# Patient Record
Sex: MALE | Race: WHITE | Employment: OTHER | URBAN - METROPOLITAN AREA
[De-identification: names, ages, dates, MRNs, and addresses within clinical notes are randomized per-mention and may not be internally consistent; named-entity substitution may affect disease eponyms.]

---

## 2018-09-28 ENCOUNTER — APPOINTMENT (OUTPATIENT)
Dept: GENERAL RADIOLOGY | Age: 58
End: 2018-09-28
Payer: MEDICAID

## 2018-09-28 ENCOUNTER — HOSPITAL ENCOUNTER (EMERGENCY)
Age: 58
Discharge: HOME | End: 2018-09-28
Payer: MEDICAID

## 2018-09-28 VITALS
DIASTOLIC BLOOD PRESSURE: 96 MMHG | RESPIRATION RATE: 16 BRPM | HEART RATE: 62 BPM | BODY MASS INDEX: 22.22 KG/M2 | WEIGHT: 150 LBS | TEMPERATURE: 97.4 F | HEIGHT: 69 IN | OXYGEN SATURATION: 98 % | SYSTOLIC BLOOD PRESSURE: 126 MMHG

## 2018-09-28 DIAGNOSIS — R07.9 CHEST PAIN, UNSPECIFIED TYPE: Primary | ICD-10-CM

## 2018-09-28 DIAGNOSIS — M25.512 ACUTE PAIN OF LEFT SHOULDER: ICD-10-CM

## 2018-09-28 LAB
ALBUMIN SERPL-MCNC: 4.2 GM/DL (ref 3.4–5)
ALP BLD-CCNC: 65 IU/L (ref 40–129)
ALT SERPL-CCNC: 12 U/L (ref 10–40)
ANION GAP SERPL CALCULATED.3IONS-SCNC: 14 MMOL/L (ref 4–16)
AST SERPL-CCNC: 21 IU/L (ref 15–37)
BASOPHILS ABSOLUTE: 0.1 K/CU MM
BASOPHILS RELATIVE PERCENT: 0.6 % (ref 0–1)
BILIRUB SERPL-MCNC: 0.3 MG/DL (ref 0–1)
BUN BLDV-MCNC: 14 MG/DL (ref 6–23)
CALCIUM SERPL-MCNC: 9 MG/DL (ref 8.3–10.6)
CHLORIDE BLD-SCNC: 102 MMOL/L (ref 99–110)
CO2: 21 MMOL/L (ref 21–32)
CREAT SERPL-MCNC: 0.8 MG/DL (ref 0.9–1.3)
DIFFERENTIAL TYPE: ABNORMAL
EOSINOPHILS ABSOLUTE: 0.3 K/CU MM
EOSINOPHILS RELATIVE PERCENT: 2.7 % (ref 0–3)
GFR AFRICAN AMERICAN: >60 ML/MIN/1.73M2
GFR NON-AFRICAN AMERICAN: >60 ML/MIN/1.73M2
GLUCOSE BLD-MCNC: 93 MG/DL (ref 70–99)
HCT VFR BLD CALC: 41.1 % (ref 42–52)
HEMOGLOBIN: 13.8 GM/DL (ref 13.5–18)
IMMATURE NEUTROPHIL %: 0.2 % (ref 0–0.43)
LYMPHOCYTES ABSOLUTE: 2.3 K/CU MM
LYMPHOCYTES RELATIVE PERCENT: 24.4 % (ref 24–44)
MCH RBC QN AUTO: 29.4 PG (ref 27–31)
MCHC RBC AUTO-ENTMCNC: 33.6 % (ref 32–36)
MCV RBC AUTO: 87.6 FL (ref 78–100)
MONOCYTES ABSOLUTE: 0.9 K/CU MM
MONOCYTES RELATIVE PERCENT: 9.1 % (ref 0–4)
NUCLEATED RBC %: 0 %
PDW BLD-RTO: 15.4 % (ref 11.7–14.9)
PLATELET # BLD: 234 K/CU MM (ref 140–440)
PMV BLD AUTO: 8.8 FL (ref 7.5–11.1)
POTASSIUM SERPL-SCNC: 4.1 MMOL/L (ref 3.5–5.1)
RBC # BLD: 4.69 M/CU MM (ref 4.6–6.2)
SEGMENTED NEUTROPHILS ABSOLUTE COUNT: 6 K/CU MM
SEGMENTED NEUTROPHILS RELATIVE PERCENT: 63 % (ref 36–66)
SODIUM BLD-SCNC: 137 MMOL/L (ref 135–145)
TOTAL IMMATURE NEUTOROPHIL: 0.02 K/CU MM
TOTAL NUCLEATED RBC: 0 K/CU MM
TOTAL PROTEIN: 6.6 GM/DL (ref 6.4–8.2)
TROPONIN T: <0.01 NG/ML
WBC # BLD: 9.5 K/CU MM (ref 4–10.5)

## 2018-09-28 PROCEDURE — 84484 ASSAY OF TROPONIN QUANT: CPT

## 2018-09-28 PROCEDURE — 93005 ELECTROCARDIOGRAM TRACING: CPT | Performed by: PHYSICIAN ASSISTANT

## 2018-09-28 PROCEDURE — 73030 X-RAY EXAM OF SHOULDER: CPT

## 2018-09-28 PROCEDURE — 71045 X-RAY EXAM CHEST 1 VIEW: CPT

## 2018-09-28 PROCEDURE — 85025 COMPLETE CBC W/AUTO DIFF WBC: CPT

## 2018-09-28 PROCEDURE — 99285 EMERGENCY DEPT VISIT HI MDM: CPT

## 2018-09-28 PROCEDURE — 80053 COMPREHEN METABOLIC PANEL: CPT

## 2018-09-28 PROCEDURE — 6370000000 HC RX 637 (ALT 250 FOR IP): Performed by: PHYSICIAN ASSISTANT

## 2018-09-28 RX ORDER — HYDROCODONE BITARTRATE AND ACETAMINOPHEN 5; 325 MG/1; MG/1
1 TABLET ORAL ONCE
Status: COMPLETED | OUTPATIENT
Start: 2018-09-28 | End: 2018-09-28

## 2018-09-28 RX ORDER — HYDROCODONE BITARTRATE AND ACETAMINOPHEN 5; 325 MG/1; MG/1
1 TABLET ORAL EVERY 6 HOURS PRN
Qty: 8 TABLET | Refills: 0 | Status: SHIPPED | OUTPATIENT
Start: 2018-09-28 | End: 2018-10-06

## 2018-09-28 RX ORDER — METHYLPREDNISOLONE 4 MG/1
TABLET ORAL
Qty: 1 KIT | Refills: 0 | Status: SHIPPED | OUTPATIENT
Start: 2018-09-28 | End: 2018-12-21 | Stop reason: ALTCHOICE

## 2018-09-28 RX ADMIN — HYDROCODONE BITARTRATE AND ACETAMINOPHEN 1 TABLET: 5; 325 TABLET ORAL at 07:14

## 2018-09-28 ASSESSMENT — PAIN SCALES - GENERAL
PAINLEVEL_OUTOF10: 10
PAINLEVEL_OUTOF10: 10

## 2018-09-28 ASSESSMENT — PAIN DESCRIPTION - ORIENTATION: ORIENTATION: LEFT

## 2018-09-28 ASSESSMENT — HEART SCORE: ECG: 0

## 2018-09-28 ASSESSMENT — PAIN DESCRIPTION - PAIN TYPE: TYPE: CHRONIC PAIN

## 2018-09-28 NOTE — ED PROVIDER NOTES
surgical history that includes Arm Surgery (Left, 30+ yrs ); Colonoscopy (2010); and other surgical history (Right, 07/08/2015). Home medications:   Prior to Admission medications    Medication Sig Start Date End Date Taking? Authorizing Provider   HYDROcodone-acetaminophen (NORCO) 5-325 MG per tablet Take 1 tablet by mouth every 6 hours as needed for Pain for up to 8 doses. . 9/28/18 10/6/18 Yes George Franks PA-C   methylPREDNISolone (MEDROL, ELOISA,) 4 MG tablet Take by mouth. 9/28/18  Yes Fuentes Gelineau TERRANCE Franks   naproxen (NAPROSYN) 500 MG tablet Take 1 tablet by mouth 2 times daily 4/5/18   Hair Hernandez MD   ondansetron (ZOFRAN ODT) 4 MG disintegrating tablet Take 1 tablet by mouth every 8 hours as needed for Nausea or Vomiting 4/5/18   Hair Hernandez MD   Dextromethorphan-guaiFENesin (Q-TUSSIN DM)  MG/5ML SYRP Take 5 mLs by mouth every 4 hours as needed for Cough 9/13/17   Debra Suresh PA-C   aspirin 81 MG chewable tablet Take 1 tablet by mouth daily 1/3/17   Jess Reddy MD   theophylline (CORA-24) 100 MG extended release capsule Take 1 capsule by mouth 2 times daily 1/3/17   Jess Reddy MD   verapamil (CALAN) 120 MG tablet Take 1 tablet by mouth every 12 hours 1/3/17   Jess Reddy MD   guaiFENesin [facility]) 600 MG extended release tablet Take 1 tablet by mouth 2 times daily 1/3/17   Jess Reddy MD   tiotropium (Ines Fulton) 18 MCG inhalation capsule Inhale 1 capsule into the lungs daily 1/3/17   Jess Reddy MD   albuterol sulfate HFA (PROVENTIL HFA) 108 (90 BASE) MCG/ACT inhaler Inhale 2 puffs into the lungs every 6 hours as needed for Wheezing 1/3/17   Jess Reddy MD   omeprazole (PRILOSEC) 20 MG capsule Take 1 capsule by mouth daily. 7/18/14   Liborio Brady MD   albuterol (PROVENTIL HFA) 108 (90 BASE) MCG/ACT inhaler Inhale 2 puffs into the lungs every 4 hours as needed for Wheezing or Shortness of Breath for 30 days. With spacer (and mask if indicated). Thanks.  12/30/13 1/29/14  Anika LEIGH Maciel's note for EKG read. Radiographs (if obtained):  [] The following radiograph was interpreted by myself in the absence of a radiologist:   [x] Radiologist's Report Reviewed:  XR CHEST PORTABLE   Final Result   No acute cardiopulmonary disease. XR SHOULDER LEFT (MIN 2 VIEWS)   Final Result   No acute abnormality.                 Labs  Results for orders placed or performed during the hospital encounter of 09/28/18   Troponin   Result Value Ref Range    Troponin T <0.010 <0.01 NG/ML   CBC Auto Differential   Result Value Ref Range    WBC 9.5 4.0 - 10.5 K/CU MM    RBC 4.69 4.6 - 6.2 M/CU MM    Hemoglobin 13.8 13.5 - 18.0 GM/DL    Hematocrit 41.1 (L) 42 - 52 %    MCV 87.6 78 - 100 FL    MCH 29.4 27 - 31 PG    MCHC 33.6 32.0 - 36.0 %    RDW 15.4 (H) 11.7 - 14.9 %    Platelets 496 016 - 002 K/CU MM    MPV 8.8 7.5 - 11.1 FL    Differential Type AUTOMATED DIFFERENTIAL     Segs Relative 63.0 36 - 66 %    Lymphocytes % 24.4 24 - 44 %    Monocytes % 9.1 (H) 0 - 4 %    Eosinophils % 2.7 0 - 3 %    Basophils % 0.6 0 - 1 %    Segs Absolute 6.0 K/CU MM    Lymphocytes # 2.3 K/CU MM    Monocytes # 0.9 K/CU MM    Eosinophils # 0.3 K/CU MM    Basophils # 0.1 K/CU MM    Nucleated RBC % 0.0 %    Total Nucleated RBC 0.0 K/CU MM    Total Immature Neutrophil 0.02 K/CU MM    Immature Neutrophil % 0.2 0 - 0.43 %   CMP   Result Value Ref Range    Sodium 137 135 - 145 MMOL/L    Potassium 4.1 3.5 - 5.1 MMOL/L    Chloride 102 99 - 110 mMol/L    CO2 21 21 - 32 MMOL/L    BUN 14 6 - 23 MG/DL    CREATININE 0.8 (L) 0.9 - 1.3 MG/DL    Glucose 93 70 - 99 MG/DL    Calcium 9.0 8.3 - 10.6 MG/DL    Alb 4.2 3.4 - 5.0 GM/DL    Total Protein 6.6 6.4 - 8.2 GM/DL    Total Bilirubin 0.3 0.0 - 1.0 MG/DL    ALT 12 10 - 40 U/L    AST 21 15 - 37 IU/L    Alkaline Phosphatase 65 40 - 129 IU/L    GFR Non-African American >60 >60 mL/min/1.73m2    GFR African American >60 >60 mL/min/1.73m2    Anion Gap 14 4 - 16   EKG 12 Lead   Result Value Ref Range

## 2018-09-29 PROCEDURE — 93010 ELECTROCARDIOGRAM REPORT: CPT | Performed by: INTERNAL MEDICINE

## 2018-10-01 LAB
EKG ATRIAL RATE: 65 BPM
EKG DIAGNOSIS: NORMAL
EKG P AXIS: 71 DEGREES
EKG P-R INTERVAL: 192 MS
EKG Q-T INTERVAL: 420 MS
EKG QRS DURATION: 100 MS
EKG QTC CALCULATION (BAZETT): 436 MS
EKG R AXIS: 69 DEGREES
EKG T AXIS: 77 DEGREES
EKG VENTRICULAR RATE: 65 BPM

## 2018-11-05 ENCOUNTER — HOSPITAL ENCOUNTER (OUTPATIENT)
Age: 58
Discharge: HOME | End: 2018-11-05
Payer: MEDICAID

## 2018-11-05 ENCOUNTER — HOSPITAL ENCOUNTER (OUTPATIENT)
Dept: GENERAL RADIOLOGY | Age: 58
Discharge: HOME | End: 2018-11-05
Payer: MEDICAID

## 2018-11-05 DIAGNOSIS — M99.05 SEGMENTAL AND SOMATIC DYSFUNCTION OF PELVIC REGION: ICD-10-CM

## 2018-11-05 DIAGNOSIS — M99.03 LUMBAR REGION SOMATIC DYSFUNCTION: ICD-10-CM

## 2018-11-05 PROCEDURE — 72170 X-RAY EXAM OF PELVIS: CPT

## 2018-11-05 PROCEDURE — 72100 X-RAY EXAM L-S SPINE 2/3 VWS: CPT

## 2018-11-30 ENCOUNTER — HOSPITAL ENCOUNTER (OUTPATIENT)
Dept: NEUROLOGY | Age: 58
Discharge: HOME | End: 2018-11-30
Payer: MEDICAID

## 2018-11-30 PROCEDURE — 95860 NEEDLE EMG 1 EXTREMITY: CPT

## 2018-12-19 ENCOUNTER — OFFICE VISIT (OUTPATIENT)
Dept: BARIATRICS/WEIGHT MGMT | Age: 58
End: 2018-12-19
Payer: MEDICAID

## 2018-12-19 VITALS
WEIGHT: 159.2 LBS | DIASTOLIC BLOOD PRESSURE: 80 MMHG | BODY MASS INDEX: 23.58 KG/M2 | HEIGHT: 69 IN | HEART RATE: 59 BPM | RESPIRATION RATE: 15 BRPM | SYSTOLIC BLOOD PRESSURE: 118 MMHG

## 2018-12-19 DIAGNOSIS — K44.9 HIATAL HERNIA: Primary | ICD-10-CM

## 2018-12-19 DIAGNOSIS — K27.9 PUD (PEPTIC ULCER DISEASE): ICD-10-CM

## 2018-12-19 DIAGNOSIS — K21.00 GASTROESOPHAGEAL REFLUX DISEASE WITH ESOPHAGITIS: ICD-10-CM

## 2018-12-19 PROCEDURE — G8420 CALC BMI NORM PARAMETERS: HCPCS | Performed by: SURGERY

## 2018-12-19 PROCEDURE — 4004F PT TOBACCO SCREEN RCVD TLK: CPT | Performed by: SURGERY

## 2018-12-19 PROCEDURE — 3017F COLORECTAL CA SCREEN DOC REV: CPT | Performed by: SURGERY

## 2018-12-19 PROCEDURE — G8427 DOCREV CUR MEDS BY ELIG CLIN: HCPCS | Performed by: SURGERY

## 2018-12-19 PROCEDURE — G8484 FLU IMMUNIZE NO ADMIN: HCPCS | Performed by: SURGERY

## 2018-12-19 PROCEDURE — 99203 OFFICE O/P NEW LOW 30 MIN: CPT | Performed by: SURGERY

## 2018-12-19 RX ORDER — PANTOPRAZOLE SODIUM 40 MG/1
40 TABLET, DELAYED RELEASE ORAL 2 TIMES DAILY
Qty: 60 TABLET | Refills: 3 | Status: ON HOLD | OUTPATIENT
Start: 2018-12-19 | End: 2019-01-28 | Stop reason: HOSPADM

## 2018-12-19 RX ORDER — PANTOPRAZOLE SODIUM 40 MG/1
40 TABLET, DELAYED RELEASE ORAL DAILY
COMMUNITY
End: 2018-12-21 | Stop reason: DRUGHIGH

## 2018-12-19 RX ORDER — AMOXICILLIN 250 MG
2 CAPSULE ORAL DAILY
Qty: 60 TABLET | Refills: 3 | Status: SHIPPED | OUTPATIENT
Start: 2018-12-19 | End: 2018-12-29

## 2018-12-19 ASSESSMENT — ENCOUNTER SYMPTOMS
COLOR CHANGE: 0
SORE THROAT: 0
CONSTIPATION: 1
WHEEZING: 0
BLOOD IN STOOL: 0
VOMITING: 0
ANAL BLEEDING: 0
ABDOMINAL PAIN: 1
SHORTNESS OF BREATH: 0
TROUBLE SWALLOWING: 0
NAUSEA: 1
DIARRHEA: 0
COUGH: 0
VOICE CHANGE: 0
PHOTOPHOBIA: 0

## 2018-12-21 ENCOUNTER — HOSPITAL ENCOUNTER (OUTPATIENT)
Age: 58
Discharge: HOME | End: 2018-12-21
Payer: MEDICAID

## 2018-12-21 ENCOUNTER — ANESTHESIA EVENT (OUTPATIENT)
Dept: OPERATING ROOM | Age: 58
DRG: 220 | End: 2018-12-21
Payer: MEDICAID

## 2018-12-21 ENCOUNTER — HOSPITAL ENCOUNTER (OUTPATIENT)
Dept: PREADMISSION TESTING | Age: 58
Discharge: HOME | End: 2018-12-25
Payer: MEDICAID

## 2018-12-21 VITALS
WEIGHT: 159 LBS | RESPIRATION RATE: 18 BRPM | BODY MASS INDEX: 23.55 KG/M2 | HEART RATE: 70 BPM | HEIGHT: 69 IN | TEMPERATURE: 98.8 F | DIASTOLIC BLOOD PRESSURE: 76 MMHG | SYSTOLIC BLOOD PRESSURE: 111 MMHG | OXYGEN SATURATION: 95 %

## 2018-12-21 LAB
ANION GAP SERPL CALCULATED.3IONS-SCNC: 10 MMOL/L (ref 4–16)
BUN BLDV-MCNC: 14 MG/DL (ref 6–23)
CALCIUM SERPL-MCNC: 9 MG/DL (ref 8.3–10.6)
CHLORIDE BLD-SCNC: 104 MMOL/L (ref 99–110)
CO2: 28 MMOL/L (ref 21–32)
CREAT SERPL-MCNC: 1 MG/DL (ref 0.9–1.3)
GFR AFRICAN AMERICAN: >60 ML/MIN/1.73M2
GFR NON-AFRICAN AMERICAN: >60 ML/MIN/1.73M2
GLUCOSE BLD-MCNC: 94 MG/DL (ref 70–99)
HCT VFR BLD CALC: 42.2 % (ref 42–52)
HEMOGLOBIN: 13.7 GM/DL (ref 13.5–18)
MCH RBC QN AUTO: 29.4 PG (ref 27–31)
MCHC RBC AUTO-ENTMCNC: 32.5 % (ref 32–36)
MCV RBC AUTO: 90.6 FL (ref 78–100)
PDW BLD-RTO: 14.9 % (ref 11.7–14.9)
PLATELET # BLD: 255 K/CU MM (ref 140–440)
PMV BLD AUTO: 8.9 FL (ref 7.5–11.1)
POTASSIUM SERPL-SCNC: 4.7 MMOL/L (ref 3.5–5.1)
RBC # BLD: 4.66 M/CU MM (ref 4.6–6.2)
SODIUM BLD-SCNC: 142 MMOL/L (ref 135–145)
WBC # BLD: 9.5 K/CU MM (ref 4–10.5)

## 2018-12-21 PROCEDURE — 80048 BASIC METABOLIC PNL TOTAL CA: CPT

## 2018-12-21 PROCEDURE — 85027 COMPLETE CBC AUTOMATED: CPT

## 2018-12-21 PROCEDURE — 36415 COLL VENOUS BLD VENIPUNCTURE: CPT

## 2018-12-21 RX ORDER — DOCUSATE SODIUM 100 MG/1
200 CAPSULE, LIQUID FILLED ORAL 2 TIMES DAILY PRN
COMMUNITY
End: 2019-10-11 | Stop reason: CLARIF

## 2018-12-21 RX ORDER — CEFAZOLIN SODIUM 1 G/50ML
1 INJECTION, SOLUTION INTRAVENOUS ONCE
Status: CANCELLED | OUTPATIENT
Start: 2018-12-26

## 2018-12-21 RX ORDER — SUCRALFATE ORAL 1 G/10ML
1 SUSPENSION ORAL 4 TIMES DAILY
Status: ON HOLD | COMMUNITY
End: 2018-12-27 | Stop reason: HOSPADM

## 2018-12-21 ASSESSMENT — ENCOUNTER SYMPTOMS: DYSPNEA ACTIVITY LEVEL: AFTER AMBULATING 2 FLIGHTS OF STAIRS

## 2018-12-21 ASSESSMENT — PAIN SCALES - GENERAL: PAINLEVEL_OUTOF10: 7

## 2018-12-21 ASSESSMENT — LIFESTYLE VARIABLES: SMOKING_STATUS: 1

## 2018-12-21 NOTE — ANESTHESIA PRE PROCEDURE
08/02/2017       HCG (If Applicable): No results found for: PREGTESTUR, PREGSERUM, HCG, HCGQUANT     ABGs: No results found for: PHART, PO2ART, WVZ1OSS, NMP4FZR, BEART, S7WVGSFX     Type & Screen (If Applicable):  No results found for: [facility]    Anesthesia Evaluation  Patient summary reviewed and Nursing notes reviewed no history of anesthetic complications:   Airway: Mallampati: II  TM distance: >3 FB   Neck ROM: limited  Mouth opening: > = 3 FB Dental:    (+) edentulous      Pulmonary: breath sounds clear to auscultation  (+) COPD:  current smoker ( ETOH once a month)                           Cardiovascular:  Exercise tolerance: good (>4 METS),   (+) hypertension:, NAVARRETE: after ambulating 2 flights of stairs,       NYHA Classification: I  ECG reviewed  Rhythm: regular  Rate: normal  Echocardiogram reviewed         Beta Blocker:  Not on Beta Blocker      ROS comment: ECHO   Summary   Left ventricular systolic function is normal.   Ejection fraction is visually estimated at 50%.   Essentially normal chamber sizes.   Sclerotic, but non-stenotic aortic valve.   No evidence of any pericardial effusion.   Dilated LVOT, measuring at 2.5 cm.   Dilated aortic root, measuring at 4.0 cm.   Inferior vena cava is dilated, measuring at 2.6 cm cm, but does collapse   with respiration.   Mild MR and TR noted   RVSP is normal      Signature      ------------------------------------------------------------------   Electronically signed by Shay Moreau MD (Interpreting   physician) on 08/02/2017 at 07:55 PM   ------------------------------------------------------------------    HR 65  Normal sinus rhythm   Incomplete right bundle branch block   Borderline ECG   When compared with ECG of 13-SEP-2017 11:25,   No significant change was found   Confirmed by [facility] MD, Dorcas Galeazzi ([id number]) on 9/29/2018 8:16:33 PM      Neuro/Psych:   (+) neuromuscular disease (Numbness & tingling in hands & feet; left shoulder impingement ):, headaches:

## 2018-12-22 LAB
EKG ATRIAL RATE: 65 BPM
EKG DIAGNOSIS: NORMAL
EKG P AXIS: 68 DEGREES
EKG P-R INTERVAL: 198 MS
EKG Q-T INTERVAL: 398 MS
EKG QRS DURATION: 100 MS
EKG QTC CALCULATION (BAZETT): 413 MS
EKG R AXIS: 35 DEGREES
EKG T AXIS: 57 DEGREES
EKG VENTRICULAR RATE: 65 BPM

## 2018-12-26 ENCOUNTER — HOSPITAL ENCOUNTER (INPATIENT)
Age: 58
LOS: 1 days | Discharge: HOME | DRG: 220 | End: 2018-12-27
Attending: SURGERY | Admitting: SURGERY
Payer: MEDICAID

## 2018-12-26 ENCOUNTER — ANESTHESIA (OUTPATIENT)
Dept: OPERATING ROOM | Age: 58
DRG: 220 | End: 2018-12-26
Payer: MEDICAID

## 2018-12-26 VITALS
OXYGEN SATURATION: 91 % | SYSTOLIC BLOOD PRESSURE: 136 MMHG | RESPIRATION RATE: 10 BRPM | TEMPERATURE: 97.1 F | DIASTOLIC BLOOD PRESSURE: 80 MMHG

## 2018-12-26 DIAGNOSIS — K44.9 PARAESOPHAGEAL HIATAL HERNIA: Primary | ICD-10-CM

## 2018-12-26 PROCEDURE — 6360000002 HC RX W HCPCS: Performed by: SURGERY

## 2018-12-26 PROCEDURE — 1200000000 HC SEMI PRIVATE

## 2018-12-26 PROCEDURE — 6360000002 HC RX W HCPCS: Performed by: ANESTHESIOLOGY

## 2018-12-26 PROCEDURE — 6360000002 HC RX W HCPCS: Performed by: NURSE ANESTHETIST, CERTIFIED REGISTERED

## 2018-12-26 PROCEDURE — 94761 N-INVAS EAR/PLS OXIMETRY MLT: CPT

## 2018-12-26 PROCEDURE — 2580000003 HC RX 258: Performed by: NURSE ANESTHETIST, CERTIFIED REGISTERED

## 2018-12-26 PROCEDURE — 2709999900 HC NON-CHARGEABLE SUPPLY: Performed by: SURGERY

## 2018-12-26 PROCEDURE — 0DV44ZZ RESTRICTION OF ESOPHAGOGASTRIC JUNCTION, PERCUTANEOUS ENDOSCOPIC APPROACH: ICD-10-PCS | Performed by: SURGERY

## 2018-12-26 PROCEDURE — S2900 ROBOTIC SURGICAL SYSTEM: HCPCS | Performed by: SURGERY

## 2018-12-26 PROCEDURE — 3600000009 HC SURGERY ROBOT BASE: Performed by: SURGERY

## 2018-12-26 PROCEDURE — 2700000000 HC OXYGEN THERAPY PER DAY

## 2018-12-26 PROCEDURE — 2580000003 HC RX 258: Performed by: SURGERY

## 2018-12-26 PROCEDURE — 6370000000 HC RX 637 (ALT 250 FOR IP): Performed by: SURGERY

## 2018-12-26 PROCEDURE — 7100000001 HC PACU RECOVERY - ADDTL 15 MIN: Performed by: SURGERY

## 2018-12-26 PROCEDURE — C9113 INJ PANTOPRAZOLE SODIUM, VIA: HCPCS | Performed by: SURGERY

## 2018-12-26 PROCEDURE — C1713 ANCHOR/SCREW BN/BN,TIS/BN: HCPCS | Performed by: SURGERY

## 2018-12-26 PROCEDURE — 0BUT4JZ SUPPLEMENT DIAPHRAGM WITH SYNTHETIC SUBSTITUTE, PERCUTANEOUS ENDOSCOPIC APPROACH: ICD-10-PCS | Performed by: SURGERY

## 2018-12-26 PROCEDURE — 2580000003 HC RX 258: Performed by: ANESTHESIOLOGY

## 2018-12-26 PROCEDURE — 43282 LAP PARAESOPH HER RPR W/MESH: CPT | Performed by: SURGERY

## 2018-12-26 PROCEDURE — 2500000003 HC RX 250 WO HCPCS: Performed by: SURGERY

## 2018-12-26 PROCEDURE — 2500000003 HC RX 250 WO HCPCS: Performed by: NURSE ANESTHETIST, CERTIFIED REGISTERED

## 2018-12-26 PROCEDURE — 7100000000 HC PACU RECOVERY - FIRST 15 MIN: Performed by: SURGERY

## 2018-12-26 PROCEDURE — 3700000001 HC ADD 15 MINUTES (ANESTHESIA): Performed by: SURGERY

## 2018-12-26 PROCEDURE — C9290 INJ, BUPIVACAINE LIPOSOME: HCPCS | Performed by: SURGERY

## 2018-12-26 PROCEDURE — 3600000019 HC SURGERY ROBOT ADDTL 15MIN: Performed by: SURGERY

## 2018-12-26 PROCEDURE — 3700000000 HC ANESTHESIA ATTENDED CARE: Performed by: SURGERY

## 2018-12-26 PROCEDURE — 8E0W4CZ ROBOTIC ASSISTED PROCEDURE OF TRUNK REGION, PERCUTANEOUS ENDOSCOPIC APPROACH: ICD-10-PCS | Performed by: SURGERY

## 2018-12-26 DEVICE — MESH HERN W7XL10CM SYN TISS REINF BIOABSRB DISP BIO-A: Type: IMPLANTABLE DEVICE | Site: ABDOMEN | Status: FUNCTIONAL

## 2018-12-26 RX ORDER — BISACODYL 10 MG
10 SUPPOSITORY, RECTAL RECTAL DAILY PRN
Status: DISCONTINUED | OUTPATIENT
Start: 2018-12-26 | End: 2018-12-27 | Stop reason: HOSPADM

## 2018-12-26 RX ORDER — HYDROMORPHONE HCL 110MG/55ML
0.5 PATIENT CONTROLLED ANALGESIA SYRINGE INTRAVENOUS EVERY 5 MIN PRN
Status: DISCONTINUED | OUTPATIENT
Start: 2018-12-26 | End: 2018-12-26 | Stop reason: HOSPADM

## 2018-12-26 RX ORDER — SODIUM CHLORIDE 0.9 % (FLUSH) 0.9 %
10 SYRINGE (ML) INJECTION EVERY 12 HOURS SCHEDULED
Status: DISCONTINUED | OUTPATIENT
Start: 2018-12-26 | End: 2018-12-27 | Stop reason: HOSPADM

## 2018-12-26 RX ORDER — ONDANSETRON 2 MG/ML
4 INJECTION INTRAMUSCULAR; INTRAVENOUS
Status: DISCONTINUED | OUTPATIENT
Start: 2018-12-26 | End: 2018-12-26 | Stop reason: HOSPADM

## 2018-12-26 RX ORDER — PROPOFOL 10 MG/ML
INJECTION, EMULSION INTRAVENOUS PRN
Status: DISCONTINUED | OUTPATIENT
Start: 2018-12-26 | End: 2018-12-26 | Stop reason: SDUPTHER

## 2018-12-26 RX ORDER — OXYCODONE HYDROCHLORIDE AND ACETAMINOPHEN 5; 325 MG/1; MG/1
1 TABLET ORAL EVERY 4 HOURS PRN
Status: DISCONTINUED | OUTPATIENT
Start: 2018-12-26 | End: 2018-12-27 | Stop reason: HOSPADM

## 2018-12-26 RX ORDER — ACETAMINOPHEN 325 MG/1
650 TABLET ORAL EVERY 4 HOURS PRN
Status: DISCONTINUED | OUTPATIENT
Start: 2018-12-26 | End: 2018-12-27 | Stop reason: HOSPADM

## 2018-12-26 RX ORDER — ROCURONIUM BROMIDE 10 MG/ML
INJECTION, SOLUTION INTRAVENOUS PRN
Status: DISCONTINUED | OUTPATIENT
Start: 2018-12-26 | End: 2018-12-26 | Stop reason: SDUPTHER

## 2018-12-26 RX ORDER — FENTANYL CITRATE 50 UG/ML
25 INJECTION, SOLUTION INTRAMUSCULAR; INTRAVENOUS EVERY 5 MIN PRN
Status: DISCONTINUED | OUTPATIENT
Start: 2018-12-26 | End: 2018-12-26 | Stop reason: HOSPADM

## 2018-12-26 RX ORDER — SENNA AND DOCUSATE SODIUM 50; 8.6 MG/1; MG/1
2 TABLET, FILM COATED ORAL 2 TIMES DAILY
Status: DISCONTINUED | OUTPATIENT
Start: 2018-12-26 | End: 2018-12-27 | Stop reason: HOSPADM

## 2018-12-26 RX ORDER — MAGNESIUM SULFATE 1 G/100ML
1 INJECTION INTRAVENOUS PRN
Status: DISCONTINUED | OUTPATIENT
Start: 2018-12-26 | End: 2018-12-27 | Stop reason: HOSPADM

## 2018-12-26 RX ORDER — PROMETHAZINE HYDROCHLORIDE 25 MG/ML
12.5 INJECTION, SOLUTION INTRAMUSCULAR; INTRAVENOUS EVERY 6 HOURS PRN
Status: DISCONTINUED | OUTPATIENT
Start: 2018-12-26 | End: 2018-12-27 | Stop reason: HOSPADM

## 2018-12-26 RX ORDER — CEFAZOLIN SODIUM 2 G/50ML
2 SOLUTION INTRAVENOUS EVERY 8 HOURS
Status: DISCONTINUED | OUTPATIENT
Start: 2018-12-26 | End: 2018-12-26 | Stop reason: SDUPTHER

## 2018-12-26 RX ORDER — DOCUSATE SODIUM 100 MG/1
100 CAPSULE, LIQUID FILLED ORAL 2 TIMES DAILY
Status: DISCONTINUED | OUTPATIENT
Start: 2018-12-26 | End: 2018-12-27 | Stop reason: HOSPADM

## 2018-12-26 RX ORDER — AMOXICILLIN 250 MG
2 CAPSULE ORAL DAILY
Status: DISCONTINUED | OUTPATIENT
Start: 2018-12-26 | End: 2018-12-26 | Stop reason: SDUPTHER

## 2018-12-26 RX ORDER — LIDOCAINE HYDROCHLORIDE 20 MG/ML
INJECTION, SOLUTION EPIDURAL; INFILTRATION; INTRACAUDAL; PERINEURAL PRN
Status: DISCONTINUED | OUTPATIENT
Start: 2018-12-26 | End: 2018-12-26 | Stop reason: SDUPTHER

## 2018-12-26 RX ORDER — OXYCODONE HYDROCHLORIDE AND ACETAMINOPHEN 5; 325 MG/1; MG/1
2 TABLET ORAL EVERY 4 HOURS PRN
Status: DISCONTINUED | OUTPATIENT
Start: 2018-12-26 | End: 2018-12-27 | Stop reason: HOSPADM

## 2018-12-26 RX ORDER — SODIUM CHLORIDE, SODIUM LACTATE, POTASSIUM CHLORIDE, CALCIUM CHLORIDE 600; 310; 30; 20 MG/100ML; MG/100ML; MG/100ML; MG/100ML
INJECTION, SOLUTION INTRAVENOUS ONCE
Status: COMPLETED | OUTPATIENT
Start: 2018-12-26 | End: 2018-12-26

## 2018-12-26 RX ORDER — POTASSIUM CHLORIDE 7.45 MG/ML
10 INJECTION INTRAVENOUS PRN
Status: DISCONTINUED | OUTPATIENT
Start: 2018-12-26 | End: 2018-12-27 | Stop reason: HOSPADM

## 2018-12-26 RX ORDER — HYDROMORPHONE HCL 110MG/55ML
1 PATIENT CONTROLLED ANALGESIA SYRINGE INTRAVENOUS
Status: DISCONTINUED | OUTPATIENT
Start: 2018-12-26 | End: 2018-12-27 | Stop reason: HOSPADM

## 2018-12-26 RX ORDER — SODIUM CHLORIDE, SODIUM LACTATE, POTASSIUM CHLORIDE, CALCIUM CHLORIDE 600; 310; 30; 20 MG/100ML; MG/100ML; MG/100ML; MG/100ML
INJECTION, SOLUTION INTRAVENOUS CONTINUOUS PRN
Status: DISCONTINUED | OUTPATIENT
Start: 2018-12-26 | End: 2018-12-26 | Stop reason: SDUPTHER

## 2018-12-26 RX ORDER — HYDRALAZINE HYDROCHLORIDE 20 MG/ML
INJECTION INTRAMUSCULAR; INTRAVENOUS PRN
Status: DISCONTINUED | OUTPATIENT
Start: 2018-12-26 | End: 2018-12-26 | Stop reason: SDUPTHER

## 2018-12-26 RX ORDER — ONDANSETRON 4 MG/1
4 TABLET, ORALLY DISINTEGRATING ORAL EVERY 8 HOURS PRN
Status: DISCONTINUED | OUTPATIENT
Start: 2018-12-26 | End: 2018-12-27 | Stop reason: HOSPADM

## 2018-12-26 RX ORDER — SODIUM CHLORIDE 0.9 % (FLUSH) 0.9 %
10 SYRINGE (ML) INJECTION PRN
Status: DISCONTINUED | OUTPATIENT
Start: 2018-12-26 | End: 2018-12-27 | Stop reason: HOSPADM

## 2018-12-26 RX ORDER — LABETALOL HYDROCHLORIDE 5 MG/ML
5 INJECTION, SOLUTION INTRAVENOUS EVERY 10 MIN PRN
Status: DISCONTINUED | OUTPATIENT
Start: 2018-12-26 | End: 2018-12-26 | Stop reason: HOSPADM

## 2018-12-26 RX ORDER — PANTOPRAZOLE SODIUM 40 MG/10ML
40 INJECTION, POWDER, LYOPHILIZED, FOR SOLUTION INTRAVENOUS 2 TIMES DAILY
Status: DISCONTINUED | OUTPATIENT
Start: 2018-12-26 | End: 2018-12-27 | Stop reason: HOSPADM

## 2018-12-26 RX ORDER — ACETAMINOPHEN 10 MG/ML
1000 INJECTION, SOLUTION INTRAVENOUS ONCE
Status: COMPLETED | OUTPATIENT
Start: 2018-12-26 | End: 2018-12-26

## 2018-12-26 RX ORDER — CEFAZOLIN SODIUM 1 G/50ML
1 INJECTION, SOLUTION INTRAVENOUS ONCE
Status: COMPLETED | OUTPATIENT
Start: 2018-12-26 | End: 2018-12-26

## 2018-12-26 RX ORDER — ONDANSETRON 2 MG/ML
INJECTION INTRAMUSCULAR; INTRAVENOUS PRN
Status: DISCONTINUED | OUTPATIENT
Start: 2018-12-26 | End: 2018-12-26 | Stop reason: SDUPTHER

## 2018-12-26 RX ORDER — ALBUTEROL SULFATE 90 UG/1
2 AEROSOL, METERED RESPIRATORY (INHALATION) EVERY 6 HOURS PRN
Status: DISCONTINUED | OUTPATIENT
Start: 2018-12-26 | End: 2018-12-27 | Stop reason: HOSPADM

## 2018-12-26 RX ORDER — DEXTROSE AND SODIUM CHLORIDE 5; .45 G/100ML; G/100ML
INJECTION, SOLUTION INTRAVENOUS CONTINUOUS
Status: DISCONTINUED | OUTPATIENT
Start: 2018-12-26 | End: 2018-12-27 | Stop reason: HOSPADM

## 2018-12-26 RX ORDER — ACETAMINOPHEN 650 MG/1
650 SUPPOSITORY RECTAL EVERY 4 HOURS PRN
Status: DISCONTINUED | OUTPATIENT
Start: 2018-12-26 | End: 2018-12-27 | Stop reason: HOSPADM

## 2018-12-26 RX ORDER — ONDANSETRON 2 MG/ML
4 INJECTION INTRAMUSCULAR; INTRAVENOUS EVERY 4 HOURS PRN
Status: DISCONTINUED | OUTPATIENT
Start: 2018-12-26 | End: 2018-12-27 | Stop reason: HOSPADM

## 2018-12-26 RX ORDER — FENTANYL CITRATE 50 UG/ML
INJECTION, SOLUTION INTRAMUSCULAR; INTRAVENOUS PRN
Status: DISCONTINUED | OUTPATIENT
Start: 2018-12-26 | End: 2018-12-26 | Stop reason: SDUPTHER

## 2018-12-26 RX ORDER — PROMETHAZINE HYDROCHLORIDE 25 MG/ML
6.25 INJECTION, SOLUTION INTRAMUSCULAR; INTRAVENOUS
Status: COMPLETED | OUTPATIENT
Start: 2018-12-26 | End: 2018-12-26

## 2018-12-26 RX ORDER — ACETAMINOPHEN 80 MG
TABLET,CHEWABLE ORAL
Status: DISCONTINUED
Start: 2018-12-26 | End: 2018-12-26 | Stop reason: WASHOUT

## 2018-12-26 RX ORDER — LABETALOL HYDROCHLORIDE 5 MG/ML
INJECTION, SOLUTION INTRAVENOUS PRN
Status: DISCONTINUED | OUTPATIENT
Start: 2018-12-26 | End: 2018-12-26 | Stop reason: SDUPTHER

## 2018-12-26 RX ORDER — CEFAZOLIN SODIUM 2 G/50ML
2 SOLUTION INTRAVENOUS EVERY 8 HOURS
Status: COMPLETED | OUTPATIENT
Start: 2018-12-26 | End: 2018-12-27

## 2018-12-26 RX ORDER — HYDROMORPHONE HCL 110MG/55ML
PATIENT CONTROLLED ANALGESIA SYRINGE INTRAVENOUS PRN
Status: DISCONTINUED | OUTPATIENT
Start: 2018-12-26 | End: 2018-12-26 | Stop reason: SDUPTHER

## 2018-12-26 RX ORDER — ONDANSETRON 2 MG/ML
4 INJECTION INTRAMUSCULAR; INTRAVENOUS EVERY 4 HOURS PRN
Status: DISCONTINUED | OUTPATIENT
Start: 2018-12-26 | End: 2018-12-26 | Stop reason: SDUPTHER

## 2018-12-26 RX ORDER — GLYCOPYRROLATE 1 MG/5 ML
SYRINGE (ML) INTRAVENOUS PRN
Status: DISCONTINUED | OUTPATIENT
Start: 2018-12-26 | End: 2018-12-26 | Stop reason: SDUPTHER

## 2018-12-26 RX ORDER — NEOSTIGMINE METHYLSULFATE 5 MG/5 ML
SYRINGE (ML) INTRAVENOUS PRN
Status: DISCONTINUED | OUTPATIENT
Start: 2018-12-26 | End: 2018-12-26 | Stop reason: SDUPTHER

## 2018-12-26 RX ORDER — DEXAMETHASONE SODIUM PHOSPHATE 4 MG/ML
INJECTION, SOLUTION INTRA-ARTICULAR; INTRALESIONAL; INTRAMUSCULAR; INTRAVENOUS; SOFT TISSUE PRN
Status: DISCONTINUED | OUTPATIENT
Start: 2018-12-26 | End: 2018-12-26 | Stop reason: SDUPTHER

## 2018-12-26 RX ADMIN — THEOPHYLLINE ANHYDROUS 100 MG: 100 CAPSULE, EXTENDED RELEASE ORAL at 21:28

## 2018-12-26 RX ADMIN — DOCUSATE SODIUM AND SENNOSIDES 2 TABLET: 8.6; 5 TABLET, FILM COATED ORAL at 21:28

## 2018-12-26 RX ADMIN — SODIUM CHLORIDE, POTASSIUM CHLORIDE, SODIUM LACTATE AND CALCIUM CHLORIDE: 600; 310; 30; 20 INJECTION, SOLUTION INTRAVENOUS at 11:56

## 2018-12-26 RX ADMIN — METRONIDAZOLE 500 MG: 500 INJECTION, SOLUTION INTRAVENOUS at 12:18

## 2018-12-26 RX ADMIN — SODIUM CHLORIDE, PRESERVATIVE FREE 10 ML: 5 INJECTION INTRAVENOUS at 21:31

## 2018-12-26 RX ADMIN — PANTOPRAZOLE SODIUM 40 MG: 40 INJECTION, POWDER, FOR SOLUTION INTRAVENOUS at 21:28

## 2018-12-26 RX ADMIN — CEFAZOLIN SODIUM 2 G: 1 INJECTION, SOLUTION INTRAVENOUS at 12:06

## 2018-12-26 RX ADMIN — HYDROMORPHONE HYDROCHLORIDE 1 MG: 2 INJECTION INTRAMUSCULAR; INTRAVENOUS; SUBCUTANEOUS at 12:32

## 2018-12-26 RX ADMIN — HYDROMORPHONE HYDROCHLORIDE 0.5 MG: 2 INJECTION INTRAMUSCULAR; INTRAVENOUS; SUBCUTANEOUS at 14:20

## 2018-12-26 RX ADMIN — Medication 3 MG: at 13:46

## 2018-12-26 RX ADMIN — SODIUM CHLORIDE, POTASSIUM CHLORIDE, SODIUM LACTATE AND CALCIUM CHLORIDE: 600; 310; 30; 20 INJECTION, SOLUTION INTRAVENOUS at 10:36

## 2018-12-26 RX ADMIN — LIDOCAINE HYDROCHLORIDE 80 MG: 20 INJECTION, SOLUTION EPIDURAL; INFILTRATION; INTRACAUDAL; PERINEURAL at 12:05

## 2018-12-26 RX ADMIN — LABETALOL HYDROCHLORIDE 5 MG: 5 INJECTION, SOLUTION INTRAVENOUS at 12:42

## 2018-12-26 RX ADMIN — SODIUM CHLORIDE, POTASSIUM CHLORIDE, SODIUM LACTATE AND CALCIUM CHLORIDE: 600; 310; 30; 20 INJECTION, SOLUTION INTRAVENOUS at 12:55

## 2018-12-26 RX ADMIN — OXYCODONE AND ACETAMINOPHEN 2 TABLET: 5; 325 TABLET ORAL at 21:52

## 2018-12-26 RX ADMIN — DEXAMETHASONE SODIUM PHOSPHATE 8 MG: 4 INJECTION, SOLUTION INTRAMUSCULAR; INTRAVENOUS at 12:22

## 2018-12-26 RX ADMIN — FENTANYL CITRATE 50 MCG: 50 INJECTION INTRAMUSCULAR; INTRAVENOUS at 13:16

## 2018-12-26 RX ADMIN — HYDROMORPHONE HYDROCHLORIDE 1 MG: 2 INJECTION INTRAMUSCULAR; INTRAVENOUS; SUBCUTANEOUS at 11:57

## 2018-12-26 RX ADMIN — CEFAZOLIN SODIUM 2 G: 2 SOLUTION INTRAVENOUS at 21:27

## 2018-12-26 RX ADMIN — ACETAMINOPHEN 1000 MG: 10 INJECTION, SOLUTION INTRAVENOUS at 13:48

## 2018-12-26 RX ADMIN — PROPOFOL 150 MG: 10 INJECTION, EMULSION INTRAVENOUS at 12:05

## 2018-12-26 RX ADMIN — DEXTROSE AND SODIUM CHLORIDE: 5; 450 INJECTION, SOLUTION INTRAVENOUS at 14:59

## 2018-12-26 RX ADMIN — VERAPAMIL HYDROCHLORIDE 120 MG: 120 TABLET, FILM COATED, EXTENDED RELEASE ORAL at 21:52

## 2018-12-26 RX ADMIN — DEXTROSE AND SODIUM CHLORIDE: 5; 450 INJECTION, SOLUTION INTRAVENOUS at 23:33

## 2018-12-26 RX ADMIN — HYDRALAZINE HYDROCHLORIDE 5 MG: 20 INJECTION, SOLUTION INTRAMUSCULAR; INTRAVENOUS at 12:47

## 2018-12-26 RX ADMIN — Medication 0.4 MG: at 13:46

## 2018-12-26 RX ADMIN — ROCURONIUM BROMIDE 30 MG: 50 INJECTION, SOLUTION INTRAVENOUS at 13:20

## 2018-12-26 RX ADMIN — DOCUSATE SODIUM 100 MG: 100 CAPSULE, LIQUID FILLED ORAL at 21:28

## 2018-12-26 RX ADMIN — ONDANSETRON HYDROCHLORIDE 4 MG: 2 SOLUTION INTRAMUSCULAR; INTRAVENOUS at 13:49

## 2018-12-26 RX ADMIN — ROCURONIUM BROMIDE 50 MG: 50 INJECTION, SOLUTION INTRAVENOUS at 12:07

## 2018-12-26 RX ADMIN — PROMETHAZINE HYDROCHLORIDE 6.25 MG: 25 INJECTION INTRAMUSCULAR; INTRAVENOUS at 14:30

## 2018-12-26 ASSESSMENT — PULMONARY FUNCTION TESTS
PIF_VALUE: 22
PIF_VALUE: 23
PIF_VALUE: 24
PIF_VALUE: 7
PIF_VALUE: 15
PIF_VALUE: 0
PIF_VALUE: 1
PIF_VALUE: 14
PIF_VALUE: 2
PIF_VALUE: 23
PIF_VALUE: 29
PIF_VALUE: 17
PIF_VALUE: 23
PIF_VALUE: 0
PIF_VALUE: 21
PIF_VALUE: 23
PIF_VALUE: 24
PIF_VALUE: 22
PIF_VALUE: 16
PIF_VALUE: 21
PIF_VALUE: 16
PIF_VALUE: 5
PIF_VALUE: 21
PIF_VALUE: 23
PIF_VALUE: 20
PIF_VALUE: 17
PIF_VALUE: 18
PIF_VALUE: 20
PIF_VALUE: 23
PIF_VALUE: 15
PIF_VALUE: 15
PIF_VALUE: 14
PIF_VALUE: 24
PIF_VALUE: 21
PIF_VALUE: 21
PIF_VALUE: 24
PIF_VALUE: 17
PIF_VALUE: 22
PIF_VALUE: 17
PIF_VALUE: 21
PIF_VALUE: 14
PIF_VALUE: 17
PIF_VALUE: 1
PIF_VALUE: 24
PIF_VALUE: 17
PIF_VALUE: 23
PIF_VALUE: 14
PIF_VALUE: 14
PIF_VALUE: 4
PIF_VALUE: 21
PIF_VALUE: 17
PIF_VALUE: 22
PIF_VALUE: 17
PIF_VALUE: 14
PIF_VALUE: 21
PIF_VALUE: 14
PIF_VALUE: 21
PIF_VALUE: 21
PIF_VALUE: 25
PIF_VALUE: 23
PIF_VALUE: 15
PIF_VALUE: 2
PIF_VALUE: 22
PIF_VALUE: 14
PIF_VALUE: 20
PIF_VALUE: 21
PIF_VALUE: 19
PIF_VALUE: 21
PIF_VALUE: 21
PIF_VALUE: 26
PIF_VALUE: 24
PIF_VALUE: 20
PIF_VALUE: 2
PIF_VALUE: 24
PIF_VALUE: 16
PIF_VALUE: 24
PIF_VALUE: 24
PIF_VALUE: 17
PIF_VALUE: 21
PIF_VALUE: 21
PIF_VALUE: 23
PIF_VALUE: 20
PIF_VALUE: 21
PIF_VALUE: 25
PIF_VALUE: 21
PIF_VALUE: 23
PIF_VALUE: 6
PIF_VALUE: 24
PIF_VALUE: 15
PIF_VALUE: 24
PIF_VALUE: 23
PIF_VALUE: 19
PIF_VALUE: 23
PIF_VALUE: 14
PIF_VALUE: 0
PIF_VALUE: 1
PIF_VALUE: 0
PIF_VALUE: 21
PIF_VALUE: 17
PIF_VALUE: 8
PIF_VALUE: 5
PIF_VALUE: 4
PIF_VALUE: 17
PIF_VALUE: 17
PIF_VALUE: 25
PIF_VALUE: 21
PIF_VALUE: 23
PIF_VALUE: 0
PIF_VALUE: 24
PIF_VALUE: 21
PIF_VALUE: 0
PIF_VALUE: 16
PIF_VALUE: 19
PIF_VALUE: 25
PIF_VALUE: 14
PIF_VALUE: 4
PIF_VALUE: 21
PIF_VALUE: 14
PIF_VALUE: 24
PIF_VALUE: 14
PIF_VALUE: 24

## 2018-12-26 ASSESSMENT — PAIN DESCRIPTION - LOCATION: LOCATION: ABDOMEN

## 2018-12-26 ASSESSMENT — PAIN SCALES - GENERAL
PAINLEVEL_OUTOF10: 8
PAINLEVEL_OUTOF10: 7

## 2018-12-26 ASSESSMENT — PAIN DESCRIPTION - DESCRIPTORS
DESCRIPTORS: PRESSURE
DESCRIPTORS: SHARP

## 2018-12-26 ASSESSMENT — PAIN DESCRIPTION - PAIN TYPE: TYPE: SURGICAL PAIN

## 2018-12-26 ASSESSMENT — PAIN - FUNCTIONAL ASSESSMENT: PAIN_FUNCTIONAL_ASSESSMENT: 0-10

## 2018-12-26 ASSESSMENT — PAIN DESCRIPTION - ORIENTATION: ORIENTATION: MID

## 2018-12-26 NOTE — OP NOTE
OPERATIVE REPORT      Jama Ramirez, 1960, 63 y.o.,  male, CSN: [id number]  December 26, 2018      PREOPERATIVE DIAGNOSES:   1. Refractory severe gastroesophageal reflux disease. 2. Medium size paraesophageal Hiatal hernia. POSTOPERATIVE DIAGNOSES:  1. Refractory severe gastroesophageal reflux disease. 2. Large paraesophageal Hiatal hernia. PROCEDURES DONE:   1. Laparoscopic da Perla-assisted robotic paraesophageal hiatal  Herniorrhaphy with Bio-A mesh 10 cm x 7 cm . 2. Nissen-Basilia floppy fundoplication. SURGEON: Gauri Hood MD, FACS, FICS. ASSISTANT: None. ANESTHESIA: General endotracheal anesthesia, as well as local to the wounds  preoperatively and postoperatively using 1% Xylocaine with epinephrine and  0.5% Marcaine, 50:50 mixture. ESTIMATED BLOOD LOSS: Less than 10 mL. BLOOD GIVEN: None. FLUIDS GIVEN: Crystalloid. DRAINS: None. COMPLICATIONS: None. CONDITION: Stable, extubated to the PACU. SPECIMENS: Hiatal hernial sac and Gastro-Esophageal pad of fat - discarded. OPERATIVE DESCRIPTION: After proper informed consent was signed by the  patient, knowing all the risks, benefits, and potential complications and  possible alternatives of the procedure, after the patient complied adequately  with 2 weeks worth of Slim-Fast diet, he was aware of the fact that  eructation after surgery might be difficult or impossible; well aware of the  fact that recurrent reflux might occur, recurrent hiatal hernia might happen,  need for further procedure or further interventions might be necessary. Given his history of longstanding, 20+ years of refractory GERD, with a CT-proven paraesophageal  hiatal hernia, and an EGD confirming the diagnosis and ruling out esophageal neoplasms and gross dysmotility disorders, decision was made to proceed with laparoscopic da  Perla-assisted hiatal herniorrhaphy and Nissen fundoplication.    The patient was properly identified in the holding area; he received 2 g of  Ancef IV, 500 mg of Flagyl IV. TEDs and SCDs were placed on his legs and  activated bilaterally and he voided his urinary bladder on-call to OR and  Hibiclens skin prep was performed; he was taken to the operating room, was  placed supine on the operating room table, and after institution of general  endotracheal anesthesia, his abdomen  was prepped and draped in usual sterile fashion. An OG tube was placed without difficulty by Anesthesia. The  above-mentioned anesthetic mixture was used to anesthetize all incisions,  starting 2 fingerbreadths above the umbilicus after a timeout was carried out  appropriately. Dissection was carried down all the way to the fascia. The  fascia was opened longitudinally for 1 cm. The peritoneal cavity was entered  under direct visualization. The 10-mm port was introduced. Pneumoperitoneum  was instituted using CO2 inflation of 14 mmHg pressure. Patient was placed  in steep reverse Trendelenburg position. Exploration of the abdomen revealed  the previously noted on CT scan hiatal hernia. The [facility] liver retractor was introduced, and with the help of another  retractor to lift the liver, the [facility] liver retractor was used to retract  the left lobe of the liver and exposed the hiatal hernia, and intrathoracic  fundus of the stomach was noted. The hiatal hernia defect was about 4 cm in  greatest dimension. All ports were placed under direct visualization, that  is two 8-mm ports in the left upper quadrant, mid-clavicular line and  anterior axillary line, and the right upper quadrant port in the  mid-clavicular line. All ports were 10 cm apart, one from the other in a  semilunar line. Attention was paid to the hiatal hernia. The gastrohepatic omentum/pars  flaccida were taken down meticulously using the [employer] ligature.  The hot  scissors were then used to take down the hernial sac, which was done without  any difficulty, starting at

## 2018-12-27 VITALS
WEIGHT: 159 LBS | HEART RATE: 65 BPM | HEIGHT: 69 IN | SYSTOLIC BLOOD PRESSURE: 122 MMHG | DIASTOLIC BLOOD PRESSURE: 95 MMHG | RESPIRATION RATE: 17 BRPM | TEMPERATURE: 97.8 F | BODY MASS INDEX: 23.55 KG/M2 | OXYGEN SATURATION: 96 %

## 2018-12-27 DIAGNOSIS — K44.9 PARAESOPHAGEAL HIATAL HERNIA: ICD-10-CM

## 2018-12-27 LAB
ANION GAP SERPL CALCULATED.3IONS-SCNC: 10 MMOL/L (ref 4–16)
BASOPHILS ABSOLUTE: 0 K/CU MM
BASOPHILS RELATIVE PERCENT: 0.3 % (ref 0–1)
BUN BLDV-MCNC: 7 MG/DL (ref 6–23)
CALCIUM SERPL-MCNC: 8.7 MG/DL (ref 8.3–10.6)
CHLORIDE BLD-SCNC: 105 MMOL/L (ref 99–110)
CO2: 25 MMOL/L (ref 21–32)
CREAT SERPL-MCNC: 0.8 MG/DL (ref 0.9–1.3)
DIFFERENTIAL TYPE: ABNORMAL
EOSINOPHILS ABSOLUTE: 0 K/CU MM
EOSINOPHILS RELATIVE PERCENT: 0.2 % (ref 0–3)
GFR AFRICAN AMERICAN: >60 ML/MIN/1.73M2
GFR NON-AFRICAN AMERICAN: >60 ML/MIN/1.73M2
GLUCOSE BLD-MCNC: 116 MG/DL (ref 70–99)
HCT VFR BLD CALC: 38.9 % (ref 42–52)
HEMOGLOBIN: 12.3 GM/DL (ref 13.5–18)
IMMATURE NEUTROPHIL %: 0.3 % (ref 0–0.43)
LYMPHOCYTES ABSOLUTE: 2 K/CU MM
LYMPHOCYTES RELATIVE PERCENT: 19.5 % (ref 24–44)
MCH RBC QN AUTO: 28.6 PG (ref 27–31)
MCHC RBC AUTO-ENTMCNC: 31.6 % (ref 32–36)
MCV RBC AUTO: 90.5 FL (ref 78–100)
MONOCYTES ABSOLUTE: 0.9 K/CU MM
MONOCYTES RELATIVE PERCENT: 8.2 % (ref 0–4)
NUCLEATED RBC %: 0 %
PDW BLD-RTO: 15.1 % (ref 11.7–14.9)
PLATELET # BLD: 218 K/CU MM (ref 140–440)
PMV BLD AUTO: 8.7 FL (ref 7.5–11.1)
POTASSIUM SERPL-SCNC: 3.9 MMOL/L (ref 3.5–5.1)
RBC # BLD: 4.3 M/CU MM (ref 4.6–6.2)
SEGMENTED NEUTROPHILS ABSOLUTE COUNT: 7.5 K/CU MM
SEGMENTED NEUTROPHILS RELATIVE PERCENT: 71.5 % (ref 36–66)
SODIUM BLD-SCNC: 140 MMOL/L (ref 135–145)
TOTAL IMMATURE NEUTOROPHIL: 0.03 K/CU MM
TOTAL NUCLEATED RBC: 0 K/CU MM
WBC # BLD: 10.4 K/CU MM (ref 4–10.5)

## 2018-12-27 PROCEDURE — 36415 COLL VENOUS BLD VENIPUNCTURE: CPT

## 2018-12-27 PROCEDURE — 6360000002 HC RX W HCPCS: Performed by: SURGERY

## 2018-12-27 PROCEDURE — 99024 POSTOP FOLLOW-UP VISIT: CPT | Performed by: SURGERY

## 2018-12-27 PROCEDURE — 85025 COMPLETE CBC W/AUTO DIFF WBC: CPT

## 2018-12-27 PROCEDURE — C9113 INJ PANTOPRAZOLE SODIUM, VIA: HCPCS | Performed by: SURGERY

## 2018-12-27 PROCEDURE — 94640 AIRWAY INHALATION TREATMENT: CPT

## 2018-12-27 PROCEDURE — 94150 VITAL CAPACITY TEST: CPT

## 2018-12-27 PROCEDURE — 2700000000 HC OXYGEN THERAPY PER DAY

## 2018-12-27 PROCEDURE — 80048 BASIC METABOLIC PNL TOTAL CA: CPT

## 2018-12-27 PROCEDURE — 2580000003 HC RX 258: Performed by: SURGERY

## 2018-12-27 PROCEDURE — 6370000000 HC RX 637 (ALT 250 FOR IP): Performed by: SURGERY

## 2018-12-27 PROCEDURE — 94761 N-INVAS EAR/PLS OXIMETRY MLT: CPT

## 2018-12-27 RX ORDER — OXYCODONE HYDROCHLORIDE AND ACETAMINOPHEN 5; 325 MG/1; MG/1
1-2 TABLET ORAL EVERY 6 HOURS PRN
Qty: 35 TABLET | Refills: 0 | Status: SHIPPED | OUTPATIENT
Start: 2018-12-27 | End: 2018-12-27 | Stop reason: SDUPTHER

## 2018-12-27 RX ORDER — OXYCODONE HYDROCHLORIDE AND ACETAMINOPHEN 5; 325 MG/1; MG/1
1-2 TABLET ORAL EVERY 6 HOURS PRN
Qty: 35 TABLET | Refills: 0 | Status: SHIPPED | OUTPATIENT
Start: 2018-12-27 | End: 2019-01-03

## 2018-12-27 RX ORDER — AMOXICILLIN 250 MG
2 CAPSULE ORAL DAILY
Qty: 60 TABLET | Refills: 3 | Status: SHIPPED | OUTPATIENT
Start: 2018-12-27 | End: 2019-01-06

## 2018-12-27 RX ADMIN — ENOXAPARIN SODIUM 40 MG: 40 INJECTION SUBCUTANEOUS at 09:43

## 2018-12-27 RX ADMIN — TIOTROPIUM BROMIDE 18 MCG: 18 CAPSULE ORAL; RESPIRATORY (INHALATION) at 08:12

## 2018-12-27 RX ADMIN — OXYCODONE AND ACETAMINOPHEN 2 TABLET: 5; 325 TABLET ORAL at 04:13

## 2018-12-27 RX ADMIN — DOCUSATE SODIUM 100 MG: 100 CAPSULE, LIQUID FILLED ORAL at 09:42

## 2018-12-27 RX ADMIN — CEFAZOLIN SODIUM 2 G: 2 SOLUTION INTRAVENOUS at 04:06

## 2018-12-27 RX ADMIN — OXYCODONE AND ACETAMINOPHEN 2 TABLET: 5; 325 TABLET ORAL at 11:20

## 2018-12-27 RX ADMIN — THEOPHYLLINE ANHYDROUS 100 MG: 100 CAPSULE, EXTENDED RELEASE ORAL at 09:42

## 2018-12-27 RX ADMIN — DOCUSATE SODIUM AND SENNOSIDES 2 TABLET: 8.6; 5 TABLET, FILM COATED ORAL at 09:42

## 2018-12-27 RX ADMIN — VERAPAMIL HYDROCHLORIDE 120 MG: 120 TABLET, FILM COATED, EXTENDED RELEASE ORAL at 09:42

## 2018-12-27 RX ADMIN — SODIUM CHLORIDE, PRESERVATIVE FREE 10 ML: 5 INJECTION INTRAVENOUS at 09:43

## 2018-12-27 RX ADMIN — DEXTROSE AND SODIUM CHLORIDE: 5; 450 INJECTION, SOLUTION INTRAVENOUS at 07:54

## 2018-12-27 RX ADMIN — PANTOPRAZOLE SODIUM 40 MG: 40 INJECTION, POWDER, FOR SOLUTION INTRAVENOUS at 09:43

## 2018-12-27 ASSESSMENT — PAIN DESCRIPTION - PAIN TYPE
TYPE: SURGICAL PAIN
TYPE: SURGICAL PAIN

## 2018-12-27 ASSESSMENT — PAIN DESCRIPTION - FREQUENCY
FREQUENCY: INTERMITTENT
FREQUENCY: INTERMITTENT

## 2018-12-27 ASSESSMENT — PAIN DESCRIPTION - ONSET
ONSET: PROGRESSIVE
ONSET: ON-GOING

## 2018-12-27 ASSESSMENT — PAIN DESCRIPTION - PROGRESSION
CLINICAL_PROGRESSION: GRADUALLY IMPROVING
CLINICAL_PROGRESSION: NOT CHANGED
CLINICAL_PROGRESSION: GRADUALLY WORSENING

## 2018-12-27 ASSESSMENT — PAIN DESCRIPTION - DESCRIPTORS
DESCRIPTORS: PRESSURE
DESCRIPTORS: DISCOMFORT

## 2018-12-27 ASSESSMENT — PAIN DESCRIPTION - LOCATION
LOCATION: ABDOMEN
LOCATION: ABDOMEN

## 2018-12-27 ASSESSMENT — PAIN SCALES - GENERAL
PAINLEVEL_OUTOF10: 6
PAINLEVEL_OUTOF10: 5
PAINLEVEL_OUTOF10: 2
PAINLEVEL_OUTOF10: 3

## 2018-12-27 ASSESSMENT — PAIN DESCRIPTION - ORIENTATION
ORIENTATION: MID
ORIENTATION: MID;UPPER

## 2018-12-27 NOTE — PLAN OF CARE
Problem: Pain:  Goal: Pain level will decrease  Pain level will decrease  Outcome: Ongoing    Goal: Control of acute pain  Control of acute pain  Outcome: Ongoing      Problem: Falls - Risk of:  Goal: Will remain free from falls  Will remain free from falls  Outcome: Ongoing      Problem: Skin Integrity:  Goal: Demonstration of wound healing without infection will improve  Demonstration of wound healing without infection will improve  Outcome: Ongoing

## 2018-12-27 NOTE — PROGRESS NOTES
Continue to wait for transport help
I met with patient on 12/27/18 for bedside tobacco consult. Karen Darnell stated that he smokes about 8 cigarettes per day. I explained that [facility] ([facility]) cares about his health and advised him to quit. Karen Darnell stated that he would like to quit and has cut back from a previous 2.5 packs per day. Karen Darnell is not using the nicotine patch and not having intense cravings. We discussed health concerns, reported by the patient-COPD-and the benefits of quitting. We discussed building a quit plan, identifying triggers, ways to fight cravings, modifying behaviors and building a quit kit to assist. I explained the REACH cessation program, provided educational information, and strongly encouraged Karen Darnell to contact me for outpatient services.  He again stated that he is trying to quit.       Alva Eddy, Certified Tobacco Treatment Specialist, [facility]      
Recd from or  All aalrms on  All safety checks done pt restless  Turning side to side  C/o post pain  rr even and regular  Report rtecd from [address]
Turned amadou well  Back to resting after  reassesment done  Leaving on o2 at 2l
K/CU MM    Immature Neutrophil % 0.3 0 - 0.43 %       Scheduled Meds:   theophylline  100 mg Oral BID    tiotropium  18 mcg Inhalation Daily    verapamil  120 mg Oral 2 times per day    sodium chloride flush  10 mL Intravenous 2 times per day    docusate sodium  100 mg Oral BID    enoxaparin  40 mg Subcutaneous Daily    pantoprazole  40 mg Intravenous BID    sennosides-docusate sodium  2 tablet Oral BID       Continuous Infusions:   dextrose 5 % and 0.45 % NaCl 125 mL/hr at 12/27/18 0754       Physical Exam:  HEENT: Anicteric sclerae, Oropharyngeal mucosae moist, pink and intact. Heart:  Normal S1 and S2, RRR  Lungs: Clear to auscultation bilaterally, No audible Wheezes or Rales. Extremities: No edema. Neuro: Alert and Oriented x 3, Non focal.  Abdomen: Soft, Benign, Non tender, Non distended, Positive bowel sounds. Incision: Nicely healing: No erythema, No discharge. Active Problems:    Paraesophageal hiatal hernia  Resolved Problems:    * No resolved hospital problems. *      Assessment and Plan:  Shruthi English is a 63 y.o. male who is POD # 1 status post:  1. Laparoscopic da Perla-assisted robotic paraesophageal hiatal  Herniorrhaphy with Bio-A mesh 10 cm x 7 cm . 2. Nissen-Basilia floppy fundoplication. Advance diet to full liquids as tolerated, and probably dc home today, on 10 days full liquids. Increase Ambulation to at least 4x/day walk in the hallways with assistance. Respiratory everett-operative care: Incentive Spirometry / deep breathing and coughing 10x/hr while awake. Continue DVT prophylaxis with Teds and SCDs and SC Lovenox. Continue GI prophylaxis with Protonix IV till able to tolerate PO. Continue everett-op Abx for 24 hrs after surgery then dc.    ___________________________________________    Tonya An MD, FACS, FICS  12/27/2018  8:52 AM    Patient was seen with total face to face time of 25 minutes.  More than 50% of this visit was counseling and education as

## 2018-12-27 NOTE — DISCHARGE SUMMARY
Refills: 3      tiotropium (SPIRIVA HANDIHALER) 18 MCG inhalation capsule Inhale 1 capsule into the lungs daily  Qty: 30 capsule, Refills: 3      albuterol sulfate HFA (PROVENTIL HFA) 108 (90 BASE) MCG/ACT inhaler Inhale 2 puffs into the lungs every 6 hours as needed for Wheezing  Qty: 1 Inhaler, Refills: 3      theophylline (CORA-24) 100 MG extended release capsule Take 1 capsule by mouth 2 times daily  Qty: 90 capsule, Refills: 3       !! - Potential duplicate medications found. Please discuss with provider. STOP taking these medications       sucralfate (CARAFATE) 1 GM/10ML suspension Comments:   Reason for Stopping:             Activity: no lifting > 20 Lbs, or Strenuous exercise for 3 weeks. Diet: encourage fluids and bariatric stage II diet for 2 wks.   Wound Care: keep wound clean and dry    Call  [phone] to make a follow-up appointment  with Dr Violetta Meyer MD, Sheeba Fonseca in 1 week.    ____________________________________________    Signed:      Violetta Meyer MD, HENRY GALLARDO    12/27/2018  1:02 PM

## 2019-01-04 ENCOUNTER — OFFICE VISIT (OUTPATIENT)
Dept: BARIATRICS/WEIGHT MGMT | Age: 59
End: 2019-01-04

## 2019-01-04 VITALS
HEART RATE: 85 BPM | DIASTOLIC BLOOD PRESSURE: 82 MMHG | BODY MASS INDEX: 23.54 KG/M2 | HEIGHT: 69 IN | SYSTOLIC BLOOD PRESSURE: 130 MMHG | WEIGHT: 158.95 LBS

## 2019-01-04 DIAGNOSIS — Z98.890 STATUS POST LAPAROSCOPIC NISSEN FUNDOPLICATION: Primary | ICD-10-CM

## 2019-01-04 PROCEDURE — 99024 POSTOP FOLLOW-UP VISIT: CPT | Performed by: SURGERY

## 2019-01-04 RX ORDER — PANTOPRAZOLE SODIUM 40 MG/1
40 TABLET, DELAYED RELEASE ORAL 2 TIMES DAILY
Qty: 60 TABLET | Refills: 3 | Status: ON HOLD | OUTPATIENT
Start: 2019-01-04 | End: 2019-01-28 | Stop reason: HOSPADM

## 2019-01-04 RX ORDER — ALUMINA, MAGNESIA, AND SIMETHICONE 2400; 2400; 240 MG/30ML; MG/30ML; MG/30ML
30 SUSPENSION ORAL EVERY 6 HOURS
Qty: 355 ML | Refills: 5 | Status: SHIPPED | OUTPATIENT
Start: 2019-01-04 | End: 2019-10-11 | Stop reason: CLARIF

## 2019-01-14 ENCOUNTER — OFFICE VISIT (OUTPATIENT)
Dept: ORTHOPEDIC SURGERY | Age: 59
End: 2019-01-14
Payer: MEDICAID

## 2019-01-14 DIAGNOSIS — G56.02 LEFT CARPAL TUNNEL SYNDROME: Primary | ICD-10-CM

## 2019-01-14 PROCEDURE — 99203 OFFICE O/P NEW LOW 30 MIN: CPT | Performed by: ORTHOPAEDIC SURGERY

## 2019-01-14 PROCEDURE — 3017F COLORECTAL CA SCREEN DOC REV: CPT | Performed by: ORTHOPAEDIC SURGERY

## 2019-01-14 PROCEDURE — 4004F PT TOBACCO SCREEN RCVD TLK: CPT | Performed by: ORTHOPAEDIC SURGERY

## 2019-01-14 PROCEDURE — G8420 CALC BMI NORM PARAMETERS: HCPCS | Performed by: ORTHOPAEDIC SURGERY

## 2019-01-14 PROCEDURE — G8428 CUR MEDS NOT DOCUMENT: HCPCS | Performed by: ORTHOPAEDIC SURGERY

## 2019-01-14 PROCEDURE — G8484 FLU IMMUNIZE NO ADMIN: HCPCS | Performed by: ORTHOPAEDIC SURGERY

## 2019-01-14 ASSESSMENT — ENCOUNTER SYMPTOMS: COLOR CHANGE: 0

## 2019-01-16 ENCOUNTER — OFFICE VISIT (OUTPATIENT)
Dept: BARIATRICS/WEIGHT MGMT | Age: 59
End: 2019-01-16

## 2019-01-16 VITALS
WEIGHT: 149 LBS | DIASTOLIC BLOOD PRESSURE: 72 MMHG | SYSTOLIC BLOOD PRESSURE: 110 MMHG | BODY MASS INDEX: 22.07 KG/M2 | RESPIRATION RATE: 15 BRPM | HEART RATE: 70 BPM | HEIGHT: 69 IN

## 2019-01-16 DIAGNOSIS — Z98.890 STATUS POST LAPAROSCOPIC NISSEN FUNDOPLICATION: Primary | ICD-10-CM

## 2019-01-16 PROCEDURE — 99024 POSTOP FOLLOW-UP VISIT: CPT | Performed by: SURGERY

## 2019-01-16 RX ORDER — FEEDER CONTAINER WITH PUMP SET
1 EACH MISCELLANEOUS
Qty: 32 CAN | Refills: 3 | Status: SHIPPED | OUTPATIENT
Start: 2019-01-16 | End: 2019-01-22 | Stop reason: SDUPTHER

## 2019-01-21 ENCOUNTER — TELEPHONE (OUTPATIENT)
Dept: BARIATRICS/WEIGHT MGMT | Age: 59
End: 2019-01-21

## 2019-01-22 ENCOUNTER — OFFICE VISIT (OUTPATIENT)
Dept: PHYSICAL MEDICINE AND REHAB | Age: 59
End: 2019-01-22
Payer: MEDICAID

## 2019-01-22 DIAGNOSIS — R20.2 PARESTHESIA AND PAIN OF BOTH UPPER EXTREMITIES: ICD-10-CM

## 2019-01-22 DIAGNOSIS — M54.12 C7 RADICULOPATHY: Primary | ICD-10-CM

## 2019-01-22 DIAGNOSIS — M79.602 PARESTHESIA AND PAIN OF BOTH UPPER EXTREMITIES: ICD-10-CM

## 2019-01-22 DIAGNOSIS — G56.03 BILATERAL CARPAL TUNNEL SYNDROME: ICD-10-CM

## 2019-01-22 DIAGNOSIS — M79.601 PARESTHESIA AND PAIN OF BOTH UPPER EXTREMITIES: ICD-10-CM

## 2019-01-22 PROCEDURE — 95911 NRV CNDJ TEST 9-10 STUDIES: CPT | Performed by: PHYSICAL MEDICINE & REHABILITATION

## 2019-01-22 PROCEDURE — 95886 MUSC TEST DONE W/N TEST COMP: CPT | Performed by: PHYSICAL MEDICINE & REHABILITATION

## 2019-01-22 RX ORDER — FEEDER CONTAINER WITH PUMP SET
1 EACH MISCELLANEOUS
Qty: 32 CAN | Refills: 3 | Status: CANCELLED | OUTPATIENT
Start: 2019-01-22

## 2019-01-22 RX ORDER — FEEDER CONTAINER WITH PUMP SET
1 EACH MISCELLANEOUS
Qty: 32 CAN | Refills: 3 | Status: SHIPPED | OUTPATIENT
Start: 2019-01-22 | End: 2019-02-14 | Stop reason: SDUPTHER

## 2019-01-23 ENCOUNTER — HOSPITAL ENCOUNTER (OUTPATIENT)
Dept: GENERAL RADIOLOGY | Age: 59
Discharge: HOME | End: 2019-01-23
Payer: MEDICAID

## 2019-01-23 DIAGNOSIS — Z98.890 STATUS POST LAPAROSCOPIC NISSEN FUNDOPLICATION: ICD-10-CM

## 2019-01-23 PROCEDURE — 74241 FL UGI W KUB: CPT

## 2019-01-25 ENCOUNTER — ANESTHESIA EVENT (OUTPATIENT)
Dept: ENDOSCOPY | Age: 59
End: 2019-01-25
Payer: MEDICAID

## 2019-01-25 ASSESSMENT — LIFESTYLE VARIABLES: SMOKING_STATUS: 1

## 2019-01-28 ENCOUNTER — HOSPITAL ENCOUNTER (OUTPATIENT)
Age: 59
Setting detail: OUTPATIENT SURGERY
Discharge: HOME | End: 2019-01-28
Attending: SURGERY | Admitting: SURGERY
Payer: MEDICAID

## 2019-01-28 ENCOUNTER — ANESTHESIA (OUTPATIENT)
Dept: ENDOSCOPY | Age: 59
End: 2019-01-28
Payer: MEDICAID

## 2019-01-28 VITALS — OXYGEN SATURATION: 97 % | SYSTOLIC BLOOD PRESSURE: 125 MMHG | DIASTOLIC BLOOD PRESSURE: 96 MMHG

## 2019-01-28 VITALS
WEIGHT: 143 LBS | DIASTOLIC BLOOD PRESSURE: 87 MMHG | TEMPERATURE: 97.3 F | SYSTOLIC BLOOD PRESSURE: 136 MMHG | RESPIRATION RATE: 16 BRPM | HEART RATE: 65 BPM | BODY MASS INDEX: 21.18 KG/M2 | OXYGEN SATURATION: 96 % | HEIGHT: 69 IN

## 2019-01-28 PROBLEM — R13.19 ESOPHAGEAL DYSPHAGIA: Status: ACTIVE | Noted: 2019-01-28

## 2019-01-28 PROCEDURE — 6360000002 HC RX W HCPCS: Performed by: NURSE ANESTHETIST, CERTIFIED REGISTERED

## 2019-01-28 PROCEDURE — 88305 TISSUE EXAM BY PATHOLOGIST: CPT

## 2019-01-28 PROCEDURE — 7100000011 HC PHASE II RECOVERY - ADDTL 15 MIN: Performed by: SURGERY

## 2019-01-28 PROCEDURE — 3700000001 HC ADD 15 MINUTES (ANESTHESIA): Performed by: SURGERY

## 2019-01-28 PROCEDURE — 88112 CYTOPATH CELL ENHANCE TECH: CPT

## 2019-01-28 PROCEDURE — 2709999900 HC NON-CHARGEABLE SUPPLY: Performed by: SURGERY

## 2019-01-28 PROCEDURE — 3609012400 HC EGD TRANSORAL BIOPSY SINGLE/MULTIPLE: Performed by: SURGERY

## 2019-01-28 PROCEDURE — 3700000000 HC ANESTHESIA ATTENDED CARE: Performed by: SURGERY

## 2019-01-28 PROCEDURE — 2580000003 HC RX 258: Performed by: NURSE ANESTHETIST, CERTIFIED REGISTERED

## 2019-01-28 PROCEDURE — 2580000003 HC RX 258: Performed by: ANESTHESIOLOGY

## 2019-01-28 PROCEDURE — 17250 CHEM CAUT OF GRANLTJ TISSUE: CPT | Performed by: SURGERY

## 2019-01-28 PROCEDURE — 43239 EGD BIOPSY SINGLE/MULTIPLE: CPT | Performed by: SURGERY

## 2019-01-28 PROCEDURE — 7100000010 HC PHASE II RECOVERY - FIRST 15 MIN: Performed by: SURGERY

## 2019-01-28 PROCEDURE — 2500000003 HC RX 250 WO HCPCS: Performed by: NURSE ANESTHETIST, CERTIFIED REGISTERED

## 2019-01-28 RX ORDER — LIDOCAINE HYDROCHLORIDE 20 MG/ML
INJECTION, SOLUTION EPIDURAL; INFILTRATION; INTRACAUDAL; PERINEURAL PRN
Status: DISCONTINUED | OUTPATIENT
Start: 2019-01-28 | End: 2019-01-28 | Stop reason: SDUPTHER

## 2019-01-28 RX ORDER — NYSTATIN 100000 [USP'U]/G
POWDER TOPICAL
Qty: 2 BOTTLE | Refills: 3 | Status: SHIPPED | OUTPATIENT
Start: 2019-01-28 | End: 2019-07-05

## 2019-01-28 RX ORDER — ESOMEPRAZOLE MAGNESIUM 40 MG/1
40 CAPSULE, DELAYED RELEASE ORAL 2 TIMES DAILY
Qty: 60 CAPSULE | Refills: 2 | Status: SHIPPED | OUTPATIENT
Start: 2019-01-28 | End: 2019-07-05

## 2019-01-28 RX ORDER — METOCLOPRAMIDE 10 MG/1
5 TABLET ORAL
Qty: 90 TABLET | Refills: 3 | Status: SHIPPED | OUTPATIENT
Start: 2019-01-28 | End: 2019-07-05

## 2019-01-28 RX ORDER — SODIUM CHLORIDE, SODIUM LACTATE, POTASSIUM CHLORIDE, CALCIUM CHLORIDE 600; 310; 30; 20 MG/100ML; MG/100ML; MG/100ML; MG/100ML
INJECTION, SOLUTION INTRAVENOUS ONCE
Status: COMPLETED | OUTPATIENT
Start: 2019-01-28 | End: 2019-01-28

## 2019-01-28 RX ORDER — SODIUM CHLORIDE 9 MG/ML
INJECTION, SOLUTION INTRAVENOUS CONTINUOUS PRN
Status: DISCONTINUED | OUTPATIENT
Start: 2019-01-28 | End: 2019-01-28 | Stop reason: SDUPTHER

## 2019-01-28 RX ORDER — CALCIUM CARBONATE 200(500)MG
1 TABLET,CHEWABLE ORAL DAILY
Qty: 30 TABLET | Refills: 3 | Status: SHIPPED | OUTPATIENT
Start: 2019-01-28 | End: 2019-02-27

## 2019-01-28 RX ORDER — PROPOFOL 10 MG/ML
INJECTION, EMULSION INTRAVENOUS PRN
Status: DISCONTINUED | OUTPATIENT
Start: 2019-01-28 | End: 2019-01-28 | Stop reason: SDUPTHER

## 2019-01-28 RX ADMIN — PROPOFOL 50 MG: 10 INJECTION, EMULSION INTRAVENOUS at 08:26

## 2019-01-28 RX ADMIN — LIDOCAINE HYDROCHLORIDE 100 MG: 20 INJECTION, SOLUTION EPIDURAL; INFILTRATION; INTRACAUDAL; PERINEURAL at 08:20

## 2019-01-28 RX ADMIN — SODIUM CHLORIDE, POTASSIUM CHLORIDE, SODIUM LACTATE AND CALCIUM CHLORIDE: 600; 310; 30; 20 INJECTION, SOLUTION INTRAVENOUS at 08:14

## 2019-01-28 RX ADMIN — SODIUM CHLORIDE: 9 INJECTION, SOLUTION INTRAVENOUS at 08:10

## 2019-01-28 RX ADMIN — PROPOFOL 50 MG: 10 INJECTION, EMULSION INTRAVENOUS at 08:23

## 2019-01-28 RX ADMIN — PROPOFOL 50 MG: 10 INJECTION, EMULSION INTRAVENOUS at 08:29

## 2019-01-28 RX ADMIN — PROPOFOL 150 MG: 10 INJECTION, EMULSION INTRAVENOUS at 08:20

## 2019-01-28 ASSESSMENT — PAIN SCALES - GENERAL
PAINLEVEL_OUTOF10: 0
PAINLEVEL_OUTOF10: 0

## 2019-01-28 ASSESSMENT — PAIN - FUNCTIONAL ASSESSMENT: PAIN_FUNCTIONAL_ASSESSMENT: 0-10

## 2019-01-28 ASSESSMENT — LIFESTYLE VARIABLES: SMOKING_STATUS: 1

## 2019-01-29 ENCOUNTER — TELEPHONE (OUTPATIENT)
Dept: BARIATRICS/WEIGHT MGMT | Age: 59
End: 2019-01-29

## 2019-02-13 RX ORDER — GREEN TEA/HOODIA GORDONII 315-12.5MG
1 CAPSULE ORAL DAILY
Qty: 30 TABLET | Refills: 2 | Status: SHIPPED | OUTPATIENT
Start: 2019-02-13 | End: 2019-03-15

## 2019-02-13 RX ORDER — AMPHOTERICIN B
1 POWDER (GRAM) MISCELLANEOUS 4 TIMES DAILY
Qty: 100 G | Refills: 5 | Status: SHIPPED | OUTPATIENT
Start: 2019-02-13 | End: 2019-07-05

## 2019-02-14 DIAGNOSIS — R13.19 ESOPHAGEAL DYSPHAGIA: ICD-10-CM

## 2019-02-14 DIAGNOSIS — Z98.890 STATUS POST LAPAROSCOPIC NISSEN FUNDOPLICATION: ICD-10-CM

## 2019-02-14 DIAGNOSIS — B37.0 THRUSH: Primary | ICD-10-CM

## 2019-02-14 RX ORDER — FEEDER CONTAINER WITH PUMP SET
1 EACH MISCELLANEOUS
Qty: 32 CAN | Refills: 3 | Status: SHIPPED | OUTPATIENT
Start: 2019-02-14 | End: 2020-05-20 | Stop reason: DRUGHIGH

## 2019-02-14 RX ORDER — CLOTRIMAZOLE 10 MG/1
10 LOZENGE ORAL; TOPICAL
Qty: 50 TABLET | Refills: 0 | Status: SHIPPED | OUTPATIENT
Start: 2019-02-14 | End: 2019-02-24

## 2019-02-15 ENCOUNTER — TELEPHONE (OUTPATIENT)
Dept: BARIATRICS/WEIGHT MGMT | Age: 59
End: 2019-02-15

## 2019-02-22 ENCOUNTER — HOSPITAL ENCOUNTER (OUTPATIENT)
Age: 59
Discharge: HOME | End: 2019-02-22
Payer: MEDICAID

## 2019-02-22 ENCOUNTER — OFFICE VISIT (OUTPATIENT)
Dept: BARIATRICS/WEIGHT MGMT | Age: 59
End: 2019-02-22

## 2019-02-22 VITALS
DIASTOLIC BLOOD PRESSURE: 70 MMHG | SYSTOLIC BLOOD PRESSURE: 128 MMHG | BODY MASS INDEX: 22.07 KG/M2 | HEIGHT: 69 IN | RESPIRATION RATE: 17 BRPM | HEART RATE: 64 BPM | WEIGHT: 149 LBS

## 2019-02-22 DIAGNOSIS — Z98.890 STATUS POST LAPAROSCOPIC NISSEN FUNDOPLICATION: ICD-10-CM

## 2019-02-22 DIAGNOSIS — K44.9 PARAESOPHAGEAL HIATAL HERNIA: ICD-10-CM

## 2019-02-22 DIAGNOSIS — K44.9 PARAESOPHAGEAL HIATAL HERNIA: Primary | ICD-10-CM

## 2019-02-22 LAB
ALBUMIN SERPL-MCNC: 4.3 GM/DL (ref 3.4–5)
ALP BLD-CCNC: 83 IU/L (ref 40–129)
ALT SERPL-CCNC: 7 U/L (ref 10–40)
AST SERPL-CCNC: 15 IU/L (ref 15–37)
BILIRUB SERPL-MCNC: 0.2 MG/DL (ref 0–1)
BILIRUBIN DIRECT: 0.2 MG/DL (ref 0–0.3)
BILIRUBIN, INDIRECT: 0 MG/DL (ref 0–0.7)
TOTAL PROTEIN: 6.2 GM/DL (ref 6.4–8.2)

## 2019-02-22 PROCEDURE — 99024 POSTOP FOLLOW-UP VISIT: CPT | Performed by: SURGERY

## 2019-02-22 PROCEDURE — 80076 HEPATIC FUNCTION PANEL: CPT

## 2019-02-22 PROCEDURE — 36415 COLL VENOUS BLD VENIPUNCTURE: CPT

## 2019-02-22 RX ORDER — FEEDER CONTAINER WITH PUMP SET
1 EACH MISCELLANEOUS
Qty: 32 CAN | Refills: 3 | Status: ON HOLD | OUTPATIENT
Start: 2019-02-22 | End: 2019-04-08 | Stop reason: SDUPTHER

## 2019-02-25 ENCOUNTER — TELEPHONE (OUTPATIENT)
Dept: BARIATRICS/WEIGHT MGMT | Age: 59
End: 2019-02-25

## 2019-02-25 DIAGNOSIS — K20.90 ESOPHAGITIS: Primary | ICD-10-CM

## 2019-02-25 RX ORDER — OXYCODONE HYDROCHLORIDE AND ACETAMINOPHEN 325; 5 MG/5ML; MG/5ML
7.5 SOLUTION ORAL EVERY 6 HOURS PRN
Qty: 220 ML | Refills: 0 | Status: SHIPPED | OUTPATIENT
Start: 2019-02-25 | End: 2019-02-28

## 2019-02-27 DIAGNOSIS — B37.0 ORAL THRUSH: Primary | ICD-10-CM

## 2019-02-28 RX ORDER — CLOTRIMAZOLE 10 MG/1
10 LOZENGE ORAL; TOPICAL
Qty: 50 TABLET | Refills: 0 | Status: SHIPPED | OUTPATIENT
Start: 2019-02-28 | End: 2019-03-08 | Stop reason: SDUPTHER

## 2019-03-08 ENCOUNTER — OFFICE VISIT (OUTPATIENT)
Dept: BARIATRICS/WEIGHT MGMT | Age: 59
End: 2019-03-08

## 2019-03-08 VITALS
SYSTOLIC BLOOD PRESSURE: 128 MMHG | RESPIRATION RATE: 16 BRPM | WEIGHT: 151 LBS | DIASTOLIC BLOOD PRESSURE: 76 MMHG | BODY MASS INDEX: 22.36 KG/M2 | HEART RATE: 75 BPM | HEIGHT: 69 IN

## 2019-03-08 DIAGNOSIS — B37.0 ORAL THRUSH: ICD-10-CM

## 2019-03-08 DIAGNOSIS — B37.81 ESOPHAGEAL THRUSH (HCC): Primary | ICD-10-CM

## 2019-03-08 PROCEDURE — 99024 POSTOP FOLLOW-UP VISIT: CPT | Performed by: SURGERY

## 2019-03-08 RX ORDER — AMOXICILLIN 250 MG
2 CAPSULE ORAL DAILY
Qty: 60 TABLET | Refills: 3 | Status: SHIPPED | OUTPATIENT
Start: 2019-03-08 | End: 2019-03-18

## 2019-03-08 RX ORDER — CLOTRIMAZOLE 10 MG/1
10 LOZENGE ORAL; TOPICAL
Qty: 50 TABLET | Refills: 0 | Status: SHIPPED | OUTPATIENT
Start: 2019-03-08 | End: 2019-05-02 | Stop reason: SDUPTHER

## 2019-03-08 RX ORDER — OXYCODONE HYDROCHLORIDE AND ACETAMINOPHEN 5; 325 MG/1; MG/1
1-2 TABLET ORAL EVERY 6 HOURS PRN
Qty: 35 TABLET | Refills: 0 | Status: SHIPPED | OUTPATIENT
Start: 2019-03-08 | End: 2019-03-15

## 2019-03-11 ENCOUNTER — TELEPHONE (OUTPATIENT)
Dept: BARIATRICS/WEIGHT MGMT | Age: 59
End: 2019-03-11

## 2019-03-27 ENCOUNTER — APPOINTMENT (OUTPATIENT)
Dept: GENERAL RADIOLOGY | Age: 59
End: 2019-03-27
Payer: MEDICAID

## 2019-03-27 ENCOUNTER — HOSPITAL ENCOUNTER (EMERGENCY)
Age: 59
Discharge: HOME | End: 2019-03-27
Payer: MEDICAID

## 2019-03-27 VITALS
SYSTOLIC BLOOD PRESSURE: 133 MMHG | BODY MASS INDEX: 22.22 KG/M2 | HEART RATE: 61 BPM | RESPIRATION RATE: 17 BRPM | DIASTOLIC BLOOD PRESSURE: 97 MMHG | HEIGHT: 69 IN | OXYGEN SATURATION: 99 % | WEIGHT: 150 LBS | TEMPERATURE: 98.4 F

## 2019-03-27 DIAGNOSIS — R11.2 NAUSEA AND VOMITING, INTRACTABILITY OF VOMITING NOT SPECIFIED, UNSPECIFIED VOMITING TYPE: Primary | ICD-10-CM

## 2019-03-27 LAB
ALBUMIN SERPL-MCNC: 3.6 GM/DL (ref 3.4–5)
ALP BLD-CCNC: 87 IU/L (ref 40–129)
ALT SERPL-CCNC: 8 U/L (ref 10–40)
ANION GAP SERPL CALCULATED.3IONS-SCNC: 11 MMOL/L (ref 4–16)
AST SERPL-CCNC: 14 IU/L (ref 15–37)
BASOPHILS ABSOLUTE: 0 K/CU MM
BASOPHILS RELATIVE PERCENT: 0.4 % (ref 0–1)
BILIRUB SERPL-MCNC: 0.1 MG/DL (ref 0–1)
BUN BLDV-MCNC: 13 MG/DL (ref 6–23)
CALCIUM SERPL-MCNC: 8.3 MG/DL (ref 8.3–10.6)
CHLORIDE BLD-SCNC: 108 MMOL/L (ref 99–110)
CO2: 24 MMOL/L (ref 21–32)
CREAT SERPL-MCNC: 0.7 MG/DL (ref 0.9–1.3)
DIFFERENTIAL TYPE: ABNORMAL
EOSINOPHILS ABSOLUTE: 0.1 K/CU MM
EOSINOPHILS RELATIVE PERCENT: 1.6 % (ref 0–3)
GFR AFRICAN AMERICAN: >60 ML/MIN/1.73M2
GFR NON-AFRICAN AMERICAN: >60 ML/MIN/1.73M2
GLUCOSE BLD-MCNC: 99 MG/DL (ref 70–99)
HCT VFR BLD CALC: 37.9 % (ref 42–52)
HEMOGLOBIN: 12.2 GM/DL (ref 13.5–18)
IMMATURE NEUTROPHIL %: 0.2 % (ref 0–0.43)
LIPASE: 14 IU/L (ref 13–60)
LYMPHOCYTES ABSOLUTE: 2.1 K/CU MM
LYMPHOCYTES RELATIVE PERCENT: 37.4 % (ref 24–44)
MCH RBC QN AUTO: 28.2 PG (ref 27–31)
MCHC RBC AUTO-ENTMCNC: 32.2 % (ref 32–36)
MCV RBC AUTO: 87.5 FL (ref 78–100)
MONOCYTES ABSOLUTE: 0.6 K/CU MM
MONOCYTES RELATIVE PERCENT: 10.5 % (ref 0–4)
NUCLEATED RBC %: 0 %
PDW BLD-RTO: 15.9 % (ref 11.7–14.9)
PLATELET # BLD: 221 K/CU MM (ref 140–440)
PMV BLD AUTO: 9.8 FL (ref 7.5–11.1)
POTASSIUM SERPL-SCNC: 3.7 MMOL/L (ref 3.5–5.1)
RBC # BLD: 4.33 M/CU MM (ref 4.6–6.2)
SEGMENTED NEUTROPHILS ABSOLUTE COUNT: 2.8 K/CU MM
SEGMENTED NEUTROPHILS RELATIVE PERCENT: 49.9 % (ref 36–66)
SODIUM BLD-SCNC: 143 MMOL/L (ref 135–145)
TOTAL IMMATURE NEUTOROPHIL: 0.01 K/CU MM
TOTAL NUCLEATED RBC: 0 K/CU MM
TOTAL PROTEIN: 6.4 GM/DL (ref 6.4–8.2)
WBC # BLD: 5.5 K/CU MM (ref 4–10.5)

## 2019-03-27 PROCEDURE — 2580000003 HC RX 258: Performed by: PHYSICIAN ASSISTANT

## 2019-03-27 PROCEDURE — 96374 THER/PROPH/DIAG INJ IV PUSH: CPT

## 2019-03-27 PROCEDURE — 85025 COMPLETE CBC W/AUTO DIFF WBC: CPT

## 2019-03-27 PROCEDURE — 83690 ASSAY OF LIPASE: CPT

## 2019-03-27 PROCEDURE — 96375 TX/PRO/DX INJ NEW DRUG ADDON: CPT

## 2019-03-27 PROCEDURE — 2500000003 HC RX 250 WO HCPCS: Performed by: PHYSICIAN ASSISTANT

## 2019-03-27 PROCEDURE — 80053 COMPREHEN METABOLIC PANEL: CPT

## 2019-03-27 PROCEDURE — 99283 EMERGENCY DEPT VISIT LOW MDM: CPT

## 2019-03-27 PROCEDURE — 71045 X-RAY EXAM CHEST 1 VIEW: CPT

## 2019-03-27 PROCEDURE — 36415 COLL VENOUS BLD VENIPUNCTURE: CPT

## 2019-03-27 PROCEDURE — 6360000002 HC RX W HCPCS: Performed by: PHYSICIAN ASSISTANT

## 2019-03-27 RX ORDER — ONDANSETRON 2 MG/ML
4 INJECTION INTRAMUSCULAR; INTRAVENOUS EVERY 30 MIN PRN
Status: DISCONTINUED | OUTPATIENT
Start: 2019-03-27 | End: 2019-03-27 | Stop reason: HOSPADM

## 2019-03-27 RX ORDER — GUAIFENESIN AND CODEINE PHOSPHATE 100; 10 MG/5ML; MG/5ML
5 SOLUTION ORAL 3 TIMES DAILY PRN
Qty: 118 ML | Refills: 0 | Status: SHIPPED | OUTPATIENT
Start: 2019-03-27 | End: 2019-04-03

## 2019-03-27 RX ORDER — 0.9 % SODIUM CHLORIDE 0.9 %
1000 INTRAVENOUS SOLUTION INTRAVENOUS ONCE
Status: COMPLETED | OUTPATIENT
Start: 2019-03-27 | End: 2019-03-27

## 2019-03-27 RX ORDER — ONDANSETRON 4 MG/1
4 TABLET, FILM COATED ORAL EVERY 8 HOURS PRN
Qty: 10 TABLET | Refills: 0 | Status: ON HOLD | OUTPATIENT
Start: 2019-03-27 | End: 2019-04-08 | Stop reason: SDUPTHER

## 2019-03-27 RX ORDER — IPRATROPIUM BROMIDE AND ALBUTEROL SULFATE 2.5; .5 MG/3ML; MG/3ML
1 SOLUTION RESPIRATORY (INHALATION) ONCE
Status: DISCONTINUED | OUTPATIENT
Start: 2019-03-27 | End: 2019-03-27

## 2019-03-27 RX ORDER — FAMOTIDINE 20 MG/1
20 TABLET, FILM COATED ORAL 2 TIMES DAILY
Qty: 20 TABLET | Refills: 0 | Status: SHIPPED | OUTPATIENT
Start: 2019-03-27 | End: 2019-04-03

## 2019-03-27 RX ADMIN — ONDANSETRON 4 MG: 2 INJECTION INTRAMUSCULAR; INTRAVENOUS at 17:09

## 2019-03-27 RX ADMIN — SODIUM CHLORIDE 1000 ML: 9 INJECTION, SOLUTION INTRAVENOUS at 17:09

## 2019-03-27 RX ADMIN — FAMOTIDINE 20 MG: 10 INJECTION, SOLUTION INTRAVENOUS at 17:12

## 2019-03-27 ASSESSMENT — PAIN DESCRIPTION - LOCATION: LOCATION: ABDOMEN

## 2019-03-27 ASSESSMENT — PAIN SCALES - GENERAL: PAINLEVEL_OUTOF10: 8

## 2019-03-27 ASSESSMENT — PAIN DESCRIPTION - DESCRIPTORS: DESCRIPTORS: ACHING;DISCOMFORT

## 2019-03-28 ENCOUNTER — TELEPHONE (OUTPATIENT)
Dept: BARIATRICS/WEIGHT MGMT | Age: 59
End: 2019-03-28

## 2019-03-29 ENCOUNTER — TELEPHONE (OUTPATIENT)
Dept: BARIATRICS/WEIGHT MGMT | Age: 59
End: 2019-03-29

## 2019-03-29 DIAGNOSIS — R11.0 NAUSEA: Primary | ICD-10-CM

## 2019-03-29 RX ORDER — PROMETHAZINE HYDROCHLORIDE 25 MG/1
25 TABLET ORAL EVERY 8 HOURS PRN
Qty: 21 TABLET | Refills: 0 | Status: SHIPPED | OUTPATIENT
Start: 2019-03-29 | End: 2019-04-05

## 2019-04-03 ENCOUNTER — ANESTHESIA EVENT (OUTPATIENT)
Dept: ENDOSCOPY | Age: 59
End: 2019-04-03
Payer: MEDICAID

## 2019-04-03 ENCOUNTER — OFFICE VISIT (OUTPATIENT)
Dept: BARIATRICS/WEIGHT MGMT | Age: 59
End: 2019-04-03
Payer: MEDICAID

## 2019-04-03 VITALS
SYSTOLIC BLOOD PRESSURE: 104 MMHG | BODY MASS INDEX: 21.59 KG/M2 | HEART RATE: 80 BPM | HEIGHT: 69 IN | WEIGHT: 145.8 LBS | DIASTOLIC BLOOD PRESSURE: 72 MMHG

## 2019-04-03 DIAGNOSIS — Z98.890 STATUS POST LAPAROSCOPIC NISSEN FUNDOPLICATION: Primary | ICD-10-CM

## 2019-04-03 PROCEDURE — 3017F COLORECTAL CA SCREEN DOC REV: CPT | Performed by: SURGERY

## 2019-04-03 PROCEDURE — G8420 CALC BMI NORM PARAMETERS: HCPCS | Performed by: SURGERY

## 2019-04-03 PROCEDURE — 99214 OFFICE O/P EST MOD 30 MIN: CPT | Performed by: SURGERY

## 2019-04-03 PROCEDURE — G8427 DOCREV CUR MEDS BY ELIG CLIN: HCPCS | Performed by: SURGERY

## 2019-04-03 PROCEDURE — 4004F PT TOBACCO SCREEN RCVD TLK: CPT | Performed by: SURGERY

## 2019-04-03 RX ORDER — LORATADINE AND PSEUDOEPHEDRINE 10; 240 MG/1; MG/1
1 TABLET, EXTENDED RELEASE ORAL DAILY
Qty: 60 TABLET | Refills: 3 | Status: SHIPPED | OUTPATIENT
Start: 2019-04-03 | End: 2019-10-11 | Stop reason: CLARIF

## 2019-04-03 RX ORDER — ONDANSETRON 4 MG/1
4 TABLET, ORALLY DISINTEGRATING ORAL EVERY 8 HOURS PRN
Qty: 30 TABLET | Refills: 3 | Status: SHIPPED | OUTPATIENT
Start: 2019-04-03 | End: 2019-07-05

## 2019-04-03 RX ORDER — OMEPRAZOLE 20 MG/1
20 CAPSULE, DELAYED RELEASE ORAL DAILY PRN
Status: ON HOLD | COMMUNITY
End: 2019-12-30 | Stop reason: SDUPTHER

## 2019-04-03 ASSESSMENT — LIFESTYLE VARIABLES: SMOKING_STATUS: 1

## 2019-04-03 ASSESSMENT — ENCOUNTER SYMPTOMS
VOMITING: 1
DIARRHEA: 0
VOICE CHANGE: 0
NAUSEA: 1
COLOR CHANGE: 0
SHORTNESS OF BREATH: 0
ANAL BLEEDING: 0
SORE THROAT: 0
PHOTOPHOBIA: 0
BLOOD IN STOOL: 0
COUGH: 0
WHEEZING: 0
CONSTIPATION: 0
TROUBLE SWALLOWING: 0
ABDOMINAL PAIN: 1

## 2019-04-03 NOTE — ANESTHESIA PRE PROCEDURE
Department of Anesthesiology  Preprocedure Note       Name:  Ana Dozier   Age:  63 y.o.  :  1960                                          MRN:  [id number]         Date:  4/3/2019      Surgeon: Martita Munson):  Tobias Boss MD    Procedure: EGD ESOPHAGOGASTRODUODENOSCOPY WITH BX (N/A )    Medications prior to admission:   Prior to Admission medications    Medication Sig Start Date End Date Taking? Authorizing Provider   omeprazole (PRILOSEC) 20 MG delayed release capsule Take 20 mg by mouth daily    Historical Provider, MD   ondansetron (ZOFRAN ODT) 4 MG disintegrating tablet Take 1 tablet by mouth every 8 hours as needed for Nausea or Vomiting Place under the tongue and let it melt and absorb from under your tongue. Do NOT swallow the pill otherwise it will take longer to work. Get well soon. 4/3/19   Mray Valles MD   loratadine-pseudoephedrine (CLARITIN-D 24 HOUR)  MG per extended release tablet Take 1 tablet by mouth daily 4/3/19   Tobias Boss MD   promethazine (PHENERGAN) 25 MG tablet Take 1 tablet by mouth every 8 hours as needed for Nausea 2nd choice for nausea 3/29/19 4/5/19  TIMOTHY Hines - CNP   ondansetron (ZOFRAN) 4 MG tablet Take 1 tablet by mouth every 8 hours as needed for Nausea 3/27/19   WM Thomas   guaiFENesin-codeine (GUAIFENESIN AC) 100-10 MG/5ML syrup Take 5 mLs by mouth 3 times daily as needed for Cough for up to 7 days.  3/27/19 4/3/19  WM Thomas   benzocaine-menthol (CEPACOL SORE THROAT) 15-3.6 MG lozenge Take 1 lozenge by mouth every 2 hours as needed for Sore Throat 19   Tobias Boss MD   phenol 1.4 % LIQD mouth spray Take 1 drop by mouth every 2 hours as needed for Sore Throat 19   Tobias Boss MD   Nutritional Supplements (ENSURE HIGH PROTEIN) LIQD Take 1 Can by mouth Daily with lunch 19   Tobias Boss MD   lidocaine viscous (XYLOCAINE) 2 % solution Take 15 mLs by mouth as needed for HFA (PROVENTIL HFA) 108 (90 BASE) MCG/ACT inhaler Inhale 2 puffs into the lungs every 6 hours as needed for Wheezing 1/3/17   Mauricio Toure MD       Current medications:    Current Outpatient Medications   Medication Sig Dispense Refill    omeprazole (PRILOSEC) 20 MG delayed release capsule Take 20 mg by mouth daily      ondansetron (ZOFRAN ODT) 4 MG disintegrating tablet Take 1 tablet by mouth every 8 hours as needed for Nausea or Vomiting Place under the tongue and let it melt and absorb from under your tongue. Do NOT swallow the pill otherwise it will take longer to work. Get well soon. 30 tablet 3    loratadine-pseudoephedrine (CLARITIN-D 24 HOUR)  MG per extended release tablet Take 1 tablet by mouth daily 60 tablet 3    promethazine (PHENERGAN) 25 MG tablet Take 1 tablet by mouth every 8 hours as needed for Nausea 2nd choice for nausea 21 tablet 0    ondansetron (ZOFRAN) 4 MG tablet Take 1 tablet by mouth every 8 hours as needed for Nausea 10 tablet 0    guaiFENesin-codeine (GUAIFENESIN AC) 100-10 MG/5ML syrup Take 5 mLs by mouth 3 times daily as needed for Cough for up to 7 days.  118 mL 0    benzocaine-menthol (CEPACOL SORE THROAT) 15-3.6 MG lozenge Take 1 lozenge by mouth every 2 hours as needed for Sore Throat 168 lozenge 5    phenol 1.4 % LIQD mouth spray Take 1 drop by mouth every 2 hours as needed for Sore Throat 177 mL 5    Nutritional Supplements (ENSURE HIGH PROTEIN) LIQD Take 1 Can by mouth Daily with lunch 32 Can 3    lidocaine viscous (XYLOCAINE) 2 % solution Take 15 mLs by mouth as needed for Irritation 675 mL 5    Nutritional Supplements (ENSURE HIGH PROTEIN) LIQD Take 1 Can by mouth Daily with lunch 32 Can 3    Amphotericin B 905 UNIT/MG POWD 1 Dose by Does not apply route 4 times daily 100 g 5    nystatin (MYCOSTATIN) 347435 UNIT/ML suspension Take 5 mLs by mouth 4 times daily 300 mL 5    esomeprazole (NEXIUM) 40 MG delayed release capsule Take 1 capsule by mouth 2 times daily 60 capsule 2    metoclopramide (REGLAN) 10 MG tablet Take 0.5 tablets by mouth 3 times daily (with meals) 90 tablet 3    nystatin (MYCOSTATIN) 655407 UNIT/GM powder Apply 3 times daily. 2 Bottle 3    magnesium hydroxide (MILK OF MAGNESIA) 400 MG/5ML suspension Take 30 mLs by mouth daily as needed for Constipation 473 mL 5    aluminum & magnesium hydroxide-simethicone (MAALOX ADVANCED MAX ST) 400-400-40 MG/5ML SUSP Take 30 mLs by mouth every 6 hours 355 mL 5    docusate sodium (COLACE) 100 MG capsule Take 200 mg by mouth nightly      magnesium hydroxide (MILK OF MAGNESIA) 400 MG/5ML suspension Take 30 mLs by mouth daily as needed for Constipation 473 mL 5    ondansetron (ZOFRAN ODT) 4 MG disintegrating tablet Take 1 tablet by mouth every 8 hours as needed for Nausea or Vomiting 30 tablet 0    theophylline (CORA-24) 100 MG extended release capsule Take 1 capsule by mouth 2 times daily 90 capsule 3    verapamil (CALAN) 120 MG tablet Take 1 tablet by mouth every 12 hours (Patient taking differently: Take 120 mg by mouth every 12 hours Supposed to take for cluster headache treatment but has been out of med for months) 60 tablet 3    tiotropium (SPIRIVA HANDIHALER) 18 MCG inhalation capsule Inhale 1 capsule into the lungs daily 30 capsule 3    albuterol sulfate HFA (PROVENTIL HFA) 108 (90 BASE) MCG/ACT inhaler Inhale 2 puffs into the lungs every 6 hours as needed for Wheezing 1 Inhaler 3     No current facility-administered medications for this visit. Allergies:     Allergies   Allergen Reactions    Bee Venom Anaphylaxis    Nsaids Other (See Comments)     Not to take any longer due to stomach ulcer       Problem List:    Patient Active Problem List   Diagnosis Code    Impingement syndrome of right shoulder M75.41    Acute bronchitis with COPD ([facility] [address] J44.0, J20.9    Chest pain R07.9    Post-nasal drip R09.82    Chronic cluster headache G44.029    Generalized weakness R53.1    Vertigo R42    Positional lightheadedness R42    Hiatal hernia K44.9    Gastroesophageal reflux disease with esophagitis K21.0    PUD (peptic ulcer disease) K27.9    Paraesophageal hiatal hernia K44.9    Status post laparoscopic Nissen fundoplication A59.382    Esophageal dysphagia R13.10    Esophageal thrush (HCC) B37.81       Past Medical History:        Diagnosis Date    Acid reflux     Arthritis     Bronchitis     Chronic cluster headache     COPD (chronic obstructive pulmonary disease) ([address]     \"dx 2 yrs ago\" \\"does not see a lung dr Sheila Calabrese Degenerative disc disease     \"in my back have degenarative disc\"    Depression     Fracture     \"fell over a 5 gallon bucket and landed on cement block and broke my right hand\" (7/6/2015)    GERD with esophagitis     Hiatal hernia     HX OTHER MEDICAL     pt states has trouble with reading and writing\"can read very very little\"    Hyperlipidemia     Hypertension     \"on medication for last two yrs\" follow with Dr Bridger Moreno Impingement syndrome of right shoulder     Left shoulder pain     limited range-of-motion    Lower back pain     Neck pain     more on left side into shoulder--pain limits ROM    Vertigo        Past Surgical History:        Procedure Laterality Date    ARM SURGERY Left 30+ yrs    \"put plastic ligaments in \"  after injury through glass window    COLONOSCOPY  2010    DENTAL SURGERY      all teeth extracted    GASTRIC FUNDOPLICATION N/A 82/37/3813    NISSEN FUNDOPLICATION LAPAROSCOPIC ROBOTIC HIATAL HERNIA performed by Bouchra Back MD at [address]  12/2018    UPPER GASTROINTESTINAL ENDOSCOPY N/A 1/28/2019    EGD BIOPSY / BRUSHING OF ESOPHAGUS performed by Bouchra Back MD at [address] [facility] ENDOSCOPY       Social History:    Social History     Tobacco Use    Smoking status: Current Every Day Smoker     Packs/day: 1.50     Years: 40.00     Pack years: 60.00     Types: Cigarettes    Smokeless tobacco: Never Used Substance Use Topics    Alcohol use: Yes     Alcohol/week: 1.8 oz     Types: 3 Cans of beer per week     Comment: socially                                Ready to quit: Not Answered  Counseling given: Not Answered      Vital Signs (Current): There were no vitals filed for this visit. BP Readings from Last 3 Encounters:   04/03/19 104/72   03/27/19 (!) 133/97   03/08/19 128/76       NPO Status:                                                                                 BMI:   Wt Readings from Last 3 Encounters:   04/03/19 145 lb 12.8 oz (66.1 kg)   03/27/19 150 lb (68 kg)   03/08/19 151 lb (68.5 kg)     There is no height or weight on file to calculate BMI.    CBC:   Lab Results   Component Value Date    WBC 5.5 03/27/2019    RBC 4.33 03/27/2019    HGB 12.2 03/27/2019    HCT 37.9 03/27/2019    MCV 87.5 03/27/2019    RDW 15.9 03/27/2019     03/27/2019       CMP:   Lab Results   Component Value Date     03/27/2019    K 3.7 03/27/2019     03/27/2019    CO2 24 03/27/2019    BUN 13 03/27/2019    CREATININE 0.7 03/27/2019    GFRAA >60 03/27/2019    LABGLOM >60 03/27/2019    GLUCOSE 99 03/27/2019    PROT 6.4 03/27/2019    PROT 7.0 11/19/2012    CALCIUM 8.3 03/27/2019    BILITOT 0.1 03/27/2019    ALKPHOS 87 03/27/2019    AST 14 03/27/2019    ALT 8 03/27/2019       POC Tests: No results for input(s): POCGLU, POCNA, POCK, POCCL, POCBUN, POCHEMO, POCHCT in the last 72 hours.     Coags:   Lab Results   Component Value Date    PROTIME 11.8 08/02/2017    PROTIME 10.8 05/15/2011    INR 1.03 08/02/2017    APTT 31.3 08/02/2017       HCG (If Applicable): No results found for: PREGTESTUR, PREGSERUM, HCG, HCGQUANT     ABGs: No results found for: PHART, PO2ART, TCC6YQQ, DES5PUN, BEART, A8EASQXC     Type & Screen (If Applicable):  No results found for: LABABO, [address]    Anesthesia Evaluation  Patient summary reviewed and Nursing notes reviewed  Airway: Mallampati: I  TM

## 2019-04-03 NOTE — PROGRESS NOTES
presenting here with Abdominal pain, nausea, vomiting. States having fevers at night. C/O foul taste in the back of throat and mouth causing vomiting. Recently seen in ER on 3/27/19. Chest Xray obtained:    FINDINGS:   The cardiomediastinal silhouette is within normal range. Lungs are clear. There is no focal pulmonary consolidation, pleural effusion, pneumothorax, or   evidence of airspace pulmonary edema.           Impression   1.  No radiographic finding to account for patient's cough and congestion.

## 2019-04-03 NOTE — PROGRESS NOTES
states has trouble with reading and writing\"can read very very little\"    Hyperlipidemia     Hypertension     \"on medication for last two yrs\" follow with Dr Analilia Aguillon Impingement syndrome of right shoulder     Left shoulder pain     limited range-of-motion    Lower back pain     Neck pain     more on left side into shoulder--pain limits ROM    Vertigo       Past Surgical History:   Procedure Laterality Date    ARM SURGERY Left 30+ yrs    \"put plastic ligaments in \"  after injury through glass window    COLONOSCOPY  2010    DENTAL SURGERY      all teeth extracted    GASTRIC FUNDOPLICATION N/A 45/07/8623    NISSEN FUNDOPLICATION LAPAROSCOPIC ROBOTIC HIATAL HERNIA performed by Sherwin Pedro MD at [address] [facility]  12/2018    UPPER GASTROINTESTINAL ENDOSCOPY N/A 1/28/2019    EGD BIOPSY / BRUSHING OF ESOPHAGUS performed by Sherwin Pedro MD at [facility] ENDOSCOPY     Current Outpatient Medications   Medication Sig Dispense Refill    omeprazole (PRILOSEC) 20 MG delayed release capsule Take 20 mg by mouth daily      promethazine (PHENERGAN) 25 MG tablet Take 1 tablet by mouth every 8 hours as needed for Nausea 2nd choice for nausea 21 tablet 0    ondansetron (ZOFRAN) 4 MG tablet Take 1 tablet by mouth every 8 hours as needed for Nausea 10 tablet 0    guaiFENesin-codeine (GUAIFENESIN AC) 100-10 MG/5ML syrup Take 5 mLs by mouth 3 times daily as needed for Cough for up to 7 days.  118 mL 0    benzocaine-menthol (CEPACOL SORE THROAT) 15-3.6 MG lozenge Take 1 lozenge by mouth every 2 hours as needed for Sore Throat 168 lozenge 5    phenol 1.4 % LIQD mouth spray Take 1 drop by mouth every 2 hours as needed for Sore Throat 177 mL 5    Nutritional Supplements (ENSURE HIGH PROTEIN) LIQD Take 1 Can by mouth Daily with lunch 32 Can 3    lidocaine viscous (XYLOCAINE) 2 % solution Take 15 mLs by mouth as needed for Irritation 675 mL 5    Nutritional Supplements (ENSURE HIGH PROTEIN) LIQD Take 1 Can by mouth Daily with lunch 32 Can 3    Amphotericin B 905 UNIT/MG POWD 1 Dose by Does not apply route 4 times daily 100 g 5    nystatin (MYCOSTATIN) 893064 UNIT/ML suspension Take 5 mLs by mouth 4 times daily 300 mL 5    esomeprazole (NEXIUM) 40 MG delayed release capsule Take 1 capsule by mouth 2 times daily 60 capsule 2    metoclopramide (REGLAN) 10 MG tablet Take 0.5 tablets by mouth 3 times daily (with meals) 90 tablet 3    nystatin (MYCOSTATIN) 991247 UNIT/GM powder Apply 3 times daily. 2 Bottle 3    magnesium hydroxide (MILK OF MAGNESIA) 400 MG/5ML suspension Take 30 mLs by mouth daily as needed for Constipation 473 mL 5    aluminum & magnesium hydroxide-simethicone (MAALOX ADVANCED MAX ST) 400-400-40 MG/5ML SUSP Take 30 mLs by mouth every 6 hours 355 mL 5    docusate sodium (COLACE) 100 MG capsule Take 200 mg by mouth nightly      magnesium hydroxide (MILK OF MAGNESIA) 400 MG/5ML suspension Take 30 mLs by mouth daily as needed for Constipation 473 mL 5    ondansetron (ZOFRAN ODT) 4 MG disintegrating tablet Take 1 tablet by mouth every 8 hours as needed for Nausea or Vomiting 30 tablet 0    theophylline (CORA-24) 100 MG extended release capsule Take 1 capsule by mouth 2 times daily 90 capsule 3    verapamil (CALAN) 120 MG tablet Take 1 tablet by mouth every 12 hours (Patient taking differently: Take 120 mg by mouth every 12 hours Supposed to take for cluster headache treatment but has been out of med for months) 60 tablet 3    tiotropium (SPIRIVA HANDIHALER) 18 MCG inhalation capsule Inhale 1 capsule into the lungs daily 30 capsule 3    albuterol sulfate HFA (PROVENTIL HFA) 108 (90 BASE) MCG/ACT inhaler Inhale 2 puffs into the lungs every 6 hours as needed for Wheezing 1 Inhaler 3     No current facility-administered medications for this visit.        Allergies   Allergen Reactions    Bee Venom Anaphylaxis    Nsaids Other (See Comments)     Not to take any longer due to stomach ulcer           Review of Systems   Constitutional: Negative for activity change, chills, diaphoresis and fever. HENT: Negative for sore throat, trouble swallowing and voice change. Eyes: Negative for photophobia and visual disturbance. Respiratory: Negative for cough, shortness of breath and wheezing. Cardiovascular: Negative for chest pain, palpitations and leg swelling. Gastrointestinal: Positive for abdominal pain, nausea and vomiting. Negative for anal bleeding, blood in stool, constipation and diarrhea. Endocrine: Negative for cold intolerance, heat intolerance, polydipsia and polyuria. Genitourinary: Negative for dysuria, frequency and hematuria. Musculoskeletal: Negative for joint swelling, myalgias and neck stiffness. Skin: Negative for color change and rash. Neurological: Negative for seizures, speech difficulty, light-headedness and numbness. Hematological: Negative for adenopathy. Does not bruise/bleed easily. OBJECTIVE:    /72 (Site: Right Upper Arm, Position: Sitting, Cuff Size: Medium Adult)   Pulse 80   Ht 5' 9\" (1.753 m)   Wt 145 lb 12.8 oz (66.1 kg)   BMI 21.53 kg/m²    Body mass index is 21.53 kg/m². Physical Exam   Constitutional: He is oriented to person, place, and time. He appears well-developed and well-nourished. No distress. HENT:   Head: Normocephalic and atraumatic. Eyes: Pupils are equal, round, and reactive to light. Conjunctivae and EOM are normal. No scleral icterus. Neck: Normal range of motion. No JVD present. No tracheal deviation present. No thyromegaly present. Cardiovascular: Normal rate, regular rhythm, normal heart sounds and intact distal pulses. Exam reveals no gallop and no friction rub. No murmur heard. Pulmonary/Chest: Effort normal. No stridor. No respiratory distress. He has no wheezes. He has no rales. He exhibits no tenderness. Abdominal: Soft. Bowel sounds are normal. He exhibits no distension and no mass.

## 2019-04-08 ENCOUNTER — HOSPITAL ENCOUNTER (OUTPATIENT)
Age: 59
Setting detail: OUTPATIENT SURGERY
Discharge: HOME | End: 2019-04-08
Attending: SURGERY | Admitting: SURGERY
Payer: MEDICAID

## 2019-04-08 ENCOUNTER — ANESTHESIA (OUTPATIENT)
Dept: ENDOSCOPY | Age: 59
End: 2019-04-08
Payer: MEDICAID

## 2019-04-08 VITALS
WEIGHT: 145 LBS | TEMPERATURE: 97 F | HEIGHT: 69 IN | BODY MASS INDEX: 21.48 KG/M2 | SYSTOLIC BLOOD PRESSURE: 116 MMHG | DIASTOLIC BLOOD PRESSURE: 88 MMHG | HEART RATE: 60 BPM | OXYGEN SATURATION: 95 % | RESPIRATION RATE: 16 BRPM

## 2019-04-08 VITALS
RESPIRATION RATE: 14 BRPM | OXYGEN SATURATION: 95 % | SYSTOLIC BLOOD PRESSURE: 114 MMHG | DIASTOLIC BLOOD PRESSURE: 84 MMHG

## 2019-04-08 PROCEDURE — 87077 CULTURE AEROBIC IDENTIFY: CPT

## 2019-04-08 PROCEDURE — 7100000011 HC PHASE II RECOVERY - ADDTL 15 MIN: Performed by: SURGERY

## 2019-04-08 PROCEDURE — 3700000000 HC ANESTHESIA ATTENDED CARE: Performed by: SURGERY

## 2019-04-08 PROCEDURE — 88342 IMHCHEM/IMCYTCHM 1ST ANTB: CPT

## 2019-04-08 PROCEDURE — 43239 EGD BIOPSY SINGLE/MULTIPLE: CPT | Performed by: SURGERY

## 2019-04-08 PROCEDURE — 2500000003 HC RX 250 WO HCPCS: Performed by: NURSE ANESTHETIST, CERTIFIED REGISTERED

## 2019-04-08 PROCEDURE — 2709999900 HC NON-CHARGEABLE SUPPLY: Performed by: SURGERY

## 2019-04-08 PROCEDURE — 3609012400 HC EGD TRANSORAL BIOPSY SINGLE/MULTIPLE: Performed by: SURGERY

## 2019-04-08 PROCEDURE — 2580000003 HC RX 258: Performed by: ANESTHESIOLOGY

## 2019-04-08 PROCEDURE — 7100000010 HC PHASE II RECOVERY - FIRST 15 MIN: Performed by: SURGERY

## 2019-04-08 PROCEDURE — 6360000002 HC RX W HCPCS: Performed by: NURSE ANESTHETIST, CERTIFIED REGISTERED

## 2019-04-08 PROCEDURE — 88112 CYTOPATH CELL ENHANCE TECH: CPT

## 2019-04-08 PROCEDURE — 88305 TISSUE EXAM BY PATHOLOGIST: CPT

## 2019-04-08 PROCEDURE — 3700000001 HC ADD 15 MINUTES (ANESTHESIA): Performed by: SURGERY

## 2019-04-08 RX ORDER — PROPOFOL 10 MG/ML
INJECTION, EMULSION INTRAVENOUS PRN
Status: DISCONTINUED | OUTPATIENT
Start: 2019-04-08 | End: 2019-04-08 | Stop reason: SDUPTHER

## 2019-04-08 RX ORDER — SODIUM CHLORIDE, SODIUM LACTATE, POTASSIUM CHLORIDE, CALCIUM CHLORIDE 600; 310; 30; 20 MG/100ML; MG/100ML; MG/100ML; MG/100ML
1000 INJECTION, SOLUTION INTRAVENOUS CONTINUOUS
Status: DISCONTINUED | OUTPATIENT
Start: 2019-04-08 | End: 2019-04-08 | Stop reason: HOSPADM

## 2019-04-08 RX ORDER — LIDOCAINE HYDROCHLORIDE 20 MG/ML
INJECTION, SOLUTION INFILTRATION; PERINEURAL PRN
Status: DISCONTINUED | OUTPATIENT
Start: 2019-04-08 | End: 2019-04-08 | Stop reason: SDUPTHER

## 2019-04-08 RX ORDER — METOCLOPRAMIDE 10 MG/1
5 TABLET ORAL
Qty: 90 TABLET | Refills: 3 | Status: SHIPPED | OUTPATIENT
Start: 2019-04-08 | End: 2019-07-05

## 2019-04-08 RX ADMIN — SODIUM CHLORIDE, POTASSIUM CHLORIDE, SODIUM LACTATE AND CALCIUM CHLORIDE 1000 ML: 600; 310; 30; 20 INJECTION, SOLUTION INTRAVENOUS at 11:57

## 2019-04-08 RX ADMIN — PROPOFOL 80 MG: 10 INJECTION, EMULSION INTRAVENOUS at 13:08

## 2019-04-08 RX ADMIN — PROPOFOL 30 MG: 10 INJECTION, EMULSION INTRAVENOUS at 13:16

## 2019-04-08 RX ADMIN — PROPOFOL 30 MG: 10 INJECTION, EMULSION INTRAVENOUS at 13:12

## 2019-04-08 RX ADMIN — PROPOFOL 30 MG: 10 INJECTION, EMULSION INTRAVENOUS at 13:14

## 2019-04-08 RX ADMIN — LIDOCAINE HYDROCHLORIDE 100 MG: 20 INJECTION, SOLUTION INFILTRATION; PERINEURAL at 13:08

## 2019-04-08 RX ADMIN — PROPOFOL 30 MG: 10 INJECTION, EMULSION INTRAVENOUS at 13:18

## 2019-04-08 RX ADMIN — PROPOFOL 50 MG: 10 INJECTION, EMULSION INTRAVENOUS at 13:10

## 2019-04-08 ASSESSMENT — PAIN DESCRIPTION - DESCRIPTORS: DESCRIPTORS: ACHING

## 2019-04-08 ASSESSMENT — PAIN SCALES - GENERAL
PAINLEVEL_OUTOF10: 0
PAINLEVEL_OUTOF10: 0

## 2019-04-08 ASSESSMENT — PAIN - FUNCTIONAL ASSESSMENT: PAIN_FUNCTIONAL_ASSESSMENT: 0-10

## 2019-04-08 NOTE — OP NOTE
ANTRAL gastritis, with healing ulcers  -Significant gastroparesis with solid food particles in the stomach  - No biliary reflux  - Esophageal candidiasis improved but still half of the esophagus was healed, the proximal half - biopsies were brushed and sent to the lab   The pylorus was intubated and  the scope was advanced all the way to the third portion of the duodenum. No  postpyloric abnormalities identified, except some superficial healing ulcers, including the ampulla and periampullary  regions. The scope was retracted back into the stomach, retroflexed; again an intact fundoplication was noted, intra abbominally. Documentary pictures of all the above  and below relevant findings were taken. The stomach was decompressed; the scope was retracted  out uneventfully. The patient tolerated the procedure well without any  complications and was sent to PACU in stable condition. Will have to contiune him on Prevacid PPI and Reglan for pro kinesis, and Nystatin and follow.     ____________________________________________    Leta Saini MD, FACS, FICS    4/8/2019  1:23 PM

## 2019-04-08 NOTE — ANESTHESIA POSTPROCEDURE EVALUATION
Department of Anesthesiology  Postprocedure Note    Patient: Leoma Seip  MRN: [id number]  YOB: 1960  Date of evaluation: 4/8/2019  Time:  1:37 PM     Procedure Summary     Date:  04/08/19 Room / Location:  [facility] ENDO 01 / [facility] ENDOSCOPY    Anesthesia Start:  3767 Anesthesia Stop:  1458    Procedure:  EGD BIOPSY AND BRUSHING (N/A ) Diagnosis:  (Nasuea, Vomiting)    Surgeon:  Aneesh Tran MD Responsible Provider:  Therese Marcos MD    Anesthesia Type:  MAC, TIVA ASA Status:  3          Anesthesia Type: MAC, TIVA    Sreedhar Phase I:      Sreedhar Phase II:      Last vitals: Reviewed and per EMR flowsheets.        Anesthesia Post Evaluation    Patient location during evaluation: bedside  Patient participation: complete - patient participated  Level of consciousness: awake and alert  Pain score: 0  Airway patency: patent  Nausea & Vomiting: no vomiting and no nausea  Complications: no  Cardiovascular status: blood pressure returned to baseline and hemodynamically stable  Respiratory status: acceptable, room air, spontaneous ventilation and nonlabored ventilation  Hydration status: stable

## 2019-04-08 NOTE — PROGRESS NOTES
1340-To Pacu, awake, denies any pain, nausea or SOB, family @ bedside, updated on plan of care, call light in reach.   Dr. Aviva Mendez in to update on procedure, family @ bedside  1350-Repositioned to semi-fowlers, PO fluids given  1407-Tolerating fluids well, instructions reviewed with pt and family, understanding verbalized  1416-To car per w/c with written instructions,  present

## 2019-04-08 NOTE — H&P
HISTORY AND PHYSICAL EXAM    Date of Admission:  4/8/2019    PCP:  Isidra Lal    Chief Complaint / History of Present Illness:  Giovanni Ashraf is a 63 y.o. male presenting with pain in the Epigasrtium and is chronic, worsening, dull and throbbing compared to patient's normal condition. It is moderate in intensity for a duration of 3 months and is intermittent with modifying factors increased by or worse with eating. .. presenting here with Abdominal pain, nausea, vomiting. States having fevers at night. C/O foul taste in the back of throat and mouth causing vomiting. Recently seen in ER on 3/27/19. Chest Xray obtained:     FINDINGS:   The cardiomediastinal silhouette is within normal range. Lungs are clear. There is no focal pulmonary consolidation, pleural effusion, pneumothorax, or   evidence of airspace pulmonary edema.           Impression   1. No radiographic finding to account for patient's cough and congestion.          Fungal infection was Diagnosed with his last EGD, needs another one, will proceed TODAY. Wounds very nicely healing, no erythema, no induration, no purulent discharge. No constipation, BMs back to normal.      PMH:   has a past medical history of Acid reflux, Arthritis, Bronchitis, Chronic cluster headache, COPD (chronic obstructive pulmonary disease) ([address] Degenerative disc disease, Depression, Fracture, GERD with esophagitis, Hiatal hernia, gastric ulcer, OTHER MEDICAL, Hyperlipidemia, Hypertension, Impingement syndrome of right shoulder, Left shoulder pain, Lower back pain, Neck pain, and Vertigo. PSH:   has a past surgical history that includes Arm Surgery (Left, 30+ yrs); Colonoscopy (2010); Upper gastrointestinal endoscopy (12/2018); Dental surgery; Gastric fundoplication (N/A, 80/30/1986); and Upper gastrointestinal endoscopy (N/A, 1/28/2019). Allergies:     Allergies   Allergen Reactions    Bee Venom Anaphylaxis    Nsaids Other (See Comments) Not to take any longer due to stomach ulcer        Home Meds:    Prior to Admission medications    Medication Sig Start Date End Date Taking? Authorizing Provider   ondansetron (ZOFRAN ODT) 4 MG disintegrating tablet Take 1 tablet by mouth every 8 hours as needed for Nausea or Vomiting Place under the tongue and let it melt and absorb from under your tongue. Do NOT swallow the pill otherwise it will take longer to work. Get well soon.  4/3/19  Yes Gita Trevino MD   loratadine-pseudoephedrine (CLARITIN-D 24 HOUR)  MG per extended release tablet Take 1 tablet by mouth daily 4/3/19  Yes Gita Trevino MD   benzocaine-menthol (CEPACOL SORE THROAT) 15-3.6 MG lozenge Take 1 lozenge by mouth every 2 hours as needed for Sore Throat 2/22/19  Yes Gita Trevino MD   phenol 1.4 % LIQD mouth spray Take 1 drop by mouth every 2 hours as needed for Sore Throat 2/22/19  Yes Gita Trevino MD   lidocaine viscous (XYLOCAINE) 2 % solution Take 15 mLs by mouth as needed for Irritation 2/22/19  Yes Gita Trevino MD   Nutritional Supplements (ENSURE HIGH PROTEIN) LIQD Take 1 Can by mouth Daily with lunch 2/14/19  Yes TIMOTHY Laurent - CNP   nystatin (MYCOSTATIN) 759799 UNIT/ML suspension Take 5 mLs by mouth 4 times daily 1/30/19  Yes Gita Trevino MD   esomeprazole (NEXIUM) 40 MG delayed release capsule Take 1 capsule by mouth 2 times daily 1/28/19  Yes Gita Trevino MD   metoclopramide (REGLAN) 10 MG tablet Take 0.5 tablets by mouth 3 times daily (with meals) 1/28/19  Yes Gita Trevino MD   aluminum & magnesium hydroxide-simethicone (MAALOX ADVANCED MAX ST) 400-400-40 MG/5ML SUSP Take 30 mLs by mouth every 6 hours 1/4/19  Yes Gita Trevino MD   docusate sodium (COLACE) 100 MG capsule Take 200 mg by mouth nightly   Yes Historical Provider, MD   magnesium hydroxide (MILK OF MAGNESIA) 400 MG/5ML suspension Take 30 mLs by mouth daily as needed for Constipation 12/19/18  Yes Gita Trevino MD theophylline (CORA-24) 100 MG extended release capsule Take 1 capsule by mouth 2 times daily 1/3/17  Yes Leslie Abbott MD   tiotropium (Adline Wolfgang) 18 MCG inhalation capsule Inhale 1 capsule into the lungs daily 1/3/17  Yes Leslie Abbott MD   albuterol sulfate HFA (PROVENTIL HFA) 108 (90 BASE) MCG/ACT inhaler Inhale 2 puffs into the lungs every 6 hours as needed for Wheezing 1/3/17  Yes Leslie Abbott MD   omeprazole (PRILOSEC) 20 MG delayed release capsule Take 20 mg by mouth daily    Historical Provider, MD   Amphotericin B 905 UNIT/MG POWD 1 Dose by Does not apply route 4 times daily 2/13/19   Tobias Boss MD   nystatin (MYCOSTATIN) 129239 UNIT/GM powder Apply 3 times daily.  1/28/19   Tobias Boss MD   verapamil (CALAN) 120 MG tablet Take 1 tablet by mouth every 12 hours  Patient taking differently: Take 120 mg by mouth every 12 hours Supposed to take for cluster headache treatment but has been out of med for months 1/3/17   Leslie Abbott MD        [facility] Meds:    Current Facility-Administered Medications   Medication Dose Route Frequency Provider Last Rate Last Dose    lactated ringers infusion 1,000 mL  1,000 mL Intravenous Continuous Katie Nelson DO 50 mL/hr at 04/08/19 1157 1,000 mL at 04/08/19 1157      lactated ringers 1,000 mL (04/08/19 1157)       Social History / Family History:        Social History     Socioeconomic History    Marital status:      Spouse name: None    Number of children: None    Years of education: None    Highest education level: None   Occupational History    None   Social Needs    Financial resource strain: None    Food insecurity:     Worry: None     Inability: None    Transportation needs:     Medical: None     Non-medical: None   Tobacco Use    Smoking status: Current Every Day Smoker     Packs/day: 0.25     Years: 40.00     Pack years: 10.00     Types: Cigarettes    Smokeless tobacco: Never Used    Tobacco comment: \"use to smoke 1.5 - to 2.5 packs per day\"   Substance and Sexual Activity    Alcohol use: Yes     Alcohol/week: 1.8 oz     Types: 3 Cans of beer per week     Comment: socially/ average\" one time per week- but none for past 4-5 months\" \"use to drink pretty heavy back in the day- just on weekends\"    Drug use: No    Sexual activity: Yes     Partners: Female   Lifestyle    Physical activity:     Days per week: None     Minutes per session: None    Stress: None   Relationships    Social connections:     Talks on phone: None     Gets together: None     Attends Yarsani service: None     Active member of club or organization: None     Attends meetings of clubs or organizations: None     Relationship status: None    Intimate partner violence:     Fear of current or ex partner: None     Emotionally abused: None     Physically abused: None     Forced sexual activity: None   Other Topics Concern    None   Social History Narrative    None      Family History   Problem Relation Age of Onset    Dementia Mother     Stroke Mother     Stroke Father     High Blood Pressure Father     Heart Disease Father     Breast Cancer Sister     Diabetes Brother     Cancer Brother         mouth and throat    Heart Attack Maternal Uncle     Heart Disease Maternal Uncle     otherwise irrelevant to this surgical issue. Review of Systems:  Constitutional: Negative for fever, chills, diaphoresis, appetite change and fatigue. HENT: Negative for sore throat, trouble swallowing and voice change. Respiratory: Negative for cough, positive for shortness of breath no wheezing. Cardiovascular: Negative for chest pain positive for SOB with one flight of stairs exertion, no pitting LE edema. Gastrointestinal: Positive for abdominal pain, nausea and vomiting. Negative for diarrhea, constipation, blood in stool, abdominal distention, anal bleeding or rectal pain. Musculoskeletal: Negative for joint swelling and arthralgias.    Skin: Warm and dry, well previous visit (from the past 24 hour(s)). Diagnosis:  Patient Active Problem List   Diagnosis    Impingement syndrome of right shoulder    Acute bronchitis with COPD ([address]    Chest pain    Post-nasal drip    Chronic cluster headache    Generalized weakness    Vertigo    Positional lightheadedness    Hiatal hernia    Gastroesophageal reflux disease with esophagitis    PUD (peptic ulcer disease)    Paraesophageal hiatal hernia    Status post laparoscopic Nissen fundoplication    Esophageal dysphagia    Esophageal thrush ([facility] [address]           Assessment & Plan:    1. Status post laparoscopic Nissen fundoplication          C/o \"nasty taste, paint. ? Sour, worse than sour\", that causes n/v.. Needs another EGD.   Will do today.    ____________________________________________    Radha Galeana MD, FACS, FICS    4/8/2019  1:05 PM

## 2019-04-09 LAB — CLOTEST: NEGATIVE

## 2019-04-24 ENCOUNTER — OFFICE VISIT (OUTPATIENT)
Dept: BARIATRICS/WEIGHT MGMT | Age: 59
End: 2019-04-24
Payer: MEDICAID

## 2019-04-24 VITALS
HEIGHT: 69 IN | WEIGHT: 145.4 LBS | SYSTOLIC BLOOD PRESSURE: 114 MMHG | BODY MASS INDEX: 21.53 KG/M2 | DIASTOLIC BLOOD PRESSURE: 78 MMHG | HEART RATE: 85 BPM

## 2019-04-24 DIAGNOSIS — B37.81 CANDIDA INFECTION, ESOPHAGEAL (HCC): Primary | ICD-10-CM

## 2019-04-24 PROCEDURE — 4004F PT TOBACCO SCREEN RCVD TLK: CPT | Performed by: SURGERY

## 2019-04-24 PROCEDURE — 3017F COLORECTAL CA SCREEN DOC REV: CPT | Performed by: SURGERY

## 2019-04-24 PROCEDURE — 99213 OFFICE O/P EST LOW 20 MIN: CPT | Performed by: SURGERY

## 2019-04-24 PROCEDURE — G8420 CALC BMI NORM PARAMETERS: HCPCS | Performed by: SURGERY

## 2019-04-24 PROCEDURE — G8427 DOCREV CUR MEDS BY ELIG CLIN: HCPCS | Performed by: SURGERY

## 2019-04-24 RX ORDER — FEEDER CONTAINER WITH PUMP SET
1 EACH MISCELLANEOUS
Qty: 32 CAN | Refills: 3 | Status: SHIPPED | OUTPATIENT
Start: 2019-04-24 | End: 2019-07-05

## 2019-04-24 RX ORDER — CIPROFLOXACIN 500 MG/1
500 TABLET, FILM COATED ORAL 2 TIMES DAILY
Qty: 20 TABLET | Refills: 0 | Status: SHIPPED | OUTPATIENT
Start: 2019-04-24 | End: 2019-05-04

## 2019-04-24 ASSESSMENT — ENCOUNTER SYMPTOMS
TROUBLE SWALLOWING: 0
COLOR CHANGE: 0
BLOOD IN STOOL: 0
SORE THROAT: 0
ANAL BLEEDING: 0
COUGH: 0
DIARRHEA: 0
VOMITING: 0
CONSTIPATION: 0
ABDOMINAL PAIN: 0
WHEEZING: 0
SHORTNESS OF BREATH: 0
NAUSEA: 0
PHOTOPHOBIA: 0
VOICE CHANGE: 0

## 2019-04-24 NOTE — PROGRESS NOTES
GENERAL SURGERY OFFICE NOTE    SUBJECTIVE:    Patient presenting today referred from [facility] [place] and No ref. provider found, for   Chief Complaint   Patient presents with    Follow-up     F/U EGD 4/8/19        HPI: Giovanni Ashraf is a 63 y.o. male presenting in first, follow up 3 weeks post EGD.     Procedure: EGD     Pathology:   Final Pathologic Diagnosis:  Biopsy gastric antral mucosa: Mild chronic gastritis. Electronically Signed Out By Carol Maldonado MD  Comment:   Small intestinal metaplasia, dysplasia, and organisms with  features of H. pylori are not seen.     Cyto:  Final Cytologic Diagnosis:   Esophageal Brushing with cell block:        Negative for malignancy.      Positive for Candida esophagitis. Needs Abx for what appears to be a UTI, and Ensure. Wounds very nicely healing, no erythema, no induration, no purulent discharge. No constipation, BMs back to normal.    Thoroughly reviewed the patient's medical history, family history, social history and review of systems with the patient today in the office. Please see medical record for pertinent positives.       Past Medical History:   Diagnosis Date    Acid reflux     Arthritis     Bronchitis     Chronic cluster headache     COPD (chronic obstructive pulmonary disease) (HCC)     \"dx 2 yrs ago\" \\"does not see a lung dr Chaparro Navas Degenerative disc disease     \"in my back have degenarative disc\"    Depression     Fracture     \"fell over a 5 gallon bucket and landed on cement block and broke my right hand\" (7/6/2015)    GERD with esophagitis     Hiatal hernia     Hx of gastric ulcer     HX OTHER MEDICAL     pt states has trouble with reading and writing\"can read very very little\"    Hyperlipidemia     Hypertension     \"on medication for last two yrs\" follow with Dr Sandeep Gonzalez Impingement syndrome of right shoulder     Left shoulder pain     limited range-of-motion    Lower back pain     Neck pain     more on left side viscous (XYLOCAINE) 2 % solution Take 15 mLs by mouth as needed for Irritation 675 mL 5    Nutritional Supplements (ENSURE HIGH PROTEIN) LIQD Take 1 Can by mouth Daily with lunch 32 Can 3    Amphotericin B 905 UNIT/MG POWD 1 Dose by Does not apply route 4 times daily 100 g 5    nystatin (MYCOSTATIN) 603970 UNIT/ML suspension Take 5 mLs by mouth 4 times daily 300 mL 5    esomeprazole (NEXIUM) 40 MG delayed release capsule Take 1 capsule by mouth 2 times daily 60 capsule 2    metoclopramide (REGLAN) 10 MG tablet Take 0.5 tablets by mouth 3 times daily (with meals) 90 tablet 3    nystatin (MYCOSTATIN) 987919 UNIT/GM powder Apply 3 times daily. 2 Bottle 3    aluminum & magnesium hydroxide-simethicone (MAALOX ADVANCED MAX ST) 400-400-40 MG/5ML SUSP Take 30 mLs by mouth every 6 hours 355 mL 5    docusate sodium (COLACE) 100 MG capsule Take 200 mg by mouth nightly      magnesium hydroxide (MILK OF MAGNESIA) 400 MG/5ML suspension Take 30 mLs by mouth daily as needed for Constipation 473 mL 5    theophylline (CORA-24) 100 MG extended release capsule Take 1 capsule by mouth 2 times daily 90 capsule 3    verapamil (CALAN) 120 MG tablet Take 1 tablet by mouth every 12 hours (Patient taking differently: Take 120 mg by mouth every 12 hours Supposed to take for cluster headache treatment but has been out of med for months) 60 tablet 3    tiotropium (SPIRIVA HANDIHALER) 18 MCG inhalation capsule Inhale 1 capsule into the lungs daily 30 capsule 3    albuterol sulfate HFA (PROVENTIL HFA) 108 (90 BASE) MCG/ACT inhaler Inhale 2 puffs into the lungs every 6 hours as needed for Wheezing 1 Inhaler 3     No current facility-administered medications for this visit. Allergies   Allergen Reactions    Bee Venom Anaphylaxis    Nsaids Other (See Comments)     Not to take any longer due to stomach ulcer           Review of Systems   Constitutional: Negative for activity change, chills, diaphoresis and fever.    HENT: Negative for sore throat, trouble swallowing and voice change. Eyes: Negative for photophobia and visual disturbance. Respiratory: Negative for cough, shortness of breath and wheezing. Cardiovascular: Negative for chest pain, palpitations and leg swelling. Gastrointestinal: Negative for abdominal pain, anal bleeding, blood in stool, constipation, diarrhea, nausea and vomiting. Endocrine: Negative for cold intolerance, heat intolerance, polydipsia and polyuria. Genitourinary: Negative for dysuria, frequency and hematuria. Musculoskeletal: Negative for joint swelling, myalgias and neck stiffness. Skin: Negative for color change and rash. Neurological: Negative for seizures, speech difficulty, light-headedness and numbness. Hematological: Negative for adenopathy. Does not bruise/bleed easily. OBJECTIVE:    /78 (Site: Right Upper Arm, Position: Sitting, Cuff Size: Large Adult)   Pulse 85   Ht 5' 9\" (1.753 m)   Wt 145 lb 6.4 oz (66 kg)   BMI 21.47 kg/m²    Body mass index is 21.47 kg/m². Physical Exam   Constitutional: He is oriented to person, place, and time. He appears well-developed and well-nourished. No distress. HENT:   Head: Normocephalic and atraumatic. Eyes: Pupils are equal, round, and reactive to light. Conjunctivae and EOM are normal. No scleral icterus. Neck: Normal range of motion. No JVD present. No tracheal deviation present. No thyromegaly present. Cardiovascular: Normal rate, regular rhythm, normal heart sounds and intact distal pulses. Exam reveals no gallop and no friction rub. No murmur heard. Pulmonary/Chest: Effort normal. No stridor. No respiratory distress. He has no wheezes. He has no rales. He exhibits no tenderness. Abdominal: Soft. Bowel sounds are normal. He exhibits no distension and no mass. There is no tenderness. There is no rebound and no guarding. Musculoskeletal: Normal range of motion. He exhibits no edema or tenderness.

## 2019-04-24 NOTE — PROGRESS NOTES
presenting in first, follow up 3 weeks post EGD. Procedure: EGD    Pathology:   Final Pathologic Diagnosis:  Biopsy gastric antral mucosa: Mild chronic gastritis. Electronically Signed Out By Sebas Warren MD  Comment:   Small intestinal metaplasia, dysplasia, and organisms with  features of H. pylori are not seen. Cyto:  Final Cytologic Diagnosis:   Esophageal Brushing with cell block:        Negative for malignancy.      Positive for Candida esophagitis.

## 2019-05-01 DIAGNOSIS — B37.0 ORAL THRUSH: ICD-10-CM

## 2019-05-02 RX ORDER — CLOTRIMAZOLE 10 MG/1
10 LOZENGE ORAL; TOPICAL
Qty: 50 TABLET | Refills: 0 | Status: SHIPPED | OUTPATIENT
Start: 2019-05-02 | End: 2019-05-12

## 2019-05-13 ENCOUNTER — OFFICE VISIT (OUTPATIENT)
Dept: ORTHOPEDIC SURGERY | Age: 59
End: 2019-05-13
Payer: MEDICAID

## 2019-05-13 DIAGNOSIS — G56.01 RIGHT CARPAL TUNNEL SYNDROME: ICD-10-CM

## 2019-05-13 DIAGNOSIS — G56.02 LEFT CARPAL TUNNEL SYNDROME: Primary | ICD-10-CM

## 2019-05-13 PROCEDURE — G8420 CALC BMI NORM PARAMETERS: HCPCS | Performed by: ORTHOPAEDIC SURGERY

## 2019-05-13 PROCEDURE — 4004F PT TOBACCO SCREEN RCVD TLK: CPT | Performed by: ORTHOPAEDIC SURGERY

## 2019-05-13 PROCEDURE — 3017F COLORECTAL CA SCREEN DOC REV: CPT | Performed by: ORTHOPAEDIC SURGERY

## 2019-05-13 PROCEDURE — G8428 CUR MEDS NOT DOCUMENT: HCPCS | Performed by: ORTHOPAEDIC SURGERY

## 2019-05-13 PROCEDURE — 99213 OFFICE O/P EST LOW 20 MIN: CPT | Performed by: ORTHOPAEDIC SURGERY

## 2019-05-13 ASSESSMENT — ENCOUNTER SYMPTOMS: COLOR CHANGE: 0

## 2019-05-13 NOTE — PROGRESS NOTES
present in the radial distribution. Decreased strength noted. Neurological: He is alert and oriented to person, place, and time. He has normal strength. A sensory deficit is present. Skin: Skin is warm and dry. No rash noted. No erythema. No pallor. Left hand-mild thenar atrophy, skin intact with no erythema or ecchymosis present. He does have severe arthritic changes to the DIP joints of all of his fingers without any sign of skin breakdown. EMG report of the bilateral upper extremities from January 22, 2019 reviewed by me today in the office demonstrates mild bilateral carpal tunnel syndrome, left worse than right, indolent C7 radiculopathy. Assessment:      Left carpal tunnel syndrome, mild  Right carpal tunnel syndrome, mild      Plan:      I discussed with him today his EMG findings. I explained does have mild carpal tunnel syndrome bilaterally. At this point given his persistent and worsening symptoms despite conservative treatment the next step is to consider surgical treatment beginning with his more symptomatic right hand first.  I discussed with him today performing right carpal tunnel release. I explained risks, benefits, possible complications of the procedure and answered all questions for the patient. The patient understands and consents to the procedure. We will schedule surgery at soonest convenience. I explained postoperative rehabilitation protocol and expectations with the patient today. Patient will follow up with their primary care physician prior to surgical treatment for preoperative clearance. Continue weight-bearing as tolerated. Continue range of motion exercises as instructed. Ice and elevate as needed. Tylenol or Motrin for pain. He would like to have the surgery at [facility]. Follow up in 1 week postop and once he is doing better we will discuss proceeding with surgical treatment for his left hand.           Lakeisha 97, DO

## 2019-05-21 ENCOUNTER — TELEPHONE (OUTPATIENT)
Dept: ORTHOPEDIC SURGERY | Age: 59
End: 2019-05-21

## 2019-05-21 NOTE — TELEPHONE ENCOUNTER
Called and left message with surgery date/times  Pre testing is June 7,2019 in [facility] arrival time is 2pm  Surgery date is June 14, 2019 in [facility] arrival time is 8am

## 2019-06-04 ENCOUNTER — TELEPHONE (OUTPATIENT)
Dept: ORTHOPEDIC SURGERY | Age: 59
End: 2019-06-04

## 2019-06-04 NOTE — TELEPHONE ENCOUNTER
Left another message of surgery date and time  Pre testing is June 7, 2019 in [facility] arrival time is 2pm  Surgery date is June 14, 2019 in [facility] arrival time is 8 am

## 2019-06-05 DIAGNOSIS — G56.01 RIGHT CARPAL TUNNEL SYNDROME: Primary | ICD-10-CM

## 2019-06-05 RX ORDER — CEPHALEXIN 250 MG/1
250 CAPSULE ORAL 4 TIMES DAILY
Qty: 4 CAPSULE | Refills: 0 | Status: SHIPPED | OUTPATIENT
Start: 2019-06-05 | End: 2019-06-06

## 2019-06-05 RX ORDER — HYDROCODONE BITARTRATE AND ACETAMINOPHEN 5; 325 MG/1; MG/1
1 TABLET ORAL EVERY 6 HOURS PRN
Qty: 5 TABLET | Refills: 0 | Status: SHIPPED | OUTPATIENT
Start: 2019-06-05 | End: 2019-06-08

## 2019-06-07 ENCOUNTER — HOSPITAL ENCOUNTER (OUTPATIENT)
Age: 59
Discharge: HOME | End: 2019-06-07
Payer: MEDICAID

## 2019-06-07 ENCOUNTER — HOSPITAL ENCOUNTER (OUTPATIENT)
Dept: PREADMISSION TESTING | Age: 59
Discharge: HOME | End: 2019-06-11
Payer: MEDICAID

## 2019-06-07 VITALS
WEIGHT: 146.38 LBS | BODY MASS INDEX: 21.68 KG/M2 | SYSTOLIC BLOOD PRESSURE: 137 MMHG | TEMPERATURE: 98.7 F | RESPIRATION RATE: 16 BRPM | OXYGEN SATURATION: 96 % | HEART RATE: 76 BPM | DIASTOLIC BLOOD PRESSURE: 83 MMHG | HEIGHT: 69 IN

## 2019-06-07 LAB
ANION GAP SERPL CALCULATED.3IONS-SCNC: 12 MMOL/L (ref 4–16)
BUN BLDV-MCNC: 18 MG/DL (ref 6–23)
CALCIUM SERPL-MCNC: 8.9 MG/DL (ref 8.3–10.6)
CHLORIDE BLD-SCNC: 103 MMOL/L (ref 99–110)
CO2: 23 MMOL/L (ref 21–32)
CREAT SERPL-MCNC: 0.9 MG/DL (ref 0.9–1.3)
EKG ATRIAL RATE: 73 BPM
EKG DIAGNOSIS: NORMAL
EKG P AXIS: 76 DEGREES
EKG P-R INTERVAL: 196 MS
EKG Q-T INTERVAL: 392 MS
EKG QRS DURATION: 98 MS
EKG QTC CALCULATION (BAZETT): 431 MS
EKG R AXIS: 13 DEGREES
EKG T AXIS: 58 DEGREES
EKG VENTRICULAR RATE: 73 BPM
GFR AFRICAN AMERICAN: >60 ML/MIN/1.73M2
GFR NON-AFRICAN AMERICAN: >60 ML/MIN/1.73M2
GLUCOSE BLD-MCNC: 58 MG/DL (ref 70–99)
HCT VFR BLD CALC: 40.7 % (ref 42–52)
HEMOGLOBIN: 13.1 GM/DL (ref 13.5–18)
MCH RBC QN AUTO: 29.4 PG (ref 27–31)
MCHC RBC AUTO-ENTMCNC: 32.2 % (ref 32–36)
MCV RBC AUTO: 91.3 FL (ref 78–100)
PDW BLD-RTO: 14.4 % (ref 11.7–14.9)
PLATELET # BLD: 257 K/CU MM (ref 140–440)
PMV BLD AUTO: 9.4 FL (ref 7.5–11.1)
POTASSIUM SERPL-SCNC: 4.2 MMOL/L (ref 3.5–5.1)
RBC # BLD: 4.46 M/CU MM (ref 4.6–6.2)
SODIUM BLD-SCNC: 138 MMOL/L (ref 135–145)
WBC # BLD: 8 K/CU MM (ref 4–10.5)

## 2019-06-07 PROCEDURE — 85027 COMPLETE CBC AUTOMATED: CPT

## 2019-06-07 PROCEDURE — 93010 ELECTROCARDIOGRAM REPORT: CPT | Performed by: INTERNAL MEDICINE

## 2019-06-07 PROCEDURE — 36415 COLL VENOUS BLD VENIPUNCTURE: CPT

## 2019-06-07 PROCEDURE — 80048 BASIC METABOLIC PNL TOTAL CA: CPT

## 2019-06-07 PROCEDURE — 93005 ELECTROCARDIOGRAM TRACING: CPT | Performed by: NURSE PRACTITIONER

## 2019-06-07 ASSESSMENT — PAIN DESCRIPTION - ORIENTATION: ORIENTATION: RIGHT

## 2019-06-07 ASSESSMENT — PAIN SCALES - GENERAL: PAINLEVEL_OUTOF10: 6

## 2019-06-07 ASSESSMENT — PAIN DESCRIPTION - DESCRIPTORS: DESCRIPTORS: ACHING

## 2019-06-07 ASSESSMENT — PAIN DESCRIPTION - PAIN TYPE: TYPE: ACUTE PAIN

## 2019-06-07 ASSESSMENT — PAIN DESCRIPTION - LOCATION: LOCATION: HAND;WRIST

## 2019-06-07 NOTE — PROGRESS NOTES
PAT interview complete. Labs/EKG done . Pre-op instructiions given and understanding voiced. Dylan Cason here to see.

## 2019-06-13 ENCOUNTER — TELEPHONE (OUTPATIENT)
Dept: ORTHOPEDIC SURGERY | Age: 59
End: 2019-06-13

## 2019-06-21 ENCOUNTER — OFFICE VISIT (OUTPATIENT)
Dept: BARIATRICS/WEIGHT MGMT | Age: 59
End: 2019-06-21
Payer: MEDICAID

## 2019-06-21 ENCOUNTER — TELEPHONE (OUTPATIENT)
Dept: BARIATRICS/WEIGHT MGMT | Age: 59
End: 2019-06-21

## 2019-06-21 VITALS
RESPIRATION RATE: 16 BRPM | BODY MASS INDEX: 21.27 KG/M2 | WEIGHT: 144 LBS | DIASTOLIC BLOOD PRESSURE: 82 MMHG | HEART RATE: 69 BPM | SYSTOLIC BLOOD PRESSURE: 116 MMHG

## 2019-06-21 DIAGNOSIS — E16.2 HYPOGLYCEMIA: ICD-10-CM

## 2019-06-21 DIAGNOSIS — B37.81 CANDIDA ESOPHAGITIS (HCC): Primary | ICD-10-CM

## 2019-06-21 PROCEDURE — 3017F COLORECTAL CA SCREEN DOC REV: CPT | Performed by: SURGERY

## 2019-06-21 PROCEDURE — 4004F PT TOBACCO SCREEN RCVD TLK: CPT | Performed by: SURGERY

## 2019-06-21 PROCEDURE — G8420 CALC BMI NORM PARAMETERS: HCPCS | Performed by: SURGERY

## 2019-06-21 PROCEDURE — G8427 DOCREV CUR MEDS BY ELIG CLIN: HCPCS | Performed by: SURGERY

## 2019-06-21 PROCEDURE — 99214 OFFICE O/P EST MOD 30 MIN: CPT | Performed by: SURGERY

## 2019-06-21 ASSESSMENT — ENCOUNTER SYMPTOMS
DIARRHEA: 0
COUGH: 0
SORE THROAT: 0
PHOTOPHOBIA: 0
ANAL BLEEDING: 0
COLOR CHANGE: 0
ABDOMINAL PAIN: 1
WHEEZING: 0
SHORTNESS OF BREATH: 0
NAUSEA: 0
TROUBLE SWALLOWING: 0
VOICE CHANGE: 0
CONSTIPATION: 0
BLOOD IN STOOL: 0
VOMITING: 0

## 2019-06-21 NOTE — PROGRESS NOTES
GENERAL SURGERY OFFICE NOTE    SUBJECTIVE:    Patient presenting today referred from Lei Marcos and No ref. provider found, for   Chief Complaint   Patient presents with    Follow-up     1 month f/u patient reprots trouble swallowing and feels like thrush has returned or unresolved. HPI: Clau Mary is a 63 y.o. male presenting with pain in the Epigasrtium and is chronic, worsening and dull compared to patient's normal condition. It is moderate in intensity for a duration of 1 week and is intermittent with modifying factors increased by or worse with eating. .  presenting in first, follow up 1 months OV 4/24/19        Wounds very nicely healing, no erythema, no induration, no purulent discharge. No constipation, BMs back to normal.    Thoroughly reviewed the patient's medical history, family history, social history and review of systems with the patient today in the office. Please see medical record for pertinent positives.       Past Medical History:   Diagnosis Date    Acid reflux     Arthritis     Bronchitis     last ~2016    Chronic cluster headache 6/7/2019 last    COPD (chronic obstructive pulmonary disease) ([facility] [address]     \"dx 2 yrs ago\" \\"does not see a lung dr Mary Jauregui Degenerative disc disease     \"in my back have degenarative disc\"    Depression     Fracture     \"fell over a 5 gallon bucket and landed on cement block and broke my right hand\" (7/6/2015)    GERD with esophagitis     H/O dizziness     States will feel like he is going to pass out, possible low BS    Hiatal hernia     Hx of gastric ulcer     HX OTHER MEDICAL     pt states has trouble with reading and writing\"can read very very little\"    Hyperlipidemia     Hypertension     \"on medication for last two yrs\" follow with Dr Tommy Lewis Impingement syndrome of right shoulder     Left shoulder pain     limited range-of-motion    Lower back pain     Motion sickness     Neck pain     more on left side into shoulder--pain limits ROM    Vertigo       Past Surgical History:   Procedure Laterality Date    ARM SURGERY Left 30+ yrs    \"put plastic ligaments in \"  after injury through glass window    COLONOSCOPY  2010    DENTAL SURGERY      all teeth extracted    ENDOSCOPY, COLON, DIAGNOSTIC  04/08/2019    EGD - thrush noted through out    GASTRIC FUNDOPLICATION N/A 06/43/2845    NISSEN FUNDOPLICATION LAPAROSCOPIC ROBOTIC HIATAL HERNIA performed by Luz Whitfield MD at [address]  12/2018    UPPER GASTROINTESTINAL ENDOSCOPY N/A 1/28/2019    EGD BIOPSY / BRUSHING OF ESOPHAGUS performed by Luz Whitfield MD at [address] [facility] N/A 4/8/2019    EGD BIOPSY AND BRUSHING performed by Luz Whitfield MD at [address] [facility] ENDOSCOPY     Current Outpatient Medications   Medication Sig Dispense Refill    nystatin (MYCOSTATIN) 334976 UNIT/ML suspension Take 5 mLs by mouth 4 times daily 300 mL 5    Nutritional Supplements (ENSURE HIGH PROTEIN) LIQD Take 1 Can by mouth Daily with lunch (Patient taking differently: Take 1 Can by mouth Daily with supper ) 32 Can 3    metoclopramide (REGLAN) 10 MG tablet Take 0.5 tablets by mouth 3 times daily (with meals) 90 tablet 3    omeprazole (PRILOSEC) 20 MG delayed release capsule Take 20 mg by mouth daily      ondansetron (ZOFRAN ODT) 4 MG disintegrating tablet Take 1 tablet by mouth every 8 hours as needed for Nausea or Vomiting Place under the tongue and let it melt and absorb from under your tongue. Do NOT swallow the pill otherwise it will take longer to work. Get well soon.  30 tablet 3    loratadine-pseudoephedrine (CLARITIN-D 24 HOUR)  MG per extended release tablet Take 1 tablet by mouth daily 60 tablet 3    benzocaine-menthol (CEPACOL SORE THROAT) 15-3.6 MG lozenge Take 1 lozenge by mouth every 2 hours as needed for Sore Throat 168 lozenge 5    phenol 1.4 % LIQD mouth spray Take 1 drop by mouth every 2 hours as needed for Sore Throat 177 mL 5    lidocaine viscous (XYLOCAINE) 2 % solution Take 15 mLs by mouth as needed for Irritation 675 mL 5    Nutritional Supplements (ENSURE HIGH PROTEIN) LIQD Take 1 Can by mouth Daily with lunch 32 Can 3    Amphotericin B 905 UNIT/MG POWD 1 Dose by Does not apply route 4 times daily 100 g 5    esomeprazole (NEXIUM) 40 MG delayed release capsule Take 1 capsule by mouth 2 times daily 60 capsule 2    metoclopramide (REGLAN) 10 MG tablet Take 0.5 tablets by mouth 3 times daily (with meals) 90 tablet 3    nystatin (MYCOSTATIN) 209326 UNIT/GM powder Apply 3 times daily. 2 Bottle 3    aluminum & magnesium hydroxide-simethicone (MAALOX ADVANCED MAX ST) 400-400-40 MG/5ML SUSP Take 30 mLs by mouth every 6 hours 355 mL 5    docusate sodium (COLACE) 100 MG capsule Take 200 mg by mouth nightly      magnesium hydroxide (MILK OF MAGNESIA) 400 MG/5ML suspension Take 30 mLs by mouth daily as needed for Constipation 473 mL 5    theophylline (CORA-24) 100 MG extended release capsule Take 1 capsule by mouth 2 times daily 90 capsule 3    verapamil (CALAN) 120 MG tablet Take 1 tablet by mouth every 12 hours (Patient taking differently: Take 120 mg by mouth every 12 hours Supposed to take for cluster headache treatment but has been out of med for months) 60 tablet 3    tiotropium (SPIRIVA HANDIHALER) 18 MCG inhalation capsule Inhale 1 capsule into the lungs daily 30 capsule 3    albuterol sulfate HFA (PROVENTIL HFA) 108 (90 BASE) MCG/ACT inhaler Inhale 2 puffs into the lungs every 6 hours as needed for Wheezing 1 Inhaler 3     No current facility-administered medications for this visit. Allergies   Allergen Reactions    Bee Venom Anaphylaxis    Nsaids Other (See Comments)     Not to take any longer due to stomach ulcer           Review of Systems   Constitutional: Negative for activity change, chills, diaphoresis and fever.    HENT: Negative for sore throat, trouble swallowing and voice change. Eyes: Negative for photophobia and visual disturbance. Respiratory: Negative for cough, shortness of breath and wheezing. Cardiovascular: Negative for chest pain, palpitations and leg swelling. Gastrointestinal: Positive for abdominal pain (Dysphagia. Amanda Leida ). Negative for anal bleeding, blood in stool, constipation, diarrhea, nausea and vomiting. Endocrine: Negative for cold intolerance, heat intolerance, polydipsia and polyuria. Genitourinary: Negative for dysuria, frequency and hematuria. Musculoskeletal: Negative for joint swelling, myalgias and neck stiffness. Skin: Negative for color change and rash. Neurological: Negative for seizures, speech difficulty, light-headedness and numbness. Hematological: Negative for adenopathy. Does not bruise/bleed easily. OBJECTIVE:    /82 (Site: Right Upper Arm, Position: Sitting, Cuff Size: Medium Adult)   Pulse 69   Resp 16   Wt 144 lb (65.3 kg)   BMI 21.27 kg/m²    Body mass index is 21.27 kg/m². Physical Exam   Constitutional: He is oriented to person, place, and time. He appears well-developed and well-nourished. No distress. HENT:   Head: Normocephalic and atraumatic. Eyes: Pupils are equal, round, and reactive to light. Conjunctivae and EOM are normal. No scleral icterus. Neck: Normal range of motion. No JVD present. No tracheal deviation present. No thyromegaly present. Cardiovascular: Normal rate, regular rhythm, normal heart sounds and intact distal pulses. Exam reveals no gallop and no friction rub. No murmur heard. Pulmonary/Chest: Effort normal. No stridor. No respiratory distress. He has no wheezes. He has no rales. He exhibits no tenderness. Abdominal: Soft. Bowel sounds are normal. He exhibits no distension and no mass. There is no tenderness. There is no rebound and no guarding. Musculoskeletal: Normal range of motion. He exhibits no edema or tenderness.    Lymphadenopathy:     He has no cervical adenopathy. Neurological: He is alert and oriented to person, place, and time. No cranial nerve deficit. Coordination normal.   Skin: Skin is warm and dry. No rash noted. He is not diaphoretic. No erythema. No pallor. Psychiatric: His behavior is normal. Judgment and thought content normal.   Vitals reviewed. ASSESSMENT & PLAN:    1. Candida esophagitis ([address]    2. Hypoglycemia         Nystatin is BACK, Rx for 2 wks then re-EGD. Patient counseled on the risks, benefits, and alternatives of treatment plan at length while in the office today. Patient states an understanding and willingness to proceed with the plan. Orders Placed This Encounter   Medications    nystatin (MYCOSTATIN) 849178 UNIT/ML suspension     Sig: Take 5 mLs by mouth 4 times daily     Dispense:  300 mL     Refill:  5     Hypoglycemia? ? Will check MRI to r/o insulinoma. Follow Up:  Return in about 2 weeks (around 7/5/2019) for Surgery: EGD. Jovany Patterson MD, FACS, FICS. 6/21/19       Patient was seen with total face to face time of 25 minutes. More than 50% of this visit was counseling and education as above in my assessment and plan.

## 2019-06-21 NOTE — TELEPHONE ENCOUNTER
Scheduled MRI ABD MRCP at [facility]  On 7/2/19 at 13:00 with arrival time of 12:30. Prep: NPO 8 hours prior. Wear loose comfortable clothing, with no metal.  F/U to be scheduled after EGD. Called and spoke to Giuseppe Chavez, gave dates and times.

## 2019-06-25 ENCOUNTER — TELEPHONE (OUTPATIENT)
Dept: SURGERY | Age: 59
End: 2019-06-25

## 2019-07-02 ENCOUNTER — HOSPITAL ENCOUNTER (OUTPATIENT)
Dept: MRI IMAGING | Age: 59
Discharge: HOME | End: 2019-07-02
Payer: MEDICAID

## 2019-07-02 DIAGNOSIS — E16.2 HYPOGLYCEMIA: ICD-10-CM

## 2019-07-02 PROCEDURE — 6360000004 HC RX CONTRAST MEDICATION: Performed by: SURGERY

## 2019-07-02 PROCEDURE — 74183 MRI ABD W/O CNTR FLWD CNTR: CPT

## 2019-07-02 PROCEDURE — A9577 INJ MULTIHANCE: HCPCS | Performed by: SURGERY

## 2019-07-02 RX ADMIN — GADOBENATE DIMEGLUMINE 13 ML: 529 INJECTION, SOLUTION INTRAVENOUS at 14:23

## 2019-07-03 ENCOUNTER — ANESTHESIA EVENT (OUTPATIENT)
Dept: ENDOSCOPY | Age: 59
End: 2019-07-03
Payer: MEDICAID

## 2019-07-05 NOTE — PROGRESS NOTES
Surgery 7/8/19 @ 0800, arrive 0600     1. Do not eat or drink anything after 12 midnight -unless instructed by your doctor prior to surgery. This includes no water, chewing gum or mints. 2. Follow your directions as prescribed by the doctor for your procedure and medications. 3.Check with your Doctor regarding stopping Plavix, Coumadin, Lovenox,Effient,Pradaxa,Xarelto, Fragmin or other blood thinners and follow their instructions. 4. Do not smoke, and do not drink any alcoholic beverages 24 hours prior to surgery. This includes NA Beer. 5. You may brush your teeth and gargle the morning of surgery. DO NOT SWALLOW WATER   6. You MUST make arrangements for a responsible adult to take you home after your surgery and be able to check on you every couple hours for the day. You will not be allowed to leave alone or drive yourself home. It is strongly suggested someone stay with you the first 24 hrs. Your surgery will be cancelled if you do not have a ride home. 7. Please wear simple, loose fitting clothing to the hospital.  Geoffrey Rodriges not bring valuables (money, credit cards, checkbooks, etc.) Do not wear any makeup (including no eye makeup) or nail polish on your fingers or toes. 8. DO NOT wear any jewelry or piercings on day of surgery. All body piercing jewelry must be removed. 9. If you have dentures, they will be removed before going to the OR; we will provide you a container. If you wear contact lenses or glasses, they will be removed; please bring a case for them. 10. If you  have a Living Will and Durable Power of  for Healthcare, please bring in a copy. 11 Please bring picture ID,  insurance card, paperwork from the doctors office    (H & P, Consent, & card for implantable devices).

## 2019-07-07 ASSESSMENT — COPD QUESTIONNAIRES: CAT_SEVERITY: SEVERE

## 2019-07-07 NOTE — ANESTHESIA PRE PROCEDURE
ligaments in \"  after injury through glass window    COLONOSCOPY  2010    COLONOSCOPY  2018    HX: polyps - Dr. Olive Espinoza      all teeth extracted    ENDOSCOPY, COLON, DIAGNOSTIC  04/08/2019    EGD - thrush noted through out    EYE SURGERY Left 1990's    \"metal removed from eye\"    GASTRIC FUNDOPLICATION N/A 02/50/9611    NISSEN FUNDOPLICATION LAPAROSCOPIC ROBOTIC HIATAL HERNIA performed by Carlos Enrique Koroma MD at [facility]  12/2018    UPPER GASTROINTESTINAL ENDOSCOPY N/A 1/28/2019    EGD BIOPSY / BRUSHING OF ESOPHAGUS performed by Carlos Enrique Koroma MD at [facility] N/A 4/8/2019    EGD BIOPSY AND BRUSHING performed by Carlos Enrique Koroma MD at [address] [place] [address] History:    Social History     Tobacco Use    Smoking status: Current Every Day Smoker     Packs/day: 1.00     Years: 40.00     Pack years: 40.00     Types: Cigarettes    Smokeless tobacco: Never Used    Tobacco comment: \"use to smoke 1.5 - to 2.5 packs per day\"   Substance Use Topics    Alcohol use: Yes     Comment: Occasional 1/month                                Ready to quit: Not Answered  Counseling given: Not Answered  Comment: \"use to smoke 1.5 - to 2.5 packs per day\"      Vital Signs (Current):   Vitals:    07/05/19 0906   Weight: 144 lb (65.3 kg)   Height: 5' 9\" (1.753 m)                                              BP Readings from Last 3 Encounters:   06/21/19 116/82   06/07/19 137/83   04/24/19 114/78       NPO Status:                                                                                 BMI:   Wt Readings from Last 3 Encounters:   06/21/19 144 lb (65.3 kg)   06/07/19 146 lb 6 oz (66.4 kg)   04/24/19 145 lb 6.4 oz (66 kg)     Body mass index is 21.27 kg/m².     CBC:   Lab Results   Component Value Date    WBC 8.0 06/07/2019    RBC 4.46 06/07/2019    HGB 13.1 06/07/2019    HCT 40.7 06/07/2019    MCV 91.3

## 2019-07-08 ENCOUNTER — HOSPITAL ENCOUNTER (OUTPATIENT)
Age: 59
Setting detail: OUTPATIENT SURGERY
Discharge: HOME | End: 2019-07-08
Attending: SURGERY | Admitting: SURGERY
Payer: MEDICAID

## 2019-07-08 ENCOUNTER — ANESTHESIA (OUTPATIENT)
Dept: ENDOSCOPY | Age: 59
End: 2019-07-08
Payer: MEDICAID

## 2019-07-08 VITALS
WEIGHT: 144 LBS | TEMPERATURE: 97.8 F | HEART RATE: 56 BPM | OXYGEN SATURATION: 97 % | BODY MASS INDEX: 21.33 KG/M2 | DIASTOLIC BLOOD PRESSURE: 77 MMHG | SYSTOLIC BLOOD PRESSURE: 107 MMHG | HEIGHT: 69 IN | RESPIRATION RATE: 16 BRPM

## 2019-07-08 VITALS
DIASTOLIC BLOOD PRESSURE: 66 MMHG | TEMPERATURE: 98.6 F | SYSTOLIC BLOOD PRESSURE: 109 MMHG | RESPIRATION RATE: 12 BRPM | OXYGEN SATURATION: 100 %

## 2019-07-08 PROCEDURE — 87077 CULTURE AEROBIC IDENTIFY: CPT

## 2019-07-08 PROCEDURE — 7100000010 HC PHASE II RECOVERY - FIRST 15 MIN: Performed by: SURGERY

## 2019-07-08 PROCEDURE — 6360000002 HC RX W HCPCS: Performed by: NURSE ANESTHETIST, CERTIFIED REGISTERED

## 2019-07-08 PROCEDURE — 88112 CYTOPATH CELL ENHANCE TECH: CPT

## 2019-07-08 PROCEDURE — 7100000011 HC PHASE II RECOVERY - ADDTL 15 MIN: Performed by: SURGERY

## 2019-07-08 PROCEDURE — 2709999900 HC NON-CHARGEABLE SUPPLY: Performed by: SURGERY

## 2019-07-08 PROCEDURE — 3700000001 HC ADD 15 MINUTES (ANESTHESIA): Performed by: SURGERY

## 2019-07-08 PROCEDURE — 2500000003 HC RX 250 WO HCPCS: Performed by: NURSE ANESTHETIST, CERTIFIED REGISTERED

## 2019-07-08 PROCEDURE — 2580000003 HC RX 258: Performed by: SURGERY

## 2019-07-08 PROCEDURE — 87102 FUNGUS ISOLATION CULTURE: CPT

## 2019-07-08 PROCEDURE — 3700000000 HC ANESTHESIA ATTENDED CARE: Performed by: SURGERY

## 2019-07-08 PROCEDURE — 3609012400 HC EGD TRANSORAL BIOPSY SINGLE/MULTIPLE: Performed by: SURGERY

## 2019-07-08 PROCEDURE — 43239 EGD BIOPSY SINGLE/MULTIPLE: CPT | Performed by: SURGERY

## 2019-07-08 PROCEDURE — 88305 TISSUE EXAM BY PATHOLOGIST: CPT

## 2019-07-08 RX ORDER — LIDOCAINE HYDROCHLORIDE 20 MG/ML
INJECTION, SOLUTION EPIDURAL; INFILTRATION; INTRACAUDAL; PERINEURAL PRN
Status: DISCONTINUED | OUTPATIENT
Start: 2019-07-08 | End: 2019-07-08 | Stop reason: SDUPTHER

## 2019-07-08 RX ORDER — PROPOFOL 10 MG/ML
INJECTION, EMULSION INTRAVENOUS PRN
Status: DISCONTINUED | OUTPATIENT
Start: 2019-07-08 | End: 2019-07-08 | Stop reason: SDUPTHER

## 2019-07-08 RX ORDER — SODIUM CHLORIDE, SODIUM LACTATE, POTASSIUM CHLORIDE, CALCIUM CHLORIDE 600; 310; 30; 20 MG/100ML; MG/100ML; MG/100ML; MG/100ML
INJECTION, SOLUTION INTRAVENOUS CONTINUOUS
Status: DISCONTINUED | OUTPATIENT
Start: 2019-07-08 | End: 2019-07-08 | Stop reason: HOSPADM

## 2019-07-08 RX ORDER — METOCLOPRAMIDE 10 MG/1
5 TABLET ORAL
Qty: 90 TABLET | Refills: 3 | Status: SHIPPED | OUTPATIENT
Start: 2019-07-08 | End: 2019-09-04 | Stop reason: SDUPTHER

## 2019-07-08 RX ADMIN — LIDOCAINE HYDROCHLORIDE 100 MG: 20 INJECTION, SOLUTION EPIDURAL; INFILTRATION; INTRACAUDAL; PERINEURAL at 08:12

## 2019-07-08 RX ADMIN — SODIUM CHLORIDE, POTASSIUM CHLORIDE, SODIUM LACTATE AND CALCIUM CHLORIDE: 600; 310; 30; 20 INJECTION, SOLUTION INTRAVENOUS at 06:35

## 2019-07-08 RX ADMIN — PROPOFOL 10 MG: 10 INJECTION, EMULSION INTRAVENOUS at 08:13

## 2019-07-08 RX ADMIN — PROPOFOL 80 MG: 10 INJECTION, EMULSION INTRAVENOUS at 08:12

## 2019-07-08 RX ADMIN — PROPOFOL 150 MG: 10 INJECTION, EMULSION INTRAVENOUS at 08:14

## 2019-07-08 ASSESSMENT — COPD QUESTIONNAIRES: CAT_SEVERITY: SEVERE

## 2019-07-08 NOTE — ANESTHESIA PRE PROCEDURE
HFA) 108 (90 BASE) MCG/ACT inhaler Inhale 2 puffs into the lungs every 6 hours as needed for Wheezing 1/3/17  Yes Kristian Cervantes MD   theophylline (CORA-24) 100 MG extended release capsule Take 1 capsule by mouth 2 times daily 1/3/17   Kristian Cervantes MD       Current medications:    Current Facility-Administered Medications   Medication Dose Route Frequency Provider Last Rate Last Dose    lactated ringers infusion   Intravenous Continuous Griselda Tineo  mL/hr at 07/08/19 7407         Allergies:     Allergies   Allergen Reactions    Bee Venom Anaphylaxis    Nsaids Other (See Comments)     Not to take any longer due to stomach ulcer       Problem List:    Patient Active Problem List   Diagnosis Code    Impingement syndrome of right shoulder M75.41    Acute bronchitis with COPD ([facility] [address] J44.0, J20.9    Chest pain R07.9    Post-nasal drip R09.82    Chronic cluster headache G44.029    Generalized weakness R53.1    Vertigo R42    Positional lightheadedness R42    Hiatal hernia K44.9    Gastroesophageal reflux disease with esophagitis K21.0    PUD (peptic ulcer disease) K27.9    Paraesophageal hiatal hernia K44.9    Status post laparoscopic Nissen fundoplication Q42.273    Esophageal dysphagia R13.10    Esophageal thrush (HCC) B37.81    Candida esophagitis (HCC) B37.81    Gastroparesis K31.84    Candida infection, esophageal ([facility] [address] B37.81       Past Medical History:        Diagnosis Date    Arthritis     Hands    Bronchitis     last ~2016    Chronic cluster headache 6/7/2019 last    COPD (chronic obstructive pulmonary disease) ([facility] [address]     \"dx 2 yrs ago\" \\"does not see a lung dr Aldair Joseph with [facility]    Degenerative disc disease     \"in my back have degenarative disc\"    Depression     Fracture     \"fell over a 5 gallon bucket and landed on cement block and broke my right hand\" (7/6/2015)    GERD with esophagitis     H/O dizziness     States will feel like he is going to pass out, possible low BS    Hiatal hernia     Hx of gastric ulcer     HX OTHER MEDICAL     pt states has trouble with reading and writing\"can read very very little\"    Hyperlipidemia     Hypertension     \"on medication for last two yrs\" follow with Dr Attila Natarajan Impingement syndrome of right shoulder     Left shoulder pain     limited range-of-motion    Lower back pain     Motion sickness     Neck pain     more on left side into shoulder--pain limits ROM    Vertigo        Past Surgical History:        Procedure Laterality Date    ARM SURGERY Left 30+ yrs    \"put plastic ligaments in \"  after injury through glass window    COLONOSCOPY  2010    COLONOSCOPY  2018    HX: polyps - Dr. Zeb Doherty      all teeth extracted    ENDOSCOPY, COLON, DIAGNOSTIC  04/08/2019    EGD - thrush noted through out   [address] Left 1990's    \"metal removed from eye\"    GASTRIC FUNDOPLICATION N/A 73/62/5916    NISSEN FUNDOPLICATION LAPAROSCOPIC ROBOTIC HIATAL HERNIA performed by Senait Dangelo MD at [facility] 18  12/2018    UPPER GASTROINTESTINAL ENDOSCOPY N/A 1/28/2019    EGD BIOPSY / BRUSHING OF ESOPHAGUS performed by Senait Dangelo MD at [place] [address] N/A 4/8/2019    EGD BIOPSY AND BRUSHING performed by Senait Dangelo MD at [address] [place] [address] History:    Social History     Tobacco Use    Smoking status: Current Every Day Smoker     Packs/day: 1.00     Years: 40.00     Pack years: 40.00     Types: Cigarettes    Smokeless tobacco: Never Used    Tobacco comment: \"use to smoke 1.5 - to 2.5 packs per day\"   Substance Use Topics    Alcohol use: Yes     Comment: Occasional 1/month                                Ready to quit: Not Answered  Counseling given: Not Answered  Comment: \"use to smoke 1.5 - to 2.5 packs per day\"      Vital Signs (Current):   Vitals:    07/05/19 0906 07/08/19 0618   BP:  122/78   Pulse:  55   Resp:  17

## 2019-07-08 NOTE — H&P
HISTORY AND PHYSICAL EXAM    Date of Admission:  7/8/2019    PCP:  Amanda Freed    Chief Complaint / History of Present Illness:  Lamont Bell is a 63 y.o. male presenting with pain in the Epigasrtium and is chronic, worsening and dull compared to patient's normal condition. It is moderate in intensity for a duration of many weeks and is intermittent with modifying factors increased by or worse with eating. .  Had candida esophagitis, and treated with Antifungals, will repeat the EGD and culture. PMH:   has a past medical history of Arthritis, Bronchitis, Chronic cluster headache (6/7/2019 last), COPD (chronic obstructive pulmonary disease) ([address] Degenerative disc disease, Depression, Fracture, GERD with esophagitis, H/O dizziness, Hiatal hernia, gastric ulcer, OTHER MEDICAL, Hyperlipidemia, Hypertension, Impingement syndrome of right shoulder, Left shoulder pain, Lower back pain, Motion sickness, Neck pain, and Vertigo. PSH:   has a past surgical history that includes Arm Surgery (Left, 30+ yrs); Upper gastrointestinal endoscopy (12/2018); Dental surgery; Gastric fundoplication (N/A, 32/27/2073); Upper gastrointestinal endoscopy (N/A, 1/28/2019); Endoscopy, colon, diagnostic (04/08/2019); Upper gastrointestinal endoscopy (N/A, 4/8/2019); Colonoscopy (2010); Colonoscopy (2018); eye surgery (Left, 1990's); and hernia repair. Allergies: Allergies   Allergen Reactions    Bee Venom Anaphylaxis    Nsaids Other (See Comments)     Not to take any longer due to stomach ulcer        Home Meds:    Prior to Admission medications    Medication Sig Start Date End Date Taking?  Authorizing Provider   nystatin (MYCOSTATIN) 339307 UNIT/ML suspension Take 5 mLs by mouth 4 times daily 6/21/19  Yes Lucia Bishop MD   omeprazole (PRILOSEC) 20 MG delayed release capsule Take 20 mg by mouth daily   Yes Historical Provider, MD   loratadine-pseudoephedrine (CLARITIN-D 24 HOUR)  MG per extended

## 2019-07-08 NOTE — OP NOTE
intact fundoplication was noted, no changes suspicious Whitney's  - ANTRAL gastritis, with healing ulcers, almost all healed  - Less obvious gastroparesis with no solid food particles in the stomach  - Esophageal candidiasis MUCH improved - biopsies were brushed and sent to the lab    - No biliary reflux  The pylorus was intubated and  the scope was advanced all the way to the third portion of the duodenum. No  postpyloric abnormalities identified, including the ampulla and periampullary  regions. The scope was retracted back into the stomach, retroflexed; again an intact fundoplication was noted, intra abbominally. Documentary pictures of all the above  and below relevant findings were taken. The stomach was decompressed; the scope was retracted  out uneventfully. The patient tolerated the procedure well without any  complications and was sent to PACU in stable condition.      ____________________________________________    George Simon MD, FACS, FICS    7/8/2019  8:25 AM

## 2019-07-09 LAB — CLOTEST: NEGATIVE

## 2019-07-12 DIAGNOSIS — E16.2 HYPOGLYCEMIA: Primary | ICD-10-CM

## 2019-07-17 ENCOUNTER — OFFICE VISIT (OUTPATIENT)
Dept: BARIATRICS/WEIGHT MGMT | Age: 59
End: 2019-07-17
Payer: MEDICAID

## 2019-07-17 VITALS
DIASTOLIC BLOOD PRESSURE: 78 MMHG | SYSTOLIC BLOOD PRESSURE: 106 MMHG | OXYGEN SATURATION: 97 % | BODY MASS INDEX: 21.03 KG/M2 | HEART RATE: 72 BPM | WEIGHT: 142 LBS | HEIGHT: 69 IN

## 2019-07-17 DIAGNOSIS — B37.81 CANDIDA ESOPHAGITIS (HCC): Primary | ICD-10-CM

## 2019-07-17 PROCEDURE — 99213 OFFICE O/P EST LOW 20 MIN: CPT | Performed by: SURGERY

## 2019-07-17 PROCEDURE — G8420 CALC BMI NORM PARAMETERS: HCPCS | Performed by: SURGERY

## 2019-07-17 PROCEDURE — 4004F PT TOBACCO SCREEN RCVD TLK: CPT | Performed by: SURGERY

## 2019-07-17 PROCEDURE — G8427 DOCREV CUR MEDS BY ELIG CLIN: HCPCS | Performed by: SURGERY

## 2019-07-17 PROCEDURE — 3017F COLORECTAL CA SCREEN DOC REV: CPT | Performed by: SURGERY

## 2019-07-17 ASSESSMENT — ENCOUNTER SYMPTOMS
DIARRHEA: 0
BLOOD IN STOOL: 0
CONSTIPATION: 0
NAUSEA: 0
COUGH: 0
SORE THROAT: 0
VOMITING: 0
TROUBLE SWALLOWING: 0
COLOR CHANGE: 0
SHORTNESS OF BREATH: 0
WHEEZING: 0
VOICE CHANGE: 0
ANAL BLEEDING: 0
ABDOMINAL PAIN: 0
PHOTOPHOBIA: 0

## 2019-07-17 NOTE — PROGRESS NOTES
GENERAL SURGERY OFFICE NOTE    SUBJECTIVE:    Patient presenting today referred from [facility] and No ref. provider found, for   Chief Complaint   Patient presents with    Follow-up     Post EGD, MRI/MRCP        HPI: Cory Bishop is a 63 y.o. male Presenting here to follow-up after EGD and MRI/MRCP. Procedure: - EGD to the 3rd portion of the duodenum  - Esophageal biopsies to r/o candida esophagitis  - Antral mucosal biopsies to r/o H.pylori: Crystal and Pathology    Pathology: Pathology is still in process. Final Cytologic Diagnosis:  Esophageal Brushing (Cytology with Cell Block): -     Negative for malignancy. -     Findings suggestive of candida fungal organisms are  not seen.  .      MRCP:   Impression   No acute intra-abdominal abnormality.  Specifically, no arterially enhancing   mass to suggest insulinoma.           Wounds very nicely healing, no erythema, no induration, no purulent discharge. No constipation, BMs back to normal.    Thoroughly reviewed the patient's medical history, family history, social history and review of systems with the patient today in the office. Please see medical record for pertinent positives.       Past Medical History:   Diagnosis Date    Arthritis     Hands    Bronchitis     last ~2016    Chronic cluster headache 6/7/2019 last    COPD (chronic obstructive pulmonary disease) ([facility] [address]     \"dx 2 yrs ago\" \\"does not see a lung dr Joce Ayala with FMD    Degenerative disc disease     \"in my back have degenarative disc\"    Depression     Fracture     \"fell over a 5 gallon bucket and landed on cement block and broke my right hand\" (7/6/2015)    GERD with esophagitis     H/O dizziness     States will feel like he is going to pass out, possible low BS    Hiatal hernia     Hx of gastric ulcer     HX OTHER MEDICAL     pt states has trouble with reading and writing\"can read very very little\"    Hyperlipidemia     Hypertension     \"on medication for last 2 hours as needed for Sore Throat 177 mL 5    lidocaine viscous (XYLOCAINE) 2 % solution Take 15 mLs by mouth as needed for Irritation 675 mL 5    Nutritional Supplements (ENSURE HIGH PROTEIN) LIQD Take 1 Can by mouth Daily with lunch 32 Can 3    aluminum & magnesium hydroxide-simethicone (MAALOX ADVANCED MAX ST) 400-400-40 MG/5ML SUSP Take 30 mLs by mouth every 6 hours 355 mL 5    docusate sodium (COLACE) 100 MG capsule Take 200 mg by mouth 2 times daily as needed       magnesium hydroxide (MILK OF MAGNESIA) 400 MG/5ML suspension Take 30 mLs by mouth daily as needed for Constipation 473 mL 5    theophylline (CORA-24) 100 MG extended release capsule Take 1 capsule by mouth 2 times daily 90 capsule 3    tiotropium (SPIRIVA HANDIHALER) 18 MCG inhalation capsule Inhale 1 capsule into the lungs daily 30 capsule 3    albuterol sulfate HFA (PROVENTIL HFA) 108 (90 BASE) MCG/ACT inhaler Inhale 2 puffs into the lungs every 6 hours as needed for Wheezing 1 Inhaler 3     No current facility-administered medications for this visit. Allergies   Allergen Reactions    Bee Venom Anaphylaxis    Nsaids Other (See Comments)     Not to take any longer due to stomach ulcer           Review of Systems   Constitutional: Negative for activity change, chills, diaphoresis and fever. HENT: Negative for sore throat, trouble swallowing and voice change. Eyes: Negative for photophobia and visual disturbance. Respiratory: Negative for cough, shortness of breath and wheezing. Cardiovascular: Negative for chest pain, palpitations and leg swelling. Gastrointestinal: Negative for abdominal pain, anal bleeding, blood in stool, constipation, diarrhea, nausea and vomiting. Endocrine: Negative for cold intolerance, heat intolerance, polydipsia and polyuria. Genitourinary: Negative for dysuria, frequency and hematuria. Musculoskeletal: Negative for joint swelling, myalgias and neck stiffness.    Skin: Negative for color today. Patient states an understanding and willingness to proceed with the plan. Follow Up:  Return in about 4 weeks (around 8/14/2019) for Follow up Symptoms: And to assure that he gains weight. Sirisha Moser MD, FACS, FICS. 7/17/19       Patient was seen with total face to face time of 25 minutes. More than 50% of this visit was counseling and education as above in my assessment and plan.

## 2019-08-10 LAB
FINAL RESULT: NORMAL
FINAL RESULT: NORMAL
PRELIMINARY: NORMAL
PRELIMINARY: NORMAL

## 2019-08-26 ENCOUNTER — OFFICE VISIT (OUTPATIENT)
Dept: ORTHOPEDIC SURGERY | Age: 59
End: 2019-08-26
Payer: MEDICAID

## 2019-08-26 VITALS — OXYGEN SATURATION: 96 % | RESPIRATION RATE: 14 BRPM | HEART RATE: 71 BPM

## 2019-08-26 DIAGNOSIS — G56.01 RIGHT CARPAL TUNNEL SYNDROME: Primary | ICD-10-CM

## 2019-08-26 PROCEDURE — G8420 CALC BMI NORM PARAMETERS: HCPCS | Performed by: ORTHOPAEDIC SURGERY

## 2019-08-26 PROCEDURE — 4004F PT TOBACCO SCREEN RCVD TLK: CPT | Performed by: ORTHOPAEDIC SURGERY

## 2019-08-26 PROCEDURE — 99212 OFFICE O/P EST SF 10 MIN: CPT | Performed by: ORTHOPAEDIC SURGERY

## 2019-08-26 PROCEDURE — 3017F COLORECTAL CA SCREEN DOC REV: CPT | Performed by: ORTHOPAEDIC SURGERY

## 2019-08-26 PROCEDURE — G8427 DOCREV CUR MEDS BY ELIG CLIN: HCPCS | Performed by: ORTHOPAEDIC SURGERY

## 2019-08-26 ASSESSMENT — ENCOUNTER SYMPTOMS: COLOR CHANGE: 0

## 2019-08-26 NOTE — PROGRESS NOTES
normal range of motion, no tenderness, no bony tenderness, normal two-point discrimination, normal capillary refill, no deformity, no laceration and no swelling. Normal sensation noted. Normal strength noted. Left hand: He exhibits tenderness, disruption of two-point discrimination and swelling. He exhibits normal range of motion, no bony tenderness, normal capillary refill, no deformity and no laceration. Decreased sensation noted. Decreased sensation is present in the ulnar distribution and is present in the medial distribution. Decreased sensation is not present in the radial distribution. Decreased strength noted. Neurological: He is alert and oriented to person, place, and time. He has normal strength. A sensory deficit is present. Skin: Skin is warm and dry. No rash noted. No erythema. No pallor. Left hand-mild thenar atrophy, skin intact with no erythema or ecchymosis present. He does have severe arthritic changes to the DIP joints of all of his fingers without any sign of skin breakdown. EMG report of the bilateral upper extremities from January 22, 2019 reviewed by me today in the office demonstrates mild bilateral carpal tunnel syndrome, left worse than right, indolent C7 radiculopathy. Assessment:      Left carpal tunnel syndrome, mild  Right carpal tunnel syndrome, mild      Plan:      I discussed with him again today his EMG findings. I explained does have mild carpal tunnel syndrome bilaterally. At this point given his persistent and worsening symptoms despite conservative treatment the next step is to consider surgical treatment beginning with his more symptomatic right hand first.  I discussed with him today performing right carpal tunnel release. I explained risks, benefits, possible complications of the procedure and answered all questions for the patient. The patient understands and consents to the procedure. We will schedule surgery at soonest convenience.   I explained postoperative rehabilitation protocol and expectations with the patient today. Patient will follow up with their primary care physician prior to surgical treatment for preoperative clearance. Continue weight-bearing as tolerated. Continue range of motion exercises as instructed. Ice and elevate as needed. Tylenol or Motrin for pain. He would like to have the surgery at [facility]. Follow up in 1 week postop and once he is doing better we will discuss proceeding with surgical treatment for his left hand.           Lakeisha 97, DO

## 2019-08-27 ENCOUNTER — TELEPHONE (OUTPATIENT)
Dept: ORTHOPEDIC SURGERY | Age: 59
End: 2019-08-27

## 2019-08-27 NOTE — TELEPHONE ENCOUNTER
Spoke with Ely Shown, gave pre testing information  Pre testing is Oct. 11, 2019 in [facility] at 9 am  Ely Shown repeated information

## 2019-09-04 ENCOUNTER — OFFICE VISIT (OUTPATIENT)
Dept: BARIATRICS/WEIGHT MGMT | Age: 59
End: 2019-09-04
Payer: MEDICAID

## 2019-09-04 VITALS
SYSTOLIC BLOOD PRESSURE: 112 MMHG | HEART RATE: 80 BPM | DIASTOLIC BLOOD PRESSURE: 86 MMHG | BODY MASS INDEX: 21.11 KG/M2 | WEIGHT: 142.5 LBS | HEIGHT: 69 IN

## 2019-09-04 DIAGNOSIS — K31.84 GASTROPARESIS: ICD-10-CM

## 2019-09-04 DIAGNOSIS — B37.81 CANDIDA INFECTION, ESOPHAGEAL (HCC): Primary | ICD-10-CM

## 2019-09-04 PROCEDURE — 99214 OFFICE O/P EST MOD 30 MIN: CPT | Performed by: SURGERY

## 2019-09-04 PROCEDURE — 3017F COLORECTAL CA SCREEN DOC REV: CPT | Performed by: SURGERY

## 2019-09-04 PROCEDURE — G8420 CALC BMI NORM PARAMETERS: HCPCS | Performed by: SURGERY

## 2019-09-04 PROCEDURE — G8427 DOCREV CUR MEDS BY ELIG CLIN: HCPCS | Performed by: SURGERY

## 2019-09-04 PROCEDURE — 4004F PT TOBACCO SCREEN RCVD TLK: CPT | Performed by: SURGERY

## 2019-09-04 RX ORDER — METOCLOPRAMIDE 10 MG/1
5 TABLET ORAL
Qty: 90 TABLET | Refills: 5 | Status: SHIPPED | OUTPATIENT
Start: 2019-09-04 | End: 2019-10-11 | Stop reason: CLARIF

## 2019-09-04 ASSESSMENT — ENCOUNTER SYMPTOMS
COLOR CHANGE: 0
WHEEZING: 0
BLOOD IN STOOL: 0
COUGH: 0
CONSTIPATION: 0
NAUSEA: 0
VOMITING: 0
VOICE CHANGE: 0
ANAL BLEEDING: 0
SHORTNESS OF BREATH: 0
PHOTOPHOBIA: 0
TROUBLE SWALLOWING: 0
SORE THROAT: 0
ABDOMINAL PAIN: 0
DIARRHEA: 0

## 2019-09-04 NOTE — PROGRESS NOTES
GENERAL SURGERY OFFICE NOTE    SUBJECTIVE:    Patient presenting today [facility] and No ref. provider found, for   Chief Complaint   Patient presents with    Follow-up        HPI: Hernan Orellana is a 63 y.o. male presenting with pain in the Epigasrtium and is acute, improving and dull compared to patient's normal condition. It is moderate in intensity for a duration of 3 months and is intermittent with modifying factors increased by or worse with eating. NOW is liking the Reglan, it is helping;    Procedure: - EGD to the 3rd portion of the duodenum  - Esophageal biopsies to r/o candida esophagitis  - Antral mucosal biopsies to r/o H.pylori: Crystal and Pathology     Pathology: Pathology is still in process. Final Cytologic Diagnosis:  Esophageal Brushing (Cytology with Cell Block): -     Negative for malignancy. -     Findings suggestive of candida fungal organisms are  not seen.  .        MRCP:   Impression   No acute intra-abdominal abnormality.  Specifically, no arterially enhancing   mass to suggest insulinoma.              Wounds very nicely healing, no erythema, no induration, no purulent discharge. No constipation, BMs back to normal.    Thoroughly reviewed the patient's medical history, family history, social history and review of systems with the patient today in the office. Please see medical record for pertinent positives.       Past Medical History:   Diagnosis Date    Arthritis     Hands    Bronchitis     last ~2016    Chronic cluster headache 6/7/2019 last    COPD (chronic obstructive pulmonary disease) ([facility] [address]     \"dx 2 yrs ago\" \\"does not see a lung  [facility] Isabella Bryan with FMD    Degenerative disc disease     \"in my back have degenarative disc\"    Depression     Fracture     \"fell over a 5 gallon bucket and landed on cement block and broke my right hand\" (7/6/2015)    GERD with esophagitis     H/O dizziness     States will feel like he is going to pass out,

## 2019-09-24 ENCOUNTER — ANESTHESIA EVENT (OUTPATIENT)
Dept: OPERATING ROOM | Age: 59
End: 2019-09-24
Payer: MEDICAID

## 2019-09-25 DIAGNOSIS — G56.01 RIGHT CARPAL TUNNEL SYNDROME: Primary | ICD-10-CM

## 2019-09-25 RX ORDER — CEPHALEXIN 250 MG/1
250 CAPSULE ORAL 4 TIMES DAILY
Qty: 4 CAPSULE | Refills: 0 | Status: SHIPPED | OUTPATIENT
Start: 2019-09-25 | End: 2019-09-26

## 2019-09-25 RX ORDER — HYDROCODONE BITARTRATE AND ACETAMINOPHEN 5; 325 MG/1; MG/1
1 TABLET ORAL EVERY 6 HOURS PRN
Qty: 5 TABLET | Refills: 0 | Status: SHIPPED | OUTPATIENT
Start: 2019-09-25 | End: 2019-09-27 | Stop reason: ALTCHOICE

## 2019-09-27 ENCOUNTER — HOSPITAL ENCOUNTER (OUTPATIENT)
Dept: PREADMISSION TESTING | Age: 59
Discharge: HOME | End: 2019-10-01
Payer: MEDICAID

## 2019-09-27 VITALS
HEART RATE: 64 BPM | HEIGHT: 69 IN | SYSTOLIC BLOOD PRESSURE: 138 MMHG | DIASTOLIC BLOOD PRESSURE: 90 MMHG | WEIGHT: 140 LBS | TEMPERATURE: 97.8 F | RESPIRATION RATE: 16 BRPM | BODY MASS INDEX: 20.73 KG/M2 | OXYGEN SATURATION: 98 %

## 2019-09-27 LAB
ANION GAP SERPL CALCULATED.3IONS-SCNC: 10 MMOL/L (ref 4–16)
BUN BLDV-MCNC: 17 MG/DL (ref 6–23)
CALCIUM SERPL-MCNC: 8.7 MG/DL (ref 8.3–10.6)
CHLORIDE BLD-SCNC: 103 MMOL/L (ref 99–110)
CO2: 26 MMOL/L (ref 21–32)
CREAT SERPL-MCNC: 0.8 MG/DL (ref 0.9–1.3)
EKG ATRIAL RATE: 52 BPM
EKG DIAGNOSIS: NORMAL
EKG P AXIS: 75 DEGREES
EKG P-R INTERVAL: 192 MS
EKG Q-T INTERVAL: 434 MS
EKG QRS DURATION: 92 MS
EKG QTC CALCULATION (BAZETT): 403 MS
EKG R AXIS: 52 DEGREES
EKG T AXIS: 71 DEGREES
EKG VENTRICULAR RATE: 52 BPM
GFR AFRICAN AMERICAN: >60 ML/MIN/1.73M2
GFR NON-AFRICAN AMERICAN: >60 ML/MIN/1.73M2
GLUCOSE BLD-MCNC: 64 MG/DL (ref 70–99)
HCT VFR BLD CALC: 41.8 % (ref 42–52)
HEMOGLOBIN: 13.4 GM/DL (ref 13.5–18)
MCH RBC QN AUTO: 29.3 PG (ref 27–31)
MCHC RBC AUTO-ENTMCNC: 32.1 % (ref 32–36)
MCV RBC AUTO: 91.3 FL (ref 78–100)
PDW BLD-RTO: 13.7 % (ref 11.7–14.9)
PLATELET # BLD: 214 K/CU MM (ref 140–440)
PMV BLD AUTO: 9.6 FL (ref 7.5–11.1)
POTASSIUM SERPL-SCNC: 4.2 MMOL/L (ref 3.5–5.1)
RBC # BLD: 4.58 M/CU MM (ref 4.6–6.2)
SODIUM BLD-SCNC: 139 MMOL/L (ref 135–145)
WBC # BLD: 6.1 K/CU MM (ref 4–10.5)

## 2019-09-27 PROCEDURE — 85027 COMPLETE CBC AUTOMATED: CPT

## 2019-09-27 PROCEDURE — 36415 COLL VENOUS BLD VENIPUNCTURE: CPT

## 2019-09-27 PROCEDURE — 93005 ELECTROCARDIOGRAM TRACING: CPT | Performed by: NURSE PRACTITIONER

## 2019-09-27 PROCEDURE — 93010 ELECTROCARDIOGRAM REPORT: CPT | Performed by: INTERNAL MEDICINE

## 2019-09-27 PROCEDURE — 80048 BASIC METABOLIC PNL TOTAL CA: CPT

## 2019-09-27 RX ORDER — ACETAMINOPHEN 500 MG
1000 TABLET ORAL EVERY 6 HOURS PRN
COMMUNITY

## 2019-09-27 ASSESSMENT — PAIN DESCRIPTION - PAIN TYPE: TYPE: CHRONIC PAIN

## 2019-09-27 ASSESSMENT — PAIN DESCRIPTION - ORIENTATION: ORIENTATION: RIGHT

## 2019-09-27 ASSESSMENT — LIFESTYLE VARIABLES: SMOKING_STATUS: 1

## 2019-09-27 ASSESSMENT — PAIN DESCRIPTION - LOCATION: LOCATION: HAND

## 2019-09-27 ASSESSMENT — PAIN SCALES - GENERAL: PAINLEVEL_OUTOF10: 4

## 2019-09-27 ASSESSMENT — PAIN DESCRIPTION - DESCRIPTORS: DESCRIPTORS: ACHING

## 2019-10-04 ENCOUNTER — ANESTHESIA (OUTPATIENT)
Dept: OPERATING ROOM | Age: 59
End: 2019-10-04
Payer: MEDICAID

## 2019-10-04 ENCOUNTER — HOSPITAL ENCOUNTER (OUTPATIENT)
Age: 59
Setting detail: OUTPATIENT SURGERY
Discharge: HOME | End: 2019-10-04
Attending: ORTHOPAEDIC SURGERY | Admitting: ORTHOPAEDIC SURGERY
Payer: MEDICAID

## 2019-10-04 VITALS
RESPIRATION RATE: 18 BRPM | WEIGHT: 140 LBS | BODY MASS INDEX: 20.73 KG/M2 | DIASTOLIC BLOOD PRESSURE: 78 MMHG | SYSTOLIC BLOOD PRESSURE: 115 MMHG | HEIGHT: 69 IN | TEMPERATURE: 97.2 F | HEART RATE: 65 BPM | OXYGEN SATURATION: 96 %

## 2019-10-04 VITALS — DIASTOLIC BLOOD PRESSURE: 78 MMHG | SYSTOLIC BLOOD PRESSURE: 112 MMHG | OXYGEN SATURATION: 100 %

## 2019-10-04 PROCEDURE — 3600000002 HC SURGERY LEVEL 2 BASE: Performed by: ORTHOPAEDIC SURGERY

## 2019-10-04 PROCEDURE — 3600000012 HC SURGERY LEVEL 2 ADDTL 15MIN: Performed by: ORTHOPAEDIC SURGERY

## 2019-10-04 PROCEDURE — 3700000001 HC ADD 15 MINUTES (ANESTHESIA): Performed by: ORTHOPAEDIC SURGERY

## 2019-10-04 PROCEDURE — 3700000000 HC ANESTHESIA ATTENDED CARE: Performed by: ORTHOPAEDIC SURGERY

## 2019-10-04 PROCEDURE — 64721 CARPAL TUNNEL SURGERY: CPT | Performed by: ORTHOPAEDIC SURGERY

## 2019-10-04 PROCEDURE — 2709999900 HC NON-CHARGEABLE SUPPLY: Performed by: ORTHOPAEDIC SURGERY

## 2019-10-04 PROCEDURE — 7100000010 HC PHASE II RECOVERY - FIRST 15 MIN: Performed by: ORTHOPAEDIC SURGERY

## 2019-10-04 PROCEDURE — 2580000003 HC RX 258

## 2019-10-04 PROCEDURE — 2500000003 HC RX 250 WO HCPCS: Performed by: ORTHOPAEDIC SURGERY

## 2019-10-04 PROCEDURE — 6360000002 HC RX W HCPCS: Performed by: NURSE ANESTHETIST, CERTIFIED REGISTERED

## 2019-10-04 PROCEDURE — 6360000002 HC RX W HCPCS: Performed by: ORTHOPAEDIC SURGERY

## 2019-10-04 PROCEDURE — 7100000011 HC PHASE II RECOVERY - ADDTL 15 MIN: Performed by: ORTHOPAEDIC SURGERY

## 2019-10-04 RX ORDER — LIDOCAINE HYDROCHLORIDE 20 MG/ML
INJECTION, SOLUTION INTRAVENOUS PRN
Status: DISCONTINUED | OUTPATIENT
Start: 2019-10-04 | End: 2019-10-04 | Stop reason: SDUPTHER

## 2019-10-04 RX ORDER — SODIUM CHLORIDE 0.9 % (FLUSH) 0.9 %
10 SYRINGE (ML) INJECTION EVERY 12 HOURS SCHEDULED
Status: DISCONTINUED | OUTPATIENT
Start: 2019-10-04 | End: 2019-10-04 | Stop reason: HOSPADM

## 2019-10-04 RX ORDER — OXYCODONE HYDROCHLORIDE 5 MG/1
10 TABLET ORAL EVERY 4 HOURS PRN
Status: DISCONTINUED | OUTPATIENT
Start: 2019-10-04 | End: 2019-10-04 | Stop reason: HOSPADM

## 2019-10-04 RX ORDER — ONDANSETRON 2 MG/ML
4 INJECTION INTRAMUSCULAR; INTRAVENOUS EVERY 6 HOURS PRN
Status: DISCONTINUED | OUTPATIENT
Start: 2019-10-04 | End: 2019-10-04 | Stop reason: HOSPADM

## 2019-10-04 RX ORDER — SODIUM CHLORIDE, SODIUM LACTATE, POTASSIUM CHLORIDE, CALCIUM CHLORIDE 600; 310; 30; 20 MG/100ML; MG/100ML; MG/100ML; MG/100ML
INJECTION, SOLUTION INTRAVENOUS CONTINUOUS
Status: DISCONTINUED | OUTPATIENT
Start: 2019-10-04 | End: 2019-10-04 | Stop reason: HOSPADM

## 2019-10-04 RX ORDER — PROPOFOL 10 MG/ML
INJECTION, EMULSION INTRAVENOUS CONTINUOUS PRN
Status: DISCONTINUED | OUTPATIENT
Start: 2019-10-04 | End: 2019-10-04 | Stop reason: SDUPTHER

## 2019-10-04 RX ORDER — LIDOCAINE HYDROCHLORIDE 10 MG/ML
INJECTION, SOLUTION INFILTRATION; PERINEURAL
Status: COMPLETED | OUTPATIENT
Start: 2019-10-04 | End: 2019-10-04

## 2019-10-04 RX ORDER — CEFAZOLIN SODIUM 2 G/100ML
2 INJECTION, SOLUTION INTRAVENOUS
Status: COMPLETED | OUTPATIENT
Start: 2019-10-04 | End: 2019-10-04

## 2019-10-04 RX ORDER — SODIUM CHLORIDE, SODIUM LACTATE, POTASSIUM CHLORIDE, CALCIUM CHLORIDE 600; 310; 30; 20 MG/100ML; MG/100ML; MG/100ML; MG/100ML
INJECTION, SOLUTION INTRAVENOUS
Status: COMPLETED
Start: 2019-10-04 | End: 2019-10-04

## 2019-10-04 RX ORDER — SODIUM CHLORIDE 0.9 % (FLUSH) 0.9 %
10 SYRINGE (ML) INJECTION PRN
Status: DISCONTINUED | OUTPATIENT
Start: 2019-10-04 | End: 2019-10-04 | Stop reason: HOSPADM

## 2019-10-04 RX ORDER — DOCUSATE SODIUM 100 MG/1
100 CAPSULE, LIQUID FILLED ORAL 2 TIMES DAILY
Status: DISCONTINUED | OUTPATIENT
Start: 2019-10-04 | End: 2019-10-04 | Stop reason: HOSPADM

## 2019-10-04 RX ORDER — OXYCODONE HYDROCHLORIDE 5 MG/1
5 TABLET ORAL EVERY 4 HOURS PRN
Status: DISCONTINUED | OUTPATIENT
Start: 2019-10-04 | End: 2019-10-04 | Stop reason: HOSPADM

## 2019-10-04 RX ADMIN — PROPOFOL 140 MCG/KG/MIN: 10 INJECTION, EMULSION INTRAVENOUS at 11:59

## 2019-10-04 RX ADMIN — SODIUM CHLORIDE, SODIUM LACTATE, POTASSIUM CHLORIDE, CALCIUM CHLORIDE: 600; 310; 30; 20 INJECTION, SOLUTION INTRAVENOUS at 08:31

## 2019-10-04 RX ADMIN — CEFAZOLIN SODIUM 2 G: 2 INJECTION, SOLUTION INTRAVENOUS at 12:01

## 2019-10-04 RX ADMIN — LIDOCAINE HYDROCHLORIDE 80 MG: 20 INJECTION, SOLUTION INTRAVENOUS at 11:59

## 2019-10-04 RX ADMIN — SODIUM CHLORIDE, POTASSIUM CHLORIDE, SODIUM LACTATE AND CALCIUM CHLORIDE: 600; 310; 30; 20 INJECTION, SOLUTION INTRAVENOUS at 08:31

## 2019-10-04 ASSESSMENT — PULMONARY FUNCTION TESTS
PIF_VALUE: 0
PIF_VALUE: 1
PIF_VALUE: 0
PIF_VALUE: 1

## 2019-10-04 ASSESSMENT — PAIN DESCRIPTION - DESCRIPTORS
DESCRIPTORS: NAGGING
DESCRIPTORS: NAGGING

## 2019-10-04 ASSESSMENT — PAIN DESCRIPTION - FREQUENCY: FREQUENCY: INTERMITTENT

## 2019-10-04 ASSESSMENT — PAIN SCALES - GENERAL
PAINLEVEL_OUTOF10: 3
PAINLEVEL_OUTOF10: 4

## 2019-10-04 ASSESSMENT — PAIN DESCRIPTION - PAIN TYPE
TYPE: SURGICAL PAIN
TYPE: SURGICAL PAIN

## 2019-10-04 ASSESSMENT — PAIN DESCRIPTION - LOCATION: LOCATION: HAND

## 2019-10-04 ASSESSMENT — PAIN DESCRIPTION - ONSET: ONSET: OTHER (COMMENT)

## 2019-10-04 ASSESSMENT — PAIN DESCRIPTION - ORIENTATION: ORIENTATION: RIGHT

## 2019-10-04 ASSESSMENT — PAIN - FUNCTIONAL ASSESSMENT: PAIN_FUNCTIONAL_ASSESSMENT: ACTIVITIES ARE NOT PREVENTED

## 2019-10-11 ENCOUNTER — APPOINTMENT (OUTPATIENT)
Dept: GENERAL RADIOLOGY | Age: 59
DRG: 139 | End: 2019-10-11
Payer: MEDICAID

## 2019-10-11 ENCOUNTER — APPOINTMENT (OUTPATIENT)
Dept: CT IMAGING | Age: 59
DRG: 139 | End: 2019-10-11
Payer: MEDICAID

## 2019-10-11 ENCOUNTER — HOSPITAL ENCOUNTER (INPATIENT)
Age: 59
LOS: 1 days | Discharge: HOME | DRG: 139 | End: 2019-10-12
Attending: EMERGENCY MEDICINE | Admitting: INTERNAL MEDICINE
Payer: MEDICAID

## 2019-10-11 DIAGNOSIS — J69.0 ASPIRATION PNEUMONIA OF RIGHT LUNG, UNSPECIFIED ASPIRATION PNEUMONIA TYPE, UNSPECIFIED PART OF LUNG (HCC): Primary | ICD-10-CM

## 2019-10-11 PROBLEM — J18.9 PNEUMONIA: Status: ACTIVE | Noted: 2019-10-11

## 2019-10-11 LAB
ADENOVIRUS DETECTION BY PCR: NOT DETECTED
ALBUMIN SERPL-MCNC: 4 GM/DL (ref 3.4–5)
ALP BLD-CCNC: 116 IU/L (ref 40–129)
ALT SERPL-CCNC: 9 U/L (ref 10–40)
ANION GAP SERPL CALCULATED.3IONS-SCNC: 10 MMOL/L (ref 4–16)
AST SERPL-CCNC: 18 IU/L (ref 15–37)
BASOPHILS ABSOLUTE: 0 K/CU MM
BASOPHILS RELATIVE PERCENT: 0.2 % (ref 0–1)
BILIRUB SERPL-MCNC: 0.4 MG/DL (ref 0–1)
BORDETELLA PERTUSSIS PCR: NOT DETECTED
BUN BLDV-MCNC: 22 MG/DL (ref 6–23)
CALCIUM SERPL-MCNC: 8.7 MG/DL (ref 8.3–10.6)
CHLAMYDOPHILA PNEUMONIA PCR: NOT DETECTED
CHLORIDE BLD-SCNC: 100 MMOL/L (ref 99–110)
CO2: 23 MMOL/L (ref 21–32)
CORONAVIRUS 229E PCR: NOT DETECTED
CORONAVIRUS HKU1 PCR: NOT DETECTED
CORONAVIRUS NL63 PCR: NOT DETECTED
CORONAVIRUS OC43 PCR: NOT DETECTED
CREAT SERPL-MCNC: 0.7 MG/DL (ref 0.9–1.3)
DIFFERENTIAL TYPE: ABNORMAL
EOSINOPHILS ABSOLUTE: 0.1 K/CU MM
EOSINOPHILS RELATIVE PERCENT: 0.6 % (ref 0–3)
GFR AFRICAN AMERICAN: >60 ML/MIN/1.73M2
GFR NON-AFRICAN AMERICAN: >60 ML/MIN/1.73M2
GLUCOSE BLD-MCNC: 111 MG/DL (ref 70–99)
HCT VFR BLD CALC: 39.1 % (ref 42–52)
HEMOGLOBIN: 13 GM/DL (ref 13.5–18)
HUMAN METAPNEUMOVIRUS PCR: NOT DETECTED
IMMATURE NEUTROPHIL %: 0.4 % (ref 0–0.43)
INFLUENZA A BY PCR: NOT DETECTED
INFLUENZA A H1 (2009) PCR: NOT DETECTED
INFLUENZA A H1 PANDEMIC PCR: NOT DETECTED
INFLUENZA A H3 PCR: NOT DETECTED
INFLUENZA B BY PCR: NOT DETECTED
LACTATE: 0.5 MMOL/L (ref 0.4–2)
LYMPHOCYTES ABSOLUTE: 1.5 K/CU MM
LYMPHOCYTES RELATIVE PERCENT: 10.6 % (ref 24–44)
MCH RBC QN AUTO: 29.4 PG (ref 27–31)
MCHC RBC AUTO-ENTMCNC: 33.2 % (ref 32–36)
MCV RBC AUTO: 88.5 FL (ref 78–100)
MONOCYTES ABSOLUTE: 1.4 K/CU MM
MONOCYTES RELATIVE PERCENT: 9.6 % (ref 0–4)
MYCOPLASMA PNEUMONIAE PCR: NOT DETECTED
NUCLEATED RBC %: 0 %
PARAINFLUENZA 1 PCR: NOT DETECTED
PARAINFLUENZA 2 PCR: NOT DETECTED
PARAINFLUENZA 3 PCR: NOT DETECTED
PARAINFLUENZA 4 PCR: NOT DETECTED
PDW BLD-RTO: 13 % (ref 11.7–14.9)
PLATELET # BLD: 242 K/CU MM (ref 140–440)
PMV BLD AUTO: 8.7 FL (ref 7.5–11.1)
POTASSIUM SERPL-SCNC: 4 MMOL/L (ref 3.5–5.1)
RBC # BLD: 4.42 M/CU MM (ref 4.6–6.2)
RHINOVIRUS ENTEROVIRUS PCR: ABNORMAL
RSV PCR: NOT DETECTED
SEGMENTED NEUTROPHILS ABSOLUTE COUNT: 11 K/CU MM
SEGMENTED NEUTROPHILS RELATIVE PERCENT: 78.6 % (ref 36–66)
SODIUM BLD-SCNC: 133 MMOL/L (ref 135–145)
TOTAL IMMATURE NEUTOROPHIL: 0.05 K/CU MM
TOTAL NUCLEATED RBC: 0 K/CU MM
TOTAL PROTEIN: 7 GM/DL (ref 6.4–8.2)
TROPONIN T: <0.01 NG/ML
TROPONIN T: <0.01 NG/ML
WBC # BLD: 14 K/CU MM (ref 4–10.5)

## 2019-10-11 PROCEDURE — 71275 CT ANGIOGRAPHY CHEST: CPT

## 2019-10-11 PROCEDURE — 87581 M.PNEUMON DNA AMP PROBE: CPT

## 2019-10-11 PROCEDURE — G0378 HOSPITAL OBSERVATION PER HR: HCPCS

## 2019-10-11 PROCEDURE — 87040 BLOOD CULTURE FOR BACTERIA: CPT

## 2019-10-11 PROCEDURE — 96366 THER/PROPH/DIAG IV INF ADDON: CPT

## 2019-10-11 PROCEDURE — 93010 ELECTROCARDIOGRAM REPORT: CPT | Performed by: INTERNAL MEDICINE

## 2019-10-11 PROCEDURE — 84484 ASSAY OF TROPONIN QUANT: CPT

## 2019-10-11 PROCEDURE — 87798 DETECT AGENT NOS DNA AMP: CPT

## 2019-10-11 PROCEDURE — 2700000000 HC OXYGEN THERAPY PER DAY

## 2019-10-11 PROCEDURE — 6360000002 HC RX W HCPCS: Performed by: INTERNAL MEDICINE

## 2019-10-11 PROCEDURE — 6360000002 HC RX W HCPCS

## 2019-10-11 PROCEDURE — 6360000002 HC RX W HCPCS: Performed by: EMERGENCY MEDICINE

## 2019-10-11 PROCEDURE — 96375 TX/PRO/DX INJ NEW DRUG ADDON: CPT

## 2019-10-11 PROCEDURE — 83605 ASSAY OF LACTIC ACID: CPT

## 2019-10-11 PROCEDURE — 2580000003 HC RX 258: Performed by: INTERNAL MEDICINE

## 2019-10-11 PROCEDURE — 96372 THER/PROPH/DIAG INJ SC/IM: CPT

## 2019-10-11 PROCEDURE — 96367 TX/PROPH/DG ADDL SEQ IV INF: CPT

## 2019-10-11 PROCEDURE — 6360000004 HC RX CONTRAST MEDICATION: Performed by: EMERGENCY MEDICINE

## 2019-10-11 PROCEDURE — 96376 TX/PRO/DX INJ SAME DRUG ADON: CPT

## 2019-10-11 PROCEDURE — 2580000003 HC RX 258: Performed by: EMERGENCY MEDICINE

## 2019-10-11 PROCEDURE — 71046 X-RAY EXAM CHEST 2 VIEWS: CPT

## 2019-10-11 PROCEDURE — 94761 N-INVAS EAR/PLS OXIMETRY MLT: CPT

## 2019-10-11 PROCEDURE — 94640 AIRWAY INHALATION TREATMENT: CPT

## 2019-10-11 PROCEDURE — 99285 EMERGENCY DEPT VISIT HI MDM: CPT

## 2019-10-11 PROCEDURE — 87633 RESP VIRUS 12-25 TARGETS: CPT

## 2019-10-11 PROCEDURE — 6370000000 HC RX 637 (ALT 250 FOR IP): Performed by: INTERNAL MEDICINE

## 2019-10-11 PROCEDURE — 93005 ELECTROCARDIOGRAM TRACING: CPT | Performed by: EMERGENCY MEDICINE

## 2019-10-11 PROCEDURE — 87486 CHLMYD PNEUM DNA AMP PROBE: CPT

## 2019-10-11 PROCEDURE — 1200000000 HC SEMI PRIVATE

## 2019-10-11 PROCEDURE — 85025 COMPLETE CBC W/AUTO DIFF WBC: CPT

## 2019-10-11 PROCEDURE — 96365 THER/PROPH/DIAG IV INF INIT: CPT

## 2019-10-11 PROCEDURE — 80053 COMPREHEN METABOLIC PANEL: CPT

## 2019-10-11 RX ORDER — MORPHINE SULFATE 4 MG/ML
1 INJECTION, SOLUTION INTRAMUSCULAR; INTRAVENOUS EVERY 4 HOURS PRN
Status: DISCONTINUED | OUTPATIENT
Start: 2019-10-11 | End: 2019-10-12 | Stop reason: HOSPADM

## 2019-10-11 RX ORDER — HYDROCODONE BITARTRATE AND ACETAMINOPHEN 5; 325 MG/1; MG/1
1 TABLET ORAL EVERY 6 HOURS PRN
Status: DISCONTINUED | OUTPATIENT
Start: 2019-10-11 | End: 2019-10-12 | Stop reason: HOSPADM

## 2019-10-11 RX ORDER — NICOTINE 21 MG/24HR
1 PATCH, TRANSDERMAL 24 HOURS TRANSDERMAL DAILY
Status: DISCONTINUED | OUTPATIENT
Start: 2019-10-11 | End: 2019-10-12 | Stop reason: HOSPADM

## 2019-10-11 RX ORDER — SODIUM CHLORIDE 0.9 % (FLUSH) 0.9 %
10 SYRINGE (ML) INJECTION 2 TIMES DAILY
Status: DISCONTINUED | OUTPATIENT
Start: 2019-10-11 | End: 2019-10-12 | Stop reason: HOSPADM

## 2019-10-11 RX ORDER — KETOROLAC TROMETHAMINE 30 MG/ML
15 INJECTION, SOLUTION INTRAMUSCULAR; INTRAVENOUS ONCE
Status: COMPLETED | OUTPATIENT
Start: 2019-10-11 | End: 2019-10-11

## 2019-10-11 RX ORDER — ACETAMINOPHEN 325 MG/1
650 TABLET ORAL EVERY 4 HOURS PRN
Status: DISCONTINUED | OUTPATIENT
Start: 2019-10-11 | End: 2019-10-11

## 2019-10-11 RX ORDER — SODIUM CHLORIDE 0.9 % (FLUSH) 0.9 %
10 SYRINGE (ML) INJECTION PRN
Status: DISCONTINUED | OUTPATIENT
Start: 2019-10-11 | End: 2019-10-12 | Stop reason: HOSPADM

## 2019-10-11 RX ORDER — PANTOPRAZOLE SODIUM 40 MG/1
40 TABLET, DELAYED RELEASE ORAL
Status: DISCONTINUED | OUTPATIENT
Start: 2019-10-12 | End: 2019-10-12 | Stop reason: HOSPADM

## 2019-10-11 RX ORDER — SODIUM CHLORIDE 9 MG/ML
INJECTION, SOLUTION INTRAVENOUS CONTINUOUS
Status: DISCONTINUED | OUTPATIENT
Start: 2019-10-11 | End: 2019-10-12 | Stop reason: HOSPADM

## 2019-10-11 RX ORDER — MORPHINE SULFATE 4 MG/ML
4 INJECTION, SOLUTION INTRAMUSCULAR; INTRAVENOUS ONCE
Status: COMPLETED | OUTPATIENT
Start: 2019-10-11 | End: 2019-10-11

## 2019-10-11 RX ORDER — ONDANSETRON 2 MG/ML
4 INJECTION INTRAMUSCULAR; INTRAVENOUS EVERY 6 HOURS PRN
Status: DISCONTINUED | OUTPATIENT
Start: 2019-10-11 | End: 2019-10-12 | Stop reason: HOSPADM

## 2019-10-11 RX ORDER — FAMOTIDINE 20 MG/1
20 TABLET, FILM COATED ORAL 2 TIMES DAILY
Status: DISCONTINUED | OUTPATIENT
Start: 2019-10-11 | End: 2019-10-11

## 2019-10-11 RX ORDER — MORPHINE SULFATE 4 MG/ML
INJECTION, SOLUTION INTRAMUSCULAR; INTRAVENOUS
Status: COMPLETED
Start: 2019-10-11 | End: 2019-10-11

## 2019-10-11 RX ORDER — ALBUTEROL SULFATE 2.5 MG/3ML
2.5 SOLUTION RESPIRATORY (INHALATION)
Status: DISCONTINUED | OUTPATIENT
Start: 2019-10-11 | End: 2019-10-12 | Stop reason: HOSPADM

## 2019-10-11 RX ORDER — ASPIRIN 81 MG/1
324 TABLET, CHEWABLE ORAL ONCE
Status: DISCONTINUED | OUTPATIENT
Start: 2019-10-11 | End: 2019-10-12 | Stop reason: HOSPADM

## 2019-10-11 RX ORDER — ACETAMINOPHEN 325 MG/1
650 TABLET ORAL EVERY 4 HOURS PRN
Status: DISCONTINUED | OUTPATIENT
Start: 2019-10-11 | End: 2019-10-12 | Stop reason: HOSPADM

## 2019-10-11 RX ORDER — SODIUM CHLORIDE 0.9 % (FLUSH) 0.9 %
10 SYRINGE (ML) INJECTION EVERY 12 HOURS SCHEDULED
Status: DISCONTINUED | OUTPATIENT
Start: 2019-10-11 | End: 2019-10-12 | Stop reason: HOSPADM

## 2019-10-11 RX ADMIN — AZITHROMYCIN MONOHYDRATE 500 MG: 500 INJECTION, POWDER, LYOPHILIZED, FOR SOLUTION INTRAVENOUS at 11:24

## 2019-10-11 RX ADMIN — MORPHINE SULFATE 4 MG: 4 INJECTION, SOLUTION INTRAMUSCULAR; INTRAVENOUS at 03:45

## 2019-10-11 RX ADMIN — SODIUM CHLORIDE 3 G: 900 INJECTION INTRAVENOUS at 08:22

## 2019-10-11 RX ADMIN — ALBUTEROL SULFATE 2.5 MG: 2.5 SOLUTION RESPIRATORY (INHALATION) at 20:56

## 2019-10-11 RX ADMIN — KETOROLAC TROMETHAMINE 15 MG: 30 INJECTION, SOLUTION INTRAMUSCULAR; INTRAVENOUS at 05:27

## 2019-10-11 RX ADMIN — Medication 10 ML: at 08:10

## 2019-10-11 RX ADMIN — HYDROCODONE BITARTRATE AND ACETAMINOPHEN 1 TABLET: 5; 325 TABLET ORAL at 12:23

## 2019-10-11 RX ADMIN — HYDROCODONE BITARTRATE AND ACETAMINOPHEN 1 TABLET: 5; 325 TABLET ORAL at 19:50

## 2019-10-11 RX ADMIN — ALBUTEROL SULFATE 2.5 MG: 2.5 SOLUTION RESPIRATORY (INHALATION) at 16:07

## 2019-10-11 RX ADMIN — CEFTRIAXONE 1 G: 1 INJECTION, POWDER, FOR SOLUTION INTRAMUSCULAR; INTRAVENOUS at 10:32

## 2019-10-11 RX ADMIN — SODIUM CHLORIDE: 9 INJECTION, SOLUTION INTRAVENOUS at 08:09

## 2019-10-11 RX ADMIN — ALBUTEROL SULFATE 2.5 MG: 2.5 SOLUTION RESPIRATORY (INHALATION) at 12:07

## 2019-10-11 RX ADMIN — Medication 10 ML: at 05:43

## 2019-10-11 RX ADMIN — IOPAMIDOL 80 ML: 755 INJECTION, SOLUTION INTRAVENOUS at 05:43

## 2019-10-11 RX ADMIN — FAMOTIDINE 20 MG: 20 TABLET ORAL at 10:31

## 2019-10-11 RX ADMIN — SODIUM CHLORIDE: 9 INJECTION, SOLUTION INTRAVENOUS at 18:39

## 2019-10-11 ASSESSMENT — PAIN DESCRIPTION - DESCRIPTORS
DESCRIPTORS: CONSTANT
DESCRIPTORS: CONSTANT;PRESSURE;SHARP
DESCRIPTORS: CONSTANT;TIGHTNESS

## 2019-10-11 ASSESSMENT — PAIN SCALES - GENERAL
PAINLEVEL_OUTOF10: 7
PAINLEVEL_OUTOF10: 0
PAINLEVEL_OUTOF10: 7
PAINLEVEL_OUTOF10: 10
PAINLEVEL_OUTOF10: 5
PAINLEVEL_OUTOF10: 10
PAINLEVEL_OUTOF10: 6
PAINLEVEL_OUTOF10: 7
PAINLEVEL_OUTOF10: 2
PAINLEVEL_OUTOF10: 6

## 2019-10-11 ASSESSMENT — PAIN DESCRIPTION - LOCATION
LOCATION: CHEST

## 2019-10-11 ASSESSMENT — PAIN DESCRIPTION - ONSET
ONSET: AWAKENED FROM SLEEP
ONSET: ON-GOING

## 2019-10-11 ASSESSMENT — PAIN DESCRIPTION - FREQUENCY
FREQUENCY: CONTINUOUS

## 2019-10-11 ASSESSMENT — PAIN DESCRIPTION - PROGRESSION
CLINICAL_PROGRESSION: NOT CHANGED

## 2019-10-11 ASSESSMENT — PAIN DESCRIPTION - ORIENTATION
ORIENTATION: MID
ORIENTATION: RIGHT;LOWER
ORIENTATION: RIGHT;LOWER
ORIENTATION: RIGHT
ORIENTATION: MID

## 2019-10-11 ASSESSMENT — PAIN DESCRIPTION - PAIN TYPE
TYPE: ACUTE PAIN

## 2019-10-11 ASSESSMENT — PAIN - FUNCTIONAL ASSESSMENT: PAIN_FUNCTIONAL_ASSESSMENT: ACTIVITIES ARE NOT PREVENTED

## 2019-10-12 VITALS
WEIGHT: 135.7 LBS | SYSTOLIC BLOOD PRESSURE: 112 MMHG | HEIGHT: 69 IN | HEART RATE: 61 BPM | OXYGEN SATURATION: 94 % | RESPIRATION RATE: 16 BRPM | TEMPERATURE: 97.4 F | DIASTOLIC BLOOD PRESSURE: 73 MMHG | BODY MASS INDEX: 20.1 KG/M2

## 2019-10-12 LAB
ANION GAP SERPL CALCULATED.3IONS-SCNC: 6 MMOL/L (ref 4–16)
BUN BLDV-MCNC: 13 MG/DL (ref 6–23)
CALCIUM SERPL-MCNC: 8.3 MG/DL (ref 8.3–10.6)
CHLORIDE BLD-SCNC: 107 MMOL/L (ref 99–110)
CO2: 26 MMOL/L (ref 21–32)
CREAT SERPL-MCNC: 0.8 MG/DL (ref 0.9–1.3)
GFR AFRICAN AMERICAN: >60 ML/MIN/1.73M2
GFR NON-AFRICAN AMERICAN: >60 ML/MIN/1.73M2
GLUCOSE BLD-MCNC: 84 MG/DL (ref 70–99)
HCT VFR BLD CALC: 34.7 % (ref 42–52)
HEMOGLOBIN: 11.3 GM/DL (ref 13.5–18)
MCH RBC QN AUTO: 29.7 PG (ref 27–31)
MCHC RBC AUTO-ENTMCNC: 32.6 % (ref 32–36)
MCV RBC AUTO: 91.3 FL (ref 78–100)
PDW BLD-RTO: 13.2 % (ref 11.7–14.9)
PLATELET # BLD: 179 K/CU MM (ref 140–440)
PMV BLD AUTO: 8.6 FL (ref 7.5–11.1)
POTASSIUM SERPL-SCNC: 4.5 MMOL/L (ref 3.5–5.1)
RBC # BLD: 3.8 M/CU MM (ref 4.6–6.2)
SODIUM BLD-SCNC: 139 MMOL/L (ref 135–145)
WBC # BLD: 7.4 K/CU MM (ref 4–10.5)

## 2019-10-12 PROCEDURE — 36415 COLL VENOUS BLD VENIPUNCTURE: CPT

## 2019-10-12 PROCEDURE — 90686 IIV4 VACC NO PRSV 0.5 ML IM: CPT | Performed by: INTERNAL MEDICINE

## 2019-10-12 PROCEDURE — G0378 HOSPITAL OBSERVATION PER HR: HCPCS

## 2019-10-12 PROCEDURE — 94761 N-INVAS EAR/PLS OXIMETRY MLT: CPT

## 2019-10-12 PROCEDURE — 96372 THER/PROPH/DIAG INJ SC/IM: CPT

## 2019-10-12 PROCEDURE — 6370000000 HC RX 637 (ALT 250 FOR IP): Performed by: INTERNAL MEDICINE

## 2019-10-12 PROCEDURE — 2580000003 HC RX 258: Performed by: INTERNAL MEDICINE

## 2019-10-12 PROCEDURE — 96376 TX/PRO/DX INJ SAME DRUG ADON: CPT

## 2019-10-12 PROCEDURE — 6360000002 HC RX W HCPCS: Performed by: INTERNAL MEDICINE

## 2019-10-12 PROCEDURE — 2700000000 HC OXYGEN THERAPY PER DAY

## 2019-10-12 PROCEDURE — 80048 BASIC METABOLIC PNL TOTAL CA: CPT

## 2019-10-12 PROCEDURE — 96366 THER/PROPH/DIAG IV INF ADDON: CPT

## 2019-10-12 PROCEDURE — 85027 COMPLETE CBC AUTOMATED: CPT

## 2019-10-12 PROCEDURE — 94640 AIRWAY INHALATION TREATMENT: CPT

## 2019-10-12 PROCEDURE — G0008 ADMIN INFLUENZA VIRUS VAC: HCPCS | Performed by: INTERNAL MEDICINE

## 2019-10-12 RX ORDER — NICOTINE 21 MG/24HR
1 PATCH, TRANSDERMAL 24 HOURS TRANSDERMAL DAILY
Qty: 30 PATCH | Refills: 3 | Status: SHIPPED | OUTPATIENT
Start: 2019-10-13 | End: 2019-12-11

## 2019-10-12 RX ORDER — LEVOFLOXACIN 500 MG/1
500 TABLET, FILM COATED ORAL DAILY
Qty: 5 TABLET | Refills: 0 | Status: SHIPPED | OUTPATIENT
Start: 2019-10-13 | End: 2019-10-18

## 2019-10-12 RX ADMIN — Medication 10 ML: at 09:33

## 2019-10-12 RX ADMIN — CEFTRIAXONE 1 G: 1 INJECTION, POWDER, FOR SOLUTION INTRAMUSCULAR; INTRAVENOUS at 09:39

## 2019-10-12 RX ADMIN — ENOXAPARIN SODIUM 40 MG: 40 INJECTION SUBCUTANEOUS at 09:04

## 2019-10-12 RX ADMIN — MORPHINE SULFATE 1 MG: 4 INJECTION, SOLUTION INTRAMUSCULAR; INTRAVENOUS at 09:33

## 2019-10-12 RX ADMIN — HYDROCODONE BITARTRATE AND ACETAMINOPHEN 1 TABLET: 5; 325 TABLET ORAL at 02:25

## 2019-10-12 RX ADMIN — ALBUTEROL SULFATE 2.5 MG: 2.5 SOLUTION RESPIRATORY (INHALATION) at 08:33

## 2019-10-12 RX ADMIN — TIOTROPIUM BROMIDE 18 MCG: 18 CAPSULE ORAL; RESPIRATORY (INHALATION) at 08:33

## 2019-10-12 RX ADMIN — INFLUENZA A VIRUS A/BRISBANE/02/2018 IVR-190 (H1N1) ANTIGEN (PROPIOLACTONE INACTIVATED), INFLUENZA A VIRUS A/KANSAS/14/2017 X-327 (H3N2) ANTIGEN (PROPIOLACTONE INACTIVATED), INFLUENZA B VIRUS B/MARYLAND/15/2016 ANTIGEN (PROPIOLACTONE INACTIVATED), INFLUENZA B VIRUS B/PHUKET/3073/2013 BVR-1B ANTIGEN (PROPIOLACTONE INACTIVATED) 0.5 ML: 15; 15; 15; 15 INJECTION, SUSPENSION INTRAMUSCULAR at 09:05

## 2019-10-12 RX ADMIN — AZITHROMYCIN MONOHYDRATE 500 MG: 500 INJECTION, POWDER, LYOPHILIZED, FOR SOLUTION INTRAVENOUS at 10:45

## 2019-10-12 RX ADMIN — SODIUM CHLORIDE: 9 INJECTION, SOLUTION INTRAVENOUS at 06:07

## 2019-10-12 RX ADMIN — PANTOPRAZOLE SODIUM 40 MG: 40 TABLET, DELAYED RELEASE ORAL at 06:07

## 2019-10-12 RX ADMIN — ALBUTEROL SULFATE 2.5 MG: 2.5 SOLUTION RESPIRATORY (INHALATION) at 11:52

## 2019-10-12 ASSESSMENT — PAIN DESCRIPTION - ORIENTATION
ORIENTATION: RIGHT;MID
ORIENTATION: MID
ORIENTATION: MID;RIGHT
ORIENTATION: RIGHT;MID
ORIENTATION: RIGHT;MID

## 2019-10-12 ASSESSMENT — PAIN SCALES - GENERAL
PAINLEVEL_OUTOF10: 7
PAINLEVEL_OUTOF10: 5
PAINLEVEL_OUTOF10: 3
PAINLEVEL_OUTOF10: 5
PAINLEVEL_OUTOF10: 8
PAINLEVEL_OUTOF10: 3
PAINLEVEL_OUTOF10: 0
PAINLEVEL_OUTOF10: 5
PAINLEVEL_OUTOF10: 3
PAINLEVEL_OUTOF10: 3

## 2019-10-12 ASSESSMENT — PAIN DESCRIPTION - PROGRESSION
CLINICAL_PROGRESSION: NOT CHANGED

## 2019-10-12 ASSESSMENT — PAIN DESCRIPTION - LOCATION
LOCATION: CHEST

## 2019-10-12 ASSESSMENT — PAIN DESCRIPTION - ONSET
ONSET: OTHER (COMMENT)
ONSET: ON-GOING

## 2019-10-12 ASSESSMENT — PAIN DESCRIPTION - DESCRIPTORS
DESCRIPTORS: SHARP;CONSTANT;DISCOMFORT
DESCRIPTORS: SHARP;CONSTANT;DISCOMFORT
DESCRIPTORS: CONSTANT
DESCRIPTORS: SHARP;CONSTANT;DISCOMFORT
DESCRIPTORS: DISCOMFORT

## 2019-10-12 ASSESSMENT — PAIN DESCRIPTION - FREQUENCY
FREQUENCY: CONTINUOUS
FREQUENCY: INTERMITTENT
FREQUENCY: INTERMITTENT
FREQUENCY: CONTINUOUS
FREQUENCY: INTERMITTENT

## 2019-10-12 ASSESSMENT — PAIN DESCRIPTION - PAIN TYPE
TYPE: ACUTE PAIN
TYPE: ACUTE PAIN
TYPE: OTHER (COMMENT)
TYPE: CHRONIC PAIN

## 2019-10-15 LAB
EKG ATRIAL RATE: 85 BPM
EKG DIAGNOSIS: NORMAL
EKG P AXIS: 65 DEGREES
EKG P-R INTERVAL: 152 MS
EKG Q-T INTERVAL: 364 MS
EKG QRS DURATION: 90 MS
EKG QTC CALCULATION (BAZETT): 433 MS
EKG R AXIS: 18 DEGREES
EKG T AXIS: 60 DEGREES
EKG VENTRICULAR RATE: 85 BPM

## 2019-10-16 LAB
CULTURE: NORMAL
CULTURE: NORMAL
Lab: NORMAL
Lab: NORMAL
SPECIMEN: NORMAL
SPECIMEN: NORMAL

## 2019-10-17 ENCOUNTER — OFFICE VISIT (OUTPATIENT)
Dept: ORTHOPEDIC SURGERY | Age: 59
End: 2019-10-17

## 2019-10-17 VITALS
HEART RATE: 66 BPM | WEIGHT: 132 LBS | BODY MASS INDEX: 19.55 KG/M2 | OXYGEN SATURATION: 98 % | HEIGHT: 69 IN | RESPIRATION RATE: 14 BRPM

## 2019-10-17 DIAGNOSIS — Z98.890 S/P CARPAL TUNNEL RELEASE: Primary | ICD-10-CM

## 2019-10-17 PROCEDURE — 99024 POSTOP FOLLOW-UP VISIT: CPT | Performed by: PHYSICIAN ASSISTANT

## 2019-10-24 ENCOUNTER — OFFICE VISIT (OUTPATIENT)
Dept: ORTHOPEDIC SURGERY | Age: 59
End: 2019-10-24

## 2019-10-24 VITALS — OXYGEN SATURATION: 98 % | RESPIRATION RATE: 14 BRPM | HEART RATE: 82 BPM

## 2019-10-24 DIAGNOSIS — Z98.890 S/P CARPAL TUNNEL RELEASE: Primary | ICD-10-CM

## 2019-10-24 PROCEDURE — 99024 POSTOP FOLLOW-UP VISIT: CPT | Performed by: PHYSICIAN ASSISTANT

## 2019-11-18 ENCOUNTER — OFFICE VISIT (OUTPATIENT)
Dept: ORTHOPEDIC SURGERY | Age: 59
End: 2019-11-18

## 2019-11-18 VITALS
RESPIRATION RATE: 16 BRPM | HEIGHT: 69 IN | WEIGHT: 132.06 LBS | HEART RATE: 64 BPM | BODY MASS INDEX: 19.56 KG/M2 | OXYGEN SATURATION: 98 %

## 2019-11-18 DIAGNOSIS — G56.01 RIGHT CARPAL TUNNEL SYNDROME: Primary | ICD-10-CM

## 2019-11-18 PROCEDURE — 99024 POSTOP FOLLOW-UP VISIT: CPT | Performed by: ORTHOPAEDIC SURGERY

## 2019-11-18 RX ORDER — IPRATROPIUM BROMIDE AND ALBUTEROL SULFATE 2.5; .5 MG/3ML; MG/3ML
SOLUTION RESPIRATORY (INHALATION) PRN
COMMUNITY
Start: 2019-10-19

## 2019-11-18 RX ORDER — UMECLIDINIUM 62.5 UG/1
AEROSOL, POWDER ORAL
COMMUNITY
Start: 2019-10-19 | End: 2020-06-04

## 2019-11-18 RX ORDER — NEBULIZER
EACH MISCELLANEOUS
Refills: 11 | COMMUNITY
Start: 2019-09-23

## 2019-11-18 RX ORDER — METOCLOPRAMIDE 10 MG/1
TABLET ORAL PRN
COMMUNITY
Start: 2019-10-22 | End: 2020-06-04

## 2019-11-22 ASSESSMENT — ENCOUNTER SYMPTOMS: COLOR CHANGE: 0

## 2019-12-11 ENCOUNTER — HOSPITAL ENCOUNTER (OUTPATIENT)
Dept: CT IMAGING | Age: 59
Discharge: HOME | End: 2019-12-11
Payer: MEDICAID

## 2019-12-11 ENCOUNTER — OFFICE VISIT (OUTPATIENT)
Dept: BARIATRICS/WEIGHT MGMT | Age: 59
End: 2019-12-11
Payer: MEDICAID

## 2019-12-11 VITALS
HEIGHT: 69 IN | SYSTOLIC BLOOD PRESSURE: 114 MMHG | DIASTOLIC BLOOD PRESSURE: 82 MMHG | WEIGHT: 153 LBS | HEART RATE: 80 BPM | BODY MASS INDEX: 22.66 KG/M2

## 2019-12-11 DIAGNOSIS — R10.32 LLQ PAIN: ICD-10-CM

## 2019-12-11 DIAGNOSIS — B37.81 CANDIDA INFECTION, ESOPHAGEAL (HCC): Primary | ICD-10-CM

## 2019-12-11 DIAGNOSIS — R10.12 LUQ PAIN: ICD-10-CM

## 2019-12-11 PROCEDURE — 99215 OFFICE O/P EST HI 40 MIN: CPT | Performed by: SURGERY

## 2019-12-11 PROCEDURE — G8427 DOCREV CUR MEDS BY ELIG CLIN: HCPCS | Performed by: SURGERY

## 2019-12-11 PROCEDURE — 3017F COLORECTAL CA SCREEN DOC REV: CPT | Performed by: SURGERY

## 2019-12-11 PROCEDURE — 2580000003 HC RX 258: Performed by: SURGERY

## 2019-12-11 PROCEDURE — G8420 CALC BMI NORM PARAMETERS: HCPCS | Performed by: SURGERY

## 2019-12-11 PROCEDURE — 4004F PT TOBACCO SCREEN RCVD TLK: CPT | Performed by: SURGERY

## 2019-12-11 PROCEDURE — G8482 FLU IMMUNIZE ORDER/ADMIN: HCPCS | Performed by: SURGERY

## 2019-12-11 PROCEDURE — 6360000004 HC RX CONTRAST MEDICATION: Performed by: SURGERY

## 2019-12-11 PROCEDURE — 74177 CT ABD & PELVIS W/CONTRAST: CPT

## 2019-12-11 RX ORDER — 0.9 % SODIUM CHLORIDE 0.9 %
10 VIAL (ML) INJECTION
Status: COMPLETED | OUTPATIENT
Start: 2019-12-11 | End: 2019-12-11

## 2019-12-11 RX ADMIN — IOHEXOL 50 ML: 240 INJECTION, SOLUTION INTRATHECAL; INTRAVASCULAR; INTRAVENOUS; ORAL at 15:01

## 2019-12-11 RX ADMIN — Medication 10 ML: at 15:01

## 2019-12-11 RX ADMIN — IOPAMIDOL 75 ML: 755 INJECTION, SOLUTION INTRAVENOUS at 15:01

## 2019-12-11 ASSESSMENT — ENCOUNTER SYMPTOMS
ABDOMINAL PAIN: 1
COLOR CHANGE: 0
PHOTOPHOBIA: 0
CONSTIPATION: 0
WHEEZING: 0
SHORTNESS OF BREATH: 0
TROUBLE SWALLOWING: 0
NAUSEA: 1
VOICE CHANGE: 0
BLOOD IN STOOL: 0
ANAL BLEEDING: 0
VOMITING: 0
COUGH: 0
SORE THROAT: 0
DIARRHEA: 0

## 2019-12-13 ENCOUNTER — OFFICE VISIT (OUTPATIENT)
Dept: BARIATRICS/WEIGHT MGMT | Age: 59
End: 2019-12-13
Payer: MEDICAID

## 2019-12-13 VITALS
BODY MASS INDEX: 22.66 KG/M2 | DIASTOLIC BLOOD PRESSURE: 74 MMHG | SYSTOLIC BLOOD PRESSURE: 116 MMHG | HEART RATE: 80 BPM | HEIGHT: 69 IN | WEIGHT: 153 LBS

## 2019-12-13 DIAGNOSIS — R10.32 LLQ PAIN: Primary | ICD-10-CM

## 2019-12-13 PROCEDURE — G8420 CALC BMI NORM PARAMETERS: HCPCS | Performed by: SURGERY

## 2019-12-13 PROCEDURE — G8482 FLU IMMUNIZE ORDER/ADMIN: HCPCS | Performed by: SURGERY

## 2019-12-13 PROCEDURE — 99214 OFFICE O/P EST MOD 30 MIN: CPT | Performed by: SURGERY

## 2019-12-13 PROCEDURE — G8427 DOCREV CUR MEDS BY ELIG CLIN: HCPCS | Performed by: SURGERY

## 2019-12-13 PROCEDURE — 3017F COLORECTAL CA SCREEN DOC REV: CPT | Performed by: SURGERY

## 2019-12-13 PROCEDURE — 4004F PT TOBACCO SCREEN RCVD TLK: CPT | Performed by: SURGERY

## 2019-12-13 ASSESSMENT — ENCOUNTER SYMPTOMS
WHEEZING: 0
BLOOD IN STOOL: 0
SORE THROAT: 0
COUGH: 0
VOICE CHANGE: 0
VOMITING: 0
COLOR CHANGE: 0
TROUBLE SWALLOWING: 0
DIARRHEA: 0
CONSTIPATION: 0
SHORTNESS OF BREATH: 0
PHOTOPHOBIA: 0
NAUSEA: 0
ANAL BLEEDING: 0
ABDOMINAL PAIN: 1

## 2019-12-19 ENCOUNTER — TELEPHONE (OUTPATIENT)
Dept: BARIATRICS/WEIGHT MGMT | Age: 59
End: 2019-12-19

## 2019-12-20 ENCOUNTER — TELEPHONE (OUTPATIENT)
Dept: BARIATRICS/WEIGHT MGMT | Age: 59
End: 2019-12-20

## 2019-12-20 RX ORDER — ONDANSETRON 4 MG/1
4 TABLET, ORALLY DISINTEGRATING ORAL EVERY 8 HOURS PRN
Qty: 30 TABLET | Refills: 3 | Status: SHIPPED | OUTPATIENT
Start: 2019-12-20

## 2019-12-26 NOTE — PROGRESS NOTES
Surgery:    [email or web address]                      Arrival time: 1200  Nothing to eat or drink after midnight unless instructed to take certain medications by the doctor or the nurse the am of surgery  Arrive at the front information desk -1st floor /[facility] is on [address] [facility]  Please leave money and all other valuables at home. Wear comfortable clothing. If you wear contacts please bring a case. No make up. You may be asked to remove rings or body piercing. Please bring insurance cards and picture ID am of procedure. Please bring any consent or paper work from your doctor. If you become ill,such as a cold, sore throat or fever contact your doctor. Please bathe or shower am of procedure.   Medications to take AM of procedure:     Any questions call [facility]  [phone]

## 2019-12-28 ENCOUNTER — ANESTHESIA EVENT (OUTPATIENT)
Dept: ENDOSCOPY | Age: 59
End: 2019-12-28
Payer: MEDICAID

## 2019-12-28 ASSESSMENT — LIFESTYLE VARIABLES: SMOKING_STATUS: 1

## 2019-12-28 NOTE — ANESTHESIA PRE PROCEDURE
Department of Anesthesiology  Preprocedure Note       Name:  Anders Nathan   Age:  63 y.o.  :  1960                                          MRN:  [id number]         Date:  2019      Surgeon: Yaneth Melendez):  Read Claude, MD    Procedure: COLONOSCOPY DIAGNOSTIC (N/A )  EGD ESOPHAGOGASTRODUODENOSCOPY (N/A )    Medications prior to admission:   Prior to Admission medications    Medication Sig Start Date End Date Taking?  Authorizing Provider   ondansetron (ZOFRAN ODT) 4 MG disintegrating tablet Place 1 tablet under the tongue every 8 hours as needed for Nausea or Vomiting 19   Read Claude, MD   bisacodyl (BISACODYL) 5 MG EC tablet Take 4 tablets by mouth 2 times daily 19   Read Claude, MD   ipratropium-albuterol (DUONEB) 0.5-2.5 (3) MG/3ML SOLN nebulizer solution  10/19/19   Historical Provider, MD   metoclopramide (REGLAN) 10 MG tablet  10/22/19   Historical Provider, MD   Nebulizers (MICRO AIR NEBULIZER) MISC TO USE AS NEEDED WITH NEBULIZER SOLUTION 19   Historical Provider, MD   INCRUSE ELLIPTA 62.5 MCG/INH AEPB  10/19/19   Historical Provider, MD   acetaminophen (TYLENOL) 500 MG tablet Take 1,000 mg by mouth every 6 hours as needed for Pain    Historical Provider, MD   omeprazole (PRILOSEC) 20 MG delayed release capsule Take 20 mg by mouth daily as needed     Historical Provider, MD   Nutritional Supplements (ENSURE HIGH PROTEIN) LIQD Take 1 Can by mouth Daily with lunch 19   Valetta Lefort, APRN - CNP   tiotropium (SPIRIVA HANDIHALER) 18 MCG inhalation capsule Inhale 1 capsule into the lungs daily 1/3/17   Karla Auguste MD   albuterol sulfate HFA (PROVENTIL HFA) 108 (90 BASE) MCG/ACT inhaler Inhale 2 puffs into the lungs every 6 hours as needed for Wheezing 1/3/17   Karla Auguste MD       Current medications:    Current Outpatient Medications   Medication Sig Dispense Refill    ondansetron (ZOFRAN ODT) 4 MG disintegrating tablet Place 1 tablet under the tongue every  LLQ pain R10.32       Past Medical History:        Diagnosis Date    Arthritis     Hands    Bronchitis     last ~2016    Chronic cluster headache 6/7/2019 last    COPD (chronic obstructive pulmonary disease) ([address]     \"dx 2 yrs ago\" \\"does not see a lung dr Gilson Flores with PCP    Degenerative disc disease     \"in my back have degenarative disc\"    Depression     Edentulous     Fracture     \"fell over a 5 gallon bucket and landed on cement block and broke my right hand\" (7/6/2015)    GERD with esophagitis     H/O dizziness     States will feel like he is going to pass out, possible low BS    Hiatal hernia     Hx of gastric ulcer     HX OTHER MEDICAL     pt states has trouble with reading and writing\"can read very very little\"    Hyperlipidemia     Hypertension     \"on medication for last two yrs\" follow with Dr Samanta Pickering Impingement syndrome of right shoulder     Left shoulder pain     limited range-of-motion    Lower back pain     Motion sickness     Neck pain     more on left side into shoulder--pain limits ROM    Vertigo        Past Surgical History:        Procedure Laterality Date    ARM SURGERY Left 30+ yrs    \"put plastic ligaments in \"  after injury through glass window    CARPAL TUNNEL RELEASE Right 10/4/2019    RIGHT CARPAL TUNNEL RELEASE performed by Javier Erickson DO at [place] [address]  2010    COLONOSCOPY  2018    HX: polyps - Dr. Ignacia Sprague      all teeth extracted    ENDOSCOPY, COLON, DIAGNOSTIC  04/08/2019    EGD - thrush noted through out   [address] Left 1990's    \"metal removed from eye\"    GASTRIC FUNDOPLICATION N/A 41/19/2114    NISSEN FUNDOPLICATION LAPAROSCOPIC ROBOTIC HIATAL HERNIA performed by Tavares Bill MD at [place] [address]  12/2018    UPPER GASTROINTESTINAL ENDOSCOPY N/A 1/28/2019    EGD BIOPSY / BRUSHING OF ESOPHAGUS performed by Taavres Bill MD at [place] [address] Exam:  Head/Neck movement: limited side to side  Thyromental Distance: > 3 FB  History of difficult intubation:  None  Mallampati Classification:  Class II  Teeth: all teeth extracted   Able to lie flat     LUNGS:  Diminished BS, no crackles or wheezing   Cardiovascular:    (+) hypertension:,       ECG reviewed      Echocardiogram reviewed  Stress test reviewed             ROS comment: [id number]  Normal sinus rhythm   Normal ECG   When compared with ECG of 21-DEC-2018 10:09,   No significant change was found     Cardiac cath 2017  1. Left main is patent. 2.  Circ and OM1 have mild disease. 3.  LAD and diagonal branch have mild disease. 4.  RCA is normal.  5.  EDP was around 15 mmHg.       Echo 2017  EF 50%  Left ventricular systolic function is normal.   Essentially normal chamber sizes.   Sclerotic, but non-stenotic aortic valve.   No evidence of any pericardial effusion.   Dilated LVOT, measuring at 2.5 cm.   Dilated aortic root, measuring at 4.0 cm.   Inferior vena cava is dilated, measuring at 2.6 cm cm, but does collapse   with respiration.   Mild MR and TR noted   RVSP is normal         CARDIOVASCULAR:  Normal apical impulse, regular rate and rhythm, normal S1 and S2, no S3 or S4, and no murmur noted    CP: NO  Exercise: NO     Neuro/Psych:   (+) neuromuscular disease (right CTS, generalized weakness. DDD with chronic back and right neck pain):, headaches (daily, worse at night. ): cluster headaches, psychiatric history:depression/anxiety             GI/Hepatic/Renal:   (+) hiatal hernia (s/p fundoplication, ), GERD: well controlled, PUD,          ROS comment:  Dysphagia multiple C-scopes per Dr. Leland Stallings. Last   C-scope 2019:   ANTRAL gastritis, with healing ulcers, almost all healed  - Less obvious gastroparesis with no solid food particles in the stomach  - Esophageal candidiasis MUCH improved - biopsies were brushed and sent to the lab    - No biliary reflux.    Endo/Other:    (+) Diabetes

## 2019-12-30 ENCOUNTER — ANESTHESIA (OUTPATIENT)
Dept: ENDOSCOPY | Age: 59
End: 2019-12-30
Payer: MEDICAID

## 2019-12-30 ENCOUNTER — HOSPITAL ENCOUNTER (OUTPATIENT)
Age: 59
Setting detail: OUTPATIENT SURGERY
Discharge: HOME | End: 2019-12-30
Attending: SURGERY | Admitting: SURGERY
Payer: MEDICAID

## 2019-12-30 VITALS — OXYGEN SATURATION: 98 % | SYSTOLIC BLOOD PRESSURE: 95 MMHG | DIASTOLIC BLOOD PRESSURE: 73 MMHG

## 2019-12-30 VITALS
HEIGHT: 69 IN | OXYGEN SATURATION: 99 % | BODY MASS INDEX: 22.66 KG/M2 | TEMPERATURE: 97.2 F | RESPIRATION RATE: 16 BRPM | WEIGHT: 153 LBS | HEART RATE: 71 BPM | SYSTOLIC BLOOD PRESSURE: 101 MMHG | DIASTOLIC BLOOD PRESSURE: 67 MMHG

## 2019-12-30 PROCEDURE — 2709999900 HC NON-CHARGEABLE SUPPLY: Performed by: SURGERY

## 2019-12-30 PROCEDURE — 43239 EGD BIOPSY SINGLE/MULTIPLE: CPT | Performed by: SURGERY

## 2019-12-30 PROCEDURE — 2500000003 HC RX 250 WO HCPCS: Performed by: NURSE ANESTHETIST, CERTIFIED REGISTERED

## 2019-12-30 PROCEDURE — 87077 CULTURE AEROBIC IDENTIFY: CPT

## 2019-12-30 PROCEDURE — 88342 IMHCHEM/IMCYTCHM 1ST ANTB: CPT

## 2019-12-30 PROCEDURE — 3700000000 HC ANESTHESIA ATTENDED CARE: Performed by: SURGERY

## 2019-12-30 PROCEDURE — 88112 CYTOPATH CELL ENHANCE TECH: CPT

## 2019-12-30 PROCEDURE — 3700000001 HC ADD 15 MINUTES (ANESTHESIA): Performed by: SURGERY

## 2019-12-30 PROCEDURE — 2580000003 HC RX 258: Performed by: SURGERY

## 2019-12-30 PROCEDURE — 88305 TISSUE EXAM BY PATHOLOGIST: CPT

## 2019-12-30 PROCEDURE — 3609027000 HC COLONOSCOPY: Performed by: SURGERY

## 2019-12-30 PROCEDURE — 88312 SPECIAL STAINS GROUP 1: CPT

## 2019-12-30 PROCEDURE — 3609012400 HC EGD TRANSORAL BIOPSY SINGLE/MULTIPLE: Performed by: SURGERY

## 2019-12-30 PROCEDURE — 7100000001 HC PACU RECOVERY - ADDTL 15 MIN: Performed by: SURGERY

## 2019-12-30 PROCEDURE — 45378 DIAGNOSTIC COLONOSCOPY: CPT | Performed by: SURGERY

## 2019-12-30 PROCEDURE — 2580000003 HC RX 258: Performed by: NURSE ANESTHETIST, CERTIFIED REGISTERED

## 2019-12-30 PROCEDURE — 6360000002 HC RX W HCPCS: Performed by: NURSE ANESTHETIST, CERTIFIED REGISTERED

## 2019-12-30 PROCEDURE — 7100000000 HC PACU RECOVERY - FIRST 15 MIN: Performed by: SURGERY

## 2019-12-30 PROCEDURE — 7100000011 HC PHASE II RECOVERY - ADDTL 15 MIN: Performed by: SURGERY

## 2019-12-30 PROCEDURE — 7100000010 HC PHASE II RECOVERY - FIRST 15 MIN: Performed by: SURGERY

## 2019-12-30 RX ORDER — LIDOCAINE HYDROCHLORIDE 20 MG/ML
INJECTION, SOLUTION INFILTRATION; PERINEURAL PRN
Status: DISCONTINUED | OUTPATIENT
Start: 2019-12-30 | End: 2019-12-30 | Stop reason: SDUPTHER

## 2019-12-30 RX ORDER — SODIUM CHLORIDE, SODIUM LACTATE, POTASSIUM CHLORIDE, CALCIUM CHLORIDE 600; 310; 30; 20 MG/100ML; MG/100ML; MG/100ML; MG/100ML
INJECTION, SOLUTION INTRAVENOUS CONTINUOUS
Status: CANCELLED | OUTPATIENT
Start: 2019-12-30

## 2019-12-30 RX ORDER — FLUCONAZOLE 100 MG/1
200 TABLET ORAL DAILY
Qty: 28 TABLET | Refills: 3 | Status: SHIPPED | OUTPATIENT
Start: 2019-12-30 | End: 2020-03-06 | Stop reason: SDUPTHER

## 2019-12-30 RX ORDER — OMEPRAZOLE 20 MG/1
20 CAPSULE, DELAYED RELEASE ORAL 2 TIMES DAILY
Qty: 60 CAPSULE | Refills: 3 | Status: SHIPPED | OUTPATIENT
Start: 2019-12-30 | End: 2020-04-29

## 2019-12-30 RX ORDER — PROPOFOL 10 MG/ML
INJECTION, EMULSION INTRAVENOUS PRN
Status: DISCONTINUED | OUTPATIENT
Start: 2019-12-30 | End: 2019-12-30 | Stop reason: SDUPTHER

## 2019-12-30 RX ORDER — SODIUM CHLORIDE, SODIUM LACTATE, POTASSIUM CHLORIDE, CALCIUM CHLORIDE 600; 310; 30; 20 MG/100ML; MG/100ML; MG/100ML; MG/100ML
INJECTION, SOLUTION INTRAVENOUS CONTINUOUS PRN
Status: DISCONTINUED | OUTPATIENT
Start: 2019-12-30 | End: 2019-12-30 | Stop reason: SDUPTHER

## 2019-12-30 RX ORDER — SODIUM CHLORIDE, SODIUM LACTATE, POTASSIUM CHLORIDE, CALCIUM CHLORIDE 600; 310; 30; 20 MG/100ML; MG/100ML; MG/100ML; MG/100ML
INJECTION, SOLUTION INTRAVENOUS ONCE
Status: COMPLETED | OUTPATIENT
Start: 2019-12-30 | End: 2019-12-30

## 2019-12-30 RX ADMIN — PROPOFOL 30 MG: 10 INJECTION, EMULSION INTRAVENOUS at 09:58

## 2019-12-30 RX ADMIN — PROPOFOL 30 MG: 10 INJECTION, EMULSION INTRAVENOUS at 09:33

## 2019-12-30 RX ADMIN — PROPOFOL 30 MG: 10 INJECTION, EMULSION INTRAVENOUS at 09:30

## 2019-12-30 RX ADMIN — PROPOFOL 30 MG: 10 INJECTION, EMULSION INTRAVENOUS at 09:40

## 2019-12-30 RX ADMIN — PHENYLEPHRINE HYDROCHLORIDE 100 MCG: 10 INJECTION INTRAVENOUS at 10:01

## 2019-12-30 RX ADMIN — PROPOFOL 30 MG: 10 INJECTION, EMULSION INTRAVENOUS at 09:47

## 2019-12-30 RX ADMIN — PHENYLEPHRINE HYDROCHLORIDE 150 MCG: 10 INJECTION INTRAVENOUS at 09:36

## 2019-12-30 RX ADMIN — PROPOFOL 30 MG: 10 INJECTION, EMULSION INTRAVENOUS at 09:42

## 2019-12-30 RX ADMIN — LIDOCAINE HYDROCHLORIDE 100 MG: 20 INJECTION, SOLUTION INFILTRATION; PERINEURAL at 09:23

## 2019-12-30 RX ADMIN — PROPOFOL 30 MG: 10 INJECTION, EMULSION INTRAVENOUS at 09:51

## 2019-12-30 RX ADMIN — PROPOFOL 50 MG: 10 INJECTION, EMULSION INTRAVENOUS at 09:23

## 2019-12-30 RX ADMIN — SODIUM CHLORIDE, POTASSIUM CHLORIDE, SODIUM LACTATE AND CALCIUM CHLORIDE: 600; 310; 30; 20 INJECTION, SOLUTION INTRAVENOUS at 07:41

## 2019-12-30 RX ADMIN — PROPOFOL 30 MG: 10 INJECTION, EMULSION INTRAVENOUS at 09:55

## 2019-12-30 RX ADMIN — PROPOFOL 50 MG: 10 INJECTION, EMULSION INTRAVENOUS at 09:24

## 2019-12-30 RX ADMIN — SODIUM CHLORIDE, POTASSIUM CHLORIDE, SODIUM LACTATE AND CALCIUM CHLORIDE: 600; 310; 30; 20 INJECTION, SOLUTION INTRAVENOUS at 09:17

## 2019-12-30 RX ADMIN — PROPOFOL 30 MG: 10 INJECTION, EMULSION INTRAVENOUS at 09:44

## 2019-12-30 RX ADMIN — PROPOFOL 30 MG: 10 INJECTION, EMULSION INTRAVENOUS at 09:27

## 2019-12-30 RX ADMIN — PROPOFOL 30 MG: 10 INJECTION, EMULSION INTRAVENOUS at 09:37

## 2019-12-30 ASSESSMENT — PAIN DESCRIPTION - LOCATION: LOCATION: ABDOMEN

## 2019-12-30 ASSESSMENT — PAIN DESCRIPTION - FREQUENCY: FREQUENCY: CONTINUOUS

## 2019-12-30 ASSESSMENT — PAIN SCALES - GENERAL
PAINLEVEL_OUTOF10: 0
PAINLEVEL_OUTOF10: 2

## 2019-12-30 ASSESSMENT — PAIN - FUNCTIONAL ASSESSMENT: PAIN_FUNCTIONAL_ASSESSMENT: 0-10

## 2019-12-30 ASSESSMENT — LIFESTYLE VARIABLES: SMOKING_STATUS: 1

## 2019-12-30 ASSESSMENT — PAIN DESCRIPTION - ORIENTATION: ORIENTATION: RIGHT;LOWER

## 2019-12-30 ASSESSMENT — PAIN DESCRIPTION - DESCRIPTORS: DESCRIPTORS: ACHING

## 2019-12-30 ASSESSMENT — PAIN DESCRIPTION - PAIN TYPE: TYPE: ACUTE PAIN

## 2019-12-30 NOTE — H&P
HISTORY AND PHYSICAL EXAM    Date of Admission:  12/30/2019    PCP:  Lauren Long    Chief Complaint / History of Present Illness:  Pop Cruz is a 63 y.o. male presenting with pain in the Left LowerQuadrant and is chronic, improving and dull compared to patient's normal condition. It is moderate in intensity for a duration of 2 weeks and is intermittent with modifying factors increased by or worse with constipation. Needs a upper endoscopy to r/o Candida recurrence, and PUD, and colonoscopy to eval the LLQ pain. .     Wounds very nicely healing, no erythema, no induration, no purulent discharge. PMH:   has a past medical history of Arthritis, Bronchitis, Chronic cluster headache (6/7/2019 last), COPD (chronic obstructive pulmonary disease) ([address] Degenerative disc disease, Depression, Edentulous, Fracture, GERD with esophagitis, H/O dizziness, Hiatal hernia, gastric ulcer, OTHER MEDICAL, Hyperlipidemia, Hypertension, Impingement syndrome of right shoulder, Left shoulder pain, Lower back pain, Motion sickness, Neck pain, and Vertigo. PSH:   has a past surgical history that includes Arm Surgery (Left, 30+ yrs); Upper gastrointestinal endoscopy (12/2018); Dental surgery; Gastric fundoplication (N/A, 68/84/9908); Upper gastrointestinal endoscopy (N/A, 1/28/2019); Endoscopy, colon, diagnostic (04/08/2019); Upper gastrointestinal endoscopy (N/A, 4/8/2019); Colonoscopy (2010); Colonoscopy (2018); eye surgery (Left, 1990's); hernia repair; Upper gastrointestinal endoscopy (N/A, 7/8/2019); and Carpal tunnel release (Right, 10/4/2019). Allergies: Allergies   Allergen Reactions    Bee Venom Anaphylaxis    Nsaids Other (See Comments)     Not to take any longer due to stomach ulcer        Home Meds:    Prior to Admission medications    Medication Sig Start Date End Date Taking?  Authorizing Provider   ondansetron (ZOFRAN ODT) 4 MG disintegrating tablet Place 1 tablet under the tongue every 8 willingness to proceed with the plan.         ____________________________________________    Jocy Cleaning MD, JESSIKA, HENRY    12/30/2019  8:17 AM

## 2019-12-30 NOTE — PROGRESS NOTES
1055- pt rec'd from [facility] per anesthesia's request. Pt is s/p EGD and colonoscopy. Pt arousing and following commands. Pt disoriented to recent events. Pt re-oriented to surroundings. resps even and unlabored. ABD soft and non-distended. Pt repositioned on his left side. Denies nausea. 1110- pt c/o right lower quadrant ABD pain that is stabbing and constant. Pt states it hurts to breathe. ABD remains soft. Repositioned again in bed.  1125- Dr. Lauren Rodrigues at the bedside and updated. Pt alert and oriented. Pt voiced a decrease in ABD pain. Pt passing flatus. 1133- ABD remains soft upon palpation. VSS. Pt transferred back to [facility] via cart without incident. Bedside handoff report given.

## 2019-12-30 NOTE — PROGRESS NOTES
Returned to room from ENDO. Very restless, attempting to take IV out.   Speech very muffled and unable to follow commands

## 2019-12-30 NOTE — ANESTHESIA PRE PROCEDURE
Department of Anesthesiology  Preprocedure Note       Name:  Virginia Hanna   Age:  63 y.o.  :  1960                                          MRN:  [id number]         Date:  2019      Surgeon: Jorge Loaiza):  Wolfgang Mcqueen MD    Procedure: COLONOSCOPY DIAGNOSTIC (N/A )  EGD ESOPHAGOGASTRODUODENOSCOPY (N/A )    Medications prior to admission:   Prior to Admission medications    Medication Sig Start Date End Date Taking? Authorizing Provider   ondansetron (ZOFRAN ODT) 4 MG disintegrating tablet Place 1 tablet under the tongue every 8 hours as needed for Nausea or Vomiting 19   Wolfgang Mcqueen MD   bisacodyl (BISACODYL) 5 MG EC tablet Take 4 tablets by mouth 2 times daily 19   Wolfgang Mcqueen MD   ipratropium-albuterol (DUONEB) 0.5-2.5 (3) MG/3ML SOLN nebulizer solution  10/19/19   Historical Provider, MD   metoclopramide (REGLAN) 10 MG tablet  10/22/19   Historical Provider, MD   Nebulizers (MICRO AIR NEBULIZER) MISC TO USE AS NEEDED WITH NEBULIZER SOLUTION 19   Historical Provider, MD   INCRUSE ELLIPTA 62.5 MCG/INH AEPB  10/19/19   Historical Provider, MD   acetaminophen (TYLENOL) 500 MG tablet Take 1,000 mg by mouth every 6 hours as needed for Pain    Historical Provider, MD   omeprazole (PRILOSEC) 20 MG delayed release capsule Take 20 mg by mouth daily as needed     Historical Provider, MD   Nutritional Supplements (ENSURE HIGH PROTEIN) LIQD Take 1 Can by mouth Daily with lunch 19   Shelia Hernadez, APRN - CNP   tiotropium (SPIRIVA HANDIHALER) 18 MCG inhalation capsule Inhale 1 capsule into the lungs daily 1/3/17   Jesse Fong MD   albuterol sulfate HFA (PROVENTIL HFA) 108 (90 BASE) MCG/ACT inhaler Inhale 2 puffs into the lungs every 6 hours as needed for Wheezing 1/3/17   Jsese Fong MD       Current medications:    No current outpatient medications on file. No current facility-administered medications for this visit. Allergies:     Allergies   Allergen Reactions    Bee Venom Anaphylaxis    Nsaids Other (See Comments)     Not to take any longer due to stomach ulcer       Problem List:    Patient Active Problem List   Diagnosis Code    Impingement syndrome of right shoulder M75.41    Acute bronchitis with COPD ([facility] J44.0, J20.9    Chest pain R07.9    Post-nasal drip R09.82    Chronic cluster headache G44.029    Generalized weakness R53.1    Vertigo R42    Positional lightheadedness R42    Hiatal hernia K44.9    Gastroesophageal reflux disease with esophagitis K21.0    PUD (peptic ulcer disease) K27.9    Paraesophageal hiatal hernia K44.9    Status post laparoscopic Nissen fundoplication Z97.359    Esophageal dysphagia R13.10    Esophageal thrush (HCC) B37.81    Candida esophagitis (HCC) B37.81    Gastroparesis K31.84    Candida infection, esophageal ([facility] B37.81    Pneumonia J18.9    LUQ pain R10.12    LLQ pain R10.32       Past Medical History:        Diagnosis Date    Arthritis     Hands    Bronchitis     last ~2016    Chronic cluster headache 6/7/2019 last    COPD (chronic obstructive pulmonary disease) ([facility]     \"dx 2 yrs ago\" \\"does not see a lung dr Vijay Chow with PCP    Degenerative disc disease     \"in my back have degenarative disc\"    Depression     Edentulous     Fracture     \"fell over a 5 gallon bucket and landed on cement block and broke my right hand\" (7/6/2015)    GERD with esophagitis     H/O dizziness     States will feel like he is going to pass out, possible low BS    Hiatal hernia     Hx of gastric ulcer     HX OTHER MEDICAL     pt states has trouble with reading and writing\"can read very very little\"    Hyperlipidemia     Hypertension     \"on medication for last two yrs\" follow with Dr Justine Briceño    Impingement syndrome of right shoulder     Left shoulder pain     limited range-of-motion    Lower back pain     Motion sickness     Neck pain     more on left side into shoulder--pain limits ROM    Vertigo Past Surgical History:        Procedure Laterality Date    ARM SURGERY Left 30+ yrs    \"put plastic ligaments in \"  after injury through glass window    CARPAL TUNNEL RELEASE Right 10/4/2019    RIGHT CARPAL TUNNEL RELEASE performed by Elizabeth Dash DO at [address]  2010    COLONOSCOPY  2018    HX: polyps - Dr. Ahsan Ervin      all teeth extracted    ENDOSCOPY, COLON, DIAGNOSTIC  04/08/2019    EGD - thrush noted through out   [address] Left 1990's    \"metal removed from eye\"    GASTRIC FUNDOPLICATION N/A 07/14/2542    NISSEN FUNDOPLICATION LAPAROSCOPIC ROBOTIC HIATAL HERNIA performed by Razia Field MD at [place] [address]  12/2018    UPPER GASTROINTESTINAL ENDOSCOPY N/A 1/28/2019    EGD BIOPSY / BRUSHING OF ESOPHAGUS performed by Razia Field MD at [facility] N/A 4/8/2019    EGD BIOPSY AND BRUSHING performed by Razia Field MD at [facility] N/A 7/8/2019    EGD BIOPSY and brushing performed by Razia Field MD at [facility] ENDOSCOPY       Social History:    Social History     Tobacco Use    Smoking status: Current Every Day Smoker     Packs/day: 1.00     Years: 40.00     Pack years: 40.00     Types: Cigarettes    Smokeless tobacco: Never Used   Substance Use Topics    Alcohol use: Yes     Comment: Occasional 1/month                                Ready to quit: Not Answered  Counseling given: Not Answered      Vital Signs (Current): There were no vitals filed for this visit.                                            BP Readings from Last 3 Encounters:   12/30/19 106/80   12/13/19 116/74   12/11/19 114/82       NPO Status:                                                                                 BMI:   Wt Readings from Last 3 Encounters:   12/30/19 153 lb (69.4 kg)   12/13/19 153 lb (69.4 kg)   12/11/19 153 lb (69.4 kg) There is no height or weight on file to calculate BMI. CBC  Lab Results   Component Value Date/Time    WBC 7.4 10/12/2019 06:29 AM    HGB 11.3 (L) 10/12/2019 06:29 AM    HCT 34.7 (L) 10/12/2019 06:29 AM     10/12/2019 06:29 AM     RENAL  Lab Results   Component Value Date/Time     10/12/2019 06:29 AM    K 4.5 10/12/2019 06:29 AM     10/12/2019 06:29 AM    CO2 26 10/12/2019 06:29 AM    BUN 13 10/12/2019 06:29 AM    CREATININE 0.8 (L) 10/12/2019 06:29 AM    GLUCOSE 84 10/12/2019 06:29 AM     Anesthesia Evaluation  Patient summary reviewed and Nursing notes reviewed  Airway: Mallampati: II  TM distance: >3 FB   Neck ROM: full  Mouth opening: > = 3 FB Dental:    (+) lower dentures, upper dentures and edentulous      Pulmonary:normal exam  breath sounds clear to auscultation  (+) COPD (inhaler x 2, last exacerbation  ):  current smoker (1.0-2.5 ppd x 40 years)          Patient smoked on day of surgery. ROS comment: smoker  Counseled on smoking cessation. PAT Airway Exam:  Head/Neck movement: limited side to side  Thyromental Distance: > 3 FB  History of difficult intubation:  None  Mallampati Classification:  Class II  Teeth: all teeth extracted   Able to lie flat     LUNGS:  Diminished BS, no crackles or wheezing   Cardiovascular:  Exercise tolerance: good (>4 METS),   (+) hypertension:,       ECG reviewed  Rhythm: regular  Rate: normal  Echocardiogram reviewed  Stress test reviewed             ROS comment: [id number]  Normal sinus rhythm   Normal ECG   When compared with ECG of 21-DEC-2018 10:09,   No significant change was found     Cardiac cath 2017  1. Left main is patent. 2.  Circ and OM1 have mild disease. 3.  LAD and diagonal branch have mild disease.   4.  RCA is normal.  5.  EDP was around 15 mmHg.       Echo 2017  EF 50%  Left ventricular systolic function is normal.   Essentially normal chamber sizes.   Sclerotic, but non-stenotic aortic valve.   No evidence of any pericardial effusion.   Dilated LVOT, measuring at 2.5 cm.   Dilated aortic root, measuring at 4.0 cm.   Inferior vena cava is dilated, measuring at 2.6 cm cm, but does collapse   with respiration.   Mild MR and TR noted   RVSP is normal         CARDIOVASCULAR:  Normal apical impulse, regular rate and rhythm, normal S1 and S2, no S3 or S4, and no murmur noted    CP: NO  Exercise: NO     Neuro/Psych:   (+) neuromuscular disease (right CTS, generalized weakness. DDD with chronic back and right neck pain):, headaches (daily, worse at night. ): cluster headaches, psychiatric history:depression/anxiety             GI/Hepatic/Renal:   (+) hiatal hernia (s/p fundoplication, 93/8762), GERD: well controlled, PUD,          ROS comment:  Dysphagia multiple C-scopes per Dr. Rena Hong. Last   C-scope 7/2019:   ANTRAL gastritis, with healing ulcers, almost all healed  - Less obvious gastroparesis with no solid food particles in the stomach  - Esophageal candidiasis MUCH improved - biopsies were brushed and sent to the lab    - No biliary reflux. Endo/Other:    (+) Diabetes (hx hypoglycemia after fundo, stopped PPI. PAT BS: 64, 9/27/2019. Asymptomatic, plans to eat after appt.  ), blood dyscrasia: anemia, arthritis (hands ): OA., .                  ROS comment: Impingement syndrome of right shoulder    Right hand base of 4th metacarpal fracture-dislocation. S/p  Closed reduction, right hand base of 4th metacarpal ,2015.    1980's left arm injury with plastic ligaments placed. Abdominal:           Vascular:                                          Anesthesia Plan      MAC and TIVA     ASA 3     (Chart review)  Induction: intravenous. Anesthetic plan and risks discussed with patient. Plan discussed with attending.

## 2020-01-16 LAB
ALBUMIN SERPL-MCNC: 4.5 G/DL
ALP BLD-CCNC: 82 U/L
ALT SERPL-CCNC: 10 U/L
ANION GAP SERPL CALCULATED.3IONS-SCNC: NORMAL MMOL/L
AST SERPL-CCNC: 18 U/L
AVERAGE GLUCOSE: NORMAL
BASOPHILS ABSOLUTE: 0.1 /ΜL
BASOPHILS RELATIVE PERCENT: 0.8 %
BILIRUB SERPL-MCNC: 0.3 MG/DL (ref 0.1–1.4)
BUN BLDV-MCNC: 11 MG/DL
CALCIUM SERPL-MCNC: 9.8 MG/DL
CHLORIDE BLD-SCNC: 105 MMOL/L
CHOLESTEROL, TOTAL: 200 MG/DL
CHOLESTEROL/HDL RATIO: NORMAL
CO2: 24 MMOL/L
CREAT SERPL-MCNC: 0.8 MG/DL
EOSINOPHILS ABSOLUTE: 0.2 /ΜL
EOSINOPHILS RELATIVE PERCENT: 2.6 %
GFR CALCULATED: NORMAL
GLUCOSE BLD-MCNC: 66 MG/DL
HBA1C MFR BLD: 5.5 %
HCT VFR BLD CALC: 41.6 % (ref 41–53)
HDLC SERPL-MCNC: 54 MG/DL (ref 35–70)
HEMOGLOBIN: 14 G/DL (ref 13.5–17.5)
LDL CHOLESTEROL CALCULATED: 130 MG/DL (ref 0–160)
LYMPHOCYTES ABSOLUTE: 2.3 /ΜL
LYMPHOCYTES RELATIVE PERCENT: 35 %
MCH RBC QN AUTO: 29.1 PG
MCHC RBC AUTO-ENTMCNC: 33.7 G/DL
MCV RBC AUTO: 86.2 FL
MONOCYTES ABSOLUTE: 0.5 /ΜL
MONOCYTES RELATIVE PERCENT: 7.6 %
NEUTROPHILS ABSOLUTE: 3.6 /ΜL
NEUTROPHILS RELATIVE PERCENT: 54 %
NONHDLC SERPL-MCNC: NORMAL MG/DL
PLATELET # BLD: 238 K/ΜL
PMV BLD AUTO: NORMAL FL
POTASSIUM SERPL-SCNC: 4.5 MMOL/L
RBC # BLD: 4.83 10^6/ΜL
SODIUM BLD-SCNC: 141 MMOL/L
TOTAL PROTEIN: 6.6
TRIGL SERPL-MCNC: 82 MG/DL
TSH SERPL DL<=0.05 MIU/L-ACNC: 0.92 UIU/ML
VLDLC SERPL CALC-MCNC: 16 MG/DL
WBC # BLD: 6.6 10^3/ML

## 2020-01-24 LAB — CLOTEST: NEGATIVE

## 2020-01-29 ENCOUNTER — OFFICE VISIT (OUTPATIENT)
Dept: BARIATRICS/WEIGHT MGMT | Age: 60
End: 2020-01-29
Payer: MEDICAID

## 2020-01-29 VITALS
WEIGHT: 155 LBS | BODY MASS INDEX: 22.89 KG/M2 | DIASTOLIC BLOOD PRESSURE: 64 MMHG | SYSTOLIC BLOOD PRESSURE: 110 MMHG | HEART RATE: 69 BPM | RESPIRATION RATE: 16 BRPM

## 2020-01-29 PROCEDURE — 99214 OFFICE O/P EST MOD 30 MIN: CPT | Performed by: SURGERY

## 2020-01-29 PROCEDURE — 3017F COLORECTAL CA SCREEN DOC REV: CPT | Performed by: SURGERY

## 2020-01-29 PROCEDURE — G8420 CALC BMI NORM PARAMETERS: HCPCS | Performed by: SURGERY

## 2020-01-29 PROCEDURE — G8482 FLU IMMUNIZE ORDER/ADMIN: HCPCS | Performed by: SURGERY

## 2020-01-29 PROCEDURE — G8427 DOCREV CUR MEDS BY ELIG CLIN: HCPCS | Performed by: SURGERY

## 2020-01-29 PROCEDURE — 4004F PT TOBACCO SCREEN RCVD TLK: CPT | Performed by: SURGERY

## 2020-01-29 ASSESSMENT — ENCOUNTER SYMPTOMS
ABDOMINAL PAIN: 1
ANAL BLEEDING: 0
WHEEZING: 0
VOICE CHANGE: 0
PHOTOPHOBIA: 0
NAUSEA: 0
BLOOD IN STOOL: 0
COUGH: 0
TROUBLE SWALLOWING: 0
CONSTIPATION: 0
VOMITING: 0
DIARRHEA: 0
COLOR CHANGE: 0
SHORTNESS OF BREATH: 0
SORE THROAT: 0

## 2020-01-29 NOTE — PROGRESS NOTES
GENERAL SURGERY OFFICE NOTE    SUBJECTIVE:    Patient presenting today referred from Gardenia Sanchez and No ref. provider found, for   Chief Complaint   Patient presents with    Follow-up     f/u to EGD/C-scope 12/30/19        HPI: Louann Cranker is a 63 y.o. male presenting with pain in the Epigasrtium and is chronic, improving and dull compared to patient's normal condition. It is moderate in intensity for a duration of 3 months and is intermittent with modifying factors increased by or worse with eating. .. presenting in first, follow up 4 weeks post op 12/30/19. Procedure:   EGD:       c-scope:      Pathology:   Clotest NEGATIVE      Final Cytologic Diagnosis:  Esophageal Brushing for fungus, with cell block:       Negative for malignancy.      Negative for fungi. Final Pathologic Diagnosis:  A. Biopsy gastric antral mucosa: Reactive gastropathy. B.  Biopsy squamous mucosa, esophagus: No histopathological  diagnosis. Wounds very nicely healing, no erythema, no induration, no purulent discharge. No constipation, BMs back to normal.    Thoroughly reviewed the patient's medical history, family history, social history and review of systems with the patient today in the office. Please see medical record for pertinent positives.       Past Medical History:   Diagnosis Date    Arthritis     Hands    Bronchitis     last ~2016    Chronic cluster headache 6/7/2019 last    COPD (chronic obstructive pulmonary disease) ([facility]     \"dx 2 yrs ago\" \\"does not see a lung dr Vijay Chow with PCP    Degenerative disc disease     \"in my back have degenarative disc\"    Depression     Edentulous     Fracture     \"fell over a 5 gallon bucket and landed on cement block and broke my right hand\" (7/6/2015)    GERD with esophagitis     H/O dizziness     States will feel like he is going to pass out, possible low BS    Hiatal hernia     Hx of gastric ulcer     HX OTHER MEDICAL     pt states has trouble with reading and writing\"can read very very little\"    Hyperlipidemia     Hypertension     \"on medication for last two yrs\" follow with Dr Margie Alcala Impingement syndrome of right shoulder     Left shoulder pain     limited range-of-motion    Lower back pain     Motion sickness     Neck pain     more on left side into shoulder--pain limits ROM    Vertigo       Past Surgical History:   Procedure Laterality Date    ARM SURGERY Left 30+ yrs    \"put plastic ligaments in \"  after injury through glass window    CARPAL TUNNEL RELEASE Right 10/4/2019    RIGHT CARPAL TUNNEL RELEASE performed by Duke Dooley DO at [address]  2010    COLONOSCOPY  2018    HX: polyps - Dr. Melissa Beckwith COLONOSCOPY N/A 12/30/2019    COLONOSCOPY DIAGNOSTIC performed by Edda Leventhal, MD at [address] [place] [address]      all teeth extracted    ENDOSCOPY, COLON, DIAGNOSTIC  04/08/2019    EGD - thrush noted through out    EYE SURGERY Left 1990's    \"metal removed from eye\"    GASTRIC FUNDOPLICATION N/A 44/80/9831    NISSEN FUNDOPLICATION LAPAROSCOPIC ROBOTIC HIATAL HERNIA performed by Edda Leventhal, MD at [place] [address]  12/2018    UPPER GASTROINTESTINAL ENDOSCOPY N/A 1/28/2019    EGD BIOPSY / BRUSHING OF ESOPHAGUS performed by Edda Leventhal, MD at [address] [facility] N/A 4/8/2019    EGD BIOPSY AND BRUSHING performed by Edda Leventhal, MD at [address] [facility] 7/8/2019    EGD BIOPSY and brushing performed by Edda Leventhal, MD at [address] [place] [address] 12/30/2019    EGD BIOPSY/BRUSHING OF ESOPHAGUS performed by Edda Leventhal, MD at [address] [facility] ENDOSCOPY     Current Outpatient Medications   Medication Sig Dispense Refill    omeprazole (PRILOSEC) 20 MG delayed release capsule Take 1 capsule by mouth 2 times daily 60 capsule 3    ondansetron (ZOFRAN ODT) 4 MG disintegrating tablet Place 1 tablet under the tongue every 8 hours as needed for Nausea or Vomiting 30 tablet 3    bisacodyl (BISACODYL) 5 MG EC tablet Take 4 tablets by mouth 2 times daily 8 tablet 3    ipratropium-albuterol (DUONEB) 0.5-2.5 (3) MG/3ML SOLN nebulizer solution       metoclopramide (REGLAN) 10 MG tablet       Nebulizers (MICRO AIR NEBULIZER) MISC TO USE AS NEEDED WITH NEBULIZER SOLUTION  11    INCRUSE ELLIPTA 62.5 MCG/INH AEPB       acetaminophen (TYLENOL) 500 MG tablet Take 1,000 mg by mouth every 6 hours as needed for Pain      Nutritional Supplements (ENSURE HIGH PROTEIN) LIQD Take 1 Can by mouth Daily with lunch 32 Can 3    tiotropium (SPIRIVA HANDIHALER) 18 MCG inhalation capsule Inhale 1 capsule into the lungs daily 30 capsule 3    albuterol sulfate HFA (PROVENTIL HFA) 108 (90 BASE) MCG/ACT inhaler Inhale 2 puffs into the lungs every 6 hours as needed for Wheezing 1 Inhaler 3     No current facility-administered medications for this visit. Allergies   Allergen Reactions    Bee Venom Anaphylaxis    Nsaids Other (See Comments)     Not to take any longer due to stomach ulcer           Review of Systems   Constitutional: Negative for activity change, chills, diaphoresis and fever. HENT: Negative for sore throat, trouble swallowing and voice change. Eyes: Negative for photophobia and visual disturbance. Respiratory: Negative for cough, shortness of breath and wheezing. Cardiovascular: Negative for chest pain, palpitations and leg swelling. Gastrointestinal: Positive for abdominal pain. Negative for anal bleeding, blood in stool, constipation, diarrhea, nausea and vomiting. Endocrine: Negative for cold intolerance, heat intolerance, polydipsia and polyuria. Genitourinary: Negative for dysuria, frequency and hematuria. Musculoskeletal: Negative for joint swelling, myalgias and neck stiffness. Skin: Negative for color change and rash.

## 2020-03-04 ENCOUNTER — HOSPITAL ENCOUNTER (OUTPATIENT)
Dept: CT IMAGING | Age: 60
Discharge: HOME | End: 2020-03-04
Payer: MEDICAID

## 2020-03-04 ENCOUNTER — OFFICE VISIT (OUTPATIENT)
Dept: BARIATRICS/WEIGHT MGMT | Age: 60
End: 2020-03-04
Payer: MEDICAID

## 2020-03-04 VITALS
HEIGHT: 69 IN | DIASTOLIC BLOOD PRESSURE: 60 MMHG | SYSTOLIC BLOOD PRESSURE: 110 MMHG | HEART RATE: 63 BPM | BODY MASS INDEX: 22.64 KG/M2 | WEIGHT: 152.9 LBS

## 2020-03-04 PROBLEM — R10.9 LEFT FLANK PAIN: Status: ACTIVE | Noted: 2020-03-04

## 2020-03-04 LAB
GFR AFRICAN AMERICAN: >60 ML/MIN/1.73M2
GFR NON-AFRICAN AMERICAN: >60 ML/MIN/1.73M2
POC CREATININE: 0.9 MG/DL (ref 0.9–1.3)

## 2020-03-04 PROCEDURE — G8420 CALC BMI NORM PARAMETERS: HCPCS | Performed by: SURGERY

## 2020-03-04 PROCEDURE — 6360000004 HC RX CONTRAST MEDICATION: Performed by: SURGERY

## 2020-03-04 PROCEDURE — 3017F COLORECTAL CA SCREEN DOC REV: CPT | Performed by: SURGERY

## 2020-03-04 PROCEDURE — 4004F PT TOBACCO SCREEN RCVD TLK: CPT | Performed by: SURGERY

## 2020-03-04 PROCEDURE — 74177 CT ABD & PELVIS W/CONTRAST: CPT

## 2020-03-04 PROCEDURE — 2580000003 HC RX 258: Performed by: SURGERY

## 2020-03-04 PROCEDURE — 99214 OFFICE O/P EST MOD 30 MIN: CPT | Performed by: SURGERY

## 2020-03-04 PROCEDURE — G8482 FLU IMMUNIZE ORDER/ADMIN: HCPCS | Performed by: SURGERY

## 2020-03-04 PROCEDURE — G8427 DOCREV CUR MEDS BY ELIG CLIN: HCPCS | Performed by: SURGERY

## 2020-03-04 RX ORDER — SODIUM CHLORIDE 0.9 % (FLUSH) 0.9 %
10 SYRINGE (ML) INJECTION PRN
Status: DISCONTINUED | OUTPATIENT
Start: 2020-03-04 | End: 2020-03-05 | Stop reason: HOSPADM

## 2020-03-04 RX ORDER — HYDROCODONE BITARTRATE AND ACETAMINOPHEN 5; 325 MG/1; MG/1
1 TABLET ORAL EVERY 4 HOURS PRN
Qty: 30 TABLET | Refills: 0 | Status: SHIPPED | OUTPATIENT
Start: 2020-03-04 | End: 2020-03-09

## 2020-03-04 RX ORDER — CIPROFLOXACIN 500 MG/1
500 TABLET, FILM COATED ORAL 2 TIMES DAILY
COMMUNITY
End: 2020-06-04

## 2020-03-04 RX ADMIN — IOHEXOL 50 ML: 240 INJECTION, SOLUTION INTRATHECAL; INTRAVASCULAR; INTRAVENOUS; ORAL at 20:01

## 2020-03-04 RX ADMIN — IOPAMIDOL 75 ML: 755 INJECTION, SOLUTION INTRAVENOUS at 20:01

## 2020-03-04 RX ADMIN — SODIUM CHLORIDE, PRESERVATIVE FREE 10 ML: 5 INJECTION INTRAVENOUS at 20:02

## 2020-03-04 ASSESSMENT — ENCOUNTER SYMPTOMS
WHEEZING: 0
PHOTOPHOBIA: 0
SORE THROAT: 0
BLOOD IN STOOL: 0
VOICE CHANGE: 0
ANAL BLEEDING: 0
ABDOMINAL PAIN: 1
COUGH: 0
TROUBLE SWALLOWING: 0
COLOR CHANGE: 0
SHORTNESS OF BREATH: 0
DIARRHEA: 0
NAUSEA: 0
VOMITING: 0
CONSTIPATION: 0

## 2020-03-04 NOTE — PROGRESS NOTES
GENERAL SURGERY OFFICE NOTE    SUBJECTIVE:    Patient presenting today referred from Fei Hurd and No ref. provider found, for   Chief Complaint   Patient presents with    Follow-up     6 WK Check up - Thrush, C/O LT Flank & groin pain        HPI: Clinton Silva is a 63 y.o. male presenting with pain in the Left LowerQuadrant and is acute, worsening, dull and throbbing compared to patient's normal condition. It is severe in intensity for a duration of 5 days and is intermittent with modifying factors increased by or worse with breathing. .. Wounds very nicely healing, no erythema, no induration, no purulent discharge. No constipation, BMs back to normal.    Thoroughly reviewed the patient's medical history, family history, social history and review of systems with the patient today in the office. Please see medical record for pertinent positives.       Past Medical History:   Diagnosis Date    Arthritis     Hands    Bronchitis     last ~2016    Chronic cluster headache 6/7/2019 last    COPD (chronic obstructive pulmonary disease) ([facility]     \"dx 2 yrs ago\" \\"does not see a lung dr Blaze Maguire with PCP    Degenerative disc disease     \"in my back have degenarative disc\"    Depression     Edentulous     Fracture     \"fell over a 5 gallon bucket and landed on cement block and broke my right hand\" (7/6/2015)    GERD with esophagitis     H/O dizziness     States will feel like he is going to pass out, possible low BS    Hiatal hernia     Hx of gastric ulcer     HX OTHER MEDICAL     pt states has trouble with reading and writing\"can read very very little\"    Hyperlipidemia     Hypertension     \"on medication for last two yrs\" follow with Dr Lexie Lozano Impingement syndrome of right shoulder     Left shoulder pain     limited range-of-motion    Lower back pain     Motion sickness     Neck pain     more on left side into shoulder--pain limits ROM    Vertigo       Past Surgical adenopathy. Does not bruise/bleed easily. OBJECTIVE:    /60   Pulse 63   Ht 5' 9\" (1.753 m)   Wt 152 lb 14.4 oz (69.4 kg)   BMI 22.58 kg/m²    Body mass index is 22.58 kg/m². Physical Exam  Vitals signs reviewed. Constitutional:       General: He is not in acute distress. Appearance: He is well-developed. He is not diaphoretic. HENT:      Head: Normocephalic and atraumatic. Eyes:      General: No scleral icterus. Conjunctiva/sclera: Conjunctivae normal.      Pupils: Pupils are equal, round, and reactive to light. Neck:      Musculoskeletal: Normal range of motion. Thyroid: No thyromegaly. Vascular: No JVD. Trachea: No tracheal deviation. Cardiovascular:      Rate and Rhythm: Normal rate and regular rhythm. Heart sounds: Normal heart sounds. No murmur. No friction rub. No gallop. Pulmonary:      Effort: Pulmonary effort is normal. No respiratory distress. Breath sounds: No stridor. No wheezing or rales. Chest:      Chest wall: No tenderness. Abdominal:      General: Bowel sounds are normal. There is no distension. Palpations: Abdomen is soft. There is no mass. Tenderness: There is no abdominal tenderness. There is no guarding or rebound. Musculoskeletal: Normal range of motion. General: No tenderness. Lymphadenopathy:      Cervical: No cervical adenopathy. Skin:     General: Skin is warm and dry. Coloration: Skin is not pale. Findings: No erythema or rash. Neurological:      Mental Status: He is alert and oriented to person, place, and time. Cranial Nerves: No cranial nerve deficit. Coordination: Coordination normal.   Psychiatric:         Behavior: Behavior normal.         Thought Content:  Thought content normal.         Judgment: Judgment normal.           Orders Placed This Encounter   Medications    HYDROcodone-acetaminophen (NORCO) 5-325 MG per tablet     Sig: Take 1 tablet by mouth every 4 hours

## 2020-03-06 ENCOUNTER — OFFICE VISIT (OUTPATIENT)
Dept: BARIATRICS/WEIGHT MGMT | Age: 60
End: 2020-03-06
Payer: MEDICAID

## 2020-03-06 VITALS
BODY MASS INDEX: 21.82 KG/M2 | HEIGHT: 69 IN | DIASTOLIC BLOOD PRESSURE: 65 MMHG | HEART RATE: 67 BPM | SYSTOLIC BLOOD PRESSURE: 118 MMHG | WEIGHT: 147.3 LBS

## 2020-03-06 PROCEDURE — 4004F PT TOBACCO SCREEN RCVD TLK: CPT | Performed by: SURGERY

## 2020-03-06 PROCEDURE — G8482 FLU IMMUNIZE ORDER/ADMIN: HCPCS | Performed by: SURGERY

## 2020-03-06 PROCEDURE — 99214 OFFICE O/P EST MOD 30 MIN: CPT | Performed by: SURGERY

## 2020-03-06 PROCEDURE — 3017F COLORECTAL CA SCREEN DOC REV: CPT | Performed by: SURGERY

## 2020-03-06 PROCEDURE — G8420 CALC BMI NORM PARAMETERS: HCPCS | Performed by: SURGERY

## 2020-03-06 PROCEDURE — G8427 DOCREV CUR MEDS BY ELIG CLIN: HCPCS | Performed by: SURGERY

## 2020-03-06 RX ORDER — BACLOFEN 10 MG/1
10 TABLET ORAL 3 TIMES DAILY
Qty: 45 TABLET | Refills: 0 | Status: SHIPPED | OUTPATIENT
Start: 2020-03-06 | End: 2020-03-18

## 2020-03-06 RX ORDER — FLUCONAZOLE 100 MG/1
200 TABLET ORAL DAILY
Qty: 28 TABLET | Refills: 3 | Status: SHIPPED | OUTPATIENT
Start: 2020-03-06 | End: 2020-03-20

## 2020-03-06 ASSESSMENT — ENCOUNTER SYMPTOMS
VOMITING: 0
CONSTIPATION: 0
ABDOMINAL PAIN: 1
TROUBLE SWALLOWING: 0
PHOTOPHOBIA: 0
NAUSEA: 0
ANAL BLEEDING: 0
BLOOD IN STOOL: 0
COLOR CHANGE: 0
SHORTNESS OF BREATH: 0
WHEEZING: 0
VOICE CHANGE: 0
SORE THROAT: 0
DIARRHEA: 0
COUGH: 0

## 2020-03-06 NOTE — PROGRESS NOTES
Take 1 tablet by mouth every 4 hours as needed for Pain for up to 5 days. Intended supply: 5 days. Take lowest dose possible to manage pain 30 tablet 0    omeprazole (PRILOSEC) 20 MG delayed release capsule Take 1 capsule by mouth 2 times daily 60 capsule 3    ondansetron (ZOFRAN ODT) 4 MG disintegrating tablet Place 1 tablet under the tongue every 8 hours as needed for Nausea or Vomiting 30 tablet 3    bisacodyl (BISACODYL) 5 MG EC tablet Take 4 tablets by mouth 2 times daily 8 tablet 3    ipratropium-albuterol (DUONEB) 0.5-2.5 (3) MG/3ML SOLN nebulizer solution       metoclopramide (REGLAN) 10 MG tablet       Nebulizers (MICRO AIR NEBULIZER) MISC TO USE AS NEEDED WITH NEBULIZER SOLUTION  11    INCRUSE ELLIPTA 62.5 MCG/INH AEPB       acetaminophen (TYLENOL) 500 MG tablet Take 1,000 mg by mouth every 6 hours as needed for Pain      Nutritional Supplements (ENSURE HIGH PROTEIN) LIQD Take 1 Can by mouth Daily with lunch 32 Can 3    tiotropium (SPIRIVA HANDIHALER) 18 MCG inhalation capsule Inhale 1 capsule into the lungs daily 30 capsule 3    albuterol sulfate HFA (PROVENTIL HFA) 108 (90 BASE) MCG/ACT inhaler Inhale 2 puffs into the lungs every 6 hours as needed for Wheezing 1 Inhaler 3     No current facility-administered medications for this visit. Allergies   Allergen Reactions    Bee Venom Anaphylaxis    Nsaids Other (See Comments)     Not to take any longer due to stomach ulcer           Review of Systems   Constitutional: Negative for activity change, chills, diaphoresis and fever. HENT: Negative for sore throat, trouble swallowing and voice change. Eyes: Negative for photophobia and visual disturbance. Respiratory: Negative for cough, shortness of breath and wheezing. Cardiovascular: Negative for chest pain, palpitations and leg swelling. Gastrointestinal: Positive for abdominal pain (LUQ). Negative for anal bleeding, blood in stool, constipation, diarrhea, nausea and vomiting. person, place, and time. Cranial Nerves: No cranial nerve deficit. Coordination: Coordination normal.   Psychiatric:         Behavior: Behavior normal.         Thought Content: Thought content normal.         Judgment: Judgment normal.           Orders Placed This Encounter   Medications    baclofen (LIORESAL) 10 MG tablet     Sig: Take 1 tablet by mouth 3 times daily     Dispense:  45 tablet     Refill:  0    fluconazole (DIFLUCAN) 100 MG tablet     Sig: Take 2 tablets by mouth daily for 14 days     Dispense:  28 tablet     Refill:  3       ASSESSMENT & PLAN:    1. LLQ pain         Discussed the CT results and NO need to worry, will Rx with Baclofen, and continue The Nystatin. Patient counseled on the risks, benefits, and alternatives of treatment plan at length while in the office today. Patient states an understanding and willingness to proceed with the plan. Follow Up:  Return in about 3 weeks (around 3/27/2020) for Follow up Symptoms. Jocy Cleaning MD, FACS, FICS.    3/6/20       Patient was seen with total face to face time of 25 minutes. More than 50% of this visit was counseling and education as above in my assessment and plan.

## 2020-03-18 RX ORDER — BACLOFEN 10 MG/1
TABLET ORAL
Qty: 45 TABLET | Refills: 0 | Status: SHIPPED | OUTPATIENT
Start: 2020-03-18 | End: 2020-04-13

## 2020-03-27 ENCOUNTER — VIRTUAL VISIT (OUTPATIENT)
Dept: BARIATRICS/WEIGHT MGMT | Age: 60
End: 2020-03-27
Payer: MEDICAID

## 2020-03-27 PROCEDURE — 99442 PR PHYS/QHP TELEPHONE EVALUATION 11-20 MIN: CPT | Performed by: SURGERY

## 2020-03-27 PROCEDURE — G8482 FLU IMMUNIZE ORDER/ADMIN: HCPCS | Performed by: SURGERY

## 2020-03-27 PROCEDURE — G8420 CALC BMI NORM PARAMETERS: HCPCS | Performed by: SURGERY

## 2020-03-27 PROCEDURE — G8428 CUR MEDS NOT DOCUMENT: HCPCS | Performed by: SURGERY

## 2020-03-27 PROCEDURE — 3017F COLORECTAL CA SCREEN DOC REV: CPT | Performed by: SURGERY

## 2020-03-27 PROCEDURE — 4004F PT TOBACCO SCREEN RCVD TLK: CPT | Performed by: SURGERY

## 2020-04-13 RX ORDER — BACLOFEN 10 MG/1
TABLET ORAL
Qty: 45 TABLET | Refills: 0 | Status: ON HOLD
Start: 2020-04-13 | End: 2020-06-05 | Stop reason: HOSPADM

## 2020-04-29 RX ORDER — OMEPRAZOLE 20 MG/1
CAPSULE, DELAYED RELEASE ORAL
Qty: 60 CAPSULE | Refills: 2 | Status: SHIPPED | OUTPATIENT
Start: 2020-04-29 | End: 2020-06-04

## 2020-05-14 ENCOUNTER — TELEPHONE (OUTPATIENT)
Dept: INTERNAL MEDICINE CLINIC | Age: 60
End: 2020-05-14

## 2020-05-20 ENCOUNTER — TELEPHONE (OUTPATIENT)
Dept: BARIATRICS/WEIGHT MGMT | Age: 60
End: 2020-05-20

## 2020-05-20 ENCOUNTER — OFFICE VISIT (OUTPATIENT)
Dept: BARIATRICS/WEIGHT MGMT | Age: 60
End: 2020-05-20
Payer: MEDICAID

## 2020-05-20 VITALS
OXYGEN SATURATION: 95 % | DIASTOLIC BLOOD PRESSURE: 82 MMHG | HEART RATE: 62 BPM | HEIGHT: 69 IN | BODY MASS INDEX: 21.8 KG/M2 | TEMPERATURE: 98 F | WEIGHT: 147.2 LBS | SYSTOLIC BLOOD PRESSURE: 118 MMHG | RESPIRATION RATE: 16 BRPM

## 2020-05-20 PROCEDURE — 3017F COLORECTAL CA SCREEN DOC REV: CPT | Performed by: SURGERY

## 2020-05-20 PROCEDURE — 4004F PT TOBACCO SCREEN RCVD TLK: CPT | Performed by: SURGERY

## 2020-05-20 PROCEDURE — 99214 OFFICE O/P EST MOD 30 MIN: CPT | Performed by: SURGERY

## 2020-05-20 PROCEDURE — G8420 CALC BMI NORM PARAMETERS: HCPCS | Performed by: SURGERY

## 2020-05-20 PROCEDURE — G8427 DOCREV CUR MEDS BY ELIG CLIN: HCPCS | Performed by: SURGERY

## 2020-05-20 RX ORDER — ZINC OXIDE 216 MG/ML
1 LOTION TOPICAL 3 TIMES DAILY
Qty: 90 CAN | Refills: 5 | Status: SHIPPED | OUTPATIENT
Start: 2020-05-20 | End: 2022-04-06

## 2020-05-20 RX ORDER — FEEDER CONTAINER WITH PUMP SET
1 EACH MISCELLANEOUS 3 TIMES DAILY
Qty: 30 CAN | Refills: 5 | Status: SHIPPED | OUTPATIENT
Start: 2020-05-20

## 2020-05-20 RX ORDER — METOCLOPRAMIDE 10 MG/1
10 TABLET ORAL 4 TIMES DAILY
Qty: 120 TABLET | Refills: 3 | Status: SHIPPED | OUTPATIENT
Start: 2020-05-20 | End: 2020-06-04

## 2020-05-20 ASSESSMENT — ENCOUNTER SYMPTOMS
WHEEZING: 0
VOICE CHANGE: 0
COUGH: 0
SORE THROAT: 0
TROUBLE SWALLOWING: 0
CONSTIPATION: 0
BLOOD IN STOOL: 0
SHORTNESS OF BREATH: 0
VOMITING: 1
DIARRHEA: 0
PHOTOPHOBIA: 0
ANAL BLEEDING: 0
ABDOMINAL PAIN: 1
NAUSEA: 0
COLOR CHANGE: 0

## 2020-05-20 NOTE — TELEPHONE ENCOUNTER
Called patient to advised his UGI is scheduled at [facility] 5/26/20 at 1p (arrival 1230), NPO after midnight, small dinner night before, no gum/smoking/pills the morning of. Needs to bring ins/ID cards and wear a mask. No answer, left message to call back. His EGD is in the works of being scheduled, we will call them once we know the information.

## 2020-05-20 NOTE — PROGRESS NOTES
GENERAL SURGERY OFFICE NOTE    SUBJECTIVE:    Patient presenting today referred from Abe Matthew and No ref. provider found, for   Chief Complaint   Patient presents with    Other     unresolved dysphagia        HPI: John Mills is a 63 y.o. male presenting with pain in the Epigastrium and esophagus and is chronic, worsening and dull compared to patient's normal condition. It is severe in intensity for a duration of 3 months and is intermittent with modifying factors increased by or worse with eating. C/O unresolved dysphagia, feels like food is getting stuck in throat, thrush  Wounds very nicely healing, no erythema, no induration, no purulent discharge. No constipation, BMs back to normal.    Thoroughly reviewed the patient's medical history, family history, social history and review of systems with the patient today in the office. Please see medical record for pertinent positives.       Past Medical History:   Diagnosis Date    Arthritis     Hands    Bronchitis     last ~2016    Chronic cluster headache 6/7/2019 last    COPD (chronic obstructive pulmonary disease) ([facility] [address]     \"dx 2 yrs ago\" \\"does not see a lung dr Francesca Almeida with PCP    Degenerative disc disease     \"in my back have degenarative disc\"    Depression     Edentulous     Fracture     \"fell over a 5 gallon bucket and landed on cement block and broke my right hand\" (7/6/2015)    GERD with esophagitis     H/O dizziness     States will feel like he is going to pass out, possible low BS    Hiatal hernia     Hx of gastric ulcer     HX OTHER MEDICAL     pt states has trouble with reading and writing\"can read very very little\"    Hyperlipidemia     Hypertension     \"on medication for last two yrs\" follow with Dr Nhung Augustine Impingement syndrome of right shoulder     Left shoulder pain     limited range-of-motion    Lower back pain     Motion sickness     Neck pain     more on left side into shoulder--pain limits ROM    Vertigo         Past Surgical History:   Procedure Laterality Date    ARM SURGERY Left 30+ yrs    \"put plastic ligaments in \"  after injury through glass window    CARPAL TUNNEL RELEASE Right 10/4/2019    RIGHT CARPAL TUNNEL RELEASE performed by Felicitas Kahn DO at [facility]  2010    COLONOSCOPY  2018    HX: polyps - Dr. Henny Saldivar 12/30/2019    COLONOSCOPY DIAGNOSTIC performed by Bud Fitzgerald MD at [address] [place] [address]      all teeth extracted    ENDOSCOPY, COLON, DIAGNOSTIC  04/08/2019    EGD - thrush noted through out   [address] Left 1990's    \"metal removed from eye\"    GASTRIC FUNDOPLICATION N/A 97/84/0110    NISSEN FUNDOPLICATION LAPAROSCOPIC ROBOTIC HIATAL HERNIA performed by Bud Fitzgerald MD at [place] [address]  12/2018    UPPER GASTROINTESTINAL ENDOSCOPY N/A 1/28/2019    EGD BIOPSY / BRUSHING OF ESOPHAGUS performed by Bud Fitzgerald MD at [place] [address] N/A 4/8/2019    EGD BIOPSY AND BRUSHING performed by Bud Fitzgerald MD at [place] [address] N/A 7/8/2019    EGD BIOPSY and brushing performed by Bud Fitzgerald MD at [place] [address] N/A 12/30/2019    EGD BIOPSY/BRUSHING OF ESOPHAGUS performed by Bud Fitzgerald MD at [address] [place] [address] Marital status:      Spouse name: Not on file    Number of children: Not on file    Years of education: Not on file    Highest education level: Not on file   Occupational History    Not on file   Social Needs    Financial resource strain: Not on file    Food insecurity     Worry: Not on file     Inability: Not on file    Transportation needs     Medical: Not on file     Non-medical: Not on file   Tobacco Use    Smoking status: Current Every Day Smoker     Packs/day: 1.00 Years: 40.00     Pack years: 40.00     Types: Cigarettes    Smokeless tobacco: Never Used   Substance and Sexual Activity    Alcohol use: Yes     Comment: Occasional 1/month    Drug use: Never    Sexual activity: Not Currently     Partners: Female   Lifestyle    Physical activity     Days per week: Not on file     Minutes per session: Not on file    Stress: Not on file   Relationships    Social connections     Talks on phone: Not on file     Gets together: Not on file     Attends Evangelical service: Not on file     Active member of club or organization: Not on file     Attends meetings of clubs or organizations: Not on file     Relationship status: Not on file    Intimate partner violence     Fear of current or ex partner: Not on file     Emotionally abused: Not on file     Physically abused: Not on file     Forced sexual activity: Not on file   Other Topics Concern    Not on file   Social History Narrative    Not on file        Allergies   Allergen Reactions    Bee Venom Anaphylaxis    Nsaids Other (See Comments)     Not to take any longer due to stomach ulcer        The patient has a family history of    Current Outpatient Medications   Medication Sig Dispense Refill    metoclopramide (REGLAN) 10 MG tablet Take 1 tablet by mouth 4 times daily 120 tablet 3    Nutritional Supplement LIQD Take 1 Can by mouth 3 times daily 90 Can 5    omeprazole (PRILOSEC) 20 MG delayed release capsule TAKE ONE CAPSULE BY MOUTH TWICE A DAY 60 capsule 2    baclofen (LIORESAL) 10 MG tablet TAKE ONE TABLET BY MOUTH THREE TIMES A DAY 45 tablet 0    ciprofloxacin (CIPRO) 500 MG tablet Take 500 mg by mouth 2 times daily      ondansetron (ZOFRAN ODT) 4 MG disintegrating tablet Place 1 tablet under the tongue every 8 hours as needed for Nausea or Vomiting 30 tablet 3    bisacodyl (BISACODYL) 5 MG EC tablet Take 4 tablets by mouth 2 times daily 8 tablet 3    ipratropium-albuterol (DUONEB) 0.5-2.5 (3) MG/3ML SOLN nebulizer solution       metoclopramide (REGLAN) 10 MG tablet       Nebulizers (MICRO AIR NEBULIZER) MISC TO USE AS NEEDED WITH NEBULIZER SOLUTION  11    INCRUSE ELLIPTA 62.5 MCG/INH AEPB       acetaminophen (TYLENOL) 500 MG tablet Take 1,000 mg by mouth every 6 hours as needed for Pain      Nutritional Supplements (ENSURE HIGH PROTEIN) LIQD Take 1 Can by mouth Daily with lunch 32 Can 3    tiotropium (SPIRIVA HANDIHALER) 18 MCG inhalation capsule Inhale 1 capsule into the lungs daily 30 capsule 3    albuterol sulfate HFA (PROVENTIL HFA) 108 (90 BASE) MCG/ACT inhaler Inhale 2 puffs into the lungs every 6 hours as needed for Wheezing 1 Inhaler 3     No current facility-administered medications for this visit. Review of Systems   Constitutional: Negative for activity change, chills, diaphoresis and fever. HENT: Negative for sore throat, trouble swallowing and voice change. Eyes: Negative for photophobia and visual disturbance. Respiratory: Negative for cough, shortness of breath and wheezing. Cardiovascular: Negative for chest pain, palpitations and leg swelling. Gastrointestinal: Positive for abdominal pain and vomiting. Negative for anal bleeding, blood in stool, constipation, diarrhea and nausea. Endocrine: Negative for cold intolerance, heat intolerance, polydipsia and polyuria. Genitourinary: Negative for dysuria, frequency and hematuria. Musculoskeletal: Negative for joint swelling, myalgias and neck stiffness. Skin: Negative for color change and rash. Neurological: Negative for seizures, speech difficulty, light-headedness and numbness. Hematological: Negative for adenopathy. Does not bruise/bleed easily.          OBJECTIVE:    /82 (Site: Right Upper Arm, Position: Sitting, Cuff Size: Medium Adult)   Pulse 62   Temp 98 °F (36.7 °C) (Infrared)   Resp 16   Ht 5' 9\" (1.753 m)   Wt 147 lb 3.2 oz (66.8 kg)   SpO2 95%   BMI 21.74 kg/m²    Body mass index is 21.74 flavors    Orders Placed This Encounter   Procedures    FL UGI W KUB        ASSESSMENT & PLAN:    1. Esophageal dysphagia    2. Status post laparoscopic Nissen fundoplication         C/O unresolved dysphagia, feels like food is getting stuck in throat, thrush    Needs upper GI first, then EGD, and then dentist IF ok. Patient counseled on the risks, benefits, and alternatives of treatment plan at length while in the office today. Patient states an understanding and willingness to proceed with the plan. Follow Up:  Return in about 1 week (around 5/27/2020), or EGD. Shania Cee MD, FACS, FICS.     5/20/20

## 2020-05-26 ENCOUNTER — HOSPITAL ENCOUNTER (OUTPATIENT)
Dept: GENERAL RADIOLOGY | Age: 60
Discharge: HOME | End: 2020-05-26
Payer: MEDICAID

## 2020-05-26 PROCEDURE — 74240 X-RAY XM UPR GI TRC 1CNTRST: CPT

## 2020-05-27 ENCOUNTER — TELEPHONE (OUTPATIENT)
Dept: BARIATRICS/WEIGHT MGMT | Age: 60
End: 2020-05-27

## 2020-05-28 NOTE — TELEPHONE ENCOUNTER
SPOKE TO Romelia Romero (EGD) SCHEDULED @ [facility].  NOTIFIED OF DATES, TIMES AND LOCATION    PHONE ASSESSMENT  SURGERY - 6/5/20 @ 1030, 0827 arrival  P/O - 6/17/20 at 1415    NPO AFTER MIDNIGHT  Covid-19 culture on 6/1/20  HOLD BLOOD THINNERS - Not on thinners

## 2020-06-01 ENCOUNTER — HOSPITAL ENCOUNTER (OUTPATIENT)
Age: 60
Setting detail: SPECIMEN
Discharge: HOME | End: 2020-06-01
Payer: MEDICAID

## 2020-06-01 PROCEDURE — U0002 COVID-19 LAB TEST NON-CDC: HCPCS

## 2020-06-02 LAB
SARS-COV-2: NOT DETECTED
SOURCE: NORMAL

## 2020-06-04 ENCOUNTER — ANESTHESIA EVENT (OUTPATIENT)
Dept: ENDOSCOPY | Age: 60
End: 2020-06-04
Payer: MEDICAID

## 2020-06-04 RX ORDER — FLUCONAZOLE 100 MG/1
100 TABLET ORAL DAILY
COMMUNITY
End: 2021-05-19 | Stop reason: ALTCHOICE

## 2020-06-04 RX ORDER — METOCLOPRAMIDE 10 MG/1
10 TABLET ORAL 4 TIMES DAILY
COMMUNITY
End: 2021-05-27

## 2020-06-04 ASSESSMENT — LIFESTYLE VARIABLES: SMOKING_STATUS: 1

## 2020-06-04 NOTE — ANESTHESIA PRE PROCEDURE
Department of Anesthesiology  Preprocedure Note       Name:  Phil Cisneros   Age:  63 y.o.  :  1960                                          MRN:  [id number]         Date:  2020      Surgeon: Hayde Serrano):  Gab Veronica MD    Procedure: Procedure(s):  EGD ESOPHAGOGASTRODUODENOSCOPY WITH BX    Medications prior to admission:   Prior to Admission medications    Medication Sig Start Date End Date Taking?  Authorizing Provider   fluconazole (DIFLUCAN) 100 MG tablet Take 100 mg by mouth daily 2 tablets for 14 days   Yes Historical Provider, MD   metoclopramide (REGLAN) 10 MG tablet Take 10 mg by mouth 4 times daily   Yes Historical Provider, MD   Nutritional Supplement LIQD Take 1 Can by mouth 3 times daily 20   Gab Veronica MD   Nutritional Supplements (ENSURE HIGH PROTEIN) LIQD Take 1 Can by mouth 3 times daily Different flavors if possible 20   Gab Veronica MD   baclofen (LIORESAL) 10 MG tablet TAKE ONE TABLET BY MOUTH THREE TIMES A DAY  Patient taking differently: as needed  20   Gab Veronica MD   ondansetron (ZOFRAN ODT) 4 MG disintegrating tablet Place 1 tablet under the tongue every 8 hours as needed for Nausea or Vomiting 19   Gab Veronica MD   ipratropium-albuterol (DUONEB) 0.5-2.5 (3) MG/3ML SOLN nebulizer solution as needed  10/19/19   Historical Provider, MD   Nebulizers (MICRO AIR NEBULIZER) MISC TO USE AS NEEDED WITH NEBULIZER SOLUTION 19   Historical Provider, MD   acetaminophen (TYLENOL) 500 MG tablet Take 1,000 mg by mouth every 6 hours as needed for Pain    Historical Provider, MD   tiotropium (Hidalgo Comber) 18 MCG inhalation capsule Inhale 1 capsule into the lungs daily 1/3/17   Cecy Shook MD   albuterol sulfate HFA (PROVENTIL HFA) 108 (90 BASE) MCG/ACT inhaler Inhale 2 puffs into the lungs every 6 hours as needed for Wheezing 1/3/17   Cecy Shook MD       Current medications:    No current facility-administered medications for this laparoscopic Nissen fundoplication E71.512    Esophageal dysphagia R13.10    Esophageal thrush (HCC) B37.81    Candida esophagitis ([facility]) B37.81    Gastroparesis K31.84    Candida infection, esophageal (HCC) B37.81    Pneumonia J18.9    Left upper quadrant pain R10.12    LLQ pain R10.32    Fungal esophagitis 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.8    Left flank pain R10.9       Past Medical History:        Diagnosis Date    Arthritis     Hands    Bronchitis     last ~2016    Chronic cluster headache 6/7/2019 last    COPD (chronic obstructive pulmonary disease) ([facility] [address]     \"dx 2 yrs ago\" \\"does not see a lung dr Krzysztof Crow with PCP    Degenerative disc disease     \"in my back have degenarative disc\"    Depression     Edentulous     Fracture     \"fell over a 5 gallon bucket and landed on cement block and broke my right hand\" (7/6/2015)    GERD with esophagitis     H/O dizziness     States will feel like he is going to pass out, possible low BS    Hiatal hernia     Hx of gastric ulcer     HX OTHER MEDICAL     pt states has trouble with reading and writing\"can read very very little\"    Hyperlipidemia     Hypertension     \"on medication for last two yrs\" follow with Dr Khalida Simpson Impingement syndrome of right shoulder     Left shoulder pain     limited range-of-motion    Lower back pain     Motion sickness     Neck pain     more on left side into shoulder--pain limits ROM    Vertigo        Past Surgical History:        Procedure Laterality Date    ARM SURGERY Left 30+ yrs    \"put plastic ligaments in \"  after injury through glass window    CARPAL TUNNEL RELEASE Right 10/4/2019    RIGHT CARPAL TUNNEL RELEASE performed by Ti Rush DO at [facility] COLONOSCOPY  2010    COLONOSCOPY  2018    HX: polyps - Dr. Jason Khoury N/A 12/30/2019    COLONOSCOPY DIAGNOSTIC performed by Ronald Aponte MD at [address] [place] [address]      all teeth extracted    ENDOSCOPY, COLON, DIAGNOSTIC  04/08/2019    EGD - thrush noted through out   [address] Left 1990's    \"metal removed from eye\"    GASTRIC FUNDOPLICATION N/A 26/42/8631    NISSEN FUNDOPLICATION LAPAROSCOPIC ROBOTIC HIATAL HERNIA performed by Quintni Alexander MD at [facility] 18  12/2018    UPPER GASTROINTESTINAL ENDOSCOPY N/A 1/28/2019    EGD BIOPSY / BRUSHING OF ESOPHAGUS performed by Quintin Alexander MD at [address] [place] [address] [facility] N/A 4/8/2019    EGD BIOPSY AND BRUSHING performed by Quintin Alexander MD at [address] [place] [address] [facility] N/A 7/8/2019    EGD BIOPSY and brushing performed by Quintin Alexander MD at [address] [place] [address] [address] [address] N/A 12/30/2019    EGD BIOPSY/BRUSHING OF ESOPHAGUS performed by Quintin Alexander MD at [address] History:    Social History     Tobacco Use    Smoking status: Current Every Day Smoker     Packs/day: 1.00     Years: 40.00     Pack years: 40.00     Types: Cigarettes    Smokeless tobacco: Never Used   Substance Use Topics    Alcohol use: Yes     Comment: Occasional 1/month                                Ready to quit: Not Answered  Counseling given: Not Answered      Vital Signs (Current):   Vitals:    06/04/20 1037   Weight: 137 lb (62.1 kg)   Height: 5' 9\" (1.753 m)                                              BP Readings from Last 3 Encounters:   05/20/20 118/82   03/06/20 118/65   03/04/20 110/60       NPO Status:                                                                                 BMI:   Wt Readings from Last 3 Encounters:   05/20/20 147 lb 3.2 oz (66.8 kg)   03/06/20 147 lb 4.8 oz (66.8 kg)   03/04/20 152 lb 14.4 oz (69.4 kg)     Body mass index is 20.23 kg/m².     CBC:   Lab Results   Component Value Date    WBC 7.4 10/12/2019    RBC 3.80 10/12/2019    HGB 11.3 10/12/2019    HCT 34.7 10/12/2019    MCV 91.3 10/12/2019    RDW 13.2 10/12/2019     10/12/2019 normal.   Ejection fraction is visually estimated at 50%. Essentially normal chamber sizes. Sclerotic, but non-stenotic aortic valve. No evidence of any pericardial effusion. Dilated LVOT, measuring at 2.5 cm. Dilated aortic root, measuring at 4.0 cm. Inferior vena cava is dilated, measuring at 2.6 cm cm, but does collapse   with respiration. Mild MR and TR noted   RVSP is normal     Neuro/Psych:   (+) headaches: cluster headaches, psychiatric history:depression/anxiety             GI/Hepatic/Renal:   (+) hiatal hernia, PUD,          ROS comment: DYSPHAGIA. Endo/Other:    (+) : arthritis:., .                 Abdominal:           Vascular: negative vascular ROS. Anesthesia Plan      MAC     ASA 3       Induction: intravenous. Anesthetic plan and risks discussed with patient. Estefania Eubanks, APRN - CRNA   6/4/2020       Pre Anesthesia Assessment complete.  Chart reviewed on 6/4/2020

## 2020-06-05 ENCOUNTER — HOSPITAL ENCOUNTER (OUTPATIENT)
Age: 60
Setting detail: OUTPATIENT SURGERY
Discharge: HOME | End: 2020-06-05
Attending: SURGERY | Admitting: SURGERY
Payer: MEDICAID

## 2020-06-05 ENCOUNTER — ANESTHESIA (OUTPATIENT)
Dept: ENDOSCOPY | Age: 60
End: 2020-06-05
Payer: MEDICAID

## 2020-06-05 VITALS
RESPIRATION RATE: 16 BRPM | HEIGHT: 69 IN | OXYGEN SATURATION: 95 % | HEART RATE: 62 BPM | BODY MASS INDEX: 20.29 KG/M2 | WEIGHT: 137 LBS | SYSTOLIC BLOOD PRESSURE: 119 MMHG | TEMPERATURE: 96.8 F | DIASTOLIC BLOOD PRESSURE: 82 MMHG

## 2020-06-05 VITALS — OXYGEN SATURATION: 99 % | SYSTOLIC BLOOD PRESSURE: 100 MMHG | DIASTOLIC BLOOD PRESSURE: 72 MMHG

## 2020-06-05 PROCEDURE — 7100000011 HC PHASE II RECOVERY - ADDTL 15 MIN: Performed by: SURGERY

## 2020-06-05 PROCEDURE — 2500000003 HC RX 250 WO HCPCS: Performed by: NURSE ANESTHETIST, CERTIFIED REGISTERED

## 2020-06-05 PROCEDURE — 88342 IMHCHEM/IMCYTCHM 1ST ANTB: CPT

## 2020-06-05 PROCEDURE — 6360000002 HC RX W HCPCS: Performed by: NURSE ANESTHETIST, CERTIFIED REGISTERED

## 2020-06-05 PROCEDURE — 88112 CYTOPATH CELL ENHANCE TECH: CPT

## 2020-06-05 PROCEDURE — 3609012400 HC EGD TRANSORAL BIOPSY SINGLE/MULTIPLE: Performed by: SURGERY

## 2020-06-05 PROCEDURE — 2580000003 HC RX 258: Performed by: SURGERY

## 2020-06-05 PROCEDURE — 2709999900 HC NON-CHARGEABLE SUPPLY: Performed by: SURGERY

## 2020-06-05 PROCEDURE — 3700000001 HC ADD 15 MINUTES (ANESTHESIA): Performed by: SURGERY

## 2020-06-05 PROCEDURE — 88305 TISSUE EXAM BY PATHOLOGIST: CPT

## 2020-06-05 PROCEDURE — 7100000010 HC PHASE II RECOVERY - FIRST 15 MIN: Performed by: SURGERY

## 2020-06-05 PROCEDURE — 43239 EGD BIOPSY SINGLE/MULTIPLE: CPT | Performed by: SURGERY

## 2020-06-05 PROCEDURE — 87077 CULTURE AEROBIC IDENTIFY: CPT

## 2020-06-05 PROCEDURE — 3700000000 HC ANESTHESIA ATTENDED CARE: Performed by: SURGERY

## 2020-06-05 RX ORDER — PROPOFOL 10 MG/ML
INJECTION, EMULSION INTRAVENOUS PRN
Status: DISCONTINUED | OUTPATIENT
Start: 2020-06-05 | End: 2020-06-05 | Stop reason: SDUPTHER

## 2020-06-05 RX ORDER — PANTOPRAZOLE SODIUM 40 MG/1
40 TABLET, DELAYED RELEASE ORAL 2 TIMES DAILY
Qty: 60 TABLET | Refills: 3 | Status: SHIPPED | OUTPATIENT
Start: 2020-06-05 | End: 2021-06-15

## 2020-06-05 RX ORDER — LIDOCAINE HYDROCHLORIDE 20 MG/ML
INJECTION, SOLUTION EPIDURAL; INFILTRATION; INTRACAUDAL; PERINEURAL PRN
Status: DISCONTINUED | OUTPATIENT
Start: 2020-06-05 | End: 2020-06-05 | Stop reason: SDUPTHER

## 2020-06-05 RX ORDER — SODIUM CHLORIDE, SODIUM LACTATE, POTASSIUM CHLORIDE, CALCIUM CHLORIDE 600; 310; 30; 20 MG/100ML; MG/100ML; MG/100ML; MG/100ML
INJECTION, SOLUTION INTRAVENOUS CONTINUOUS
Status: DISCONTINUED | OUTPATIENT
Start: 2020-06-05 | End: 2020-06-05 | Stop reason: HOSPADM

## 2020-06-05 RX ORDER — GLYCOPYRROLATE 1 MG/5 ML
SYRINGE (ML) INTRAVENOUS PRN
Status: DISCONTINUED | OUTPATIENT
Start: 2020-06-05 | End: 2020-06-05 | Stop reason: SDUPTHER

## 2020-06-05 RX ORDER — CHOLESTYRAMINE 4 G/9G
1 POWDER, FOR SUSPENSION ORAL 3 TIMES DAILY
Qty: 90 PACKET | Refills: 3 | Status: SHIPPED | OUTPATIENT
Start: 2020-06-05 | End: 2021-06-15

## 2020-06-05 RX ADMIN — PROPOFOL 170 MG: 10 INJECTION, EMULSION INTRAVENOUS at 10:17

## 2020-06-05 RX ADMIN — LIDOCAINE HYDROCHLORIDE 200 MG: 20 INJECTION, SOLUTION EPIDURAL; INFILTRATION; INTRACAUDAL; PERINEURAL at 10:17

## 2020-06-05 RX ADMIN — Medication 0.2 MG: at 10:16

## 2020-06-05 RX ADMIN — SODIUM CHLORIDE, POTASSIUM CHLORIDE, SODIUM LACTATE AND CALCIUM CHLORIDE: 600; 310; 30; 20 INJECTION, SOLUTION INTRAVENOUS at 08:35

## 2020-06-05 ASSESSMENT — PULMONARY FUNCTION TESTS
PIF_VALUE: 0
PIF_VALUE: 1
PIF_VALUE: 0
PIF_VALUE: 0
PIF_VALUE: 1
PIF_VALUE: 1
PIF_VALUE: 0
PIF_VALUE: 1
PIF_VALUE: 0
PIF_VALUE: 1
PIF_VALUE: 0
PIF_VALUE: 0

## 2020-06-05 ASSESSMENT — PAIN - FUNCTIONAL ASSESSMENT
PAIN_FUNCTIONAL_ASSESSMENT: ACTIVITIES ARE NOT PREVENTED
PAIN_FUNCTIONAL_ASSESSMENT: 0-10

## 2020-06-05 ASSESSMENT — PAIN DESCRIPTION - DESCRIPTORS: DESCRIPTORS: BURNING

## 2020-06-05 ASSESSMENT — PAIN SCALES - GENERAL: PAINLEVEL_OUTOF10: 0

## 2020-06-05 NOTE — PROGRESS NOTES
1045:  Report received from [address]. Pt received drowsy, but easily arousible. Denies c/o, tolerating PO with no c/o. Call light with in reach.

## 2020-06-05 NOTE — PROGRESS NOTES
REPORT GIVEN TO SHAWN DE LEÓN IN SAME DAY. PT AWAKE AND RESPONSIVE. STILL A LITTLE DROWSY. VS STABLE.

## 2020-06-05 NOTE — H&P
 Smokeless tobacco: Never Used   Substance and Sexual Activity    Alcohol use:  Yes       Comment: Occasional 1/month    Drug use: Never    Sexual activity: Not Currently       Partners: Female   Lifestyle    Physical activity       Days per week: Not on file       Minutes per session: Not on file    Stress: Not on file   Relationships    Social connections       Talks on phone: Not on file       Gets together: Not on file       Attends Yazdanism service: Not on file       Active member of club or organization: Not on file       Attends meetings of clubs or organizations: Not on file       Relationship status: Not on file    Intimate partner violence       Fear of current or ex partner: Not on file       Emotionally abused: Not on file       Physically abused: Not on file       Forced sexual activity: Not on file   Other Topics Concern    Not on file   Social History Narrative    Not on file                   Allergies   Allergen Reactions    Bee Venom Anaphylaxis    Nsaids Other (See Comments)       Not to take any longer due to stomach ulcer          The patient has a family history of     Current Facility-Administered Medications          Current Outpatient Medications   Medication Sig Dispense Refill    metoclopramide (REGLAN) 10 MG tablet Take 1 tablet by mouth 4 times daily 120 tablet 3    Nutritional Supplement LIQD Take 1 Can by mouth 3 times daily 90 Can 5    omeprazole (PRILOSEC) 20 MG delayed release capsule TAKE ONE CAPSULE BY MOUTH TWICE A DAY 60 capsule 2    baclofen (LIORESAL) 10 MG tablet TAKE ONE TABLET BY MOUTH THREE TIMES A DAY 45 tablet 0    ciprofloxacin (CIPRO) 500 MG tablet Take 500 mg by mouth 2 times daily        ondansetron (ZOFRAN ODT) 4 MG disintegrating tablet Place 1 tablet under the tongue every 8 hours as needed for Nausea or Vomiting 30 tablet 3    bisacodyl (BISACODYL) 5 MG EC tablet Take 4 tablets by mouth 2 times daily 8 tablet 3    ipratropium-albuterol distress. Appearance: He is well-developed. He is not diaphoretic. HENT:      Head: Normocephalic and atraumatic. Eyes:      General: No scleral icterus. Conjunctiva/sclera: Conjunctivae normal.      Pupils: Pupils are equal, round, and reactive to light. Neck:      Musculoskeletal: Normal range of motion. Thyroid: No thyromegaly. Vascular: No JVD. Trachea: No tracheal deviation. Cardiovascular:      Rate and Rhythm: Normal rate and regular rhythm. Heart sounds: Normal heart sounds. No murmur. No friction rub. No gallop. Pulmonary:      Effort: Pulmonary effort is normal. No respiratory distress. Breath sounds: No stridor. No wheezing or rales. Chest:      Chest wall: No tenderness. Abdominal:      General: Bowel sounds are normal. There is no distension. Palpations: Abdomen is soft. There is no mass. Tenderness: There is no abdominal tenderness. There is no guarding or rebound. Musculoskeletal: Normal range of motion. General: No tenderness. Lymphadenopathy:      Cervical: No cervical adenopathy. Skin:     General: Skin is warm and dry. Coloration: Skin is not pale. Findings: No erythema or rash. Neurological:      Mental Status: He is alert and oriented to person, place, and time. Cranial Nerves: No cranial nerve deficit. Coordination: Coordination normal.   Psychiatric:         Behavior: Behavior normal.         Thought Content: Thought content normal.         Judgment: Judgment normal.               Encounter Medications         Orders Placed This Encounter   Medications    metoclopramide (REGLAN) 10 MG tablet       Sig: Take 1 tablet by mouth 4 times daily       Dispense:  120 tablet       Refill:  3    Nutritional Supplement LIQD       Sig: Take 1 Can by mouth 3 times daily       Dispense:  90 Can       Refill:  5                Orders Placed This Encounter   Procedures    BERENICE MOJICA         Impression   1. Somewhat limited study as he was unable to swallow an adequate volume of   the thick barium or retain the gas within the stomach.  No obvious   abnormalities involving the esophagus.          ASSESSMENT & PLAN:     1. Esophageal dysphagia    2. Status post laparoscopic Nissen fundoplication          C/O unresolved dysphagia, feels like food is getting stuck in throat, thrush     Needs upper GI first, then EGD, and then dentist IF ok.     Patient counseled on the risks, benefits, and alternatives of treatment plan at length while in the office today. Patient states an understanding and willingness to proceed with the plan.        Alyssa Rg MD, FACS, FICS.

## 2020-06-06 LAB — CLOTEST: NEGATIVE

## 2020-06-09 ENCOUNTER — TELEPHONE (OUTPATIENT)
Dept: BARIATRICS/WEIGHT MGMT | Age: 60
End: 2020-06-09

## 2020-06-09 NOTE — TELEPHONE ENCOUNTER
Pt needs another ensure script sent to [facility] in order for him to be able to get different flavors. It does not need to be high protein, he will drink more a day with other flavors. They can only get chocolate and vanilla with the high protein. Pt wants to be able to get different flavors.  Please advise

## 2020-06-17 ENCOUNTER — VIRTUAL VISIT (OUTPATIENT)
Dept: BARIATRICS/WEIGHT MGMT | Age: 60
End: 2020-06-17
Payer: MEDICAID

## 2020-06-17 PROCEDURE — 99213 OFFICE O/P EST LOW 20 MIN: CPT | Performed by: SURGERY

## 2020-06-17 PROCEDURE — G8420 CALC BMI NORM PARAMETERS: HCPCS | Performed by: SURGERY

## 2020-06-17 PROCEDURE — 4004F PT TOBACCO SCREEN RCVD TLK: CPT | Performed by: SURGERY

## 2020-06-17 PROCEDURE — G8427 DOCREV CUR MEDS BY ELIG CLIN: HCPCS | Performed by: SURGERY

## 2020-06-17 PROCEDURE — 3017F COLORECTAL CA SCREEN DOC REV: CPT | Performed by: SURGERY

## 2020-06-17 ASSESSMENT — ENCOUNTER SYMPTOMS
NAUSEA: 0
ANAL BLEEDING: 0
COUGH: 0
COLOR CHANGE: 0
VOICE CHANGE: 0
VOMITING: 0
SORE THROAT: 0
CONSTIPATION: 0
DIARRHEA: 0
PHOTOPHOBIA: 0
WHEEZING: 0
SHORTNESS OF BREATH: 0
BLOOD IN STOOL: 0
TROUBLE SWALLOWING: 0
ABDOMINAL PAIN: 0

## 2020-06-17 NOTE — PROGRESS NOTES
2020    TELEHEALTH EVALUATION -- Audio ([facility])    HPI:    Raffaele Shetty (:  1960) has requested an audio evaluation for the following concern(s):        Review of Systems   Constitutional: Negative for activity change, chills, diaphoresis and fever. HENT: Negative for sore throat, trouble swallowing and voice change. Eyes: Negative for photophobia and visual disturbance. Respiratory: Negative for cough, shortness of breath and wheezing. Cardiovascular: Negative for chest pain, palpitations and leg swelling. Gastrointestinal: Negative for abdominal pain, anal bleeding, blood in stool, constipation, diarrhea, nausea and vomiting. Endocrine: Negative for cold intolerance, heat intolerance, polydipsia and polyuria. Genitourinary: Negative for dysuria, frequency and hematuria. Musculoskeletal: Negative for joint swelling, myalgias and neck stiffness. Skin: Negative for color change and rash. Neurological: Negative for seizures, speech difficulty, light-headedness and numbness. Hematological: Negative for adenopathy. Does not bruise/bleed easily. Prior to Visit Medications    Medication Sig Taking?  Authorizing Provider   pantoprazole (PROTONIX) 40 MG tablet Take 1 tablet by mouth 2 times daily  Surendra Claire MD   cholestyramine (QUESTRAN) 4 g packet Take 1 packet by mouth 3 times daily  Surendra Claire MD   fluconazole (DIFLUCAN) 100 MG tablet Take 100 mg by mouth daily 2 tablets for 14 days  Historical Provider, MD   metoclopramide (REGLAN) 10 MG tablet Take 10 mg by mouth 4 times daily  Historical Provider, MD   Nutritional Supplement LIQD Take 1 Can by mouth 3 times daily  Surendra Claire MD   Nutritional Supplements (ENSURE HIGH PROTEIN) LIQD Take 1 Can by mouth 3 times daily Different flavors if possible  Surendra Claire MD   ondansetron (ZOFRAN ODT) 4 MG disintegrating tablet Place 1 tablet under the tongue every 8 hours as needed for Nausea or Vomiting  Fouzia Spencer MD   ipratropium-albuterol (DUONEB) 0.5-2.5 (3) MG/3ML SOLN nebulizer solution as needed   Historical Provider, MD   Nebulizers (MICRO AIR NEBULIZER) MISC TO USE AS NEEDED WITH NEBULIZER SOLUTION  Historical Provider, MD   acetaminophen (TYLENOL) 500 MG tablet Take 1,000 mg by mouth every 6 hours as needed for Pain  Historical Provider, MD   tiotropium (Melissa Ramos) 18 MCG inhalation capsule Inhale 1 capsule into the lungs daily  Phoebe Iraheta MD   albuterol sulfate HFA (PROVENTIL HFA) 108 (90 BASE) MCG/ACT inhaler Inhale 2 puffs into the lungs every 6 hours as needed for Wheezing  Phoebe Iraheta MD       Social History     Tobacco Use    Smoking status: Current Every Day Smoker     Packs/day: 1.00     Years: 40.00     Pack years: 40.00     Types: Cigarettes    Smokeless tobacco: Never Used   Substance Use Topics    Alcohol use: Yes     Comment: Occasional 1/month    Drug use: Never        Allergies   Allergen Reactions    Bee Venom Anaphylaxis    Nsaids Other (See Comments)     Not to take any longer due to stomach ulcer   ,   Past Medical History:   Diagnosis Date    Arthritis     Hands    Bronchitis     last ~2016    Chronic cluster headache 6/7/2019 last    COPD [facility]chronic obstructive pulmonary disease) ([facility]     \"dx 2 yrs ago\" \\"does not see a lung dr Ragland  with PCP    Degenerative disc disease     \"in my back have degenarative disc\"    Depression     Edentulous     Fracture     \"fell over a 5 gallon bucket and landed on cement block and broke my right hand\" (7/6/2015)    GERD with esophagitis     H/O dizziness     States will feel like he is going to pass out, possible low BS    Hiatal hernia     Hx of gastric ulcer     HX OTHER MEDICAL     pt states has trouble with reading and writing\"can read very very little\"    Hyperlipidemia     Hypertension     \"on medication for last two yrs\" follow with Dr Coral Barnett    Impingement syndrome of right shoulder     Left shoulder pain     limited range-of-motion    Lower back pain     Motion sickness     Neck pain     more on left side into shoulder--pain limits ROM    Vertigo    ,   Past Surgical History:   Procedure Laterality Date    ARM SURGERY Left 30+ yrs    \"put plastic ligaments in \"  after injury through glass window    CARPAL TUNNEL RELEASE Right 10/4/2019    RIGHT CARPAL TUNNEL RELEASE performed by Felicitas Khan DO at [address] [facility]  2010    COLONOSCOPY  2018    HX: polyps - Dr. Anai Bullock COLONOSCOPY N/A 12/30/2019    COLONOSCOPY DIAGNOSTIC performed by Bud Fitzgerald MD at [address] [place] [address]      all teeth extracted    ENDOSCOPY, COLON, DIAGNOSTIC  04/08/2019    EGD - thrush noted through out    EYE SURGERY Left 1990's    \"metal removed from eye\"    GASTRIC FUNDOPLICATION N/A 58/11/1287    NISSEN FUNDOPLICATION LAPAROSCOPIC ROBOTIC HIATAL HERNIA performed by Bud Fitzgerald MD at [place] [address]  12/2018    UPPER GASTROINTESTINAL ENDOSCOPY N/A 1/28/2019    EGD BIOPSY / BRUSHING OF ESOPHAGUS performed by Bud Fitzgerald MD at [facility] N/A 4/8/2019    EGD BIOPSY AND BRUSHING performed by Bud Fitzgerald MD at [facility] N/A 7/8/2019    EGD BIOPSY and brushing performed by Bud Fitzgerald MD at [facility] N/A 12/30/2019    EGD BIOPSY/BRUSHING OF ESOPHAGUS performed by Bud Fitzgerald MD at [place] [address] N/A 6/5/2020    EGD BIOPSY performed by Bud Fitzgerald MD at [address] [place] [address] ENDOSCOPY   ,   Social History     Tobacco Use    Smoking status: Current Every Day Smoker     Packs/day: 1.00     Years: 40.00     Pack years: 40.00     Types: Cigarettes    Smokeless tobacco: Never Used   Substance Use Topics    Alcohol use: Yes     Comment: Occasional 1/month    Drug use: Never   ,   Family History   Problem Relation Age of Onset    Dementia Mother     Stroke Mother     Stroke Father     High Blood Pressure Father     Heart Disease Father     Breast Cancer Sister     Diabetes Brother     Cancer Brother         mouth and throat    Heart Attack Maternal Uncle     Heart Disease Maternal Uncle     No Known Problems Son     No Known Problems Son     No Known Problems Daughter    ,   Immunization History   Administered Date(s) Administered    Influenza, Quadv, IM, PF (6 mo and older Fluzone, Flulaval, Fluarix, and 3 yrs and older Afluria) 01/01/2017, 10/12/2019    Tdap (Boostrix, Adacel) 06/26/2013   ,   Health Maintenance   Topic Date Due    Pneumococcal 0-64 years Vaccine (1 of 1 - PPSV23) 08/31/1967    HIV screen  08/31/1976    Shingles Vaccine (1 of 2) 08/31/2011    Low dose CT lung screening  12/30/2017    Lipid screen  08/02/2022    DTaP/Tdap/Td vaccine (2 - Td) 06/26/2023    Colon cancer screen colonoscopy  12/30/2029    Flu vaccine  Completed    Hepatitis C screen  Completed    Hepatitis A vaccine  Aged Out    Hepatitis B vaccine  Aged Out    Hib vaccine  Aged Out    Meningococcal (ACWY) vaccine  Aged Out         ASSESSMENT/PLAN:    Feeling MUCH better, and the medicine is working, needs the Diflucan tab, will preauthorize. 1. Candida esophagitis (HCC)  Protein shakes needed     Return in about 6 weeks (around 7/29/2020) for Follow up Symptoms. Laura Spatz is a 63 y.o. male being evaluated by a Virtual Visit (call visit) encounter to address concerns as mentioned above. A caregiver was present when appropriate. Due to this being a TeleHealth encounter (During [facility] public health emergency), evaluation of the following organ systems was limited: Vitals/Constitutional/EENT/Resp/CV/GI//MS/Neuro/Skin/Heme-Lymph-Imm.   Pursuant to the emergency declaration under the [address], [address] waiver authority and the [employer] Act, this Virtual Visit was conducted with patient's (and/or legal guardian's) consent, to reduce the patient's risk of exposure to COVID-19 and provide necessary medical care. The patient (and/or legal guardian) has also been advised to contact this office for worsening conditions or problems, and seek emergency medical treatment and/or call 911 if deemed necessary. Patient identification was verified at the start of the visit: YES    Total time spent on this encounter: [address] were provided through a video synchronous discussion virtually to substitute for in-person clinic visit. Patient and provider were located at their individual homes. --Fouzia Spencer MD on 6/17/2020 at 1:54 PM    An electronic signature was used to authenticate this note.

## 2021-03-24 ENCOUNTER — HOSPITAL ENCOUNTER (OUTPATIENT)
Dept: GENERAL RADIOLOGY | Age: 61
Discharge: HOME | End: 2021-03-24
Payer: MEDICAID

## 2021-03-24 ENCOUNTER — HOSPITAL ENCOUNTER (OUTPATIENT)
Age: 61
Discharge: HOME | End: 2021-03-24
Payer: MEDICAID

## 2021-03-24 DIAGNOSIS — R07.2 PRECORDIAL PAIN: ICD-10-CM

## 2021-03-24 PROCEDURE — 71046 X-RAY EXAM CHEST 2 VIEWS: CPT

## 2021-03-31 RX ORDER — ALBUTEROL SULFATE 90 UG/1
2 AEROSOL, METERED RESPIRATORY (INHALATION) EVERY 6 HOURS PRN
COMMUNITY

## 2021-03-31 RX ORDER — FLUTICASONE PROPIONATE 50 MCG
SPRAY, SUSPENSION (ML) NASAL PRN
COMMUNITY
Start: 2021-03-09

## 2021-03-31 RX ORDER — CETIRIZINE HYDROCHLORIDE 10 MG/1
1 TABLET ORAL DAILY
COMMUNITY
Start: 2021-03-09

## 2021-03-31 RX ORDER — IBUPROFEN 600 MG/1
600 TABLET ORAL EVERY 6 HOURS PRN
Status: ON HOLD | COMMUNITY
End: 2021-06-01 | Stop reason: HOSPADM

## 2021-04-05 ENCOUNTER — PROCEDURE VISIT (OUTPATIENT)
Dept: CARDIOLOGY CLINIC | Age: 61
End: 2021-04-05
Payer: MEDICAID

## 2021-04-05 ENCOUNTER — INITIAL CONSULT (OUTPATIENT)
Dept: CARDIOLOGY CLINIC | Age: 61
End: 2021-04-05
Payer: MEDICAID

## 2021-04-05 VITALS
DIASTOLIC BLOOD PRESSURE: 86 MMHG | WEIGHT: 141 LBS | BODY MASS INDEX: 20.88 KG/M2 | HEIGHT: 69 IN | SYSTOLIC BLOOD PRESSURE: 132 MMHG | HEART RATE: 69 BPM

## 2021-04-05 DIAGNOSIS — K21.00 GASTROESOPHAGEAL REFLUX DISEASE WITH ESOPHAGITIS, UNSPECIFIED WHETHER HEMORRHAGE: ICD-10-CM

## 2021-04-05 DIAGNOSIS — Z98.890 STATUS POST LAPAROSCOPIC NISSEN FUNDOPLICATION: ICD-10-CM

## 2021-04-05 DIAGNOSIS — I10 ESSENTIAL HYPERTENSION: ICD-10-CM

## 2021-04-05 DIAGNOSIS — R07.2 PRECORDIAL PAIN: ICD-10-CM

## 2021-04-05 DIAGNOSIS — E78.5 DYSLIPIDEMIA: ICD-10-CM

## 2021-04-05 DIAGNOSIS — K27.9 PUD (PEPTIC ULCER DISEASE): ICD-10-CM

## 2021-04-05 DIAGNOSIS — R07.9 CHEST PAIN IN ADULT: Primary | ICD-10-CM

## 2021-04-05 DIAGNOSIS — K44.9 HIATAL HERNIA: ICD-10-CM

## 2021-04-05 DIAGNOSIS — R07.9 CHEST PAIN IN ADULT: ICD-10-CM

## 2021-04-05 LAB
LV EF: 48 %
LVEF MODALITY: NORMAL

## 2021-04-05 PROCEDURE — 4004F PT TOBACCO SCREEN RCVD TLK: CPT | Performed by: INTERNAL MEDICINE

## 2021-04-05 PROCEDURE — 93018 CV STRESS TEST I&R ONLY: CPT | Performed by: INTERNAL MEDICINE

## 2021-04-05 PROCEDURE — G8427 DOCREV CUR MEDS BY ELIG CLIN: HCPCS | Performed by: INTERNAL MEDICINE

## 2021-04-05 PROCEDURE — 99204 OFFICE O/P NEW MOD 45 MIN: CPT | Performed by: INTERNAL MEDICINE

## 2021-04-05 PROCEDURE — 93016 CV STRESS TEST SUPVJ ONLY: CPT | Performed by: INTERNAL MEDICINE

## 2021-04-05 PROCEDURE — 93017 CV STRESS TEST TRACING ONLY: CPT | Performed by: INTERNAL MEDICINE

## 2021-04-05 PROCEDURE — A9500 TC99M SESTAMIBI: HCPCS | Performed by: INTERNAL MEDICINE

## 2021-04-05 PROCEDURE — G8420 CALC BMI NORM PARAMETERS: HCPCS | Performed by: INTERNAL MEDICINE

## 2021-04-05 PROCEDURE — 78452 HT MUSCLE IMAGE SPECT MULT: CPT | Performed by: INTERNAL MEDICINE

## 2021-04-05 PROCEDURE — 93000 ELECTROCARDIOGRAM COMPLETE: CPT | Performed by: INTERNAL MEDICINE

## 2021-04-05 PROCEDURE — 3017F COLORECTAL CA SCREEN DOC REV: CPT | Performed by: INTERNAL MEDICINE

## 2021-04-05 NOTE — PROGRESS NOTES
CARDIAC CONSULT NOTE       Karon  62 y.o.  male    Chief Complaint   Patient presents with    Chest Pain       Referring physician:  Ambrose Draper MD     Primary care physician:  Ambrose Draper MD    History of Present Illness:     Karon is a 62 y.o. male referred for evaluation and management of chest pain. CHEST PAIN:  1. When did it began: about 2 1/2 months ago. Getting worse in last 1 1/2 month. 2. How long does it last: every day all day. 3. How severe:4-8/10  4. Radiation:Left arm pit to elbow. Feels fingers to be numb. 5. Aggravating factors:no   6. Relieving factors:no  7. Associated features:SOB & some times palpitations. 8. Tenderness to palpation: yes    Patient's cardiac risk factors include Tobacco abuse, HTN & Dyslipidemia problems. Not a known Diabetic. Patient has COPD.     has a past medical history of Arthritis, Bronchitis, Chronic cluster headache, COPD (chronic obstructive pulmonary disease) ([address] Degenerative disc disease, Depression, Edentulous, Fracture, GERD with esophagitis, H/O dizziness, Hiatal hernia, Hx of gastric ulcer, HX OTHER MEDICAL, Hyperlipidemia, Hypertension, Impingement syndrome of right shoulder, Left shoulder pain, Lower back pain, Motion sickness, Neck pain, and Vertigo. has a past surgical history that includes Arm Surgery (Left, 30+ yrs); Upper gastrointestinal endoscopy (12/2018); Dental surgery; Gastric fundoplication (N/A, 27/98/8605); Upper gastrointestinal endoscopy (N/A, 1/28/2019); Endoscopy, colon, diagnostic (04/08/2019); Upper gastrointestinal endoscopy (N/A, 4/8/2019); Colonoscopy (2010); Colonoscopy (2018); eye surgery (Left, 1990's); hernia repair; Upper gastrointestinal endoscopy (N/A, 7/8/2019); Carpal tunnel release (Right, 10/4/2019); Colonoscopy (N/A, 12/30/2019); Upper gastrointestinal endoscopy (N/A, 12/30/2019); and Upper gastrointestinal endoscopy (N/A, 6/5/2020).      reports that he has been smoking cigarettes. He has a 10.00 pack-year smoking history. He has never used smokeless tobacco. He reports current alcohol use. He reports that he does not use drugs. family history includes Breast Cancer in his sister; Cancer in his brother; Dementia in his mother; Diabetes in his brother; Heart Attack in his maternal uncle; Heart Disease in his father and maternal uncle; High Blood Pressure in his father; No Known Problems in his daughter, son, and son; Stroke in his father and mother. Review of Systems:   1. Cardiovascular: No chest pain, dyspnea on exertion, palpitations or loss of consciousness  2. Respiratory: No cough or wheezing    3. Musculoskeletal:  No gait disturbance, weakness, muscle cramps, aches & pains or joint complaints  4. Neurological: No TIA or stroke symptoms  5. Psychiatric: No anxiety or depression  6. Hematologic/Lymphatic: No bleeding problems, blood clots or swollen lymph nodes    Physical Examination:    /86   Pulse 69   Ht 5' 9\" (1.753 m)   Wt 141 lb (64 kg)   BMI 20.82 kg/m²    Wt Readings from Last 3 Encounters:   04/05/21 141 lb (64 kg)   06/04/20 137 lb (62.1 kg)   05/20/20 147 lb 3.2 oz (66.8 kg)     Body mass index is 20.82 kg/m². General Appearance:  Non-obese/Well Nourished  1. Skin: It is warm & dry. No rashes noted. 2. Eyes: No conjunctival Pallor seen. No jaundice noted. 3. Neck: is supple there is no elevation of JVD. No thyromegaly  4. Respiratory:  · Resp Assessment: No abnormal findings. · Resp Auscultation: Vesicular breath sounds without rales or wheezing. 5. Cardiovascular:  · Auscultation: Normal S1 & S2, No prominent murmurs  · Carotid Arteries: No bruits present  · Abdominal Aorta: Non-palpable  · Pedal Pulses: 2+ and equal   6. Abdomen:  · No masses or tenderness  · Liver/Spleen: No Abnormalities Noted, no organomegaly. 7. Musculoskeletal: No joint deformities. No muscle wasting  8. Extremities:  ·  No Cyanosis or Clubbing.  No significant edema   9. Rectal / genital:  ·  Deferred  10. Neurological/Psychiatric:  · Oriented to time, place, and person  · Non-anxious    Lab Results   Component Value Date    CKTOTAL 100 12/30/2016    CKTOTAL 204 08/30/2012    CKTOTAL 203 08/30/2012    CKMB 0.8 08/30/2012    CKMB 1.4 09/21/2010    TROPONINT <0.010 10/11/2019    TROPONINT <0.010 10/11/2019     BNP:    Lab Results   Component Value Date    BNP 10 12/30/2013    BNP 8 05/18/2013    PROBNP 117.7 09/13/2017    PROBNP 21.52 08/02/2017     PT/INR:  No results found for: PTINR  Lab Results   Component Value Date    LABA1C 5.5 01/16/2020     Lab Results   Component Value Date    CHOL 200 01/16/2020    CHOL 172 08/02/2017    TRIG 82 01/16/2020    TRIG 80 08/02/2017    HDL 54 01/16/2020    HDL 48 08/02/2017    LDLCALC 130 01/16/2020    LDLCALC 126 (H) 11/19/2012    LDLDIRECT 119 (H) 08/02/2017    LDLDIRECT 128 (H) 12/30/2016     Lab Results   Component Value Date    ALT 10 01/16/2020    ALT 9 (L) 10/11/2019    AST 18 01/16/2020    AST 18 10/11/2019     BMP:    Lab Results   Component Value Date     01/16/2020     10/12/2019    K 4.5 01/16/2020    K 4.5 10/12/2019     01/16/2020     10/12/2019    CO2 24 01/16/2020    CO2 26 10/12/2019    BUN 11 01/16/2020    BUN 13 10/12/2019    CREATININE 0.9 03/04/2020    CREATININE 0.8 01/16/2020    CREATININE 0.8 10/12/2019     CMP:    Lab Results   Component Value Date     01/16/2020     10/12/2019    K 4.5 01/16/2020    K 4.5 10/12/2019     01/16/2020     10/12/2019    CO2 24 01/16/2020    CO2 26 10/12/2019    BUN 11 01/16/2020    BUN 13 10/12/2019    CREATININE 0.9 03/04/2020    CREATININE 0.8 01/16/2020    CREATININE 0.8 10/12/2019    PROT 7.0 10/11/2019    PROT 6.4 03/27/2019    PROT 7.0 11/19/2012    PROT 6.5 08/30/2012     TSH:    Lab Results   Component Value Date    TSH 0.919 01/16/2020    TSHHS 1.010 08/02/2017    TSHHS 1.650 12/30/2016     CATH 8/2017  1.   Left main is patent. 2.  Circ and OM1 have mild disease. 3.  LAD and diagonal branch have mild disease. 4.  RCA is normal.  5.  EDP was around 15 mmHg. Echo: 8/2017    Left ventricular systolic function is normal.   Ejection fraction is visually estimated at 50%. Essentially normal chamber sizes. Sclerotic, but non-stenotic aortic valve. No evidence of any pericardial effusion. Dilated LVOT, measuring at 2.5 cm. Dilated aortic root, measuring at 4.0 cm. Inferior vena cava is dilated, measuring at 2.6 cm cm, but does collapse   with respiration. Mild MR and TR noted   RVSP is normal    EKG: normal EKG, normal sinus rhythm     QUALITY MEASURES REVIEWED:  1.CAD:Patient is taking anti platelet agent:No  Patient does not have Hx of documented CAD  2. DYSLIPIDEMIA: Patient is on cholesterol lowering medication:No  3. Beta-Blocker therapy for CAD, if prior Myocardial Infarction:No   4. Counselled regarding smoking cessation. Yes   5. Anticoagulation therapy (for A.Fib) No   Does Not have A.Fib.  6.Discussed weight management strategies. Assessment, Recommendations & Tests:     1. CHEST PAIN; ? Cardiac. Will check Stress cardiolite & Echo further risk stratification. 2. HTN: Says keeps an eye on salt intake. 3. DYSLIPIDEMIA: diet controlled only. 4. TOBACCO ABUSE: Will refer to smoking cessation clinic. Office Visit for test results.      Mita Haas MD, 4/5/2021 9:28 AM

## 2021-04-05 NOTE — PATIENT INSTRUCTIONS
Please be informed that if you contact our office outside of normal business hours the physician on call cannot help with any scheduling or rescheduling issues, procedure instruction questions or any type of medication issue. We advise you for any urgent/emergency that you go to the nearest emergency room! PLEASE CALL OUR OFFICE DURING NORMAL BUSINESS HOURS    Monday - Friday   8 am to 5 pm    [facility] [address]: [phone]    [place]:  [phone]  **It is YOUR responsibilty to bring medication bottles and/or updated medication list to [address] [place] [address]. This will allow us to better serve you and all your healthcare needs**    Please hold on to these instructions the  will call you within 1-9 business days when we receive authorization from your insurance. Nuclear Stress Test    WHAT TO EXPECT:   ? You will need to confirm the test or it could be cancelled. ? This test will take approximately 2 hours: 1 hour in the AM &    1 hour in the PM. You will be given a time by the Technologist after the first part is completed to come back. ? You will be given a medication, through an IV in the hand, this will safely simulate exercise. This IV is also needed to inject the radioactive isotope unless you are able toe walk the treadmill. ? You will receive an injection in the AM & PM before the pictures. ? Using a special camera, you will have one set of pictures of your heart taken in the AM and a set of pictures in the PM.     PREPARATION FOR TEST:  ? Eat a light breakfast such as water or juice and toast.  ? If you are DIABETIC: Eat a normal breakfast with NO CAFFEINE and take your insulin as normal.   ? AVOID ALL FOODS & DRINKS containing CAFFEINE 12 HOURS PRIOR TO THE TEST: Including coffee, Tea, Adrianne and other soft drinks even those labeled  caffeine free or decaffeinated.  ?  HOLD THESE MEDICATIONS Persantine & Theophylline (Theodur)  24 hours prior & bring your inhaler with you.        1. CHEST PAIN; ? Cardiac. Will check Stress cardiolite & Echo further risk stratification. 2. HTN: Says keeps an eye on salt intake. 3. DYSLIPIDEMIA: diet controlled only. 4. TOBACCO ABUSE: Will refer to smoking cessation clinic.     Office Visit for test results.

## 2021-04-05 NOTE — LETTER
[address]  [address] [address]  Phone: [phone]  Fax: [phone]    Mynor Holman MD        April 6, 2021     Eliane Whalen [place]    Patient: Rochelle Alexis  MR Number: [id number]  YOB: 1960  Date of Visit: 4/5/2021    Dear Dr. Mitchell Tellez: Thank you for the request for consultation for Sidney Atkins to me for the evaluation of chest pain. Below are the relevant portions of my assessment and plan of care. If you have questions, please do not hesitate to call me. I look forward to following Chacorta Contreras along with you.     Sincerely,        Mynor Holman MD

## 2021-04-05 NOTE — LETTER
[address] [facility]  1960  [id number]    Have you had any Chest Pain that is not new? - Yes  If Yes DO EKG - How does it feel - sharp, heaviness   How long does the pain last - All day every day    How long have you been having the pain - 2 1/2 months   Did you take a n/a   And did it relieve the pain - n/a   Its been getting worst the last 1.5month    Doesn't know what brings it on   Nothing helps it go away    Pain scale 4/10-8/10   Will move to armpit and move to mid arm    Comes with SOB, sometimes has palpitations      DO EKG IF: Patient has a Heart Rate above 100 or below 40     CAD (Coronary Artery Disease) patient should have one on file every 6 months        Have you had any Shortness of Breath - Yes  If Yes - When on exertion   Has COPD    Have you had any dizziness - No  If Yes DO ORTHOSTATIC BP - when do you feel dizzy     How long does it last .          Sitting wait 5 minutes do supine (laying down) wait 5 minutes then do standing - log each in \"vitals\" area in Epic   Be sure to ask what symptoms they are having if they get dizzy while completing ortho stats such as: room spinning, nausea, etc.    Have you had any palpitations that are not new? - No  If Yes DO EKG - Do you feel your heart    How long does it last - . Is the patient on any of the following medications -   If Yes DO EKG - Needs done every 6 months    Do you have any edema - swelling in No    If Yes - CHECK TO SEE IF THE EDEMA IS PITTING  How long have they been having edema -    If Yes - Have they worn compression stockings    If they have worn compression stockings        Vein \"LEG PROBLEM Questionnaire\"  1. Do you have prominent leg veins? 2. Do you have any skin discoloration? 3. Do you have any healed or active sores? 4. Do you have swelling of the legs? 5. Do you have a family history of varicose veins? 6.  Does your profession involve pro-longed standing or heavy lifting? 7. Have you been fighting overweight problems? 8. Do you have restless legs? 9. Do you have any night time cramps? 10. Do you have any of the following in your legs:              When did you have your last labs drawn   Where did you have them done   What doctor ordered     If we do not have these labs you are retrieve these labs for these providers! Do you have a surgery or procedure scheduled in the near future - No  If Yes- DO EKG  If Yes - Who is the surgery with?    Phone number of physician   Fax number of physician   Type of surgery   GIVE THIS INFORMATION TO PHYLLIS PRADHAN     Ask patient if they want to sign up for [facility] if they are not already signed up     Check to see if we have an E-MAIL on file for the patient     Check medication list thoroughly!!! AND RECONCILE OUTSIDE MEDICATIONS  If dose has changed change the entire order not just the [place] At check out add to every patient's \"wrap up\" the following dot phrase AFTERHOURSEDUCATION and ensure we explain this to our patients

## 2021-04-06 ENCOUNTER — TELEPHONE (OUTPATIENT)
Dept: CARDIOLOGY CLINIC | Age: 61
End: 2021-04-06

## 2021-04-06 NOTE — TELEPHONE ENCOUNTER
Left message   NM results:  Normal study.    Normal perfusion study with normal distribution in all coronal, short, and    horizontal axis.    The observed defect is consistent with diaphragmatic attenuation.    Normal LV function.  LVEF is 48 %

## 2021-04-26 ENCOUNTER — PROCEDURE VISIT (OUTPATIENT)
Dept: CARDIOLOGY CLINIC | Age: 61
End: 2021-04-26
Payer: MEDICAID

## 2021-04-26 DIAGNOSIS — K21.00 GASTROESOPHAGEAL REFLUX DISEASE WITH ESOPHAGITIS, UNSPECIFIED WHETHER HEMORRHAGE: ICD-10-CM

## 2021-04-26 DIAGNOSIS — K44.9 HIATAL HERNIA: ICD-10-CM

## 2021-04-26 DIAGNOSIS — R07.2 PRECORDIAL PAIN: ICD-10-CM

## 2021-04-26 DIAGNOSIS — R07.9 CHEST PAIN IN ADULT: ICD-10-CM

## 2021-04-26 DIAGNOSIS — K27.9 PUD (PEPTIC ULCER DISEASE): ICD-10-CM

## 2021-04-26 DIAGNOSIS — Z98.890 STATUS POST LAPAROSCOPIC NISSEN FUNDOPLICATION: ICD-10-CM

## 2021-04-26 DIAGNOSIS — E78.5 DYSLIPIDEMIA: ICD-10-CM

## 2021-04-26 DIAGNOSIS — I10 ESSENTIAL HYPERTENSION: ICD-10-CM

## 2021-04-26 LAB
LV EF: 53 %
LVEF MODALITY: NORMAL

## 2021-04-26 PROCEDURE — 93306 TTE W/DOPPLER COMPLETE: CPT | Performed by: INTERNAL MEDICINE

## 2021-04-27 ENCOUNTER — TELEPHONE (OUTPATIENT)
Dept: CARDIOLOGY CLINIC | Age: 61
End: 2021-04-27

## 2021-04-27 NOTE — TELEPHONE ENCOUNTER
Left voicemail informing patient of no significant changes in echo results. Told him to call back if he had any questions, otherwise we would see him at his f/u OV.

## 2021-05-05 ENCOUNTER — OFFICE VISIT (OUTPATIENT)
Dept: CARDIOLOGY CLINIC | Age: 61
End: 2021-05-05
Payer: MEDICAID

## 2021-05-05 VITALS
BODY MASS INDEX: 21.33 KG/M2 | HEIGHT: 69 IN | WEIGHT: 144 LBS | DIASTOLIC BLOOD PRESSURE: 70 MMHG | HEART RATE: 72 BPM | SYSTOLIC BLOOD PRESSURE: 120 MMHG

## 2021-05-05 DIAGNOSIS — R07.2 PRECORDIAL PAIN: Primary | ICD-10-CM

## 2021-05-05 DIAGNOSIS — K21.00 GASTROESOPHAGEAL REFLUX DISEASE WITH ESOPHAGITIS, UNSPECIFIED WHETHER HEMORRHAGE: ICD-10-CM

## 2021-05-05 DIAGNOSIS — E78.5 DYSLIPIDEMIA: ICD-10-CM

## 2021-05-05 DIAGNOSIS — I10 ESSENTIAL HYPERTENSION: ICD-10-CM

## 2021-05-05 DIAGNOSIS — R42 POSITIONAL LIGHTHEADEDNESS: ICD-10-CM

## 2021-05-05 PROCEDURE — G8427 DOCREV CUR MEDS BY ELIG CLIN: HCPCS | Performed by: INTERNAL MEDICINE

## 2021-05-05 PROCEDURE — G8420 CALC BMI NORM PARAMETERS: HCPCS | Performed by: INTERNAL MEDICINE

## 2021-05-05 PROCEDURE — 4004F PT TOBACCO SCREEN RCVD TLK: CPT | Performed by: INTERNAL MEDICINE

## 2021-05-05 PROCEDURE — 3017F COLORECTAL CA SCREEN DOC REV: CPT | Performed by: INTERNAL MEDICINE

## 2021-05-05 PROCEDURE — 99213 OFFICE O/P EST LOW 20 MIN: CPT | Performed by: INTERNAL MEDICINE

## 2021-05-05 NOTE — PROGRESS NOTES
OFFICE PROGRESS NOTES      Natalie Muñiz is a 62 y.o. male who has    CHIEF COMPLAINT AS FOLLOWS:  CHEST PAIN: Patient C/O chest pain but symptoms appear to be atypical.   SOB: Has SOB with exertion but no change over previous noted. .   LEG EDEMA: No leg edema   PALPITATIONS: Denies any C/O Palpitations   DIZZINESS: No C/O Dizziness   SYNCOPE: None   OTHER:                                     HPI: Patient is here for F/U on his CHEST PAIN, HTN & Dyslipidemia problems. HTN: Patient has known Hx of essential HTN. Has been treated with guideline recommended medical / physical/ diet therapy as stated below. Dyslipidemia: Patient has known Hx of mixed dyslipidemia. Has been treated with guideline recommended medical / physical/ diet therapy as stated below. He does not have any new complaints at this time.     Current Outpatient Medications   Medication Sig Dispense Refill    cetirizine (ZYRTEC) 10 MG tablet 1 tablet daily      fluticasone (FLONASE) 50 MCG/ACT nasal spray as needed      albuterol sulfate  (90 Base) MCG/ACT inhaler Inhale 2 puffs into the lungs every 6 hours as needed      ibuprofen (ADVIL;MOTRIN) 600 MG tablet Take 600 mg by mouth every 6 hours as needed      pantoprazole (PROTONIX) 40 MG tablet Take 1 tablet by mouth 2 times daily 60 tablet 3    cholestyramine (QUESTRAN) 4 g packet Take 1 packet by mouth 3 times daily 90 packet 3    fluconazole (DIFLUCAN) 100 MG tablet Take 100 mg by mouth daily 2 tablets for 14 days      metoclopramide (REGLAN) 10 MG tablet Take 10 mg by mouth 4 times daily      Nutritional Supplement LIQD Take 1 Can by mouth 3 times daily 90 Can 5    Nutritional Supplements (ENSURE HIGH PROTEIN) LIQD Take 1 Can by mouth 3 times daily Different flavors if possible 30 Can 5    ondansetron (ZOFRAN ODT) 4 MG disintegrating tablet Place 1 tablet under the tongue every 8 hours as needed for Nausea or Vomiting 30 tablet 3    ipratropium-albuterol (DUONEB) 0.5-2.5 (3) MG/3ML SOLN nebulizer solution as needed       Nebulizers (MICRO AIR NEBULIZER) MISC TO USE AS NEEDED WITH NEBULIZER SOLUTION  11    acetaminophen (TYLENOL) 500 MG tablet Take 1,000 mg by mouth every 6 hours as needed for Pain      tiotropium (SPIRIVA HANDIHALER) 18 MCG inhalation capsule Inhale 1 capsule into the lungs daily 30 capsule 3    albuterol sulfate HFA (PROVENTIL HFA) 108 (90 BASE) MCG/ACT inhaler Inhale 2 puffs into the lungs every 6 hours as needed for Wheezing 1 Inhaler 3     No current facility-administered medications for this visit. Allergies: Bee venom and Nsaids  Review of Systems:    Constitutional: Negative for diaphoresis and fatigue  Respiratory: Negative for shortness of breath  Cardiovascular: Negative for chest pain, dyspnea on exertion, claudication, edema, irregular heartbeat, murmur, palpitations or shortness of breath  Musculoskeletal: Negative for muscle pain, muscular weakness, negative for pain in arm and leg or swelling in foot and leg    Objective:  /70   Pulse 72   Ht 5' 9\" (1.753 m)   Wt 144 lb (65.3 kg)   BMI 21.27 kg/m²   Wt Readings from Last 3 Encounters:   05/05/21 144 lb (65.3 kg)   04/05/21 141 lb (64 kg)   06/04/20 137 lb (62.1 kg)     Body mass index is 21.27 kg/m². GENERAL - Alert, oriented, pleasant, in no apparent distress. EYES: No jaundice, no conjunctival pallor. Neck - Supple. No jugular venous distention noted. No carotid bruits. Cardiovascular - Normal S1 and S2 without obvious murmur or gallop. Extremities - No cyanosis, clubbing, or significant edema. Pulmonary - No respiratory distress. No wheezes or rales.       Lab Review   Lab Results   Component Value Date    TROPONINT <0.010 10/11/2019    TROPONINT <0.010 10/11/2019     Lab Results   Component Value Date    BNP 10 12/30/2013    BNP 8 05/18/2013    PROBNP 117.7 09/13/2017    PROBNP 21.52 08/02/2017     Lab Results   Component Value Date    INR 1.03 08/02/2017    INR 0.95 12/30/2016     Lab Results   Component Value Date    LABA1C 5.5 01/16/2020     Lab Results   Component Value Date    WBC 6.6 01/16/2020    WBC 7.4 10/12/2019    HCT 41.6 01/16/2020    HCT 34.7 (L) 10/12/2019    MCV 86.2 01/16/2020    MCV 91.3 10/12/2019     01/16/2020     10/12/2019     Lab Results   Component Value Date    CHOL 200 01/16/2020    CHOL 172 08/02/2017    TRIG 82 01/16/2020    TRIG 80 08/02/2017    HDL 54 01/16/2020    HDL 48 08/02/2017    LDLCALC 130 01/16/2020    LDLCALC 126 (H) 11/19/2012    LDLDIRECT 119 (H) 08/02/2017    LDLDIRECT 128 (H) 12/30/2016     Lab Results   Component Value Date    ALT 10 01/16/2020    ALT 9 (L) 10/11/2019    AST 18 01/16/2020    AST 18 10/11/2019     BMP:    Lab Results   Component Value Date     01/16/2020     10/12/2019    K 4.5 01/16/2020    K 4.5 10/12/2019     01/16/2020     10/12/2019    CO2 24 01/16/2020    CO2 26 10/12/2019    BUN 11 01/16/2020    BUN 13 10/12/2019    CREATININE 0.9 03/04/2020    CREATININE 0.8 01/16/2020    CREATININE 0.8 10/12/2019     CMP:   Lab Results   Component Value Date     01/16/2020     10/12/2019    K 4.5 01/16/2020    K 4.5 10/12/2019     01/16/2020     10/12/2019    CO2 24 01/16/2020    CO2 26 10/12/2019    BUN 11 01/16/2020    BUN 13 10/12/2019    CREATININE 0.9 03/04/2020    CREATININE 0.8 01/16/2020    CREATININE 0.8 10/12/2019    PROT 7.0 10/11/2019    PROT 6.4 03/27/2019    PROT 7.0 11/19/2012    PROT 6.5 08/30/2012     Lab Results   Component Value Date    TSH 0.919 01/16/2020    TSHHS 1.010 08/02/2017    [facility] 1.650 12/30/2016     ECHO 4/2021   Left ventricular function is low normal, EF is estimated at 50-55%. Mild left ventricular hypertrophy. E/A reversal; indeterminate diastolic function. Mild mitral and tricuspid regurgitation is present. RVSP is 21 mmHg.    Dilation of the aortic root(4.2) and the ascending

## 2021-05-05 NOTE — PATIENT INSTRUCTIONS
Please be informed that if you contact our office outside of normal business hours the physician on call cannot help with any scheduling or rescheduling issues, procedure instruction questions or any type of medication issue. We advise you for any urgent/emergency that you go to the nearest emergency room! PLEASE CALL OUR OFFICE DURING NORMAL BUSINESS HOURS    Monday - Friday   8 am to 5 pm    [facility] [facility]: [phone]    [place]:  [phone]    CAD:None known. Chest pain symptoms are probably non-cardiac  HTN:Yes  well controlled on current medical regimen, see list above. - changes in  treatment:   no   VHD:no   No significant VHD noted  DYSLIPIDEMIA: yes,   Patient's profile is at / near [employer],   Tolerating current medical regimen wellyes,  OTHER RELEVANT DIAGNOSIS:as noted in patient's active problem list:  Tobacco abuse: councelled  TESTS ORDERED:   None this visit  All previously ordered tests reviewed. MEDICATIONS: CPM   Office f/u in 3 months.

## 2021-05-05 NOTE — LETTER
[address] [address]  [address] [place] [address]  Phone: [phone]  Fax: [phone]    Mynor Holman MD        May 5, 2021     Eliane Whalen [place]    Patient: Rochelle Alexis  MR Number: [id number]  YOB: 1960  Date of Visit: 5/5/2021    Dear Dr. Mitchell Tellez: Thank you for the request for consultation for Sidney Atkins to me for the evaluation of chest pain. Below are the relevant portions of my assessment and plan of care. If you have questions, please do not hesitate to call me. I look forward to following Chacorta Contreras along with you.     Sincerely,        Mynor Holman MD

## 2021-05-19 ENCOUNTER — OFFICE VISIT (OUTPATIENT)
Dept: BARIATRICS/WEIGHT MGMT | Age: 61
End: 2021-05-19
Payer: MEDICAID

## 2021-05-19 VITALS
DIASTOLIC BLOOD PRESSURE: 86 MMHG | SYSTOLIC BLOOD PRESSURE: 130 MMHG | HEIGHT: 69 IN | WEIGHT: 139.9 LBS | BODY MASS INDEX: 20.72 KG/M2 | HEART RATE: 66 BPM | TEMPERATURE: 98.2 F | OXYGEN SATURATION: 98 %

## 2021-05-19 DIAGNOSIS — K21.00 GASTROESOPHAGEAL REFLUX DISEASE WITH ESOPHAGITIS WITHOUT HEMORRHAGE: ICD-10-CM

## 2021-05-19 DIAGNOSIS — R10.13 EPIGASTRIC PAIN: Primary | ICD-10-CM

## 2021-05-19 PROCEDURE — G8420 CALC BMI NORM PARAMETERS: HCPCS | Performed by: SURGERY

## 2021-05-19 PROCEDURE — 3017F COLORECTAL CA SCREEN DOC REV: CPT | Performed by: SURGERY

## 2021-05-19 PROCEDURE — 99213 OFFICE O/P EST LOW 20 MIN: CPT | Performed by: SURGERY

## 2021-05-19 PROCEDURE — G8427 DOCREV CUR MEDS BY ELIG CLIN: HCPCS | Performed by: SURGERY

## 2021-05-19 PROCEDURE — 4004F PT TOBACCO SCREEN RCVD TLK: CPT | Performed by: SURGERY

## 2021-05-19 RX ORDER — METOCLOPRAMIDE 10 MG/1
10 TABLET ORAL
Qty: 120 TABLET | Refills: 3 | Status: SHIPPED | OUTPATIENT
Start: 2021-05-19 | End: 2021-12-13 | Stop reason: SDUPTHER

## 2021-05-19 ASSESSMENT — ENCOUNTER SYMPTOMS
CONSTIPATION: 0
SORE THROAT: 0
COUGH: 0
VOMITING: 0
SHORTNESS OF BREATH: 0
ABDOMINAL PAIN: 1
TROUBLE SWALLOWING: 0
ANAL BLEEDING: 0
NAUSEA: 1
WHEEZING: 0
DIARRHEA: 0
COLOR CHANGE: 0
PHOTOPHOBIA: 0
BLOOD IN STOOL: 0
VOICE CHANGE: 0

## 2021-05-19 NOTE — PROGRESS NOTES
GENERAL SURGERY OFFICE NOTE    SUBJECTIVE:    Patient presenting today referred from Carlos Ayoub MD and No ref. provider found, for   Chief Complaint   Patient presents with    Follow-up     dysphagia        HPI: Que Doan is a 62 y.o. male presenting with pain in the Epigasrtium and is chronic, worsening and dull compared to patient's normal condition. It is severe in intensity for a duration of 5 months and is continuous with modifying factors increased by or worse with eating. .. Wounds very nicely healing, no erythema, no induration, no purulent discharge. No constipation, BMs back to normal.    Thoroughly reviewed the patient's medical history, family history, social history and review of systems with the patient today in the office. Please see medical record for pertinent positives.       Past Medical History:   Diagnosis Date    Arthritis     Hands    Bronchitis     last ~2016    Chronic cluster headache 6/7/2019 last    COPD (chronic obstructive pulmonary disease) ([facility] [address]     \"dx 2 yrs ago\" \\"does not see a lung dr Jeanmarie Darlingo with PCP    Degenerative disc disease     \"in my back have degenarative disc\"    Depression     Edentulous     Fracture     \"fell over a 5 gallon bucket and landed on cement block and broke my right hand\" (7/6/2015)    GERD with esophagitis     H/O dizziness     States will feel like he is going to pass out, possible low BS    Hiatal hernia     Hx of gastric ulcer     HX OTHER MEDICAL     pt states has trouble with reading and writing\"can read very very little\"    Hyperlipidemia     Hypertension     \"on medication for last two yrs\" follow with Dr John Murphy Impingement syndrome of right shoulder     Left shoulder pain     limited range-of-motion    Lower back pain     Motion sickness     Neck pain     more on left side into shoulder--pain limits ROM    Vertigo         Past Surgical History:   Procedure Laterality Date    ARM SURGERY Left 30+ yrs \"put plastic ligaments in \"  after injury through glass window    CARPAL TUNNEL RELEASE Right 10/4/2019    RIGHT CARPAL TUNNEL RELEASE performed by Radha Hoffman DO at [facility]  2010    COLONOSCOPY  2018    HX: polyps - Dr. Lidya Toussaint 12/30/2019    COLONOSCOPY DIAGNOSTIC performed by Flor De La Rosa MD at [facility]      all teeth extracted    ENDOSCOPY, COLON, DIAGNOSTIC  04/08/2019    EGD - thrush noted through out   [address] Left 1990's    \"metal removed from eye\"    GASTRIC FUNDOPLICATION N/A 66/19/1355    NISSEN FUNDOPLICATION LAPAROSCOPIC ROBOTIC HIATAL HERNIA performed by Flor De La Rosa MD at [place] [address]  12/2018    UPPER GASTROINTESTINAL ENDOSCOPY N/A 1/28/2019    EGD BIOPSY / BRUSHING OF ESOPHAGUS [facility] N/A 4/8/2019    EGD BIOPSY AND BRUSHING performed [facility] N/A 7/8/2019    EGD BIOPSY and brushing performed [facility] N/A 12/30/2019    EGD BIOPSY/BRUSHING OF ESOPHAGUS performed [facility] N/A 6/5/2020    EGD BIOPSY performed by Flor De La Rosa MD at [address] [place] [address] Marital status:      Spouse name: Not on file    Number of children: Not on file    Years of education: Not on file    Highest education level: Not on file   Occupational History    Not on file   Tobacco Use    Smoking status: Current Every Day Smoker     Packs/day: 0.25     Years: 40.00     Pack years: 10.00     Types: Cigarettes    Smokeless tobacco: Never Used   Vaping Use    Vaping Use: Never used   Substance and Sexual Activity    Alcohol use: Yes     Comment: Occasional 1/month    Drug use: Never     Comment: 3 cups of coffee daily    Sexual activity: Not Currently     Partners: Female   Other Topics Concern    Not on file   Social History Narrative    Not on file     Social Determinants of Health     Financial Resource Strain:     Difficulty of Paying Living Expenses:    Food Insecurity:     Worried About Running Out of Food in the Last Year:     [address] in the Last Year:    Transportation Needs:     Lack of Transportation (Medical):  Lack of Transportation (Non-Medical):    Physical Activity:     Days of Exercise per Week:     Minutes of Exercise per Session:    Stress:     Feeling of Stress :    Social Connections:     Frequency of Communication with Friends and Family:     Frequency of Social Gatherings with Friends and Family:     Attends [employer] Services:     Active Member of Clubs or Organizations:     Attends Club or Organization Meetings:     Marital Status:    Intimate Partner Violence:     Fear of Current or Ex-Partner:     Emotionally Abused:     Physically Abused:     Sexually Abused:          Allergies   Allergen Reactions    Bee Venom Anaphylaxis    Nsaids Other (See Comments)     Not to take any longer due to stomach ulcer        Family History   Problem Relation Age of Onset    Dementia Mother     Stroke Mother     Stroke Father     High Blood Pressure Father     Heart Disease Father     Breast Cancer Sister     Diabetes Brother     Cancer Brother         mouth and throat    Heart Attack Maternal Uncle     Heart Disease Maternal Uncle     No Known Problems Son     No Known Problems Son     No Known Problems Daughter        Current Outpatient Medications   Medication Sig Dispense Refill    metoclopramide (REGLAN) 10 MG tablet Take 1 tablet by mouth 3 times daily (with meals) 120 tablet 3    cetirizine (ZYRTEC) 10 MG tablet 1 tablet daily      fluticasone (FLONASE) 50 MCG/ACT nasal spray as needed      albuterol sulfate  (90 Base) MCG/ACT inhaler Inhale 2 puffs into the lungs every 6 hours as needed      ibuprofen (ADVIL;MOTRIN) 600 MG tablet Take 600 mg by mouth every 6 hours as needed      cholestyramine (QUESTRAN) 4 g packet Take 1 packet by mouth 3 times daily 90 packet 3    Nutritional Supplement LIQD Take 1 Can by mouth 3 times daily 90 Can 5    Nutritional Supplements (ENSURE HIGH PROTEIN) LIQD Take 1 Can by mouth 3 times daily Different flavors if possible 30 Can 5    ondansetron (ZOFRAN ODT) 4 MG disintegrating tablet Place 1 tablet under the tongue every 8 hours as needed for Nausea or Vomiting 30 tablet 3    ipratropium-albuterol (DUONEB) 0.5-2.5 (3) MG/3ML SOLN nebulizer solution as needed       Nebulizers (MICRO AIR NEBULIZER) MISC TO USE AS NEEDED WITH NEBULIZER SOLUTION  11    acetaminophen (TYLENOL) 500 MG tablet Take 1,000 mg by mouth every 6 hours as needed for Pain      tiotropium (SPIRIVA HANDIHALER) 18 MCG inhalation capsule Inhale 1 capsule into the lungs daily 30 capsule 3    albuterol sulfate HFA (PROVENTIL HFA) 108 (90 BASE) MCG/ACT inhaler Inhale 2 puffs into the lungs every 6 hours as needed for Wheezing 1 Inhaler 3    pantoprazole (PROTONIX) 40 MG tablet Take 1 tablet by mouth 2 times daily 60 tablet 3    metoclopramide (REGLAN) 10 MG tablet Take 10 mg by mouth 4 times daily (Patient not taking: Reported on 5/19/2021)       No current facility-administered medications for this visit. Review of Systems   Constitutional: Negative for activity change, chills, diaphoresis and fever. HENT: Negative for sore throat, trouble swallowing and voice change. Eyes: Negative for photophobia and visual disturbance. Respiratory: Negative for cough, shortness of breath and wheezing. Cardiovascular: Negative for chest pain, palpitations and leg swelling.    Gastrointestinal: Positive for abdominal pain (Catching food in the upper esophagus, and heart burn real adenopathy. Skin:     General: Skin is warm and dry. Coloration: Skin is not pale. Findings: No erythema or rash. Neurological:      Mental Status: He is alert and oriented to person, place, and time. Cranial Nerves: No cranial nerve deficit. Coordination: Coordination normal.   Psychiatric:         Behavior: Behavior normal.         Thought Content: Thought content normal.         Judgment: Judgment normal.           Orders Placed This Encounter   Medications    metoclopramide (REGLAN) 10 MG tablet     Sig: Take 1 tablet by mouth 3 times daily (with meals)     Dispense:  120 tablet     Refill:  3         ASSESSMENT & PLAN:    1. Epigastric pain    2. Gastroesophageal reflux disease with esophagitis without hemorrhage         Need to r/o candida esophagitis, and r/o GERD / stricture? ?    Patient counseled on the risks, benefits, and alternatives of treatment plan at length while in the office today. Patient states an understanding and willingness to proceed with the plan. Follow Up:  Return in about 2 weeks (around 6/2/2021), or EGD. Char Torres MD, FACS, FICS.     5/19/21

## 2021-05-25 ENCOUNTER — TELEPHONE (OUTPATIENT)
Dept: BARIATRICS/WEIGHT MGMT | Age: 61
End: 2021-05-25

## 2021-05-25 NOTE — TELEPHONE ENCOUNTER
SPOKE TO Kriss Pena REGARDING SURGERY (EGD WITH BX) SCHEDULED @ [facility].  NOTIFIED OF DATES, TIMES AND LOCATION    PHONE ASSESSMENT   COVID - VACCINATED  SURGERY - 6/1/21 @ 1145  P/O - 6/11/21 @ 300    NPO AFTER MIDNIGHT  HOLD BLOOD THINNERS - NOT ON BLOOD THINNERS

## 2021-05-27 NOTE — PROGRESS NOTES
.Surgery 6/1/21 you will be called 5/28/21 with times               1. Do not eat or drink anything after midnight - unless instructed by your doctor prior to surgery. This includes                   no water, chewing gum or mints. 2. Follow your directions as prescribed by the doctor for your procedure and medications. Take Protonix in am with a sip. 3. Check with your Doctor regarding stopping Plavix, Coumadin, Lovenox,Effient,Pradaxa,Xarelto, Fragmin or other blood thinners and                   follow their instructions. 4. Do not smoke, and do not drink any alcoholic beverages 24 hours prior to surgery. This includes NA Beer. 5. You may brush your teeth and gargle the morning of surgery. DO NOT SWALLOW WATER   6. You MUST make arrangements for a responsible adult to take you home after your surgery and be able to check on you every couple                   hours for the day. You will not be allowed to leave alone or drive yourself home. It is strongly suggested someone stay with you the first 24                   hrs. Your surgery will be cancelled if you do not have a ride home. 7. Please wear simple, loose fitting clothing to the hospital.  Audery Ion not bring valuables (money, credit cards, checkbooks, etc.) Do not wear any                   makeup (including no eye makeup) or nail polish on your fingers or toes. 8. DO NOT wear any jewelry or piercings on day of surgery. All body piercing jewelry must be removed. 9. If you have dentures, they will be removed before going to the OR; we will provide you a container. If you wear contact lenses or glasses,                  they will be removed; please bring a case for them. 10. If you  have a Living Will and Durable Power of  for Healthcare, please bring in a copy. 11. Please bring picture ID,  insurance card, paperwork from the doctors office    (H & P, Consent, & card for implantable devices). 12. Take a shower the night before or morning of your procedure with the soaps provided, do not apply any lotion, oil or powder.            13. Wear a mask covering your nose & mouth when entering the hospital. Fully vaccinated w/Moderna x2, last shot 4/13/21

## 2021-05-29 ENCOUNTER — ANESTHESIA EVENT (OUTPATIENT)
Dept: ENDOSCOPY | Age: 61
End: 2021-05-29
Payer: MEDICAID

## 2021-05-29 ASSESSMENT — LIFESTYLE VARIABLES: SMOKING_STATUS: 1

## 2021-05-29 NOTE — ANESTHESIA PRE PROCEDURE
Department of Anesthesiology  Preprocedure Note       Name:  Los Echeverria   Age:  62 y.o.  :  1960                                          MRN:  [id number]         Date:  2021      Surgeon: Barbara Baer):  Simran Madrigal MD    Procedure: Procedure(s):  EGD BIOPSY    Medications prior to admission:   Prior to Admission medications    Medication Sig Start Date End Date Taking?  Authorizing Provider   metoclopramide (REGLAN) 10 MG tablet Take 1 tablet by mouth 3 times daily (with meals) 21   Simran Madrigal MD   cetirizine (ZYRTEC) 10 MG tablet 1 tablet daily 3/9/21   Historical Provider, MD   fluticasone (FLONASE) 50 MCG/ACT nasal spray as needed 3/9/21   Historical Provider, MD   albuterol sulfate  (90 Base) MCG/ACT inhaler Inhale 2 puffs into the lungs every 6 hours as needed    Historical Provider, MD   ibuprofen (ADVIL;MOTRIN) 600 MG tablet Take 600 mg by mouth every 6 hours as needed    Historical Provider, MD   pantoprazole (PROTONIX) 40 MG tablet Take 1 tablet by mouth 2 times daily 20   Simran Madrigal MD   cholestyramine (QUESTRAN) 4 g packet Take 1 packet by mouth 3 times daily 20   Simran Madrigal MD   Nutritional Supplement LIQD Take 1 Can by mouth 3 times daily 20   Simran Madrigal MD   Nutritional Supplements (ENSURE HIGH PROTEIN) LIQD Take 1 Can by mouth 3 times daily Different flavors if possible 20   Simran Madrigal MD   ondansetron (ZOFRAN ODT) 4 MG disintegrating tablet Place 1 tablet under the tongue every 8 hours as needed for Nausea or Vomiting 19   Simran Madrigal MD   ipratropium-albuterol (DUONEB) 0.5-2.5 (3) MG/3ML SOLN nebulizer solution as needed  10/19/19   Historical Provider, MD   Nebulizers (MICRO AIR NEBULIZER) MISC TO USE AS NEEDED WITH NEBULIZER SOLUTION 19   Historical Provider, MD   acetaminophen (TYLENOL) 500 MG tablet Take 1,000 mg by mouth every 6 hours as needed for Pain    Historical Provider, MD   tiotropium (Sarah Walters) 18 MCG inhalation capsule Inhale 1 capsule into the lungs daily 1/3/17   Eric Sanderson MD   albuterol sulfate HFA (PROVENTIL HFA) 108 (90 BASE) MCG/ACT inhaler Inhale 2 puffs into the lungs every 6 hours as needed for Wheezing 1/3/17   Eric Sanderson MD       Current medications:    No current facility-administered medications for this encounter.      Current Outpatient Medications   Medication Sig Dispense Refill    metoclopramide (REGLAN) 10 MG tablet Take 1 tablet by mouth 3 times daily (with meals) 120 tablet 3    cetirizine (ZYRTEC) 10 MG tablet 1 tablet daily      fluticasone (FLONASE) 50 MCG/ACT nasal spray as needed      albuterol sulfate  (90 Base) MCG/ACT inhaler Inhale 2 puffs into the lungs every 6 hours as needed      ibuprofen (ADVIL;MOTRIN) 600 MG tablet Take 600 mg by mouth every 6 hours as needed      pantoprazole (PROTONIX) 40 MG tablet Take 1 tablet by mouth 2 times daily 60 tablet 3    cholestyramine (QUESTRAN) 4 g packet Take 1 packet by mouth 3 times daily 90 packet 3    Nutritional Supplement LIQD Take 1 Can by mouth 3 times daily 90 Can 5    Nutritional Supplements (ENSURE HIGH PROTEIN) LIQD Take 1 Can by mouth 3 times daily Different flavors if possible 30 Can 5    ondansetron (ZOFRAN ODT) 4 MG disintegrating tablet Place 1 tablet under the tongue every 8 hours as needed for Nausea or Vomiting 30 tablet 3    ipratropium-albuterol (DUONEB) 0.5-2.5 (3) MG/3ML SOLN nebulizer solution as needed       Nebulizers (MICRO AIR NEBULIZER) MISC TO USE AS NEEDED WITH NEBULIZER SOLUTION  11    acetaminophen (TYLENOL) 500 MG tablet Take 1,000 mg by mouth every 6 hours as needed for Pain      tiotropium (SPIRIVA HANDIHALER) 18 MCG inhalation capsule Inhale 1 capsule into the lungs daily 30 capsule 3    albuterol sulfate HFA (PROVENTIL HFA) 108 (90 BASE) MCG/ACT inhaler Inhale 2 puffs into the lungs every 6 hours as needed for Wheezing 1 Inhaler 3       Allergies: Allergies   Allergen Reactions    Bee Venom Anaphylaxis    Nsaids Other (See Comments)     Can take small doses for a short time - Not to take any longer than necessary stomach ulcer       Problem List:    Patient Active Problem List   Diagnosis Code    Impingement syndrome of right shoulder M75.41    [facility] J44.0, J20.9    Chest pain R07.9    Post-nasal drip R09.82    Chronic cluster headache G44.029    Generalized weakness R53.1    Vertigo R42    Positional lightheadedness R42    Hiatal hernia K44.9    Gastroesophageal reflux disease with esophagitis K21.00    PUD (peptic ulcer disease) K27.9    Paraesophageal hiatal hernia K44.9    Status post laparoscopic Nissen fundoplication J04.017    Esophageal dysphagia R13.10    Esophageal thrush (HCC) B37.81    Candida esophagitis (HCC) B37.81    Gastroparesis K31.84    Candida infection, esophageal (HCC) B37.81    Pneumonia J18.9    Left upper quadrant pain R10.12    LLQ pain R10.32    Fungal esophagitis K20.80, B49    Left flank pain R10.9    Gastroesophageal reflux disease without esophagitis K21.9    Dyslipidemia E78.5    Essential hypertension I10    Epigastric pain R10.13       Past Medical History:        Diagnosis Date    Arthritis     Hands    Chronic cluster headache 6/7/2019 last    COPD [facility]chronic obstructive pulmonary disease) ([facility]     \"dx 2 yrs ago\" \\"does not see a lung dr Kimi Browning with PCP    Degenerative disc disease     \"in my back have degenarative disc\"    Depression     Edentulous     Fracture     \"fell over a 5 gallon bucket and landed on cement block and broke my right hand\" (7/6/2015)    GERD with esophagitis     H/O dizziness     States will feel like he is going to pass out, possible low BS    Hiatal hernia     Hx of gastric ulcer     HX OTHER MEDICAL     pt states has trouble with reading and writing\"can read very very little\"    Hyperlipidemia     Hypertension     No meds as of 5/27/21 - follows with PCP    Impingement syndrome of right shoulder     Left shoulder pain     limited range-of-motion    Lower back pain     Motion sickness     Neck pain     more on left side into shoulder--pain limits ROM    Vertigo        Past Surgical History:        Procedure Laterality Date    ARM SURGERY Left 30+ yrs    \"put plastic ligaments in \"  after injury through glass window    CARPAL TUNNEL RELEASE Right 10/4/2019    RIGHT CARPAL TUNNEL RELEASE performed by Araceli Gomez DO at [address] [place] [address]  2010    COLONOSCOPY  2018    HX: polyps - Dr. Taylor Nazario COLONOSCOPY N/A 12/30/2019    COLONOSCOPY DIAGNOSTIC performed by Starla Du MD at [address] [place] [address]      all teeth extracted    ENDOSCOPY, COLON, DIAGNOSTIC  04/08/2019    EGD - thrush noted through out    EYE SURGERY Left 1990's    \"metal removed from eye\"    GASTRIC FUNDOPLICATION N/A 00/51/0619    NISSEN FUNDOPLICATION LAPAROSCOPIC ROBOTIC HIATAL HERNIA performed by Starla Du MD at [place] [address]  12/2018    UPPER GASTROINTESTINAL ENDOSCOPY N/A 1/28/2019    EGD BIOPSY / BRUSHING OF ESOPHAGUS performed by Starla Du MD at [facility] N/A 4/8/2019    EGD BIOPSY AND BRUSHING performed by Starla Du MD at [facility] N/A 7/8/2019    EGD BIOPSY and brushing performed by Starla Du MD at [facility] N/A 12/30/2019    EGD BIOPSY/BRUSHING OF ESOPHAGUS performed by Starla Du MD at [facility] N/A 6/5/2020    EGD BIOPSY performed by Starla Du MD at [address] History:    Social History     Tobacco Use    Smoking status: Current Every Day Smoker     Packs/day: 0.50     Years: 48.00     Pack years: 24.00     Types: Cigarettes     Start date: 1972    Smokeless tobacco: Never Used    Tobacco comment: use to smoke 2.5 PPD   Substance Use Topics    Alcohol use: Yes     Comment: Occasional 1/month                                Ready to quit: Not Answered  Counseling given: Not Answered  Comment: use to smoke 2.5 PPD      Vital Signs (Current): There were no vitals filed for this visit. BP Readings from Last 3 Encounters:   05/19/21 130/86   05/05/21 120/70   04/05/21 132/86       NPO Status:                                                                                 BMI:   Wt Readings from Last 3 Encounters:   05/19/21 139 lb 14.4 oz (63.5 kg)   05/05/21 144 lb (65.3 kg)   04/05/21 141 lb (64 kg)     There is no height or weight on file to calculate BMI.    CBC:   Lab Results   Component Value Date    WBC 6.6 01/16/2020    RBC 4.83 01/16/2020    HGB 14.0 01/16/2020    HCT 41.6 01/16/2020    MCV 86.2 01/16/2020    RDW 13.2 10/12/2019     01/16/2020       CMP:   Lab Results   Component Value Date     01/16/2020    K 4.5 01/16/2020     01/16/2020    CO2 24 01/16/2020    BUN 11 01/16/2020    CREATININE 0.9 03/04/2020    CREATININE 0.8 01/16/2020    GFRAA >60 03/04/2020    LABGLOM >60 03/04/2020    GLUCOSE 66 01/16/2020    PROT 7.0 10/11/2019    PROT 7.0 11/19/2012    CALCIUM 9.8 01/16/2020    BILITOT 0.3 01/16/2020    ALKPHOS 82 01/16/2020    AST 18 01/16/2020    ALT 10 01/16/2020       POC Tests: No results for input(s): POCGLU, POCNA, POCK, POCCL, POCBUN, POCHEMO, POCHCT in the last 72 hours.     Coags:   Lab Results   Component Value Date    PROTIME 11.8 08/02/2017    PROTIME 10.8 05/15/2011    INR 1.03 08/02/2017    APTT 31.3 08/02/2017       HCG (If Applicable): No results found for: PREGTESTUR, PREGSERUM, HCG, HCGQUANT     ABGs: No results found for: PHART, PO2ART, VVZ3XIH, SSS4NVB, BEART, F3XGJRSE     Type & Screen (If Applicable):  No results found for: LABABO, LABRH    Drug/Infectious Status (If Applicable):  Lab Results   Component Value Date    HEPCAB NON REACTIVE 03/08/2011       COVID-19 Screening (If Applicable):   Lab Results   Component Value Date    COVID19 NOT DETECTED 06/01/2020           Anesthesia Evaluation  Patient summary reviewed no history of anesthetic complications:   Airway: Mallampati: I  TM distance: >3 FB   Neck ROM: full  Mouth opening: > = 3 FB Dental: normal exam   (+) upper dentures and lower dentures      Pulmonary: breath sounds clear to auscultation  (+) COPD:  current smoker                          ROS comment: CXR 3/2021:  Impression  No acute abnormality. Cardiovascular:  Exercise tolerance: good (>4 METS),   (+) hypertension:, hyperlipidemia        Rhythm: regular  Rate: normal           Beta Blocker:  Not on Beta Blocker      ROS comment: Echo 4/2021:  Summary   Left ventricular function is low normal, EF is estimated at 50-55%. Mild left ventricular hypertrophy. E/A reversal; indeterminate diastolic function. Mild mitral and tricuspid regurgitation is present. RVSP is 21 mmHg. Dilation of the aortic root(4.2) and the ascending aorta(4.0). No evidence of pericardial effusion. Stress test 4/2021:  Summary   Normal study.   Normal perfusion study with normal distribution in all coronal, short, and   horizontal axis.   The observed defect is consistent with diaphragmatic attenuation.   Normal LV function. LVEF is 48 %.   Supervising physician Dr. Vanna Olson .            Neuro/Psych:   (+) headaches:, psychiatric history:            GI/Hepatic/Renal:   (+) hiatal hernia, GERD:, PUD,           Endo/Other: Negative Endo/Other ROS                    Abdominal:           Vascular: negative vascular ROS. Anesthesia Plan      MAC     ASA 3       Induction: intravenous. Anesthetic plan and risks discussed with patient. Plan discussed with CRISTIN.                 Trey Young, APRN - CRNA   5/29/2021

## 2021-06-01 ENCOUNTER — ANESTHESIA (OUTPATIENT)
Dept: ENDOSCOPY | Age: 61
End: 2021-06-01
Payer: MEDICAID

## 2021-06-01 ENCOUNTER — HOSPITAL ENCOUNTER (OUTPATIENT)
Age: 61
Setting detail: OUTPATIENT SURGERY
Discharge: HOME | End: 2021-06-01
Attending: SURGERY | Admitting: SURGERY
Payer: MEDICAID

## 2021-06-01 VITALS — DIASTOLIC BLOOD PRESSURE: 45 MMHG | SYSTOLIC BLOOD PRESSURE: 93 MMHG | OXYGEN SATURATION: 99 %

## 2021-06-01 VITALS
DIASTOLIC BLOOD PRESSURE: 88 MMHG | SYSTOLIC BLOOD PRESSURE: 142 MMHG | RESPIRATION RATE: 16 BRPM | OXYGEN SATURATION: 94 % | TEMPERATURE: 97.7 F | HEART RATE: 70 BPM

## 2021-06-01 PROCEDURE — 3609017700 HC EGD DILATION GASTRIC/DUODENAL STRICTURE: Performed by: SURGERY

## 2021-06-01 PROCEDURE — 88305 TISSUE EXAM BY PATHOLOGIST: CPT | Performed by: PATHOLOGY

## 2021-06-01 PROCEDURE — 2500000003 HC RX 250 WO HCPCS: Performed by: NURSE ANESTHETIST, CERTIFIED REGISTERED

## 2021-06-01 PROCEDURE — 2580000003 HC RX 258: Performed by: SURGERY

## 2021-06-01 PROCEDURE — 43249 ESOPH EGD DILATION <30 MM: CPT | Performed by: SURGERY

## 2021-06-01 PROCEDURE — 43239 EGD BIOPSY SINGLE/MULTIPLE: CPT | Performed by: SURGERY

## 2021-06-01 PROCEDURE — 6360000002 HC RX W HCPCS: Performed by: NURSE ANESTHETIST, CERTIFIED REGISTERED

## 2021-06-01 PROCEDURE — 88342 IMHCHEM/IMCYTCHM 1ST ANTB: CPT | Performed by: PATHOLOGY

## 2021-06-01 PROCEDURE — 3700000000 HC ANESTHESIA ATTENDED CARE: Performed by: SURGERY

## 2021-06-01 PROCEDURE — 7100000010 HC PHASE II RECOVERY - FIRST 15 MIN: Performed by: SURGERY

## 2021-06-01 PROCEDURE — 7100000011 HC PHASE II RECOVERY - ADDTL 15 MIN: Performed by: SURGERY

## 2021-06-01 PROCEDURE — 3700000001 HC ADD 15 MINUTES (ANESTHESIA): Performed by: SURGERY

## 2021-06-01 PROCEDURE — C1726 CATH, BAL DIL, NON-VASCULAR: HCPCS | Performed by: SURGERY

## 2021-06-01 PROCEDURE — 87077 CULTURE AEROBIC IDENTIFY: CPT

## 2021-06-01 PROCEDURE — 2709999900 HC NON-CHARGEABLE SUPPLY: Performed by: SURGERY

## 2021-06-01 RX ORDER — ONDANSETRON 2 MG/ML
INJECTION INTRAMUSCULAR; INTRAVENOUS PRN
Status: DISCONTINUED | OUTPATIENT
Start: 2021-06-01 | End: 2021-06-01 | Stop reason: SDUPTHER

## 2021-06-01 RX ORDER — LIDOCAINE HYDROCHLORIDE 20 MG/ML
INJECTION, SOLUTION EPIDURAL; INFILTRATION; INTRACAUDAL; PERINEURAL PRN
Status: DISCONTINUED | OUTPATIENT
Start: 2021-06-01 | End: 2021-06-01 | Stop reason: SDUPTHER

## 2021-06-01 RX ORDER — PROPOFOL 10 MG/ML
INJECTION, EMULSION INTRAVENOUS PRN
Status: DISCONTINUED | OUTPATIENT
Start: 2021-06-01 | End: 2021-06-01 | Stop reason: SDUPTHER

## 2021-06-01 RX ORDER — SODIUM CHLORIDE, SODIUM LACTATE, POTASSIUM CHLORIDE, CALCIUM CHLORIDE 600; 310; 30; 20 MG/100ML; MG/100ML; MG/100ML; MG/100ML
INJECTION, SOLUTION INTRAVENOUS CONTINUOUS
Status: DISCONTINUED | OUTPATIENT
Start: 2021-06-01 | End: 2021-06-01 | Stop reason: HOSPADM

## 2021-06-01 RX ADMIN — ONDANSETRON 4 MG: 2 INJECTION INTRAMUSCULAR; INTRAVENOUS at 12:19

## 2021-06-01 RX ADMIN — LIDOCAINE HYDROCHLORIDE 100 MG: 20 INJECTION, SOLUTION EPIDURAL; INFILTRATION; INTRACAUDAL; PERINEURAL at 11:40

## 2021-06-01 RX ADMIN — SODIUM CHLORIDE, POTASSIUM CHLORIDE, SODIUM LACTATE AND CALCIUM CHLORIDE: 600; 310; 30; 20 INJECTION, SOLUTION INTRAVENOUS at 10:54

## 2021-06-01 RX ADMIN — SODIUM CHLORIDE, POTASSIUM CHLORIDE, SODIUM LACTATE AND CALCIUM CHLORIDE: 600; 310; 30; 20 INJECTION, SOLUTION INTRAVENOUS at 12:09

## 2021-06-01 RX ADMIN — PROPOFOL 450 MG: 10 INJECTION, EMULSION INTRAVENOUS at 11:40

## 2021-06-01 ASSESSMENT — PAIN SCALES - GENERAL
PAINLEVEL_OUTOF10: 0
PAINLEVEL_OUTOF10: 0

## 2021-06-01 NOTE — H&P
Hiatal hernia, gastric ulcer, OTHER MEDICAL, Hyperlipidemia, Hypertension, Impingement syndrome of right shoulder, Left shoulder pain, Lower back pain, Motion sickness, Neck pain, and Vertigo. PSH:   has a past surgical history that includes Arm Surgery (Left, 30+ yrs); Upper gastrointestinal endoscopy (12/2018); Dental surgery; Gastric fundoplication (N/A, 89/47/6356); Upper gastrointestinal endoscopy (N/A, 1/28/2019); Endoscopy, colon, diagnostic (04/08/2019); Upper gastrointestinal endoscopy (N/A, 4/8/2019); Colonoscopy (2010); Colonoscopy (2018); eye surgery (Left, 1990's); hernia repair; Upper gastrointestinal endoscopy (N/A, 7/8/2019); Carpal tunnel release (Right, 10/4/2019); Colonoscopy (N/A, 12/30/2019); Upper gastrointestinal endoscopy (N/A, 12/30/2019); and Upper gastrointestinal endoscopy (N/A, 6/5/2020). Allergies: Allergies   Allergen Reactions    Bee Venom Anaphylaxis    Nsaids Other (See Comments)     Can take small doses for a short time - Not to take any longer than necessary stomach ulcer        Home Meds:    Prior to Admission medications    Medication Sig Start Date End Date Taking?  Authorizing Provider   metoclopramide (REGLAN) 10 MG tablet Take 1 tablet by mouth 3 times daily (with meals) 5/19/21  Yes Annette Schuler MD   fluticasone (FLONASE) 50 MCG/ACT nasal spray as needed 3/9/21  Yes Historical Provider, MD   albuterol sulfate  (90 Base) MCG/ACT inhaler Inhale 2 puffs into the lungs every 6 hours as needed   Yes Historical Provider, MD   ibuprofen (ADVIL;MOTRIN) 600 MG tablet Take 600 mg by mouth every 6 hours as needed   Yes Historical Provider, MD   pantoprazole (PROTONIX) 40 MG tablet Take 1 tablet by mouth 2 times daily 6/5/20  Yes Annette Schuler MD   cholestyramine (QUESTRAN) 4 g packet Take 1 packet by mouth 3 times daily 6/5/20  Yes Annette Schuler MD   ondansetron (ZOFRAN ODT) 4 MG disintegrating tablet Place 1 tablet under the tongue every 8 hours as needed for Nausea or Vomiting 12/20/19  Yes Sayda Boyer MD   acetaminophen (TYLENOL) 500 MG tablet Take 1,000 mg by mouth every 6 hours as needed for Pain   Yes Historical Provider, MD   tiotropium (Lodema [place]) 18 MCG inhalation capsule Inhale 1 capsule into the lungs daily 1/3/17  Yes Melanie Kennedy MD   albuterol sulfate HFA (PROVENTIL HFA) 108 (90 BASE) MCG/ACT inhaler Inhale 2 puffs into the lungs every 6 hours as needed for Wheezing 1/3/17  Yes Melanie Kennedy MD   cetirizine (ZYRTEC) 10 MG tablet 1 tablet daily 3/9/21   Historical Provider, MD   Nutritional Supplement LIQD Take 1 Can by mouth 3 times daily 5/20/20   Sayda Boyer MD   Nutritional Supplements (ENSURE HIGH PROTEIN) LIQD Take 1 Can by mouth 3 times daily Different flavors if possible 5/20/20   Sayda Boyer MD   ipratropium-albuterol (DUONEB) 0.5-2.5 (3) MG/3ML SOLN nebulizer solution as needed  10/19/19   Historical Provider, MD   Nebulizers (MICRO AIR NEBULIZER) MISC TO USE AS NEEDED WITH NEBULIZER SOLUTION 9/23/19   Historical Provider, MD        Hospital Meds:    Current Facility-Administered Medications   Medication Dose Route Frequency Provider Last Rate Last Admin    lactated ringers infusion   Intravenous Continuous Sayda Boyer  mL/hr at 06/01/21 1054 New Bag at 06/01/21 1054      lactated ringers 100 mL/hr at 06/01/21 1054       Social History / Family History:        Social History     Socioeconomic History    Marital status:      Spouse name: None    Number of children: None    Years of education: None    Highest education level: None   Occupational History    None   Tobacco Use    Smoking status: Current Every Day Smoker     Packs/day: 0.50     Years: 48.00     Pack years: 24.00     Types: Cigarettes     Start date: 1972    Smokeless tobacco: Never Used    Tobacco comment: use to smoke 2.5 PPD   Vaping Use    Vaping Use: Never used   Substance and Sexual Activity    Alcohol use:  Yes Comment: Occasional 1/month    Drug use: Never     Comment: 3 cups of coffee daily    Sexual activity: Not Currently     Partners: Female   Other Topics Concern    None   Social History Narrative    None     Social Determinants of Health     Financial Resource Strain:     Difficulty of Paying Living Expenses:    Food Insecurity:     Worried About Running Out of Food in the Last Year:     Ran Out of Food in the Last Year:    Transportation Needs:     Lack of Transportation (Medical):  Lack of Transportation (Non-Medical):    Physical Activity:     Days of Exercise per Week:     Minutes of Exercise per Session:    Stress:     Feeling of Stress :    Social Connections:     Frequency of Communication with Friends and Family:     Frequency of Social Gatherings with Friends and Family:     Attends Zoroastrianism Services:     Active Member of Clubs or Organizations:     Attends Club or Organization Meetings:     Marital Status:    Intimate Partner Violence:     Fear of Current or Ex-Partner:     Emotionally Abused:     Physically Abused:     Sexually Abused:       Family History   Problem Relation Age of Onset    Dementia Mother     Stroke Mother     Stroke Father     High Blood Pressure Father     Heart Disease Father     Breast Cancer Sister     Diabetes Brother     Cancer Brother         mouth and throat    Heart Attack Maternal Uncle     Heart Disease Maternal Uncle     No Known Problems Son     No Known Problems Son     No Known Problems Daughter     otherwise irrelevant to this surgical issue. Review of Systems:  Constitutional: Negative for fever, chills, diaphoresis, appetite change and fatigue. HENT: Negative for sore throat, trouble swallowing and voice change. Respiratory: Negative for cough, positive for shortness of breath no wheezing. Cardiovascular: Negative for chest pain positive for SOB with one flight of stairs exertion, no pitting LE edema.    Gastrointestinal: Gastroesophageal reflux disease with esophagitis    PUD (peptic ulcer disease)    Paraesophageal hiatal hernia    Status post laparoscopic Nissen fundoplication    Esophageal dysphagia    Esophageal thrush (HCC)    Candida esophagitis (HCC)    Gastroparesis    Candida infection, esophageal (HCC)    Pneumonia    Left upper quadrant pain    LLQ pain    Fungal esophagitis    Left flank pain    Gastroesophageal reflux disease without esophagitis    Dyslipidemia    Essential hypertension    Epigastric pain           Assessment & Plan:    1. Epigastric pain    2. Gastroesophageal reflux disease with esophagitis without hemorrhage          Need to r/o candida esophagitis, and r/o GERD / stricture? ?     Patient counseled on the risks, benefits, and alternatives of treatment plan at length while in the office last week.   Patient states an understanding and willingness to proceed with the plan.      ____________________________________________    Felisha Samson MD, FACS, FICS    6/1/2021  11:21 AM

## 2021-06-01 NOTE — ANESTHESIA POSTPROCEDURE EVALUATION
Department of Anesthesiology  Postprocedure Note    Patient: Kriss Pena  MRN: [id number]  YOB: 1960  Date of evaluation: 6/1/2021  Time:  12:46 PM     Procedure Summary     Date: 06/01/21 Room / Location: [address]    Anesthesia Start: 3077 Anesthesia Stop: 9741    Procedure: EGD DILATION BALLOON WITH 18-20 BALLOON UP TO 20 WITH BIOPSIES (N/A ) Diagnosis: (EPIGASTRIC PAIN)    Surgeons: Gil De La Rosa MD Responsible Provider: Diana Aguero DO    Anesthesia Type: MAC ASA Status: 3          Anesthesia Type: MAC    Sreedhar Phase I:  10    Sreedhar Phase II:  10    Last vitals: Reviewed and per EMR flowsheets.        Anesthesia Post Evaluation    Patient location during evaluation: bedside  Patient participation: complete - patient participated  Level of consciousness: awake and alert  Pain score: 0  Airway patency: patent  Nausea & Vomiting: no nausea and no vomiting  Complications: no  Cardiovascular status: hemodynamically stable  Respiratory status: acceptable, room air, spontaneous ventilation and nonlabored ventilation  Hydration status: euvolemic

## 2021-06-01 NOTE — PROGRESS NOTES
REPORT GIVEN TO SANDOR DE LEÓN IN SDS. PT AWAKE AND RESPONSIVE. VS STABLE. SNACK AND BEVERAGE PROVIDED.

## 2021-06-02 LAB — CLOTEST: NEGATIVE

## 2021-06-11 ENCOUNTER — OFFICE VISIT (OUTPATIENT)
Dept: BARIATRICS/WEIGHT MGMT | Age: 61
End: 2021-06-11
Payer: MEDICAID

## 2021-06-11 VITALS
TEMPERATURE: 97.4 F | BODY MASS INDEX: 20.94 KG/M2 | DIASTOLIC BLOOD PRESSURE: 76 MMHG | HEART RATE: 75 BPM | SYSTOLIC BLOOD PRESSURE: 100 MMHG | WEIGHT: 141.4 LBS | OXYGEN SATURATION: 96 % | HEIGHT: 69 IN

## 2021-06-11 DIAGNOSIS — R13.14 PHARYNGOESOPHAGEAL DYSPHAGIA: Primary | ICD-10-CM

## 2021-06-11 PROCEDURE — G8427 DOCREV CUR MEDS BY ELIG CLIN: HCPCS | Performed by: SURGERY

## 2021-06-11 PROCEDURE — 4004F PT TOBACCO SCREEN RCVD TLK: CPT | Performed by: SURGERY

## 2021-06-11 PROCEDURE — 99213 OFFICE O/P EST LOW 20 MIN: CPT | Performed by: SURGERY

## 2021-06-11 PROCEDURE — 3017F COLORECTAL CA SCREEN DOC REV: CPT | Performed by: SURGERY

## 2021-06-11 PROCEDURE — G8420 CALC BMI NORM PARAMETERS: HCPCS | Performed by: SURGERY

## 2021-06-11 RX ORDER — TIOTROPIUM BROMIDE INHALATION SPRAY 1.56 UG/1
SPRAY, METERED RESPIRATORY (INHALATION)
COMMUNITY
Start: 2021-05-16 | End: 2021-06-11

## 2021-06-11 RX ORDER — VARENICLINE TARTRATE 1 MG/1
1 TABLET, FILM COATED ORAL 2 TIMES DAILY
Qty: 180 TABLET | Refills: 1 | Status: SHIPPED | OUTPATIENT
Start: 2021-06-11 | End: 2022-04-06

## 2021-06-11 RX ORDER — ATORVASTATIN CALCIUM 20 MG/1
TABLET, FILM COATED ORAL
COMMUNITY
Start: 2021-05-16

## 2021-06-11 RX ORDER — ACARBOSE 25 MG/1
TABLET ORAL
COMMUNITY
Start: 2021-05-13 | End: 2022-04-06

## 2021-06-11 ASSESSMENT — ENCOUNTER SYMPTOMS
VOMITING: 0
COLOR CHANGE: 0
COUGH: 0
CONSTIPATION: 0
BLOOD IN STOOL: 0
VOICE CHANGE: 0
ANAL BLEEDING: 0
NAUSEA: 0
TROUBLE SWALLOWING: 0
WHEEZING: 0
SORE THROAT: 0
ABDOMINAL PAIN: 1
SHORTNESS OF BREATH: 0
DIARRHEA: 0
PHOTOPHOBIA: 0

## 2021-06-11 NOTE — PROGRESS NOTES
GENERAL SURGERY OFFICE NOTE    SUBJECTIVE:    Patient presenting today referred from Segudno Sanchez MD and No ref. provider found, for   Chief Complaint   Patient presents with    Follow-up     Post EGD with bx @ [facility] 6/1/21        HPI: Cynthia Mendoza is a 62 y.o. male presenting with pain in the Epigasrtium and is chronic, worsening and dull compared to patient's normal condition. It is moderate in intensity for a duration of 3 months and is intermittent with modifying factors increased by or worse with eating meat. .. Needs esophageal dilation and needs to chew 50-60 times BEFORE swallowing meats. Wounds very nicely healing, no erythema, no induration, no purulent discharge. No constipation, BMs back to normal.    Thoroughly reviewed the patient's medical history, family history, social history and review of systems with the patient today in the office. Please see medical record for pertinent positives.       Past Medical History:   Diagnosis Date    Arthritis     Hands    Chronic cluster headache 6/7/2019 last    COPD (chronic obstructive pulmonary disease) ([facility] [address]     \"dx 2 yrs ago\" \\"does not see a lung dr Teena Riveor with PCP    Degenerative disc disease     \"in my back have degenarative disc\"    Depression     Edentulous     Fracture     \"fell over a 5 gallon bucket and landed on cement block and broke my right hand\" (7/6/2015)    GERD with esophagitis     H/O dizziness     States will feel like he is going to pass out, possible low BS    Hiatal hernia     Hx of gastric ulcer     HX OTHER MEDICAL     pt states has trouble with reading and writing\"can read very very little\"    Hyperlipidemia     Hypertension     No meds as of 5/27/21 - follows with PCP    Impingement syndrome of right shoulder     Left shoulder pain     limited range-of-motion    Lower back pain     Motion sickness     Neck pain     more on left side into shoulder--pain limits ROM    Vertigo         Past Surgical History:   Procedure Laterality Date    ARM SURGERY Left 30+ yrs    \"put plastic ligaments in \"  after injury through glass window    CARPAL TUNNEL RELEASE Right 10/4/2019    RIGHT CARPAL TUNNEL RELEASE performed by Sendy Hensley DO at [facility]  2010    COLONOSCOPY  2018    HX: polyps - Dr. Noe Romano COLONOSCOPY N/A 12/30/2019    COLONOSCOPY DIAGNOSTIC performed by Tate Trammell MD at [address] [place] [address]      all teeth extracted    ENDOSCOPY, COLON, DIAGNOSTIC  04/08/2019    EGD - thrush noted through out    ENDOSCOPY, COLON, DIAGNOSTIC  06/01/2021    dr winters:multiple gastric ulcers, intact fundiplication with tightening, dil 18-20, biopsy r/o h pylori, f/u as scheduled    EYE SURGERY Left 1990's    \"metal removed from eye\"    GASTRIC FUNDOPLICATION N/A 06/72/9132    NISSEN FUNDOPLICATION LAPAROSCOPIC ROBOTIC HIATAL HERNIA performed by Tate Trammell MD at [place] [address]  12/2018    UPPER GASTROINTESTINAL ENDOSCOPY N/A 1/28/2019    EGD BIOPSY / BRUSHING OF ESOPHAGUS performed by Tate Trammell MD at [facility] N/A 4/8/2019    EGD BIOPSY AND BRUSHING performed by Tate Trammell MD at [facility] N/A 7/8/2019    EGD BIOPSY and brushing performed by Tate Trammell MD at [facility] N/A 12/30/2019    EGD BIOPSY/BRUSHING OF ESOPHAGUS performed by Tate Trammell MD at [facility] 6/5/2020    EGD BIOPSY performed by Tate Trammell MD at [facility] N/A 6/1/2021    EGD DILATION BALLOON WITH 18-20 BALLOON UP TO 21 WITH BIOPSIES performed by Tate Trammell MD at [address] [place] [address] Marital status:      Spouse name: Not on file    Number of children: Not on file    Years of education: Not on file    Highest education level: Not on file   Occupational History    Not on file   Tobacco Use    Smoking status: Current Every Day Smoker     Packs/day: 0.50     Years: 48.00     Pack years: 24.00     Types: Cigarettes     Start date: 0    Smokeless tobacco: Never Used    Tobacco comment: use to smoke 2.5 PPD   Vaping Use    Vaping Use: Never used   Substance and Sexual Activity    Alcohol use: Yes     Comment: Occasional 1/month    Drug use: Never     Comment: 3 cups of coffee daily    Sexual activity: Not Currently     Partners: Female   Other Topics Concern    Not on file   Social History Narrative    Not on file     Social Determinants of Health     Financial Resource Strain:     Difficulty of Paying Living Expenses:    Food Insecurity:     Worried About Running Out of Food in the Last Year:     Ran Out of Food in the Last Year:    Transportation Needs:     Lack of Transportation (Medical):  Lack of Transportation (Non-Medical):    Physical Activity:     Days of Exercise per Week:     Minutes of Exercise per Session:    Stress:     Feeling of Stress :    Social Connections:     Frequency of Communication with Friends and Family:     Frequency of Social Gatherings with Friends and Family:     Attends Sabianist Services:     Active Member of Clubs or Organizations:     Attends Club or Organization Meetings:     Marital Status:    Intimate Partner Violence:     Fear of Current or Ex-Partner:     Emotionally Abused:     Physically Abused:     Sexually Abused:          Allergies   Allergen Reactions    Bee Venom Anaphylaxis    Nsaids Other (See Comments)     Can take small doses for a short time - Not to take any longer than necessary stomach ulcer        Family History   Problem Relation Age of Onset    Dementia Mother     Stroke Mother     Stroke Father     High Blood Pressure Father     Heart Disease Father     Breast Cancer Sister     Diabetes Brother     Cancer Brother         mouth and throat    Heart Attack Maternal Uncle     Heart Disease Maternal Uncle     No Known Problems Son     No Known Problems Son     No Known Problems Daughter        Current Outpatient Medications   Medication Sig Dispense Refill    acarbose (PRECOSE) 25 MG tablet       atorvastatin (LIPITOR) 20 MG tablet       varenicline (CHANTIX) 1 MG tablet Take 1 tablet by mouth 2 times daily 180 tablet 1    metoclopramide (REGLAN) 10 MG tablet Take 1 tablet by mouth 3 times daily (with meals) 120 tablet 3    cetirizine (ZYRTEC) 10 MG tablet 1 tablet daily      fluticasone (FLONASE) 50 MCG/ACT nasal spray as needed      albuterol sulfate  (90 Base) MCG/ACT inhaler Inhale 2 puffs into the lungs every 6 hours as needed      pantoprazole (PROTONIX) 40 MG tablet Take 1 tablet by mouth 2 times daily 60 tablet 3    Nutritional Supplement LIQD Take 1 Can by mouth 3 times daily 90 Can 5    Nutritional Supplements (ENSURE HIGH PROTEIN) LIQD Take 1 Can by mouth 3 times daily Different flavors if possible 30 Can 5    ondansetron (ZOFRAN ODT) 4 MG disintegrating tablet Place 1 tablet under the tongue every 8 hours as needed for Nausea or Vomiting 30 tablet 3    ipratropium-albuterol (DUONEB) 0.5-2.5 (3) MG/3ML SOLN nebulizer solution as needed       Nebulizers (MICRO AIR NEBULIZER) MISC TO USE AS NEEDED WITH NEBULIZER SOLUTION  11    acetaminophen (TYLENOL) 500 MG tablet Take 1,000 mg by mouth every 6 hours as needed for Pain      tiotropium (SPIRIVA HANDIHALER) 18 MCG inhalation capsule Inhale 1 capsule into the lungs daily 30 capsule 3    albuterol sulfate HFA (PROVENTIL HFA) 108 (90 BASE) MCG/ACT inhaler Inhale 2 puffs into the lungs every 6 hours as needed for Wheezing 1 Inhaler 3    cholestyramine (QUESTRAN) 4 g packet Take 1 packet by mouth 3 times daily (Patient not taking: Reported on 6/11/2021) 90 packet 3     No current facility-administered medications for this visit. Review of Systems   Constitutional: Negative for activity change, chills, diaphoresis and fever. HENT: Negative for sore throat, trouble swallowing and voice change. Eyes: Negative for photophobia and visual disturbance. Respiratory: Negative for cough, shortness of breath and wheezing. Cardiovascular: Negative for chest pain, palpitations and leg swelling. Gastrointestinal: Positive for abdominal pain. Negative for anal bleeding, blood in stool, constipation, diarrhea, nausea and vomiting ([place] solid food). Endocrine: Negative for cold intolerance, heat intolerance, polydipsia and polyuria. Genitourinary: Negative for dysuria, frequency and hematuria. Musculoskeletal: Negative for joint swelling, myalgias and neck stiffness. Skin: Negative for color change and rash. Neurological: Negative for seizures, speech difficulty, light-headedness and numbness. Hematological: Negative for adenopathy. Does not bruise/bleed easily. OBJECTIVE:    /76   Pulse 75   Temp 97.4 °F (36.3 °C)   Ht 5' 9\" (1.753 m)   Wt 141 lb 6.4 oz (64.1 kg)   SpO2 96%   BMI 20.88 kg/m²    Body mass index is 20.88 kg/m². Physical Exam  Vitals reviewed. Constitutional:       General: He is not in acute distress. Appearance: He is well-developed. He is not diaphoretic. HENT:      Head: Normocephalic and atraumatic. Eyes:      General: No scleral icterus. Conjunctiva/sclera: Conjunctivae normal.      Pupils: Pupils are equal, round, and reactive to light. Neck:      Thyroid: No thyromegaly. Vascular: No JVD. Trachea: No tracheal deviation. Cardiovascular:      Rate and Rhythm: Normal rate and regular rhythm. Heart sounds: Normal heart sounds. No murmur heard. No friction rub. No gallop. Pulmonary:      Effort: Pulmonary effort is normal. No respiratory distress. Breath sounds: No stridor.  No wheezing or rales. Chest:      Chest wall: No tenderness. Abdominal:      General: Bowel sounds are normal. There is no distension. Palpations: Abdomen is soft. There is no mass. Tenderness: There is no abdominal tenderness. There is no guarding or rebound. Musculoskeletal:         General: No tenderness. Normal range of motion. Cervical back: Normal range of motion. Lymphadenopathy:      Cervical: No cervical adenopathy. Skin:     General: Skin is warm and dry. Coloration: Skin is not pale. Findings: No erythema or rash. Neurological:      Mental Status: He is alert and oriented to person, place, and time. Cranial Nerves: No cranial nerve deficit. Coordination: Coordination normal.   Psychiatric:         Behavior: Behavior normal.         Thought Content: Thought content normal.         Judgment: Judgment normal.           ASSESSMENT & PLAN:    1. Pharyngoesophageal dysphagia           Needs esophageal dilation and needs to chew 50-60 times BEFORE swallowing meats. Will EGD and Balloon dilate him ASAP    AND    Will quit smoking I Rx-ed him Chantix. Patient counseled on the risks, benefits, and alternatives of treatment plan at length while in the office today. Patient states an understanding and willingness to proceed with the plan. Follow Up:  Return in about 2 weeks (around 6/25/2021), or EGD and Esophageal Dilation, for Surgery. Sonya Andres MD, FACS, FICS.     6/11/21

## 2021-06-15 RX ORDER — PANTOPRAZOLE SODIUM 40 MG/1
TABLET, DELAYED RELEASE ORAL
Qty: 60 TABLET | Refills: 2 | Status: SHIPPED | OUTPATIENT
Start: 2021-06-15 | End: 2021-09-19

## 2021-06-15 RX ORDER — CHOLESTYRAMINE 4 G/9G
POWDER, FOR SUSPENSION ORAL
Qty: 90 PACKET | Refills: 2 | Status: SHIPPED | OUTPATIENT
Start: 2021-06-15 | End: 2021-09-19

## 2021-06-17 ENCOUNTER — TELEPHONE (OUTPATIENT)
Dept: BARIATRICS/WEIGHT MGMT | Age: 61
End: 2021-06-17

## 2021-06-17 NOTE — TELEPHONE ENCOUNTER
SPOKE TO  Raquel Rose REGARDING SURGERY (EGD WITH BX, BALLOON DILATION) SCHEDULED @ [facility]. NOTIFIED OF DATES, TIMES AND LOCATION    PHONE ASSESSMENT   COVID - FULLY [facility]  SURGERY - 6/29/21 @ 1100  P/O - 7/7/21 @ 1000    NPO AFTER MIDNIGHT  HOLD BLOOD THINNERS - OKAY TO CONT.

## 2021-06-27 ENCOUNTER — ANESTHESIA EVENT (OUTPATIENT)
Dept: ENDOSCOPY | Age: 61
End: 2021-06-27
Payer: MEDICAID

## 2021-06-27 ASSESSMENT — LIFESTYLE VARIABLES: SMOKING_STATUS: 1

## 2021-06-27 NOTE — ANESTHESIA PRE PROCEDURE
Department of Anesthesiology  Preprocedure Note       Name:  Zion Slater   Age:  62 y.o.  :  1960                                          MRN:  [id number]         Date:  2021      Surgeon: Isa Collins):  Ana Stout MD    Procedure: Procedure(s):  EGD DILATION BALLOON WITH BX    Medications prior to admission:   Prior to Admission medications    Medication Sig Start Date End Date Taking?  Authorizing Provider   pantoprazole (PROTONIX) 40 MG tablet TAKE ONE TABLET BY MOUTH TWICE A DAY 6/15/21   Mary Ceballos MD   cholestyramine (QUESTRAN) 4 g packet TAKE ONE PACKET BY MOUTH THREE TIMES A DAY 6/15/21   Aan Stout MD   acarbose (PRECOSE) 25 MG tablet  21   Historical Provider, MD   atorvastatin (LIPITOR) 20 MG tablet  21   Historical Provider, MD   varenicline (CHANTIX) 1 MG tablet Take 1 tablet by mouth 2 times daily 21   Ana Stout MD   metoclopramide (REGLAN) 10 MG tablet Take 1 tablet by mouth 3 times daily (with meals) 21   Ana Stout MD   cetirizine (ZYRTEC) 10 MG tablet 1 tablet daily 3/9/21   Historical Provider, MD   fluticasone (FLONASE) 50 MCG/ACT nasal spray as needed 3/9/21   Historical Provider, MD   albuterol sulfate  (90 Base) MCG/ACT inhaler Inhale 2 puffs into the lungs every 6 hours as needed    Historical Provider, MD   Nutritional Supplement LIQD Take 1 Can by mouth 3 times daily 20   Ana Stout MD   Nutritional Supplements (ENSURE HIGH PROTEIN) LIQD Take 1 Can by mouth 3 times daily Different flavors if possible 20   Ana Stout MD   ondansetron (ZOFRAN ODT) 4 MG disintegrating tablet Place 1 tablet under the tongue every 8 hours as needed for Nausea or Vomiting 19   Ana Stout MD   ipratropium-albuterol (DUONEB) 0.5-2.5 (3) MG/3ML SOLN nebulizer solution as needed  10/19/19   Historical Provider, MD   Nebulizers (MICRO AIR NEBULIZER) MISC TO USE AS NEEDED WITH NEBULIZER SOLUTION 19 MCG inhalation capsule Inhale 1 capsule into the lungs daily 30 capsule 3    albuterol sulfate HFA (PROVENTIL HFA) 108 (90 BASE) MCG/ACT inhaler Inhale 2 puffs into the lungs every 6 hours as needed for Wheezing 1 Inhaler 3       Allergies:     Allergies   Allergen Reactions    Bee Venom Anaphylaxis    Nsaids Other (See Comments)     Can take small doses for a short time - Not to take any longer than necessary stomach ulcer       Problem List:    Patient Active Problem List   Diagnosis Code    Impingement syndrome of right shoulder M75.41    Acute bronchitis with COPD ([facility] [address] J44.0, J20.9    Chest pain R07.9    Post-nasal drip R09.82    Chronic cluster headache G44.029    Generalized weakness R53.1    Vertigo R42    Positional lightheadedness R42    Hiatal hernia K44.9    Gastroesophageal reflux disease with esophagitis K21.00    PUD (peptic ulcer disease) K27.9    Paraesophageal hiatal hernia K44.9    Status post laparoscopic Nissen fundoplication Z98.791    Esophageal dysphagia R13.10    Esophageal thrush (HCC) B37.81    Candida esophagitis (HCC) B37.81    Gastroparesis K31.84    Candida infection, esophageal (HCC) B37.81    Pneumonia J18.9    Left upper quadrant pain R10.12    LLQ pain R10.32    Fungal esophagitis K20.80, B49    Left flank pain R10.9    Gastroesophageal reflux disease without esophagitis K21.9    Dyslipidemia E78.5    Essential hypertension I10    Epigastric pain R10.13    Esophageal stricture K22.2    Pharyngoesophageal dysphagia R13.14       Past Medical History:        Diagnosis Date    Arthritis     Hands    Chronic cluster headache 6/7/2019 last    COPD (chronic obstructive pulmonary disease) ([facility] [address]     \"dx 2 yrs ago\" \\"does not see a lung dr Tani Pearson with PCP    Degenerative disc disease     \"in my back have degenarative disc\"    Depression     Edentulous     Fracture     \"fell over a 5 gallon bucket and landed on cement block and broke my right hand\" (7/6/2015)    GERD with esophagitis     H/O dizziness     States will feel like he is going to pass out, possible low BS    Hiatal hernia     Hx of gastric ulcer     HX OTHER MEDICAL     pt states has trouble with reading and writing\"can read very very little\"    Hyperlipidemia     Hypertension     No meds as of 5/27/21 - follows with PCP    Impingement syndrome of right shoulder     Left shoulder pain     limited range-of-motion    Lower back pain     Motion sickness     Neck pain     more on left side into shoulder--pain limits ROM    PONV (postoperative nausea and vomiting)     Vertigo        Past Surgical History:        Procedure Laterality Date    ARM SURGERY Left 30+ yrs    \"put plastic ligaments in \"  after injury through glass window    CARPAL TUNNEL RELEASE Right 10/4/2019    RIGHT CARPAL TUNNEL RELEASE performed by Coreen Haines DO at [address] [place] [address] Rd  2010    COLONOSCOPY  2018    HX: polyps - Dr. Paty Cardoza    COLONOSCOPY N/A 12/30/2019    COLONOSCOPY DIAGNOSTIC performed by Jacquelynn Homans, MD at [address] [place] [address]      all teeth extracted    ENDOSCOPY, COLON, DIAGNOSTIC  04/08/2019    EGD - thrush noted through out    ENDOSCOPY, COLON, DIAGNOSTIC  06/01/2021    dr winters:multiple gastric ulcers, intact fundiplication with tightening, dil 18-20, biopsy r/o h pylori, f/u as scheduled    EYE SURGERY Left 1990's    \"metal removed from eye\"    GASTRIC FUNDOPLICATION N/A 87/76/0532    NISSEN FUNDOPLICATION LAPAROSCOPIC ROBOTIC HIATAL HERNIA performed by Jacquelynn Homans, MD at [place] [address]  12/2018    UPPER GASTROINTESTINAL ENDOSCOPY N/A 1/28/2019    EGD BIOPSY / BRUSHING OF ESOPHAGUS performed by Jacquelynn Homans, MD at [address] [facility] N/A 4/8/2019    EGD BIOPSY AND BRUSHING performed by Jacquelynn Homans, MD at [facility] [address] [facility] N/A 7/8/2019 EGD BIOPSY and brushing performed by Starla Du MD at [facility] N/A 12/30/2019    EGD BIOPSY/BRUSHING OF ESOPHAGUS performed by Starla Du MD at [facility] 6/5/2020    EGD BIOPSY performed by Starla Du MD at [facility] N/A 6/1/2021    EGD DILATION BALLOON WITH 18-20 BALLOON UP TO 21 WITH BIOPSIES performed by Starla Du MD at [facility] ENDOSCOPY       Social History:    Social History     Tobacco Use    Smoking status: Current Every Day Smoker     Packs/day: 0.50     Years: 48.00     Pack years: 24.00     Types: Cigarettes     Start date: 1972    Smokeless tobacco: Never Used    Tobacco comment: use to smoke 2.5 PPD   Substance Use Topics    Alcohol use: Yes     Comment: Occasional 1/month                                Ready to quit: Not Answered  Counseling given: Not Answered  Comment: use to smoke 2.5 PPD      Vital Signs (Current): There were no vitals filed for this visit.                                            BP Readings from Last 3 Encounters:   06/11/21 100/76   06/01/21 (!) 93/45   06/01/21 (!) 142/88       NPO Status:                                                                                 BMI:   Wt Readings from Last 3 Encounters:   06/11/21 141 lb 6.4 oz (64.1 kg)   05/19/21 139 lb 14.4 oz (63.5 kg)   05/05/21 144 lb (65.3 kg)     There is no height or weight on file to calculate BMI.    CBC:   Lab Results   Component Value Date    WBC 6.6 01/16/2020    RBC 4.83 01/16/2020    HGB 14.0 01/16/2020    HCT 41.6 01/16/2020    MCV 86.2 01/16/2020    RDW 13.2 10/12/2019     01/16/2020       CMP:   Lab Results   Component Value Date     01/16/2020    K 4.5 01/16/2020     01/16/2020    CO2 24 01/16/2020    BUN 11 01/16/2020    CREATININE 0.9 03/04/2020    CREATININE 0.8 01/16/2020    GFRAA >60 03/04/2020    LABGLOM >60 03/04/2020    GLUCOSE 66 01/16/2020    PROT 7.0 10/11/2019    PROT 7.0 11/19/2012    CALCIUM 9.8 01/16/2020    BILITOT 0.3 01/16/2020    ALKPHOS 82 01/16/2020    AST 18 01/16/2020    ALT 10 01/16/2020       POC Tests: No results for input(s): POCGLU, POCNA, POCK, POCCL, POCBUN, POCHEMO, POCHCT in the last 72 hours. Coags:   Lab Results   Component Value Date    PROTIME 11.8 08/02/2017    PROTIME 10.8 05/15/2011    INR 1.03 08/02/2017    APTT 31.3 08/02/2017       HCG (If Applicable): No results found for: PREGTESTUR, PREGSERUM, HCG, HCGQUANT     ABGs: No results found for: PHART, PO2ART, HWT5MJV, FKP1NCT, BEART, M0DWNELQ     Type & Screen (If Applicable):  No results found for: LABABO, LABRH    Drug/Infectious Status (If Applicable):  Lab Results   Component Value Date    HEPCAB NON REACTIVE 03/08/2011       COVID-19 Screening (If Applicable):   Lab Results   Component Value Date    COVID19 NOT DETECTED 06/01/2020           Anesthesia Evaluation  Patient summary reviewed   history of anesthetic complications: PONV. Airway: Mallampati: II  TM distance: >3 FB   Neck ROM: full  Mouth opening: > = 3 FB Dental:          Pulmonary:   (+) pneumonia: no interval change,  COPD:  current smoker                           Cardiovascular:  Exercise tolerance: poor (<4 METS),   (+) hypertension:, valvular problems/murmurs: MR, hyperlipidemia         Beta Blocker:  Not on Beta Blocker      ROS comment: Echo 4/2020:  Summary   Left ventricular function is low normal, EF is estimated at 50-55%. Mild left ventricular hypertrophy. E/A reversal; indeterminate diastolic function. Mild mitral and tricuspid regurgitation is present. RVSP is 21 mmHg. Dilation of the aortic root(4.2) and the ascending aorta(4.0). No evidence of pericardial effusion.     Stress test 4/2021:  Summary   Normal study.   Normal perfusion study with normal distribution in all coronal, short, and   horizontal axis.   The observed defect is consistent with diaphragmatic attenuation.   Normal LV function. LVEF is 48 %.   Supervising physician Dr. Julia Aaron .            Neuro/Psych:   (+) headaches:, psychiatric history:            GI/Hepatic/Renal:   (+) hiatal hernia, GERD:, PUD,           Endo/Other: Negative Endo/Other ROS                    Abdominal:             Vascular: negative vascular ROS. Other Findings:           Anesthesia Plan      MAC and general     ASA 4       Induction: intravenous. Anesthetic plan and risks discussed with patient. TIMOTHY Olson CRNA   6/27/2021         Pre Anesthesia Assessment complete. Chart reviewed on 6/27/2021     Pre Anesthesia Evaluation complete. Anesthesia plan, risks, benefits, alternatives, and personnel discussed with patient and/or legal guardian. Patient and/or legal guardian verbalized an understanding and agreed to proceed. Anesthesia plan discussed with care team members and agreed upon.   TIMOTHY Hernandez CRNA  6/29/2021

## 2021-06-29 ENCOUNTER — ANESTHESIA (OUTPATIENT)
Dept: ENDOSCOPY | Age: 61
End: 2021-06-29
Payer: MEDICAID

## 2021-06-29 ENCOUNTER — HOSPITAL ENCOUNTER (OUTPATIENT)
Age: 61
Setting detail: OUTPATIENT SURGERY
Discharge: HOME | End: 2021-06-29
Attending: SURGERY | Admitting: SURGERY
Payer: MEDICAID

## 2021-06-29 VITALS
RESPIRATION RATE: 20 BRPM | OXYGEN SATURATION: 100 % | DIASTOLIC BLOOD PRESSURE: 81 MMHG | SYSTOLIC BLOOD PRESSURE: 111 MMHG

## 2021-06-29 VITALS
RESPIRATION RATE: 20 BRPM | SYSTOLIC BLOOD PRESSURE: 115 MMHG | BODY MASS INDEX: 20.23 KG/M2 | DIASTOLIC BLOOD PRESSURE: 85 MMHG | OXYGEN SATURATION: 97 % | HEART RATE: 52 BPM | WEIGHT: 137 LBS | TEMPERATURE: 96.2 F

## 2021-06-29 LAB
BACTERIA: ABNORMAL /HPF
BASOPHILS ABSOLUTE: 0 K/CU MM
BASOPHILS RELATIVE PERCENT: 0.5 % (ref 0–1)
BILIRUBIN URINE: NEGATIVE MG/DL
BLOOD, URINE: NEGATIVE
CLARITY: CLEAR
COLOR: YELLOW
DIFFERENTIAL TYPE: ABNORMAL
EOSINOPHILS ABSOLUTE: 0.2 K/CU MM
EOSINOPHILS RELATIVE PERCENT: 2.4 % (ref 0–3)
GLUCOSE, URINE: NEGATIVE MG/DL
HCT VFR BLD CALC: 40.8 % (ref 42–52)
HEMOGLOBIN: 13.5 GM/DL (ref 13.5–18)
IMMATURE NEUTROPHIL %: 0.5 % (ref 0–0.43)
KETONES, URINE: NEGATIVE MG/DL
LEUKOCYTE ESTERASE, URINE: ABNORMAL
LYMPHOCYTES ABSOLUTE: 1.9 K/CU MM
LYMPHOCYTES RELATIVE PERCENT: 30.2 % (ref 24–44)
MCH RBC QN AUTO: 30.3 PG (ref 27–31)
MCHC RBC AUTO-ENTMCNC: 33.1 % (ref 32–36)
MCV RBC AUTO: 91.5 FL (ref 78–100)
MONOCYTES ABSOLUTE: 0.5 K/CU MM
MONOCYTES RELATIVE PERCENT: 7.2 % (ref 0–4)
MUCUS: ABNORMAL HPF
NITRITE URINE, QUANTITATIVE: NEGATIVE
NUCLEATED RBC %: 0 %
PDW BLD-RTO: 13.4 % (ref 11.7–14.9)
PH, URINE: 6 (ref 5–8)
PLATELET # BLD: 198 K/CU MM (ref 140–440)
PMV BLD AUTO: 8.8 FL (ref 7.5–11.1)
PROTEIN UA: NEGATIVE MG/DL
RBC # BLD: 4.46 M/CU MM (ref 4.6–6.2)
RBC URINE: 1 /HPF (ref 0–3)
SEGMENTED NEUTROPHILS ABSOLUTE COUNT: 3.8 K/CU MM
SEGMENTED NEUTROPHILS RELATIVE PERCENT: 59.2 % (ref 36–66)
SPECIFIC GRAVITY UA: 1.01 (ref 1–1.03)
SQUAMOUS EPITHELIAL: <1 /HPF
TOTAL IMMATURE NEUTOROPHIL: 0.03 K/CU MM
TOTAL NUCLEATED RBC: 0 K/CU MM
TRICHOMONAS: ABNORMAL /HPF
UROBILINOGEN, URINE: NEGATIVE MG/DL (ref 0.2–1)
WBC # BLD: 6.4 K/CU MM (ref 4–10.5)
WBC UA: 10 /HPF (ref 0–2)

## 2021-06-29 PROCEDURE — 7100000011 HC PHASE II RECOVERY - ADDTL 15 MIN: Performed by: SURGERY

## 2021-06-29 PROCEDURE — 88312 SPECIAL STAINS GROUP 1: CPT

## 2021-06-29 PROCEDURE — 85025 COMPLETE CBC W/AUTO DIFF WBC: CPT

## 2021-06-29 PROCEDURE — 2580000003 HC RX 258: Performed by: NURSE ANESTHETIST, CERTIFIED REGISTERED

## 2021-06-29 PROCEDURE — 87086 URINE CULTURE/COLONY COUNT: CPT

## 2021-06-29 PROCEDURE — 6370000000 HC RX 637 (ALT 250 FOR IP): Performed by: SURGERY

## 2021-06-29 PROCEDURE — 7100000010 HC PHASE II RECOVERY - FIRST 15 MIN: Performed by: SURGERY

## 2021-06-29 PROCEDURE — 88112 CYTOPATH CELL ENHANCE TECH: CPT

## 2021-06-29 PROCEDURE — 3700000001 HC ADD 15 MINUTES (ANESTHESIA): Performed by: SURGERY

## 2021-06-29 PROCEDURE — 6360000002 HC RX W HCPCS: Performed by: NURSE ANESTHETIST, CERTIFIED REGISTERED

## 2021-06-29 PROCEDURE — 2709999900 HC NON-CHARGEABLE SUPPLY: Performed by: SURGERY

## 2021-06-29 PROCEDURE — 81001 URINALYSIS AUTO W/SCOPE: CPT

## 2021-06-29 PROCEDURE — 43239 EGD BIOPSY SINGLE/MULTIPLE: CPT | Performed by: SURGERY

## 2021-06-29 PROCEDURE — 2500000003 HC RX 250 WO HCPCS: Performed by: NURSE ANESTHETIST, CERTIFIED REGISTERED

## 2021-06-29 PROCEDURE — 43245 EGD DILATE STRICTURE: CPT | Performed by: SURGERY

## 2021-06-29 PROCEDURE — 3700000000 HC ANESTHESIA ATTENDED CARE: Performed by: SURGERY

## 2021-06-29 PROCEDURE — 3609012700 HC EGD DILATION SAVORY: Performed by: SURGERY

## 2021-06-29 PROCEDURE — 88305 TISSUE EXAM BY PATHOLOGIST: CPT

## 2021-06-29 RX ORDER — FLUCONAZOLE 100 MG/1
100 TABLET ORAL DAILY
Qty: 14 TABLET | Refills: 2 | Status: SHIPPED | OUTPATIENT
Start: 2021-06-29 | End: 2021-07-13

## 2021-06-29 RX ORDER — PROPOFOL 10 MG/ML
INJECTION, EMULSION INTRAVENOUS PRN
Status: DISCONTINUED | OUTPATIENT
Start: 2021-06-29 | End: 2021-06-29 | Stop reason: SDUPTHER

## 2021-06-29 RX ORDER — LIDOCAINE HYDROCHLORIDE 20 MG/ML
INJECTION, SOLUTION EPIDURAL; INFILTRATION; INTRACAUDAL; PERINEURAL PRN
Status: DISCONTINUED | OUTPATIENT
Start: 2021-06-29 | End: 2021-06-29 | Stop reason: SDUPTHER

## 2021-06-29 RX ORDER — SODIUM CHLORIDE, SODIUM LACTATE, POTASSIUM CHLORIDE, CALCIUM CHLORIDE 600; 310; 30; 20 MG/100ML; MG/100ML; MG/100ML; MG/100ML
INJECTION, SOLUTION INTRAVENOUS CONTINUOUS PRN
Status: DISCONTINUED | OUTPATIENT
Start: 2021-06-29 | End: 2021-06-29 | Stop reason: SDUPTHER

## 2021-06-29 RX ORDER — SODIUM CHLORIDE, SODIUM LACTATE, POTASSIUM CHLORIDE, CALCIUM CHLORIDE 600; 310; 30; 20 MG/100ML; MG/100ML; MG/100ML; MG/100ML
INJECTION, SOLUTION INTRAVENOUS CONTINUOUS
Status: DISCONTINUED | OUTPATIENT
Start: 2021-06-29 | End: 2021-06-29 | Stop reason: HOSPADM

## 2021-06-29 RX ADMIN — PROPOFOL 50 MG: 10 INJECTION, EMULSION INTRAVENOUS at 10:24

## 2021-06-29 RX ADMIN — LIDOCAINE HYDROCHLORIDE 50 MG: 20 INJECTION, SOLUTION EPIDURAL; INFILTRATION; INTRACAUDAL; PERINEURAL at 10:22

## 2021-06-29 RX ADMIN — PROPOFOL 20 MG: 10 INJECTION, EMULSION INTRAVENOUS at 10:35

## 2021-06-29 RX ADMIN — LIDOCAINE HYDROCHLORIDE 50 MG: 20 INJECTION, SOLUTION EPIDURAL; INFILTRATION; INTRACAUDAL; PERINEURAL at 10:21

## 2021-06-29 RX ADMIN — PROPOFOL 100 MG: 10 INJECTION, EMULSION INTRAVENOUS at 10:21

## 2021-06-29 RX ADMIN — PROPOFOL 20 MG: 10 INJECTION, EMULSION INTRAVENOUS at 10:31

## 2021-06-29 RX ADMIN — PROPOFOL 50 MG: 10 INJECTION, EMULSION INTRAVENOUS at 10:22

## 2021-06-29 RX ADMIN — PROPOFOL 20 MG: 10 INJECTION, EMULSION INTRAVENOUS at 11:00

## 2021-06-29 RX ADMIN — PROPOFOL 20 MG: 10 INJECTION, EMULSION INTRAVENOUS at 10:43

## 2021-06-29 RX ADMIN — PROPOFOL 20 MG: 10 INJECTION, EMULSION INTRAVENOUS at 10:39

## 2021-06-29 RX ADMIN — PROPOFOL 20 MG: 10 INJECTION, EMULSION INTRAVENOUS at 10:50

## 2021-06-29 RX ADMIN — PROPOFOL 30 MG: 10 INJECTION, EMULSION INTRAVENOUS at 10:27

## 2021-06-29 RX ADMIN — PROPOFOL 20 MG: 10 INJECTION, EMULSION INTRAVENOUS at 10:55

## 2021-06-29 RX ADMIN — SODIUM CHLORIDE, POTASSIUM CHLORIDE, SODIUM LACTATE AND CALCIUM CHLORIDE: 600; 310; 30; 20 INJECTION, SOLUTION INTRAVENOUS at 10:18

## 2021-06-29 RX ADMIN — BENZOCAINE AND MENTHOL 1 LOZENGE: 15; 3.6 LOZENGE ORAL at 12:07

## 2021-06-29 ASSESSMENT — PAIN SCALES - GENERAL
PAINLEVEL_OUTOF10: 8
PAINLEVEL_OUTOF10: 3

## 2021-06-29 ASSESSMENT — PAIN DESCRIPTION - FREQUENCY: FREQUENCY: CONTINUOUS

## 2021-06-29 ASSESSMENT — PAIN DESCRIPTION - DESCRIPTORS: DESCRIPTORS: ACHING;CONSTANT

## 2021-06-29 ASSESSMENT — PAIN DESCRIPTION - LOCATION: LOCATION: THROAT

## 2021-06-29 ASSESSMENT — PAIN DESCRIPTION - ONSET: ONSET: ON-GOING

## 2021-06-29 NOTE — PROGRESS NOTES
1113- Patient returned to room drowsy but alert. Able to make needs known. Report given to this nurse from [address] [place] [address]. Patient vital wnl, Call light in reach, bed in lowest position. 1122- Patient c/o pain in throat 9/10 this nurse gave patient ice chips, girlfriend sobeida at beside. CBC and urine sample ordered and collected. 1140- patient still c/o pain 9/10 to throat p/s sent to Dr. Bharath Stallings new orders received. 1145 Patient requesting coffee sitting on side of bed getting dressed girlfriend/ Sobeida at bedside. 1200- Patient sipping coffee stated his throat feels better 3/10. Discharge instructions given to [facility] and Patient both verbalized understanding. 1215 Patient escorted to car via w/c where [facility] will transport.
6/28/21 - . Notified girlfriend surgery 6/29/21 @ 1100, arrival 0900. No questions asked and understanding verbalized.
Patient is awake, alert and oriented. Preop questions reviewed and verified. H&P and consent completed. Report handoff received from [address] ([facility]).
Patient is back to baseline. Report handoff given to [facility] [address] ([facility]).
devices). 12. Take a shower the night before or morning of your procedure, do not apply any lotion, oil or powder. 13. Wear a mask covering your nose & mouth when entering the hospital. Vaccinated. Quarantine yourself after the test until after your surgery.

## 2021-06-29 NOTE — ANESTHESIA POSTPROCEDURE EVALUATION
Department of Anesthesiology  Postprocedure Note    Patient: Karla Ferraro  MRN: [id number]  YOB: 1960  Date of evaluation: 6/29/2021  Time:  11:09 AM     Procedure Summary     Date: 06/29/21 Room / Location: [address]    Anesthesia Start: 5236 Anesthesia Stop: 1109    Procedure:  Med Tech Paloma used 55, 50. (N/A ) Diagnosis: (EPIGASTRIC PAIN , ESOPHAGEAL STRICTURE)    Surgeons: Jacquelynn Homans, MD Responsible Provider: Deidre Sr MD    Anesthesia Type: MAC, general ASA Status: 4          Anesthesia Type: MAC, general    Sreedhar Phase I:      Sreedhar Phase II:      Last vitals: Reviewed and per EMR flowsheets.        Anesthesia Post Evaluation    Patient location during evaluation: bedside  Patient participation: complete - patient participated  Level of consciousness: sleepy but conscious  Pain score: 0  Airway patency: patent  Nausea & Vomiting: no nausea and no vomiting  Complications: no  Cardiovascular status: blood pressure returned to baseline and hemodynamically stable  Respiratory status: acceptable, room air and spontaneous ventilation  Hydration status: euvolemic
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
## 2021-06-30 LAB
CULTURE: NORMAL
Lab: NORMAL
SPECIMEN: NORMAL

## 2021-07-07 ENCOUNTER — OFFICE VISIT (OUTPATIENT)
Dept: BARIATRICS/WEIGHT MGMT | Age: 61
End: 2021-07-07
Payer: MEDICAID

## 2021-07-07 VITALS
WEIGHT: 141.8 LBS | TEMPERATURE: 97.9 F | DIASTOLIC BLOOD PRESSURE: 78 MMHG | OXYGEN SATURATION: 96 % | HEART RATE: 70 BPM | HEIGHT: 69 IN | SYSTOLIC BLOOD PRESSURE: 120 MMHG | BODY MASS INDEX: 21 KG/M2

## 2021-07-07 DIAGNOSIS — R10.13 EPIGASTRIC PAIN: ICD-10-CM

## 2021-07-07 DIAGNOSIS — B37.81 ESOPHAGEAL THRUSH (HCC): Primary | ICD-10-CM

## 2021-07-07 DIAGNOSIS — K31.84 GASTROPARESIS: ICD-10-CM

## 2021-07-07 DIAGNOSIS — B37.81 CANDIDA ESOPHAGITIS (HCC): ICD-10-CM

## 2021-07-07 PROCEDURE — 4004F PT TOBACCO SCREEN RCVD TLK: CPT | Performed by: SURGERY

## 2021-07-07 PROCEDURE — G8427 DOCREV CUR MEDS BY ELIG CLIN: HCPCS | Performed by: SURGERY

## 2021-07-07 PROCEDURE — 99213 OFFICE O/P EST LOW 20 MIN: CPT | Performed by: SURGERY

## 2021-07-07 PROCEDURE — 3017F COLORECTAL CA SCREEN DOC REV: CPT | Performed by: SURGERY

## 2021-07-07 PROCEDURE — G8420 CALC BMI NORM PARAMETERS: HCPCS | Performed by: SURGERY

## 2021-07-07 RX ORDER — NYSTATIN 100000 [USP'U]/G
POWDER TOPICAL
Qty: 1 BOTTLE | Refills: 3 | Status: SHIPPED | OUTPATIENT
Start: 2021-07-07

## 2021-07-07 RX ORDER — METOCLOPRAMIDE 10 MG/1
10 TABLET ORAL
Qty: 120 TABLET | Refills: 3 | Status: SHIPPED | OUTPATIENT
Start: 2021-07-07 | End: 2021-12-10

## 2021-07-07 RX ORDER — FLUCONAZOLE 100 MG/1
100 TABLET ORAL DAILY
Qty: 14 TABLET | Refills: 2 | Status: SHIPPED | OUTPATIENT
Start: 2021-07-07 | End: 2021-07-14

## 2021-07-07 ASSESSMENT — ENCOUNTER SYMPTOMS
ABDOMINAL PAIN: 1
DIARRHEA: 0
SORE THROAT: 0
NAUSEA: 0
CONSTIPATION: 0
VOMITING: 0
TROUBLE SWALLOWING: 0
COUGH: 0
SHORTNESS OF BREATH: 0
PHOTOPHOBIA: 0
COLOR CHANGE: 0
BLOOD IN STOOL: 0
WHEEZING: 0
ANAL BLEEDING: 0
VOICE CHANGE: 0

## 2021-07-07 NOTE — PROGRESS NOTES
limits ROM    PONV (postoperative nausea and vomiting)     Vertigo         Past Surgical History:   Procedure Laterality Date    ARM SURGERY Left 30+ yrs    \"put plastic ligaments in \"  after injury through glass window    CARPAL TUNNEL RELEASE Right 10/4/2019    RIGHT CARPAL TUNNEL RELEASE performed by Tam Guevara DO at [address] [place] [address]  2010    COLONOSCOPY  2018    HX: polyps - Dr. Lissa Ruiz COLONOSCOPY N/A 12/30/2019    COLONOSCOPY DIAGNOSTIC performed by Warren Bryant MD at [address] [place] [address]      all teeth extracted    ENDOSCOPY, COLON, DIAGNOSTIC  04/08/2019    EGD - thrush noted through out    ENDOSCOPY, COLON, DIAGNOSTIC  06/01/2021    dr winters:multiple gastric ulcers, intact fundiplication with tightening, dil 18-20, biopsy r/o h pylori, f/u as scheduled    EYE SURGERY Left 1990's    \"metal removed from eye\"    GASTRIC FUNDOPLICATION N/A 75/02/1079    NISSEN FUNDOPLICATION LAPAROSCOPIC ROBOTIC HIATAL HERNIA performed by Warren Bryant MD at [place] [address]  12/2018    UPPER GASTROINTESTINAL ENDOSCOPY N/A 1/28/2019    EGD BIOPSY / BRUSHING OF ESOPHAGUS performed by Warren Bryant MD at [place] [address] N/A 4/8/2019    EGD BIOPSY AND BRUSHING performed by Warren Bryant MD at [place] [address] N/A 7/8/2019    EGD BIOPSY and brushing performed by Warren Bryant MD at [place] [address] N/A 12/30/2019    EGD BIOPSY/BRUSHING OF ESOPHAGUS performed by Warren Bryant MD at [place] [address] 6/5/2020    EGD BIOPSY performed by Warren Bryant MD at [place] [address] 6/1/2021    EGD DILATION BALLOON WITH 18-20 BALLOON UP TO 20 WITH BIOPSIES performed by Warren Bryant MD at [place] [address] 6/29/2021    EGD DILATION GWENDOLYN Guevara Dilators used 55, 50. with biopsies performed by Chapis Gomes MD at [address] [place] [address] Place Marital status:      Spouse name: Not on file    Number of children: Not on file    Years of education: Not on file    Highest education level: Not on file   Occupational History    Not on file   Tobacco Use    Smoking status: Current Every Day Smoker     Packs/day: 0.50     Years: 48.00     Pack years: 24.00     Types: Cigarettes     Start date: 0    Smokeless tobacco: Never Used    Tobacco comment: use to smoke 2.5 PPD   Vaping Use    Vaping Use: Never used   Substance and Sexual Activity    Alcohol use: Yes     Comment: Occasional 1/month    Drug use: Never     Comment: 3 cups of coffee daily    Sexual activity: Not Currently     Partners: Female   Other Topics Concern    Not on file   Social History Narrative    Not on file     Social Determinants of Health     Financial Resource Strain:     Difficulty of Paying Living Expenses:    Food Insecurity:     Worried About Running Out of Food in the Last Year:     Ran Out of Food in the Last Year:    Transportation Needs:     Lack of Transportation (Medical):  Lack of Transportation (Non-Medical):    Physical Activity:     Days of Exercise per Week:     Minutes of Exercise per Session:    Stress:     Feeling of Stress :    Social Connections:     Frequency of Communication with Friends and Family:     Frequency of Social Gatherings with Friends and Family:     Attends Anabaptist Services:     Active Member of Clubs or Organizations:     Attends Club or Organization Meetings:     Marital Status:    Intimate Partner Violence:     Fear of Current or Ex-Partner:     Emotionally Abused:     Physically Abused:     Sexually Abused:          Allergies   Allergen Reactions    Bee Venom Anaphylaxis    Nsaids Other (See Comments)     Can take small doses for a short time - Not to take any longer than necessary stomach ulcer        Family History   Problem Relation Age of Onset    Dementia Mother     Stroke Mother     Stroke Father     High Blood Pressure Father     Heart Disease Father     Breast Cancer Sister     Diabetes Brother     Cancer Brother         mouth and throat    Heart Attack Maternal Uncle     Heart Disease Maternal Uncle     No Known Problems Son     No Known Problems Son     No Known Problems Daughter        Current Outpatient Medications   Medication Sig Dispense Refill    fluconazole (DIFLUCAN) 100 MG tablet Take 1 tablet by mouth daily for 7 days 14 tablet 2    nystatin (MYCOSTATIN) 105551 UNIT/GM powder Apply 3 times daily.  1 Bottle 3    metoclopramide (REGLAN) 10 MG tablet Take 1 tablet by mouth 3 times daily (with meals) 120 tablet 3    pantoprazole (PROTONIX) 40 MG tablet TAKE ONE TABLET BY MOUTH TWICE A DAY 60 tablet 2    cholestyramine (QUESTRAN) 4 g packet TAKE ONE PACKET BY MOUTH THREE TIMES A DAY 90 packet 2    acarbose (PRECOSE) 25 MG tablet       atorvastatin (LIPITOR) 20 MG tablet       varenicline (CHANTIX) 1 MG tablet Take 1 tablet by mouth 2 times daily 180 tablet 1    metoclopramide (REGLAN) 10 MG tablet Take 1 tablet by mouth 3 times daily (with meals) 120 tablet 3    cetirizine (ZYRTEC) 10 MG tablet 1 tablet daily      fluticasone (FLONASE) 50 MCG/ACT nasal spray as needed      albuterol sulfate  (90 Base) MCG/ACT inhaler Inhale 2 puffs into the lungs every 6 hours as needed      Nutritional Supplement LIQD Take 1 Can by mouth 3 times daily 90 Can 5    Nutritional Supplements (ENSURE HIGH PROTEIN) LIQD Take 1 Can by mouth 3 times daily Different flavors if possible 30 Can 5    ondansetron (ZOFRAN ODT) 4 MG disintegrating tablet Place 1 tablet under the tongue every 8 hours as needed for Nausea or Vomiting 30 tablet 3    ipratropium-albuterol (DUONEB) 0.5-2.5 (3) MG/3ML SOLN nebulizer solution as needed       Nebulizers (MICRO AIR NEBULIZER) MISC TO USE AS NEEDED WITH NEBULIZER SOLUTION  11    acetaminophen (TYLENOL) 500 MG tablet Take 1,000 mg by mouth every 6 hours as needed for Pain      tiotropium (SPIRIVA HANDIHALER) 18 MCG inhalation capsule Inhale 1 capsule into the lungs daily 30 capsule 3    albuterol sulfate HFA (PROVENTIL HFA) 108 (90 BASE) MCG/ACT inhaler Inhale 2 puffs into the lungs every 6 hours as needed for Wheezing 1 Inhaler 3    nystatin (MYCOSTATIN) 475088 UNIT/ML suspension Take 5 mLs by mouth 4 times daily for 10 days Retain in mouth as long as possible 200 mL 5    fluconazole (DIFLUCAN) 100 MG tablet Take 1 tablet by mouth daily for 14 days (Patient not taking: Reported on 7/7/2021) 14 tablet 2     No current facility-administered medications for this visit. Review of Systems   Constitutional: Negative for activity change, chills, diaphoresis and fever. HENT: Negative for sore throat, trouble swallowing and voice change. Eyes: Negative for photophobia and visual disturbance. Respiratory: Negative for cough, shortness of breath and wheezing. Cardiovascular: Negative for chest pain, palpitations and leg swelling. Gastrointestinal: Positive for abdominal pain (Epigastric). Negative for anal bleeding, blood in stool, constipation, diarrhea, nausea and vomiting. Endocrine: Negative for cold intolerance, heat intolerance, polydipsia and polyuria. Genitourinary: Negative for dysuria, frequency and hematuria. Musculoskeletal: Negative for joint swelling, myalgias and neck stiffness. Skin: Negative for color change and rash. Neurological: Negative for seizures, speech difficulty, light-headedness and numbness. Hematological: Negative for adenopathy. Does not bruise/bleed easily.          OBJECTIVE:    /78   Pulse 70   Temp 97.9 °F (36.6 °C)   Ht 5' 9\" (1.753 m)   Wt 141 lb 12.8 oz (64.3 kg)   SpO2 96%   BMI 20.94 kg/m²    Body mass index is 20.94 kg/m². Physical Exam  Vitals reviewed. Constitutional:       General: He is not in acute distress. Appearance: He is well-developed. He is not diaphoretic. HENT:      Head: Normocephalic and atraumatic. Eyes:      General: No scleral icterus. Conjunctiva/sclera: Conjunctivae normal.      Pupils: Pupils are equal, round, and reactive to light. Neck:      Thyroid: No thyromegaly. Vascular: No JVD. Trachea: No tracheal deviation. Cardiovascular:      Rate and Rhythm: Normal rate and regular rhythm. Heart sounds: Normal heart sounds. No murmur heard. No friction rub. No gallop. Pulmonary:      Effort: Pulmonary effort is normal. No respiratory distress. Breath sounds: No stridor. No wheezing or rales. Chest:      Chest wall: No tenderness. Abdominal:      General: Bowel sounds are normal. There is no distension. Palpations: Abdomen is soft. There is no mass. Tenderness: There is abdominal tenderness (Epigasrtic). There is no guarding or rebound. Musculoskeletal:         General: No tenderness. Normal range of motion. Cervical back: Normal range of motion. Lymphadenopathy:      Cervical: No cervical adenopathy. Skin:     General: Skin is warm and dry. Coloration: Skin is not pale. Findings: No erythema or rash. Neurological:      Mental Status: He is alert and oriented to person, place, and time. Cranial Nerves: No cranial nerve deficit. Coordination: Coordination normal.   Psychiatric:         Behavior: Behavior normal.         Thought Content: Thought content normal.         Judgment: Judgment normal.           Orders Placed This Encounter   Medications    fluconazole (DIFLUCAN) 100 MG tablet     Sig: Take 1 tablet by mouth daily for 7 days     Dispense:  14 tablet     Refill:  2    nystatin (MYCOSTATIN) 072732 UNIT/GM powder     Sig: Apply 3 times daily.      Dispense:  1 Bottle     Refill:  3    metoclopramide (REGLAN) 10 MG tablet     Sig: Take 1 tablet by mouth 3 times daily (with meals)     Dispense:  120 tablet     Refill:  3     No orders of the defined types were placed in this encounter. ASSESSMENT & PLAN:    1. Esophageal thrush (HCC)    2. Epigastric pain    3. Candida esophagitis ([facility] [address]    4. Gastroparesis         Michelle Salazar is a 62 y.o. male presenting with pain in the Epigasrtium and is chronic, worsening and dull compared to patient's normal condition. It is moderate in intensity for a duration of 3 months and is continuous with modifying factors increased by or worse with eating. Sonny Carr He has esophagitis, fungal, and gastroparesis, need to Rx these 2 FIRST before revising his fundoplication. Patient counseled on the risks, benefits, and alternatives of treatment plan at length while in the office today. Patient states an understanding and willingness to proceed with the plan. Follow Up:  Return in about 4 weeks (around 8/4/2021) for Follow up Symptoms.       La Nena Avery MD, FACS, FICS.    7/7/21

## 2021-08-11 ENCOUNTER — OFFICE VISIT (OUTPATIENT)
Dept: CARDIOLOGY CLINIC | Age: 61
End: 2021-08-11
Payer: MEDICAID

## 2021-08-11 VITALS
WEIGHT: 144.2 LBS | SYSTOLIC BLOOD PRESSURE: 118 MMHG | BODY MASS INDEX: 21.36 KG/M2 | HEIGHT: 69 IN | HEART RATE: 68 BPM | DIASTOLIC BLOOD PRESSURE: 70 MMHG

## 2021-08-11 DIAGNOSIS — R07.2 PRECORDIAL PAIN: Primary | ICD-10-CM

## 2021-08-11 DIAGNOSIS — E78.5 DYSLIPIDEMIA: ICD-10-CM

## 2021-08-11 DIAGNOSIS — I10 ESSENTIAL HYPERTENSION: ICD-10-CM

## 2021-08-11 DIAGNOSIS — K21.00 GASTROESOPHAGEAL REFLUX DISEASE WITH ESOPHAGITIS WITHOUT HEMORRHAGE: ICD-10-CM

## 2021-08-11 PROCEDURE — 3017F COLORECTAL CA SCREEN DOC REV: CPT | Performed by: INTERNAL MEDICINE

## 2021-08-11 PROCEDURE — G8420 CALC BMI NORM PARAMETERS: HCPCS | Performed by: INTERNAL MEDICINE

## 2021-08-11 PROCEDURE — 4004F PT TOBACCO SCREEN RCVD TLK: CPT | Performed by: INTERNAL MEDICINE

## 2021-08-11 PROCEDURE — G8427 DOCREV CUR MEDS BY ELIG CLIN: HCPCS | Performed by: INTERNAL MEDICINE

## 2021-08-11 PROCEDURE — 99213 OFFICE O/P EST LOW 20 MIN: CPT | Performed by: INTERNAL MEDICINE

## 2021-08-11 NOTE — PATIENT INSTRUCTIONS
CAD:None known. Chest pain symptoms are probably non-cardiac. Most likely GI. Patient to see a Gastro-enterologist. Possibly has Costochondritis too. HTN:Yes  well controlled on current medical regimen, see list above. - changes in  treatment:   no   VHD:no              No significant VHD noted  DYSLIPIDEMIA: yes,   Patient's profile is at / near [employer],   Tolerating current medical regimen wellyes,  OTHER RELEVANT DIAGNOSIS:as noted in patient's active problem list:  Tobacco abuse: councelled  TESTS ORDERED:   None this visit  All previously ordered tests reviewed. MEDICATIONS: CPM   Office f/u in 6 months.

## 2021-08-11 NOTE — PROGRESS NOTES
Aljeandra Betancourt is a 62 y.o. male who has    CHIEF COMPLAINT AS FOLLOWS:  CHEST PAIN: Patient still C/O chest pain but symptoms appear to be atypical.No change over previous. SOB: Has SOB with exertion but no change over previous noted. .   LEG EDEMA: No leg edema   PALPITATIONS: Denies any C/O Palpitations   DIZZINESS: No C/O Dizziness   SYNCOPE: None   OTHER:                                     HPI: Patient is here for F/U on his Chest psin, HTN & Dyslipidemia problems. HTN: Patient has known Hx of essential HTN. Has been treated with guideline recommended medical / physical/ diet therapy as stated below. Dyslipidemia: Patient has known Hx of mixed dyslipidemia. Has been treated with guideline recommended medical / physical/ diet therapy as stated below. He does not have any new complaints at this time. Current Outpatient Medications   Medication Sig Dispense Refill    nystatin (MYCOSTATIN) 047352 UNIT/GM powder Apply 3 times daily.  1 Bottle 3    metoclopramide (REGLAN) 10 MG tablet Take 1 tablet by mouth 3 times daily (with meals) 120 tablet 3    pantoprazole (PROTONIX) 40 MG tablet TAKE ONE TABLET BY MOUTH TWICE A DAY 60 tablet 2    cholestyramine (QUESTRAN) 4 g packet TAKE ONE PACKET BY MOUTH THREE TIMES A DAY 90 packet 2    acarbose (PRECOSE) 25 MG tablet       atorvastatin (LIPITOR) 20 MG tablet       varenicline (CHANTIX) 1 MG tablet Take 1 tablet by mouth 2 times daily 180 tablet 1    metoclopramide (REGLAN) 10 MG tablet Take 1 tablet by mouth 3 times daily (with meals) 120 tablet 3    cetirizine (ZYRTEC) 10 MG tablet 1 tablet daily      fluticasone (FLONASE) 50 MCG/ACT nasal spray as needed      albuterol sulfate  (90 Base) MCG/ACT inhaler Inhale 2 puffs into the lungs every 6 hours as needed      Nutritional Supplement LIQD Take 1 Can by mouth 3 times daily 90 Can 5    Nutritional Supplements (ENSURE HIGH PROTEIN) LIQD Take 1 Can by mouth 3 times daily Different flavors if possible 30 Can 5    ondansetron (ZOFRAN ODT) 4 MG disintegrating tablet Place 1 tablet under the tongue every 8 hours as needed for Nausea or Vomiting 30 tablet 3    ipratropium-albuterol (DUONEB) 0.5-2.5 (3) MG/3ML SOLN nebulizer solution as needed       Nebulizers (MICRO AIR NEBULIZER) MISC TO USE AS NEEDED WITH NEBULIZER SOLUTION  11    acetaminophen (TYLENOL) 500 MG tablet Take 1,000 mg by mouth every 6 hours as needed for Pain      tiotropium (SPIRIVA HANDIHALER) 18 MCG inhalation capsule Inhale 1 capsule into the lungs daily 30 capsule 3    albuterol sulfate HFA (PROVENTIL HFA) 108 (90 BASE) MCG/ACT inhaler Inhale 2 puffs into the lungs every 6 hours as needed for Wheezing 1 Inhaler 3     No current facility-administered medications for this visit. Allergies: Bee venom and Nsaids  Review of Systems:    Constitutional: Negative for diaphoresis and fatigue  Respiratory: Negative for shortness of breath  Cardiovascular: Negative for chest pain, dyspnea on exertion, claudication, edema, irregular heartbeat, murmur, palpitations or shortness of breath  Musculoskeletal: Negative for muscle pain, muscular weakness, negative for pain in arm and leg or swelling in foot and leg    Objective:  /70   Pulse 68   Ht 5' 9\" (1.753 m)   Wt 144 lb 3.2 oz (65.4 kg)   BMI 21.29 kg/m²   Wt Readings from Last 3 Encounters:   08/11/21 144 lb 3.2 oz (65.4 kg)   07/07/21 141 lb 12.8 oz (64.3 kg)   06/29/21 137 lb (62.1 kg)     Body mass index is 21.29 kg/m². GENERAL - Alert, oriented, pleasant, in no apparent distress. EYES: No jaundice, no conjunctival pallor. Neck - Supple. No jugular venous distention noted. No carotid bruits. Cardiovascular - Normal S1 and S2 without obvious murmur or gallop. Has Left rib cage tenderness. Extremities - No cyanosis, clubbing, or significant edema. Pulmonary - No respiratory distress. No wheezes or rales.       Lab Review Lab Results   Component Value Date    TROPONINT <0.010 10/11/2019    TROPONINT <0.010 10/11/2019     Lab Results   Component Value Date    BNP 10 12/30/2013    BNP 8 05/18/2013    PROBNP 117.7 09/13/2017    PROBNP 21.52 08/02/2017     Lab Results   Component Value Date    INR 1.03 08/02/2017    INR 0.95 12/30/2016     Lab Results   Component Value Date    LABA1C 5.5 01/16/2020     Lab Results   Component Value Date    WBC 6.4 06/29/2021    WBC 6.6 01/16/2020    HCT 40.8 (L) 06/29/2021    HCT 41.6 01/16/2020    MCV 91.5 06/29/2021    MCV 86.2 01/16/2020     06/29/2021     01/16/2020     Lab Results   Component Value Date    CHOL 200 01/16/2020    CHOL 172 08/02/2017    TRIG 82 01/16/2020    TRIG 80 08/02/2017    HDL 54 01/16/2020    HDL 48 08/02/2017    LDLCALC 130 01/16/2020    LDLCALC 126 (H) 11/19/2012    LDLDIRECT 119 (H) 08/02/2017    LDLDIRECT 128 (H) 12/30/2016     Lab Results   Component Value Date    ALT 10 01/16/2020    ALT 9 (L) 10/11/2019    AST 18 01/16/2020    AST 18 10/11/2019     BMP:    Lab Results   Component Value Date     01/16/2020     10/12/2019    K 4.5 01/16/2020    K 4.5 10/12/2019     01/16/2020     10/12/2019    CO2 24 01/16/2020    CO2 26 10/12/2019    BUN 11 01/16/2020    BUN 13 10/12/2019    CREATININE 0.9 03/04/2020    CREATININE 0.8 01/16/2020    CREATININE 0.8 10/12/2019     CMP:   Lab Results   Component Value Date     01/16/2020     10/12/2019    K 4.5 01/16/2020    K 4.5 10/12/2019     01/16/2020     10/12/2019    CO2 24 01/16/2020    CO2 26 10/12/2019    BUN 11 01/16/2020    BUN 13 10/12/2019    CREATININE 0.9 03/04/2020    CREATININE 0.8 01/16/2020    CREATININE 0.8 10/12/2019    PROT 7.0 10/11/2019    PROT 6.4 03/27/2019    PROT 7.0 11/19/2012    PROT 6.5 08/30/2012     Lab Results   Component Value Date    TSH 0.919 01/16/2020    TSHHS 1.010 08/02/2017    TSHHS 1.650 12/30/2016     ECHO 4/2021   Left ventricular function is low normal, EF is estimated at 50-55%.   Mild left ventricular hypertrophy.  E/A reversal; indeterminate diastolic function.   Mild mitral and tricuspid regurgitation is present.  RVSP is 21 mmHg.   Dilation of the aortic root(4.2) and the ascending aorta(4.0).  No evidence of pericardial effusion.     CARDIOLITE 4/2021    Normal study.    Normal perfusion study with normal distribution in all coronal, short, and    horizontal axis.    The observed defect is consistent with diaphragmatic attenuation.    Normal LV function. LVEF is 48 %. CATH 8/2017  1. Left main is patent. 2.  Circ and OM1 have mild disease. 3.  LAD and diagonal branch have mild disease. 4.  RCA is normal.  5.  EDP was around 15 mmHg.     QUALITY MEASURES REVIEWED:  1.CAD:Patient is taking anti platelet agent:No  Patient does not have Hx of documented CAD  2. DYSLIPIDEMIA: Patient is on cholesterol lowering medication:Yes  3. Beta-Blocker therapy for CAD, if prior Myocardial Infarction:No   4. Counselled regarding smoking cessation. Yes   Trying to quit  5. Anticoagulation therapy (for A.Fib) No   Does Not have A.Fib.  6.Discussed weight management strategies. Assessment & Plan:  Primary / Secondary prevention is the goal by aggressive risk modification, healthy and therapeutic life style changes for cardiovascular risk reduction. Various goals are discussed and multiple questions answered. CAD:None known. Chest pain symptoms are probably non-cardiac. Most likely GI. Patient to see a Gastro-enterologist. Possibly has Costochondritis too. HTN:Yes  well controlled on current medical regimen, see list above.   - changes in  treatment:   no   VHD:no              No significant VHD noted  DYSLIPIDEMIA: yes,   Patient's profile is at / near [employer],   Tolerating current medical regimen wellyes,  OTHER RELEVANT DIAGNOSIS:as noted in patient's active problem list:  Tobacco abuse: councelled  TESTS ORDERED:   None this visit  All

## 2021-08-11 NOTE — LETTER
[facility]  [address] [address]  Phone: [phone]  Fax: [phone]    Ahsan Davidson MD    August 11, 2021     oJhanna Durhamt, [address]    Patient: Lizet Banegas   MR Number: [id number]   YOB: 1960   Date of Visit: 8/11/2021       Dear Johanna Durhamt: Thank you for referring Analilia Reyes to me for evaluation/treatment. Below are the relevant portions of my assessment and plan of care. If you have questions, please do not hesitate to call me. I look forward to following Stas Khan along with you.     Sincerely,        Ahsan Davidson MD

## 2021-08-11 NOTE — LETTER
[address] [facility]  1960  [id number]    Have you had any Chest Pain that is not new? - Yes, same as last time   If Yes DO EKG - How does it feel - Heaviness   How long does the pain last - 10 minutes    How long have you been having the pain - Months   Did you take a nitro   And did it relieve the pain - No     DO EKG IF: Patient has a Heart Rate above 100 or below 40     CAD (Coronary Artery Disease) patient should have one on file every 6 months        Have you had any Shortness of Breath - No      Have you had any dizziness - No   . Have you had any palpitations that are not new? - No      Is the patient on any of the following medications -   If Yes DO EKG - Needs done every 6 months    Do you have any edema - swelling in No        If we do not have these labs you are retrieve these labs for these providers!     Do you have a surgery or procedure scheduled in the near future - No       Ask patient if they want to [facility] if they are not already signed up     Check to see if we have an E-MAIL on file for the patient     Check medication list thoroughly!!! AND RECONCILE OUTSIDE MEDICATIONS  If dose has changed change the entire order not just the MG  BE SURE TO ASK PATIENT IF THEY NEED MEDICATION REFILLS     At check out add to every patient's \"wrap up\" the following dot phrase AFTERHOURSEDUCATION and ensure we explain this to our patients

## 2021-09-03 ENCOUNTER — HOSPITAL ENCOUNTER (OUTPATIENT)
Dept: CT IMAGING | Age: 61
Discharge: HOME | End: 2021-09-03
Payer: MEDICAID

## 2021-09-03 ENCOUNTER — OFFICE VISIT (OUTPATIENT)
Dept: BARIATRICS/WEIGHT MGMT | Age: 61
End: 2021-09-03
Payer: MEDICAID

## 2021-09-03 VITALS
BODY MASS INDEX: 21.31 KG/M2 | OXYGEN SATURATION: 96 % | HEIGHT: 69 IN | WEIGHT: 143.9 LBS | DIASTOLIC BLOOD PRESSURE: 74 MMHG | SYSTOLIC BLOOD PRESSURE: 110 MMHG | RESPIRATION RATE: 17 BRPM | HEART RATE: 78 BPM

## 2021-09-03 DIAGNOSIS — R10.10 PAIN OF UPPER ABDOMEN: ICD-10-CM

## 2021-09-03 DIAGNOSIS — R10.10 PAIN OF UPPER ABDOMEN: Primary | ICD-10-CM

## 2021-09-03 DIAGNOSIS — F17.219 CIGARETTE NICOTINE DEPENDENCE WITH NICOTINE-INDUCED DISORDER: ICD-10-CM

## 2021-09-03 DIAGNOSIS — B37.81 CANDIDA ESOPHAGITIS (HCC): ICD-10-CM

## 2021-09-03 PROBLEM — F17.200 NICOTINE DEPENDENCE: Status: ACTIVE | Noted: 2021-09-03

## 2021-09-03 LAB
GFR AFRICAN AMERICAN: >60 ML/MIN/1.73M2
GFR NON-AFRICAN AMERICAN: >60 ML/MIN/1.73M2
POC CREATININE: 1 MG/DL (ref 0.9–1.3)

## 2021-09-03 PROCEDURE — 6360000004 HC RX CONTRAST MEDICATION: Performed by: SURGERY

## 2021-09-03 PROCEDURE — 2580000003 HC RX 258: Performed by: SURGERY

## 2021-09-03 PROCEDURE — 99213 OFFICE O/P EST LOW 20 MIN: CPT | Performed by: SURGERY

## 2021-09-03 PROCEDURE — 74177 CT ABD & PELVIS W/CONTRAST: CPT

## 2021-09-03 RX ORDER — SODIUM CHLORIDE 0.9 % (FLUSH) 0.9 %
10 SYRINGE (ML) INJECTION PRN
Status: DISCONTINUED | OUTPATIENT
Start: 2021-09-03 | End: 2021-09-04 | Stop reason: HOSPADM

## 2021-09-03 RX ORDER — NICOTINE 21 MG/24HR
1 PATCH, TRANSDERMAL 24 HOURS TRANSDERMAL DAILY
Qty: 42 PATCH | Refills: 0 | Status: SHIPPED | OUTPATIENT
Start: 2021-09-03 | End: 2022-04-06

## 2021-09-03 RX ADMIN — IOPAMIDOL 80 ML: 755 INJECTION, SOLUTION INTRAVENOUS at 17:13

## 2021-09-03 RX ADMIN — IOHEXOL 50 ML: 240 INJECTION, SOLUTION INTRATHECAL; INTRAVASCULAR; INTRAVENOUS; ORAL at 15:15

## 2021-09-03 RX ADMIN — Medication 10 ML: at 17:14

## 2021-09-03 ASSESSMENT — ENCOUNTER SYMPTOMS
VOICE CHANGE: 0
COUGH: 0
DIARRHEA: 0
BLOOD IN STOOL: 0
ABDOMINAL PAIN: 1
ANAL BLEEDING: 0
PHOTOPHOBIA: 0
CONSTIPATION: 0
VOMITING: 0
SORE THROAT: 0
NAUSEA: 0
COLOR CHANGE: 0
WHEEZING: 0
SHORTNESS OF BREATH: 0
TROUBLE SWALLOWING: 0

## 2021-09-03 NOTE — PROGRESS NOTES
GENERAL SURGERY OFFICE NOTE    SUBJECTIVE:    Patient presenting today referred from Paul Skinner MD and No ref. provider found, for   Chief Complaint   Patient presents with    1 Month Follow-Up     follow up EGD         HPI: Harry Marroquin is a 62 y.o. male presenting with pain in the periumbilically - supra. Xi Dye and is acute, worsening, dull and throbbing compared to patient's normal condition. It is severe in intensity for a duration of 1 week and is continuous with modifying factors increased by or worse with breathing. .. Wounds very nicely healing, no erythema, no induration, no purulent discharge. No constipation, BMs back to normal.    Thoroughly reviewed the patient's medical history, family history, social history and review of systems with the patient today in the office. Please see medical record for pertinent positives.       Past Medical History:   Diagnosis Date    Arthritis     Hands    Chronic cluster headache 6/7/2019 last    COPD (chronic obstructive pulmonary disease) ([facility] [address]     \"dx 2 yrs ago\" \\"does not see a lung dr Johanna Stallings with PCP    Degenerative disc disease     \"in my back have degenarative disc\"    Depression     Edentulous     Fracture     \"fell over a 5 gallon bucket and landed on cement block and broke my right hand\" (7/6/2015)    GERD with esophagitis     H/O dizziness     States will feel like he is going to pass out, possible low BS    Hiatal hernia     Hx of gastric ulcer     HX OTHER MEDICAL     pt states has trouble with reading and writing\"can read very very little\"    Hyperlipidemia     Hypertension     No meds as of 5/27/21 - follows with PCP    Impingement syndrome of right shoulder     Left shoulder pain     limited range-of-motion    Lower back pain     Motion sickness     Neck pain     more on left side into shoulder--pain limits ROM    PONV (postoperative nausea and vomiting)     Vertigo         Past Surgical History:   Procedure Laterality Date    ARM SURGERY Left 30+ yrs    \"put plastic ligaments in \"  after injury through glass window    CARPAL TUNNEL RELEASE Right 10/4/2019    RIGHT CARPAL TUNNEL RELEASE performed by Sol Gracia DO at [facility]  2010    COLONOSCOPY  2018    HX: polyps - Dr. Afia Fitzgerald COLONOSCOPY N/A 12/30/2019    COLONOSCOPY DIAGNOSTIC performed by Maria Esther Vargas MD at [address] [place] [address]      all teeth extracted    ENDOSCOPY, COLON, DIAGNOSTIC  04/08/2019    EGD - thrush noted through out    ENDOSCOPY, COLON, DIAGNOSTIC  06/01/2021    dr winters:multiple gastric ulcers, intact fundiplication with tightening, dil 18-20, biopsy r/o h pylori, f/u as scheduled    EYE SURGERY Left 1990's    \"metal removed from eye\"    GASTRIC FUNDOPLICATION N/A 21/46/3576    NISSEN FUNDOPLICATION LAPAROSCOPIC ROBOTIC HIATAL HERNIA performed by Maria Esther Vargas MD at [place] [address]  12/2018    UPPER GASTROINTESTINAL ENDOSCOPY N/A 1/28/2019    EGD BIOPSY / BRUSHING OF ESOPHAGUS performed by Maria Esther Vargas MD at [place] [address] N/A 4/8/2019    EGD BIOPSY AND BRUSHING performed by Maria Esther Vargas MD at [place] [address] N/A 7/8/2019    EGD BIOPSY and brushing performed by Maria Esther Vargas MD at [place] [address] N/A 12/30/2019    EGD BIOPSY/BRUSHING OF ESOPHAGUS performed by Maria Esther Vargas MD at [address] [place] [address] 6/5/2020    EGD BIOPSY performed by Maria Esther Vargas MD at [address] [place] [address] 6/1/2021    EGD DILATION BALLOON WITH 18-20 BALLOON UP TO 20 WITH BIOPSIES performed by Maria Esther Vargas MD at [address] [place] [address] 6/29/2021    EGD DILATION SAVORY Guevara Dilators used 55, 50. with biopsies performed by Maria Esther Vargas MD at [address] ENDOSCOPY        Social History     Socioeconomic History    Marital status:      Spouse name: Not on file    Number of children: Not on file    Years of education: Not on file    Highest education level: Not on file   Occupational History    Not on file   Tobacco Use    Smoking status: Current Every Day Smoker     Packs/day: 0.50     Years: 48.00     Pack years: 24.00     Types: Cigarettes     Start date: 0    Smokeless tobacco: Never Used    Tobacco comment: use to smoke 2.5 PPD   Vaping Use    Vaping Use: Never used   Substance and Sexual Activity    Alcohol use: Yes     Comment: Occasional 1/month    Drug use: Never     Comment: 3 cups of coffee daily    Sexual activity: Not Currently     Partners: Female   Other Topics Concern    Not on file   Social History Narrative    Not on file     Social Determinants of Health     Financial Resource Strain:     Difficulty of Paying Living Expenses:    Food Insecurity:     Worried About Running Out of Food in the Last Year:     Ran Out of Food in the Last Year:    Transportation Needs:     Lack of Transportation (Medical):  Lack of Transportation (Non-Medical):    Physical Activity:     Days of Exercise per Week:     Minutes of Exercise per Session:    Stress:     Feeling of Stress :    Social Connections:     Frequency of Communication with Friends and Family:     Frequency of Social Gatherings with Friends and Family:     Attends Taoist Services:     Active Member of Clubs or Organizations:     Attends Club or Organization Meetings:     Marital Status:    Intimate Partner Violence:     Fear of Current or Ex-Partner:     Emotionally Abused:     Physically Abused:     Sexually Abused:          Allergies   Allergen Reactions    Bee Venom Anaphylaxis    Nsaids Other (See Comments)     Can take small doses for a short time - Not to take any longer than necessary stomach ulcer        Family History   Problem Relation Age of Onset    Dementia Mother     Stroke Mother     Stroke Father     High Blood Pressure Father     Heart Disease Father     Breast Cancer Sister     Diabetes Brother     Cancer Brother         mouth and throat    Heart Attack Maternal Uncle     Heart Disease Maternal Uncle     No Known Problems Son     No Known Problems Son     No Known Problems Daughter        Current Outpatient Medications   Medication Sig Dispense Refill    nicotine (NICODERM CQ) 14 MG/24HR Place 1 patch onto the skin daily 42 patch 0    nystatin (MYCOSTATIN) 343333 UNIT/GM powder Apply 3 times daily.  1 Bottle 3    metoclopramide (REGLAN) 10 MG tablet Take 1 tablet by mouth 3 times daily (with meals) 120 tablet 3    pantoprazole (PROTONIX) 40 MG tablet TAKE ONE TABLET BY MOUTH TWICE A DAY 60 tablet 2    cholestyramine (QUESTRAN) 4 g packet TAKE ONE PACKET BY MOUTH THREE TIMES A DAY 90 packet 2    acarbose (PRECOSE) 25 MG tablet       atorvastatin (LIPITOR) 20 MG tablet       varenicline (CHANTIX) 1 MG tablet Take 1 tablet by mouth 2 times daily 180 tablet 1    metoclopramide (REGLAN) 10 MG tablet Take 1 tablet by mouth 3 times daily (with meals) 120 tablet 3    fluticasone (FLONASE) 50 MCG/ACT nasal spray as needed      albuterol sulfate  (90 Base) MCG/ACT inhaler Inhale 2 puffs into the lungs every 6 hours as needed      Nutritional Supplement LIQD Take 1 Can by mouth 3 times daily 90 Can 5    Nutritional Supplements (ENSURE HIGH PROTEIN) LIQD Take 1 Can by mouth 3 times daily Different flavors if possible 30 Can 5    ondansetron (ZOFRAN ODT) 4 MG disintegrating tablet Place 1 tablet under the tongue every 8 hours as needed for Nausea or Vomiting 30 tablet 3    ipratropium-albuterol (DUONEB) 0.5-2.5 (3) MG/3ML SOLN nebulizer solution as needed       Nebulizers (MICRO AIR NEBULIZER) MISC TO USE AS NEEDED WITH NEBULIZER SOLUTION  11    acetaminophen (TYLENOL) 500 MG tablet Take 1,000 mg by mouth every 6 hours as needed for Pain      tiotropium (SPIRIVA HANDIHALER) 18 MCG inhalation capsule Inhale 1 capsule into the lungs daily 30 capsule 3    albuterol sulfate HFA (PROVENTIL HFA) 108 (90 BASE) MCG/ACT inhaler Inhale 2 puffs into the lungs every 6 hours as needed for Wheezing 1 Inhaler 3    cetirizine (ZYRTEC) 10 MG tablet 1 tablet daily (Patient not taking: Reported on 9/3/2021)       No current facility-administered medications for this visit. Review of Systems   Constitutional: Negative for activity change, chills, diaphoresis and fever. HENT: Negative for sore throat, trouble swallowing and voice change. Eyes: Negative for photophobia and visual disturbance. Respiratory: Negative for cough, shortness of breath and wheezing. Cardiovascular: Negative for chest pain, palpitations and leg swelling. Gastrointestinal: Positive for abdominal pain. Negative for anal bleeding, blood in stool, constipation, diarrhea, nausea and vomiting. Endocrine: Negative for cold intolerance, heat intolerance, polydipsia and polyuria. Genitourinary: Negative for dysuria, frequency and hematuria. Musculoskeletal: Negative for joint swelling, myalgias and neck stiffness. Skin: Negative for color change and rash. Neurological: Negative for seizures, speech difficulty, light-headedness and numbness. Hematological: Negative for adenopathy. Does not bruise/bleed easily. OBJECTIVE:    /74   Pulse 78   Resp 17   Ht 5' 9\" (1.753 m)   Wt 143 lb 14.4 oz (65.3 kg)   SpO2 96%   BMI 21.25 kg/m²    Body mass index is 21.25 kg/m². Physical Exam  Vitals reviewed. Constitutional:       General: He is not in acute distress. Appearance: He is well-developed. He is not diaphoretic. HENT:      Head: Normocephalic and atraumatic. Eyes:      General: No scleral icterus. Conjunctiva/sclera: Conjunctivae normal.      Pupils: Pupils are equal, round, and reactive to light.    Neck:      Thyroid: No thyromegaly. Vascular: No JVD. Trachea: No tracheal deviation. Cardiovascular:      Rate and Rhythm: Normal rate and regular rhythm. Heart sounds: Normal heart sounds. No murmur heard. No friction rub. No gallop. Pulmonary:      Effort: Pulmonary effort is normal. No respiratory distress. Breath sounds: No stridor. No wheezing or rales. Chest:      Chest wall: No tenderness. Abdominal:      General: Bowel sounds are normal. There is no distension. Palpations: Abdomen is soft. There is no mass. Tenderness: There is abdominal tenderness. There is no guarding or rebound. Musculoskeletal:         General: No tenderness. Normal range of motion. Cervical back: Normal range of motion. Lymphadenopathy:      Cervical: No cervical adenopathy. Skin:     General: Skin is warm and dry. Coloration: Skin is not pale. Findings: No erythema or rash. Neurological:      Mental Status: He is alert and oriented to person, place, and time. Cranial Nerves: No cranial nerve deficit. Coordination: Coordination normal.   Psychiatric:         Behavior: Behavior normal.         Thought Content: Thought content normal.         Judgment: Judgment normal.         Orders Placed This Encounter   Procedures    CT ABDOMEN PELVIS W IV CONTRAST Additional Contrast? Oral        ASSESSMENT & PLAN:    1. Pain of upper abdomen    2. Candida esophagitis ([address]    3. Cigarette nicotine dependence with nicotine-induced disorder         Needs CT and will eval..    Patient counseled on the risks, benefits, and alternatives of treatment plan at length while in the office today. Patient states an understanding and willingness to proceed with the plan. Follow Up:  Return in about 1 week (around 9/10/2021) for For imaging and tests results review, Follow up Symptoms.       Wiley Keane MD, FACS, FICS.    9/3/21

## 2021-09-08 ENCOUNTER — OFFICE VISIT (OUTPATIENT)
Dept: BARIATRICS/WEIGHT MGMT | Age: 61
End: 2021-09-08
Payer: MEDICAID

## 2021-09-08 VITALS
BODY MASS INDEX: 21.77 KG/M2 | WEIGHT: 147 LBS | HEART RATE: 68 BPM | DIASTOLIC BLOOD PRESSURE: 82 MMHG | HEIGHT: 69 IN | SYSTOLIC BLOOD PRESSURE: 130 MMHG

## 2021-09-08 DIAGNOSIS — K92.1 HEMATOCHEZIA: Primary | ICD-10-CM

## 2021-09-08 PROCEDURE — G8427 DOCREV CUR MEDS BY ELIG CLIN: HCPCS | Performed by: SURGERY

## 2021-09-08 PROCEDURE — 3017F COLORECTAL CA SCREEN DOC REV: CPT | Performed by: SURGERY

## 2021-09-08 PROCEDURE — G8420 CALC BMI NORM PARAMETERS: HCPCS | Performed by: SURGERY

## 2021-09-08 PROCEDURE — 4004F PT TOBACCO SCREEN RCVD TLK: CPT | Performed by: SURGERY

## 2021-09-08 PROCEDURE — 99213 OFFICE O/P EST LOW 20 MIN: CPT | Performed by: SURGERY

## 2021-09-08 RX ORDER — BISACODYL 5 MG
20 TABLET, DELAYED RELEASE (ENTERIC COATED) ORAL 2 TIMES DAILY
Qty: 8 TABLET | Refills: 3 | Status: SHIPPED | OUTPATIENT
Start: 2021-09-08 | End: 2022-04-06 | Stop reason: ALTCHOICE

## 2021-09-08 RX ORDER — POLYETHYLENE GLYCOL 3350, SODIUM SULFATE ANHYDROUS, SODIUM BICARBONATE, SODIUM CHLORIDE, POTASSIUM CHLORIDE 236; 22.74; 6.74; 5.86; 2.97 G/4L; G/4L; G/4L; G/4L; G/4L
4 POWDER, FOR SOLUTION ORAL ONCE
Qty: 4000 ML | Refills: 0 | Status: SHIPPED | OUTPATIENT
Start: 2021-09-08 | End: 2021-09-08

## 2021-09-08 ASSESSMENT — ENCOUNTER SYMPTOMS
COLOR CHANGE: 0
SORE THROAT: 0
CONSTIPATION: 0
BLOOD IN STOOL: 1
TROUBLE SWALLOWING: 0
WHEEZING: 0
VOMITING: 0
VOICE CHANGE: 0
SHORTNESS OF BREATH: 0
PHOTOPHOBIA: 0
ABDOMINAL PAIN: 0
ANAL BLEEDING: 1
NAUSEA: 0
COUGH: 0
DIARRHEA: 0

## 2021-09-08 NOTE — PROGRESS NOTES
 Vertigo         Past Surgical History:   Procedure Laterality Date    ARM SURGERY Left 30+ yrs    \"put plastic ligaments in \"  after injury through glass window    CARPAL TUNNEL RELEASE Right 10/4/2019    RIGHT CARPAL TUNNEL RELEASE performed by Connie Longoria DO at [facility]  2010    COLONOSCOPY  2018    HX: polyps - Dr. Estela Ching COLONOSCOPY N/A 12/30/2019    COLONOSCOPY DIAGNOSTIC performed by Mago Wilkes MD at [address] [place] [address]      all teeth extracted    ENDOSCOPY, COLON, DIAGNOSTIC  04/08/2019    EGD - thrush noted through out    ENDOSCOPY, COLON, DIAGNOSTIC  06/01/2021    dr winters:multiple gastric ulcers, intact fundiplication with tightening, dil 18-20, biopsy r/o h pylori, f/u as scheduled    EYE SURGERY Left 1990's    \"metal removed from eye\"    GASTRIC FUNDOPLICATION N/A 74/82/7635    NISSEN FUNDOPLICATION LAPAROSCOPIC ROBOTIC HIATAL HERNIA performed by Mago Wilkes MD at [place] [address]  12/2018    UPPER GASTROINTESTINAL ENDOSCOPY N/A 1/28/2019    EGD BIOPSY / BRUSHING OF ESOPHAGUS performed by Mago Wilkes MD at [address] [place] [address] N/A 4/8/2019    EGD BIOPSY AND BRUSHING performed by Mago Wilkes MD at [address] [place] [address] N/A 7/8/2019    EGD BIOPSY and brushing performed by Mago Wilkes MD at [address] [place] [address] N/A 12/30/2019    EGD BIOPSY/BRUSHING OF ESOPHAGUS performed by Mago Wilkes MD at [address] [place] [address] 6/5/2020    EGD BIOPSY performed by Mago Wilkes MD at [address] [place] [address] 6/1/2021    EGD DILATION BALLOON WITH 18-20 BALLOON UP TO 20 WITH BIOPSIES performed by Mago Wilkes MD at [address] [place] [address] 6/29/2021     [facility] used 55, 50. with biopsies performed by Yaniv Garnett MD at [address] [place] [address] Marital status:      Spouse name: Not on file    Number of children: Not on file    Years of education: Not on file    Highest education level: Not on file   Occupational History    Not on file   Tobacco Use    Smoking status: Current Every Day Smoker     Packs/day: 0.50     Years: 48.00     Pack years: 24.00     Types: Cigarettes     Start date: 0    Smokeless tobacco: Never Used    Tobacco comment: use to smoke 2.5 PPD   Vaping Use    Vaping Use: Never used   Substance and Sexual Activity    Alcohol use: Yes     Comment: Occasional 1/month    Drug use: Never     Comment: 3 cups of coffee daily    Sexual activity: Not Currently     Partners: Female   Other Topics Concern    Not on file   Social History Narrative    Not on file     Social Determinants of Health     Financial Resource Strain:     Difficulty of Paying Living Expenses:    Food Insecurity:     Worried About Running Out of Food in the Last Year:     Ran Out of Food in the Last Year:    Transportation Needs:     Lack of Transportation (Medical):  Lack of Transportation (Non-Medical):    Physical Activity:     Days of Exercise per Week:     Minutes of Exercise per Session:    Stress:     Feeling of Stress :    Social Connections:     Frequency of Communication with Friends and Family:     Frequency of Social Gatherings with Friends and Family:     Attends Orthodox Services:     Active Member of Clubs or Organizations:     Attends Club or Organization Meetings:     Marital Status:    Intimate Partner Violence:     Fear of Current or Ex-Partner:     Emotionally Abused:     Physically Abused:     Sexually Abused:          Allergies   Allergen Reactions    Bee Venom Anaphylaxis    Nsaids Other (See Comments)     Can take small doses for a short time - Not to take any longer than necessary stomach ulcer        Family History   Problem Relation Age of Onset    Dementia Mother     Stroke Mother     Stroke Father     High Blood Pressure Father     Heart Disease Father     Breast Cancer Sister     Diabetes Brother     Cancer Brother         mouth and throat    Heart Attack Maternal Uncle     Heart Disease Maternal Uncle     No Known Problems Son     No Known Problems Son     No Known Problems Daughter        Current Outpatient Medications   Medication Sig Dispense Refill    polyethylene glycol (GOLYTELY) 236 g solution Take 4,000 mLs by mouth once for 1 dose 4000 mL 0    bisacodyl (BISACODYL) 5 MG EC tablet Take 4 tablets by mouth 2 times daily 8 tablet 3    nicotine (NICODERM CQ) 14 MG/24HR Place 1 patch onto the skin daily 42 patch 0    nystatin (MYCOSTATIN) 755290 UNIT/GM powder Apply 3 times daily.  1 Bottle 3    metoclopramide (REGLAN) 10 MG tablet Take 1 tablet by mouth 3 times daily (with meals) 120 tablet 3    pantoprazole (PROTONIX) 40 MG tablet TAKE ONE TABLET BY MOUTH TWICE A DAY 60 tablet 2    cholestyramine (QUESTRAN) 4 g packet TAKE ONE PACKET BY MOUTH THREE TIMES A DAY 90 packet 2    acarbose (PRECOSE) 25 MG tablet       atorvastatin (LIPITOR) 20 MG tablet       varenicline (CHANTIX) 1 MG tablet Take 1 tablet by mouth 2 times daily 180 tablet 1    metoclopramide (REGLAN) 10 MG tablet Take 1 tablet by mouth 3 times daily (with meals) 120 tablet 3    fluticasone (FLONASE) 50 MCG/ACT nasal spray as needed      albuterol sulfate  (90 Base) MCG/ACT inhaler Inhale 2 puffs into the lungs every 6 hours as needed      Nutritional Supplement LIQD Take 1 Can by mouth 3 times daily 90 Can 5    Nutritional Supplements (ENSURE HIGH PROTEIN) LIQD Take 1 Can by mouth 3 times daily Different flavors if possible 30 Can 5    ondansetron (ZOFRAN ODT) 4 MG disintegrating tablet Place 1 tablet under the tongue every 8 hours as needed for Nausea or Vomiting 30 tablet 3    ipratropium-albuterol (DUONEB) 0.5-2.5 (3) MG/3ML SOLN nebulizer solution as needed       Nebulizers (MICRO AIR NEBULIZER) MISC TO USE AS NEEDED WITH NEBULIZER SOLUTION  11    acetaminophen (TYLENOL) 500 MG tablet Take 1,000 mg by mouth every 6 hours as needed for Pain      tiotropium (SPIRIVA HANDIHALER) 18 MCG inhalation capsule Inhale 1 capsule into the lungs daily 30 capsule 3    albuterol sulfate HFA (PROVENTIL HFA) 108 (90 BASE) MCG/ACT inhaler Inhale 2 puffs into the lungs every 6 hours as needed for Wheezing 1 Inhaler 3    cetirizine (ZYRTEC) 10 MG tablet 1 tablet daily (Patient not taking: Reported on 9/3/2021)       No current facility-administered medications for this visit. Review of Systems   Constitutional: Negative for activity change, chills, diaphoresis and fever. HENT: Negative for sore throat, trouble swallowing and voice change. Eyes: Negative for photophobia and visual disturbance. Respiratory: Negative for cough, shortness of breath and wheezing. Cardiovascular: Negative for chest pain, palpitations and leg swelling. Gastrointestinal: Positive for anal bleeding and blood in stool. Negative for abdominal pain, constipation, diarrhea, nausea and vomiting. Endocrine: Negative for cold intolerance, heat intolerance, polydipsia and polyuria. Genitourinary: Negative for dysuria, frequency and hematuria. Musculoskeletal: Negative for joint swelling, myalgias and neck stiffness. Skin: Negative for color change and rash. Neurological: Negative for seizures, speech difficulty, light-headedness and numbness. Hematological: Negative for adenopathy. Does not bruise/bleed easily. OBJECTIVE:    /82   Pulse 68   Ht 5' 9\" (1.753 m)   Wt 147 lb (66.7 kg)   BMI 21.71 kg/m²    Body mass index is 21.71 kg/m². Physical Exam  Vitals reviewed. Constitutional:       General: He is not in acute distress. Appearance: He is well-developed.  He is not diaphoretic. HENT:      Head: Normocephalic and atraumatic. Eyes:      General: No scleral icterus. Conjunctiva/sclera: Conjunctivae normal.      Pupils: Pupils are equal, round, and reactive to light. Neck:      Thyroid: No thyromegaly. Vascular: No JVD. Trachea: No tracheal deviation. Cardiovascular:      Rate and Rhythm: Normal rate and regular rhythm. Heart sounds: Normal heart sounds. No murmur heard. No friction rub. No gallop. Pulmonary:      Effort: Pulmonary effort is normal. No respiratory distress. Breath sounds: No stridor. No wheezing or rales. Chest:      Chest wall: No tenderness. Abdominal:      General: Bowel sounds are normal. There is no distension. Palpations: Abdomen is soft. There is no mass. Tenderness: There is no abdominal tenderness. There is no guarding or rebound. Musculoskeletal:         General: No tenderness. Normal range of motion. Cervical back: Normal range of motion. Lymphadenopathy:      Cervical: No cervical adenopathy. Skin:     General: Skin is warm and dry. Coloration: Skin is not pale. Findings: No erythema or rash. Neurological:      Mental Status: He is alert and oriented to person, place, and time. Cranial Nerves: No cranial nerve deficit. Coordination: Coordination normal.   Psychiatric:         Behavior: Behavior normal.         Thought Content: Thought content normal.         Judgment: Judgment normal.           Orders Placed This Encounter   Medications    polyethylene glycol (GOLYTELY) 236 g solution     Sig: Take 4,000 mLs by mouth once for 1 dose     Dispense:  4000 mL     Refill:  0    bisacodyl (BISACODYL) 5 MG EC tablet     Sig: Take 4 tablets by mouth 2 times daily     Dispense:  8 tablet     Refill:  3       ASSESSMENT & PLAN:    1. Hematochezia         Will do colonoscopy after Bowel prep, NOT worried.     Patient counseled on the risks, benefits, and alternatives of treatment plan at length while in the office today. Patient states an understanding and willingness to proceed with the plan. Follow Up:  Return in about 2 weeks (around 9/22/2021), or Colonoscopy.       Frank Monroe MD, FACS, FICS.    9/8/21

## 2021-09-19 RX ORDER — CHOLESTYRAMINE 4 G/9G
POWDER, FOR SUSPENSION ORAL
Qty: 90 PACKET | Refills: 3 | Status: SHIPPED | OUTPATIENT
Start: 2021-09-19 | End: 2022-01-05 | Stop reason: SDUPTHER

## 2021-09-19 RX ORDER — PANTOPRAZOLE SODIUM 40 MG/1
TABLET, DELAYED RELEASE ORAL
Qty: 60 TABLET | Refills: 2 | Status: ON HOLD | OUTPATIENT
Start: 2021-09-19 | End: 2021-12-16 | Stop reason: HOSPADM

## 2021-10-13 ENCOUNTER — TELEPHONE (OUTPATIENT)
Dept: SURGERY | Age: 61
End: 2021-10-13

## 2021-10-13 NOTE — TELEPHONE ENCOUNTER
NEED TO SCHEDULE CSCOPE - NOTIFY PATIENT OF DR HAND NO LONGER SERVING PATIENTS AT THE [facility]. DR Char Tobias IS AVAILABLE TO CONTINUE HIS CARE IF PATIENT OBLIGES.

## 2021-11-15 ENCOUNTER — OFFICE VISIT (OUTPATIENT)
Dept: SURGERY | Age: 61
End: 2021-11-15
Payer: MEDICAID

## 2021-11-15 VITALS
BODY MASS INDEX: 22.54 KG/M2 | HEIGHT: 69 IN | WEIGHT: 152.2 LBS | HEART RATE: 72 BPM | OXYGEN SATURATION: 97 % | SYSTOLIC BLOOD PRESSURE: 120 MMHG | DIASTOLIC BLOOD PRESSURE: 82 MMHG

## 2021-11-15 DIAGNOSIS — R13.19 ESOPHAGEAL DYSPHAGIA: ICD-10-CM

## 2021-11-15 DIAGNOSIS — R10.32 LEFT LOWER QUADRANT ABDOMINAL PAIN: Primary | ICD-10-CM

## 2021-11-15 PROCEDURE — G8428 CUR MEDS NOT DOCUMENT: HCPCS | Performed by: SURGERY

## 2021-11-15 PROCEDURE — 4004F PT TOBACCO SCREEN RCVD TLK: CPT | Performed by: SURGERY

## 2021-11-15 PROCEDURE — G8484 FLU IMMUNIZE NO ADMIN: HCPCS | Performed by: SURGERY

## 2021-11-15 PROCEDURE — 99213 OFFICE O/P EST LOW 20 MIN: CPT | Performed by: SURGERY

## 2021-11-15 PROCEDURE — G8420 CALC BMI NORM PARAMETERS: HCPCS | Performed by: SURGERY

## 2021-11-15 PROCEDURE — 3017F COLORECTAL CA SCREEN DOC REV: CPT | Performed by: SURGERY

## 2021-11-15 RX ORDER — SODIUM CHLORIDE 9 MG/ML
25 INJECTION, SOLUTION INTRAVENOUS PRN
Status: CANCELLED | OUTPATIENT
Start: 2021-11-15

## 2021-11-15 RX ORDER — SODIUM CHLORIDE 0.9 % (FLUSH) 0.9 %
10 SYRINGE (ML) INJECTION PRN
Status: CANCELLED | OUTPATIENT
Start: 2021-11-15

## 2021-11-15 RX ORDER — SODIUM CHLORIDE 0.9 % (FLUSH) 0.9 %
10 SYRINGE (ML) INJECTION EVERY 12 HOURS SCHEDULED
Status: CANCELLED | OUTPATIENT
Start: 2021-11-15

## 2021-11-15 RX ORDER — SODIUM, POTASSIUM,MAG SULFATES 17.5-3.13G
SOLUTION, RECONSTITUTED, ORAL ORAL
Qty: 1 EACH | Refills: 0 | Status: SHIPPED | OUTPATIENT
Start: 2021-11-15 | End: 2022-01-05

## 2021-11-15 RX ORDER — SODIUM CHLORIDE, SODIUM LACTATE, POTASSIUM CHLORIDE, CALCIUM CHLORIDE 600; 310; 30; 20 MG/100ML; MG/100ML; MG/100ML; MG/100ML
INJECTION, SOLUTION INTRAVENOUS CONTINUOUS
Status: CANCELLED | OUTPATIENT
Start: 2021-11-15

## 2021-11-15 ASSESSMENT — PATIENT HEALTH QUESTIONNAIRE - PHQ9
SUM OF ALL RESPONSES TO PHQ9 QUESTIONS 1 & 2: 2
1. LITTLE INTEREST OR PLEASURE IN DOING THINGS: 1
SUM OF ALL RESPONSES TO PHQ QUESTIONS 1-9: 2
2. FEELING DOWN, DEPRESSED OR HOPELESS: 1

## 2021-11-15 NOTE — PROGRESS NOTES
Chief Complaint   Patient presents with   Linda Lara pt Cscope consult. SUBJECTIVE:  HPI: Patient is here with complaints of dysphagia and abdominal pain. He has a history of hiatal hernia repair with Nissen in 2018. Since then he has had intermittent abdominal pain. Pain is in several areas of his abdomen but worse in the LLQ. He was planning to have a colonoscopy with Dr. Morgan Rg.  Last c-scope was about 2 years ago with internal hemorrhoids and diverticulosis. He has had some intermittent constipation and blood in the stools but reports no blood for the past few weeks. He gets some \"white specks\" in the stool. He has had multiple EGDs including EGD with dilation which he reports improved his dysphagia in the past.  He reports feeling of dysphagia in the lower esophagus. Denies other complaints. He continues to smoke and has not consistently been taking his PPI. He had a CT in September that was negative for abdominal source of his pain. Upper GI in 5/2020 that failed to show problems with esophageal motility. I have reviewed the patient's(pertinent information to this visit) medical history, family history(scanned in  the [address] [place] [address] under \"patient questioner\"), social history and review of systems with the patient today in the office.             Past Surgical History:   Procedure Laterality Date    ARM SURGERY Left 30+ yrs    \"put plastic ligaments in \"  after injury through glass window    CARPAL TUNNEL RELEASE Right 10/4/2019    RIGHT CARPAL TUNNEL RELEASE performed by Jose Alegre DO at [address]  2010    COLONOSCOPY  2018    HX: polyps - Dr. Whaley Willoughby 12/30/2019    COLONOSCOPY DIAGNOSTIC performed by Neda Elder MD at [address] [place] [address]      all teeth extracted    ENDOSCOPY, COLON, DIAGNOSTIC  04/08/2019    EGD - thrush noted through out    ENDOSCOPY, COLON, DIAGNOSTIC  06/01/2021    dr Juana Terrell gastric ulcers, intact fundiplication with tightening, dil 18-20, biopsy r/o h pylori, f/u as scheduled    EYE SURGERY Left 1990's    \"metal removed from eye\"    GASTRIC FUNDOPLICATION N/A 17/23/0894    NISSEN FUNDOPLICATION LAPAROSCOPIC ROBOTIC HIATAL HERNIA performed by Yumiko Stubbs MD at [place] [address]  12/2018    UPPER GASTROINTESTINAL ENDOSCOPY N/A 1/28/2019    EGD BIOPSY / BRUSHING OF ESOPHAGUS performed by Yumiko Stubbs MD at [address] [place] [address] N/A 4/8/2019    EGD BIOPSY AND BRUSHING performed by Yumiko Stubbs MD at [address] [place] [address] N/A 7/8/2019    EGD BIOPSY and brushing performed by Yumiko Stubbs MD at [address] [place] [address] N/A 12/30/2019    EGD BIOPSY/BRUSHING OF ESOPHAGUS performed by Yumiko Stubbs MD at [address] [place] [address] N/A 6/5/2020    EGD BIOPSY performed by Yumiko Stubbs MD at [address] [place] [address] N/A 6/1/2021    EGD DILATION BALLOON WITH 18-20 BALLOON UP TO 21 WITH BIOPSIES performed by Yumiko Stubbs MD at [address] 6/29/2021     [facility] used 55, 50. with biopsies performed by Y[facility]iko Stubbs MD at [facility]     Past Medical History:   Diagnosis Date    Arthritis     Hands    Chronic cluster headache 6/7/2019 last    COPD (chronic obstructive pulmonary disease) ([facility] [address]     \"dx 2 yrs ago\" \\"does not see a lung dr Basil Shukla with PCP    Degenerative disc disease     \"in my back have degenarative disc\"    Depression     Edentulous     Fracture     \"fell over a 5 gallon bucket and landed on cement block and broke my right hand\" (7/6/2015)    GERD with esophagitis     H/O dizziness     States will feel like he is going to pass out, possible low BS    Hiatal hernia     Hx of gastric ulcer     HX OTHER MEDICAL     pt states has trouble with reading and writing\"can read very very little\"    Hyperlipidemia     Hypertension     No meds as of 5/27/21 - follows with PCP    Impingement syndrome of right shoulder     Left shoulder pain     limited range-of-motion    Lower back pain     Motion sickness     Neck pain     more on left side into shoulder--pain limits ROM    PONV (postoperative nausea and vomiting)     Vertigo      Family History   Problem Relation Age of Onset    Dementia Mother     Stroke Mother     Stroke Father     High Blood Pressure Father     Heart Disease Father     Breast Cancer Sister     Diabetes Brother     Cancer Brother         mouth and throat    Heart Attack Maternal Uncle     Heart Disease Maternal Uncle     No Known Problems Son     No Known Problems Son     No Known Problems Daughter      Social History     Socioeconomic History    Marital status:      Spouse name: Not on file    Number of children: Not on file    Years of education: Not on file    Highest education level: Not on file   Occupational History    Not on file   Tobacco Use    Smoking status: Current Every Day Smoker     Packs/day: 0.50     Years: 48.00     Pack years: 24.00     Types: Cigarettes     Start date: 1972    Smokeless tobacco: Never Used    Tobacco comment: use to smoke 2.5 PPD   Vaping Use    Vaping Use: Never used   Substance and Sexual Activity    Alcohol use: Yes     Comment: Occasional 1/month    Drug use: Never     Comment: 3 cups of coffee daily    Sexual activity: Not Currently     Partners: Female   Other Topics Concern    Not on file   Social History Narrative    Not on file     Social Determinants of Health     Financial Resource Strain:     Difficulty of Paying Living Expenses: Not on file   Food Insecurity:     Worried About Running Out of Food in the Last Year: Not on file    [place] of Food in the Last Year: Not on file Transportation Needs:     Lack of Transportation (Medical): Not on file    Lack of Transportation (Non-Medical):  Not on file   Physical Activity:     Days of Exercise per Week: Not on file    Minutes of Exercise per Session: Not on file   Stress:     Feeling of Stress : Not on file   Social Connections:     Frequency of Communication with Friends and Family: Not on file    Frequency of Social Gatherings with Friends and Family: Not on file    Attends Christian Services: Not on file    Active Member of [address] [place] [address] or Organizations: Not on file    Attends Club or Organization Meetings: Not on file    Marital Status: Not on file   Intimate Partner Violence:     Fear of Current or Ex-Partner: Not on file    Emotionally Abused: Not on file    Physically Abused: Not on file    Sexually Abused: Not on file   Housing Stability:     Unable to Pay for Housing in the Last Year: Not on file    Number of [place] in the Last Year: Not on file    Unstable Housing in the Last Year: Not on file       Current Outpatient Medications   Medication Sig Dispense Refill    pantoprazole (PROTONIX) 40 MG tablet TAKE ONE TABLET BY MOUTH TWICE A DAY 60 tablet 2    cholestyramine (QUESTRAN) 4 g packet TAKE ONE PACKET BY MOUTH THREE TIMES A DAY 90 packet 3    bisacodyl (BISACODYL) 5 MG EC tablet Take 4 tablets by mouth 2 times daily 8 tablet 3    metoclopramide (REGLAN) 10 MG tablet Take 1 tablet by mouth 3 times daily (with meals) 120 tablet 3    acarbose (PRECOSE) 25 MG tablet       atorvastatin (LIPITOR) 20 MG tablet       metoclopramide (REGLAN) 10 MG tablet Take 1 tablet by mouth 3 times daily (with meals) 120 tablet 3    cetirizine (ZYRTEC) 10 MG tablet 1 tablet daily       fluticasone (FLONASE) 50 MCG/ACT nasal spray as needed      albuterol sulfate  (90 Base) MCG/ACT inhaler Inhale 2 puffs into the lungs every 6 hours as needed      Nutritional Supplement LIQD Take 1 Can by mouth 3 times daily 90 Can 5    Nutritional Supplements (ENSURE HIGH PROTEIN) LIQD Take 1 Can by mouth 3 times daily Different flavors if possible 30 Can 5    ondansetron (ZOFRAN ODT) 4 MG disintegrating tablet Place 1 tablet under the tongue every 8 hours as needed for Nausea or Vomiting 30 tablet 3    ipratropium-albuterol (DUONEB) 0.5-2.5 (3) MG/3ML SOLN nebulizer solution as needed       Nebulizers (MICRO AIR NEBULIZER) MISC TO USE AS NEEDED WITH NEBULIZER SOLUTION  11    acetaminophen (TYLENOL) 500 MG tablet Take 1,000 mg by mouth every 6 hours as needed for Pain      tiotropium (SPIRIVA HANDIHALER) 18 MCG inhalation capsule Inhale 1 capsule into the lungs daily 30 capsule 3    albuterol sulfate HFA (PROVENTIL HFA) 108 (90 BASE) MCG/ACT inhaler Inhale 2 puffs into the lungs every 6 hours as needed for Wheezing 1 Inhaler 3    glucose 4 g chewable tablet CHEW AND SWALLOW 1 TABLET BY MOUTH AS NEEDED for hypoglycemia episodes      nicotine (NICODERM CQ) 14 MG/24HR Place 1 patch onto the skin daily 42 patch 0    nystatin (MYCOSTATIN) 000717 UNIT/GM powder Apply 3 times daily. (Patient not taking: Reported on 11/15/2021) 1 Bottle 3    varenicline (CHANTIX) 1 MG tablet Take 1 tablet by mouth 2 times daily (Patient not taking: Reported on 11/15/2021) 180 tablet 1     No current facility-administered medications for this visit. Allergies   Allergen Reactions    Bee Venom Anaphylaxis    Nsaids Other (See Comments)     Can take small doses for a short time - Not to take any longer than necessary stomach ulcer       Review of Systems:       Review of Systems   Constitutional: Negative for chills. Negative for fever. HENT: Negative for congestion. Negative for rhinorrhea. Respiratory:positive for tobacco use, COPD  Cardiovascular: Negative for chest pain. Denies previous MI    Gastrointestinal: as per HPI    Genitourinary: Negative for difficulty urinating. Neurological: Negative for dizziness, syncope and numbness. Hematological: Does not bruise/bleed easily. OBJECTIVE:  Physical Exam:    /82 (Site: Right Upper Arm, Position: Sitting, Cuff Size: Medium Adult)   Pulse 72   Ht 5' 9\" (1.753 m)   Wt 152 lb 3.2 oz (69 kg)   SpO2 97%   BMI 22.48 kg/m²      Physical Exam  General: awake, alert, in no acute distress  HEENT: mucous membranes moist  Respiratory: normal effort, no wheezes appreciated  CV: appears well perfused, regular rate and rhythm    Abdomen: Soft, mild to moderately tender in the LLQ, less tender in the RLQ and epigastrium, non-distended. No guarding or rebound tenderness. Skin: warm and dry  Extremities: atraumatic  Neuro: no focal deficits noted  Psych: mood normal        ASSESSMENT:  1. Left lower quadrant abdominal pain    2. Esophageal dysphagia      Andrzej Elam is a 62 y.o. male with complex abdominal complaints including non-specific pain and dysphagia. He has a history of hiatal hernia repair with Nissen. PLAN:  Treatment:    - will plan for EGD with possible dilation and colonoscopy. - further management based on results      Patient counseled on risks, benefits, and alternatives of treatment plan at length today. Patient states an understanding and willingness to proceed with plan.         Valentina Muller MD

## 2021-11-19 ENCOUNTER — TELEPHONE (OUTPATIENT)
Dept: SURGERY | Age: 61
End: 2021-11-19

## 2021-11-19 RX ORDER — HYDROCORTISONE 25 MG/G
CREAM TOPICAL
Qty: 1 EACH | Refills: 0 | Status: SHIPPED | OUTPATIENT
Start: 2021-11-19 | End: 2022-05-04

## 2021-11-19 NOTE — TELEPHONE ENCOUNTER
Sadi Herr called stating he is now having trouble with his hemorrhoids being swollen, very sore, and unable to sit.  Is asking for a medication to help with swelling

## 2021-11-19 NOTE — TELEPHONE ENCOUNTER
PER DR. ABRAHAM PT. TO TAKE FIBER AND WILL SEND CREAM TO PHARMACY.  PT ADVISED AND VOICED UNDERSTANDING

## 2021-12-01 ENCOUNTER — TELEPHONE (OUTPATIENT)
Dept: SURGERY | Age: 61
End: 2021-12-01

## 2021-12-01 NOTE — TELEPHONE ENCOUNTER
SPOKE TO FOR Lavada Prader REGARDING SURGERY (EGD/CSCOPE) SCHEDULED @ [facility].  NOTIFIED OF DATES, TIMES AND LOCATION    PHONE ASSESSMENT   COVID - 12/10/21 @ 800  SURGERY - 12/16/21 @ 900  P/O - 1/5/22 @ 1100    NPO AFTER MIDNIGHT  Debbie Henderson [address] [address] 12/15/21  1ST BOTTLE 4PM 12/15/21  2ND BOTTLE 4AM 12/16/21  HOLD BLOOD THINNERS

## 2021-12-03 DIAGNOSIS — Z01.818 PRE-OP TESTING: Primary | ICD-10-CM

## 2021-12-10 ENCOUNTER — HOSPITAL ENCOUNTER (OUTPATIENT)
Age: 61
Setting detail: SPECIMEN
Discharge: HOME | End: 2021-12-10
Payer: MEDICAID

## 2021-12-10 ENCOUNTER — NURSE ONLY (OUTPATIENT)
Dept: SURGERY | Age: 61
End: 2021-12-10
Payer: MEDICAID

## 2021-12-10 DIAGNOSIS — Z01.818 PRE-OP TESTING: Primary | ICD-10-CM

## 2021-12-10 PROCEDURE — U0005 INFEC AGEN DETEC AMPLI PROBE: HCPCS

## 2021-12-10 PROCEDURE — U0003 INFECTIOUS AGENT DETECTION BY NUCLEIC ACID (DNA OR RNA); SEVERE ACUTE RESPIRATORY SYNDROME CORONAVIRUS 2 (SARS-COV-2) (CORONAVIRUS DISEASE [COVID-19]), AMPLIFIED PROBE TECHNIQUE, MAKING USE OF HIGH THROUGHPUT TECHNOLOGIES AS DESCRIBED BY CMS-2020-01-R: HCPCS

## 2021-12-10 PROCEDURE — 99211 OFF/OP EST MAY X REQ PHY/QHP: CPT | Performed by: SURGERY

## 2021-12-10 NOTE — PROGRESS NOTES
Patient will arrive at 0730 on 12/16/2021 for his procedure at 0930.               1. Do not eat or drink anything after midnight - unless instructed by your doctor prior to surgery. This includes                   no water, chewing gum or mints. 2. Follow your directions as prescribed by the doctor for your procedure and medications. 3. Check with your Doctor regarding stopping vitamins, supplements, blood thinners (Plavix, Coumadin, Lovenox, Effient, Pradaxa, Xarelto, Fragmin or                   other blood thinners) and follow their instructions. 4. Do not smoke, and do not drink any alcoholic beverages 24 hours prior to surgery. This includes NA Beer. 5. You may brush your teeth and gargle the morning of surgery. DO NOT SWALLOW WATER   6. You MUST make arrangements for a responsible adult to take you home after your surgery and be able to check on you every couple                   hours for the day. You will not be allowed to leave alone or drive yourself home. It is strongly suggested someone stay with you the first 24                   hrs. Your surgery will be cancelled if you do not have a ride home. 7. Please wear simple, loose fitting clothing to the hospital.  [place] not bring valuables (money, credit cards, checkbooks, etc.) Do not wear any                   makeup (including no eye makeup) or nail polish on your fingers or toes. 8. DO NOT wear any jewelry or piercings on day of surgery. All body piercing jewelry must be removed. 9. If you have dentures, they will be removed before going to the OR; we will provide you a container. If you wear contact lenses or glasses,                  they will be removed; please bring a case for them. 10. If you  have a Living Will and Durable Power of  for Healthcare, please bring in a copy.            11. Please bring picture ID,  insurance card, paperwork from the doctors office    (H & P, Consent, & card for implantable devices). 12. Take a shower the night before or morning of your procedure, do not apply any lotion, oil or powder. 13. Wear a mask covering your nose & mouth when entering the hospital. Have your covid-19 test performed within 2-7 days of your                  surgery. Quarantine yourself after the test until after your surgery. Patient will take his protonix the morning of his procedure.

## 2021-12-10 NOTE — PROGRESS NOTES
Patient collected pre-op COVID-19 screening instruction and collection supplies given to patient accordingly. Patient denies fever/cough/sob or recent travels. Patient voiced understanding of collection/quarantine instructions. COVID screening lab ordered, collection completed without difficulty, identifiers placed on specimen. AVS given at discharge. Results will be given via mychart or telephone call [facility] will manage any positive test results, the procedure will be rescheduled at a later date).

## 2021-12-11 LAB
SARS-COV-2: NOT DETECTED
SOURCE: NORMAL

## 2021-12-13 RX ORDER — METOCLOPRAMIDE 10 MG/1
10 TABLET ORAL
Qty: 120 TABLET | Refills: 3 | Status: SHIPPED | OUTPATIENT
Start: 2021-12-13 | End: 2022-05-04 | Stop reason: SDUPTHER

## 2021-12-15 ENCOUNTER — ANESTHESIA EVENT (OUTPATIENT)
Dept: ENDOSCOPY | Age: 61
End: 2021-12-15
Payer: MEDICAID

## 2021-12-15 ASSESSMENT — LIFESTYLE VARIABLES: SMOKING_STATUS: 1

## 2021-12-15 NOTE — ANESTHESIA PRE PROCEDURE
Department of Anesthesiology  Preprocedure Note       Name:  Josselyn Jo   Age:  62 y.o.  :  1960                                          MRN:  [id number]         Date:  12/15/2021      Surgeon: Nai Iverson):  Noel Tejada MD    Procedure: Procedure(s):  COLONOSCOPY DIAGNOSTIC  EGD ESOPHAGOGASTRODUODENOSCOPY POSS DILATION    Medications prior to admission:   Prior to Admission medications    Medication Sig Start Date End Date Taking? Authorizing Provider   metoclopramide (REGLAN) 10 MG tablet Take 1 tablet by mouth 3 times daily (with meals) 21   Noel Tejada MD   hydrocortisone (ANUSOL-HC) 2.5 % CREA rectal cream Apply to hemorrhoids up to 4 times per day as needed for pain or burning. 21   Noel Tejada MD   glucose 4 g chewable tablet CHEW AND SWALLOW 1 TABLET BY MOUTH AS NEEDED for hypoglycemia episodes 10/27/21   Historical Provider, MD   Na Sulfate-K Sulfate-Mg Sulf 17.5-3.13-1.6 GM/177ML SOLN Follow directions given to patient in the office 11/15/21   Noel Tejada MD   pantoprazole (PROTONIX) 40 MG tablet TAKE ONE TABLET BY MOUTH TWICE A DAY 21   Mary Pedroza MD   cholestyramine (QUESTRAN) 4 g packet TAKE ONE PACKET BY MOUTH THREE TIMES A DAY 21   Rashid Ramesh MD   bisacodyl (BISACODYL) 5 MG EC tablet Take 4 tablets by mouth 2 times daily 21   Rashid Ramesh MD   nicotine (NICODERM CQ) 14 MG/24HR Place 1 patch onto the skin daily 9/3/21 10/15/21  Rashid Ramesh MD   nystatin (MYCOSTATIN) 151753 UNIT/GM powder Apply 3 times daily.   Patient not taking: Reported on 11/15/2021 7/7/21   Rashid Ramesh MD   acarbose (PRECOSE) 25 MG tablet  21   Historical Provider, MD   atorvastatin (LIPITOR) 20 MG tablet  21   Historical Provider, MD   varenicline (CHANTIX) 1 MG tablet Take 1 tablet by mouth 2 times daily  Patient not taking: Reported on 11/15/2021 6/11/21   Rashid Ramesh MD   cetirizine (ZYRTEC) 10 MG tablet 1 tablet daily  3/9/21   Historical Provider, MD   fluticasone (FLONASE) 50 MCG/ACT nasal spray as needed 3/9/21   Historical Provider, MD   albuterol sulfate  (90 Base) MCG/ACT inhaler Inhale 2 puffs into the lungs every 6 hours as needed    Historical Provider, MD   Nutritional Supplement LIQD Take 1 Can by mouth 3 times daily 5/20/20   Ashly Monroe MD   Nutritional Supplements (ENSURE HIGH PROTEIN) LIQD Take 1 Can by mouth 3 times daily Different flavors if possible 5/20/20   Ashly Monroe MD   ondansetron (ZOFRAN ODT) 4 MG disintegrating tablet Place 1 tablet under the tongue every 8 hours as needed for Nausea or Vomiting 12/20/19   Ashly Monroe MD   ipratropium-albuterol (DUONEB) 0.5-2.5 (3) MG/3ML SOLN nebulizer solution as needed  10/19/19   Historical Provider, MD   Nebulizers (MICRO AIR NEBULIZER) MISC TO USE AS NEEDED WITH NEBULIZER SOLUTION 9/23/19   Historical Provider, MD   acetaminophen (TYLENOL) 500 MG tablet Take 1,000 mg by mouth every 6 hours as needed for Pain    Historical Provider, MD   tiotropium ([place]) 18 MCG inhalation capsule Inhale 1 capsule into the lungs daily 1/3/17   Miryam Moore MD   albuterol sulfate HFA (PROVENTIL HFA) 108 (90 BASE) MCG/ACT inhaler Inhale 2 puffs into the lungs every 6 hours as needed for Wheezing 1/3/17   Miryam Moore MD       Current medications:    Current Outpatient Medications   Medication Sig Dispense Refill    metoclopramide (REGLAN) 10 MG tablet Take 1 tablet by mouth 3 times daily (with meals) 120 tablet 3    hydrocortisone (ANUSOL-HC) 2.5 % CREA rectal cream Apply to hemorrhoids up to 4 times per day as needed for pain or burning.  1 each 0    glucose 4 g chewable tablet CHEW AND SWALLOW 1 TABLET BY MOUTH AS NEEDED for hypoglycemia episodes      Na Sulfate-K Sulfate-Mg Sulf 17.5-3.13-1.6 GM/177ML SOLN Follow directions given to patient in the office 1 each 0    pantoprazole (PROTONIX) 40 MG tablet TAKE ONE TABLET BY MOUTH TWICE A DAY 60 tablet 2    cholestyramine (QUESTRAN) 4 g packet TAKE ONE PACKET BY MOUTH THREE TIMES A DAY 90 packet 3    bisacodyl (BISACODYL) 5 MG EC tablet Take 4 tablets by mouth 2 times daily 8 tablet 3    nicotine (NICODERM CQ) 14 MG/24HR Place 1 patch onto the skin daily 42 patch 0    nystatin (MYCOSTATIN) 157348 UNIT/GM powder Apply 3 times daily. (Patient not taking: Reported on 11/15/2021) 1 Bottle 3    acarbose (PRECOSE) 25 MG tablet       atorvastatin (LIPITOR) 20 MG tablet       varenicline (CHANTIX) 1 MG tablet Take 1 tablet by mouth 2 times daily (Patient not taking: Reported on 11/15/2021) 180 tablet 1    cetirizine (ZYRTEC) 10 MG tablet 1 tablet daily       fluticasone (FLONASE) 50 MCG/ACT nasal spray as needed      albuterol sulfate  (90 Base) MCG/ACT inhaler Inhale 2 puffs into the lungs every 6 hours as needed      Nutritional Supplement LIQD Take 1 Can by mouth 3 times daily 90 Can 5    Nutritional Supplements (ENSURE HIGH PROTEIN) LIQD Take 1 Can by mouth 3 times daily Different flavors if possible 30 Can 5    ondansetron (ZOFRAN ODT) 4 MG disintegrating tablet Place 1 tablet under the tongue every 8 hours as needed for Nausea or Vomiting 30 tablet 3    ipratropium-albuterol (DUONEB) 0.5-2.5 (3) MG/3ML SOLN nebulizer solution as needed       Nebulizers (MICRO AIR NEBULIZER) MISC TO USE AS NEEDED WITH NEBULIZER SOLUTION  11    acetaminophen (TYLENOL) 500 MG tablet Take 1,000 mg by mouth every 6 hours as needed for Pain      tiotropium (SPIRIVA HANDIHALER) 18 MCG inhalation capsule Inhale 1 capsule into the lungs daily 30 capsule 3    albuterol sulfate HFA (PROVENTIL HFA) 108 (90 BASE) MCG/ACT inhaler Inhale 2 puffs into the lungs every 6 hours as needed for Wheezing 1 Inhaler 3     No current facility-administered medications for this visit. Allergies:     Allergies   Allergen Reactions    Bee Venom Anaphylaxis    Nsaids Other (See Comments)     Can take small doses for a short time - Not to take any longer than necessary stomach ulcer       Problem List:    Patient Active Problem List   Diagnosis Code    Impingement syndrome of right shoulder M75.41    Acute bronchitis with COPD ([facility] [address] J44.0, J20.9    Chest pain R07.9    Post-nasal drip R09.82    Chronic cluster headache G44.029    Generalized weakness R53.1    Vertigo R42    Positional lightheadedness R42    Hiatal hernia K44.9    Gastroesophageal reflux disease with esophagitis K21.00    PUD (peptic ulcer disease) K27.9    Paraesophageal hiatal hernia K44.9    Status post laparoscopic Nissen fundoplication R32.455    Esophageal dysphagia R13.19    Esophageal thrush (HCC) B37.81    Candida esophagitis (HCC) B37.81    Gastroparesis K31.84    Candida infection, esophageal (HCC) B37.81    Pneumonia J18.9    Left upper quadrant pain R10.12    LLQ pain R10.32    Fungal esophagitis K20.80, B49    Left flank pain R10.9    Gastroesophageal reflux disease without esophagitis K21.9    Dyslipidemia E78.5    Essential hypertension I10    Epigastric pain R10.13    Esophageal stricture K22.2    Pharyngoesophageal dysphagia R13.14    Pain of upper abdomen R10.10    Nicotine dependence F17.200    Hematochezia K92.1       Past Medical History:        Diagnosis Date    Arthritis     Hands    Chronic cluster headache 6/7/2019 last    COPD (chronic obstructive pulmonary disease) ([facility] [address]     \"dx 2 yrs ago\" \\"does not see a lung dr Nathanael Bamberger with PCP    Degenerative disc disease     \"in my back have degenarative disc\"    Depression     Edentulous     Fracture     \"fell over a 5 gallon bucket and landed on cement block and broke my right hand\" (7/6/2015)    GERD with esophagitis     H/O dizziness     States will feel like he is going to pass out, possible low BS    Hiatal hernia     Hx of gastric ulcer     HX OTHER MEDICAL     pt states has trouble with reading and writing\"can read very very little\"    N/A 6/5/2020    EGD BIOPSY performed by India Castillo MD at [address] [place] [address] N/A 6/1/2021    EGD DILATION BALLOON WITH 18-20 BALLOON UP TO 20 WITH BIOPSIES performed by India Castillo MD at [address] [place] [address] N/A 6/29/2021     [address], 50. with biopsies performed by India Castillo MD at [facility] ENDOSCOPY       Social History:    Social History     Tobacco Use    Smoking status: Current Every Day Smoker     Packs/day: 0.50     Years: 48.00     Pack years: 24.00     Types: Cigarettes     Start date: 1972    Smokeless tobacco: Never Used    Tobacco comment: use to smoke 2.5 PPD   Substance Use Topics    Alcohol use: Yes     Comment: Occasional 1/month                                Ready to quit: Not Answered  Counseling given: Not Answered  Comment: use to smoke 2.5 PPD      Vital Signs (Current): There were no vitals filed for this visit.                                            BP Readings from Last 3 Encounters:   11/15/21 120/82   09/08/21 130/82   09/03/21 110/74       NPO Status:                                                                                 BMI:   Wt Readings from Last 3 Encounters:   11/15/21 152 lb 3.2 oz (69 kg)   09/08/21 147 lb (66.7 kg)   09/03/21 143 lb 14.4 oz (65.3 kg)     There is no height or weight on file to calculate BMI.    CBC:   Lab Results   Component Value Date    WBC 6.4 06/29/2021    RBC 4.46 06/29/2021    HGB 13.5 06/29/2021    HCT 40.8 06/29/2021    MCV 91.5 06/29/2021    RDW 13.4 06/29/2021     06/29/2021       CMP:   Lab Results   Component Value Date     01/16/2020    K 4.5 01/16/2020     01/16/2020    CO2 24 01/16/2020    BUN 11 01/16/2020    CREATININE 1.0 09/03/2021    CREATININE 0.8 01/16/2020    GFRAA >60 09/03/2021    LABGLOM >60 09/03/2021    GLUCOSE 66 01/16/2020    PROT 7.0 10/11/2019    PROT 7.0 11/19/2012    CALCIUM 9.8 01/16/2020    BILITOT 0.3 01/16/2020    ALKPHOS 82 01/16/2020    AST 18 01/16/2020    ALT 10 01/16/2020       POC Tests: No results for input(s): POCGLU, POCNA, POCK, POCCL, POCBUN, POCHEMO, POCHCT in the last 72 hours. Coags:   Lab Results   Component Value Date    PROTIME 11.8 08/02/2017    PROTIME 10.8 05/15/2011    INR 1.03 08/02/2017    APTT 31.3 08/02/2017       HCG (If Applicable): No results found for: PREGTESTUR, PREGSERUM, HCG, HCGQUANT     ABGs: No results found for: PHART, PO2ART, OJM3VSK, GBJ7NGD, BEART, C9DXXKQK     Type & Screen (If Applicable):  No results found for: LABABO, LABRH    Drug/Infectious Status (If Applicable):  Lab Results   Component Value Date    HEPCAB NON REACTIVE 03/08/2011       COVID-19 Screening (If Applicable):   Lab Results   Component Value Date    COVID19 NOT DETECTED 12/10/2021           Anesthesia Evaluation  Patient summary reviewed   history of anesthetic complications: PONV. Airway: Mallampati: II  TM distance: >3 FB   Neck ROM: full  Mouth opening: > = 3 FB Dental:    (+) edentulous      Pulmonary:   (+) pneumonia: no interval change,  COPD:  current smoker                           Cardiovascular:  Exercise tolerance: poor (<4 METS),   (+) hypertension:, valvular problems/murmurs: MR, angina:, hyperlipidemia      ECG reviewed      Echocardiogram reviewed  Stress test reviewed       Beta Blocker:  Not on Beta Blocker      ROS comment:     Stress test 4/2021:Summary   Normal study.   Normal perfusion study with normal distribution in all coronal, short, and   horizontal axis.   The observed defect is consistent with diaphragmatic attenuation.   Normal LV function.  LVEF is 48 %.   Supervising physician Dr. Cayla Barcenas .      Recommendation   Recommendations: Schedule out patient visit to discuss results.      Signatures      ------------------------------------------------------------------   Electronically signed by Gogo Duran MD (Interpreting   cardiologist) on verbalized an understanding and agreed to proceed. Anesthesia plan discussed with care team members and agreed upon.   Daily Rice, APRN - CRNA  12/15/2021

## 2021-12-16 ENCOUNTER — ANESTHESIA (OUTPATIENT)
Dept: ENDOSCOPY | Age: 61
End: 2021-12-16
Payer: MEDICAID

## 2021-12-16 ENCOUNTER — HOSPITAL ENCOUNTER (OUTPATIENT)
Age: 61
Setting detail: OUTPATIENT SURGERY
Discharge: HOME | End: 2021-12-16
Attending: SURGERY | Admitting: SURGERY
Payer: MEDICAID

## 2021-12-16 VITALS
BODY MASS INDEX: 20.88 KG/M2 | RESPIRATION RATE: 16 BRPM | HEART RATE: 58 BPM | WEIGHT: 141 LBS | HEIGHT: 69 IN | SYSTOLIC BLOOD PRESSURE: 124 MMHG | TEMPERATURE: 97.8 F | OXYGEN SATURATION: 95 % | DIASTOLIC BLOOD PRESSURE: 90 MMHG

## 2021-12-16 VITALS — SYSTOLIC BLOOD PRESSURE: 103 MMHG | OXYGEN SATURATION: 97 % | DIASTOLIC BLOOD PRESSURE: 79 MMHG

## 2021-12-16 DIAGNOSIS — K22.2 ESOPHAGEAL STRICTURE: ICD-10-CM

## 2021-12-16 DIAGNOSIS — R10.32 LEFT LOWER QUADRANT PAIN: ICD-10-CM

## 2021-12-16 LAB — GLUCOSE BLD-MCNC: 75 MG/DL (ref 70–99)

## 2021-12-16 PROCEDURE — 3609010600 HC COLONOSCOPY POLYPECTOMY SNARE/COLD BIOPSY: Performed by: SURGERY

## 2021-12-16 PROCEDURE — 43239 EGD BIOPSY SINGLE/MULTIPLE: CPT | Performed by: SURGERY

## 2021-12-16 PROCEDURE — 3609012400 HC EGD TRANSORAL BIOPSY SINGLE/MULTIPLE: Performed by: SURGERY

## 2021-12-16 PROCEDURE — 3700000001 HC ADD 15 MINUTES (ANESTHESIA): Performed by: SURGERY

## 2021-12-16 PROCEDURE — 7100000010 HC PHASE II RECOVERY - FIRST 15 MIN: Performed by: SURGERY

## 2021-12-16 PROCEDURE — 7100000011 HC PHASE II RECOVERY - ADDTL 15 MIN: Performed by: SURGERY

## 2021-12-16 PROCEDURE — 6360000002 HC RX W HCPCS

## 2021-12-16 PROCEDURE — 2500000003 HC RX 250 WO HCPCS

## 2021-12-16 PROCEDURE — 3700000000 HC ANESTHESIA ATTENDED CARE: Performed by: SURGERY

## 2021-12-16 PROCEDURE — 88305 TISSUE EXAM BY PATHOLOGIST: CPT

## 2021-12-16 PROCEDURE — 93005 ELECTROCARDIOGRAM TRACING: CPT | Performed by: SURGERY

## 2021-12-16 PROCEDURE — 82962 GLUCOSE BLOOD TEST: CPT

## 2021-12-16 PROCEDURE — 45380 COLONOSCOPY AND BIOPSY: CPT | Performed by: SURGERY

## 2021-12-16 PROCEDURE — 2709999900 HC NON-CHARGEABLE SUPPLY: Performed by: SURGERY

## 2021-12-16 PROCEDURE — 88342 IMHCHEM/IMCYTCHM 1ST ANTB: CPT

## 2021-12-16 PROCEDURE — 2580000003 HC RX 258: Performed by: SURGERY

## 2021-12-16 RX ORDER — SODIUM CHLORIDE 9 MG/ML
25 INJECTION, SOLUTION INTRAVENOUS PRN
Status: DISCONTINUED | OUTPATIENT
Start: 2021-12-16 | End: 2021-12-16 | Stop reason: HOSPADM

## 2021-12-16 RX ORDER — SODIUM CHLORIDE, SODIUM LACTATE, POTASSIUM CHLORIDE, CALCIUM CHLORIDE 600; 310; 30; 20 MG/100ML; MG/100ML; MG/100ML; MG/100ML
INJECTION, SOLUTION INTRAVENOUS CONTINUOUS
Status: DISCONTINUED | OUTPATIENT
Start: 2021-12-16 | End: 2021-12-16 | Stop reason: HOSPADM

## 2021-12-16 RX ORDER — PROPOFOL 10 MG/ML
INJECTION, EMULSION INTRAVENOUS PRN
Status: DISCONTINUED | OUTPATIENT
Start: 2021-12-16 | End: 2021-12-16 | Stop reason: SDUPTHER

## 2021-12-16 RX ORDER — LIDOCAINE HYDROCHLORIDE 20 MG/ML
INJECTION, SOLUTION EPIDURAL; INFILTRATION; INTRACAUDAL; PERINEURAL PRN
Status: DISCONTINUED | OUTPATIENT
Start: 2021-12-16 | End: 2021-12-16 | Stop reason: SDUPTHER

## 2021-12-16 RX ORDER — SODIUM CHLORIDE 0.9 % (FLUSH) 0.9 %
10 SYRINGE (ML) INJECTION PRN
Status: DISCONTINUED | OUTPATIENT
Start: 2021-12-16 | End: 2021-12-16 | Stop reason: HOSPADM

## 2021-12-16 RX ORDER — OMEPRAZOLE 20 MG/1
20 CAPSULE, DELAYED RELEASE ORAL
Qty: 60 CAPSULE | Refills: 0 | Status: SHIPPED | OUTPATIENT
Start: 2021-12-16 | End: 2022-04-06

## 2021-12-16 RX ORDER — SODIUM CHLORIDE 0.9 % (FLUSH) 0.9 %
10 SYRINGE (ML) INJECTION EVERY 12 HOURS SCHEDULED
Status: DISCONTINUED | OUTPATIENT
Start: 2021-12-16 | End: 2021-12-16 | Stop reason: HOSPADM

## 2021-12-16 RX ADMIN — LIDOCAINE HYDROCHLORIDE 100 MG: 20 INJECTION, SOLUTION EPIDURAL; INFILTRATION; INTRACAUDAL; PERINEURAL at 09:32

## 2021-12-16 RX ADMIN — PROPOFOL 300 MG: 10 INJECTION, EMULSION INTRAVENOUS at 09:33

## 2021-12-16 RX ADMIN — SODIUM CHLORIDE, POTASSIUM CHLORIDE, SODIUM LACTATE AND CALCIUM CHLORIDE: 600; 310; 30; 20 INJECTION, SOLUTION INTRAVENOUS at 09:27

## 2021-12-16 RX ADMIN — PROPOFOL 150 MG: 10 INJECTION, EMULSION INTRAVENOUS at 09:32

## 2021-12-16 ASSESSMENT — PAIN DESCRIPTION - PAIN TYPE
TYPE: CHRONIC PAIN

## 2021-12-16 ASSESSMENT — PAIN DESCRIPTION - LOCATION
LOCATION: CHEST;ARM
LOCATION: CHEST
LOCATION: CHEST;ARM
LOCATION: CHEST

## 2021-12-16 ASSESSMENT — PAIN DESCRIPTION - ORIENTATION
ORIENTATION: LEFT
ORIENTATION: LEFT
ORIENTATION: LEFT;UPPER

## 2021-12-16 ASSESSMENT — PAIN SCALES - GENERAL
PAINLEVEL_OUTOF10: 2
PAINLEVEL_OUTOF10: 2
PAINLEVEL_OUTOF10: 3
PAINLEVEL_OUTOF10: 2
PAINLEVEL_OUTOF10: 8

## 2021-12-16 ASSESSMENT — PAIN DESCRIPTION - DESCRIPTORS
DESCRIPTORS: ACHING
DESCRIPTORS: ACHING

## 2021-12-16 NOTE — ANESTHESIA POSTPROCEDURE EVALUATION
Department of Anesthesiology  Postprocedure Note    Patient: Verónica Wright  MRN: [id number]  YOB: 1960  Date of evaluation: 12/16/2021  Time:  10:18 AM     Procedure Summary     Date: 12/16/21 Room / Location: [address] [place] [address]    Anesthesia Start: 6732 Anesthesia Stop:     Procedures:       COLONOSCOPY POLYPECTOMY SNARE/COLD BIOPSY (N/A )      EGD BIOPSY (N/A ) Diagnosis:       Left lower quadrant pain      Esophageal stricture      (Left lower quadrant pain [R10.32] Esophageal stricture [K22.2])    Surgeons: Sergio Rose MD Responsible Provider: Irena Bingham MD    Anesthesia Type: MAC ASA Status: 4          Anesthesia Type: No value filed. Sreedhar Phase I:      Sreedhar Phase II:      Last vitals: Reviewed and per EMR flowsheets.        Anesthesia Post Evaluation    Patient location during evaluation: bedside  Patient participation: complete - patient participated  Level of consciousness: awake  Pain score: 0  Airway patency: patent  Nausea & Vomiting: no nausea and no vomiting  Complications: no  Cardiovascular status: blood pressure returned to baseline  Respiratory status: acceptable and room air  Hydration status: euvolemic

## 2021-12-16 NOTE — PROCEDURES
PROCEDURE NOTE    DATE OF PROCEDURE: 12/16/2021    SURGEON: Latonia Crow MD, M.D.    Dat Chan: None    PREOPERATIVE DIAGNOSIS: abdominal pain, blood per rectum, change in bowel habitys    POSTOPERATIVE DIAGNOSIS:   1. 3mm rectal polyp  2. Internal hemorrhoids grade II    OPERATION: Total colonoscopy with polypectomy    ANESTHESIA: Local monitored anesthesia. ESTIMATED BLOOD LOSS: None. COMPLICATIONS: None. SPECIMENS: were obtained    HISTORY: The patient is a 62y.o. year old male with history of above preop diagnosis. I recommended colonoscopy with possible biopsy or polypectomy and I explained the risk, benefits, expected outcome, and alternatives to the procedure. Risks included but are not limited to bleeding, infection, respiratory distress, hypotension, and perforation of the colon. The patient understands and was in agreement. PROCEDURE: The patient was given sedation per anesthesia. The patient was given oxygen by nasal cannula. The patient's SPO2 remained appropriate throughout the procedure. The colonoscope was inserted per rectum and advanced under direct vision to the cecum without difficulty. Findings:  Cecum/Ascending colon: normal    Transverse colon: normal    Descending/Sigmoid colon: normal    Rectum/Anus: examined in normal and retroflexed positions and was positive for a 3mm polyp which was removed with cold forcep. Internal hemorrhoids were also noted, grade II. The colon was decompressed and the scope was removed. The patient tolerated the procedure well. IMPRESSION/PLAN:   1. Will follow up pathology from polypectomy and make recommendation for repeat colonoscopy timeframe  2. Recommend high fiber diet or taking a fiber supplement for the hemorrhoids.     Electronically signed by Latonia Crow MD on 12/16/2021 at 10:35 AM

## 2021-12-16 NOTE — PROGRESS NOTES
1030  Pt back to [facility] from Endo per cart. Pt is drowsy, moving a lot on cart, leaning toward railing. Responds to call of name if pushed to answer. Report received from [facility]. VS rechecked and stable. 1038 Pt is restless on cart, skin pink, W&D. C/o pain to Lt arm and Lt chest, clutching at various places on wrist, arm and chest.  Pt still not fully awake. [address] on monitor showing NSR w/o ectopy. Dr Mirta Mclaughlin at [employer] station and notified. 1046  EKG obtained showing NSR--normal EKG. Warm blankets placed on patient--to Lt chest and entire body  1055  Pt more awake now. Given coffee to drink per request.  Pt states pain is easing now. States that he has episodes of pain like this fairly often and has been evaluated for it before. 1120  VS rechecked and remain stable. IV dc'd for pt to get dressed to go home. 1150  Instructions called to pt's girlfriend Nayeli Hirsch and also discussed with pt with understanding voiced. 1155  Pt to car at exit per wheelchair.

## 2021-12-16 NOTE — H&P
No chief complaint on file. SUBJECTIVE:  HPI: 11/15/21  Patient is here with complaints of dysphagia and abdominal pain. He has a history of hiatal hernia repair with Nissen in 2018. Since then he has had intermittent abdominal pain. Pain is in several areas of his abdomen but worse in the LLQ. He was planning to have a colonoscopy with Dr. Eris Howe.  Last c-scope was about 2 years ago with internal hemorrhoids and diverticulosis. He has had some intermittent constipation and blood in the stools but reports no blood for the past few weeks. He gets some \"white specks\" in the stool. He has had multiple EGDs including EGD with dilation which he reports improved his dysphagia in the past.  He reports feeling of dysphagia in the lower esophagus. Denies other complaints. He continues to smoke and has not consistently been taking his PPI. He had a CT in September that was negative for abdominal source of his pain. Upper GI in 5/2020 that failed to show problems with esophageal motility. 12/16/2021  Seen and examined again today. Here for EGD and colonoscopy. Denies changes in their health since last seen. Denies questions regarding surgery today. I have reviewed the patient's(pertinent information to this visit) medical history, family history(scanned in  the [address] [place] [address] under \"patient questioner\"), social history and review of systems with the patient today in the office.             Past Surgical History:   Procedure Laterality Date    ARM SURGERY Left 30+ yrs    \"put plastic ligaments in \"  after injury through glass window    CARPAL TUNNEL RELEASE Right 10/4/2019    RIGHT CARPAL TUNNEL RELEASE performed by Ermias Garnett DO at [address]  2010    COLONOSCOPY  2018    HX: polyps - Dr. Jeoffrey Merlin COLONOSCOPY N/A 12/30/2019    COLONOSCOPY DIAGNOSTIC performed by Raciel Liang MD at [facility]      all teeth extracted    ENDOSCOPY, COLON, DIAGNOSTIC 04/08/2019    EGD - thrush noted through out    ENDOSCOPY, COLON, DIAGNOSTIC  06/01/2021    dr winters:multiple gastric ulcers, intact fundiplication with tightening, dil 18-20, biopsy r/o h pylori, f/u as scheduled    EYE SURGERY Left 1990's    \"metal removed from eye\"    GASTRIC FUNDOPLICATION N/A 32/55/2172    NISSEN FUNDOPLICATION LAPAROSCOPIC ROBOTIC HIATAL HERNIA performed by Jessy Rosado MD at [place] [address]      abdominal    UPPER GASTROINTESTINAL ENDOSCOPY  12/2018    UPPER GASTROINTESTINAL ENDOSCOPY N/A 1/28/2019    EGD BIOPSY / BRUSHING OF ESOPHAGUS performed by Jessy Rosado MD at [address] N/A 4/8/2019    EGD BIOPSY AND BRUSHING performed by Jessy Rosado MD at [address] [facility] 7/8/2019    EGD BIOPSY and brushing performed by Jessy Rosado MD at [address] N/A 12/30/2019    EGD BIOPSY/BRUSHING OF ESOPHAGUS performed by Jessy Rosado MD at [address] N/A 6/5/2020    EGD BIOPSY performed by Jessy Rosado MD at [address] N/A 6/1/2021    EGD DILATION BALLOON WITH 18-20 BALLOON UP TO 21 WITH BIOPSIES performed by Jessy Rosado MD at [address] [facility] 6/29/2021     [facility] used 55, 50. with biopsies performed by Jessy Rosado MD at [facility]     Past Medical History:   Diagnosis Date    Arthritis     Hands    Chronic cluster headache 6/7/2019 last    COPD (chronic obstructive pulmonary disease) ([facility] [address]     \"dx 2 yrs ago\" \\"does not see a lung dr Marlyn Jacques with PCP    Degenerative disc disease     \"in my back have degenarative disc\"    Depression     Edentulous     Fracture     \"fell over a 5 gallon bucket and landed on cement block and broke my right hand\" (7/6/2015)    GERD with esophagitis     H/O dizziness     States will feel like he is going to pass out, possible low BS    Hiatal hernia     Hx of gastric ulcer     HX OTHER MEDICAL     pt states has trouble with reading and writing\"can read very very little\"    Hyperlipidemia     Hypertension     No meds as of 5/27/21 - follows with PCP    Impingement syndrome of right shoulder     Left shoulder pain     limited range-of-motion    Lower back pain     Motion sickness     Neck pain     more on left side into shoulder--pain limits ROM    PONV (postoperative nausea and vomiting)     Vertigo      Family History   Problem Relation Age of Onset    Dementia Mother     Stroke Mother     Stroke Father     High Blood Pressure Father     Heart Disease Father     Breast Cancer Sister     Diabetes Brother     Cancer Brother         mouth and throat    Heart Attack Maternal Uncle     Heart Disease Maternal Uncle     No Known Problems Son     No Known Problems Son     No Known Problems Daughter      Social History     Socioeconomic History    Marital status:      Spouse name: Not on file    Number of children: Not on file    Years of education: Not on file    Highest education level: Not on file   Occupational History    Not on file   Tobacco Use    Smoking status: Current Every Day Smoker     Packs/day: 0.50     Years: 48.00     Pack years: 24.00     Types: Cigarettes     Start date: 1972    Smokeless tobacco: Never Used    Tobacco comment: use to smoke 2.5 PPD   Vaping Use    Vaping Use: Never used   Substance and Sexual Activity    Alcohol use: Yes     Comment: Occasional 1/month    Drug use: Never     Comment: 3 cups of coffee daily    Sexual activity: Not Currently     Partners: Female   Other Topics Concern    Not on file   Social History Narrative    Not on file     Social Determinants of Health     Financial Resource Strain:     Difficulty of Paying Living Expenses: Not on file   Food Insecurity:  Worried About Running Out of Food in the Last Year: Not on file    [place] of Food in the Last Year: Not on file   Transportation Needs:     Lack of Transportation (Medical): Not on file    Lack of Transportation (Non-Medical): Not on file   Physical Activity:     Days of Exercise per Week: Not on file    Minutes of Exercise per Session: Not on file   Stress:     Feeling of Stress : Not on file   Social Connections:     Frequency of Communication with Friends and Family: Not on file    Frequency of Social Gatherings with Friends and Family: Not on file    Attends Synagogue Services: Not on file    Active Member of [address] [place] [address] [employer] or Organizations: Not on file    Attends Club or Organization Meetings: Not on file    Marital Status: Not on file   Intimate Partner Violence:     Fear of Current or Ex-Partner: Not on file    Emotionally Abused: Not on file    Physically Abused: Not on file    Sexually Abused: Not on file   Housing Stability:     Unable to Pay for Housing in the Last Year: Not on file    Number of [place] in the Last Year: Not on file    Unstable Housing in the Last Year: Not on file       Current Facility-Administered Medications   Medication Dose Route Frequency Provider Last Rate Last Admin    0.9 % sodium chloride infusion  25 mL IntraVENous PRN Santi Blake MD        lactated ringers infusion   IntraVENous Continuous Santi Blake MD        sodium chloride flush 0.9 % injection 10 mL  10 mL IntraVENous 2 times per day Santi Blake MD        sodium chloride flush 0.9 % injection 10 mL  10 mL IntraVENous PRN Santi Blake MD          Allergies   Allergen Reactions    Bee Venom Anaphylaxis    Nsaids Other (See Comments)     Can take small doses for a short time - Not to take any longer than necessary stomach ulcer       Review of Systems:       Review of Systems   Constitutional: Negative for chills. Negative for fever. HENT: Negative for congestion. Negative for rhinorrhea. Respiratory:positive for tobacco use, COPD  Cardiovascular: Negative for chest pain. Denies previous MI    Gastrointestinal: as per HPI    Genitourinary: Negative for difficulty urinating. Neurological: Negative for dizziness, syncope and numbness. Hematological: Does not bruise/bleed easily. OBJECTIVE:  Physical Exam:    BP (!) 144/85   Pulse 69   Temp 96.8 °F (36 °C) (Temporal)   Resp 18   Ht 5' 9\" (1.753 m)   Wt 141 lb (64 kg)   SpO2 97%   BMI 20.82 kg/m²      Physical Exam  General: awake, alert, in no acute distress  HEENT: mucous membranes moist  Respiratory: normal effort, no wheezes appreciated  CV: appears well perfused  Skin: warm and dry  Extremities: atraumatic  Neuro: no focal deficits noted  Psych: mood normal        ASSESSMENT:  Kathy Lr is a 62 y.o. male with complex abdominal complaints including non-specific pain and dysphagia. He has a history of hiatal hernia repair with Nissen. PLAN:  Treatment:    - will plan for EGD with possible dilation and colonoscopy today. - further management based on results      Patient counseled on risks, benefits, and alternatives of treatment plan at length today. Patient states an understanding and willingness to proceed with plan.         Antonio Mohamud MD

## 2021-12-17 LAB
EKG ATRIAL RATE: 68 BPM
EKG DIAGNOSIS: NORMAL
EKG P AXIS: 68 DEGREES
EKG P-R INTERVAL: 192 MS
EKG Q-T INTERVAL: 406 MS
EKG QRS DURATION: 100 MS
EKG QTC CALCULATION (BAZETT): 431 MS
EKG R AXIS: 16 DEGREES
EKG T AXIS: 62 DEGREES
EKG VENTRICULAR RATE: 68 BPM

## 2021-12-17 PROCEDURE — 93010 ELECTROCARDIOGRAM REPORT: CPT | Performed by: INTERNAL MEDICINE

## 2022-01-05 ENCOUNTER — OFFICE VISIT (OUTPATIENT)
Dept: SURGERY | Age: 62
End: 2022-01-05

## 2022-01-05 VITALS
OXYGEN SATURATION: 95 % | SYSTOLIC BLOOD PRESSURE: 124 MMHG | WEIGHT: 155.7 LBS | HEART RATE: 88 BPM | BODY MASS INDEX: 23.06 KG/M2 | HEIGHT: 69 IN | DIASTOLIC BLOOD PRESSURE: 84 MMHG

## 2022-01-05 DIAGNOSIS — Z48.89 POSTOPERATIVE VISIT: Primary | ICD-10-CM

## 2022-01-05 PROCEDURE — 99024 POSTOP FOLLOW-UP VISIT: CPT | Performed by: SURGERY

## 2022-01-05 RX ORDER — CHOLESTYRAMINE 4 G/9G
1 POWDER, FOR SUSPENSION ORAL 3 TIMES DAILY
Qty: 90 PACKET | Refills: 3 | Status: SHIPPED | OUTPATIENT
Start: 2022-01-05 | End: 2022-04-06

## 2022-01-05 NOTE — PROGRESS NOTES
Post-Operative Clinic Note    Chief Complaint   Patient presents with    Follow-up     F/U EGD/Cscope, 12/16/21         SUBJECTIVE:  Patient is here today for a post-operative visit. Patient is s/p EGD and colonoscopy on 12/16. He was found to have gastritis/esophagitis, a rectal polyp and hemorrhoids. He reports doing better since resuming omeprazole bid. Still with some pressure/discomfort across his abdomen. Denies other complaints.      Past Surgical History:   Procedure Laterality Date    ARM SURGERY Left 30+ yrs    \"put plastic ligaments in \"  after injury through glass window    CARPAL TUNNEL RELEASE Right 10/4/2019    RIGHT CARPAL TUNNEL RELEASE performed by Berta Tapia DO at [facility]  2010    COLONOSCOPY  2018    HX: polyps - Dr. Fouzia Dang 12/30/2019    COLONOSCOPY DIAGNOSTIC performed by Jenn Schulz MD at [facility] COLONOSCOPY N/A 12/16/2021    COLONOSCOPY POLYPECTOMY SNARE/COLD BIOPSY performed by Christian Cabrera MD at [address] [place] [address]      all teeth extracted    ENDOSCOPY, COLON, DIAGNOSTIC  04/08/2019    EGD - thrush noted through out    ENDOSCOPY, COLON, DIAGNOSTIC  06/01/2021    dr winters:multiple gastric ulcers, intact fundiplication with tightening, dil 18-20, biopsy r/o h pylori, f/u as scheduled    EYE SURGERY Left 1990's    \"metal removed from eye\"    GASTRIC FUNDOPLICATION N/A 16/34/8019    NISSEN FUNDOPLICATION LAPAROSCOPIC ROBOTIC HIATAL HERNIA performed by Jenn Schulz MD at [address] [facility]      abdominal    UPPER GASTROINTESTINAL ENDOSCOPY  12/2018    UPPER GASTROINTESTINAL ENDOSCOPY N/A 1/28/2019    EGD BIOPSY / BRUSHING OF ESOPHAGUS performed by Jenn Schulz MD at [address] N/A 4/8/2019    EGD BIOPSY AND BRUSHING performed by Jenn Schulz MD at [address] N/A 7/8/2019    EGD BIOPSY and brushing performed by Maria Esther Badillo MD at [address] [place] [address],[facility] N/A 12/30/2019    EGD BIOPSY/BRUSHING OF ESOPHAGUS performed by Maria Esther Badillo MD at [address] [place] [address] N/A 6/5/2020    EGD BIOPSY performed by Maria Esther Badillo MD at [address] [place] [address] N/A 6/1/2021    EGD DILATION BALLOON WITH 18-20 BALLOON UP TO 20 WITH BIOPSIES performed by Maria Esther Badillo MD at [address] [place] [address] N/A 6/29/2021     [facility] used 55, 50. with biopsies performed by Maria Esther Badillo MD at [address] [place] [address] N/A 12/16/2021    EGD BIOPSY performed by Maty Gaxiola MD at [facility]     Past Medical History:   Diagnosis Date    Arthritis     Hands    Chronic cluster headache 6/7/2019 last    COPD (chronic obstructive pulmonary disease) ([facility] [address]     \"dx 2 yrs ago\" \\"does not see a lung dr Tammy Solomon with PCP    Degenerative disc disease     \"in my back have degenarative disc\"    Depression     Edentulous     Fracture     \"fell over a 5 gallon bucket and landed on cement block and broke my right hand\" (7/6/2015)    GERD with esophagitis     H/O dizziness     States will feel like he is going to pass out, possible low BS    Hiatal hernia     Hx of gastric ulcer     HX OTHER MEDICAL     pt states has trouble with reading and writing\"can read very very little\"    Hyperlipidemia     Hypertension     No meds as of 5/27/21 - follows with PCP    Impingement syndrome of right shoulder     Left shoulder pain     limited range-of-motion    Lower back pain     Motion sickness     Neck pain     more on left side into shoulder--pain limits ROM    PONV (postoperative nausea and vomiting)     Vertigo      Family History   Problem Relation Age of Onset    Dementia Mother     Stroke Mother     Stroke Father     High Blood Pressure Father  Heart Disease Father     Breast Cancer Sister     Diabetes Brother     Cancer Brother         mouth and throat    Heart Attack Maternal Uncle     Heart Disease Maternal Uncle     No Known Problems Son     No Known Problems Son     No Known Problems Daughter      Social History     Socioeconomic History    Marital status:      Spouse name: Not on file    Number of children: Not on file    Years of education: Not on file    Highest education level: Not on file   Occupational History    Not on file   Tobacco Use    Smoking status: Current Every Day Smoker     Packs/day: 0.50     Years: 48.00     Pack years: 24.00     Types: Cigarettes     Start date: 1972    Smokeless tobacco: Never Used    Tobacco comment: use to smoke 2.5 PPD   Vaping Use    Vaping Use: Never used   Substance and Sexual Activity    Alcohol use: Yes     Comment: Occasional 1/month    Drug use: Never     Comment: 3 cups of coffee daily    Sexual activity: Not Currently     Partners: Female   Other Topics Concern    Not on file   Social History Narrative    Not on file     Social Determinants of Health     Financial Resource Strain:     Difficulty of Paying Living Expenses: Not on file   Food Insecurity:     Worried About [address] [email or web address] in the Last Year: Not on file    [place] of Food in the Last Year: Not on file   Transportation Needs:     Lack of Transportation (Medical): Not on file    Lack of Transportation (Non-Medical):  Not on file   Physical Activity:     Days of Exercise per Week: Not on file    Minutes of Exercise per Session: Not on file   Stress:     Feeling of Stress : Not on file   Social Connections:     Frequency of Communication with Friends and Family: Not on file    Frequency of Social Gatherings with Friends and Family: Not on file    Attends Congregation Services: Not on file    Active Member of Clubs or Organizations: Not on file    Attends Club or Organization Meetings: Not on file  Marital Status: Not on file   Intimate Partner Violence:     Fear of Current or Ex-Partner: Not on file    Emotionally Abused: Not on file    Physically Abused: Not on file    Sexually Abused: Not on file   Housing Stability:     Unable to Pay for Housing in the Last Year: Not on file    Number of [place] in the Last Year: Not on file    Unstable Housing in the Last Year: Not on file       OBJECTIVE:  Physical Exam  General: awake, alert, in no acute distress  HEENT: mucous membranes moist  Respiratory: normal effort, no wheezes appreciated  CV: appears well perfused  Abdomen: Soft, non tender, non-distended. Skin: warm and dry  Extremities: atraumatic  Neuro: no focal deficits noted  Psych: mood normal        Pathology report reveals:   Final Pathologic Diagnosis:   A.  Stomach, antrum, biopsy:   -  Mild chronic gastritis (see stains report). -  Helicobacter pylori microorganisms are not identified. B.  Esophagus, biopsy:   -  Benign squamocolumnar containing chronic inflammation and   focal reactive changes. -  Negative for intestinal metaplasia. C.  Rectum, polypectomy;   -  Hyperplastic polyp.       ASSESSMENT:  62 y.o. male s/p EGD and colonoscopy. Pathology report was reviewed.     1. Postoperative visit        PLAN:  - recommend continued BID omeprazole for the next 4-6 weeks then can try decreasing to once daily  - questran refilled as her reports this helping with his digestion  - needs repeat colonoscopy in 10 years      Niraj Gracia MD  1/5/2022  10:43 AM

## 2022-02-17 ENCOUNTER — OFFICE VISIT (OUTPATIENT)
Dept: CARDIOLOGY CLINIC | Age: 62
End: 2022-02-17
Payer: MEDICAID

## 2022-02-17 VITALS
BODY MASS INDEX: 22.93 KG/M2 | SYSTOLIC BLOOD PRESSURE: 136 MMHG | OXYGEN SATURATION: 96 % | HEART RATE: 68 BPM | DIASTOLIC BLOOD PRESSURE: 84 MMHG | HEIGHT: 69 IN | WEIGHT: 154.8 LBS

## 2022-02-17 DIAGNOSIS — K21.00 GASTROESOPHAGEAL REFLUX DISEASE WITH ESOPHAGITIS WITHOUT HEMORRHAGE: ICD-10-CM

## 2022-02-17 DIAGNOSIS — E78.5 DYSLIPIDEMIA: ICD-10-CM

## 2022-02-17 DIAGNOSIS — K44.9 HIATAL HERNIA: ICD-10-CM

## 2022-02-17 DIAGNOSIS — I10 ESSENTIAL HYPERTENSION: Primary | ICD-10-CM

## 2022-02-17 PROCEDURE — 4004F PT TOBACCO SCREEN RCVD TLK: CPT | Performed by: INTERNAL MEDICINE

## 2022-02-17 PROCEDURE — G8484 FLU IMMUNIZE NO ADMIN: HCPCS | Performed by: INTERNAL MEDICINE

## 2022-02-17 PROCEDURE — G8420 CALC BMI NORM PARAMETERS: HCPCS | Performed by: INTERNAL MEDICINE

## 2022-02-17 PROCEDURE — 3017F COLORECTAL CA SCREEN DOC REV: CPT | Performed by: INTERNAL MEDICINE

## 2022-02-17 PROCEDURE — 99213 OFFICE O/P EST LOW 20 MIN: CPT | Performed by: INTERNAL MEDICINE

## 2022-02-17 PROCEDURE — G8427 DOCREV CUR MEDS BY ELIG CLIN: HCPCS | Performed by: INTERNAL MEDICINE

## 2022-02-17 NOTE — PROGRESS NOTES
Geraldine Hutchins is a 62 y.o. male who has    CHIEF COMPLAINT AS FOLLOWS:  CHEST PAIN: Patient still C/O chest pain but symptoms appear to be atypical.No change over previous. Appears to be Costochondritis   SOB: Has SOB with exertion but no change over previous noted.             .   LEG EDEMA: No leg edema   PALPITATIONS: Denies any C/O Palpitations   DIZZINESS: No C/O Dizziness   SYNCOPE: None   OTHER/ ADDITIONAL COMPLAINTS:                                     HPI: Patient is here for F/U on his chest pain, HTN & Dyslipidemia problems. HTN: Patient has known essential HTN. Has been treated with guideline recommended medical / physical/ diet therapy as stated below. Dyslipidemia: Patient has known mixed dyslipidemia. Has been treated with guideline recommended medical / physical/ diet therapy as stated below. Current Outpatient Medications   Medication Sig Dispense Refill    cholestyramine (QUESTRAN) 4 g packet Take 1 packet by mouth 3 times daily 90 packet 3    omeprazole (PRILOSEC) 20 MG delayed release capsule Take 1 capsule by mouth 2 times daily (before meals) 60 capsule 0    metoclopramide (REGLAN) 10 MG tablet Take 1 tablet by mouth 3 times daily (with meals) 120 tablet 3    hydrocortisone (ANUSOL-HC) 2.5 % CREA rectal cream Apply to hemorrhoids up to 4 times per day as needed for pain or burning. 1 each 0    glucose 4 g chewable tablet CHEW AND SWALLOW 1 TABLET BY MOUTH AS NEEDED for hypoglycemia episodes      bisacodyl (BISACODYL) 5 MG EC tablet Take 4 tablets by mouth 2 times daily 8 tablet 3    nicotine (NICODERM CQ) 14 MG/24HR Place 1 patch onto the skin daily 42 patch 0    nystatin (MYCOSTATIN) 922482 UNIT/GM powder Apply 3 times daily.  1 Bottle 3    acarbose (PRECOSE) 25 MG tablet       atorvastatin (LIPITOR) 20 MG tablet       varenicline (CHANTIX) 1 MG tablet Take 1 tablet by mouth 2 times daily 180 tablet 1    cetirizine (ZYRTEC) 10 MG tablet 1 tablet daily       fluticasone (FLONASE) 50 MCG/ACT nasal spray as needed      albuterol sulfate  (90 Base) MCG/ACT inhaler Inhale 2 puffs into the lungs every 6 hours as needed      Nutritional Supplement LIQD Take 1 Can by mouth 3 times daily 90 Can 5    Nutritional Supplements (ENSURE HIGH PROTEIN) LIQD Take 1 Can by mouth 3 times daily Different flavors if possible 30 Can 5    ondansetron (ZOFRAN ODT) 4 MG disintegrating tablet Place 1 tablet under the tongue every 8 hours as needed for Nausea or Vomiting 30 tablet 3    ipratropium-albuterol (DUONEB) 0.5-2.5 (3) MG/3ML SOLN nebulizer solution as needed       Nebulizers (MICRO AIR NEBULIZER) MISC TO USE AS NEEDED WITH NEBULIZER SOLUTION  11    acetaminophen (TYLENOL) 500 MG tablet Take 1,000 mg by mouth every 6 hours as needed for Pain      tiotropium (SPIRIVA HANDIHALER) 18 MCG inhalation capsule Inhale 1 capsule into the lungs daily 30 capsule 3    albuterol sulfate HFA (PROVENTIL HFA) 108 (90 BASE) MCG/ACT inhaler Inhale 2 puffs into the lungs every 6 hours as needed for Wheezing 1 Inhaler 3     No current facility-administered medications for this visit. Allergies: Bee venom and Nsaids  Review of Systems:    Constitutional: Negative for diaphoresis and fatigue  Respiratory: Negative for shortness of breath  Cardiovascular: Negative for chest pain, dyspnea on exertion, claudication, edema, irregular heartbeat, murmur, palpitations or shortness of breath  Musculoskeletal: Negative for muscle pain, muscular weakness, negative for pain in arm and leg or swelling in foot and leg    Objective:  /84   Pulse 68   Ht 5' 9\" (1.753 m)   Wt 154 lb 12.8 oz (70.2 kg)   SpO2 96%   BMI 22.86 kg/m²   Wt Readings from Last 3 Encounters:   02/17/22 154 lb 12.8 oz (70.2 kg)   01/05/22 155 lb 11.2 oz (70.6 kg)   12/10/21 141 lb (64 kg)     Body mass index is 22.86 kg/m². GENERAL - Alert, oriented, pleasant, in no apparent distress.   EYES: No jaundice, no conjunctival pallor. Neck - Supple. No jugular venous distention noted. No carotid bruits. Cardiovascular - Normal S1 and S2 without obvious murmur or gallop. Extremities - No cyanosis, clubbing, or significant edema. Pulmonary - No respiratory distress. No wheezes or rales.       MEDICAL DECISION MAKING & DATA REVIEW:    Lab Review   Lab Results   Component Value Date    TROPONINT <0.010 10/11/2019    TROPONINT <0.010 10/11/2019     Lab Results   Component Value Date    BNP 10 12/30/2013    BNP 8 05/18/2013    PROBNP 117.7 09/13/2017    PROBNP 21.52 08/02/2017     Lab Results   Component Value Date    INR 1.03 08/02/2017    INR 0.95 12/30/2016     Lab Results   Component Value Date    LABA1C 5.5 01/16/2020     Lab Results   Component Value Date    WBC 6.4 06/29/2021    WBC 6.6 01/16/2020    HCT 40.8 (L) 06/29/2021    HCT 41.6 01/16/2020    MCV 91.5 06/29/2021    MCV 86.2 01/16/2020     06/29/2021     01/16/2020     Lab Results   Component Value Date    CHOL 200 01/16/2020    CHOL 172 08/02/2017    TRIG 82 01/16/2020    TRIG 80 08/02/2017    HDL 54 01/16/2020    HDL 48 08/02/2017    LDLCALC 130 01/16/2020    LDLCALC 126 (H) 11/19/2012    LDLDIRECT 119 (H) 08/02/2017    LDLDIRECT 128 (H) 12/30/2016     Lab Results   Component Value Date    ALT 10 01/16/2020    ALT 9 (L) 10/11/2019    AST 18 01/16/2020    AST 18 10/11/2019     BMP:    Lab Results   Component Value Date     01/16/2020     10/12/2019    K 4.5 01/16/2020    K 4.5 10/12/2019     01/16/2020     10/12/2019    CO2 24 01/16/2020    CO2 26 10/12/2019    BUN 11 01/16/2020    BUN 13 10/12/2019    CREATININE 1.0 09/03/2021    CREATININE 0.9 03/04/2020    CREATININE 0.8 01/16/2020    CREATININE 0.8 10/12/2019     CMP:   Lab Results   Component Value Date     01/16/2020     10/12/2019    K 4.5 01/16/2020    K 4.5 10/12/2019     01/16/2020     10/12/2019    CO2 24 01/16/2020    CO2 aortic root(4.2) and the ascending aorta(4.0).  No evidence of pericardial effusion. DYSLIPIDEMIA: yes,   Patient's profile is at / near [employer] current medical regimen wellyes, takes Lipitor    TESTS ORDERED: none this visit     PREVIOUSLY ORDERED TESTS REVIEWED & DISCUSSED WITH THE PATIENT:     I personally reviewed & interpreted, all previously ordered tests as copied above. Latest Labs are pulled in to the note with dates. Labs, specially in Reference to Lipid profile, Cardiac testing in the form of Echo ( dated: ), stress tests ( dated: ) & other relevant cardiac testing reviewed with patient & recommendations made based on assessment of the results. Discussed role of Cardiac risk factors & effects + treatment of co morbidities with patient & advised accordingly. MEDICATIONS: List of medications patient is currently taking is reviewed in detail with the patient. Discussed any side effects or problems taking the medication. Recommend Continue present management & medications as listed. AFFIRMATION: I spent at least 20 minutes of time reviewing patient's history, previous & current medical problems & all Labs + testing. This includes chart prep even prior to the vosit. Various goals are discussed and multiple questions answered. Relevant concelling performed. Office follow up in one year.

## 2022-02-17 NOTE — LETTER
[facility]  [address] [address]  Phone: [phone]  Fax: [phone]    Kaushik Cassidy MD    February 17, 2022     Eliane Kitchen Ludden Gonzales    Patient: Rk Lantigua   MR Number: [id number]   YOB: 1960   Date of Visit: 2/17/2022       Dear [place] [place]: Thank you for referring Thelma Zaman to me for evaluation/treatment. Below are the relevant portions of my assessment and plan of care. If you have questions, please do not hesitate to call me. I look forward to following Charis Valles along with you.     Sincerely,      Kaushik Cassidy MD

## 2022-02-17 NOTE — PATIENT INSTRUCTIONS
**It is YOUR responsibilty to bring medication bottles and/or updated medication list to [address] [place] [address]. This will allow us to better serve you and all your healthcare needs**    Please be informed that if you contact our office outside of normal business hours the physician on call cannot help with any scheduling or rescheduling issues, procedure instruction questions or any type of medication issue. We advise you for any urgent/emergency that you go to the nearest emergency room! PLEASE CALL OUR OFFICE DURING NORMAL BUSINESS HOURS    Monday - Friday   8 am to 5 pm    [place]: [address]: [phone]    [place]:  [address] Laboratory Locations - No appointment necessary. Sites open Monday to Friday. Call your preferred location for test preparation, business hours and other information you need. [employer] accepts [employer]. [facility] [address] [facility], [address]  Phone: [phone] [place] [facility] [address] [facility]  [address]. [place], [address] [place] [address]  Phone: [phone]     CORONARY ARTERY DISEASE:None significant. Chest pain symptoms are probably non-cardiac. Most likely  has Costochondriti.     CARDIOLITE 4/2021    Normal study.    Normal perfusion study with normal distribution in all coronal, short, and    horizontal axis.    The observed defect is consistent with diaphragmatic attenuation.    Normal LV function. LVEF is 48 %.      CATH 8/2017  1.  Left main is patent. 2.  Circ and OM1 have mild disease. 3.  LAD and diagonal branch have mild disease. 4.  RCA is normal.  5.  EDP was around 15 mmHg. HTN:Yes  well controlled on current medical regimen, see list above. - changes in  treatment:   no   VHD:no              YY significant VHD noted  ECHO 4/2021   Left ventricular function is low normal, EF is estimated at 50-55%.   Mild left ventricular hypertrophy.    E/A reversal; indeterminate diastolic function.  Mena Cruise mitral and tricuspid regurgitation is present.  RVSP is 21 mmHg.   Dilation of the aortic root(4.2) and the ascending aorta(4.0).  No evidence of pericardial effusion. DYSLIPIDEMIA: yes,   Patient's profile is at / near [employer] current medical regimen wellyes, takes Lipitor    TESTS ORDERED: none this visit     PREVIOUSLY ORDERED TESTS REVIEWED & DISCUSSED WITH THE PATIENT:     I personally reviewed & interpreted, all previously ordered tests as copied above. Latest Labs are pulled in to the note with dates. Labs, specially in Reference to Lipid profile, Cardiac testing in the form of Echo ( dated: ), stress tests ( dated: ) & other relevant cardiac testing reviewed with patient & recommendations made based on assessment of the results. Discussed role of Cardiac risk factors & effects + treatment of co morbidities with patient & advised accordingly. MEDICATIONS: List of medications patient is currently taking is reviewed in detail with the patient. Discussed any side effects or problems taking the medication. Recommend Continue present management & medications as listed. AFFIRMATION: I spent at least 20 minutes of time reviewing patient's history, previous & current medical problems & all Labs + testing. This includes chart prep even prior to the vosit. Various goals are discussed and multiple questions answered. Relevant concelling performed. Office follow up in one year.

## 2022-02-25 ENCOUNTER — OFFICE VISIT (OUTPATIENT)
Dept: ORTHOPEDIC SURGERY | Age: 62
End: 2022-02-25
Payer: MEDICAID

## 2022-02-25 VITALS
BODY MASS INDEX: 22.81 KG/M2 | HEIGHT: 69 IN | OXYGEN SATURATION: 97 % | WEIGHT: 154 LBS | HEART RATE: 98 BPM | RESPIRATION RATE: 16 BRPM

## 2022-02-25 DIAGNOSIS — M77.8 LEFT WRIST TENDINITIS: ICD-10-CM

## 2022-02-25 DIAGNOSIS — G56.02 CARPAL TUNNEL SYNDROME OF LEFT WRIST: Primary | ICD-10-CM

## 2022-02-25 PROCEDURE — G8420 CALC BMI NORM PARAMETERS: HCPCS | Performed by: PHYSICIAN ASSISTANT

## 2022-02-25 PROCEDURE — 4004F PT TOBACCO SCREEN RCVD TLK: CPT | Performed by: PHYSICIAN ASSISTANT

## 2022-02-25 PROCEDURE — G8484 FLU IMMUNIZE NO ADMIN: HCPCS | Performed by: PHYSICIAN ASSISTANT

## 2022-02-25 PROCEDURE — G8427 DOCREV CUR MEDS BY ELIG CLIN: HCPCS | Performed by: PHYSICIAN ASSISTANT

## 2022-02-25 PROCEDURE — 3017F COLORECTAL CA SCREEN DOC REV: CPT | Performed by: PHYSICIAN ASSISTANT

## 2022-02-25 PROCEDURE — 99213 OFFICE O/P EST LOW 20 MIN: CPT | Performed by: PHYSICIAN ASSISTANT

## 2022-02-25 RX ORDER — PREDNISONE 20 MG/1
20 TABLET ORAL 2 TIMES DAILY
Qty: 14 TABLET | Refills: 0 | Status: SHIPPED | OUTPATIENT
Start: 2022-02-25 | End: 2022-03-04

## 2022-02-25 ASSESSMENT — ENCOUNTER SYMPTOMS
RESPIRATORY NEGATIVE: 1
EYES NEGATIVE: 1
GASTROINTESTINAL NEGATIVE: 1

## 2022-02-25 NOTE — PROGRESS NOTES
Review of Systems   Constitutional: Negative. HENT: Negative. Eyes: Negative. Respiratory: Negative. Cardiovascular: Negative. Gastrointestinal: Negative. Genitourinary: Negative. Musculoskeletal: Positive for arthralgias and myalgias. Skin: Negative. Neurological: Negative. Psychiatric/Behavioral: Negative. Araseli Vail is a 62 y.o. male that presents to the office today complaining of acute left wrist pain and swelling as well as numbness in the fingers. Patient states that he was supposed to have carpal tunnel surgery several years ago on the left side and because the pandemic it got pushed off and he never followed up. He has had carpal tunnel surgery on the right and is doing fine with that hand. He states that about 2 weeks ago he was lifting up some canned goods in a box and felt pain in his forearm and since then he has had increasing pain and swelling and difficulty straightening his fingers all the way out. He states that this left forearm had surgery years ago to fix several torn tendons. He has tried muscle relaxers and anti-inflammatory medications with no relief.     Past Medical History:   Diagnosis Date    Arthritis     Hands    Chronic cluster headache 6/7/2019 last    COPD (chronic obstructive pulmonary disease) ([facility]     \"dx 2 yrs ago\" \\"does not see a lung dr Domonique Colbert with PCP    Degenerative disc disease     \"in my back have degenarative disc\"    Depression     Edentulous     Fracture     \"fell over a 5 gallon bucket and landed on cement block and broke my right hand\" (7/6/2015)    GERD with esophagitis     H/O dizziness     States will feel like he is going to pass out, possible low BS    Hiatal hernia     Hx of gastric ulcer     HX OTHER MEDICAL     pt states has trouble with reading and writing\"can read very very little\"    Hyperlipidemia     Hypertension     No meds as of 5/27/21 - follows with PCP    Impingement syndrome of right shoulder     Left shoulder pain     limited range-of-motion    Lower back pain     Motion sickness     Neck pain     more on left side into shoulder--pain limits ROM    PONV (postoperative nausea and vomiting)     Vertigo        Past Surgical History:   Procedure Laterality Date    ARM SURGERY Left 30+ yrs    \"put plastic ligaments in \"  after injury through glass window    CARPAL TUNNEL RELEASE Right 10/4/2019    RIGHT CARPAL TUNNEL RELEASE performed by Jung Ahmadi DO at [facility] COLONOSCOPY  2010    COLONOSCOPY  2018    HX: polyps - Dr. Polina Patel COLONOSCOPY N/A 12/30/2019    COLONOSCOPY DIAGNOSTIC performed by Marisol Head MD at [facility]8 COLONOSCOPY N/A 12/16/2021    COLONOSCOPY POLYPECTOMY SNARE/COLD BIOPSY performed by Jurgen Cochran MD at [address] [place] [address]      all teeth extracted    ENDOSCOPY, COLON, DIAGNOSTIC  04/08/2019    EGD - thrush noted through out    ENDOSCOPY, COLON, DIAGNOSTIC  06/01/2021    dr winters:multiple gastric ulcers, intact fundiplication with tightening, dil 18-20, biopsy r/o h pylori, f/u as scheduled    EYE SURGERY Left 1990's    \"metal removed from eye\"    GASTRIC FUNDOPLICATION N/A 79/07/8640    NISSEN FUNDOPLICATION LAPAROSCOPIC ROBOTIC HIATAL HERNIA performed by Marisol Head MD at [address] [facility]      abdominal    UPPER GASTROINTESTINAL ENDOSCOPY  12/2018    UPPER GASTROINTESTINAL ENDOSCOPY N/A 1/28/2019    EGD BIOPSY / BRUSHING OF ESOPHAGUS performed by Marisol Head MD at [facility] N/A 4/8/2019    EGD BIOPSY AND BRUSHING performed by Marisol Head MD at [facility] N/A 7/8/2019    EGD BIOPSY and brushing performed by Marisol Head MD at [place] [address] 12/30/2019    EGD BIOPSY/BRUSHING OF ESOPHAGUS performed by Marisol Head MD at [address] [place] [address] GASTROINTESTINAL ENDOSCOPY N/A 6/5/2020    EGD BIOPSY performed by Stas Laguna MD at [place] [address] N/A 6/1/2021    EGD DILATION BALLOON WITH 18-20 BALLOON UP TO 21 WITH BIOPSIES performed by Stas Laguna MD at [place] [address] N/A 6/29/2021     [address] used 55, 50. with biopsies performed by Stas Laguna MD at [place] [address] N/A 12/16/2021    EGD BIOPSY performed by Tj Garcia MD at [facility] ENDOSCOPY       Family History   Problem Relation Age of Onset    Dementia Mother     Stroke Mother     Stroke Father     High Blood Pressure Father     Heart Disease Father     Breast Cancer Sister     Diabetes Brother     Cancer Brother         mouth and throat    Heart Attack Maternal Uncle     Heart Disease Maternal Uncle     No Known Problems Son     No Known Problems Son     No Known Problems Daughter        Social History     Socioeconomic History    Marital status:      Spouse name: None    Number of children: None    Years of education: None    Highest education level: None   Occupational History    None   Tobacco Use    Smoking status: Current Every Day Smoker     Packs/day: 0.50     Years: 48.00     Pack years: 24.00     Types: Cigarettes     Start date: 1972    Smokeless tobacco: Never Used    Tobacco comment: use to smoke 2.5 PPD   Vaping Use    Vaping Use: Never used   Substance and Sexual Activity    Alcohol use: Yes     Comment: Occasional 1/month    Drug use: Never     Comment: 3 cups of coffee daily    Sexual activity: Not Currently     Partners: Female   Other Topics Concern    None   Social History Narrative    None     Social Determinants of Health     Financial Resource Strain:     Difficulty of Paying Living Expenses: Not on file   Food Insecurity:     Worried About Running Out of Food in the Last Year: Not on file    [address] in the Last Year: Not on file   Transportation Needs:     Lack of Transportation (Medical): Not on file    Lack of Transportation (Non-Medical): Not on file   Physical Activity:     Days of Exercise per Week: Not on file    Minutes of Exercise per Session: Not on file   Stress:     Feeling of Stress : Not on file   Social Connections:     Frequency of Communication with Friends and Family: Not on file    Frequency of Social Gatherings with Friends and Family: Not on file    Attends Moravian Services: Not on file    Active Member of [address] [place] [address] or Organizations: Not on file    Attends Club or Organization Meetings: Not on file    Marital Status: Not on file   Intimate Partner Violence:     Fear of Current or Ex-Partner: Not on file    Emotionally Abused: Not on file    Physically Abused: Not on file    Sexually Abused: Not on file   Housing Stability:     Unable to Pay for Housing in the Last Year: Not on file    Number of [place] in the Last Year: Not on file    Unstable Housing in the Last Year: Not on file       Current Outpatient Medications   Medication Sig Dispense Refill    cholestyramine (QUESTRAN) 4 g packet Take 1 packet by mouth 3 times daily 90 packet 3    omeprazole (PRILOSEC) 20 MG delayed release capsule Take 1 capsule by mouth 2 times daily (before meals) 60 capsule 0    metoclopramide (REGLAN) 10 MG tablet Take 1 tablet by mouth 3 times daily (with meals) 120 tablet 3    hydrocortisone (ANUSOL-HC) 2.5 % CREA rectal cream Apply to hemorrhoids up to 4 times per day as needed for pain or burning. 1 each 0    glucose 4 g chewable tablet CHEW AND SWALLOW 1 TABLET BY MOUTH AS NEEDED for hypoglycemia episodes      bisacodyl (BISACODYL) 5 MG EC tablet Take 4 tablets by mouth 2 times daily 8 tablet 3    nystatin (MYCOSTATIN) 258289 UNIT/GM powder Apply 3 times daily.  1 Bottle 3    atorvastatin (LIPITOR) 20 MG tablet       varenicline (CHANTIX) 1 MG tablet Take 1 tablet by mouth 2 times daily 180 tablet 1    cetirizine (ZYRTEC) 10 MG tablet 1 tablet daily       fluticasone (FLONASE) 50 MCG/ACT nasal spray as needed      albuterol sulfate  (90 Base) MCG/ACT inhaler Inhale 2 puffs into the lungs every 6 hours as needed      Nutritional Supplement LIQD Take 1 Can by mouth 3 times daily 90 Can 5    Nutritional Supplements (ENSURE HIGH PROTEIN) LIQD Take 1 Can by mouth 3 times daily Different flavors if possible 30 Can 5    ondansetron (ZOFRAN ODT) 4 MG disintegrating tablet Place 1 tablet under the tongue every 8 hours as needed for Nausea or Vomiting 30 tablet 3    ipratropium-albuterol (DUONEB) 0.5-2.5 (3) MG/3ML SOLN nebulizer solution as needed       Nebulizers (MICRO AIR NEBULIZER) MISC TO USE AS NEEDED WITH NEBULIZER SOLUTION  11    acetaminophen (TYLENOL) 500 MG tablet Take 1,000 mg by mouth every 6 hours as needed for Pain      tiotropium (SPIRIVA HANDIHALER) 18 MCG inhalation capsule Inhale 1 capsule into the lungs daily 30 capsule 3    albuterol sulfate HFA (PROVENTIL HFA) 108 (90 BASE) MCG/ACT inhaler Inhale 2 puffs into the lungs every 6 hours as needed for Wheezing 1 Inhaler 3    predniSONE (DELTASONE) 20 MG tablet Take 1 tablet by mouth 2 times daily for 7 days 14 tablet 0    nicotine (NICODERM CQ) 14 MG/24HR Place 1 patch onto the skin daily 42 patch 0    acarbose (PRECOSE) 25 MG tablet  (Patient not taking: Reported on 2/25/2022)       No current facility-administered medications for this visit. Allergies   Allergen Reactions    Bee Venom Anaphylaxis    Nsaids Other (See Comments)     Can take small doses for a short time - Not to take any longer than necessary stomach ulcer       Review of Systems:  See above      Physical Exam:   Pulse 98   Resp 16   Ht 5' 9\" (1.753 m)   Wt 154 lb (69.9 kg)   SpO2 97%   BMI 22.74 kg/m²        Gait is Normal.     Gen/Psych:Examination reveals a pleasant individual in no acute distress. The patient is oriented to time, place and person. The patient's mood and affect are appropriate. Patient appears well nourished. Body habitus is normal    Head: Head is atraumatic normocephalic,  ears are symmetric,     Eyes: Eyes show equal pupils bilaterally, extraocular muscles intact    Ears:  Hearing is intact to normal voice at 5 feet    Nose: Nares are patent bilaterally, no epistaxis,no rhinorrhea     Lymph: no obvious lymphedema in bilateral upper extremities     Skin intact in bilateral upper extremities with no ulcerations, lesions, rash, erythema. Vascular: There are no varicosities in bilateral upper  extremities, sensation intact to light touch over bilateral upper extremities. Left hand: Inspection: Second third and fourth digits as well as the small digit are held in flexion. But he is able to straighten them with pain. No erythema and no swelling noted on exam.  Palpation: Mild tenderness to palpation in his forearm. 2+ radial pulse  Decreased sensation to light touch in the middle and ring digit. No evidence of flexor tenosynovitis. Outsiderecord review: Prior office notes reviewed, prior EMG reviewed. Imaging studies:  3 views the left hand taken and reviewed in the office today show no acute fractures, no dislocations, no significant degenerative changes and no soft tissue swelling. Impression:     Diagnosis Orders   1. Carpal tunnel syndrome of left wrist     2.  Left wrist tendinitis             Plan:    Take prednisone as prescribed  Patient Instructions   Continue to weight bear as tolerated  Continue range of motion  Ice and elevate as needed  Tylenol or Motrin for pain  Follow up with Dr. Javier Flores to discuss surgery

## 2022-02-25 NOTE — PATIENT INSTRUCTIONS
Continue to weight bear as tolerated  Continue range of motion  Ice and elevate as needed  Tylenol or Motrin for pain  Follow up with Dr. Alexandra Krishna to discuss surgery

## 2022-03-02 ENCOUNTER — OFFICE VISIT (OUTPATIENT)
Dept: SURGERY | Age: 62
End: 2022-03-02
Payer: MEDICAID

## 2022-03-02 VITALS
HEART RATE: 63 BPM | BODY MASS INDEX: 23.21 KG/M2 | HEIGHT: 69 IN | WEIGHT: 156.7 LBS | OXYGEN SATURATION: 94 % | SYSTOLIC BLOOD PRESSURE: 118 MMHG | DIASTOLIC BLOOD PRESSURE: 80 MMHG

## 2022-03-02 DIAGNOSIS — K20.80 ESOPHAGITIS, LOS ANGELES GRADE B: Primary | ICD-10-CM

## 2022-03-02 PROCEDURE — G8420 CALC BMI NORM PARAMETERS: HCPCS | Performed by: SURGERY

## 2022-03-02 PROCEDURE — G8484 FLU IMMUNIZE NO ADMIN: HCPCS | Performed by: SURGERY

## 2022-03-02 PROCEDURE — 3017F COLORECTAL CA SCREEN DOC REV: CPT | Performed by: SURGERY

## 2022-03-02 PROCEDURE — 99212 OFFICE O/P EST SF 10 MIN: CPT | Performed by: SURGERY

## 2022-03-02 PROCEDURE — 4004F PT TOBACCO SCREEN RCVD TLK: CPT | Performed by: SURGERY

## 2022-03-02 PROCEDURE — G8427 DOCREV CUR MEDS BY ELIG CLIN: HCPCS | Performed by: SURGERY

## 2022-03-02 ASSESSMENT — PATIENT HEALTH QUESTIONNAIRE - PHQ9
SUM OF ALL RESPONSES TO PHQ QUESTIONS 1-9: 0
1. LITTLE INTEREST OR PLEASURE IN DOING THINGS: 0
SUM OF ALL RESPONSES TO PHQ9 QUESTIONS 1 & 2: 0
2. FEELING DOWN, DEPRESSED OR HOPELESS: 0
SUM OF ALL RESPONSES TO PHQ QUESTIONS 1-9: 0

## 2022-03-02 NOTE — PROGRESS NOTES
Post-Operative Clinic Note    Chief Complaint   Patient presents with    Follow-up     2mo f/u stomach pain check          SUBJECTIVE:  1/5/22  Patient is here today for a post-operative visit. Patient is s/p EGD and colonoscopy on 12/16. He was found to have gastritis/esophagitis, a rectal polyp and hemorrhoids. He reports doing better since resuming omeprazole bid. Still with some pressure/discomfort across his abdomen. Denies other complaints. 3/2/2022  Doing ok today. Seen in follow up for esophagitis and dysphagia. He still reports intermittent sensation of food getting stuck in his lower esophagus. He did not have any stricture seen on EGD. He reports marked improvement in his stomach pain/reflux symptoms since taking omeprazole.     Past Surgical History:   Procedure Laterality Date    ARM SURGERY Left 30+ yrs    \"put plastic ligaments in \"  after injury through glass window    CARPAL TUNNEL RELEASE Right 10/4/2019    RIGHT CARPAL TUNNEL RELEASE performed by Jordan Hodgson DO at [facility]  2010    COLONOSCOPY  2018    HX: polyps - Dr. Osmany Connelly 12/30/2019    COLONOSCOPY DIAGNOSTIC performed by Robert Schafer MD at [facility] COLONOSCOPY N/A 12/16/2021    COLONOSCOPY POLYPECTOMY SNARE/COLD BIOPSY performed by Irina Hawkins MD at [address] [place] [address]      all teeth extracted    ENDOSCOPY, COLON, DIAGNOSTIC  04/08/2019    EGD - thrush noted through out    ENDOSCOPY, COLON, DIAGNOSTIC  06/01/2021    dr winters:multiple gastric ulcers, intact fundiplication with tightening, dil 18-20, biopsy r/o h pylori, f/u as scheduled    EYE SURGERY Left 1990's    \"metal removed from eye\"    GASTRIC FUNDOPLICATION N/A 82/98/1633    NISSEN FUNDOPLICATION LAPAROSCOPIC ROBOTIC HIATAL HERNIA performed by [facility] MD [facility]      abdominal    UPPER GASTROINTESTINAL ENDOSCOPY  12/2018    UPPER GASTROINTESTINAL ENDOSCOPY N/A 1/28/2019    EGD BIOPSY / BRUSHING OF ESOPHAGUS performed by Hilton Bonner MD at [address] [place] [address] N/A 4/8/2019    EGD BIOPSY AND BRUSHING performed by Hilton Bonner MD at [address] [facility] N/A 7/8/2019    EGD BIOPSY and brushing performed by Hilton Bonner MD at [address] [place] [address] N/A 12/30/2019    EGD BIOPSY/BRUSHING OF ESOPHAGUS performed by Hilton Bonner MD at [address] [place] [address] N/A 6/5/2020    EGD BIOPSY performed by Hilton Bonner MD at [address] [place] [address] N/A 6/1/2021    EGD DILATION BALLOON WITH 18-20 BALLOON UP TO 21 WITH BIOPSIES performed by Hilton Bonner MD at [address] [place] [address] 6/29/2021    EGD DILATION GWENDOLYN Davis used 55, 50. with biopsies performed by Hilton Bonner MD at [address] [place] [address] N/A 12/16/2021    EGD BIOPSY performed by Bull Go MD at [facility]     Past Medical History:   Diagnosis Date    Arthritis     Hands    Chronic cluster headache 6/7/2019 last    COPD (chronic obstructive pulmonary disease) ([facility]     \"dx 2 yrs ago\" \\"does not see a lung dr Teena Rivero with PCP    Degenerative disc disease     \"in my back have degenarative disc\"    Depression     Edentulous     Fracture     \"fell over a 5 gallon bucket and landed on cement block and broke my right hand\" (7/6/2015)    GERD with esophagitis     H/O dizziness     States will feel like he is going to pass out, possible low BS    Hiatal hernia     Hx of gastric ulcer     HX OTHER MEDICAL     pt states has trouble with reading and writing\"can read very very little\"    Hyperlipidemia     Hypertension     No meds as of 5/27/21 - follows with PCP    Impingement syndrome of right shoulder     Left shoulder pain     limited range-of-motion    Lower back pain     Motion sickness     Neck pain     more on left side into shoulder--pain limits ROM    PONV (postoperative nausea and vomiting)     Vertigo      Family History   Problem Relation Age of Onset    Dementia Mother     Stroke Mother     Stroke Father     High Blood Pressure Father     Heart Disease Father     Breast Cancer Sister     Diabetes Brother     Cancer Brother         mouth and throat    Heart Attack Maternal Uncle     Heart Disease Maternal Uncle     No Known Problems Son     No Known Problems Son     No Known Problems Daughter      Social History     Socioeconomic History    Marital status:      Spouse name: Not on file    Number of children: Not on file    Years of education: Not on file    Highest education level: Not on file   Occupational History    Not on file   Tobacco Use    Smoking status: Current Every Day Smoker     Packs/day: 0.50     Years: 48.00     Pack years: 24.00     Types: Cigarettes     Start date: 1972    Smokeless tobacco: Never Used    Tobacco comment: use to smoke 2.5 PPD   Vaping Use    Vaping Use: Never used   Substance and Sexual Activity    Alcohol use: Yes     Comment: Occasional 1/month    Drug use: Never     Comment: 3 cups of coffee daily    Sexual activity: Not Currently     Partners: Female   Other Topics Concern    Not on file   Social History Narrative    Not on file     Social Determinants of Health     Financial Resource Strain:     Difficulty of Paying Living Expenses: Not on file   Food Insecurity:     Worried About Running Out of Food in the Last Year: Not on file    [place] of Food in the Last Year: Not on file   Transportation Needs:     Lack of Transportation (Medical): Not on file    Lack of Transportation (Non-Medical):  Not on file   Physical Activity:     Days of Exercise per Week: Not on file    Minutes of Exercise per Session: Not on file   Stress:     Feeling of Stress : Not on file   Social Connections:     Frequency of Communication with Friends and Family: Not on file    Frequency of Social Gatherings with Friends and Family: Not on file    Attends Confucianism Services: Not on file    Active Member of Clubs or Organizations: Not on file    Attends Club or Organization Meetings: Not on file    Marital Status: Not on file   Intimate Partner Violence:     Fear of Current or Ex-Partner: Not on file    Emotionally Abused: Not on file    Physically Abused: Not on file    Sexually Abused: Not on file   Housing Stability:     Unable to Pay for Housing in the Last Year: Not on file    Number of [place] in the Last Year: Not on file    Unstable Housing in the Last Year: Not on file       OBJECTIVE:  Physical Exam  General: awake, alert, in no acute distress  HEENT: mucous membranes moist  Respiratory: normal effort, no wheezes appreciated  CV: appears well perfused  Abdomen: Soft, non tender, non-distended. Skin: warm and dry  Extremities: atraumatic  Neuro: no focal deficits noted  Psych: mood normal        Pathology report reveals:   Final Pathologic Diagnosis:   A.  Stomach, antrum, biopsy:   -  Mild chronic gastritis (see stains report). -  Helicobacter pylori microorganisms are not identified. B.  Esophagus, biopsy:   -  Benign squamocolumnar containing chronic inflammation and   focal reactive changes. -  Negative for intestinal metaplasia. C.  Rectum, polypectomy;   -  Hyperplastic polyp.       ASSESSMENT:  62 y.o. male s/p EGD and colonoscopy.  Symptoms improving on Omeprazole but still with some dysphagia at times    PLAN:  - can decrease omeprazole to once daily  - questran refilled as her reports this helping with his digestion  - needs repeat colonoscopy in 10 years  - will see back PRN if problems arise      Paige Fonseca MD  3/2/2022  9:47 AM

## 2022-03-14 ENCOUNTER — OFFICE VISIT (OUTPATIENT)
Dept: ORTHOPEDIC SURGERY | Age: 62
End: 2022-03-14
Payer: MEDICAID

## 2022-03-14 VITALS
RESPIRATION RATE: 18 BRPM | OXYGEN SATURATION: 97 % | HEART RATE: 68 BPM | HEIGHT: 69 IN | WEIGHT: 156 LBS | BODY MASS INDEX: 23.11 KG/M2

## 2022-03-14 DIAGNOSIS — G56.02 CARPAL TUNNEL SYNDROME OF LEFT WRIST: Primary | ICD-10-CM

## 2022-03-14 PROCEDURE — 99213 OFFICE O/P EST LOW 20 MIN: CPT | Performed by: ORTHOPAEDIC SURGERY

## 2022-03-14 PROCEDURE — 3017F COLORECTAL CA SCREEN DOC REV: CPT | Performed by: ORTHOPAEDIC SURGERY

## 2022-03-14 PROCEDURE — 4004F PT TOBACCO SCREEN RCVD TLK: CPT | Performed by: ORTHOPAEDIC SURGERY

## 2022-03-14 PROCEDURE — G8420 CALC BMI NORM PARAMETERS: HCPCS | Performed by: ORTHOPAEDIC SURGERY

## 2022-03-14 PROCEDURE — G8484 FLU IMMUNIZE NO ADMIN: HCPCS | Performed by: ORTHOPAEDIC SURGERY

## 2022-03-14 PROCEDURE — G8427 DOCREV CUR MEDS BY ELIG CLIN: HCPCS | Performed by: ORTHOPAEDIC SURGERY

## 2022-03-14 RX ORDER — TIZANIDINE 2 MG/1
TABLET ORAL
COMMUNITY
Start: 2022-03-10

## 2022-03-14 RX ORDER — PANTOPRAZOLE SODIUM 40 MG/1
TABLET, DELAYED RELEASE ORAL
COMMUNITY
Start: 2022-03-10 | End: 2022-04-06

## 2022-03-14 RX ORDER — IBUPROFEN 600 MG/1
TABLET ORAL
COMMUNITY
Start: 2022-01-26

## 2022-03-14 ASSESSMENT — ENCOUNTER SYMPTOMS: COLOR CHANGE: 0

## 2022-03-14 NOTE — PATIENT INSTRUCTIONS
Continue weight-bearing as tolerated. Continue range of motion exercises as instructed. Ice and elevate as needed. Tylenol or Motrin for pain. We will schedule surgery at soonest convenience.  If you have any questions regarding your surgery please call our office and ask to speak with Paco Zamora [phone]

## 2022-03-14 NOTE — PROGRESS NOTES
Subjective:      Patient ID: Cherri Salomon is a 62 y.o. male. Patient returns to the office with left hand pain and numbness. Pt stated that he has had this issue for years but has progressively gotten worse. Pt stated his hand will curl up and it is difficult to straighten back out. Pt stated he had his right hand carpal released with significant relief and would like to move forward this the left hand. Pt stated he has tried bracing, icing, heating and ibuprofen with  minimal relief. He comes in today for recheck of his left hand carpal tunnel syndrome. He states that since I saw him last ended his right carpal tunnel release 2 years ago he has been doing very well and no longer has any issues with his right hand. He states that he has been dealing with worsening numbness and tingling in his left hand and he would like to discuss proceeding with surgical treatment for that side. He denies any new injury to left hand and denies any fever or chills. Wrist Pain   Associated symptoms include numbness. Pertinent negatives include no fever. Hand Pain   Associated symptoms include numbness. Review of Systems   Constitutional: Negative for activity change, chills and fever. Musculoskeletal: Positive for arthralgias and myalgias. Negative for gait problem and joint swelling. Skin: Negative for color change, pallor, rash and wound. Neurological: Positive for weakness and numbness. Objective:   Physical Exam  Constitutional:       Appearance: He is well-developed. HENT:      Head: Normocephalic and atraumatic. Eyes:      Pupils: Pupils are equal, round, and reactive to light. Musculoskeletal:         General: Tenderness present. No deformity. Normal range of motion. Right wrist: Normal.      Left wrist: Normal.      Right hand: No swelling, deformity, lacerations, tenderness or bony tenderness. Normal range of motion. Normal strength. Normal sensation.  There is no disruption of two-point discrimination. Normal capillary refill. Left hand: Swelling and tenderness present. No deformity, lacerations or bony tenderness. Normal range of motion. Decreased strength. Decreased sensation of the ulnar distribution and median distribution. Normal sensation of the radial distribution. There is disruption of two-point discrimination. Normal capillary refill. Cervical back: Normal range of motion. Skin:     General: Skin is warm and dry. Coloration: Skin is not pale. Findings: No erythema or rash. Neurological:      Mental Status: He is alert and oriented to person, place, and time. Sensory: Sensory deficit present. Left hand-Skin intact with no erythema, ecchymosis or lacerations present.  strength 4/5. Right hand-Incision clean, dry, intact, with no erythema, no drainage, and no signs of infection.  strength 5/5. Assessment:      Left carpal tunnel syndrome, mild  Right carpal tunnel release, 2 years      Plan:       I discussed with him today that in regards to his right hand he is continuing to progress extremely well. In regards to his left hand given his persistent and worsening symptoms the next step is to proceed with surgical treatment. I discussed with him today performing left carpal tunnel release. I explained risks, benefits, possible complications of the procedure and answered all questions for the patient. I explained postoperative rehabilitation protocol and expectations with the patient today. The patient understands and consents to the procedure. Patient will follow up with their primary care physician prior to surgical treatment for preoperative clearance. We will schedule surgery at soonest convenience. Continue weight-bearing as tolerated. Continue range of motion exercises as instructed. Ice and elevate as needed. Tylenol or Motrin for pain. Follow up in 2 weeks postop.   He would like to have a surgery at

## 2022-03-14 NOTE — PROGRESS NOTES
Patient returns to the office with left hand pain and numbness. Pt stated that he has had this issue for years but has progressively gotten worse. Pt stated his hand will curl up and it is difficult to straighten back out. Pt stated he had his right hand carpal released with significant relief and would like to move forward this the left hand. Pt stated he has tried bracing, icing, heating and ibuprofen with  minimal relief.

## 2022-04-04 ENCOUNTER — TELEPHONE (OUTPATIENT)
Dept: ORTHOPEDIC SURGERY | Age: 62
End: 2022-04-04

## 2022-04-04 NOTE — TELEPHONE ENCOUNTER
Scheduled patient in office for LT CTR on 4/12/22 at [facility]. Patient voiced understanding of date/time and instructions. PCP Clearance sent. Insurance Jai Frederick) contacted on 3/18/22 via forms and clinicals faxed to [phone]. Response states no pre cert is required for CPT 63617.

## 2022-04-05 ENCOUNTER — ANESTHESIA EVENT (OUTPATIENT)
Dept: OPERATING ROOM | Age: 62
End: 2022-04-05
Payer: MEDICAID

## 2022-04-05 ASSESSMENT — LIFESTYLE VARIABLES: SMOKING_STATUS: 1

## 2022-04-05 NOTE — ANESTHESIA PRE PROCEDURE
Department of Anesthesiology  Preprocedure Note       Name:  Eugenia Mudrock   Age:  62 y.o.  :  1960                                          MRN:  [id number]         Date:  2022      Surgeon: Liana Chery):  Citlalli Dwyer DO    Procedure: Procedure(s):  LEFT CARPAL TUNNEL RELEASE    Medications prior to admission:   Prior to Admission medications    Medication Sig Start Date End Date Taking? Authorizing Provider   ibuprofen (ADVIL;MOTRIN) 600 MG tablet TAKE 1 TABLET BY MOUTH THREE TIMES A DAY AS NEEDED with food 22   Historical Provider, MD   pantoprazole (PROTONIX) 40 MG tablet TAKE 1 TABLET BY MOUTH EVERY DAY 3/10/22   Historical Provider, MD   tiZANidine (ZANAFLEX) 2 MG tablet TAKE 1 TABLET BY MOUTH EVERY EIGHT HOURS AS NEEDED. NOT TO EXCEED 3 DOSES IN 24 HOURS 3/10/22   Historical Provider, MD   cholestyramine (QUESTRAN) 4 g packet Take 1 packet by mouth 3 times daily 22   Sara Cary MD   omeprazole (PRILOSEC) 20 MG delayed release capsule Take 1 capsule by mouth 2 times daily (before meals) 21   Sara Cary MD   metoclopramide (REGLAN) 10 MG tablet Take 1 tablet by mouth 3 times daily (with meals)  Patient not taking: Reported on 3/14/2022 12/13/21   Sara Cary MD   hydrocortisone (ANUSOL-HC) 2.5 % CREA rectal cream Apply to hemorrhoids up to 4 times per day as needed for pain or burning. 21   Sara Cary MD   glucose 4 g chewable tablet CHEW AND SWALLOW 1 TABLET BY MOUTH AS NEEDED for hypoglycemia episodes 10/27/21   Historical Provider, MD   bisacodyl (BISACODYL) 5 MG EC tablet Take 4 tablets by mouth 2 times daily 21   Brendan Marrero MD   nicotine (NICODERM CQ) 14 MG/24HR Place 1 patch onto the skin daily  Patient not taking: Reported on 3/14/2022 9/3/21 2/17/22  Brendan Marrero MD   nystatin (MYCOSTATIN) 846649 UNIT/GM powder Apply 3 times daily.  21   Brendan Marrero MD   acarbose (PRECOSE) 25 MG tablet  21   Historical Provider, MD   atorvastatin (LIPITOR) 20 MG tablet  5/16/21   Historical Provider, MD   varenicline (CHANTIX) 1 MG tablet Take 1 tablet by mouth 2 times daily  Patient not taking: Reported on 3/14/2022 6/11/21   Arvind Sanchez MD   cetirizine (ZYRTEC) 10 MG tablet 1 tablet daily  3/9/21   Historical Provider, MD   fluticasone [facility]) 50 MCG/ACT nasal spray as needed 3/9/21   Historical Provider, MD   albuterol sulfate  (90 Base) MCG/ACT inhaler Inhale 2 puffs into the lungs every 6 hours as needed    Historical Provider, MD   Nutritional Supplement LIQD Take 1 Can by mouth 3 times daily 5/20/20   Arvind Sanchez MD   Nutritional Supplements (ENSURE HIGH PROTEIN) LIQD Take 1 Can by mouth 3 times daily Different flavors if possible 5/20/20   Arvind Sanchez MD   ondansetron (ZOFRAN ODT) 4 MG disintegrating tablet Place 1 tablet under the tongue every 8 hours as needed for Nausea or Vomiting 12/20/19   Arvind Sanchez MD   ipratropium-albuterol (DUONEB) 0.5-2.5 (3) MG/3ML SOLN nebulizer solution as needed  10/19/19   Historical Provider, MD   Nebulizers (MICRO AIR NEBULIZER) MISC TO USE AS NEEDED WITH NEBULIZER SOLUTION 9/23/19   Historical Provider, MD   acetaminophen (TYLENOL) 500 MG tablet Take 1,000 mg by mouth every 6 hours as needed for Pain    Historical Provider, MD   tiotropium (An Ours) 18 MCG inhalation capsule Inhale 1 capsule into the lungs daily 1/3/17   Kasey Contreras MD   albuterol sulfate HFA (PROVENTIL HFA) 108 (90 BASE) MCG/ACT inhaler Inhale 2 puffs into the lungs every 6 hours as needed for Wheezing 1/3/17   Kasey Contreras MD       Current medications:    Current Outpatient Medications   Medication Sig Dispense Refill    ibuprofen (ADVIL;MOTRIN) 600 MG tablet TAKE 1 TABLET BY MOUTH THREE TIMES A DAY AS NEEDED with food      pantoprazole (PROTONIX) 40 MG tablet TAKE 1 TABLET BY MOUTH EVERY DAY      tiZANidine (ZANAFLEX) 2 MG tablet TAKE 1 TABLET BY MOUTH EVERY EIGHT HOURS AS NEEDED. NOT TO EXCEED 3 DOSES IN 24 HOURS      cholestyramine (QUESTRAN) 4 g packet Take 1 packet by mouth 3 times daily 90 packet 3    omeprazole (PRILOSEC) 20 MG delayed release capsule Take 1 capsule by mouth 2 times daily (before meals) 60 capsule 0    metoclopramide (REGLAN) 10 MG tablet Take 1 tablet by mouth 3 times daily (with meals) (Patient not taking: Reported on 3/14/2022) 120 tablet 3    hydrocortisone (ANUSOL-HC) 2.5 % CREA rectal cream Apply to hemorrhoids up to 4 times per day as needed for pain or burning. 1 each 0    glucose 4 g chewable tablet CHEW AND SWALLOW 1 TABLET BY MOUTH AS NEEDED for hypoglycemia episodes      bisacodyl (BISACODYL) 5 MG EC tablet Take 4 tablets by mouth 2 times daily 8 tablet 3    nicotine (NICODERM CQ) 14 MG/24HR Place 1 patch onto the skin daily (Patient not taking: Reported on 3/14/2022) 42 patch 0    nystatin (MYCOSTATIN) 658196 UNIT/GM powder Apply 3 times daily.  1 Bottle 3    acarbose (PRECOSE) 25 MG tablet  (Patient not taking: Reported on 2/25/2022)      atorvastatin (LIPITOR) 20 MG tablet       varenicline (CHANTIX) 1 MG tablet Take 1 tablet by mouth 2 times daily (Patient not taking: Reported on 3/14/2022) 180 tablet 1    cetirizine (ZYRTEC) 10 MG tablet 1 tablet daily       fluticasone (FLONASE) 50 MCG/ACT nasal spray as needed      albuterol sulfate  (90 Base) MCG/ACT inhaler Inhale 2 puffs into the lungs every 6 hours as needed      Nutritional Supplement LIQD Take 1 Can by mouth 3 times daily 90 Can 5    Nutritional Supplements (ENSURE HIGH PROTEIN) LIQD Take 1 Can by mouth 3 times daily Different flavors if possible 30 Can 5    ondansetron (ZOFRAN ODT) 4 MG disintegrating tablet Place 1 tablet under the tongue every 8 hours as needed for Nausea or Vomiting 30 tablet 3    ipratropium-albuterol (DUONEB) 0.5-2.5 (3) MG/3ML SOLN nebulizer solution as needed       Nebulizers (MICRO AIR NEBULIZER) MISC TO USE AS NEEDED WITH NEBULIZER SOLUTION  11    acetaminophen (TYLENOL) 500 MG tablet Take 1,000 mg by mouth every 6 hours as needed for Pain      tiotropium (SPIRIVA HANDIHALER) 18 MCG inhalation capsule Inhale 1 capsule into the lungs daily 30 capsule 3    albuterol sulfate HFA (PROVENTIL HFA) 108 (90 BASE) MCG/ACT inhaler Inhale 2 puffs into the lungs every 6 hours as needed for Wheezing 1 Inhaler 3     No current facility-administered medications for this visit. Allergies:     Allergies   Allergen Reactions    Bee Venom Anaphylaxis    Nsaids Other (See Comments)     Can take small doses for a short time - Not to take any longer than necessary stomach ulcer       Problem List:    Patient Active Problem List   Diagnosis Code    Impingement syndrome of right shoulder M75.41    Acute bronchitis with COPD ([facility] [address] J44.0, J20.9    Chest pain R07.9    Post-nasal drip R09.82    Chronic cluster headache G44.029    Generalized weakness R53.1    Vertigo R42    Positional lightheadedness R42    Hiatal hernia K44.9    Gastroesophageal reflux disease with esophagitis K21.00    PUD (peptic ulcer disease) K27.9    Paraesophageal hiatal hernia K44.9    Status post laparoscopic Nissen fundoplication B40.702    Esophageal dysphagia R13.19    Esophageal thrush (HCC) B37.81    Candida esophagitis (HCC) B37.81    Gastroparesis K31.84    Candida infection, esophageal (HCC) B37.81    Pneumonia J18.9    Left upper quadrant pain R10.12    LLQ pain R10.32    Fungal esophagitis K20.80, B49    Left flank pain R10.9    Gastroesophageal reflux disease without esophagitis K21.9    Dyslipidemia E78.5    Essential hypertension I10    Epigastric pain R10.13    Esophageal stricture K22.2    Pharyngoesophageal dysphagia R13.14    Pain of upper abdomen R10.10    Nicotine dependence F17.200    Hematochezia K92.1    Acute gastritis with hemorrhage K29.01    Esophagitis K20.90       Past Medical History:        Diagnosis Date    Arthritis Hands    Chronic cluster headache 6/7/2019 last    COPD (chronic obstructive pulmonary disease) ([address]     \"dx 2 yrs ago\" \\"does not see a lung dr Nino Rae with PCP    Degenerative disc disease     \"in my back have degenarative disc\"    Depression     Edentulous     Fracture     \"fell over a 5 gallon bucket and landed on cement block and broke my right hand\" (7/6/2015)    GERD with esophagitis     H/O dizziness     States will feel like he is going to pass out, possible low BS    Hiatal hernia     Hx of gastric ulcer     HX OTHER MEDICAL     pt states has trouble with reading and writing\"can read very very little\"    Hyperlipidemia     Hypertension     No meds as of 5/27/21 - follows with PCP    Impingement syndrome of right shoulder     Left shoulder pain     limited range-of-motion    Lower back pain     Motion sickness     Neck pain     more on left side into shoulder--pain limits ROM    PONV (postoperative nausea and vomiting)     Vertigo        Past Surgical History:        Procedure Laterality Date    ARM SURGERY Left 30+ yrs    \"put plastic ligaments in \"  after injury through glass window    CARPAL TUNNEL RELEASE Right 10/4/2019    RIGHT CARPAL TUNNEL RELEASE performed by Mignon James DO at [facility]  2010    COLONOSCOPY  2018    HX: polyps - Dr. Da Silva Hearing 12/30/2019    COLONOSCOPY DIAGNOSTIC performed by Suman Alvarez MD at [facility] [address] COLONOSCOPY N/A 12/16/2021    COLONOSCOPY POLYPECTOMY SNARE/COLD BIOPSY performed by Eligio Wilson MD at [address] [place] [address]      all teeth extracted    ENDOSCOPY, COLON, DIAGNOSTIC  04/08/2019    EGD - thrush noted through out    ENDOSCOPY, COLON, DIAGNOSTIC  06/01/2021    dr winters:multiple gastric ulcers, intact fundiplication with tightening, dil 18-20, biopsy r/o h pylori, f/u as scheduled    EYE SURGERY Left 1990's    \"metal removed from eye\"    GASTRIC FUNDOPLICATION N/A 12/26/2018    NISSEN FUNDOPLICATION LAPAROSCOPIC ROBOTIC HIATAL HERNIA performed by Rocky Stroud MD at [facility] [address]      abdominal    UPPER GASTROINTESTINAL ENDOSCOPY  12/2018    UPPER GASTROINTESTINAL ENDOSCOPY N/A 1/28/2019    EGD BIOPSY / BRUSHING OF ESOPHAGUS performed by Rocky Stroud MD at [place] [address] N/A 4/8/2019    EGD BIOPSY AND BRUSHING performed by Rocky Stroud MD at [place] [address] N/A 7/8/2019    EGD BIOPSY and brushing performed by Rocky Stroud MD at [place] [address] N/A 12/30/2019    EGD BIOPSY/BRUSHING OF ESOPHAGUS performed by Rokcy Stroud MD at [place] [address] 6/5/2020    EGD BIOPSY performed by Rocky Stroud MD at [place] [address] 6/1/2021    EGD DILATION BALLOON WITH 18-20 BALLOON UP TO 20 WITH BIOPSIES performed by Rocky Stroud MD at [place] [address] 6/29/2021     [facility] used 55, 50. with biopsies performed by Rocky Stroud MD at [place] [address] N/A 12/16/2021    EGD BIOPSY performed by Annie Castro MD at [facility] ENDOSCOPY       Social History:    Social History     Tobacco Use    Smoking status: Current Every Day Smoker     Packs/day: 0.50     Years: 48.00     Pack years: 24.00     Types: Cigarettes     Start date: 1972    Smokeless tobacco: Never Used    Tobacco comment: use to smoke 2.5 PPD   Substance Use Topics    Alcohol use: Yes     Comment: Occasional 1/month                                Ready to quit: Not Answered  Counseling given: Not Answered  Comment: use to smoke 2.5 PPD      Vital Signs (Current): There were no vitals filed for this visit.                                            BP Readings from Last 3 Encounters:   03/02/22 118/80   02/17/22 136/84   01/05/22 124/84       NPO Status:                                                                                 BMI:   Wt Readings from Last 3 Encounters:   03/14/22 156 lb (70.8 kg)   03/02/22 156 lb 11.2 oz (71.1 kg)   02/25/22 154 lb (69.9 kg)     There is no height or weight on file to calculate BMI.    CBC:   Lab Results   Component Value Date    WBC 6.4 06/29/2021    RBC 4.46 06/29/2021    HGB 13.5 06/29/2021    HCT 40.8 06/29/2021    MCV 91.5 06/29/2021    RDW 13.4 06/29/2021     06/29/2021       CMP:   Lab Results   Component Value Date     01/16/2020    K 4.5 01/16/2020     01/16/2020    CO2 24 01/16/2020    BUN 11 01/16/2020    CREATININE 1.0 09/03/2021    CREATININE 0.8 01/16/2020    GFRAA >60 09/03/2021    LABGLOM >60 09/03/2021    GLUCOSE 66 01/16/2020    PROT 7.0 10/11/2019    PROT 7.0 11/19/2012    CALCIUM 9.8 01/16/2020    BILITOT 0.3 01/16/2020    ALKPHOS 82 01/16/2020    AST 18 01/16/2020    ALT 10 01/16/2020       POC Tests: No results for input(s): POCGLU, POCNA, POCK, POCCL, POCBUN, POCHEMO, POCHCT in the last 72 hours. Coags:   Lab Results   Component Value Date    PROTIME 11.8 08/02/2017    PROTIME 10.8 05/15/2011    INR 1.03 08/02/2017    APTT 31.3 08/02/2017       HCG (If Applicable): No results found for: PREGTESTUR, PREGSERUM, HCG, HCGQUANT     ABGs: No results found for: PHART, PO2ART, EIE4KJG, ORB2SUV, BEART, V4YQKKYU     Type & Screen (If Applicable):  No results found for: LABABO, LABRH    Drug/Infectious Status (If Applicable):  Lab Results   Component Value Date    HEPCAB NON REACTIVE 03/08/2011       COVID-19 Screening (If Applicable):   Lab Results   Component Value Date    COVID19 NOT DETECTED 12/10/2021           Anesthesia Evaluation  Patient summary reviewed   history of anesthetic complications: PONV.   Airway: Mallampati: II  TM distance: >3 FB   Neck ROM: full  Mouth opening: > = 3 FB Dental:    (+) edentulous      Pulmonary: (+) pneumonia: no interval change,  COPD:  current smoker                           Cardiovascular:  Exercise tolerance: poor (<4 METS),   (+) hypertension:, valvular problems/murmurs: MR, angina:, hyperlipidemia      ECG reviewed      Echocardiogram reviewed  Stress test reviewed       Beta Blocker:  Not on Beta Blocker      ROS comment:     Stress test 4/2021:Summary   Normal study.   Normal perfusion study with normal distribution in all coronal, short, and   horizontal axis.   The observed defect is consistent with diaphragmatic attenuation.   Normal LV function. LVEF is 48 %.   Supervising physician Dr. Wilfredo iNno .      Recommendation   Recommendations: Schedule out patient visit to discuss results.      Signatures      ------------------------------------------------------------------   Electronically signed by Cher Garrido MD (Interpreting   cardiologist) on 04/05/2021 at 17:26   ------------------------------------------------------------------      Summary   Normal study.   Normal perfusion study with normal distribution in all coronal, short, and   horizontal axis.   The observed defect is consistent with diaphragmatic attenuation.   Normal LV function. LVEF is 48 %.   Supervising physician Dr. Wilfredo Nino .       Sinus  Rhythm   -RSR(V1) -nondiagnostic. PROBABLY NORMAL    Summary   Left ventricular function is low normal, EF is estimated at 50-55%. Mild left ventricular hypertrophy. E/A reversal; indeterminate diastolic function. Mild mitral and tricuspid regurgitation is present. RVSP is 21 mmHg. Dilation of the aortic root(4.2) and the ascending aorta(4.0). No evidence of pericardial effusion.       Signature      ------------------------------------------------------------------   Electronically signed by Cher Garrido MD (Interpreting   physician) on 04/26/2021 at 12:47 PM   ------------------------------------------------------------------          Neuro/Psych:   (+) headaches: cluster headaches, psychiatric history:            GI/Hepatic/Renal:   (+) hiatal hernia, GERD:, PUD,           Endo/Other: Negative Endo/Other ROS   (+) : arthritis:., .                 Abdominal:             Vascular: negative vascular ROS. Other Findings:               Anesthesia Plan      MAC     ASA 4     (Chart review only 4/5/22)  Induction: intravenous. MIPS: Postoperative opioids intended. Anesthetic plan and risks discussed with patient. Plan discussed with CRNA. Attending anesthesiologist reviewed and agrees with Pre Eval content            TIMOTHY Soria CRNA   4/5/2022         Pre Anesthesia Assessment complete. Chart reviewed on 4/5/2022     Pre Anesthesia Evaluation complete. Anesthesia plan, risks, benefits, alternatives, and personnel discussed with patient and/or legal guardian. Patient and/or legal guardian verbalized an understanding and agreed to proceed. Anesthesia plan discussed with care team members and agreed upon.   TIMOTHY Soria CRNA  4/5/2022

## 2022-04-06 RX ORDER — OMEPRAZOLE 20 MG/1
20 CAPSULE, DELAYED RELEASE ORAL 2 TIMES DAILY
COMMUNITY
End: 2022-04-19 | Stop reason: SDUPTHER

## 2022-04-06 NOTE — PROGRESS NOTES
4/6/22 - . LM concerning  surgery @ [facility] on  4/12/22. Please call the PAT Nurse for a phone assessment and surgery instructions. I also need you to come in for pre-surgery lab work.

## 2022-04-06 NOTE — PROGRESS NOTES
Patient coming in 4/7/22 for pre-surgery lab work    Surgery @ [facility] on 4/12/22,  you will be called 4/11/22 with times               1. Do not eat or drink anything after midnight - unless instructed by your doctor prior to surgery. This includes                   no water, chewing gum or mints. 2. Follow your directions as prescribed by the doctor for your procedure and medications. Take a breathing treatment in am and bring your                  Inhalers with you. Take Omeprazole in am with a sip. 3. Check with your Doctor regarding stopping vitamins, supplements, blood thinners (Plavix, Coumadin, Lovenox, Effient, Pradaxa, Xarelto, Fragmin or                   other blood thinners) and follow their instructions. 4. Do not smoke, and do not drink any alcoholic beverages 24 hours prior to surgery. This includes NA Beer. 5. You may brush your teeth and gargle the morning of surgery. DO NOT SWALLOW WATER   6. You MUST make arrangements for a responsible adult to take you home after your surgery and be able to check on you every couple                   hours for the day. You will not be allowed to leave alone or drive yourself home. It is strongly suggested someone stay with you the first 24                   hrs. Your surgery will be cancelled if you do not have a ride home. 7. Please wear simple, loose fitting clothing to the hospital.  [place] not bring valuables (money, credit cards, checkbooks, etc.) Do not wear any                   makeup (including no eye makeup) or nail polish on your fingers or toes. 8. DO NOT wear any jewelry or piercings on day of surgery. All body piercing jewelry must be removed. 9. If you have dentures, they will be removed before going to the OR; we will provide you a container. If you wear contact lenses or glasses,                  they will be removed; please bring a case for them.            10. If you  have a Living Will and Durable Power of  for Healthcare, please bring in a copy. 11. Please bring picture ID,  insurance card, paperwork from the doctors office    (H & P, Consent, & card for implantable devices). 12. Take a shower the morning of your procedure with Hibiclens or an anti-bacterial soap. Do not apply any deodorant, lotion, oil or powder.

## 2022-04-07 ENCOUNTER — HOSPITAL ENCOUNTER (OUTPATIENT)
Age: 62
Discharge: HOME | End: 2022-04-07
Payer: MEDICAID

## 2022-04-07 DIAGNOSIS — Z01.818 PRE-OP TESTING: ICD-10-CM

## 2022-04-07 LAB
ANION GAP SERPL CALCULATED.3IONS-SCNC: 12 MMOL/L (ref 4–16)
BASOPHILS ABSOLUTE: 0 K/CU MM
BASOPHILS RELATIVE PERCENT: 0.4 % (ref 0–1)
BUN BLDV-MCNC: 9 MG/DL (ref 6–23)
CALCIUM SERPL-MCNC: 8.7 MG/DL (ref 8.3–10.6)
CHLORIDE BLD-SCNC: 107 MMOL/L (ref 99–110)
CO2: 21 MMOL/L (ref 21–32)
CREAT SERPL-MCNC: 0.8 MG/DL (ref 0.9–1.3)
DIFFERENTIAL TYPE: ABNORMAL
EOSINOPHILS ABSOLUTE: 0.3 K/CU MM
EOSINOPHILS RELATIVE PERCENT: 3.6 % (ref 0–3)
GFR AFRICAN AMERICAN: >60 ML/MIN/1.73M2
GFR NON-AFRICAN AMERICAN: >60 ML/MIN/1.73M2
GLUCOSE BLD-MCNC: 88 MG/DL (ref 70–99)
HCT VFR BLD CALC: 42.2 % (ref 42–52)
HEMOGLOBIN: 13.4 GM/DL (ref 13.5–18)
IMMATURE NEUTROPHIL %: 0.5 % (ref 0–0.43)
LYMPHOCYTES ABSOLUTE: 2.5 K/CU MM
LYMPHOCYTES RELATIVE PERCENT: 33.5 % (ref 24–44)
MCH RBC QN AUTO: 28.8 PG (ref 27–31)
MCHC RBC AUTO-ENTMCNC: 31.8 % (ref 32–36)
MCV RBC AUTO: 90.6 FL (ref 78–100)
MONOCYTES ABSOLUTE: 0.6 K/CU MM
MONOCYTES RELATIVE PERCENT: 8.5 % (ref 0–4)
NUCLEATED RBC %: 0 %
PDW BLD-RTO: 13.8 % (ref 11.7–14.9)
PLATELET # BLD: 237 K/CU MM (ref 140–440)
PMV BLD AUTO: 9.7 FL (ref 7.5–11.1)
POTASSIUM SERPL-SCNC: 4.1 MMOL/L (ref 3.5–5.1)
RBC # BLD: 4.66 M/CU MM (ref 4.6–6.2)
SEGMENTED NEUTROPHILS ABSOLUTE COUNT: 3.9 K/CU MM
SEGMENTED NEUTROPHILS RELATIVE PERCENT: 53.5 % (ref 36–66)
SODIUM BLD-SCNC: 140 MMOL/L (ref 135–145)
TOTAL IMMATURE NEUTOROPHIL: 0.04 K/CU MM
TOTAL NUCLEATED RBC: 0 K/CU MM
WBC # BLD: 7.3 K/CU MM (ref 4–10.5)

## 2022-04-07 PROCEDURE — 36415 COLL VENOUS BLD VENIPUNCTURE: CPT

## 2022-04-07 PROCEDURE — 80048 BASIC METABOLIC PNL TOTAL CA: CPT

## 2022-04-07 PROCEDURE — 85025 COMPLETE CBC W/AUTO DIFF WBC: CPT

## 2022-04-11 NOTE — PROGRESS NOTES
This nurse verified arrival time at [place] on 4/12/2022 for a surgery time of 0815, pt voiced understanding.

## 2022-04-12 ENCOUNTER — ANESTHESIA (OUTPATIENT)
Dept: OPERATING ROOM | Age: 62
End: 2022-04-12
Payer: MEDICAID

## 2022-04-12 ENCOUNTER — HOSPITAL ENCOUNTER (OUTPATIENT)
Age: 62
Setting detail: OUTPATIENT SURGERY
Discharge: HOME | End: 2022-04-12
Attending: ORTHOPAEDIC SURGERY | Admitting: ORTHOPAEDIC SURGERY
Payer: MEDICAID

## 2022-04-12 VITALS
RESPIRATION RATE: 16 BRPM | HEIGHT: 69 IN | WEIGHT: 145 LBS | SYSTOLIC BLOOD PRESSURE: 110 MMHG | BODY MASS INDEX: 21.48 KG/M2 | HEART RATE: 94 BPM | OXYGEN SATURATION: 94 % | DIASTOLIC BLOOD PRESSURE: 82 MMHG | TEMPERATURE: 96.5 F

## 2022-04-12 VITALS
DIASTOLIC BLOOD PRESSURE: 87 MMHG | SYSTOLIC BLOOD PRESSURE: 106 MMHG | RESPIRATION RATE: 13 BRPM | OXYGEN SATURATION: 99 %

## 2022-04-12 DIAGNOSIS — Z01.818 ENCOUNTER FOR PREADMISSION TESTING: Primary | ICD-10-CM

## 2022-04-12 DIAGNOSIS — Z01.818 PRE-OP TESTING: ICD-10-CM

## 2022-04-12 DIAGNOSIS — Z98.890 S/P CARPAL TUNNEL RELEASE: ICD-10-CM

## 2022-04-12 LAB — GLUCOSE BLD-MCNC: 90 MG/DL (ref 70–99)

## 2022-04-12 PROCEDURE — 2580000003 HC RX 258: Performed by: ORTHOPAEDIC SURGERY

## 2022-04-12 PROCEDURE — 64721 CARPAL TUNNEL SURGERY: CPT | Performed by: ORTHOPAEDIC SURGERY

## 2022-04-12 PROCEDURE — 2500000003 HC RX 250 WO HCPCS: Performed by: ORTHOPAEDIC SURGERY

## 2022-04-12 PROCEDURE — 2709999900 HC NON-CHARGEABLE SUPPLY: Performed by: ORTHOPAEDIC SURGERY

## 2022-04-12 PROCEDURE — 3700000001 HC ADD 15 MINUTES (ANESTHESIA): Performed by: ORTHOPAEDIC SURGERY

## 2022-04-12 PROCEDURE — 7100000010 HC PHASE II RECOVERY - FIRST 15 MIN: Performed by: ORTHOPAEDIC SURGERY

## 2022-04-12 PROCEDURE — 3600000002 HC SURGERY LEVEL 2 BASE: Performed by: ORTHOPAEDIC SURGERY

## 2022-04-12 PROCEDURE — 6370000000 HC RX 637 (ALT 250 FOR IP): Performed by: ORTHOPAEDIC SURGERY

## 2022-04-12 PROCEDURE — 7100000011 HC PHASE II RECOVERY - ADDTL 15 MIN: Performed by: ORTHOPAEDIC SURGERY

## 2022-04-12 PROCEDURE — 3700000000 HC ANESTHESIA ATTENDED CARE: Performed by: ORTHOPAEDIC SURGERY

## 2022-04-12 PROCEDURE — 3600000012 HC SURGERY LEVEL 2 ADDTL 15MIN: Performed by: ORTHOPAEDIC SURGERY

## 2022-04-12 PROCEDURE — 82962 GLUCOSE BLOOD TEST: CPT

## 2022-04-12 PROCEDURE — 6360000002 HC RX W HCPCS

## 2022-04-12 PROCEDURE — 6360000002 HC RX W HCPCS: Performed by: ORTHOPAEDIC SURGERY

## 2022-04-12 RX ORDER — CEFAZOLIN SODIUM 2 G/100ML
2000 INJECTION, SOLUTION INTRAVENOUS
Status: COMPLETED | OUTPATIENT
Start: 2022-04-12 | End: 2022-04-12

## 2022-04-12 RX ORDER — SODIUM CHLORIDE 0.9 % (FLUSH) 0.9 %
10 SYRINGE (ML) INJECTION PRN
Status: DISCONTINUED | OUTPATIENT
Start: 2022-04-12 | End: 2022-04-12 | Stop reason: HOSPADM

## 2022-04-12 RX ORDER — LIDOCAINE HYDROCHLORIDE 20 MG/ML
INJECTION, SOLUTION INTRAVENOUS PRN
Status: DISCONTINUED | OUTPATIENT
Start: 2022-04-12 | End: 2022-04-12 | Stop reason: SDUPTHER

## 2022-04-12 RX ORDER — SODIUM CHLORIDE, SODIUM LACTATE, POTASSIUM CHLORIDE, CALCIUM CHLORIDE 600; 310; 30; 20 MG/100ML; MG/100ML; MG/100ML; MG/100ML
INJECTION, SOLUTION INTRAVENOUS CONTINUOUS
Status: CANCELLED | OUTPATIENT
Start: 2022-04-12

## 2022-04-12 RX ORDER — ONDANSETRON 4 MG/1
4 TABLET, ORALLY DISINTEGRATING ORAL EVERY 8 HOURS PRN
Status: CANCELLED | OUTPATIENT
Start: 2022-04-12

## 2022-04-12 RX ORDER — SODIUM CHLORIDE 0.9 % (FLUSH) 0.9 %
10 SYRINGE (ML) INJECTION EVERY 12 HOURS SCHEDULED
Status: CANCELLED | OUTPATIENT
Start: 2022-04-12

## 2022-04-12 RX ORDER — OXYCODONE HYDROCHLORIDE 5 MG/1
5 TABLET ORAL EVERY 4 HOURS PRN
Status: CANCELLED | OUTPATIENT
Start: 2022-04-12

## 2022-04-12 RX ORDER — LIDOCAINE HYDROCHLORIDE 10 MG/ML
INJECTION, SOLUTION INFILTRATION; PERINEURAL
Status: COMPLETED | OUTPATIENT
Start: 2022-04-12 | End: 2022-04-12

## 2022-04-12 RX ORDER — CEPHALEXIN 250 MG/1
250 CAPSULE ORAL 4 TIMES DAILY
Qty: 4 CAPSULE | Refills: 0 | Status: SHIPPED | OUTPATIENT
Start: 2022-04-12 | End: 2022-04-13

## 2022-04-12 RX ORDER — SODIUM CHLORIDE 9 MG/ML
25 INJECTION, SOLUTION INTRAVENOUS PRN
Status: DISCONTINUED | OUTPATIENT
Start: 2022-04-12 | End: 2022-04-12 | Stop reason: HOSPADM

## 2022-04-12 RX ORDER — SODIUM CHLORIDE, SODIUM LACTATE, POTASSIUM CHLORIDE, CALCIUM CHLORIDE 600; 310; 30; 20 MG/100ML; MG/100ML; MG/100ML; MG/100ML
INJECTION, SOLUTION INTRAVENOUS CONTINUOUS
Status: DISCONTINUED | OUTPATIENT
Start: 2022-04-12 | End: 2022-04-12 | Stop reason: HOSPADM

## 2022-04-12 RX ORDER — PROPOFOL 10 MG/ML
INJECTION, EMULSION INTRAVENOUS PRN
Status: DISCONTINUED | OUTPATIENT
Start: 2022-04-12 | End: 2022-04-12 | Stop reason: SDUPTHER

## 2022-04-12 RX ORDER — FENTANYL CITRATE 50 UG/ML
INJECTION, SOLUTION INTRAMUSCULAR; INTRAVENOUS PRN
Status: DISCONTINUED | OUTPATIENT
Start: 2022-04-12 | End: 2022-04-12 | Stop reason: SDUPTHER

## 2022-04-12 RX ORDER — SODIUM CHLORIDE 9 MG/ML
25 INJECTION, SOLUTION INTRAVENOUS PRN
Status: CANCELLED | OUTPATIENT
Start: 2022-04-12

## 2022-04-12 RX ORDER — ACETAMINOPHEN 500 MG
1000 TABLET ORAL ONCE
Status: COMPLETED | OUTPATIENT
Start: 2022-04-12 | End: 2022-04-12

## 2022-04-12 RX ORDER — HYDROCODONE BITARTRATE AND ACETAMINOPHEN 5; 325 MG/1; MG/1
1 TABLET ORAL EVERY 6 HOURS PRN
Qty: 5 TABLET | Refills: 0 | Status: SHIPPED | OUTPATIENT
Start: 2022-04-12 | End: 2022-04-15

## 2022-04-12 RX ORDER — SODIUM CHLORIDE 0.9 % (FLUSH) 0.9 %
10 SYRINGE (ML) INJECTION EVERY 12 HOURS SCHEDULED
Status: DISCONTINUED | OUTPATIENT
Start: 2022-04-12 | End: 2022-04-12 | Stop reason: HOSPADM

## 2022-04-12 RX ORDER — ONDANSETRON 2 MG/ML
4 INJECTION INTRAMUSCULAR; INTRAVENOUS EVERY 6 HOURS PRN
Status: CANCELLED | OUTPATIENT
Start: 2022-04-12

## 2022-04-12 RX ORDER — SODIUM CHLORIDE 0.9 % (FLUSH) 0.9 %
10 SYRINGE (ML) INJECTION PRN
Status: CANCELLED | OUTPATIENT
Start: 2022-04-12

## 2022-04-12 RX ORDER — OXYCODONE HYDROCHLORIDE 5 MG/1
10 TABLET ORAL EVERY 4 HOURS PRN
Status: CANCELLED | OUTPATIENT
Start: 2022-04-12

## 2022-04-12 RX ADMIN — LIDOCAINE HYDROCHLORIDE 100 MG: 20 INJECTION, SOLUTION INTRAVENOUS at 08:25

## 2022-04-12 RX ADMIN — PROPOFOL 70 MG: 10 INJECTION, EMULSION INTRAVENOUS at 08:25

## 2022-04-12 RX ADMIN — ACETAMINOPHEN 1000 MG: 500 TABLET ORAL at 06:40

## 2022-04-12 RX ADMIN — FENTANYL CITRATE 25 MCG: 50 INJECTION, SOLUTION INTRAMUSCULAR; INTRAVENOUS at 08:33

## 2022-04-12 RX ADMIN — CEFAZOLIN SODIUM 2000 MG: 2 INJECTION, SOLUTION INTRAVENOUS at 08:28

## 2022-04-12 RX ADMIN — SODIUM CHLORIDE, POTASSIUM CHLORIDE, SODIUM LACTATE AND CALCIUM CHLORIDE: 600; 310; 30; 20 INJECTION, SOLUTION INTRAVENOUS at 06:40

## 2022-04-12 ASSESSMENT — PAIN SCALES - GENERAL
PAINLEVEL_OUTOF10: 6
PAINLEVEL_OUTOF10: 0
PAINLEVEL_OUTOF10: 0

## 2022-04-12 ASSESSMENT — PULMONARY FUNCTION TESTS
PIF_VALUE: 1
PIF_VALUE: 0
PIF_VALUE: 1
PIF_VALUE: 0
PIF_VALUE: 1
PIF_VALUE: 0
PIF_VALUE: 1
PIF_VALUE: 1
PIF_VALUE: 0
PIF_VALUE: 1
PIF_VALUE: 0
PIF_VALUE: 1
PIF_VALUE: 0
PIF_VALUE: 1
PIF_VALUE: 1

## 2022-04-12 ASSESSMENT — PAIN - FUNCTIONAL ASSESSMENT: PAIN_FUNCTIONAL_ASSESSMENT: 0-10

## 2022-04-12 ASSESSMENT — PAIN DESCRIPTION - DESCRIPTORS: DESCRIPTORS: ACHING

## 2022-04-12 NOTE — PROGRESS NOTES
0900- report from Rosario Parham 16- discharge instructions reviewed w/ pt and pt friend Eusebio Soliman foster via telephone   2056- pt dressing   7305- Pt discharged via car w/ pt friend Eusebio Soliman driving

## 2022-04-12 NOTE — BRIEF OP NOTE
Brief Postoperative Note      Patient: Abhijit Szymanski  YOB: 1960  MRN: [id number]    Date of Procedure: 4/12/2022    Pre-Op Diagnosis: LEFT CARPAL TUNNEL SYNDROME    Post-Op Diagnosis: Same       Procedure(s):  LEFT CARPAL TUNNEL RELEASE    Surgeon(s):  Nelsy Noble DO    Assistant:  Physician Assistant: Radha Smith PA-C    Anesthesia: Monitor Anesthesia Care    Estimated Blood Loss (mL): Minimal    Complications: None    Specimens:   * No specimens in log *    Implants:  * No implants in log *      Drains: * No LDAs found *    Findings: L CTS    Electronically signed by Justino Hobbs DO on 4/12/2022 at 8:37 AM

## 2022-04-12 NOTE — H&P
Subjective:      Patient ID: Lizet Banegas is a 62 y.o. male.     Patient returns to the office with left hand pain and numbness. Pt stated that he has had this issue for years but has progressively gotten worse. Pt stated his hand will curl up and it is difficult to straighten back out. Pt stated he had his right hand carpal released with significant relief and would like to move forward this the left hand. Pt stated he has tried bracing, icing, heating and ibuprofen with  minimal relief.     He comes in today for recheck of his left hand carpal tunnel syndrome. He states that since I saw him last ended his right carpal tunnel release 2 years ago he has been doing very well and no longer has any issues with his right hand. He states that he has been dealing with worsening numbness and tingling in his left hand and he would like to discuss proceeding with surgical treatment for that side. He denies any new injury to left hand and denies any fever or chills.              Wrist Pain   Associated symptoms include numbness. Pertinent negatives include no fever. Hand Pain   Associated symptoms include numbness.         Review of Systems   Constitutional: Negative for activity change, chills and fever. Musculoskeletal: Positive for arthralgias and myalgias. Negative for gait problem and joint swelling. Skin: Negative for color change, pallor, rash and wound.    Neurological: Positive for weakness and numbness.         Past Medical History:   Diagnosis Date    Arthritis     Hands    Chronic cluster headache 6/7/2019 last    COPD (chronic obstructive pulmonary disease) ([facility] [address]     \"dx 2 yrs ago\" \\"does not see a lung dr Mikhail Denise with PCP    Degenerative disc disease     \"in my back have degenarative disc\"    Depression     Edentulous     Fracture     \"fell over a 5 gallon bucket and landed on cement block and broke my right hand\" (7/6/2015)    GERD with esophagitis     H/O dizziness     States will feel like he is going to pass out, possible low BS    Hiatal hernia     Hx of gastric ulcer     HX OTHER MEDICAL     pt states has trouble with reading and writing\"can read very very little\"    Hyperlipidemia     Hypertension     No meds as of 5/27/21 - follows with PCP    Impingement syndrome of right shoulder     Left shoulder pain     limited range-of-motion    Lower back pain     Motion sickness     Neck pain     more on left side into shoulder--pain limits ROM    PONV (postoperative nausea and vomiting)     Vertigo        No current facility-administered medications on file prior to encounter. Current Outpatient Medications on File Prior to Encounter   Medication Sig Dispense Refill    omeprazole (PRILOSEC) 20 MG delayed release capsule Take 20 mg by mouth 2 times daily      ibuprofen (ADVIL;MOTRIN) 600 MG tablet TAKE 1 TABLET BY MOUTH THREE TIMES A DAY AS NEEDED with food      tiZANidine (ZANAFLEX) 2 MG tablet TAKE 1 TABLET BY MOUTH EVERY EIGHT HOURS AS NEEDED. NOT TO EXCEED 3 DOSES IN 24 HOURS      metoclopramide (REGLAN) 10 MG tablet Take 1 tablet by mouth 3 times daily (with meals) 120 tablet 3    hydrocortisone (ANUSOL-HC) 2.5 % CREA rectal cream Apply to hemorrhoids up to 4 times per day as needed for pain or burning. 1 each 0    glucose 4 g chewable tablet CHEW AND SWALLOW 1 TABLET BY MOUTH AS NEEDED for hypoglycemia episodes      nystatin (MYCOSTATIN) 793283 UNIT/GM powder Apply 3 times daily.  1 Bottle 3    atorvastatin (LIPITOR) 20 MG tablet       cetirizine (ZYRTEC) 10 MG tablet 1 tablet daily       fluticasone (FLONASE) 50 MCG/ACT nasal spray as needed      albuterol sulfate  (90 Base) MCG/ACT inhaler Inhale 2 puffs into the lungs every 6 hours as needed      Nutritional Supplements (ENSURE HIGH PROTEIN) LIQD Take 1 Can by mouth 3 times daily Different flavors if possible 30 Can 5    ondansetron (ZOFRAN ODT) 4 MG disintegrating tablet Place 1 tablet under the tongue every 8 hours as needed for Nausea or Vomiting 30 tablet 3    ipratropium-albuterol (DUONEB) 0.5-2.5 (3) MG/3ML SOLN nebulizer solution as needed       Nebulizers (MICRO AIR NEBULIZER) MISC TO USE AS NEEDED WITH NEBULIZER SOLUTION  11    acetaminophen (TYLENOL) 500 MG tablet Take 1,000 mg by mouth every 6 hours as needed for Pain      tiotropium (SPIRIVA HANDIHALER) 18 MCG inhalation capsule Inhale 1 capsule into the lungs daily 30 capsule 3    albuterol sulfate HFA (PROVENTIL HFA) 108 (90 BASE) MCG/ACT inhaler Inhale 2 puffs into the lungs every 6 hours as needed for Wheezing 1 Inhaler 3     Allergies   Allergen Reactions    Bee Venom Anaphylaxis     \"swell up like a balloon in face/throat\"      Nsaids Other (See Comments)     Can take small doses for a short time - Not to take any longer than necessary stomach ulcer     Past Surgical History:   Procedure Laterality Date    ARM SURGERY Left 30+ yrs    \"put plastic ligaments in \"  after injury through glass window    CARPAL TUNNEL RELEASE Right 10/4/2019    RIGHT CARPAL TUNNEL RELEASE performed by Citlalli Dwyer DO at [facility] COLONOSCOPY  2010    COLONOSCOPY  2018    HX: polyps - Dr. Eligio Stout COLONOSCOPY N/A 12/30/2019    COLONOSCOPY DIAGNOSTIC performed by Brendan Marrero MD at [facility] COLONOSCOPY N/A 12/16/2021    COLONOSCOPY POLYPECTOMY SNARE/COLD BIOPSY performed by Sara Cary MD at [address] [place] [address]      all teeth extracted    ENDOSCOPY, COLON, DIAGNOSTIC  04/08/2019    EGD - thrush noted through out    ENDOSCOPY, COLON, DIAGNOSTIC  06/01/2021    dr winters:multiple gastric ulcers, intact fundiplication with tightening, dil 18-20, biopsy r/o h pylori, f/u as scheduled    EYE SURGERY Left 1990's    \"metal removed from eye\"    GASTRIC FUNDOPLICATION N/A 60/58/8400    NISSEN FUNDOPLICATION LAPAROSCOPIC ROBOTIC HIATAL HERNIA performed by Brendan Marrero MD at [address] [facility]      abdominal    UPPER GASTROINTESTINAL ENDOSCOPY  12/2018    UPPER GASTROINTESTINAL ENDOSCOPY N/A 1/28/2019    EGD BIOPSY / BRUSHING OF ESOPHAGUS performed by Mannie Ruiz MD at [address] [facility] N/A 4/8/2019    EGD BIOPSY AND BRUSHING performed by Mannie Ruiz MD at [address] [facility] N/A 7/8/2019    EGD BIOPSY and brushing performed by Mannie Ruiz MD at [address] [facility] N/A 12/30/2019    EGD BIOPSY/BRUSHING OF ESOPHAGUS performed by Mannie Ruiz MD at [address] [facility] 6/5/2020    EGD BIOPSY performed by Mannie Ruiz MD at [address] [facility] N/A 6/1/2021    EGD DILATION BALLOON WITH 18-20 BALLOON UP TO 20 WITH BIOPSIES performed by Mannie Ruiz MD at [address] [facility] N/A 6/29/2021     [facility] used 55, 50. with biopsies performed by Mannie Ruiz MD at [address] [facility] N/A 12/16/2021    EGD BIOPSY performed by Govind Spencer MD at [address] [facility] ENDOSCOPY     Social History     Tobacco Use    Smoking status: Current Every Day Smoker     Packs/day: 0.50     Years: 50.00     Pack years: 25.00     Types: Cigarettes     Start date: 0    Smokeless tobacco: Never Used    Tobacco comment: use to smoke 2.5 PPD   Vaping Use    Vaping Use: Never used   Substance Use Topics    Alcohol use: Yes     Comment: Occasional 1/month    Drug use: Never     Comment: 3 cups of coffee daily     Family History   Problem Relation Age of Onset    Dementia Mother     Stroke Mother     Stroke Father     High Blood Pressure Father     Heart Disease Father     Breast Cancer Sister     Diabetes Brother     Cancer Brother         mouth and throat    Heart Attack Maternal Uncle     Heart Disease Maternal Uncle     No Known Problems Son     No Known Problems Son     No Known Problems Daughter        Objective:   Physical Exam  Constitutional:       Appearance: He is well-developed. HENT:      Head: Normocephalic and atraumatic. Eyes:      Pupils: Pupils are equal, round, and reactive to light. Musculoskeletal:         General: Tenderness present. No deformity. Normal range of motion. Right wrist: Normal.      Left wrist: Normal.      Right hand: No swelling, deformity, lacerations, tenderness or bony tenderness. Normal range of motion. Normal strength. Normal sensation. There is no disruption of two-point discrimination. Normal capillary refill. Left hand: Swelling and tenderness present. No deformity, lacerations or bony tenderness. Normal range of motion. Decreased strength. Decreased sensation of the ulnar distribution and median distribution. Normal sensation of the radial distribution. There is disruption of two-point discrimination. Normal capillary refill. Cervical back: Normal range of motion. Skin:     General: Skin is warm and dry. Coloration: Skin is not pale. Findings: No erythema or rash. Neurological:      Mental Status: He is alert and oriented to person, place, and time. Sensory: Sensory deficit present. Left hand-Skin intact with no erythema, ecchymosis or lacerations present.  strength 4/5.     Right hand-Incision clean, dry, intact, with no erythema, no drainage, and no signs of infection.  strength 5/5.      /84   Pulse 61   Temp 97.3 °F (36.3 °C) (Temporal)   Resp 16   Ht 5' 9\" (1.753 m)   Wt 145 lb (65.8 kg)   SpO2 96%   BMI 21.41 kg/m²     Assessment:   Left carpal tunnel syndrome, mild  Right carpal tunnel release, 2 years                Plan:       I discussed with him today that in regards to his right hand he is continuing to progress extremely well.   In regards to his left hand given his persistent and worsening symptoms the next step is to proceed with surgical treatment. I discussed with him today performing left carpal tunnel release. I explained risks, benefits, possible complications of the procedure and answered all questions for the patient. I explained postoperative rehabilitation protocol and expectations with the patient today. The patient understands and consents to the procedure. Patient will follow up with their primary care physician prior to surgical treatment for preoperative clearance. We will schedule surgery at soonest convenience. Continue weight-bearing as tolerated. Continue range of motion exercises as instructed. Ice and elevate as needed. Tylenol or Motrin for pain. Follow up in 2 weeks postop. He would like to have a surgery at [address] [place] [address] patient was counseled at length about the risks of katja Covid-19 during their perioperative period and any recovery window from their procedure. The patient was made aware that katja Covid-19  may worsen their prognosis for recovering from their procedure  and lend to a higher morbidity and/or mortality risk. All material risks, benefits, and reasonable alternatives including postponing the procedure were discussed. The patient does wish to proceed with the procedure at this time.       Pt seen and examined, No change in H+P.                   FRANK ETIENNE, DO

## 2022-04-12 NOTE — OP NOTE
DATE OF PROCEDURE:  4/12/2022    PREOPERATIVE DIAGNOSES:  1. Left carpal tunnel syndrome. POSTOPERATIVE DIAGNOSES:  1. Left carpal tunnel syndrome. PROCEDURES:  1. Left carpal tunnel release. ATTENDING SURGEON:  Aurelia Mcclain DO    FIRST ASSISTANT: WM Vance    ANESTHESIA:  MAC with local.    ESTIMATED BLOOD LOSS:  1 mL. TOTAL TOURNIQUET TIME:  6 minutes. FLUIDS:  200 mL of crystalloids. INDICATIONS FOR PROCEDURE:  The patient is a 71-year-old male with a  long-standing history of worsening, painful numbness and tingling in his  left hand. For that, he underwent conservative treatment with no relief of  symptoms. EMG was subsequently obtained, which showed evidence of  carpal tunnel syndrome. Given his persistent symptoms despite conservative treatment and with his EMG findings, I   recommended surgical treatment. I explained the risks, benefits and possible complications of the procedure  to the patient and after answering all of his questions, he consented to  undergo the above procedure. DESCRIPTION OF PROCEDURE:  The patient was seen and evaluated in the  preoperative holding area where the left upper extremity was signed in his  presence. At this point, care of the patient was turned over to anesthesia  team, and he was transported back to the operative suite. He was placed supine  on the operating table with the left upper extremity on an arm board. MAC  anesthesia was applied and once adequate anesthesia was obtained, the left  upper extremity was prepped and draped in usual sterile fashion. Preoperative antibiotics were administered. At this point, a time-out was  performed and all in attendance were in agreement. I marked out the planned surgical incision overlying the volar aspect of  the left wrist along the radial border of the ring finger proximal to  [facility] cardinal line.     I then injected approximately 2 mL of 1% plain lidocaine around the   carpal tunnel incision. I then exsanguinated the left upper extremity using Esmarch and tourniquet was inflated to 200 mmHg. I made a longitudinal incision overlying the previously marked location. I carried  sharp dissection down to the level of the transcarpal ligament. I then  placed a Weitlaner retractor for further visualization. I then incised  through the transcarpal ligament exposing the median nerve. I then placed  a retractor at the distal aspect of the incision. I then bluntly dissected deep to the ligament. I then positioned the tip of the scissors along the ligament and applied gentle pressure distally incising through the distal aspect of the transverse carpal ligament. I then bluntly probed with scissors to ensure complete release had been obtained. I then placed a retractor at the proximal aspect of the incision. I bluntly dissected deep to the ligament. I then positioned the tip of the scissors along the ligament and applied gentle pressure proximally incising the ligament in that direction. I then bluntly probed to ensure complete release had been obtained. I then closed the skin incision using 3-0 nylon suture in horizontal mattress fashion. The tourniquet was then deflated for a total of 6 minutes and adequate hemostasis was maintained. I then applied a sterile  soft dressing to the left upper extremity. The patient was then awakened from anesthesia and transported to PACU in stable condition. He appeared to have tolerated the procedure well. PROGNOSIS:  At this point, he will be discharged to home with a short  course of oral antibiotics as well as pain medication. He can have  immediate range of motion of the left hand, but is to avoid heavy lifting,  pushing or pulling. I will see him back in the office in 2 weeks for suture removal and will continue to monitor his progress in the outpatient setting for resolution of his symptoms.         Lakeisha Mccormack, DO

## 2022-04-12 NOTE — ANESTHESIA POSTPROCEDURE EVALUATION
Department of Anesthesiology  Postprocedure Note    Patient: Charlene Gonzalez  MRN: [id number]  YOB: 1960  Date of evaluation: 4/12/2022  Time:  9:59 AM     Procedure Summary     Date: 04/12/22 Room / Location: [address]    Anesthesia Start: 0298 Anesthesia Stop: 0900    Procedure: LEFT CARPAL TUNNEL RELEASE (Left Wrist) Diagnosis: (LEFT CARPAL TUNNEL SYNDROME)    Surgeons: Bill Nick DO Responsible Provider: Honey Day MD    Anesthesia Type: MAC ASA Status: 4          Anesthesia Type: MAC    Sreedhar Phase I:      Sreedhar Phase II: Sreedhar Score: 10    Last vitals: Reviewed and per EMR flowsheets.        Anesthesia Post Evaluation    Patient location during evaluation: bedside  Patient participation: complete - patient participated  Level of consciousness: awake and alert  Pain score: 0  Airway patency: patent  Nausea & Vomiting: no vomiting and no nausea  Complications: no  Cardiovascular status: hemodynamically stable  Respiratory status: room air  Hydration status: stable

## 2022-04-19 DIAGNOSIS — R13.19 ESOPHAGEAL DYSPHAGIA: Primary | ICD-10-CM

## 2022-04-19 RX ORDER — OMEPRAZOLE 20 MG/1
20 CAPSULE, DELAYED RELEASE ORAL 2 TIMES DAILY
Qty: 60 CAPSULE | Refills: 3 | Status: SHIPPED | OUTPATIENT
Start: 2022-04-19

## 2022-04-19 NOTE — TELEPHONE ENCOUNTER
LVM for PT that he needs to est. With a PCP for more refills.  That 3 were sent over to his pharmacy

## 2022-04-19 NOTE — TELEPHONE ENCOUNTER
Request for omeprazole received. Will refill at this time. Jes Jeff should establish/follow up with his PCP for ongoing refills for this medication.     Electronically signed by Mari Squires MD on 4/19/2022 at 2:59 PM

## 2022-04-21 ENCOUNTER — OFFICE VISIT (OUTPATIENT)
Dept: ORTHOPEDIC SURGERY | Age: 62
End: 2022-04-21

## 2022-04-21 VITALS
WEIGHT: 145 LBS | HEIGHT: 69 IN | BODY MASS INDEX: 21.48 KG/M2 | HEART RATE: 71 BPM | TEMPERATURE: 97.2 F | RESPIRATION RATE: 16 BRPM | OXYGEN SATURATION: 92 %

## 2022-04-21 DIAGNOSIS — Z98.890 S/P CARPAL TUNNEL RELEASE: Primary | ICD-10-CM

## 2022-04-21 PROCEDURE — 99024 POSTOP FOLLOW-UP VISIT: CPT | Performed by: PHYSICIAN ASSISTANT

## 2022-04-21 NOTE — PROGRESS NOTES
Date of surgery:   April 12th   Surgeon: Dr. David King  History:  Mr. Bull Aguilera is here in follow up regarding his left carpal tunnel release. He is doing rather well. He is having 4/10 pain. He denies chest pain, SOB, calf pain,fever,wound drainage. He states he is having more pain with this hand that he did with the other surgery on his right hand. .  He has pain that shoots from the base of the thumb up into the forearm at times. Physical:  Vitals:    04/21/22 1101   Pulse: 71   Resp: 16   Temp: 97.2 °F (36.2 °C)   TempSrc: Temporal   SpO2: 92%   Weight: 145 lb (65.8 kg)   Height: 5' 9\" (1.753 m)     Small dehiscence noted on the distal aspect of the incision but it is very superficial.  No erythema and no drainage noted. Patient is able to flex and extend the digits of the left hand. Impression: Status post above, doing well       Plan:   Follow-up next week for wound check  Patient Instructions   Continue with hand exercises at home  Continue weight bear as tolerated  Continue range of motion and exercises as instruction  Ice and elevate as needed  Tylenol or Motrin for pain  Follow up in 4 weeks with Dr. Arden Manjarrez.

## 2022-04-21 NOTE — PATIENT INSTRUCTIONS
Continue with hand exercises at home  Continue weight bear as tolerated  Continue range of motion and exercises as instruction  Ice and elevate as needed  Tylenol or Motrin for pain  Follow up in 4 weeks with Dr. Matthew Love.

## 2022-05-04 RX ORDER — HYDROCORTISONE 25 MG/G
CREAM TOPICAL
Qty: 30 G | Refills: 0 | Status: SHIPPED | OUTPATIENT
Start: 2022-05-04 | End: 2022-08-25

## 2022-05-04 RX ORDER — PREDNISONE 20 MG/1
TABLET ORAL
Qty: 14 TABLET | Refills: 0 | OUTPATIENT
Start: 2022-05-04

## 2022-05-04 RX ORDER — CEPHALEXIN 250 MG/1
CAPSULE ORAL
Qty: 4 CAPSULE | Refills: 0 | OUTPATIENT
Start: 2022-05-04

## 2022-05-04 RX ORDER — METOCLOPRAMIDE 10 MG/1
10 TABLET ORAL
Qty: 120 TABLET | Refills: 3 | Status: SHIPPED | OUTPATIENT
Start: 2022-05-04

## 2022-08-25 RX ORDER — HYDROCORTISONE 25 MG/G
CREAM TOPICAL
Qty: 30 G | Refills: 0 | Status: SHIPPED | OUTPATIENT
Start: 2022-08-25

## 2022-08-25 RX ORDER — CEPHALEXIN 250 MG/1
CAPSULE ORAL
Qty: 4 CAPSULE | Refills: 0 | OUTPATIENT
Start: 2022-08-25

## 2022-10-25 ENCOUNTER — APPOINTMENT (OUTPATIENT)
Dept: CT IMAGING | Age: 62
End: 2022-10-25
Payer: MEDICAID

## 2022-10-25 ENCOUNTER — HOSPITAL ENCOUNTER (EMERGENCY)
Age: 62
Discharge: HOME | End: 2022-10-26
Attending: EMERGENCY MEDICINE
Payer: MEDICAID

## 2022-10-25 VITALS
BODY MASS INDEX: 20.73 KG/M2 | RESPIRATION RATE: 16 BRPM | HEART RATE: 73 BPM | OXYGEN SATURATION: 96 % | WEIGHT: 140 LBS | DIASTOLIC BLOOD PRESSURE: 82 MMHG | SYSTOLIC BLOOD PRESSURE: 108 MMHG | HEIGHT: 69 IN | TEMPERATURE: 97.6 F

## 2022-10-25 DIAGNOSIS — R10.9 FLANK PAIN, ACUTE: Primary | ICD-10-CM

## 2022-10-25 DIAGNOSIS — R10.30 LOWER ABDOMINAL PAIN: ICD-10-CM

## 2022-10-25 LAB
ALBUMIN SERPL-MCNC: 3.8 GM/DL (ref 3.4–5)
ALP BLD-CCNC: 69 IU/L (ref 40–129)
ALT SERPL-CCNC: 9 U/L (ref 10–40)
ANION GAP SERPL CALCULATED.3IONS-SCNC: 13 MMOL/L (ref 4–16)
AST SERPL-CCNC: 15 IU/L (ref 15–37)
BASOPHILS ABSOLUTE: 0 K/CU MM
BASOPHILS RELATIVE PERCENT: 0.5 % (ref 0–1)
BILIRUB SERPL-MCNC: 0.2 MG/DL (ref 0–1)
BILIRUBIN URINE: NEGATIVE MG/DL
BLOOD, URINE: NEGATIVE
BUN BLDV-MCNC: 16 MG/DL (ref 6–23)
CALCIUM SERPL-MCNC: 8.8 MG/DL (ref 8.3–10.6)
CHLORIDE BLD-SCNC: 106 MMOL/L (ref 99–110)
CLARITY: CLEAR
CO2: 23 MMOL/L (ref 21–32)
COLOR: YELLOW
CREAT SERPL-MCNC: 0.7 MG/DL (ref 0.9–1.3)
DIFFERENTIAL TYPE: ABNORMAL
EOSINOPHILS ABSOLUTE: 0.2 K/CU MM
EOSINOPHILS RELATIVE PERCENT: 3.6 % (ref 0–3)
GFR SERPL CREATININE-BSD FRML MDRD: >60 ML/MIN/1.73M2
GLUCOSE BLD-MCNC: 90 MG/DL (ref 70–99)
GLUCOSE, URINE: NEGATIVE MG/DL
HCT VFR BLD CALC: 38 % (ref 42–52)
HEMOGLOBIN: 12.7 GM/DL (ref 13.5–18)
IMMATURE NEUTROPHIL %: 0.3 % (ref 0–0.43)
KETONES, URINE: NEGATIVE MG/DL
LEUKOCYTE ESTERASE, URINE: NEGATIVE
LIPASE: 30 IU/L (ref 13–60)
LYMPHOCYTES ABSOLUTE: 2.4 K/CU MM
LYMPHOCYTES RELATIVE PERCENT: 37.6 % (ref 24–44)
MCH RBC QN AUTO: 29.3 PG (ref 27–31)
MCHC RBC AUTO-ENTMCNC: 33.4 % (ref 32–36)
MCV RBC AUTO: 87.6 FL (ref 78–100)
MONOCYTES ABSOLUTE: 0.5 K/CU MM
MONOCYTES RELATIVE PERCENT: 7.4 % (ref 0–4)
NITRITE URINE, QUANTITATIVE: NEGATIVE
NUCLEATED RBC %: 0 %
PDW BLD-RTO: 13.4 % (ref 11.7–14.9)
PH, URINE: 5.5 (ref 5–8)
PLATELET # BLD: 288 K/CU MM (ref 140–440)
PMV BLD AUTO: 9.5 FL (ref 7.5–11.1)
POTASSIUM SERPL-SCNC: 4.1 MMOL/L (ref 3.5–5.1)
PROTEIN UA: NEGATIVE MG/DL
RBC # BLD: 4.34 M/CU MM (ref 4.6–6.2)
SEGMENTED NEUTROPHILS ABSOLUTE COUNT: 3.2 K/CU MM
SEGMENTED NEUTROPHILS RELATIVE PERCENT: 50.6 % (ref 36–66)
SODIUM BLD-SCNC: 142 MMOL/L (ref 135–145)
SPECIFIC GRAVITY UA: 1.02 (ref 1–1.03)
TOTAL IMMATURE NEUTOROPHIL: 0.02 K/CU MM
TOTAL NUCLEATED RBC: 0 K/CU MM
TOTAL PROTEIN: 6 GM/DL (ref 6.4–8.2)
UROBILINOGEN, URINE: 0.2 MG/DL (ref 0.2–1)
WBC # BLD: 6.3 K/CU MM (ref 4–10.5)

## 2022-10-25 PROCEDURE — 99285 EMERGENCY DEPT VISIT HI MDM: CPT

## 2022-10-25 PROCEDURE — 80053 COMPREHEN METABOLIC PANEL: CPT

## 2022-10-25 PROCEDURE — 6360000002 HC RX W HCPCS: Performed by: EMERGENCY MEDICINE

## 2022-10-25 PROCEDURE — 87040 BLOOD CULTURE FOR BACTERIA: CPT

## 2022-10-25 PROCEDURE — 74177 CT ABD & PELVIS W/CONTRAST: CPT

## 2022-10-25 PROCEDURE — 2580000003 HC RX 258: Performed by: EMERGENCY MEDICINE

## 2022-10-25 PROCEDURE — 81003 URINALYSIS AUTO W/O SCOPE: CPT

## 2022-10-25 PROCEDURE — 85025 COMPLETE CBC W/AUTO DIFF WBC: CPT

## 2022-10-25 PROCEDURE — 6360000004 HC RX CONTRAST MEDICATION: Performed by: EMERGENCY MEDICINE

## 2022-10-25 PROCEDURE — 96374 THER/PROPH/DIAG INJ IV PUSH: CPT

## 2022-10-25 PROCEDURE — 83690 ASSAY OF LIPASE: CPT

## 2022-10-25 RX ORDER — SODIUM CHLORIDE 0.9 % (FLUSH) 0.9 %
5-40 SYRINGE (ML) INJECTION 2 TIMES DAILY
Status: DISCONTINUED | OUTPATIENT
Start: 2022-10-25 | End: 2022-10-26 | Stop reason: HOSPADM

## 2022-10-25 RX ORDER — 0.9 % SODIUM CHLORIDE 0.9 %
1000 INTRAVENOUS SOLUTION INTRAVENOUS ONCE
Status: COMPLETED | OUTPATIENT
Start: 2022-10-25 | End: 2022-10-26

## 2022-10-25 RX ORDER — FENTANYL CITRATE 50 UG/ML
100 INJECTION, SOLUTION INTRAMUSCULAR; INTRAVENOUS ONCE
Status: COMPLETED | OUTPATIENT
Start: 2022-10-25 | End: 2022-10-25

## 2022-10-25 RX ADMIN — SODIUM CHLORIDE 1000 ML: 9 INJECTION, SOLUTION INTRAVENOUS at 20:34

## 2022-10-25 RX ADMIN — FENTANYL CITRATE 100 MCG: 50 INJECTION, SOLUTION INTRAMUSCULAR; INTRAVENOUS at 20:36

## 2022-10-25 RX ADMIN — IOPAMIDOL 75 ML: 755 INJECTION, SOLUTION INTRAVENOUS at 22:04

## 2022-10-25 ASSESSMENT — PAIN DESCRIPTION - DESCRIPTORS: DESCRIPTORS: SHARP;SHOOTING

## 2022-10-25 ASSESSMENT — PAIN DESCRIPTION - FREQUENCY: FREQUENCY: CONTINUOUS

## 2022-10-25 ASSESSMENT — PAIN DESCRIPTION - PAIN TYPE: TYPE: ACUTE PAIN

## 2022-10-25 ASSESSMENT — PAIN DESCRIPTION - LOCATION: LOCATION: BACK;FLANK

## 2022-10-25 ASSESSMENT — PAIN SCALES - GENERAL
PAINLEVEL_OUTOF10: 8
PAINLEVEL_OUTOF10: 4

## 2022-10-26 RX ORDER — DICYCLOMINE HCL 20 MG
20 TABLET ORAL 4 TIMES DAILY PRN
Qty: 20 TABLET | Refills: 0 | Status: SHIPPED | OUTPATIENT
Start: 2022-10-26

## 2022-10-26 NOTE — ED PROVIDER NOTES
[facility] ENCOUNTER      Pt Name: Kendra Prather  MRN: [id number]  [place] 1960  Date of evaluation: 10/25/2022  Provider: Iline Nyhan, MD    CHIEF COMPLAINT       Chief Complaint   Patient presents with    Fever    Back Pain    Abdominal Pain     Lower          HISTORY OF PRESENT ILLNESS    HPI    Nursing Notes were reviewed. Kendra Prather is a 65 y.o. male who presents to the emergency department with bilateral upper flank pain, subjective fever and abdominal pain. This is a 59-year-old man who comes the emergency department due to worsening symptoms of cystitis/urinary tract infection. The patient says he was diagnosed with a urinary tract infection did abdominal pain and a positive urinalysis by his primary care physician. He has been placed on oral ciprofloxacin. Today, the patient says he is having worsening symptoms. He describes suprapubic worsening abdominal pain. He describes dysuria. Hesitancy. He describes bilateral flank pain radiating to the anterior abdomen. He describes subjective fevers. He describes nausea but no vomiting. Patient also describes watery diarrhea/loose stools. He denies headache. He denies neck pain. He denies chest pain or difficulty breathing. REVIEW OF SYSTEMS       Review of Systems    10 Systems were reviewed with this patient and all were negative with exception of those noted in the history of present illness above.       PAST MEDICAL HISTORY     Past Medical History:   Diagnosis Date    Arthritis     Hands    Chronic cluster headache 6/7/2019 last    [facility]     \"dx 2 yrs ago\" \\"does not see a lung dr Jeremy Martínez with PCP    Degenerative disc disease     \"in my back have degenarative disc\"    Depression     Edentulous     Fracture     \"fell over a 5 gallon bucket and landed on cement block and broke my right hand\" (7/6/2015)    GERD with esophagitis     H/O dizziness     States will feel like he is going to pass out, possible low BS    Hiatal hernia     Hx of gastric ulcer     HX OTHER MEDICAL     pt states has trouble with reading and writing\"can read very very little\"    Hyperlipidemia     Hypertension     No meds as of 5/27/21 - follows with PCP    Impingement syndrome of right shoulder     Left shoulder pain     limited range-of-motion    Lower back pain     Motion sickness     Neck pain     more on left side into shoulder--pain limits ROM    PONV (postoperative nausea and vomiting)     Vertigo          SURGICAL HISTORY       Past Surgical History:   Procedure Laterality Date    ARM SURGERY Left 30+ yrs    \"put plastic ligaments in \"  after injury through glass window    CARPAL TUNNEL RELEASE Right 10/4/2019    RIGHT CARPAL TUNNEL RELEASE performed by Lovely Ellis DO at [facility] Left 4/12/2022    LEFT CARPAL TUNNEL RELEASE performed by Lovely Ellis DO at [address] [place] [address]    COLONOSCOPY  2018    HX: polyps - Dr. Yovany Najera    COLONOSCOPY N/A 12/30/2019    COLONOSCOPY DIAGNOSTIC performed by Rubia Bergeron MD at [facility] ENDOSCOPY    COLONOSCOPY N/A 12/16/2021    COLONOSCOPY POLYPECTOMY SNARE/COLD BIOPSY performed by Tru Livingston MD at [address] [place] [address]      all teeth extracted    ENDOSCOPY, COLON, DIAGNOSTIC  04/08/2019    EGD - thrush noted through out    ENDOSCOPY, COLON, DIAGNOSTIC  06/01/2021    dr winters:multiple gastric ulcers, intact fundiplication with tightening, dil 18-20, biopsy r/o h pylori, f/u as scheduled    EYE SURGERY Left 1990's    \"metal removed from eye\"    GASTRIC FUNDOPLICATION N/A 40/74/6914    NISSEN FUNDOPLICATION LAPAROSCOPIC ROBOTIC HIATAL HERNIA performed by Rubia Bergeron MD at [address] [place] [address]      abdominal    UPPER GASTROINTESTINAL ENDOSCOPY  12/2018    UPPER GASTROINTESTINAL ENDOSCOPY N/A 1/28/2019    EGD BIOPSY / BRUSHING OF ESOPHAGUS performed by Jad Powell MD at [address] [place] [address] N/A 4/8/2019    EGD BIOPSY AND BRUSHING performed by Jad Powell MD at [address] [place] [address] N/A 7/8/2019    EGD BIOPSY and brushing performed by Jad Powell MD at [address] [place] [address] N/A 12/30/2019    EGD BIOPSY/BRUSHING OF ESOPHAGUS performed by Jad Powell MD at [address] [place] [address] 6/5/2020    EGD BIOPSY performed by Jad Powell MD at [address] [place] [address] 6/1/2021    EGD DILATION BALLOON WITH 18-20 BALLOON UP TO 20 WITH BIOPSIES performed by Jad Powell MD at [address] [place] [address] 6/29/2021    EGD DILATION SAVORY Guevara Dilators used 55, 50. with biopsies performed by Jad Powell MD at [address] [place] [address] N/A 12/16/2021    EGD BIOPSY performed by Phoenix Sanford MD at [address] [place] [address]       Previous Medications    ACETAMINOPHEN (TYLENOL) 500 MG TABLET    Take 1,000 mg by mouth every 6 hours as needed for Pain    ALBUTEROL SULFATE HFA (PROVENTIL HFA) 108 (90 BASE) MCG/ACT INHALER    Inhale 2 puffs into the lungs every 6 hours as needed for Wheezing    ALBUTEROL SULFATE  (90 BASE) MCG/ACT INHALER    Inhale 2 puffs into the lungs every 6 hours as needed    ATORVASTATIN (LIPITOR) 20 MG TABLET        CETIRIZINE (ZYRTEC) 10 MG TABLET    1 tablet daily     FLUTICASONE (FLONASE) 50 MCG/ACT NASAL SPRAY    as needed    GLUCOSE 4 G CHEWABLE TABLET    CHEW AND SWALLOW 1 TABLET BY MOUTH AS NEEDED for hypoglycemia episodes    HYDROCORTISONE (ANUSOL-HC) 2.5 % CREA RECTAL CREAM    APPLY TO HEMMORROIDS topically UP TO four TIMES PER DAY AS NEEDED FOR PAIN OR BURNING    IBUPROFEN (ADVIL;MOTRIN) 600 MG TABLET    TAKE 1 TABLET BY MOUTH THREE TIMES A DAY AS NEEDED with food    IPRATROPIUM-ALBUTEROL (DUONEB) 0.5-2.5 (3) MG/3ML SOLN NEBULIZER SOLUTION    as needed     METOCLOPRAMIDE (REGLAN) 10 MG TABLET    Take 1 tablet by mouth 3 times daily (with meals)    NEBULIZERS (MICRO AIR NEBULIZER) MISC    TO USE AS NEEDED WITH NEBULIZER SOLUTION    NUTRITIONAL SUPPLEMENTS (ENSURE HIGH PROTEIN) LIQD    Take 1 Can by mouth 3 times daily Different flavors if possible    NYSTATIN (MYCOSTATIN) 582773 UNIT/GM POWDER    Apply 3 times daily. NYSTATIN (MYCOSTATIN) 211060 UNIT/ML SUSPENSION    TAKE 5 MLS BY MOUTH FOUR TIMES DAILY FOR 10 DAYS    OMEPRAZOLE (PRILOSEC) 20 MG DELAYED RELEASE CAPSULE    Take 1 capsule by mouth 2 times daily    ONDANSETRON (ZOFRAN ODT) 4 MG DISINTEGRATING TABLET    Place 1 tablet under the tongue every 8 hours as needed for Nausea or Vomiting    TIOTROPIUM (SPIRIVA HANDIHALER) 18 MCG INHALATION CAPSULE    Inhale 1 capsule into the lungs daily    TIZANIDINE (ZANAFLEX) 2 MG TABLET    TAKE 1 TABLET BY MOUTH EVERY EIGHT HOURS AS NEEDED.  NOT TO EXCEED 3 DOSES IN 24 HOURS       ALLERGIES     Bee venom and Nsaids    FAMILY HISTORY       Family History   Problem Relation Age of Onset    Dementia Mother     Stroke Mother     Stroke Father     High Blood Pressure Father     Heart Disease Father     Breast Cancer Sister     Diabetes Brother     Cancer Brother         mouth and throat    Heart Attack Maternal Uncle     Heart Disease Maternal Uncle     No Known Problems Son     No Known Problems Son     No Known Problems Daughter           SOCIAL HISTORY       Social History     Socioeconomic History    Marital status:    Tobacco Use    Smoking status: Every Day     Packs/day: 0.50     Years: 50.00     Pack years: 25.00     Types: Cigarettes     Start date: 1972    Smokeless tobacco: Never    Tobacco comments:     use to smoke 2.5 PPD   Vaping Use    Vaping Use: Never used   Substance and Sexual Activity    Alcohol use: Yes     Comment: Occasional 1/month Drug use: Never     Comment: 3 cups of coffee daily    Sexual activity: Not Currently     Partners: Female       SCREENINGS                               CIWA Assessment  BP: (!) 155/114  Heart Rate: 64                 PHYSICAL EXAM       ED Triage Vitals [10/25/22 1653]   BP Temp Temp Source Heart Rate Resp SpO2 Height Weight   132/86 97.6 °F (36.4 °C) Oral 73 15 97 % 5' 9\" (1.753 m) 140 lb (63.5 kg)       Physical Exam  Vitals and nursing note reviewed. Constitutional:       General: He is not in acute distress. Appearance: He is well-developed. He is not ill-appearing, toxic-appearing or diaphoretic. HENT:      Head: Normocephalic and atraumatic. Eyes:      Extraocular Movements: Extraocular movements intact. Pupils: Pupils are equal, round, and reactive to light. Cardiovascular:      Rate and Rhythm: Normal rate and regular rhythm. Heart sounds: Normal heart sounds. Pulmonary:      Effort: Pulmonary effort is normal.      Breath sounds: Normal breath sounds. Abdominal:      Palpations: Abdomen is soft. Tenderness: There is abdominal tenderness in the right lower quadrant, periumbilical area, suprapubic area and left lower quadrant. There is right CVA tenderness and left CVA tenderness. There is no guarding or rebound. Skin:     General: Skin is warm and dry. Capillary Refill: Capillary refill takes less than 2 seconds. Neurological:      General: No focal deficit present. Mental Status: He is alert and oriented to person, place, and time.    Psychiatric:         Mood and Affect: Mood normal.         Behavior: Behavior normal.       DIAGNOSTIC RESULTS     RADIOLOGY:   Non-plain film images such as CT, Ultrasound and MRI are read by the radiologist. Plain radiographic images are visualized and preliminarily interpreted by the emergency physician with the below findings:    Interpretation per the Radiologist below, if available at the time of this note:    CT ABDOMEN PELVIS W IV CONTRAST Additional Contrast? None    (Results Pending)       LABS:  Labs Reviewed   CULTURE, BLOOD 1   CULTURE, BLOOD 1   CBC WITH AUTO DIFFERENTIAL   COMPREHENSIVE METABOLIC PANEL   URINALYSIS       All other labs were within normal range or not returned as of this dictation. EMERGENCY DEPARTMENT COURSE and DIFFERENTIAL DIAGNOSIS/MDM:   Vitals:    Vitals:    10/25/22 1653 10/25/22 2007   BP: 132/86 (!) 155/114   Pulse: 73 64   Resp: 15 18   Temp: 97.6 °F (36.4 °C)    TempSrc: Oral    SpO2: 97%    Weight: 140 lb (63.5 kg)    Height: 5' 9\" (1.753 m)        MDM  Patient has signs and symptoms concerning for pyelonephritis. He is currently receiving ongoing treatment for urinary tract infection and has bilateral flank pain. Possibility of sepsis does need to be considered, however the patient is currently afebrile and not tachycardic. Will be evaluated with laboratory evaluation as well as CT imaging to evaluate for intra-abdominal and nephric pathology. REASSESSMENT      At the time of this dictation, laboratory evaluation and CT imaging are still pending. The patient will be signed out to Dr. Sergio Kebede  for final disposition. Please see his documentation for the final impression and plan on this patient. CONSULTS:  None    PROCEDURES:  Unless otherwise noted below, none     Procedures      FINAL IMPRESSION      1. Flank pain, acute          DISPOSITION/PLAN   DISPOSITION        PATIENT REFERRED TO:  No follow-up provider specified. DISCHARGE MEDICATIONS:  New Prescriptions    No medications on file     Controlled Substances Monitoring:     RX Monitoring 3/27/2019   Attestation -   Acute Pain Prescriptions Not required given exclusionary diagnoses. ..    Periodic Controlled Substance Monitoring -       Wilder Larkin MD (electronically signed)  Attending Emergency Physician            Wilder Larkin MD  10/25/22 0617

## 2022-10-27 NOTE — ED PROVIDER NOTES
CARE RECEIVED FROM: Dr. Jose Beyer  I reviewed the melgoza elements of the history, physical exam and initial treatment plan at the bedside. ANCILLARY DATA:  I reviewed the images. Radiologist interpretation:   CT ABDOMEN PELVIS W IV CONTRAST Additional Contrast? None   Final Result   Moderate stool load particularly in the rectal vault consistent with   constipation. No other acute finding in the abdomen or pelvis. Stable right lower lobe pulmonary nodules measuring up to 5 mm. The greater   than 2 year stability is consistent with a benign etiology. Labs Reviewed   CBC WITH AUTO DIFFERENTIAL - Abnormal; Notable for the following components:       Result Value    RBC 4.34 (*)     Hemoglobin 12.7 (*)     Hematocrit 38.0 (*)     Monocytes % 7.4 (*)     Eosinophils % 3.6 (*)     All other components within normal limits   COMPREHENSIVE METABOLIC PANEL - Abnormal; Notable for the following components:    Creatinine 0.7 (*)     Total Protein 6.0 (*)     ALT 9 (*)     All other components within normal limits   URINALYSIS - Abnormal; Notable for the following components:    Color, UA YELLOW (*)     All other components within normal limits   CULTURE, BLOOD 1    Narrative:     SETUP DATE/TIME:  10/25/2022 2105   CULTURE, BLOOD 1   LIPASE     MEDICAL DECISION MAKING / PLAN:  This is a 61-year-old male that presented to the emergency department complaints of bilateral flank pain that radiated to bilateral upper quadrants of the abdomen. He also was experiencing pain to his lower abdomen as well. Previous physician noted that patient had been placed on ciprofloxacin by primary care provider for UTI and there was concern for possible failed antibiotic treatment for UTI as well as possible pyelonephritis or renal stones. At time of signout the patient had urinalysis and CT imaging pending. His CBC was without leukocytosis and CMP was reassuring.     In speaking the patient more at bedside it seems that his urinary tract infection was actually 4 months ago and he completed ciprofloxacin therapy at that time and has been without urinary symptoms. His CT imaging here demonstrates moderate stool in the rectal vault but otherwise is without acute intra-abdominal pathology. His urinalysis is without evidence of infection. I did add lipase onto his laboratory studies which is nonelevated. At this time I feel patient is appropriate discharge home given his overall reassuring evaluation here. A prescription for Bentyl was sent to pharmacy and patient was instructed to follow-up with gastroenterology. Return precautions provided. FINAL IMPRESSION:  1. Flank pain, acute    2. Lower abdominal pain      ? Electronically signed by:  [address], DO, 10/26/2022        [address], DO  10/26/22 8090

## 2022-10-30 LAB
CULTURE: NORMAL
CULTURE: NORMAL
Lab: NORMAL
Lab: NORMAL
SPECIMEN: NORMAL
SPECIMEN: NORMAL

## 2022-10-31 NOTE — PROGRESS NOTES
Pharmacy Note  ED Culture Follow-up    Rupert Soliz is a 65 y.o. male. Allergies: Bee venom and Nsaids     Current antimicrobials:   Reviewed patient's blood culture - culture is negative. Patient was appropriately discharged on no antimicrobial therapy. No further action needed. Please call with any questions.  Zion Toth [facility], PharmD 9:14 AM 10/31/2022

## 2022-11-16 ENCOUNTER — HOSPITAL ENCOUNTER (OUTPATIENT)
Age: 62
Discharge: HOME | End: 2022-11-16
Payer: MEDICAID

## 2022-11-16 ENCOUNTER — HOSPITAL ENCOUNTER (OUTPATIENT)
Dept: ULTRASOUND IMAGING | Age: 62
Discharge: HOME | End: 2022-11-16
Payer: MEDICAID

## 2022-11-16 ENCOUNTER — OFFICE VISIT (OUTPATIENT)
Dept: SURGERY | Age: 62
End: 2022-11-16
Payer: MEDICAID

## 2022-11-16 VITALS
HEIGHT: 69 IN | SYSTOLIC BLOOD PRESSURE: 134 MMHG | WEIGHT: 147.9 LBS | BODY MASS INDEX: 21.91 KG/M2 | DIASTOLIC BLOOD PRESSURE: 86 MMHG | HEART RATE: 86 BPM

## 2022-11-16 DIAGNOSIS — R33.9 URINARY RETENTION: Primary | ICD-10-CM

## 2022-11-16 DIAGNOSIS — R33.9 URINARY RETENTION: ICD-10-CM

## 2022-11-16 LAB
ANION GAP SERPL CALCULATED.3IONS-SCNC: 9 MMOL/L (ref 4–16)
BASOPHILS ABSOLUTE: 0.1 K/CU MM
BASOPHILS RELATIVE PERCENT: 0.7 % (ref 0–1)
BUN BLDV-MCNC: 14 MG/DL (ref 6–23)
CALCIUM SERPL-MCNC: 8.9 MG/DL (ref 8.3–10.6)
CHLORIDE BLD-SCNC: 105 MMOL/L (ref 99–110)
CO2: 27 MMOL/L (ref 21–32)
CREAT SERPL-MCNC: 0.7 MG/DL (ref 0.9–1.3)
DIFFERENTIAL TYPE: ABNORMAL
EOSINOPHILS ABSOLUTE: 0.2 K/CU MM
EOSINOPHILS RELATIVE PERCENT: 2 % (ref 0–3)
GFR SERPL CREATININE-BSD FRML MDRD: >60 ML/MIN/1.73M2
GLUCOSE BLD-MCNC: 81 MG/DL (ref 70–99)
HCT VFR BLD CALC: 39.3 % (ref 42–52)
HEMOGLOBIN: 12.9 GM/DL (ref 13.5–18)
IMMATURE NEUTROPHIL %: 0.4 % (ref 0–0.43)
LYMPHOCYTES ABSOLUTE: 2.6 K/CU MM
LYMPHOCYTES RELATIVE PERCENT: 34.2 % (ref 24–44)
MCH RBC QN AUTO: 29.1 PG (ref 27–31)
MCHC RBC AUTO-ENTMCNC: 32.8 % (ref 32–36)
MCV RBC AUTO: 88.7 FL (ref 78–100)
MONOCYTES ABSOLUTE: 0.5 K/CU MM
MONOCYTES RELATIVE PERCENT: 6.5 % (ref 0–4)
NUCLEATED RBC %: 0 %
PDW BLD-RTO: 13.5 % (ref 11.7–14.9)
PLATELET # BLD: 292 K/CU MM (ref 140–440)
PMV BLD AUTO: 10.3 FL (ref 7.5–11.1)
POTASSIUM SERPL-SCNC: 4.5 MMOL/L (ref 3.5–5.1)
RBC # BLD: 4.43 M/CU MM (ref 4.6–6.2)
SEGMENTED NEUTROPHILS ABSOLUTE COUNT: 4.2 K/CU MM
SEGMENTED NEUTROPHILS RELATIVE PERCENT: 56.2 % (ref 36–66)
SODIUM BLD-SCNC: 141 MMOL/L (ref 135–145)
TOTAL IMMATURE NEUTOROPHIL: 0.03 K/CU MM
TOTAL NUCLEATED RBC: 0 K/CU MM
WBC # BLD: 7.5 K/CU MM (ref 4–10.5)

## 2022-11-16 PROCEDURE — 3074F SYST BP LT 130 MM HG: CPT | Performed by: SURGERY

## 2022-11-16 PROCEDURE — 3078F DIAST BP <80 MM HG: CPT | Performed by: SURGERY

## 2022-11-16 PROCEDURE — 4004F PT TOBACCO SCREEN RCVD TLK: CPT | Performed by: SURGERY

## 2022-11-16 PROCEDURE — G8428 CUR MEDS NOT DOCUMENT: HCPCS | Performed by: SURGERY

## 2022-11-16 PROCEDURE — 87088 URINE BACTERIA CULTURE: CPT

## 2022-11-16 PROCEDURE — 76857 US EXAM PELVIC LIMITED: CPT

## 2022-11-16 PROCEDURE — 36415 COLL VENOUS BLD VENIPUNCTURE: CPT

## 2022-11-16 PROCEDURE — 99213 OFFICE O/P EST LOW 20 MIN: CPT | Performed by: SURGERY

## 2022-11-16 PROCEDURE — G8484 FLU IMMUNIZE NO ADMIN: HCPCS | Performed by: SURGERY

## 2022-11-16 PROCEDURE — G8420 CALC BMI NORM PARAMETERS: HCPCS | Performed by: SURGERY

## 2022-11-16 PROCEDURE — 85025 COMPLETE CBC W/AUTO DIFF WBC: CPT

## 2022-11-16 PROCEDURE — 87186 SC STD MICRODIL/AGAR DIL: CPT

## 2022-11-16 PROCEDURE — 80048 BASIC METABOLIC PNL TOTAL CA: CPT

## 2022-11-16 PROCEDURE — 3017F COLORECTAL CA SCREEN DOC REV: CPT | Performed by: SURGERY

## 2022-11-16 PROCEDURE — 87086 URINE CULTURE/COLONY COUNT: CPT

## 2022-11-16 RX ORDER — TAMSULOSIN HYDROCHLORIDE 0.4 MG/1
0.4 CAPSULE ORAL DAILY
Qty: 30 CAPSULE | Refills: 0 | Status: SHIPPED | OUTPATIENT
Start: 2022-11-16

## 2022-11-16 RX ORDER — POLYETHYLENE GLYCOL 3350 17 G/17G
17 POWDER, FOR SOLUTION ORAL DAILY
Qty: 10 EACH | Refills: 3 | Status: SHIPPED | OUTPATIENT
Start: 2022-11-16 | End: 2022-12-16

## 2022-11-16 NOTE — PROGRESS NOTES
Chief Complaint   Patient presents with    Follow-up     Hospital F/U Flank Pain & Lower ABD Pain, ER 10/25/22         SUBJECTIVE:  HPI: Patient is here with complaints of difficulty urinating. He reports lower abdominal pain and fullness. He was recently seen in the ER and told to follow up with GI for his abdominal symptoms. He reports daily BM but his CT on 10/25 showed constipation. He denies blood in the urine or stool. He denies ever seeing Urology. He does not take flomax. He reports only being able to pee small amounts when he urinates and reports strong odor. UA on 10/25 was normal.      I have reviewed the patient's(pertinent information to this visit) medical history, family history(scanned in  the [address] [place] [address] under \"patient questioner\"), social history and review of systems with the patient today in the office.             Past Surgical History:   Procedure Laterality Date    ARM SURGERY Left 30+ yrs    \"put plastic ligaments in \"  after injury through glass window    CARPAL TUNNEL RELEASE Right 10/4/2019    RIGHT CARPAL TUNNEL RELEASE performed by Chapis Flores DO at [address] [facility] Left 4/12/2022    LEFT CARPAL TUNNEL RELEASE performed by Chapis Flores DO at [facility]  2010    COLONOSCOPY  2018    HX: polyps - Dr. Sima Cortez N/A 12/30/2019    COLONOSCOPY DIAGNOSTIC performed by Freya Almodovar MD at [facility] ENDOSCOPY    COLONOSCOPY N/A 12/16/2021    COLONOSCOPY POLYPECTOMY SNARE/COLD BIOPSY performed by Katie Vila MD at [address] [place] [address]      all teeth extracted    ENDOSCOPY, COLON, DIAGNOSTIC  04/08/2019    EGD - thrush noted through out    ENDOSCOPY, COLON, DIAGNOSTIC  06/01/2021    dr winters:multiple gastric ulcers, intact fundiplication with tightening, dil 18-20, biopsy r/o h pylori, f/u as scheduled    EYE SURGERY Left 1990's    \"metal removed from eye\"    GASTRIC FUNDOPLICATION N/A 51/03/1545    NISSEN FUNDOPLICATION LAPAROSCOPIC ROBOTIC HIATAL HERNIA performed by Beth Gant MD at [address] [place] [address]      abdominal    UPPER GASTROINTESTINAL ENDOSCOPY  12/2018    UPPER GASTROINTESTINAL ENDOSCOPY N/A 1/28/2019    EGD BIOPSY / BRUSHING OF ESOPHAGUS performed by Beth Gant MD at [place] [address] N/A 4/8/2019    EGD BIOPSY AND BRUSHING performed by Beth Gant MD at [place] [address] 7/8/2019    EGD BIOPSY and brushing performed by Beth Gant MD at [place] [address] N/A 12/30/2019    EGD BIOPSY/BRUSHING OF ESOPHAGUS performed by Beth Gant MD at [place] [address] N/ 6/5/2020    EGD BIOPSY performed by Beth Gant MD at [place] [address] N/A 6/1/2021    EGD DILATION BALLOON WITH 18-20 BALLOON UP TO 21 WITH BIOPSIES performed by Beth Gant MD at [place] [address] 6/29/2021    EGD DILATION GWENDOLYN French used 55, 50. with biopsies performed by Beth Gant MD at [place] [address] N/A 12/16/2021    EGD BIOPSY performed by Peyton Pool MD at [facility]     Past Medical History:   Diagnosis Date    Arthritis     Hands    Chronic cluster headache 6/7/2019 last    COPD (chronic obstructive pulmonary disease) ([facility] [address]     \"dx 2 yrs ago\" \\"does not see a lung dr Igro Ardon with PCP    Degenerative disc disease     \"in my back have degenarative disc\"    Depression     Edentulous     Fracture     \"fell over a 5 gallon bucket and landed on cement block and broke my right hand\" (7/6/2015)    GERD with esophagitis     H/O dizziness     States will feel like he is going to pass out, possible low BS    Hiatal hernia     Hx of gastric ulcer     HX OTHER MEDICAL     pt states has trouble with reading and writing\"can read very very little\" Hyperlipidemia     Hypertension     No meds as of 5/27/21 - follows with PCP    Impingement syndrome of right shoulder     Left shoulder pain     limited range-of-motion    Lower back pain     Motion sickness     Neck pain     more on left side into shoulder--pain limits ROM    PONV (postoperative nausea and vomiting)     Vertigo      Family History   Problem Relation Age of Onset    Dementia Mother     Stroke Mother     Stroke Father     High Blood Pressure Father     Heart Disease Father     Breast Cancer Sister     Diabetes Brother     Cancer Brother         mouth and throat    Heart Attack Maternal Uncle     Heart Disease Maternal Uncle     No Known Problems Son     No Known Problems Son     No Known Problems Daughter      Social History     Socioeconomic History    Marital status:      Spouse name: Not on file    Number of children: Not on file    Years of education: Not on file    Highest education level: Not on file   Occupational History    Not on file   Tobacco Use    Smoking status: Every Day     Packs/day: 0.50     Years: 50.00     Pack years: 25.00     Types: Cigarettes     Start date: 0    Smokeless tobacco: Never    Tobacco comments:     use to smoke 2.5 PPD   Vaping Use    Vaping Use: Never used   Substance and Sexual Activity    Alcohol use: Yes     Comment: Occasional 1/month    Drug use: Never     Comment: 3 cups of coffee daily    Sexual activity: Not Currently     Partners: Female   Other Topics Concern    Not on file   Social History Narrative    Not on file     Social Determinants of Health     Financial Resource Strain: Not on file   Food Insecurity: Not on file   Transportation Needs: Not on file   Physical Activity: Not on file   Stress: Not on file   Social Connections: Not on file   Intimate Partner Violence: Not on file   Housing Stability: Not on file       Current Outpatient Medications   Medication Sig Dispense Refill    polyethylene glycol (GLYCOLAX) 17 GM/SCOOP powder Take 17 g by mouth daily 10 each 3    tamsulosin (FLOMAX) 0.4 MG capsule Take 1 capsule by mouth daily 30 capsule 0    Psyllium 48.57 % POWD Take 1 Dose by mouth every morning 1 each 3    dicyclomine (BENTYL) 20 MG tablet Take 1 tablet by mouth 4 times daily as needed (Abdominal pain, spasm) 20 tablet 0    hydrocortisone (ANUSOL-HC) 2.5 % CREA rectal cream APPLY TO HEMMORROIDS topically UP TO four TIMES PER DAY AS NEEDED FOR PAIN OR BURNING 30 g 0    nystatin (MYCOSTATIN) 926393 UNIT/ML suspension TAKE 5 MLS BY MOUTH FOUR TIMES DAILY FOR 10 DAYS 200 mL 0    metoclopramide (REGLAN) 10 MG tablet Take 1 tablet by mouth 3 times daily (with meals) 120 tablet 3    omeprazole (PRILOSEC) 20 MG delayed release capsule Take 1 capsule by mouth 2 times daily 60 capsule 3    ibuprofen (ADVIL;MOTRIN) 600 MG tablet TAKE 1 TABLET BY MOUTH THREE TIMES A DAY AS NEEDED with food      tiZANidine (ZANAFLEX) 2 MG tablet TAKE 1 TABLET BY MOUTH EVERY EIGHT HOURS AS NEEDED. NOT TO EXCEED 3 DOSES IN 24 HOURS      glucose 4 g chewable tablet CHEW AND SWALLOW 1 TABLET BY MOUTH AS NEEDED for hypoglycemia episodes      nystatin (MYCOSTATIN) 534514 UNIT/GM powder Apply 3 times daily.  1 Bottle 3    atorvastatin (LIPITOR) 20 MG tablet       cetirizine (ZYRTEC) 10 MG tablet 1 tablet daily       fluticasone (FLONASE) 50 MCG/ACT nasal spray as needed      albuterol sulfate  (90 Base) MCG/ACT inhaler Inhale 2 puffs into the lungs every 6 hours as needed      Nutritional Supplements (ENSURE HIGH PROTEIN) LIQD Take 1 Can by mouth 3 times daily Different flavors if possible 30 Can 5    ondansetron (ZOFRAN ODT) 4 MG disintegrating tablet Place 1 tablet under the tongue every 8 hours as needed for Nausea or Vomiting 30 tablet 3    ipratropium-albuterol (DUONEB) 0.5-2.5 (3) MG/3ML SOLN nebulizer solution as needed       Nebulizers (MICRO AIR NEBULIZER) MISC TO USE AS NEEDED WITH NEBULIZER SOLUTION  11    acetaminophen (TYLENOL) 500 MG tablet Take 1,000 mg by mouth every 6 hours as needed for Pain      tiotropium (SPIRIVA HANDIHALER) 18 MCG inhalation capsule Inhale 1 capsule into the lungs daily 30 capsule 3    albuterol sulfate HFA (PROVENTIL HFA) 108 (90 BASE) MCG/ACT inhaler Inhale 2 puffs into the lungs every 6 hours as needed for Wheezing 1 Inhaler 3     No current facility-administered medications for this visit. Allergies   Allergen Reactions    Bee Venom Anaphylaxis     \"swell up like a balloon in face/throat\"      Nsaids Other (See Comments)     Can take small doses for a short time - Not to take any longer than necessary stomach ulcer       Review of Systems:       Review of Systems   Constitutional: Negative for chills. Negative for fever. HENT: Negative for congestion. Negative for rhinorrhea. Respiratory: Negative for cough. Negative for shortness of breath. Negative for wheezing. Cardiovascular: Negative for chest pain. Gastrointestinal: as per HPI  Genitourinary: as per HPI  Neurological: Negative for dizziness, syncope and numbness. Hematological: Does not bruise/bleed easily. OBJECTIVE:  Physical Exam:    /86 (Site: Left Upper Arm, Position: Sitting, Cuff Size: Medium Adult)   Pulse 86   Ht 5' 9\" (1.753 m)   Wt 147 lb 14.4 oz (67.1 kg)   BMI 21.84 kg/m²      Physical Exam  General: awake, alert, in no acute distress  HEENT: mucous membranes moist  Respiratory: normal effort, no wheezes appreciated  CV: appears well perfused, regular rate and rhythm    Abdomen: Soft, tender diffusely but worse in the lower abdomen, non-distended. No guarding or rebound tenderness. Skin: warm and dry  Extremities: atraumatic  Neuro: no focal deficits noted  Psych: mood normal        ASSESSMENT:  1. Urinary retention    65 y.o. male with lower abdominal pain. He reports symptoms of urinary retention. Recent workup in the ER with CT/US and UA were negative for any alarming symptoms.   CT showed constipation but he has been having regular stools. PLAN:  Treatment:    - will give flomax and repeat UA/CBC/BMP  - US bladder to rule out urinary retention. - recommend Urology evaluation given ongoing urinary complaints        Patient counseled on risks, benefits, and alternatives of treatment plan at length today. Patient states an understanding and willingness to proceed with plan.     Orders Placed This Encounter   Procedures    Urinalysis with Reflex to Culture    Basic Metabolic Panel    CBC with Auto Differential        Orders Placed This Encounter   Medications    polyethylene glycol (GLYCOLAX) 17 GM/SCOOP powder     Sig: Take 17 g by mouth daily     Dispense:  10 each     Refill:  3    tamsulosin (FLOMAX) 0.4 MG capsule     Sig: Take 1 capsule by mouth daily     Dispense:  30 capsule     Refill:  0    Psyllium 48.57 % POWD     Sig: Take 1 Dose by mouth every morning     Dispense:  1 each     Refill:  3            P[place]enix Sanford MD

## 2022-11-17 ENCOUNTER — TELEPHONE (OUTPATIENT)
Dept: SURGERY | Age: 62
End: 2022-11-17

## 2022-11-17 NOTE — TELEPHONE ENCOUNTER
I called Ga Elizabeth and let him know the results of his bladder US and labwork. Labwork was mostly unremarkable.   Bladder scan showed no significant post void residual.  Some fluid was seen in his rectum--he may still have a component of constipation.    - flomax was ordered, US did show some prostate hypertrophy  - I recommended to Ga Elizabeth that he follow up with his PCP and he may benefit from Urology referral if symptoms persist    Electronically signed by Katie Vila MD on 11/17/2022 at 1:13 PM

## 2022-11-18 DIAGNOSIS — N39.0 E-COLI UTI: Primary | ICD-10-CM

## 2022-11-18 DIAGNOSIS — B96.20 E-COLI UTI: Primary | ICD-10-CM

## 2022-11-18 LAB
CULTURE: ABNORMAL
CULTURE: ABNORMAL
Lab: ABNORMAL
SPECIMEN: ABNORMAL

## 2022-11-18 RX ORDER — NITROFURANTOIN 25; 75 MG/1; MG/1
100 CAPSULE ORAL 2 TIMES DAILY
Qty: 20 CAPSULE | Refills: 0 | Status: SHIPPED | OUTPATIENT
Start: 2022-11-18 | End: 2022-11-28

## 2022-11-18 NOTE — PROGRESS NOTES
Urine culture came back with Jonathon. I talked to Charlie Garcia and called in a prescription for Marcobid to his pharmacy.     Electronically signed by Laine Kramer MD on 11/18/2022 at 11:11 AM

## 2022-12-02 ENCOUNTER — OFFICE VISIT (OUTPATIENT)
Dept: GASTROENTEROLOGY | Age: 62
End: 2022-12-02
Payer: MEDICAID

## 2022-12-02 VITALS
DIASTOLIC BLOOD PRESSURE: 76 MMHG | BODY MASS INDEX: 22.07 KG/M2 | TEMPERATURE: 97.2 F | HEIGHT: 69 IN | HEART RATE: 79 BPM | OXYGEN SATURATION: 97 % | SYSTOLIC BLOOD PRESSURE: 122 MMHG | WEIGHT: 149 LBS

## 2022-12-02 DIAGNOSIS — R10.30 LOWER ABDOMINAL PAIN: Primary | ICD-10-CM

## 2022-12-02 DIAGNOSIS — N31.9 BLADDER DYSFUNCTION: Primary | ICD-10-CM

## 2022-12-02 PROCEDURE — 99204 OFFICE O/P NEW MOD 45 MIN: CPT | Performed by: SPECIALIST

## 2022-12-02 PROCEDURE — G8420 CALC BMI NORM PARAMETERS: HCPCS | Performed by: SPECIALIST

## 2022-12-02 PROCEDURE — 3078F DIAST BP <80 MM HG: CPT | Performed by: SPECIALIST

## 2022-12-02 PROCEDURE — G8484 FLU IMMUNIZE NO ADMIN: HCPCS | Performed by: SPECIALIST

## 2022-12-02 PROCEDURE — 3017F COLORECTAL CA SCREEN DOC REV: CPT | Performed by: SPECIALIST

## 2022-12-02 PROCEDURE — 3074F SYST BP LT 130 MM HG: CPT | Performed by: SPECIALIST

## 2022-12-02 PROCEDURE — 4004F PT TOBACCO SCREEN RCVD TLK: CPT | Performed by: SPECIALIST

## 2022-12-02 PROCEDURE — G8427 DOCREV CUR MEDS BY ELIG CLIN: HCPCS | Performed by: SPECIALIST

## 2022-12-02 RX ORDER — PANTOPRAZOLE SODIUM 40 MG/1
TABLET, DELAYED RELEASE ORAL
COMMUNITY
Start: 2022-08-29

## 2022-12-02 RX ORDER — ACARBOSE 25 MG/1
TABLET ORAL
COMMUNITY
Start: 2022-11-12

## 2022-12-02 NOTE — PROGRESS NOTES
Gastroenterology Consult Note  Louise Haile. Norah HAIR      Reason for Consult:  abd and back pain  Primary Care / referring Physician:  Robert Dawn MD      History Obtained From:  patient    CC: same    HISTORY OF PRESENT ILLNESS:          Has had lower abd pain and back pain x 1-2 months. The pain in lower abd is relatively constant- relieved briefly bu urination. He voids \"every few minutes\" in small amounts. He has had recent colonoscopy and EGD by Dr Mariann Rogers (reviewed reports and endoscopic images)-- only S/P Nissen, mild gastritis and LA Grade B esophagitis.  Colonoscopy showed only a small hyperplastoc rectal polyp    Past Medical History:        Diagnosis Date    Arthritis     Hands    Chronic cluster headache 6/7/2019 last    COPD (chronic obstructive pulmonary disease) ([facility] [place] [address]     \"dx 2 yrs ago\" \\"does not see a lung dr Molly Sam with PCP    Degenerative disc disease     \"in my back have degenarative disc\"    Depression     Edentulous     Fracture     \"fell over a 5 gallon bucket and landed on cement block and broke my right hand\" (7/6/2015)    GERD with esophagitis     H/O dizziness     States will feel like he is going to pass out, possible low BS    Hiatal hernia     Hx of gastric ulcer     HX OTHER MEDICAL     pt states has trouble with reading and writing\"can read very very little\"    Hyperlipidemia     Hypertension     No meds as of 5/27/21 - follows with PCP    Impingement syndrome of right shoulder     Left shoulder pain     limited range-of-motion    Lower back pain     Motion sickness     Neck pain     more on left side into shoulder--pain limits ROM    PONV (postoperative nausea and vomiting)     Vertigo        Past Surgical History:        Procedure Laterality Date    ARM SURGERY Left 30+ yrs    \"put plastic ligaments in \"  after injury through glass window    CARPAL TUNNEL RELEASE Right 10/4/2019    RIGHT CARPAL TUNNEL RELEASE performed by Radha Koehler, DO at [address] RELEASE Left 4/12/2022    LEFT CARPAL TUNNEL RELEASE performed by Lovely Ellis DO at [facility]  2010    COLONOSCOPY  2018    HX: polyps - Dr. Anu Teixeira N/A 12/30/2019    COLONOSCOPY DIAGNOSTIC performed by Rubia Bergeron MD at [facility] ENDOSCOPY    COLONOSCOPY N/A 12/16/2021    COLONOSCOPY POLYPECTOMY SNARE/COLD BIOPSY performed by Tru Livingston MD at [facility]      all teeth extracted    ENDOSCOPY, COLON, DIAGNOSTIC  04/08/2019    EGD - thrush noted through out    ENDOSCOPY, COLON, DIAGNOSTIC  06/01/2021    dr winters:multiple gastric ulcers, intact fundiplication with tightening, dil 18-20, biopsy r/o h pylori, f/u as scheduled    EYE SURGERY Left 1990's    \"metal removed from eye\"    GASTRIC FUNDOPLICATION N/A 67/85/4804    NISSEN FUNDOPLICATION LAPAROSCOPIC ROBOTIC HIATAL HERNIA performed by Rubia Bergeron MD at [address] [place] [address]      abdominal    UPPER GASTROINTESTINAL ENDOSCOPY  12/2018    UPPER GASTROINTESTINAL ENDOSCOPY N/A 1/28/2019    EGD BIOPSY / BRUSHING OF ESOPHAGUS performed by Rubia Bergeron MD at [place] [address] N/A 4/8/2019    EGD BIOPSY AND BRUSHING performed by Rubia Bergeron MD at [address] [place] [address] 7/8/2019    EGD BIOPSY and brushing performed by Rubia Bergeron MD at [address] [place] [address] 12/30/2019    EGD BIOPSY/BRUSHING OF ESOPHAGUS performed by Rubia Bergeron MD at [address] [place] [address] 6/5/2020    EGD BIOPSY performed by Rubia Bergeron MD at [address] [place] [address] 6/1/2021    EGD DILATION BALLOON WITH 18-20 BALLOON UP TO 20 WITH BIOPSIES performed by Rubia Bergeron MD at [address] [place] [address] 6/29/2021    EGD DILATION SAVORY Guevara Dilators used 55, 50. with biopsies performed by Rubia Bergeron MD at [facility] ENDOSCOPY    UPPER GASTROINTESTINAL ENDOSCOPY N/A 12/16/2021    EGD BIOPSY performed by Nazanin Vasquez MD at [facility] ENDOSCOPY       Social History:   Social History     Tobacco Use    Smoking status: Every Day     Packs/day: 0.50     Years: 50.00     Pack years: 25.00     Types: Cigarettes     Start date: 1972    Smokeless tobacco: Never    Tobacco comments:     use to smoke 2.5 PPD   Substance Use Topics    Alcohol use: Yes     Comment: Occasional 1/month       Medications:   Prior to Admission medications    Medication Sig Start Date End Date Taking? Authorizing Provider   acarbose (PRECOSE) 25 MG tablet take 1 tablet by mouth 3 times every day at the start (with the first bite) of each main meal 11/12/22  Yes Historical Provider, MD   pantoprazole (PROTONIX) 40 MG tablet  8/29/22  Yes Historical Provider, MD   tamsulosin (FLOMAX) 0.4 MG capsule Take 1 capsule by mouth daily 11/16/22  Yes Nazanin Vasquez MD   Psyllium 48.57 % POWD Take 1 Dose by mouth every morning 11/16/22  Yes Nazanin Vasquez MD   dicyclomine (BENTYL) 20 MG tablet Take 1 tablet by mouth 4 times daily as needed (Abdominal pain, spasm) 10/26/22  Yes Barrera Cotto Fent, DO   metoclopramide (REGLAN) 10 MG tablet Take 1 tablet by mouth 3 times daily (with meals) 5/4/22  Yes Nazanin Vasquez MD   omeprazole (PRILOSEC) 20 MG delayed release capsule Take 1 capsule by mouth 2 times daily 4/19/22  Yes Nazanin Vasquez MD   ibuprofen (ADVIL;MOTRIN) 600 MG tablet TAKE 1 TABLET BY MOUTH THREE TIMES A DAY AS NEEDED with food 1/26/22  Yes Historical Provider, MD   tiZANidine (Bryon Sara) 2 MG tablet TAKE 1 TABLET BY MOUTH EVERY EIGHT HOURS AS NEEDED.  NOT TO EXCEED 3 DOSES IN 24 HOURS 3/10/22  Yes Historical Provider, MD   glucose 4 g chewable tablet CHEW AND SWALLOW 1 TABLET BY MOUTH AS NEEDED for hypoglycemia episodes 10/27/21  Yes Historical Provider, MD   atorvastatin (LIPITOR) 20 MG tablet  5/16/21  Yes Historical Provider, MD   cetirizine (ZYRTEC) 10 MG tablet 1 tablet daily  3/9/21  Yes Historical Provider, MD   fluticasone (FLONASE) 50 MCG/ACT nasal spray as needed 3/9/21  Yes Historical Provider, MD   albuterol sulfate  (90 Base) MCG/ACT inhaler Inhale 2 puffs into the lungs every 6 hours as needed   Yes Historical Provider, MD   Nutritional Supplements (ENSURE HIGH PROTEIN) LIQD Take 1 Can by mouth 3 times daily Different flavors if possible 5/20/20  Yes Leticia Haji MD   ondansetron (ZOFRAN ODT) 4 MG disintegrating tablet Place 1 tablet under the tongue every 8 hours as needed for Nausea or Vomiting 12/20/19  Yes Leticia Haji MD   ipratropium-albuterol (DUONEB) 0.5-2.5 (3) MG/3ML SOLN nebulizer solution as needed  10/19/19  Yes Historical Provider, MD   Nebulizers (MICRO AIR NEBULIZER) MISC TO USE AS NEEDED WITH NEBULIZER SOLUTION 9/23/19  Yes Historical Provider, MD   acetaminophen (TYLENOL) 500 MG tablet Take 1,000 mg by mouth every 6 hours as needed for Pain   Yes Historical Provider, MD   tiotropium ([place]) 18 MCG inhalation capsule Inhale 1 capsule into the lungs daily 1/3/17  Yes Coco Park MD   albuterol sulfate HFA (PROVENTIL HFA) 108 (90 BASE) MCG/ACT inhaler Inhale 2 puffs into the lungs every 6 hours as needed for Wheezing 1/3/17  Yes Coco Park MD   polyethylene glycol [facility]) 17 GM/SCOOP powder Take 17 g by mouth daily  Patient not taking: Reported on 12/2/2022 11/16/22 12/16/22  Jose Ridley MD   hydrocortisone (ANUSOL-HC) 2.5 % CREA rectal cream APPLY TO HEMMORROIDS topically UP TO four TIMES PER DAY AS NEEDED FOR PAIN OR BURNING  Patient not taking: Reported on 12/2/2022 8/25/22   Jose Ridley MD   nystatin (MYCOSTATIN) 506508 UNIT/ML suspension TAKE 5 MLS BY MOUTH FOUR TIMES DAILY FOR 10 DAYS  Patient not taking: Reported on 12/2/2022 8/25/22   [place], MD   nystatin (MYCOSTATIN) 496068 UNIT/GM powder Apply 3 times daily.   Patient not taking: Reported on 12/2/2022 7/7/21   Vidhi Loja MD       Allergies: Allergies   Allergen Reactions    Bee Venom Anaphylaxis     \"swell up like a balloon in face/throat\"      Nsaids Other (See Comments)     Can take small doses for a short time - Not to take any longer than necessary stomach ulcer   . Family History:   reviewed and positives included in HPI- all other pertinent GI family history negative    REVIEW OF SYSTEMS:   see HPI for positives and pertinent negatives.  All other systems reviewed and are negative    PHYSICAL EXAM:    Vitals:  /76 (Site: Left Upper Arm, Position: Sitting, Cuff Size: Medium Adult)   Pulse 79   Temp 97.2 °F (36.2 °C) (Infrared)   Ht 5' 9\" (1.753 m)   Wt 149 lb (67.6 kg)   SpO2 97%   BMI 22.00 kg/m²   CONSTITUTIONAL: alert, cooperative, no apparent distress,   EYES:  pupils equal, round and reactive to light and sclera clear and non-icteric  ENT:  normocepalic, without obvious abnormality, airway Class 1  NECK:  supple, symmetrical, trachea midline,thyroid not enlarged and without masses  HEMATOLOGIC/LYMPHATICS:  no cervical lymphadenopathy and no supraclavicular lymphadenopathy  LUNGS:  no increased respiratory effort, lungs clear to auscultation  CARDIOVASCULAR:  regular rate and rhythm and no murmur noted, no evidence of AAA  ABDOMEN:  normal bowel sounds, soft, non-distended, non-tender with no hernias apprecioated nor masses palpated--no hepatosplenomegaly  NEUROLOGIC: cranial nerves 2-12 grossly intact,no focal deficit detected, no asterixis  SKIN:  no lesions, jaundice  EXTREMITIES: no clubbing, cyanosis, or edema    DATA:      CBC:    Lab Results   Component Value Date/Time    WBC 7.5 11/16/2022 11:24 AM    HGB 12.9 11/16/2022 11:24 AM    MCV 88.7 11/16/2022 11:24 AM     11/16/2022 11:24 AM     CMP:    Lab Results   Component Value Date/Time     11/16/2022 11:24 AM    K 4.5 11/16/2022 11:24 AM     11/16/2022 11:24 AM    CO2 27 11/16/2022 11:24 AM    BUN 14 11/16/2022 11:24 AM    CREATININE 0.7 11/16/2022 11:24 AM    GFRAA >60 04/07/2022 09:33 AM    LABGLOM >60 11/16/2022 11:24 AM    GLUCOSE 81 11/16/2022 11:24 AM    PROT 6.0 10/25/2022 08:20 PM    PROT 7.0 11/19/2012 09:07 AM    LABALBU 3.8 10/25/2022 08:20 PM    CALCIUM 8.9 11/16/2022 11:24 AM    BILITOT 0.2 10/25/2022 08:20 PM    ALKPHOS 69 10/25/2022 08:20 PM    AST 15 10/25/2022 08:20 PM    ALT 9 10/25/2022 08:20 PM         IMPRESSION:  1) lower abd pain, back pain-- main consideration is bladder dysfunction versus functional GI disorder  2) GERD- LA Grade B esophagitis despite prior Nissen      RECOMMENDATIONS:  1) CBC, CMP, ESR,CRP  2) urology referral  3) D/C Bentyl-- may be aggravating bladder function  4) D/C Reglan  5 ) call after urology evaluation - may start Rx for fubctional dyspsia/IBS then  6) continue omeprazole daily        Jhon Almazan M.D.

## 2023-01-11 NOTE — PROCEDURES
PROCEDURE NOTE    DATE OF PROCEDURE: 12/16/2021     SURGEON: Nancy Hooker MD , M.D.    Harvey Peed: None    PREOPERATIVE DIAGNOSIS: dysphagia    POSTOPERATIVE DIAGNOSIS:   1. Grade B esophagitis  2. Erosive antral gastritis  3. S/p Nissen fundoplication  4. Small to moderate hiatal hernia    OPERATION: Esophagogastroduodenoscopy with biopsies    ANESTHESIA: Local monitored anesthesia    ESTIMATED BLOOD LOSS: 46GM    COMPLICATIONS: None apparent    SPECIMENS: were obtained    HISTORY: The patient is a 62y.o. year old male with history of the above preoperative diagnosis. I recommended esophagogastroduodenoscopy with possible biopsy and I explained the risk, benefits, expected outcome, and alternatives to the procedure. Risks included but are not limited to bleeding, infection, respiratory distress, hypotension, and perforation of the esophagus, stomach, or duodenum. Patient understands and is in agreement, wishing to proceed. PROCEDURE: The patient was brought into the endoscopy suite and the appropriate monitors were connected to the patient. A timeout was held with all members of the procedure team present and in agreement. The patient was positioned in the left lateral decubitus position with a bite block in place. The endoscope was inserted into the patient's mouth and advanced from the oropharynx into the hypopharynx without difficulty and under direct vision. The scope was then advanced through the patient's esophagus under direct vision into the stomach, pylorus, and duodenum. A retroflexed view of the gastroesophageal junction was performed. Biopsies were taken.  A summary of the findings are as follows:      Duodenum:     Descending: normal    Bulb: normal    Stomach:    Antrum: erosive gastritis present with mild old blood, biopsies taken    Body: mild gastritis in the body    Fundus: normal anatomy s/p Nissen    Esophagus: LA grade B esophagitis present as well as small to moderate hiatal hernia. Biopsies taken. No stricture evident. Larynx: normal    The stomach was desufflated and the endoscope was removed. The patient tolerated the procedure well, and was transferred to the PACU following the procedure in stable condition. IMPRESSION/PLAN:   1. Significant esophagitis and gastritis. Recommend omeprazole BID. Will also treat with a course of diflucan give some whitish appearing exudate--possible candidiasis.     Electronically signed by Ollie Alcantar MD on 12/16/2021 at 10:26 AM Hydroquinone Counseling:  Patient advised that medication may result in skin irritation, lightening (hypopigmentation), dryness, and burning.  In the event of skin irritation, the patient was advised to reduce the amount of the drug applied or use it less frequently.  Rarely, spots that are treated with hydroquinone can become darker (pseudoochronosis).  Should this occur, patient instructed to stop medication and call the office. The patient verbalized understanding of the proper use and possible adverse effects of hydroquinone.  All of the patient's questions and concerns were addressed.

## 2023-02-15 ENCOUNTER — OFFICE VISIT (OUTPATIENT)
Dept: SURGERY | Age: 63
End: 2023-02-15
Payer: MEDICAID

## 2023-02-15 VITALS
WEIGHT: 147.7 LBS | SYSTOLIC BLOOD PRESSURE: 130 MMHG | DIASTOLIC BLOOD PRESSURE: 80 MMHG | BODY MASS INDEX: 21.88 KG/M2 | OXYGEN SATURATION: 100 % | HEART RATE: 76 BPM | HEIGHT: 69 IN

## 2023-02-15 DIAGNOSIS — R10.13 EPIGASTRIC PAIN: Primary | ICD-10-CM

## 2023-02-15 PROCEDURE — 3017F COLORECTAL CA SCREEN DOC REV: CPT | Performed by: SURGERY

## 2023-02-15 PROCEDURE — G8484 FLU IMMUNIZE NO ADMIN: HCPCS | Performed by: SURGERY

## 2023-02-15 PROCEDURE — 4004F PT TOBACCO SCREEN RCVD TLK: CPT | Performed by: SURGERY

## 2023-02-15 PROCEDURE — 3075F SYST BP GE 130 - 139MM HG: CPT | Performed by: SURGERY

## 2023-02-15 PROCEDURE — G8427 DOCREV CUR MEDS BY ELIG CLIN: HCPCS | Performed by: SURGERY

## 2023-02-15 PROCEDURE — 99213 OFFICE O/P EST LOW 20 MIN: CPT | Performed by: SURGERY

## 2023-02-15 PROCEDURE — 3079F DIAST BP 80-89 MM HG: CPT | Performed by: SURGERY

## 2023-02-15 PROCEDURE — G8420 CALC BMI NORM PARAMETERS: HCPCS | Performed by: SURGERY

## 2023-02-15 NOTE — PROGRESS NOTES
Chief Complaint   Patient presents with    Follow-up     Rectal pain - with bladder infection         SUBJECTIVE:  HPI:   11/16/22  Patient is here with complaints of difficulty urinating. He reports lower abdominal pain and fullness. He was recently seen in the ER and told to follow up with GI for his abdominal symptoms. He reports daily BM but his CT on 10/25 showed constipation. He denies blood in the urine or stool. He denies ever seeing Urology. He does not take flomax. He reports only being able to pee small amounts when he urinates and reports strong odor. UA on 10/25 was normal.      2/15/2023  Re-evaluated again today. He reports ongoing abdominal pain and urinary symptoms. He reports being treated for multiple urinary infections since last seen. He was encouraged by myself and by Gastroenterology to establish with Urology for evaluation. He has an appointment with Urology on 3/1. He reports epigastric pain. Still with constipation at times going several days without BM. He takes stool softeners but has not been taking a fiber supplement. His last EGD showed gastritis and esophagitis. He continues to take omeprazole bid as well as sucralfate. Still smoking. His colonoscopy last year only showed internal hemorrhoids and hyperplastic polyp. CT imaging reviewed from last November and no cause of his symptoms were identified.     Past Surgical History:   Procedure Laterality Date    ARM SURGERY Left 30+ yrs    \"put plastic ligaments in \"  after injury through glass window    CARPAL TUNNEL RELEASE Right 10/4/2019    RIGHT CARPAL TUNNEL RELEASE performed by Corinna Munoz DO at [facility] 90 Left 4/12/2022    LEFT CARPAL TUNNEL RELEASE performed by Corinna Munoz DO at [facility] 36  2010    COLONOSCOPY  2018    HX: polyps - Dr. Airam Phillips    COLONOSCOPY N/A 12/30/2019    COLONOSCOPY DIAGNOSTIC performed by Haylie Quezada MD at [place] [address] 12/16/2021    COLONOSCOPY POLYPECTOMY SNARE/COLD BIOPSY performed by Emile Collier MD at [address] [place] [address]      all teeth extracted    ENDOSCOPY, COLON, DIAGNOSTIC  04/08/2019    EGD - thrush noted through out    ENDOSCOPY, COLON, DIAGNOSTIC  06/01/2021    dr winters:multiple gastric ulcers, intact fundiplication with tightening, dil 18-20, biopsy r/o h pylori, f/u as scheduled    EYE SURGERY Left 1990's    \"metal removed from eye\"    GASTRIC FUNDOPLICATION N/A 31/90/7264    NISSEN FUNDOPLICATION LAPAROSCOPIC ROBOTIC HIATAL HERNIA performed by Haylie Quezada MD at [address] [place] [address]      abdominal    UPPER GASTROINTESTINAL ENDOSCOPY  12/2018    UPPER GASTROINTESTINAL ENDOSCOPY N/A 1/28/2019    EGD BIOPSY / BRUSHING OF ESOPHAGUS performed by Haylie Quezada MD at [address] [place] [address] [facility] N/A 4/8/2019    EGD BIOPSY AND BRUSHING performed by Haylie Quezada MD at [address] [place] [address] [facility] N/A 7/8/2019    EGD BIOPSY and brushing performed by Haylie Quezada MD at [address] [place] [address] [address] N/A 12/30/2019    EGD BIOPSY/BRUSHING OF ESOPHAGUS performed by Haylie Quezada MD at [address] [place] [address] 6/5/2020    EGD BIOPSY performed by Haylie Quezada MD at [address] [place] [address] 6/1/2021    EGD DILATION BALLOON WITH 18-20 BALLOON UP TO 20 WITH BIOPSIES performed by Haylie Quezada MD at [address] [place] [address] 6/29/2021    EGD DILATION SAVORY Marylene Fare used 55, 50. with biopsies performed by Haylie Quezada MD at [facility] N/A 12/16/2021    EGD BIOPSY performed by Emile Collier MD at [facility] ENDOSCOPY     Past Medical History:   Diagnosis Date    Arthritis     Hands    Chronic cluster headache 6/7/2019 last    COPD (chronic obstructive pulmonary disease) ([facility] [address] \"dx 2 yrs ago\" \\"does not see a lung dr Preston Fitzgerald with PCP    Degenerative disc disease     \"in my back have degenarative disc\"    Depression     Edentulous     Fracture     \"fell over a 5 gallon bucket and landed on cement block and broke my right hand\" (7/6/2015)    GERD with esophagitis     H/O dizziness     States will feel like he is going to pass out, possible low BS    Hiatal hernia     Hx of gastric ulcer     HX OTHER MEDICAL     pt states has trouble with reading and writing\"can read very very little\"    Hyperlipidemia     Hypertension     No meds as of 5/27/21 - follows with PCP    Impingement syndrome of right shoulder     Left shoulder pain     limited range-of-motion    Lower back pain     Motion sickness     Neck pain     more on left side into shoulder--pain limits ROM    PONV (postoperative nausea and vomiting)     Vertigo      Family History   Problem Relation Age of Onset    Dementia Mother     Stroke Mother     Stroke Father     High Blood Pressure Father     Heart Disease Father     Breast Cancer Sister     Diabetes Brother     Cancer Brother         mouth and throat    Heart Attack Maternal Uncle     Heart Disease Maternal Uncle     No Known Problems Son     No Known Problems Son     No Known Problems Daughter      Social History     Socioeconomic History    Marital status:      Spouse name: Not on file    Number of children: Not on file    Years of education: Not on file    Highest education level: Not on file   Occupational History    Not on file   Tobacco Use    Smoking status: Every Day     Packs/day: 0.50     Years: 50.00     Pack years: 25.00     Types: Cigarettes     Start date: 0    Smokeless tobacco: Never    Tobacco comments:     use to smoke 2.5 PPD   Vaping Use    Vaping Use: Never used   Substance and Sexual Activity    Alcohol use: Yes     Comment: Occasional 1/month    Drug use: Never     Comment: 3 cups of coffee daily    Sexual activity: Not Currently     Partners: Female   Other Topics Concern    Not on file   Social History Narrative    Not on file     Social Determinants of Health     Financial Resource Strain: Not on file   Food Insecurity: Not on file   Transportation Needs: Not on file   Physical Activity: Not on file   Stress: Not on file   Social Connections: Not on file   Intimate Partner Violence: Not on file   Housing Stability: Not on file       Current Outpatient Medications   Medication Sig Dispense Refill    acarbose (PRECOSE) 25 MG tablet take 1 tablet by mouth 3 times every day at the start (with the first bite) of each main meal      pantoprazole (PROTONIX) 40 MG tablet       tamsulosin (FLOMAX) 0.4 MG capsule Take 1 capsule by mouth daily 30 capsule 0    Psyllium 48.57 % POWD Take 1 Dose by mouth every morning 1 each 3    dicyclomine (BENTYL) 20 MG tablet Take 1 tablet by mouth 4 times daily as needed (Abdominal pain, spasm) 20 tablet 0    hydrocortisone (ANUSOL-HC) 2.5 % CREA rectal cream APPLY TO HEMMORROIDS topically UP TO four TIMES PER DAY AS NEEDED FOR PAIN OR BURNING (Patient not taking: Reported on 12/2/2022) 30 g 0    nystatin (MYCOSTATIN) 596485 UNIT/ML suspension TAKE 5 MLS BY MOUTH FOUR TIMES DAILY FOR 10 DAYS (Patient not taking: Reported on 12/2/2022) 200 mL 0    metoclopramide (REGLAN) 10 MG tablet Take 1 tablet by mouth 3 times daily (with meals) 120 tablet 3    omeprazole (PRILOSEC) 20 MG delayed release capsule Take 1 capsule by mouth 2 times daily 60 capsule 3    ibuprofen (ADVIL;MOTRIN) 600 MG tablet TAKE 1 TABLET BY MOUTH THREE TIMES A DAY AS NEEDED with food      tiZANidine (ZANAFLEX) 2 MG tablet TAKE 1 TABLET BY MOUTH EVERY EIGHT HOURS AS NEEDED. NOT TO EXCEED 3 DOSES IN 24 HOURS      glucose 4 g chewable tablet CHEW AND SWALLOW 1 TABLET BY MOUTH AS NEEDED for hypoglycemia episodes      nystatin (MYCOSTATIN) 214899 UNIT/GM powder Apply 3 times daily.  (Patient not taking: Reported on 12/2/2022) 1 Bottle 3    atorvastatin (LIPITOR) 20 MG tablet       cetirizine (ZYRTEC) 10 MG tablet 1 tablet daily       fluticasone (FLONASE) 50 MCG/ACT nasal spray as needed      albuterol sulfate  (90 Base) MCG/ACT inhaler Inhale 2 puffs into the lungs every 6 hours as needed      Nutritional Supplements (ENSURE HIGH PROTEIN) LIQD Take 1 Can by mouth 3 times daily Different flavors if possible 30 Can 5    ondansetron (ZOFRAN ODT) 4 MG disintegrating tablet Place 1 tablet under the tongue every 8 hours as needed for Nausea or Vomiting 30 tablet 3    ipratropium-albuterol (DUONEB) 0.5-2.5 (3) MG/3ML SOLN nebulizer solution as needed       Nebulizers (MICRO AIR NEBULIZER) MISC TO USE AS NEEDED WITH NEBULIZER SOLUTION  11    acetaminophen (TYLENOL) 500 MG tablet Take 1,000 mg by mouth every 6 hours as needed for Pain      tiotropium (SPIRIVA HANDIHALER) 18 MCG inhalation capsule Inhale 1 capsule into the lungs daily 30 capsule 3    albuterol sulfate HFA (PROVENTIL HFA) 108 (90 BASE) MCG/ACT inhaler Inhale 2 puffs into the lungs every 6 hours as needed for Wheezing 1 Inhaler 3     No current facility-administered medications for this visit. Allergies   Allergen Reactions    Bee Venom Anaphylaxis     \"swell up like a balloon in face/throat\"      Nsaids Other (See Comments)     Can take small doses for a short time - Not to take any longer than necessary stomach ulcer       Review of Systems:       Review of Systems   Constitutional: Negative for chills. Negative for fever. HENT: Negative for congestion. Negative for rhinorrhea. Respiratory: Negative for cough. Negative for shortness of breath. Negative for wheezing. Positive for tobacco use  Cardiovascular: Negative for chest pain. Gastrointestinal: as per HPI  Genitourinary: as per HPI  Neurological: Negative for dizziness, syncope and numbness. Hematological: Does not bruise/bleed easily.          OBJECTIVE:  Physical Exam:    /80   Pulse 76   Ht 5' 9\" (1.753 m)   Wt 147 lb 11.2 oz (67 kg) SpO2 100%   BMI 21.81 kg/m²      Physical Exam  General: awake, alert, in no acute distress  HEENT: mucous membranes moist  Respiratory: normal effort, no wheezes appreciated  CV: appears well perfused, regular rate and rhythm    Abdomen: Soft, tender diffusely but worse in the epigastrium, non-distended. No guarding or rebound tenderness. Skin: warm and dry  Extremities: atraumatic  Neuro: no focal deficits noted  Psych: mood normal        ASSESSMENT:  No diagnosis found. 65 y.o. male with nonspecific abdominal pain. Some of his pain may be due to gastritis but he is on therapy for this already. He has ongoing symptoms of urinary retention and reports recurrent urine infections--agree with Urology evaluation      PLAN:  Treatment:    - no surgically correctable disorders are evident on workup thus far, some symptoms may be attributed to his gastritis. Smoking cessation may help. Happy to see back in the future if surgical re-evaluation is needed        Patient counseled on risks, benefits, and alternatives of treatment plan at length today. Patient states an understanding and willingness to proceed with plan.           Madhu Paulson MD

## 2023-03-01 ENCOUNTER — OFFICE VISIT (OUTPATIENT)
Dept: CARDIOLOGY CLINIC | Age: 63
End: 2023-03-01
Payer: MEDICAID

## 2023-03-01 VITALS
HEART RATE: 76 BPM | WEIGHT: 149.4 LBS | DIASTOLIC BLOOD PRESSURE: 88 MMHG | SYSTOLIC BLOOD PRESSURE: 118 MMHG | BODY MASS INDEX: 22.13 KG/M2 | HEIGHT: 69 IN

## 2023-03-01 DIAGNOSIS — F17.219 CIGARETTE NICOTINE DEPENDENCE WITH NICOTINE-INDUCED DISORDER: ICD-10-CM

## 2023-03-01 DIAGNOSIS — I10 ESSENTIAL HYPERTENSION: Primary | ICD-10-CM

## 2023-03-01 DIAGNOSIS — E78.5 DYSLIPIDEMIA: ICD-10-CM

## 2023-03-01 PROCEDURE — 3074F SYST BP LT 130 MM HG: CPT | Performed by: INTERNAL MEDICINE

## 2023-03-01 PROCEDURE — G8420 CALC BMI NORM PARAMETERS: HCPCS | Performed by: INTERNAL MEDICINE

## 2023-03-01 PROCEDURE — 99213 OFFICE O/P EST LOW 20 MIN: CPT | Performed by: INTERNAL MEDICINE

## 2023-03-01 PROCEDURE — G8427 DOCREV CUR MEDS BY ELIG CLIN: HCPCS | Performed by: INTERNAL MEDICINE

## 2023-03-01 PROCEDURE — 3017F COLORECTAL CA SCREEN DOC REV: CPT | Performed by: INTERNAL MEDICINE

## 2023-03-01 PROCEDURE — 4004F PT TOBACCO SCREEN RCVD TLK: CPT | Performed by: INTERNAL MEDICINE

## 2023-03-01 PROCEDURE — 93000 ELECTROCARDIOGRAM COMPLETE: CPT | Performed by: INTERNAL MEDICINE

## 2023-03-01 PROCEDURE — 3079F DIAST BP 80-89 MM HG: CPT | Performed by: INTERNAL MEDICINE

## 2023-03-01 PROCEDURE — G8484 FLU IMMUNIZE NO ADMIN: HCPCS | Performed by: INTERNAL MEDICINE

## 2023-03-01 NOTE — LETTER
March 1, 2023      Taylor Marcus, [address] [id number]  [facility]      Patient: Jose Armando Bolanos   MR Number: [id number]   YOB: 1960   Date of Visit: 3/1/2023       Dear Taylor Marcus: Thank you for referring Saleem Amanda to me for evaluation/treatment. Below are the relevant portions of my assessment and plan of care. If you have questions, please do not hesitate to call me. I look forward to following Bert King along with you.     Sincerely,        Yeison Sosa MD

## 2023-03-01 NOTE — PROGRESS NOTES
Nina Ruiz is a 65 y.o. male who has    CHIEF COMPLAINT AS FOLLOWS:  CHEST PAIN: Patient  C/O chest pains at this time. 2/10 in intensity, few minute episodes. No aggravating or relieving factors. Positive for tenderness. SOB: Has SOB with exertion but no change over previous noted. .   LEG EDEMA: No leg edema   PALPITATIONS: Denies any C/O Palpitations   DIZZINESS: No C/O Dizziness   SYNCOPE: None   OTHER/ ADDITIONAL COMPLAINTS:                                     HPI: Patient is here for F/U on his Chest pain,  HTN & Dyslipidemia problems. HTN: Patient has known essential HTN. Has been treated with guideline recommended medical / physical/ diet therapy as stated below. Dyslipidemia: Patient has known mixed dyslipidemia. Has been treated with guideline recommended medical / physical/ diet therapy as stated below.                 Current Outpatient Medications   Medication Sig Dispense Refill    acarbose (PRECOSE) 25 MG tablet take 1 tablet by mouth 3 times every day at the start (with the first bite) of each main meal      pantoprazole (PROTONIX) 40 MG tablet       tamsulosin (FLOMAX) 0.4 MG capsule Take 1 capsule by mouth daily 30 capsule 0    Psyllium 48.57 % POWD Take 1 Dose by mouth every morning 1 each 3    dicyclomine (BENTYL) 20 MG tablet Take 1 tablet by mouth 4 times daily as needed (Abdominal pain, spasm) 20 tablet 0    hydrocortisone (ANUSOL-HC) 2.5 % CREA rectal cream APPLY TO HEMMORROIDS topically UP TO four TIMES PER DAY AS NEEDED FOR PAIN OR BURNING 30 g 0    nystatin (MYCOSTATIN) 630018 UNIT/ML suspension TAKE 5 MLS BY MOUTH FOUR TIMES DAILY FOR 10 DAYS 200 mL 0    metoclopramide (REGLAN) 10 MG tablet Take 1 tablet by mouth 3 times daily (with meals) 120 tablet 3    omeprazole (PRILOSEC) 20 MG delayed release capsule Take 1 capsule by mouth 2 times daily 60 capsule 3    ibuprofen (ADVIL;MOTRIN) 600 MG tablet TAKE 1 TABLET BY MOUTH THREE TIMES A DAY AS NEEDED with food      tiZANidine (ZANAFLEX) 2 MG tablet TAKE 1 TABLET BY MOUTH EVERY EIGHT HOURS AS NEEDED. NOT TO EXCEED 3 DOSES IN 24 HOURS      glucose 4 g chewable tablet CHEW AND SWALLOW 1 TABLET BY MOUTH AS NEEDED for hypoglycemia episodes      nystatin (MYCOSTATIN) 020101 UNIT/GM powder Apply 3 times daily. 1 Bottle 3    atorvastatin (LIPITOR) 20 MG tablet       cetirizine (ZYRTEC) 10 MG tablet 1 tablet daily       fluticasone (FLONASE) 50 MCG/ACT nasal spray as needed      albuterol sulfate  (90 Base) MCG/ACT inhaler Inhale 2 puffs into the lungs every 6 hours as needed      Nutritional Supplements (ENSURE HIGH PROTEIN) LIQD Take 1 Can by mouth 3 times daily Different flavors if possible 30 Can 5    ondansetron (ZOFRAN ODT) 4 MG disintegrating tablet Place 1 tablet under the tongue every 8 hours as needed for Nausea or Vomiting 30 tablet 3    ipratropium-albuterol (DUONEB) 0.5-2.5 (3) MG/3ML SOLN nebulizer solution as needed       Nebulizers (MICRO AIR NEBULIZER) MISC TO USE AS NEEDED WITH NEBULIZER SOLUTION  11    acetaminophen (TYLENOL) 500 MG tablet Take 1,000 mg by mouth every 6 hours as needed for Pain      tiotropium (SPIRIVA HANDIHALER) 18 MCG inhalation capsule Inhale 1 capsule into the lungs daily 30 capsule 3    albuterol sulfate HFA (PROVENTIL HFA) 108 (90 BASE) MCG/ACT inhaler Inhale 2 puffs into the lungs every 6 hours as needed for Wheezing 1 Inhaler 3     No current facility-administered medications for this visit.      Allergies: Bee venom and Nsaids  Review of Systems:    Constitutional: Negative for diaphoresis and fatigue  Respiratory: Negative for shortness of breath  Cardiovascular: Negative for chest pain, dyspnea on exertion, claudication, edema, irregular heartbeat, murmur, palpitations or shortness of breath  Musculoskeletal: Negative for muscle pain, muscular weakness, negative for pain in arm and leg or swelling in foot and leg    Objective:  /88 Pulse 76   Ht 5' 9\" (1.753 m)   Wt 149 lb 6.4 oz (67.8 kg)   BMI 22.06 kg/m²   Wt Readings from Last 3 Encounters:   03/01/23 149 lb 6.4 oz (67.8 kg)   02/15/23 147 lb 11.2 oz (67 kg)   12/02/22 149 lb (67.6 kg)     Body mass index is 22.06 kg/m². GENERAL - Alert, oriented, pleasant, in no apparent distress. EYES: No jaundice, no conjunctival pallor. Neck - Supple. No jugular venous distention noted. No carotid bruits. Cardiovascular - Normal S1 and S2 without obvious murmur or gallop. Point tenderness over right lower rib cage parasternal area noted. Extremities - No cyanosis, clubbing, or significant edema. Pulmonary - No respiratory distress. No wheezes or rales.       MEDICAL DECISION MAKING & DATA REVIEW:    Lab Review   Lab Results   Component Value Date/Time    TROPONINT <0.010 10/11/2019 05:34 AM    TROPONINT <0.010 10/11/2019 02:42 AM     Lab Results   Component Value Date/Time    BNP 10 12/30/2013 05:10 PM    BNP 8 05/18/2013 01:10 PM    PROBNP 117.7 09/13/2017 11:22 AM    PROBNP 21.52 08/02/2017 05:24 AM     Lab Results   Component Value Date    INR 1.03 08/02/2017    INR 0.95 12/30/2016     Lab Results   Component Value Date    LABA1C 5.5 01/16/2020     Lab Results   Component Value Date    WBC 7.5 11/16/2022    WBC 6.3 10/25/2022    HCT 39.3 (L) 11/16/2022    HCT 38.0 (L) 10/25/2022    MCV 88.7 11/16/2022    MCV 87.6 10/25/2022     11/16/2022     10/25/2022     Lab Results   Component Value Date    CHOL 200 01/16/2020    CHOL 172 08/02/2017    TRIG 82 01/16/2020    TRIG 80 08/02/2017    HDL 54 01/16/2020    HDL 48 08/02/2017    LDLCALC 130 01/16/2020    LDLCALC 126 (H) 11/19/2012    LDLDIRECT 119 (H) 08/02/2017    LDLDIRECT 128 (H) 12/30/2016     Lab Results   Component Value Date    ALT 9 (L) 10/25/2022    ALT 10 01/16/2020    AST 15 10/25/2022    AST 18 01/16/2020     BMP:    Lab Results   Component Value Date/Time     11/16/2022 11:24 AM     10/25/2022 08:20 PM K 4.5 11/16/2022 11:24 AM    K 4.1 10/25/2022 08:20 PM     11/16/2022 11:24 AM     10/25/2022 08:20 PM    CO2 27 11/16/2022 11:24 AM    CO2 23 10/25/2022 08:20 PM    BUN 14 11/16/2022 11:24 AM    BUN 16 10/25/2022 08:20 PM    CREATININE 0.7 11/16/2022 11:24 AM    CREATININE 0.7 10/25/2022 08:20 PM     CMP:   Lab Results   Component Value Date/Time     11/16/2022 11:24 AM     10/25/2022 08:20 PM    K 4.5 11/16/2022 11:24 AM    K 4.1 10/25/2022 08:20 PM     11/16/2022 11:24 AM     10/25/2022 08:20 PM    CO2 27 11/16/2022 11:24 AM    CO2 23 10/25/2022 08:20 PM    BUN 14 11/16/2022 11:24 AM    BUN 16 10/25/2022 08:20 PM    CREATININE 0.7 11/16/2022 11:24 AM    CREATININE 0.7 10/25/2022 08:20 PM    PROT 6.0 10/25/2022 08:20 PM    PROT 7.0 10/11/2019 02:42 AM    PROT 7.0 11/19/2012 09:07 AM    PROT 6.5 08/30/2012 08:25 PM     Lab Results   Component Value Date/Time    TSH 0.919 01/16/2020 12:00 AM    TSHHS 1.010 08/02/2017 05:24 AM    TSHHS 1.650 12/30/2016 08:12 PM       QUALITY MEASURES REVIEWED:  1.CAD:Patient is taking anti platelet agent:No  Patient does not have Hx of documented CAD  2. DYSLIPIDEMIA: Patient is on cholesterol lowering medication:Yes   3. Beta-Blocker therapy for CAD, if prior Myocardial Infarction:No   4. Counselled regarding smoking cessation. Yes . 5. Anticoagulation therapy (for A.Fib) No   Does Not have A.Fib.  6.Discussed weight management strategies. Assessment & Plan:  Primary / Secondary prevention is the goal by aggressive risk modification, healthy and therapeutic life style changes for cardiovascular risk reduction. CORONARY ARTERY DISEASE:None significant. Chest pain symptoms are probably non-cardiac. Most likely  has Costochondritis. Explained to the patient. EK: NSR 76 / minNo acute ST-T abnormalities. CARDIOLITE 4/2021    Normal study. Normal perfusion study with normal distribution in all coronal, short, and    horizontal axis.     The observed defect is consistent with diaphragmatic attenuation. Normal LV function. LVEF is 48 %. CATH 8/2017  1. Left main is patent. 2.  Circ and OM1 have mild disease. 3.  LAD and diagonal branch have mild disease. 4.  RCA is normal.  5.  EDP was around 15 mmHg. HTN:Yes  well controlled on current medical regimen, see list above. - changes in  treatment:   no   VHD:no              No significant VHD noted  ECHO 4/2021   Left ventricular function is low normal, EF is estimated at 50-55%. Mild left ventricular hypertrophy. E/A reversal; indeterminate diastolic function. Mild mitral and tricuspid regurgitation is present. RVSP is 21 mmHg. Dilation of the aortic root(4.2) and the ascending aorta(4.0). No evidence of pericardial effusion. DYSLIPIDEMIA: yes,   Patient's profile is at / near [address] current medical regimen wellyes, takes Lipitor     TESTS ORDERED: none this visit     PREVIOUSLY ORDERED TESTS REVIEWED & DISCUSSED WITH THE PATIENT:     I personally reviewed & interpreted, all previously ordered tests as copied above. Latest Labs are pulled in to the note with dates. Labs, specially in Reference to Lipid profile, Cardiac testing in the form of Echo ( dated: ), stress tests ( dated: ) & other relevant cardiac testing reviewed with patient & recommendations made based on assessment of the results. Discussed role of Cardiac risk factors & effects + treatment of co morbidities with patient & advised accordingly. MEDICATIONS: List of medications patient is currently taking is reviewed in detail with the patient. Discussed any side effects or problems taking the medication. Recommend Continue present management & medications as listed. AFFIRMATION: I spent at least 20 minutes of time reviewing patient's history, previous & current medical problems & all Labs + testing. This includes chart prep even prior to the vosit.  Various goals are discussed and multiple questions answered. Relevant concelling performed. Office follow up in one year.

## 2023-03-01 NOTE — PATIENT INSTRUCTIONS
CORONARY ARTERY DISEASE:None significant. Chest pain symptoms are probably non-cardiac. Most likely  has Costochondritis. Explained to the patient. EK: NSR 76 / minNo acute ST-T abnormalities. CARDIOLITE 4/2021    Normal study. Normal perfusion study with normal distribution in all coronal, short, and    horizontal axis. The observed defect is consistent with diaphragmatic attenuation. Normal LV function. LVEF is 48 %. CATH 8/2017  1. Left main is patent. 2.  Circ and OM1 have mild disease. 3.  LAD and diagonal branch have mild disease. 4.  RCA is normal.  5.  EDP was around 15 mmHg. HTN:Yes  well controlled on current medical regimen, see list above. - changes in  treatment:   no   VHD:no              No significant VHD noted  ECHO 4/2021   Left ventricular function is low normal, EF is estimated at 50-55%. Mild left ventricular hypertrophy. E/A reversal; indeterminate diastolic function. Mild mitral and tricuspid regurgitation is present. RVSP is 21 mmHg. Dilation of the aortic root(4.2) and the ascending aorta(4.0). No evidence of pericardial effusion. DYSLIPIDEMIA: yes,   Patient's profile is at / near [address]  current medical regimen wellyes, takes Lipitor     TESTS ORDERED: none this visit     PREVIOUSLY ORDERED TESTS REVIEWED & DISCUSSED WITH THE PATIENT:     I personally reviewed & interpreted, all previously ordered tests as copied above. Latest Labs are pulled in to the note with dates. Labs, specially in Reference to Lipid profile, Cardiac testing in the form of Echo ( dated: ), stress tests ( dated: ) & other relevant cardiac testing reviewed with patient & recommendations made based on assessment of the results. Discussed role of Cardiac risk factors & effects + treatment of co morbidities with patient & advised accordingly. MEDICATIONS: List of medications patient is currently taking is reviewed in detail with the patient.  Discussed any side effects or problems taking the medication. Recommend Continue present management & medications as listed. AFFIRMATION: I spent at least 20 minutes of time reviewing patient's history, previous & current medical problems & all Labs + testing. This includes chart prep even prior to the vosit. Various goals are discussed and multiple questions answered. Relevant concelling performed. Office follow up in one year.

## 2023-03-08 ENCOUNTER — HOSPITAL ENCOUNTER (OUTPATIENT)
Dept: ULTRASOUND IMAGING | Age: 63
Discharge: HOME | End: 2023-03-08
Payer: MEDICAID

## 2023-03-08 DIAGNOSIS — N40.1 BPH WITH URINARY OBSTRUCTION: ICD-10-CM

## 2023-03-08 DIAGNOSIS — N39.0 RECURRENT URINARY TRACT INFECTION: ICD-10-CM

## 2023-03-08 DIAGNOSIS — N13.8 BPH WITH URINARY OBSTRUCTION: ICD-10-CM

## 2023-03-08 PROCEDURE — 76770 US EXAM ABDO BACK WALL COMP: CPT

## 2023-05-02 DIAGNOSIS — R13.19 ESOPHAGEAL DYSPHAGIA: ICD-10-CM

## 2023-05-02 RX ORDER — OMEPRAZOLE 20 MG/1
CAPSULE, DELAYED RELEASE ORAL
Qty: 60 CAPSULE | Refills: 0 | Status: SHIPPED | OUTPATIENT
Start: 2023-05-02

## 2023-05-17 ENCOUNTER — OFFICE VISIT (OUTPATIENT)
Dept: SURGERY | Age: 63
End: 2023-05-17
Payer: MEDICAID

## 2023-05-17 VITALS
OXYGEN SATURATION: 97 % | HEART RATE: 66 BPM | SYSTOLIC BLOOD PRESSURE: 106 MMHG | DIASTOLIC BLOOD PRESSURE: 68 MMHG | HEIGHT: 69 IN | BODY MASS INDEX: 21.49 KG/M2 | WEIGHT: 145.1 LBS

## 2023-05-17 DIAGNOSIS — R10.13 EPIGASTRIC PAIN: Primary | ICD-10-CM

## 2023-05-17 PROCEDURE — 4004F PT TOBACCO SCREEN RCVD TLK: CPT | Performed by: SURGERY

## 2023-05-17 PROCEDURE — G8420 CALC BMI NORM PARAMETERS: HCPCS | Performed by: SURGERY

## 2023-05-17 PROCEDURE — 99214 OFFICE O/P EST MOD 30 MIN: CPT | Performed by: SURGERY

## 2023-05-17 PROCEDURE — 3017F COLORECTAL CA SCREEN DOC REV: CPT | Performed by: SURGERY

## 2023-05-17 PROCEDURE — 3078F DIAST BP <80 MM HG: CPT | Performed by: SURGERY

## 2023-05-17 PROCEDURE — G8427 DOCREV CUR MEDS BY ELIG CLIN: HCPCS | Performed by: SURGERY

## 2023-05-17 PROCEDURE — 3074F SYST BP LT 130 MM HG: CPT | Performed by: SURGERY

## 2023-05-17 ASSESSMENT — PATIENT HEALTH QUESTIONNAIRE - PHQ9
1. LITTLE INTEREST OR PLEASURE IN DOING THINGS: 0
2. FEELING DOWN, DEPRESSED OR HOPELESS: 0
SUM OF ALL RESPONSES TO PHQ9 QUESTIONS 1 & 2: 0
SUM OF ALL RESPONSES TO PHQ QUESTIONS 1-9: 0

## 2023-05-20 RX ORDER — SODIUM CHLORIDE 0.9 % (FLUSH) 0.9 %
10 SYRINGE (ML) INJECTION PRN
OUTPATIENT
Start: 2023-05-20

## 2023-05-20 RX ORDER — SODIUM CHLORIDE 0.9 % (FLUSH) 0.9 %
10 SYRINGE (ML) INJECTION EVERY 12 HOURS SCHEDULED
OUTPATIENT
Start: 2023-05-20

## 2023-05-20 RX ORDER — SODIUM CHLORIDE 9 MG/ML
25 INJECTION, SOLUTION INTRAVENOUS PRN
OUTPATIENT
Start: 2023-05-20

## 2023-05-20 RX ORDER — SODIUM CHLORIDE, SODIUM LACTATE, POTASSIUM CHLORIDE, CALCIUM CHLORIDE 600; 310; 30; 20 MG/100ML; MG/100ML; MG/100ML; MG/100ML
INJECTION, SOLUTION INTRAVENOUS CONTINUOUS
OUTPATIENT
Start: 2023-05-20

## 2023-05-20 NOTE — PROGRESS NOTES
Hematological: Does not bruise/bleed easily. OBJECTIVE:  Physical Exam:    /68 (Site: Left Upper Arm, Position: Sitting, Cuff Size: Medium Adult)   Pulse 66   Ht 5' 9\" (1.753 m)   Wt 145 lb 1.6 oz (65.8 kg)   SpO2 97%   BMI 21.43 kg/m²      Physical Exam  General: awake, alert, in no acute distress  HEENT: mucous membranes moist  Respiratory: normal effort, no wheezes appreciated  CV: appears well perfused, regular rate and rhythm    Abdomen: Soft, non-tender, non-distended. No guarding or rebound tenderness. Skin: warm and dry  Extremities: atraumatic  Neuro: no focal deficits noted  Psych: mood normal        ASSESSMENT:  1. Epigastric pain      65 y.o. male with dysphagia symptoms. He had significant esophagitis on EGD a couple years ago. He remains on PPI. PLAN:  Treatment:    - will plan for EGD  - Cardiology evaluation pre-procedure        Patient counseled on risks, benefits, and alternatives of treatment plan at length today. Patient states an understanding and willingness to proceed with plan.           Meredith Castro MD

## 2023-05-22 ENCOUNTER — TELEPHONE (OUTPATIENT)
Dept: CARDIOLOGY CLINIC | Age: 63
End: 2023-05-22

## 2023-05-25 ENCOUNTER — TELEPHONE (OUTPATIENT)
Dept: CARDIOLOGY CLINIC | Age: 63
End: 2023-05-25

## 2023-06-19 ENCOUNTER — OFFICE VISIT (OUTPATIENT)
Dept: CARDIOLOGY CLINIC | Age: 63
End: 2023-06-19
Payer: MEDICAID

## 2023-06-19 VITALS
WEIGHT: 144 LBS | HEIGHT: 69 IN | HEART RATE: 54 BPM | DIASTOLIC BLOOD PRESSURE: 88 MMHG | SYSTOLIC BLOOD PRESSURE: 130 MMHG | BODY MASS INDEX: 21.33 KG/M2

## 2023-06-19 DIAGNOSIS — I10 ESSENTIAL HYPERTENSION: Primary | ICD-10-CM

## 2023-06-19 DIAGNOSIS — F17.219 CIGARETTE NICOTINE DEPENDENCE WITH NICOTINE-INDUCED DISORDER: ICD-10-CM

## 2023-06-19 DIAGNOSIS — Z98.890 STATUS POST LAPAROSCOPIC NISSEN FUNDOPLICATION: ICD-10-CM

## 2023-06-19 DIAGNOSIS — E78.5 DYSLIPIDEMIA: ICD-10-CM

## 2023-06-19 PROCEDURE — 3079F DIAST BP 80-89 MM HG: CPT | Performed by: INTERNAL MEDICINE

## 2023-06-19 PROCEDURE — 4004F PT TOBACCO SCREEN RCVD TLK: CPT | Performed by: INTERNAL MEDICINE

## 2023-06-19 PROCEDURE — G8427 DOCREV CUR MEDS BY ELIG CLIN: HCPCS | Performed by: INTERNAL MEDICINE

## 2023-06-19 PROCEDURE — G8420 CALC BMI NORM PARAMETERS: HCPCS | Performed by: INTERNAL MEDICINE

## 2023-06-19 PROCEDURE — 3075F SYST BP GE 130 - 139MM HG: CPT | Performed by: INTERNAL MEDICINE

## 2023-06-19 PROCEDURE — 3017F COLORECTAL CA SCREEN DOC REV: CPT | Performed by: INTERNAL MEDICINE

## 2023-06-19 PROCEDURE — 93000 ELECTROCARDIOGRAM COMPLETE: CPT | Performed by: INTERNAL MEDICINE

## 2023-06-19 PROCEDURE — 99213 OFFICE O/P EST LOW 20 MIN: CPT | Performed by: INTERNAL MEDICINE

## 2023-06-19 RX ORDER — VERAPAMIL HYDROCHLORIDE 120 MG/1
CAPSULE, EXTENDED RELEASE ORAL DAILY
COMMUNITY
Start: 2023-05-18

## 2023-06-19 NOTE — PATIENT INSTRUCTIONS
CORONARY ARTERY DISEASE:None significant. Chest pain symptoms are probably non-cardiac. Most likely  has Costochondritis. Explained to the patient. EK: NSR 76 / minNo acute ST-T abnormalities. CARDIOLITE 4/2021    Normal study. Normal perfusion study with normal distribution in all coronal, short, and    horizontal axis. The observed defect is consistent with diaphragmatic attenuation. Normal LV function. LVEF is 48 %. CATH 8/2017  1. Left main is patent. 2.  Circ and OM1 have mild disease. 3.  LAD and diagonal branch have mild disease. 4.  RCA is normal.  5.  EDP was around 15 mmHg. EKG: NSR,54/min, WNL     HTN:Yes  well controlled on current medical regimen, see list above. - changes in  treatment:   no   VHD:no              No significant VHD noted  ECHO 4/2021   Left ventricular function is low normal, EF is estimated at 50-55%. Mild left ventricular hypertrophy. E/A reversal; indeterminate diastolic function. Mild mitral and tricuspid regurgitation is present. RVSP is 21 mmHg. Dilation of the aortic root(4.2) and the ascending aorta(4.0). No evidence of pericardial effusion. DYSLIPIDEMIA: yes,   Patient's profile is at / near [address]  current medical regimen wellyes, takes Lipitor     TESTS ORDERED: none this visit      PREVIOUSLY ORDERED TESTS REVIEWED & DISCUSSED WITH THE PATIENT:     I personally reviewed & interpreted, all previously ordered tests as copied above. Latest Labs are pulled in to the note with dates. Labs, specially in Reference to Lipid profile, Cardiac testing in the form of Echo ( dated: ), stress tests ( dated: ) & other relevant cardiac testing reviewed with patient & recommendations made based on assessment of the results. Discussed role of Cardiac risk factors & effects + treatment of co morbidities with patient & advised accordingly.      MEDICATIONS: List of medications patient is currently taking is reviewed in detail with the

## 2023-06-19 NOTE — PROGRESS NOTES
in all coronal, short, and    horizontal axis. The observed defect is consistent with diaphragmatic attenuation. Normal LV function. LVEF is 48 %. CATH 8/2017  1. Left main is patent. 2.  Circ and OM1 have mild disease. 3.  LAD and diagonal branch have mild disease. 4.  RCA is normal.  5.  EDP was around 15 mmHg. EKG: NSR,54/min, WNL     HTN:Yes  well controlled on current medical regimen, see list above. - changes in  treatment:   no   VHD:no              No significant VHD noted  ECHO 4/2021   Left ventricular function is low normal, EF is estimated at 50-55%. Mild left ventricular hypertrophy. E/A reversal; indeterminate diastolic function. Mild mitral and tricuspid regurgitation is present. RVSP is 21 mmHg. Dilation of the aortic root(4.2) and the ascending aorta(4.0). No evidence of pericardial effusion. DYSLIPIDEMIA: yes,   Patient's profile is at / near [address]  current medical regimen wellyes, takes Lipitor     TESTS ORDERED: none this visit      PREVIOUSLY ORDERED TESTS REVIEWED & DISCUSSED WITH THE PATIENT:     I personally reviewed & interpreted, all previously ordered tests as copied above. Latest Labs are pulled in to the note with dates. Labs, specially in Reference to Lipid profile, Cardiac testing in the form of Echo ( dated: ), stress tests ( dated: ) & other relevant cardiac testing reviewed with patient & recommendations made based on assessment of the results. Discussed role of Cardiac risk factors & effects + treatment of co morbidities with patient & advised accordingly. MEDICATIONS: List of medications patient is currently taking is reviewed in detail with the patient. Discussed any side effects or problems taking the medication. Recommend Continue present management & medications as listed. Patient is considered a low risk candidate for EGD.     AFFIRMATION: I spent at least 20 minutes of time reviewing patient's history, previous &

## 2023-06-21 ENCOUNTER — TELEPHONE (OUTPATIENT)
Dept: SURGERY | Age: 63
End: 2023-06-21

## 2023-07-05 ENCOUNTER — ANESTHESIA EVENT (OUTPATIENT)
Dept: ENDOSCOPY | Age: 63
End: 2023-07-05
Payer: MEDICAID

## 2023-07-05 ASSESSMENT — LIFESTYLE VARIABLES: SMOKING_STATUS: 1

## 2023-07-05 NOTE — ANESTHESIA PRE PROCEDURE
Department of Anesthesiology  Preprocedure Note       Name:  Ezra Lozada   Age:  65 y.o.  :  1960                                          MRN:  [id number]         Date:  2023      Surgeon: Swathi Morton):  Esau Bosworth, MD    Procedure: Procedure(s):  EGD ESOPHAGOGASTRODUODENOSCOPY    Medications prior to admission:   Prior to Admission medications    Medication Sig Start Date End Date Taking?  Authorizing Provider   verapamil (VERELAN) 120 MG extended release capsule Take by mouth daily  Patient not taking: Reported on 2023   Historical Provider, MD   omeprazole (PRILOSEC) 20 MG delayed release capsule TAKE 1 CAPSULE BY MOUTH TWO TIMES A DAY 23   Esau Bosworth, MD   acarbose (PRECOSE) 25 MG tablet take 1 tablet by mouth 3 times every day at the start (with the first bite) of each main meal 22   Historical Provider, MD   pantoprazole (PROTONIX) 40 MG tablet  22   Historical Provider, MD   tamsulosin (FLOMAX) 0.4 MG capsule Take 1 capsule by mouth daily 22   Esau Bosworth, MD   Psyllium 48.57 % POWD Take 1 Dose by mouth every morning  Patient not taking: Reported on 22   Esau Bosworth, MD   dicyclomine (BENTYL) 20 MG tablet Take 1 tablet by mouth 4 times daily as needed (Abdominal pain, spasm) 10/26/22   Barrera Moise DO   hydrocortisone (ANUSOL-HC) 2.5 % CREA rectal cream APPLY TO HEMMORROIDS topically UP TO four TIMES PER DAY AS NEEDED FOR PAIN OR BURNING 22   Esau Bosworth, MD   nystatin (MYCOSTATIN) 410970 UNIT/ML suspension TAKE 5 MLS BY MOUTH FOUR TIMES DAILY FOR 10 DAYS  Patient not taking: Reported on 2023   Esau Bosworth, MD   metoclopramide (REGLAN) 10 MG tablet Take 1 tablet by mouth 3 times daily (with meals) 22   Esau Bosworth, MD   ibuprofen (ADVIL;MOTRIN) 600 MG tablet TAKE 1 TABLET BY MOUTH THREE TIMES A DAY AS NEEDED with food 22   Historical Provider, MD   tiZANidine

## 2023-07-06 NOTE — PROGRESS NOTES
Spoke with Parviz Heath patient's GF and patient will arrive at [address] at [facility] on 7/7/2023. Orders in epic. I could not contact patient on his phone.

## 2023-07-07 ENCOUNTER — HOSPITAL ENCOUNTER (OUTPATIENT)
Age: 63
Setting detail: OUTPATIENT SURGERY
Discharge: HOME | End: 2023-07-07
Attending: SURGERY | Admitting: SURGERY
Payer: MEDICAID

## 2023-07-07 ENCOUNTER — ANESTHESIA (OUTPATIENT)
Dept: ENDOSCOPY | Age: 63
End: 2023-07-07
Payer: MEDICAID

## 2023-07-07 VITALS
SYSTOLIC BLOOD PRESSURE: 115 MMHG | TEMPERATURE: 96.4 F | BODY MASS INDEX: 22.07 KG/M2 | HEIGHT: 69 IN | WEIGHT: 149 LBS | OXYGEN SATURATION: 96 % | HEART RATE: 59 BPM | DIASTOLIC BLOOD PRESSURE: 90 MMHG | RESPIRATION RATE: 16 BRPM

## 2023-07-07 DIAGNOSIS — R10.13 EPIGASTRIC PAIN: ICD-10-CM

## 2023-07-07 LAB — GLUCOSE BLD-MCNC: 92 MG/DL (ref 70–99)

## 2023-07-07 PROCEDURE — 2580000003 HC RX 258: Performed by: SURGERY

## 2023-07-07 PROCEDURE — 2580000003 HC RX 258: Performed by: NURSE ANESTHETIST, CERTIFIED REGISTERED

## 2023-07-07 PROCEDURE — 43239 EGD BIOPSY SINGLE/MULTIPLE: CPT | Performed by: SURGERY

## 2023-07-07 PROCEDURE — 88305 TISSUE EXAM BY PATHOLOGIST: CPT

## 2023-07-07 PROCEDURE — 7100000010 HC PHASE II RECOVERY - FIRST 15 MIN: Performed by: SURGERY

## 2023-07-07 PROCEDURE — 2500000003 HC RX 250 WO HCPCS: Performed by: NURSE ANESTHETIST, CERTIFIED REGISTERED

## 2023-07-07 PROCEDURE — 7100000011 HC PHASE II RECOVERY - ADDTL 15 MIN: Performed by: SURGERY

## 2023-07-07 PROCEDURE — 3700000000 HC ANESTHESIA ATTENDED CARE: Performed by: SURGERY

## 2023-07-07 PROCEDURE — 88342 IMHCHEM/IMCYTCHM 1ST ANTB: CPT

## 2023-07-07 PROCEDURE — 82962 GLUCOSE BLOOD TEST: CPT

## 2023-07-07 PROCEDURE — 6360000002 HC RX W HCPCS: Performed by: NURSE ANESTHETIST, CERTIFIED REGISTERED

## 2023-07-07 PROCEDURE — 3700000001 HC ADD 15 MINUTES (ANESTHESIA): Performed by: SURGERY

## 2023-07-07 PROCEDURE — 3609012400 HC EGD TRANSORAL BIOPSY SINGLE/MULTIPLE: Performed by: SURGERY

## 2023-07-07 PROCEDURE — 2709999900 HC NON-CHARGEABLE SUPPLY: Performed by: SURGERY

## 2023-07-07 RX ORDER — METOCLOPRAMIDE 10 MG/1
10 TABLET ORAL
Qty: 120 TABLET | Refills: 3 | Status: SHIPPED | OUTPATIENT
Start: 2023-07-07

## 2023-07-07 RX ORDER — SODIUM CHLORIDE, SODIUM LACTATE, POTASSIUM CHLORIDE, CALCIUM CHLORIDE 600; 310; 30; 20 MG/100ML; MG/100ML; MG/100ML; MG/100ML
INJECTION, SOLUTION INTRAVENOUS CONTINUOUS PRN
Status: DISCONTINUED | OUTPATIENT
Start: 2023-07-07 | End: 2023-07-07 | Stop reason: SDUPTHER

## 2023-07-07 RX ORDER — SODIUM CHLORIDE 0.9 % (FLUSH) 0.9 %
10 SYRINGE (ML) INJECTION EVERY 12 HOURS SCHEDULED
Status: DISCONTINUED | OUTPATIENT
Start: 2023-07-07 | End: 2023-07-07 | Stop reason: HOSPADM

## 2023-07-07 RX ORDER — LIDOCAINE HYDROCHLORIDE 20 MG/ML
INJECTION, SOLUTION EPIDURAL; INFILTRATION; INTRACAUDAL; PERINEURAL PRN
Status: DISCONTINUED | OUTPATIENT
Start: 2023-07-07 | End: 2023-07-07 | Stop reason: SDUPTHER

## 2023-07-07 RX ORDER — SODIUM CHLORIDE 0.9 % (FLUSH) 0.9 %
10 SYRINGE (ML) INJECTION PRN
Status: DISCONTINUED | OUTPATIENT
Start: 2023-07-07 | End: 2023-07-07 | Stop reason: HOSPADM

## 2023-07-07 RX ORDER — SODIUM CHLORIDE 9 MG/ML
500 INJECTION, SOLUTION INTRAVENOUS PRN
Status: DISCONTINUED | OUTPATIENT
Start: 2023-07-07 | End: 2023-07-07 | Stop reason: HOSPADM

## 2023-07-07 RX ORDER — SODIUM CHLORIDE, SODIUM LACTATE, POTASSIUM CHLORIDE, CALCIUM CHLORIDE 600; 310; 30; 20 MG/100ML; MG/100ML; MG/100ML; MG/100ML
1000 INJECTION, SOLUTION INTRAVENOUS CONTINUOUS
Status: DISCONTINUED | OUTPATIENT
Start: 2023-07-07 | End: 2023-07-07 | Stop reason: HOSPADM

## 2023-07-07 RX ORDER — PROPOFOL 10 MG/ML
INJECTION, EMULSION INTRAVENOUS PRN
Status: DISCONTINUED | OUTPATIENT
Start: 2023-07-07 | End: 2023-07-07 | Stop reason: SDUPTHER

## 2023-07-07 RX ADMIN — PROPOFOL 190 MG: 10 INJECTION, EMULSION INTRAVENOUS at 07:53

## 2023-07-07 RX ADMIN — SODIUM CHLORIDE, POTASSIUM CHLORIDE, SODIUM LACTATE AND CALCIUM CHLORIDE: 600; 310; 30; 20 INJECTION, SOLUTION INTRAVENOUS at 07:50

## 2023-07-07 RX ADMIN — LIDOCAINE HYDROCHLORIDE 100 MG: 20 INJECTION, SOLUTION EPIDURAL; INFILTRATION; INTRACAUDAL; PERINEURAL at 07:53

## 2023-07-07 RX ADMIN — SODIUM CHLORIDE, POTASSIUM CHLORIDE, SODIUM LACTATE AND CALCIUM CHLORIDE 1000 ML: 600; 310; 30; 20 INJECTION, SOLUTION INTRAVENOUS at 06:36

## 2023-07-07 ASSESSMENT — LIFESTYLE VARIABLES: SMOKING_STATUS: 1

## 2023-07-07 ASSESSMENT — PAIN SCALES - GENERAL
PAINLEVEL_OUTOF10: 0
PAINLEVEL_OUTOF10: 0

## 2023-07-07 ASSESSMENT — PAIN - FUNCTIONAL ASSESSMENT: PAIN_FUNCTIONAL_ASSESSMENT: 0-10

## 2023-07-07 NOTE — H&P
H&P  CC: dysphagia      SUBJECTIVE:  HPI:   11/16/22  Patient is here with complaints of difficulty urinating. He reports lower abdominal pain and fullness. He was recently seen in the ER and told to follow up with GI for his abdominal symptoms. He reports daily BM but his CT on 10/25 showed constipation. He denies blood in the urine or stool. He denies ever seeing Urology. He does not take flomax. He reports only being able to pee small amounts when he urinates and reports strong odor. UA on 10/25 was normal.      2/15/2023  Re-evaluated again today. He reports ongoing abdominal pain and urinary symptoms. He reports being treated for multiple urinary infections since last seen. He was encouraged by myself and by Gastroenterology to establish with Urology for evaluation. He has an appointment with Urology on 3/1. He reports epigastric pain. Still with constipation at times going several days without BM. He takes stool softeners but has not been taking a fiber supplement. His last EGD showed gastritis and esophagitis. He continues to take omeprazole bid as well as sucralfate. Still smoking. His colonoscopy last year only showed internal hemorrhoids and hyperplastic polyp. CT imaging reviewed from last November and no cause of his symptoms were identified. 5/20/2023  Seen again today. Reports symptoms of dysphagia with food causing pain and getting stuck in the mid esophagus. Denies abdominal pain. Having bowel function. He did have significant esophagitis on last EGD in Dec 2021.    7/7/2023  Seen and examined again today. Here for EGD. Denies changes in their health since last seen. Denies questions regarding surgery today.     Past Surgical History:   Procedure Laterality Date    ARM SURGERY Left 30+ yrs    \"put plastic ligaments in \"  after injury through glass window    CARPAL TUNNEL RELEASE Right 10/4/2019    RIGHT CARPAL TUNNEL RELEASE performed by Mich Schmitz DO at [address]

## 2023-07-07 NOTE — DISCHARGE INSTRUCTIONS
EGD    DR. ABRAHAM    OFFICE NUMBER [phone]    FOLLOW UP APPOINTMENT   AS  NEEDED  REPEAT PROCEDURE   AS  NEEDED. TEST ORDERED:NONE  . DR WILL CALL WITH BIOPSY RESULTS    What to Expect at Home  Your Recovery:  The only discomfort after your EGD is generally limited to a mild soreness of the throat, which may last a day or two. Call your physician immediately if you have severe chest pain, shortness of breath or a temperature of 100 degrees or higher if taken orally. How can you care for yourself at home? Activity  Rest as much as you need to after you go home. You should be able to go back to your usual activities the day after the test.  Diet  Follow your doctor's directions for eating after the test.  Drink plenty of fluids (unless your doctor has told you not to). Medications  If you have a sore throat the day after the test, use an over-the-counter spray to numb your throat. Your doctor will tell you if and when you can restart your medicines. He or she will also give you instructions about taking any new medicines. If you take blood thinners, such as warfarin (Coumadin), clopidogrel (Plavix), or aspirin, be sure to talk to your doctor. He or she will tell you if and when to start taking those medicines again. Make sure that you understand exactly what your doctor wants you to do. If a biopsy was done during the test, your doctor may tell you not to take aspirin or other anti-inflammatory medicines for a few days. These include ibuprofen (Advil, Motrin) and naproxen (Aleve). DO NOT DRINK ANYTHING WITH ALCOHOL TODAY. Other instructions:Anesthesia  For your safety, do not drive or operate machinery for 24 hours. Do not sign legal documents or make major decisions for 24 hours. The anesthesia can make it hard for you to fully understand what you are agreeing to. Follow-up care is a key part of your treatment and safety.  Be sure to make and go to all appointments, and call your doctor

## 2023-07-07 NOTE — ANESTHESIA PRE PROCEDURE
Department of Anesthesiology  Preprocedure Note       Name:  Alissa Akers   Age:  65 y.o.  :  1960                                          MRN:  [id number]         Date:  2023      Surgeon: Gabo Wright):  Jerri Mcdermott MD    Procedure: Procedure(s):  EGD ESOPHAGOGASTRODUODENOSCOPY    Medications prior to admission:   Prior to Admission medications    Medication Sig Start Date End Date Taking?  Authorizing Provider   verapamil (VERELAN) 120 MG extended release capsule Take by mouth daily  Patient not taking: Reported on 2023   Historical Provider, MD   omeprazole (PRILOSEC) 20 MG delayed release capsule TAKE 1 CAPSULE BY MOUTH TWO TIMES A DAY 23   Jerri Mcdermott MD   acarbose (PRECOSE) 25 MG tablet take 1 tablet by mouth 3 times every day at the start (with the first bite) of each main meal 22   Historical Provider, MD   pantoprazole (PROTONIX) 40 MG tablet  22   Historical Provider, MD   tamsulosin (FLOMAX) 0.4 MG capsule Take 1 capsule by mouth daily 22   Jerri Mcdermott MD   Psyllium 48.57 % POWD Take 1 Dose by mouth every morning  Patient not taking: Reported on 22   Jerri Mcdermott MD   dicyclomine (BENTYL) 20 MG tablet Take 1 tablet by mouth 4 times daily as needed (Abdominal pain, spasm) 10/26/22   Barrera Bailon DO   hydrocortisone (ANUSOL-HC) 2.5 % CREA rectal cream APPLY TO HEMMORROIDS topically UP TO four TIMES PER DAY AS NEEDED FOR PAIN OR BURNING 22   Jerri Mcdermott MD   nystatin (MYCOSTATIN) 652619 UNIT/ML suspension TAKE 5 MLS BY MOUTH FOUR TIMES DAILY FOR 10 DAYS  Patient not taking: Reported on 2023   Jerri Mcdermott MD   metoclopramide (REGLAN) 10 MG tablet Take 1 tablet by mouth 3 times daily (with meals) 22   Jerri Mcdermott MD   ibuprofen (ADVIL;MOTRIN) 600 MG tablet TAKE 1 TABLET BY MOUTH THREE TIMES A DAY AS NEEDED with food 22   Historical Provider, MD   tiZANidine

## 2023-07-07 NOTE — PROGRESS NOTES
0815  Pt back to Same Day from Endo per cart. Pt is sleepy but responds to name. Report received from Marcy Tello Ped in to talk to patient. Pt given juice and micki crackers per request.  Brother brought back to room. 0840 VS remain stable. Pt ready to go home. IV dc'd for pt to get dressed. 8811 Pt instructed for discharge care and follow-up by DILLON BOOGIE [facility] RN with understanding voiced. 0900  Pt to car at exit per wheelchair.

## 2023-07-07 NOTE — PROGRESS NOTES
Received report from Diya Dejesus. Patient alert and oriented. Verified patient's name, , allergies and procedure. No beta blockers, no blood thinners, no implants, pt took ibuprofen 23. Consents verified.  Updated HP needed

## 2023-07-07 NOTE — ANESTHESIA POSTPROCEDURE EVALUATION
Department of Anesthesiology  Postprocedure Note    Patient: Vanna Phan  MRN: [id number]  YOB: 1960  Date of evaluation: 7/7/2023      Procedure Summary     Date: 07/07/23 Room / Location: [address] [place] [address]    Anesthesia Start: 2887 Anesthesia Stop: Morna Gauze    Procedure: EGD BIOPSY Diagnosis:       Epigastric pain      (Epigastric pain [R10.13])    Surgeons: Amauri Spears MD Responsible Provider: Jillian Romero MD    Anesthesia Type: MAC ASA Status: 3          Anesthesia Type: MAC    Sreedhar Phase I: Sreedhar Score: 10    Sreedhar Phase II:        Anesthesia Post Evaluation    Patient location during evaluation: bedside  Patient participation: complete - patient participated  Level of consciousness: sleepy but conscious  Pain score: 0  Airway patency: patent  Nausea & Vomiting: no nausea and no vomiting  Complications: no  Cardiovascular status: blood pressure returned to baseline and hemodynamically stable  Respiratory status: acceptable  Hydration status: stable

## 2023-08-25 ENCOUNTER — TELEPHONE (OUTPATIENT)
Dept: CARDIOLOGY CLINIC | Age: 63
End: 2023-08-25

## 2023-09-01 ENCOUNTER — APPOINTMENT (OUTPATIENT)
Dept: CT IMAGING | Age: 63
End: 2023-09-01
Payer: MEDICAID

## 2023-09-01 ENCOUNTER — HOSPITAL ENCOUNTER (EMERGENCY)
Age: 63
Discharge: HOME | End: 2023-09-01
Payer: MEDICAID

## 2023-09-01 VITALS
RESPIRATION RATE: 17 BRPM | HEART RATE: 48 BPM | SYSTOLIC BLOOD PRESSURE: 128 MMHG | DIASTOLIC BLOOD PRESSURE: 76 MMHG | OXYGEN SATURATION: 97 % | TEMPERATURE: 98 F

## 2023-09-01 DIAGNOSIS — R10.9 ABDOMINAL PAIN, UNSPECIFIED ABDOMINAL LOCATION: Primary | ICD-10-CM

## 2023-09-01 LAB
ALBUMIN SERPL-MCNC: 4.4 GM/DL (ref 3.4–5)
ALP BLD-CCNC: 97 IU/L (ref 40–129)
ALT SERPL-CCNC: 12 U/L (ref 10–40)
ANION GAP SERPL CALCULATED.3IONS-SCNC: 10 MMOL/L (ref 4–16)
AST SERPL-CCNC: 21 IU/L (ref 15–37)
BACTERIA: NEGATIVE /HPF
BASOPHILS ABSOLUTE: 0 K/CU MM
BASOPHILS RELATIVE PERCENT: 0.5 % (ref 0–1)
BILIRUB SERPL-MCNC: 0.2 MG/DL (ref 0–1)
BILIRUBIN URINE: NEGATIVE MG/DL
BLOOD, URINE: NEGATIVE
BUN SERPL-MCNC: 14 MG/DL (ref 6–23)
CALCIUM SERPL-MCNC: 9 MG/DL (ref 8.3–10.6)
CHLORIDE BLD-SCNC: 105 MMOL/L (ref 99–110)
CLARITY: CLEAR
CO2: 27 MMOL/L (ref 21–32)
COLOR: YELLOW
CREAT SERPL-MCNC: 0.7 MG/DL (ref 0.9–1.3)
DIFFERENTIAL TYPE: ABNORMAL
EOSINOPHILS ABSOLUTE: 0.2 K/CU MM
EOSINOPHILS RELATIVE PERCENT: 2.1 % (ref 0–3)
GFR SERPL CREATININE-BSD FRML MDRD: >60 ML/MIN/1.73M2
GLUCOSE SERPL-MCNC: 97 MG/DL (ref 70–99)
GLUCOSE, URINE: NEGATIVE MG/DL
HCT VFR BLD CALC: 40.4 % (ref 42–52)
HEMOGLOBIN: 13.2 GM/DL (ref 13.5–18)
IMMATURE NEUTROPHIL %: 0.3 % (ref 0–0.43)
KETONES, URINE: NEGATIVE MG/DL
LACTIC ACID, SEPSIS: 0.9 MMOL/L (ref 0.5–1.9)
LEUKOCYTE ESTERASE, URINE: ABNORMAL
LIPASE: 29 IU/L (ref 13–60)
LYMPHOCYTES ABSOLUTE: 1.8 K/CU MM
LYMPHOCYTES RELATIVE PERCENT: 23.6 % (ref 24–44)
MCH RBC QN AUTO: 29.3 PG (ref 27–31)
MCHC RBC AUTO-ENTMCNC: 32.7 % (ref 32–36)
MCV RBC AUTO: 89.6 FL (ref 78–100)
MONOCYTES ABSOLUTE: 0.6 K/CU MM
MONOCYTES RELATIVE PERCENT: 8 % (ref 0–4)
NITRITE URINE, QUANTITATIVE: NEGATIVE
NUCLEATED RBC %: 0 %
PDW BLD-RTO: 15 % (ref 11.7–14.9)
PH, URINE: 7 (ref 5–8)
PLATELET # BLD: 239 K/CU MM (ref 140–440)
PMV BLD AUTO: 9.5 FL (ref 7.5–11.1)
POTASSIUM SERPL-SCNC: 4.2 MMOL/L (ref 3.5–5.1)
PROTEIN UA: NEGATIVE MG/DL
RBC # BLD: 4.51 M/CU MM (ref 4.6–6.2)
RBC URINE: 0 /HPF (ref 0–3)
SEGMENTED NEUTROPHILS ABSOLUTE COUNT: 5 K/CU MM
SEGMENTED NEUTROPHILS RELATIVE PERCENT: 65.5 % (ref 36–66)
SODIUM BLD-SCNC: 142 MMOL/L (ref 135–145)
SPECIFIC GRAVITY UA: 1.01 (ref 1–1.03)
SQUAMOUS EPITHELIAL: <1 /HPF
TOTAL IMMATURE NEUTOROPHIL: 0.02 K/CU MM
TOTAL NUCLEATED RBC: 0 K/CU MM
TOTAL PROTEIN: 6.4 GM/DL (ref 6.4–8.2)
TRICHOMONAS: ABNORMAL /HPF
UROBILINOGEN, URINE: 0.2 MG/DL (ref 0.2–1)
WBC # BLD: 7.6 K/CU MM (ref 4–10.5)
WBC UA: 3 /HPF (ref 0–2)

## 2023-09-01 PROCEDURE — 83690 ASSAY OF LIPASE: CPT

## 2023-09-01 PROCEDURE — C9113 INJ PANTOPRAZOLE SODIUM, VIA: HCPCS | Performed by: NURSE PRACTITIONER

## 2023-09-01 PROCEDURE — 96361 HYDRATE IV INFUSION ADD-ON: CPT

## 2023-09-01 PROCEDURE — 6360000002 HC RX W HCPCS: Performed by: NURSE PRACTITIONER

## 2023-09-01 PROCEDURE — 74177 CT ABD & PELVIS W/CONTRAST: CPT

## 2023-09-01 PROCEDURE — 81001 URINALYSIS AUTO W/SCOPE: CPT

## 2023-09-01 PROCEDURE — 85025 COMPLETE CBC W/AUTO DIFF WBC: CPT

## 2023-09-01 PROCEDURE — 2580000003 HC RX 258: Performed by: NURSE PRACTITIONER

## 2023-09-01 PROCEDURE — 6360000004 HC RX CONTRAST MEDICATION: Performed by: NURSE PRACTITIONER

## 2023-09-01 PROCEDURE — 99285 EMERGENCY DEPT VISIT HI MDM: CPT

## 2023-09-01 PROCEDURE — 6370000000 HC RX 637 (ALT 250 FOR IP): Performed by: NURSE PRACTITIONER

## 2023-09-01 PROCEDURE — 83605 ASSAY OF LACTIC ACID: CPT

## 2023-09-01 PROCEDURE — 96375 TX/PRO/DX INJ NEW DRUG ADDON: CPT

## 2023-09-01 PROCEDURE — 80053 COMPREHEN METABOLIC PANEL: CPT

## 2023-09-01 PROCEDURE — 96374 THER/PROPH/DIAG INJ IV PUSH: CPT

## 2023-09-01 RX ORDER — MAGNESIUM HYDROXIDE/ALUMINUM HYDROXICE/SIMETHICONE 120; 1200; 1200 MG/30ML; MG/30ML; MG/30ML
30 SUSPENSION ORAL ONCE
Status: COMPLETED | OUTPATIENT
Start: 2023-09-01 | End: 2023-09-01

## 2023-09-01 RX ORDER — PANTOPRAZOLE SODIUM 40 MG/10ML
40 INJECTION, POWDER, LYOPHILIZED, FOR SOLUTION INTRAVENOUS ONCE
Status: COMPLETED | OUTPATIENT
Start: 2023-09-01 | End: 2023-09-01

## 2023-09-01 RX ORDER — PANTOPRAZOLE SODIUM 20 MG/1
40 TABLET, DELAYED RELEASE ORAL
Qty: 28 TABLET | Refills: 0 | Status: SHIPPED | OUTPATIENT
Start: 2023-09-01 | End: 2023-09-15

## 2023-09-01 RX ORDER — MORPHINE SULFATE 4 MG/ML
4 INJECTION, SOLUTION INTRAMUSCULAR; INTRAVENOUS
Status: COMPLETED | OUTPATIENT
Start: 2023-09-01 | End: 2023-09-01

## 2023-09-01 RX ORDER — ONDANSETRON 2 MG/ML
4 INJECTION INTRAMUSCULAR; INTRAVENOUS ONCE
Status: COMPLETED | OUTPATIENT
Start: 2023-09-01 | End: 2023-09-01

## 2023-09-01 RX ORDER — 0.9 % SODIUM CHLORIDE 0.9 %
1000 INTRAVENOUS SOLUTION INTRAVENOUS ONCE
Status: COMPLETED | OUTPATIENT
Start: 2023-09-01 | End: 2023-09-01

## 2023-09-01 RX ADMIN — SODIUM CHLORIDE 1000 ML: 9 INJECTION, SOLUTION INTRAVENOUS at 11:56

## 2023-09-01 RX ADMIN — ALUMINUM HYDROXIDE, MAGNESIUM HYDROXIDE, AND SIMETHICONE 30 ML: 200; 200; 20 SUSPENSION ORAL at 14:34

## 2023-09-01 RX ADMIN — ONDANSETRON 4 MG: 2 INJECTION INTRAMUSCULAR; INTRAVENOUS at 11:57

## 2023-09-01 RX ADMIN — MORPHINE SULFATE 4 MG: 4 INJECTION, SOLUTION INTRAMUSCULAR; INTRAVENOUS at 11:56

## 2023-09-01 RX ADMIN — IOPAMIDOL 80 ML: 755 INJECTION, SOLUTION INTRAVENOUS at 12:39

## 2023-09-01 RX ADMIN — PANTOPRAZOLE SODIUM 40 MG: 40 INJECTION, POWDER, FOR SOLUTION INTRAVENOUS at 14:35

## 2023-09-01 ASSESSMENT — LIFESTYLE VARIABLES
HOW OFTEN DO YOU HAVE A DRINK CONTAINING ALCOHOL: NEVER
HOW MANY STANDARD DRINKS CONTAINING ALCOHOL DO YOU HAVE ON A TYPICAL DAY: PATIENT DOES NOT DRINK

## 2023-09-01 ASSESSMENT — PAIN SCALES - GENERAL
PAINLEVEL_OUTOF10: 9
PAINLEVEL_OUTOF10: 1
PAINLEVEL_OUTOF10: 1

## 2023-09-01 ASSESSMENT — PAIN - FUNCTIONAL ASSESSMENT: PAIN_FUNCTIONAL_ASSESSMENT: PREVENTS OR INTERFERES WITH MANY ACTIVE NOT PASSIVE ACTIVITIES

## 2023-09-01 ASSESSMENT — PAIN DESCRIPTION - ORIENTATION: ORIENTATION: LEFT;RIGHT;LOWER

## 2023-09-01 ASSESSMENT — PAIN DESCRIPTION - LOCATION: LOCATION: ABDOMEN

## 2023-09-01 ASSESSMENT — PAIN DESCRIPTION - DESCRIPTORS: DESCRIPTORS: ACHING;CRAMPING

## 2023-09-01 NOTE — DISCHARGE INSTRUCTIONS
As we discussed, your evaluation today did not reveal any concerning findings. Your increase in pain is likely secondary to worsening gastritis. We will trial switching you to pantoprazole for the next 2 weeks. Do not take the omeprazole at the same time. You can continue your Reglan. If you notice the pantoprazole is helping you more, discussed with primary care or GI ongoing prescription to continue. Monitor your stool for any black stools and if he notices get reevaluated right away. Do not take Pepto-Bismol as this can also cause your stools to turn black and will make it difficult to differentiate blood in the stool.

## 2023-09-01 NOTE — ED PROVIDER NOTES
[address] ENCOUNTER        Pt Name: Bridgette Chow  MRN: [id number]  [address] [facility] 1960  Date of evaluation: 9/1/2023  Provider: TIMOTHY Power - CNP  PCP: Christina Velasco MD    ARIADNA. I have evaluated this patient. Triage CHIEF COMPLAINT       Chief Complaint   Patient presents with    Abdominal Pain     ABD pain started Sunday night, woke up with pain. HISTORY OF PRESENT ILLNESS      Chief Complaint: Abdominal pain    Bridgette Chow is a 59 y.o. male who presents for evaluation of abdominal pain for the last 5 days. Patient reports that started in the evening on Sunday and when he woke up on Monday morning it was significantly more intense. He has a history of decreased gastric emptying with prior gastritis and admits that he missed taking his omeprazole and Reglan for couple of days. He thought that this had exacerbated his gastritis and did not come in to get checked out but over the last couple of days the pain has become more and more intense and he is having difficulty keeping down any solid foods or even fluids due to nausea and vomiting. He reports having a fever yesterday of 101 but none today. He has had some discomfort with urination and decreased urinary output. Denies any diarrhea. He does have some chronic history of constipation at times. Nursing Notes were all reviewed and agreed with or any disagreements were addressed in the HPI. REVIEW OF SYSTEMS     Pertinent ROS as noted in HPI.       PAST MEDICAL HISTORY     Past Medical History:   Diagnosis Date    Arthritis     Hands    Chronic cluster headache 6/7/2019 last    COPD (chronic obstructive pulmonary disease) ([address] )     \"dx 2 yrs ago\" \\"does not see a lung dr Bolivar Hernandez with PCP    Degenerative disc disease     \"in my back have degenarative disc\"    Depression     Edentulous     Fracture     \"fell over a 5 gallon bucket GASTROINTESTINAL ENDOSCOPY  12/2018    UPPER GASTROINTESTINAL ENDOSCOPY N/A 1/28/2019    EGD BIOPSY / BRUSHING OF ESOPHAGUS performed by Nyoka Severe, MD at [facility] N/A 4/8/2019    EGD BIOPSY AND BRUSHING performed by Nyoka Severe, MD at [facility] N/A 7/8/2019    EGD BIOPSY and brushing performed by Nyoka Severe, MD at [address] [place] [address] N/A 12/30/2019    EGD BIOPSY/BRUSHING OF ESOPHAGUS performed by Nyoka Severe, MD at [facility] 6/5/2020    EGD BIOPSY performed by Nyoka Severe, MD at [address] [place] [address] 6/1/2021    EGD DILATION BALLOON WITH 18-20 BALLOON UP TO 20 WITH BIOPSIES performed by Nyoka Severe, MD at [address] [place] [address] 6/29/2021     [facility] used 55, 50. with biopsies performed by Nyoka Severe, MD at [address] [place] [address] 12/16/2021    EGD BIOPSY performed by Jerri Mcdermott MD at [address] [place] [address] 7/7/2023    EGD BIOPSY performed by Jerri Mcdermott MD at [address] [facility]       Previous Medications    ACARBOSE (PRECOSE) 25 MG TABLET    take 1 tablet by mouth 3 times every day at the start (with the first bite) of each main meal    ACETAMINOPHEN (TYLENOL) 500 MG TABLET    Take 2 tablets by mouth every 6 hours as needed for Pain    ALBUTEROL SULFATE HFA (PROVENTIL HFA) 108 (90 BASE) MCG/ACT INHALER    Inhale 2 puffs into the lungs every 6 hours as needed for Wheezing    ALBUTEROL SULFATE  (90 BASE) MCG/ACT INHALER    Inhale 2 puffs into the lungs every 6 hours as needed    ATORVASTATIN (LIPITOR) 20 MG TABLET        CETIRIZINE (ZYRTEC) 10 MG TABLET    1 tablet daily    DICYCLOMINE (BENTYL) 20 MG TABLET    Take 1 tablet by

## 2023-09-03 ENCOUNTER — APPOINTMENT (OUTPATIENT)
Dept: CT IMAGING | Age: 63
End: 2023-09-03
Payer: MEDICAID

## 2023-09-03 ENCOUNTER — HOSPITAL ENCOUNTER (EMERGENCY)
Age: 63
Discharge: HOME | End: 2023-09-03
Attending: EMERGENCY MEDICINE
Payer: MEDICAID

## 2023-09-03 VITALS
RESPIRATION RATE: 20 BRPM | BODY MASS INDEX: 20.73 KG/M2 | HEART RATE: 68 BPM | DIASTOLIC BLOOD PRESSURE: 92 MMHG | HEIGHT: 69 IN | SYSTOLIC BLOOD PRESSURE: 136 MMHG | WEIGHT: 140 LBS | TEMPERATURE: 97.9 F | OXYGEN SATURATION: 97 %

## 2023-09-03 DIAGNOSIS — R33.9 URINARY RETENTION: Primary | ICD-10-CM

## 2023-09-03 LAB
BACTERIA: ABNORMAL /HPF
BASOPHILS ABSOLUTE: 0 K/CU MM
BASOPHILS RELATIVE PERCENT: 0.4 % (ref 0–1)
BILIRUBIN URINE: NEGATIVE MG/DL
BLOOD, URINE: NEGATIVE
CLARITY: CLEAR
COLOR: YELLOW
DIFFERENTIAL TYPE: ABNORMAL
EOSINOPHILS ABSOLUTE: 0.2 K/CU MM
EOSINOPHILS RELATIVE PERCENT: 2.8 % (ref 0–3)
GLUCOSE, URINE: NEGATIVE MG/DL
HCT VFR BLD CALC: 37.3 % (ref 42–52)
HEMOGLOBIN: 12.7 GM/DL (ref 13.5–18)
IMMATURE NEUTROPHIL %: 0.4 % (ref 0–0.43)
KETONES, URINE: NEGATIVE MG/DL
LEUKOCYTE ESTERASE, URINE: ABNORMAL
LYMPHOCYTES ABSOLUTE: 1.8 K/CU MM
LYMPHOCYTES RELATIVE PERCENT: 25.4 % (ref 24–44)
MCH RBC QN AUTO: 29.5 PG (ref 27–31)
MCHC RBC AUTO-ENTMCNC: 34 % (ref 32–36)
MCV RBC AUTO: 86.7 FL (ref 78–100)
MONOCYTES ABSOLUTE: 0.6 K/CU MM
MONOCYTES RELATIVE PERCENT: 7.8 % (ref 0–4)
NITRITE URINE, QUANTITATIVE: NEGATIVE
NUCLEATED RBC %: 0 %
PDW BLD-RTO: 15 % (ref 11.7–14.9)
PH, URINE: 6 (ref 5–8)
PLATELET # BLD: 223 K/CU MM (ref 140–440)
PMV BLD AUTO: 8.5 FL (ref 7.5–11.1)
PRO-BNP: 91.86 PG/ML
PROTEIN UA: NEGATIVE MG/DL
RBC # BLD: 4.3 M/CU MM (ref 4.6–6.2)
RBC URINE: 1 /HPF (ref 0–3)
SEGMENTED NEUTROPHILS ABSOLUTE COUNT: 4.5 K/CU MM
SEGMENTED NEUTROPHILS RELATIVE PERCENT: 63.2 % (ref 36–66)
SPECIFIC GRAVITY UA: <1.005 (ref 1–1.03)
TOTAL IMMATURE NEUTOROPHIL: 0.03 K/CU MM
TOTAL NUCLEATED RBC: 0 K/CU MM
TRICHOMONAS: ABNORMAL /HPF
UROBILINOGEN, URINE: 0.2 MG/DL (ref 0.2–1)
WBC # BLD: 7.2 K/CU MM (ref 4–10.5)
WBC UA: 3 /HPF (ref 0–2)

## 2023-09-03 PROCEDURE — 99285 EMERGENCY DEPT VISIT HI MDM: CPT

## 2023-09-03 PROCEDURE — 85025 COMPLETE CBC W/AUTO DIFF WBC: CPT

## 2023-09-03 PROCEDURE — 84484 ASSAY OF TROPONIN QUANT: CPT

## 2023-09-03 PROCEDURE — 74177 CT ABD & PELVIS W/CONTRAST: CPT

## 2023-09-03 PROCEDURE — 83880 ASSAY OF NATRIURETIC PEPTIDE: CPT

## 2023-09-03 PROCEDURE — 6360000004 HC RX CONTRAST MEDICATION: Performed by: NURSE PRACTITIONER

## 2023-09-03 PROCEDURE — 51702 INSERT TEMP BLADDER CATH: CPT

## 2023-09-03 PROCEDURE — 81001 URINALYSIS AUTO W/SCOPE: CPT

## 2023-09-03 PROCEDURE — 80053 COMPREHEN METABOLIC PANEL: CPT

## 2023-09-03 PROCEDURE — 51798 US URINE CAPACITY MEASURE: CPT

## 2023-09-03 RX ORDER — TAMSULOSIN HYDROCHLORIDE 0.4 MG/1
0.4 CAPSULE ORAL DAILY
Qty: 30 CAPSULE | Refills: 0 | Status: SHIPPED | OUTPATIENT
Start: 2023-09-03 | End: 2023-10-03

## 2023-09-03 RX ADMIN — IOPAMIDOL 80 ML: 755 INJECTION, SOLUTION INTRAVENOUS at 18:18

## 2023-09-03 ASSESSMENT — PAIN DESCRIPTION - LOCATION: LOCATION: ABDOMEN;GROIN

## 2023-09-03 ASSESSMENT — ENCOUNTER SYMPTOMS
COUGH: 0
BACK PAIN: 1
ABDOMINAL DISTENTION: 1
COLOR CHANGE: 0
SHORTNESS OF BREATH: 0
CHEST TIGHTNESS: 0
ABDOMINAL PAIN: 1

## 2023-09-03 ASSESSMENT — PAIN SCALES - GENERAL: PAINLEVEL_OUTOF10: 8

## 2023-09-03 ASSESSMENT — LIFESTYLE VARIABLES
HOW MANY STANDARD DRINKS CONTAINING ALCOHOL DO YOU HAVE ON A TYPICAL DAY: 1 OR 2
HOW OFTEN DO YOU HAVE A DRINK CONTAINING ALCOHOL: MONTHLY OR LESS

## 2023-09-03 NOTE — ED NOTES
Educated the patient and spouse on the urinary leg bag. Verbalized understanding.       Candelaria Hernadez RN  09/03/23 9521

## 2023-09-03 NOTE — ED PROVIDER NOTES
[address] [facility] ENCOUNTER      Pt Name: Pete Adair  MRN: [id number]  [address] [facility] [place] 1960  Date of evaluation: 9/3/2023  Provider: TIMOTHY Oro - CNP  PCP: Ce Hsieh MD  Note Started: 5:02 PM EDT 9/3/23    I am the Primary Clinician of Record. I have seen and evaluated this patient with my supervising physician Jordy Richardson MD.  [address]       Chief Complaint   Patient presents with    Urinary Retention     States has not urinated for approx. 2 days. Also c/o kidney issues. HISTORY OF PRESENT ILLNESS: 1 or more Elements   Pete Adair is a 59 y.o. male who presents to the ER with chief complaint of abdominal pain has been ongoing for the past several days. Patient was treated at this facility 2 days ago for same. States that abdominal pain is continuing has abdominal distention. He has not had a bowel movement in 2 days. Typically has a bowel movement daily. Denies melena or hematochezia. Is able to urinate but is unable to fully empty his bladder. Denies hematuria. He further endorses that he is having left-sided back pain. Review of patients external notes from [facility] dated 3/1/2023 indicates patient has a history of BPH with obstruction and urinary tract infection with recurrent UTIs. Further review of medical records reveal he does have a history of significant gastritis with delayed gastric emptying and PUD for which she is on omeprazole and Reglan. I have reviewed the nursing triage documentation and agree unless otherwise noted. REVIEW OF SYSTEMS :    Review of Systems   Constitutional:  Negative for chills, fatigue and fever. HENT:  Negative for congestion. Respiratory:  Negative for cough, chest tightness and shortness of breath. Cardiovascular:  Negative for chest pain and leg swelling.    Gastrointestinal:  Positive for abdominal
clarification.         Michelle Purcell MD  09/08/23 9544

## 2023-09-03 NOTE — ED NOTES
Bladder scan complete. Had a total of 502ML. Pt states he gets the urge to urinate, but only a little bit of urine comes out at a time.       Annabel Taylor RN  09/03/23 0756

## 2023-09-03 NOTE — DISCHARGE INSTRUCTIONS
It was a pleasure to treat you today. Please take medications as prescribed. Follow-up with urology. Call the number on your discharge instructions Tuesday morning to schedule an appointment.

## 2023-09-03 NOTE — ED TRIAGE NOTES
Patient to triage with c/o urinary retention. States he has not urinated for approx. 2 days. Also states \"kidneys are swollen. \" Resps even and unlabored.

## 2023-09-04 ENCOUNTER — HOSPITAL ENCOUNTER (EMERGENCY)
Age: 63
Discharge: HOME | End: 2023-09-05
Attending: EMERGENCY MEDICINE
Payer: MEDICAID

## 2023-09-04 VITALS
OXYGEN SATURATION: 96 % | HEART RATE: 60 BPM | RESPIRATION RATE: 18 BRPM | WEIGHT: 140 LBS | HEIGHT: 69 IN | DIASTOLIC BLOOD PRESSURE: 84 MMHG | BODY MASS INDEX: 20.73 KG/M2 | TEMPERATURE: 97.8 F | SYSTOLIC BLOOD PRESSURE: 124 MMHG

## 2023-09-04 DIAGNOSIS — N32.89 BLADDER SPASM: ICD-10-CM

## 2023-09-04 DIAGNOSIS — R31.9 URINARY TRACT INFECTION WITH HEMATURIA, SITE UNSPECIFIED: ICD-10-CM

## 2023-09-04 DIAGNOSIS — Z97.8 FOLEY CATHETER IN PLACE: ICD-10-CM

## 2023-09-04 DIAGNOSIS — N39.0 URINARY TRACT INFECTION WITH HEMATURIA, SITE UNSPECIFIED: ICD-10-CM

## 2023-09-04 DIAGNOSIS — R10.9 FLANK PAIN: Primary | ICD-10-CM

## 2023-09-04 DIAGNOSIS — Z87.898 HISTORY OF URINARY RETENTION: ICD-10-CM

## 2023-09-04 LAB
ALBUMIN SERPL-MCNC: 4.1 GM/DL (ref 3.4–5)
ALBUMIN SERPL-MCNC: 4.2 GM/DL (ref 3.4–5)
ALP BLD-CCNC: 90 IU/L (ref 40–129)
ALP BLD-CCNC: 91 IU/L (ref 40–129)
ALT SERPL-CCNC: 11 U/L (ref 10–40)
ALT SERPL-CCNC: 12 U/L (ref 10–40)
ANION GAP SERPL CALCULATED.3IONS-SCNC: 12 MMOL/L (ref 4–16)
ANION GAP SERPL CALCULATED.3IONS-SCNC: 14 MMOL/L (ref 4–16)
AST SERPL-CCNC: 17 IU/L (ref 15–37)
AST SERPL-CCNC: 20 IU/L (ref 15–37)
BACTERIA: NEGATIVE /HPF
BASOPHILS ABSOLUTE: 0 K/CU MM
BASOPHILS RELATIVE PERCENT: 0.4 % (ref 0–1)
BILIRUB SERPL-MCNC: 0.1 MG/DL (ref 0–1)
BILIRUB SERPL-MCNC: 0.2 MG/DL (ref 0–1)
BILIRUBIN URINE: NEGATIVE MG/DL
BLOOD, URINE: ABNORMAL
BUN SERPL-MCNC: 18 MG/DL (ref 6–23)
BUN SERPL-MCNC: 23 MG/DL (ref 6–23)
CALCIUM SERPL-MCNC: 8.9 MG/DL (ref 8.3–10.6)
CALCIUM SERPL-MCNC: 9 MG/DL (ref 8.3–10.6)
CHLORIDE BLD-SCNC: 102 MMOL/L (ref 99–110)
CHLORIDE BLD-SCNC: 107 MMOL/L (ref 99–110)
CLARITY: ABNORMAL
CO2: 22 MMOL/L (ref 21–32)
CO2: 24 MMOL/L (ref 21–32)
COLOR: YELLOW
CREAT SERPL-MCNC: 0.7 MG/DL (ref 0.9–1.3)
CREAT SERPL-MCNC: 0.8 MG/DL (ref 0.9–1.3)
DIFFERENTIAL TYPE: ABNORMAL
EOSINOPHILS ABSOLUTE: 0.3 K/CU MM
EOSINOPHILS RELATIVE PERCENT: 2.8 % (ref 0–3)
GFR SERPL CREATININE-BSD FRML MDRD: >60 ML/MIN/1.73M2
GFR SERPL CREATININE-BSD FRML MDRD: >60 ML/MIN/1.73M2
GLUCOSE SERPL-MCNC: 103 MG/DL (ref 70–99)
GLUCOSE SERPL-MCNC: 86 MG/DL (ref 70–99)
GLUCOSE, URINE: NEGATIVE MG/DL
HCT VFR BLD CALC: 36.7 % (ref 42–52)
HEMOGLOBIN: 12.2 GM/DL (ref 13.5–18)
IMMATURE NEUTROPHIL %: 0.4 % (ref 0–0.43)
KETONES, URINE: ABNORMAL MG/DL
LEUKOCYTE ESTERASE, URINE: ABNORMAL
LYMPHOCYTES ABSOLUTE: 2 K/CU MM
LYMPHOCYTES RELATIVE PERCENT: 21.6 % (ref 24–44)
MCH RBC QN AUTO: 29.4 PG (ref 27–31)
MCHC RBC AUTO-ENTMCNC: 33.2 % (ref 32–36)
MCV RBC AUTO: 88.4 FL (ref 78–100)
MONOCYTES ABSOLUTE: 0.9 K/CU MM
MONOCYTES RELATIVE PERCENT: 9.5 % (ref 0–4)
MUCUS: ABNORMAL HPF
NITRITE URINE, QUANTITATIVE: POSITIVE
NUCLEATED RBC %: 0 %
PDW BLD-RTO: 15.4 % (ref 11.7–14.9)
PH, URINE: 5.5 (ref 5–8)
PLATELET # BLD: 226 K/CU MM (ref 140–440)
PMV BLD AUTO: 8.6 FL (ref 7.5–11.1)
POTASSIUM SERPL-SCNC: 3.9 MMOL/L (ref 3.5–5.1)
POTASSIUM SERPL-SCNC: 4.6 MMOL/L (ref 3.5–5.1)
PROTEIN UA: 30 MG/DL
RBC # BLD: 4.15 M/CU MM (ref 4.6–6.2)
RBC URINE: 2021 /HPF (ref 0–3)
SEGMENTED NEUTROPHILS ABSOLUTE COUNT: 6 K/CU MM
SEGMENTED NEUTROPHILS RELATIVE PERCENT: 65.3 % (ref 36–66)
SODIUM BLD-SCNC: 140 MMOL/L
SODIUM BLD-SCNC: 141 MMOL/L (ref 135–145)
SPECIFIC GRAVITY UA: >1.03 (ref 1–1.03)
TOTAL IMMATURE NEUTOROPHIL: 0.04 K/CU MM
TOTAL NUCLEATED RBC: 0 K/CU MM
TOTAL PROTEIN: 5.8 GM/DL (ref 6.4–8.2)
TOTAL PROTEIN: 6.1 GM/DL (ref 6.4–8.2)
TOTAL RETICULOCYTE COUNT: 0.06 K/CU MM
TRICHOMONAS: ABNORMAL /HPF
UROBILINOGEN, URINE: 1 MG/DL (ref 0.2–1)
WBC # BLD: 9.1 K/CU MM (ref 4–10.5)
WBC UA: 101 /HPF (ref 0–2)

## 2023-09-04 PROCEDURE — 96375 TX/PRO/DX INJ NEW DRUG ADDON: CPT

## 2023-09-04 PROCEDURE — 87086 URINE CULTURE/COLONY COUNT: CPT

## 2023-09-04 PROCEDURE — 87186 SC STD MICRODIL/AGAR DIL: CPT

## 2023-09-04 PROCEDURE — 6370000000 HC RX 637 (ALT 250 FOR IP): Performed by: EMERGENCY MEDICINE

## 2023-09-04 PROCEDURE — 85025 COMPLETE CBC W/AUTO DIFF WBC: CPT

## 2023-09-04 PROCEDURE — 96374 THER/PROPH/DIAG INJ IV PUSH: CPT

## 2023-09-04 PROCEDURE — 81001 URINALYSIS AUTO W/SCOPE: CPT

## 2023-09-04 PROCEDURE — 87077 CULTURE AEROBIC IDENTIFY: CPT

## 2023-09-04 PROCEDURE — 80053 COMPREHEN METABOLIC PANEL: CPT

## 2023-09-04 PROCEDURE — 6360000002 HC RX W HCPCS: Performed by: EMERGENCY MEDICINE

## 2023-09-04 PROCEDURE — 99284 EMERGENCY DEPT VISIT MOD MDM: CPT

## 2023-09-04 RX ORDER — CEPHALEXIN 250 MG/1
500 CAPSULE ORAL ONCE
Status: COMPLETED | OUTPATIENT
Start: 2023-09-04 | End: 2023-09-04

## 2023-09-04 RX ORDER — ONDANSETRON 2 MG/ML
8 INJECTION INTRAMUSCULAR; INTRAVENOUS ONCE
Status: COMPLETED | OUTPATIENT
Start: 2023-09-04 | End: 2023-09-04

## 2023-09-04 RX ORDER — MORPHINE SULFATE 4 MG/ML
4 INJECTION, SOLUTION INTRAMUSCULAR; INTRAVENOUS ONCE
Status: COMPLETED | OUTPATIENT
Start: 2023-09-04 | End: 2023-09-04

## 2023-09-04 RX ORDER — CEPHALEXIN 500 MG/1
500 CAPSULE ORAL 3 TIMES DAILY
Qty: 30 CAPSULE | Refills: 0 | Status: SHIPPED | OUTPATIENT
Start: 2023-09-04 | End: 2023-09-14

## 2023-09-04 RX ADMIN — ONDANSETRON 8 MG: 2 INJECTION INTRAMUSCULAR; INTRAVENOUS at 22:53

## 2023-09-04 RX ADMIN — CEPHALEXIN 500 MG: 250 CAPSULE ORAL at 23:02

## 2023-09-04 RX ADMIN — MORPHINE SULFATE 4 MG: 4 INJECTION, SOLUTION INTRAMUSCULAR; INTRAVENOUS at 22:53

## 2023-09-04 ASSESSMENT — PAIN SCALES - GENERAL: PAINLEVEL_OUTOF10: 10

## 2023-09-04 ASSESSMENT — PAIN DESCRIPTION - ORIENTATION: ORIENTATION: RIGHT

## 2023-09-04 ASSESSMENT — PAIN DESCRIPTION - DESCRIPTORS: DESCRIPTORS: STABBING

## 2023-09-04 ASSESSMENT — PAIN DESCRIPTION - LOCATION: LOCATION: FLANK

## 2023-09-04 ASSESSMENT — PAIN - FUNCTIONAL ASSESSMENT: PAIN_FUNCTIONAL_ASSESSMENT: 0-10

## 2023-09-05 ENCOUNTER — OFFICE VISIT (OUTPATIENT)
Dept: CARDIOLOGY CLINIC | Age: 63
End: 2023-09-05
Payer: MEDICAID

## 2023-09-05 VITALS
HEART RATE: 62 BPM | SYSTOLIC BLOOD PRESSURE: 104 MMHG | BODY MASS INDEX: 20.44 KG/M2 | HEIGHT: 69 IN | DIASTOLIC BLOOD PRESSURE: 84 MMHG | WEIGHT: 138 LBS

## 2023-09-05 DIAGNOSIS — K44.9 PARAESOPHAGEAL HIATAL HERNIA: ICD-10-CM

## 2023-09-05 DIAGNOSIS — R42 VERTIGO: ICD-10-CM

## 2023-09-05 DIAGNOSIS — F17.219 CIGARETTE NICOTINE DEPENDENCE WITH NICOTINE-INDUCED DISORDER: ICD-10-CM

## 2023-09-05 DIAGNOSIS — R07.2 PRECORDIAL PAIN: Primary | ICD-10-CM

## 2023-09-05 DIAGNOSIS — I10 ESSENTIAL HYPERTENSION: ICD-10-CM

## 2023-09-05 DIAGNOSIS — E78.5 DYSLIPIDEMIA: ICD-10-CM

## 2023-09-05 PROCEDURE — 3074F SYST BP LT 130 MM HG: CPT | Performed by: INTERNAL MEDICINE

## 2023-09-05 PROCEDURE — G8427 DOCREV CUR MEDS BY ELIG CLIN: HCPCS | Performed by: INTERNAL MEDICINE

## 2023-09-05 PROCEDURE — 3079F DIAST BP 80-89 MM HG: CPT | Performed by: INTERNAL MEDICINE

## 2023-09-05 PROCEDURE — 99214 OFFICE O/P EST MOD 30 MIN: CPT | Performed by: INTERNAL MEDICINE

## 2023-09-05 PROCEDURE — G8420 CALC BMI NORM PARAMETERS: HCPCS | Performed by: INTERNAL MEDICINE

## 2023-09-05 PROCEDURE — 3017F COLORECTAL CA SCREEN DOC REV: CPT | Performed by: INTERNAL MEDICINE

## 2023-09-05 PROCEDURE — 4004F PT TOBACCO SCREEN RCVD TLK: CPT | Performed by: INTERNAL MEDICINE

## 2023-09-05 PROCEDURE — 93000 ELECTROCARDIOGRAM COMPLETE: CPT | Performed by: INTERNAL MEDICINE

## 2023-09-05 NOTE — ED PROVIDER NOTES
CHIEF COMPLAINT    Chief Complaint   Patient presents with    Hematuria    Flank Pain     Right side     MARIAH Adair is a 59 y.o. male with history of gastric ulcer, hypertension, hyperlipidemia, gastritis, COPD who presents to the ED with complaints of abdominal pain located to suprapubic region and right flank as well as hematuria. Patient was evaluated in the emergency department on 09/01 with complaints of increasing epigastric and left-sided abdominal pain. He has a history of peptic ulcer disease and is on omeprazole and Reglan and had missed a few days of this prior to his initial evaluation. CT of the abdomen/pelvis was performed and was without acute intra-abdominal pathology. He was treated symptomatically and discharged home. He represented to the emergency department on 09/03 with complaints of ongoing abdominal pain as well as sensation that he was unable to completely empty his bladder. He had repeat CT imaging performed at that time which was without any evidence of nephrolithiasis or ureterolithiasis and was found to have urinary retention by bladder scan. Therefore Brown catheter was placed. He was initiated on Flomax and discharged with Brown catheter in place. His urinalysis collected on 09/03 demonstrated occasional bacteria but and trace pyuria and was nitrate negative at that time. On 09/01 his urinalysis was without bacteriuria and was nitrate negative. Patient states that he has continued to have ongoing abdominal pain since discharge located predominately to suprapubic region and right flank rated as moderate to severe in severity with episodes of severe spasm-like pain. He states that he has noticed some hematuria from his Brown catheter as well and has not followed up with urology yet. Patient also feels that when he does urinate there is burning associated with this.   He has followed previously with Dr. Carlos Eduardo Cool of urology at [facility] but has been unable to see him since

## 2023-09-05 NOTE — PATIENT INSTRUCTIONS
CORONARY ARTERY DISEASE:None known  Patient is currently  symptomatic ? from CAD. Counseled regarding regular exercise & its benefits.  -Testing ordered:  yes,   Leake classification: 3    EKG: NSR 62 / min probably normal.    CARDIOLITE 4/2021    Normal study. Normal perfusion study with normal distribution in all coronal, short, and    horizontal axis. The observed defect is consistent with diaphragmatic attenuation. Normal LV function. LVEF is 48 %. CATH 8/2017  1. Left main is patent. 2.  Circ and OM1 have mild disease. 3.  LAD and diagonal branch have mild disease. 4.  RCA is normal.  5.  EDP was around 15 mmHg. EKG: NSR,54/min, WNL     HTN:Yes  well controlled on current medical regimen, see list above. - changes in  treatment:   no   VHD:no              No significant VHD noted  ECHO 4/2021   Left ventricular function is low normal, EF is estimated at 50-55%. Mild left ventricular hypertrophy. E/A reversal; indeterminate diastolic function. Mild mitral and tricuspid regurgitation is present. RVSP is 21 mmHg. Dilation of the aortic root(4.2) and the ascending aorta(4.0). No evidence of pericardial effusion. DYSLIPIDEMIA: yes,   Patient's profile is at / near [address] current medical regimen wellyes, takes Lipitor    TESTS ORDERED:AdRollan Cardiolite     PREVIOUSLY ORDERED TESTS REVIEWED & DISCUSSED WITH THE PATIENT:     I personally reviewed & interpreted, all previously ordered tests as copied above. Latest Labs are pulled in to the note with dates. Labs, specially in Reference to Lipid profile, Cardiac testing in the form of Echo ( dated: ), stress tests ( dated: ) & other relevant cardiac testing reviewed with patient & recommendations made based on assessment of the results. Discussed role of Cardiac risk factors & effects + treatment of co morbidities with patient & advised accordingly.      MEDICATIONS: List of medications patient is currently

## 2023-09-05 NOTE — PROGRESS NOTES
When did it begin? 4-5 days  How long does it last? 2-3 minutes  How severe 1-10? 5  Radiation? Yes , left arm and back  Aggravating Factors? Getting up and walking around  Relieving Factors? Sit down  Associated Features? Left arm numb and tingling, back pain  Tenderness to palp?  No    Shortness of Breath:  When did it start? 4-5 days  Severity;how many flights of stairs can they go up without having SOB? 1, walking in from parking lot made him SOB  Associated features? no  Orthopnea; how many pillows do they sleep with? 2
PM     09/03/2023 05:00 PM    K 4.6 09/04/2023 09:40 PM    K 3.9 09/03/2023 05:00 PM     09/04/2023 09:40 PM     09/03/2023 05:00 PM    CO2 22 09/04/2023 09:40 PM    CO2 24 09/03/2023 05:00 PM    BUN 23 09/04/2023 09:40 PM    BUN 18 09/03/2023 05:00 PM    CREATININE 0.8 09/04/2023 09:40 PM    CREATININE 0.7 09/03/2023 05:00 PM     CMP:   Lab Results   Component Value Date/Time     09/04/2023 09:40 PM     09/03/2023 05:00 PM    K 4.6 09/04/2023 09:40 PM    K 3.9 09/03/2023 05:00 PM     09/04/2023 09:40 PM     09/03/2023 05:00 PM    CO2 22 09/04/2023 09:40 PM    CO2 24 09/03/2023 05:00 PM    BUN 23 09/04/2023 09:40 PM    BUN 18 09/03/2023 05:00 PM    CREATININE 0.8 09/04/2023 09:40 PM    CREATININE 0.7 09/03/2023 05:00 PM    PROT 5.8 09/04/2023 09:40 PM    PROT 6.1 09/03/2023 05:00 PM    PROT 7.0 11/19/2012 09:07 AM    PROT 6.5 08/30/2012 08:25 PM     Lab Results   Component Value Date/Time    TSH 0.919 01/16/2020 12:00 AM    TSHHS 1.010 08/02/2017 05:24 AM    TSHHS 1.650 12/30/2016 08:12 PM       QUALITY MEASURES REVIEWED:  1.CAD:Patient is taking anti platelet agent:No  Patient does not have Hx of documented CAD  2. DYSLIPIDEMIA: Patient is on cholesterol lowering medication:Yes   3. Beta-Blocker therapy for CAD, if prior Myocardial Infarction:No   4. Counselled regarding smoking cessation. Yes   Patient does not Smoke. 5.Anticoagulation therapy (for A.Fib) No   Does Not have A.Fib.  6.Discussed weight management strategies. Assessment & Plan:  Primary / Secondary prevention is the goal by aggressive risk modification, healthy and therapeutic life style changes for cardiovascular risk reduction. CORONARY ARTERY DISEASE:None known  Patient is currently  symptomatic ? from CAD. Counseled regarding regular exercise & its benefits.  -Testing ordered:  yes,   Nepalese classification: 3    EKG: NSR 62 / min probably normal.    CARDIOLITE 4/2021    Normal study.     Normal

## 2023-09-07 LAB
CULTURE: ABNORMAL
CULTURE: ABNORMAL
Lab: ABNORMAL
SPECIMEN: ABNORMAL

## 2023-09-07 NOTE — PROGRESS NOTES
Pharmacy Note  ED Culture Follow-up    Gely Kwan is a 59 y.o. male. Allergies: Bee venom and Nsaids     Current antimicrobials:   Reviewed patient's urine culture - culture positive for E. Coli >100,000 CFU/ml. Patient was discharged on cephalexin 500 mg by mouth three times daily for 10 days, and culture is sensitive to prescribed medication. Antibiotic prescribed at discharge is appropriate - no changes made to antibiotic regimen. Please call with any questions.  MARLEY Osorio STARLA [facility] [place], PharmD 11:22 AM 9/7/2023

## 2023-09-12 ENCOUNTER — PROCEDURE VISIT (OUTPATIENT)
Dept: CARDIOLOGY CLINIC | Age: 63
End: 2023-09-12

## 2023-09-12 DIAGNOSIS — R07.2 PRECORDIAL PAIN: ICD-10-CM

## 2023-09-12 DIAGNOSIS — F17.219 CIGARETTE NICOTINE DEPENDENCE WITH NICOTINE-INDUCED DISORDER: ICD-10-CM

## 2023-09-12 DIAGNOSIS — E78.5 DYSLIPIDEMIA: ICD-10-CM

## 2023-09-12 DIAGNOSIS — K44.9 PARAESOPHAGEAL HIATAL HERNIA: ICD-10-CM

## 2023-09-12 DIAGNOSIS — I10 ESSENTIAL HYPERTENSION: ICD-10-CM

## 2023-09-12 DIAGNOSIS — R42 VERTIGO: ICD-10-CM

## 2023-09-13 ENCOUNTER — TELEPHONE (OUTPATIENT)
Dept: CARDIOLOGY CLINIC | Age: 63
End: 2023-09-13

## 2023-09-13 NOTE — TELEPHONE ENCOUNTER
Called pt for NM stress test result. Summary   Normal study. Normal perfusion study with normal distribution in all coronal, short, and   horizontal axis. The observed defect is consistent with diaphragmatic attenuation. Low normal LV function, LVEF is 47 %. Supervising physician Dr. Sarmad Ellis .      Recommendation   Recommendations: Schedule out patient visit to discuss results.     Pt verbalized understanding and will keep f/u appt

## 2023-09-20 ENCOUNTER — HOSPITAL ENCOUNTER (OUTPATIENT)
Dept: CT IMAGING | Age: 63
Discharge: HOME | End: 2023-09-20
Payer: MEDICAID

## 2023-09-20 DIAGNOSIS — F17.210 CIGARETTE SMOKER: ICD-10-CM

## 2023-09-20 DIAGNOSIS — Z12.2 SCREENING FOR MALIGNANT NEOPLASM OF RESPIRATORY ORGAN: ICD-10-CM

## 2023-09-20 PROCEDURE — 71271 CT THORAX LUNG CANCER SCR C-: CPT

## 2023-10-05 ENCOUNTER — OFFICE VISIT (OUTPATIENT)
Dept: CARDIOLOGY CLINIC | Age: 63
End: 2023-10-05
Payer: MEDICAID

## 2023-10-05 VITALS
HEIGHT: 69 IN | DIASTOLIC BLOOD PRESSURE: 76 MMHG | SYSTOLIC BLOOD PRESSURE: 112 MMHG | HEART RATE: 66 BPM | BODY MASS INDEX: 21.42 KG/M2 | WEIGHT: 144.6 LBS

## 2023-10-05 DIAGNOSIS — I10 ESSENTIAL HYPERTENSION: Primary | ICD-10-CM

## 2023-10-05 DIAGNOSIS — Z98.890 STATUS POST LAPAROSCOPIC NISSEN FUNDOPLICATION: ICD-10-CM

## 2023-10-05 DIAGNOSIS — R07.2 PRECORDIAL PAIN: ICD-10-CM

## 2023-10-05 DIAGNOSIS — E78.5 DYSLIPIDEMIA: ICD-10-CM

## 2023-10-05 PROCEDURE — G8420 CALC BMI NORM PARAMETERS: HCPCS | Performed by: INTERNAL MEDICINE

## 2023-10-05 PROCEDURE — 99213 OFFICE O/P EST LOW 20 MIN: CPT | Performed by: INTERNAL MEDICINE

## 2023-10-05 PROCEDURE — 3017F COLORECTAL CA SCREEN DOC REV: CPT | Performed by: INTERNAL MEDICINE

## 2023-10-05 PROCEDURE — 3074F SYST BP LT 130 MM HG: CPT | Performed by: INTERNAL MEDICINE

## 2023-10-05 PROCEDURE — 3078F DIAST BP <80 MM HG: CPT | Performed by: INTERNAL MEDICINE

## 2023-10-05 PROCEDURE — G8484 FLU IMMUNIZE NO ADMIN: HCPCS | Performed by: INTERNAL MEDICINE

## 2023-10-05 PROCEDURE — 4004F PT TOBACCO SCREEN RCVD TLK: CPT | Performed by: INTERNAL MEDICINE

## 2023-10-05 PROCEDURE — G8427 DOCREV CUR MEDS BY ELIG CLIN: HCPCS | Performed by: INTERNAL MEDICINE

## 2023-10-05 RX ORDER — VERAPAMIL HYDROCHLORIDE 120 MG/1
120 CAPSULE, EXTENDED RELEASE ORAL DAILY
Qty: 30 CAPSULE | Refills: 5 | Status: SHIPPED | OUTPATIENT
Start: 2023-10-05

## 2023-10-05 RX ORDER — ATORVASTATIN CALCIUM 20 MG/1
20 TABLET, FILM COATED ORAL DAILY
Qty: 30 TABLET | Refills: 5 | Status: SHIPPED | OUTPATIENT
Start: 2023-10-05

## 2023-10-05 NOTE — PROGRESS NOTES
OFFICE PROGRESS NOTES      Abel Mcdaniel is a 59 y.o. male who has    CHIEF COMPLAINT AS FOLLOWS:  CHEST PAIN: Patient C/O chest pain but symptoms appear to be atypical.   SOB:  Has SOB with exertion but no change over previous noted. LEG EDEMA: C/O lEG EDEMA, PROMINENT LEG VEINS, SKIN DISCOLORATION, RESTLESS LEGS, NIGHT TIME CRAMPS, ACHING, BURNING, WEAK & TIRED LEGS. [place] Running PALPITATIONS: Denies any C/O Palpitations   DIZZINESS: C/O positional Dizziness but no black out spells. SYNCOPE: None   OTHER/ ADDITIONAL COMPLAINTS:                                     HPI: Patient is here for F/U on his Chest pain, HTN & Dyslipidemia problems. HTN: Patient has known essential HTN. Has been treated with guideline recommended medical / physical/ diet therapy as stated below. Dyslipidemia: Patient has known mixed dyslipidemia. Has been treated with guideline recommended medical / physical/ diet therapy as stated below.                 Current Outpatient Medications   Medication Sig Dispense Refill    atorvastatin (LIPITOR) 20 MG tablet Take 1 tablet by mouth daily 30 tablet 5    verapamil (VERELAN) 120 MG extended release capsule Take 1 capsule by mouth daily 30 capsule 5    tamsulosin (FLOMAX) 0.4 MG capsule Take 1 capsule by mouth daily 30 capsule 0    pantoprazole (PROTONIX) 20 MG tablet Take 2 tablets by mouth every morning (before breakfast) for 14 days 28 tablet 0    metoclopramide (REGLAN) 10 MG tablet Take 1 tablet by mouth 3 times daily (with meals) 120 tablet 3    omeprazole (PRILOSEC) 20 MG delayed release capsule TAKE 1 CAPSULE BY MOUTH TWO TIMES A DAY 60 capsule 0    acarbose (PRECOSE) 25 MG tablet take 1 tablet by mouth 3 times every day at the start (with the first bite) of each main meal      Psyllium 48.57 % POWD Take 1 Dose by mouth every morning 1 each 3    dicyclomine (BENTYL) 20 MG tablet Take 1 tablet by mouth 4 times daily as needed (Abdominal pain, spasm) 20 tablet 0    hydrocortisone

## 2023-10-05 NOTE — PATIENT INSTRUCTIONS
CORONARY ARTERY DISEASE:None known. Current symptoms most likely due to musculoskeletal  problems. There is point tenderness. Patient is currently  asymptomatic  from CAD. Counseled regarding regular exercise & its benefits.  -Testing ordered:  yes,   Bolivian classification: 3     EKG: NSR 62 / min probably normal.    9/12/2023   Normal study. Normal perfusion study with normal distribution in all coronal, short, and   horizontal axis. The observed defect is consistent with diaphragmatic attenuation. Low normal LV function, LVEF is 47 %. CARDIOLITE 4/2021    Normal study. Normal perfusion study with normal distribution in all coronal, short, and    horizontal axis. The observed defect is consistent with diaphragmatic attenuation. Normal LV function. LVEF is 48 %. CATH 8/2017  1. Left main is patent. 2.  Circ and OM1 have mild disease. 3.  LAD and diagonal branch have mild disease. 4.  RCA is normal.  5.  EDP was around 15 mmHg. EKG: NSR,54/min, WNL    HYPERTENSION:Yes  well controlled on current medical regimen, see list above. - changes in  treatment:   no   VHD:no              No significant VHD noted  ECHO 4/2021   Left ventricular function is low normal, EF is estimated at 50-55%. Mild left ventricular hypertrophy. E/A reversal; indeterminate diastolic function. Mild mitral and tricuspid regurgitation is present. RVSP is 21 mmHg. Dilation of the aortic root(4.2) and the ascending aorta(4.0). No evidence of pericardial effusion. DYSLIPIDEMIA: yes,   Patient's profile is at / near [address] current medical regimen wellyes, takes Lipitor    ? CVI: PATIENT IS QUITE SYMPTOMATIC, WILL ORDER VENOUS US. TESTS ORDERED: Venous US. PREVIOUSLY ORDERED TESTS REVIEWED & DISCUSSED WITH THE PATIENT:     I personally reviewed & interpreted, all previously ordered tests as copied above. Latest Labs are pulled in to the note with dates.    Labs, specially in

## 2023-10-20 ENCOUNTER — PROCEDURE VISIT (OUTPATIENT)
Dept: CARDIOLOGY CLINIC | Age: 63
End: 2023-10-20

## 2023-10-20 DIAGNOSIS — Z98.890 STATUS POST LAPAROSCOPIC NISSEN FUNDOPLICATION: ICD-10-CM

## 2023-10-20 DIAGNOSIS — E78.5 DYSLIPIDEMIA: ICD-10-CM

## 2023-10-20 DIAGNOSIS — I10 ESSENTIAL HYPERTENSION: ICD-10-CM

## 2023-10-20 DIAGNOSIS — R07.2 PRECORDIAL PAIN: ICD-10-CM

## 2023-10-26 ENCOUNTER — TELEPHONE (OUTPATIENT)
Dept: CARDIOLOGY CLINIC | Age: 63
End: 2023-10-26

## 2023-10-26 NOTE — TELEPHONE ENCOUNTER
Called and provided:     No evidence of DVT or SVT in the bilateral common femoral vein, femoral   vein, popliteal vein, greater saphenous vein or small saphenous vein. Patient was stood and bilateral lower extremities were reassessed for   significant reflux. No significant reflux noted in the veins of the bilateral lower extremity.     Next appointment:  10/31/2023 at 10:15 AM

## 2023-10-31 ENCOUNTER — OFFICE VISIT (OUTPATIENT)
Dept: CARDIOLOGY CLINIC | Age: 63
End: 2023-10-31
Payer: MEDICAID

## 2023-10-31 VITALS
DIASTOLIC BLOOD PRESSURE: 82 MMHG | HEIGHT: 69 IN | BODY MASS INDEX: 21.8 KG/M2 | WEIGHT: 147.2 LBS | SYSTOLIC BLOOD PRESSURE: 114 MMHG | HEART RATE: 77 BPM

## 2023-10-31 DIAGNOSIS — Z98.890 STATUS POST LAPAROSCOPIC NISSEN FUNDOPLICATION: ICD-10-CM

## 2023-10-31 DIAGNOSIS — I10 ESSENTIAL HYPERTENSION: Primary | ICD-10-CM

## 2023-10-31 DIAGNOSIS — E78.5 DYSLIPIDEMIA: ICD-10-CM

## 2023-10-31 DIAGNOSIS — F17.219 CIGARETTE NICOTINE DEPENDENCE WITH NICOTINE-INDUCED DISORDER: ICD-10-CM

## 2023-10-31 PROCEDURE — 4004F PT TOBACCO SCREEN RCVD TLK: CPT | Performed by: INTERNAL MEDICINE

## 2023-10-31 PROCEDURE — 3074F SYST BP LT 130 MM HG: CPT | Performed by: INTERNAL MEDICINE

## 2023-10-31 PROCEDURE — 3017F COLORECTAL CA SCREEN DOC REV: CPT | Performed by: INTERNAL MEDICINE

## 2023-10-31 PROCEDURE — G8420 CALC BMI NORM PARAMETERS: HCPCS | Performed by: INTERNAL MEDICINE

## 2023-10-31 PROCEDURE — 99213 OFFICE O/P EST LOW 20 MIN: CPT | Performed by: INTERNAL MEDICINE

## 2023-10-31 PROCEDURE — 3079F DIAST BP 80-89 MM HG: CPT | Performed by: INTERNAL MEDICINE

## 2023-10-31 PROCEDURE — G8427 DOCREV CUR MEDS BY ELIG CLIN: HCPCS | Performed by: INTERNAL MEDICINE

## 2023-10-31 PROCEDURE — G8484 FLU IMMUNIZE NO ADMIN: HCPCS | Performed by: INTERNAL MEDICINE

## 2023-10-31 RX ORDER — NICOTINE 21 MG/24HR
1 PATCH, TRANSDERMAL 24 HOURS TRANSDERMAL DAILY
Qty: 42 PATCH | Refills: 0 | Status: SHIPPED | OUTPATIENT
Start: 2023-10-31 | End: 2023-12-12

## 2023-10-31 NOTE — PATIENT INSTRUCTIONS
CORONARY ARTERY DISEASE:None known. Recent  symptoms most likely due to musculoskeletal  problems. There is point tenderness. Patient is currently  asymptomatic  from CAD. Counseled regarding regular exercise & its benefits.  -Testing ordered:  yes,   [place] classification: 3     EKG: NSR 62 / min probably normal.     9/12/2023   Normal study. Normal perfusion study with normal distribution in all coronal, short, and   horizontal axis. The observed defect is consistent with diaphragmatic attenuation. Low normal LV function, LVEF is 47 %. CARDIOLITE 4/2021    Normal study. Normal perfusion study with normal distribution in all coronal, short, and    horizontal axis. The observed defect is consistent with diaphragmatic attenuation. Normal LV function. LVEF is 48 %. CATH 8/2017  1. Left main is patent. 2.  Circ and OM1 have mild disease. 3.  LAD and diagonal branch have mild disease. 4.  RCA is normal.  5.  EDP was around 15 mmHg. EKG: NSR,54/min, WNL     HYPERTENSION:Yes  well controlled on current medical regimen, see list above. - changes in  treatment:   no   VHD:no              No significant VHD noted  ECHO 4/2021   Left ventricular function is low normal, EF is estimated at 50-55%. Mild left ventricular hypertrophy. E/A reversal; indeterminate diastolic function. Mild mitral and tricuspid regurgitation is present. RVSP is 21 mmHg. Dilation of the aortic root(4.2) and the ascending aorta(4.0). No evidence of pericardial effusion. DYSLIPIDEMIA: yes,   Patient's profile is at / near [facility] current medical regimen wellyes, takes Lipitor         CHRONIC VENOUS INSUFFICIENCY:none per US  10/20/2023   No evidence of DVT or SVT in the bilateral common femoral vein, femoral   vein, popliteal vein, greater saphenous vein or small saphenous vein. Patient was stood and bilateral lower extremities were reassessed for   significant reflux.    No

## 2023-10-31 NOTE — PROGRESS NOTES
Tiffanie Parisi is a 59 y.o. male who has    CHIEF COMPLAINT AS FOLLOWS:  CHEST PAIN: Patient denies any C/O chest pains at this time. SOB: No C/O SOB at this time. LEG EDEMA: STILL C/O LEG EDEMA, PROMINENT LEG VEINS, SKIN DISCOLORATION, RESTLESS LEGS, NIGHT TIME CRAMPS, ACHING, BURNING, WEAK & TIRED LEGS. Milan Uriel PALPITATIONS: Denies any C/O Palpitations                                   DIZZINESS: C/O positional Dizziness but no black out spells. SYNCOPE: None   OTHER/ ADDITIONAL COMPLAINTS:                                     HPI: Patient is here for F/U on his HTN & Dyslipidemia problems. HTN: Patient has known essential HTN. Has been treated with guideline recommended medical / physical/ diet therapy as stated below. Dyslipidemia: Patient has known mixed dyslipidemia. Has been treated with guideline recommended medical / physical/ diet therapy as stated below. Current Outpatient Medications   Medication Sig Dispense Refill    atorvastatin (LIPITOR) 20 MG tablet Take 1 tablet by mouth daily 30 tablet 5    verapamil (VERELAN) 120 MG extended release capsule Take 1 capsule by mouth daily 30 capsule 5    tamsulosin (FLOMAX) 0.4 MG capsule Take 1 capsule by mouth daily 30 capsule 0    pantoprazole (PROTONIX) 20 MG tablet Take 2 tablets by mouth every morning (before breakfast) for 14 days 28 tablet 0    metoclopramide (REGLAN) 10 MG tablet Take 1 tablet by mouth 3 times daily (with meals) 120 tablet 3    acarbose (PRECOSE) 25 MG tablet take 1 tablet by mouth 3 times every day at the start (with the first bite) of each main meal      Psyllium 48.57 % POWD Take 1 Dose by mouth every morning 1 each 3    dicyclomine (BENTYL) 20 MG tablet Take 1 tablet by mouth 4 times daily as needed (Abdominal pain, spasm) 20 tablet 0    tiZANidine (ZANAFLEX) 2 MG tablet TAKE 1 TABLET BY MOUTH EVERY EIGHT HOURS AS NEEDED.  NOT TO EXCEED 3 DOSES IN 24 HOURS

## 2023-12-20 ENCOUNTER — APPOINTMENT (OUTPATIENT)
Dept: ULTRASOUND IMAGING | Age: 63
End: 2023-12-20
Payer: MEDICAID

## 2023-12-20 ENCOUNTER — HOSPITAL ENCOUNTER (EMERGENCY)
Age: 63
Discharge: HOME | End: 2023-12-21
Payer: MEDICAID

## 2023-12-20 DIAGNOSIS — M79.89 LEG SWELLING: Primary | ICD-10-CM

## 2023-12-20 LAB
ANION GAP SERPL CALCULATED.3IONS-SCNC: 11 MMOL/L (ref 4–16)
BASOPHILS ABSOLUTE: 0 K/CU MM
BASOPHILS RELATIVE PERCENT: 0.5 % (ref 0–1)
BUN SERPL-MCNC: 15 MG/DL (ref 6–23)
CALCIUM SERPL-MCNC: 8.6 MG/DL (ref 8.3–10.6)
CHLORIDE BLD-SCNC: 106 MMOL/L (ref 99–110)
CO2: 24 MMOL/L (ref 21–32)
CREAT SERPL-MCNC: 0.7 MG/DL (ref 0.9–1.3)
DIFFERENTIAL TYPE: ABNORMAL
EOSINOPHILS ABSOLUTE: 0.3 K/CU MM
EOSINOPHILS RELATIVE PERCENT: 3.3 % (ref 0–3)
GFR SERPL CREATININE-BSD FRML MDRD: >60 ML/MIN/1.73M2
GLUCOSE SERPL-MCNC: 85 MG/DL (ref 70–99)
HCT VFR BLD CALC: 37.7 % (ref 42–52)
HEMOGLOBIN: 12.3 GM/DL (ref 13.5–18)
IMMATURE NEUTROPHIL %: 0.4 % (ref 0–0.43)
LYMPHOCYTES ABSOLUTE: 2.9 K/CU MM
LYMPHOCYTES RELATIVE PERCENT: 35.3 % (ref 24–44)
MCH RBC QN AUTO: 29.4 PG (ref 27–31)
MCHC RBC AUTO-ENTMCNC: 32.6 % (ref 32–36)
MCV RBC AUTO: 90.2 FL (ref 78–100)
MONOCYTES ABSOLUTE: 0.7 K/CU MM
MONOCYTES RELATIVE PERCENT: 8.8 % (ref 0–4)
NUCLEATED RBC %: 0 %
PDW BLD-RTO: 13.8 % (ref 11.7–14.9)
PLATELET # BLD: 216 K/CU MM (ref 140–440)
PMV BLD AUTO: 8.8 FL (ref 7.5–11.1)
POTASSIUM SERPL-SCNC: 4.2 MMOL/L (ref 3.5–5.1)
PRO-BNP: 41.79 PG/ML
RBC # BLD: 4.18 M/CU MM (ref 4.6–6.2)
SEGMENTED NEUTROPHILS ABSOLUTE COUNT: 4.3 K/CU MM
SEGMENTED NEUTROPHILS RELATIVE PERCENT: 51.7 % (ref 36–66)
SODIUM BLD-SCNC: 141 MMOL/L (ref 135–145)
TOTAL IMMATURE NEUTOROPHIL: 0.03 K/CU MM
TOTAL NUCLEATED RBC: 0 K/CU MM
WBC # BLD: 8.2 K/CU MM (ref 4–10.5)

## 2023-12-20 PROCEDURE — 93970 EXTREMITY STUDY: CPT

## 2023-12-20 PROCEDURE — 80048 BASIC METABOLIC PNL TOTAL CA: CPT

## 2023-12-20 PROCEDURE — 99284 EMERGENCY DEPT VISIT MOD MDM: CPT

## 2023-12-20 PROCEDURE — 85025 COMPLETE CBC W/AUTO DIFF WBC: CPT

## 2023-12-20 PROCEDURE — 6370000000 HC RX 637 (ALT 250 FOR IP): Performed by: PHYSICIAN ASSISTANT

## 2023-12-20 PROCEDURE — 83880 ASSAY OF NATRIURETIC PEPTIDE: CPT

## 2023-12-20 RX ORDER — TRAMADOL HYDROCHLORIDE 50 MG/1
50 TABLET ORAL ONCE
Status: COMPLETED | OUTPATIENT
Start: 2023-12-20 | End: 2023-12-20

## 2023-12-20 RX ADMIN — TRAMADOL HYDROCHLORIDE 50 MG: 50 TABLET, COATED ORAL at 22:18

## 2023-12-20 ASSESSMENT — PAIN DESCRIPTION - ORIENTATION
ORIENTATION: LOWER
ORIENTATION: LOWER

## 2023-12-20 ASSESSMENT — PAIN DESCRIPTION - LOCATION
LOCATION: LEG

## 2023-12-20 ASSESSMENT — PAIN DESCRIPTION - DESCRIPTORS
DESCRIPTORS: BURNING;PRESSURE
DESCRIPTORS: BURNING;DISCOMFORT
DESCRIPTORS: BURNING;DISCOMFORT

## 2023-12-20 ASSESSMENT — LIFESTYLE VARIABLES
HOW OFTEN DO YOU HAVE A DRINK CONTAINING ALCOHOL: MONTHLY OR LESS
HOW MANY STANDARD DRINKS CONTAINING ALCOHOL DO YOU HAVE ON A TYPICAL DAY: 1 OR 2

## 2023-12-20 ASSESSMENT — PAIN - FUNCTIONAL ASSESSMENT: PAIN_FUNCTIONAL_ASSESSMENT: 0-10

## 2023-12-20 ASSESSMENT — PAIN SCALES - GENERAL
PAINLEVEL_OUTOF10: 8
PAINLEVEL_OUTOF10: 4
PAINLEVEL_OUTOF10: 8

## 2023-12-21 VITALS
HEART RATE: 57 BPM | TEMPERATURE: 97.3 F | SYSTOLIC BLOOD PRESSURE: 113 MMHG | RESPIRATION RATE: 18 BRPM | OXYGEN SATURATION: 96 % | DIASTOLIC BLOOD PRESSURE: 74 MMHG

## 2023-12-21 RX ORDER — TRAMADOL HYDROCHLORIDE 50 MG/1
50 TABLET ORAL ONCE
Qty: 10 TABLET | Refills: 0 | Status: SHIPPED | OUTPATIENT
Start: 2023-12-21 | End: 2023-12-21

## 2023-12-21 NOTE — CARE COORDINATION
CM review of pt chart of discharge needs. Pt has PCP, Insurance. Pt was seen at cardiology office today, no new medications ordered, it was suggested that pt follow up with specialist for lower extremity edema due to possible arthritis. Pt did say he was wearing CESAR hose for the edema but they became to uncomfortable so he removed them. Pt is alert and oriented, states he lives alone, drives, confirms he smokes. Mentioned in triage notes that he could no longer take care of himself. CM discussed options of SNF nursing facilities if unable to manage ADL's. Pt confirmed that is not what he is needing. Pt states he needs assistance to find resolution for his lower leg edema and burning discomfort. Alejandro Feliciano PA-C entered the room during CM visit, evaluated pt for c/o legs and feet edema. POC for testing and probable discharge with o/p follow up for continued care.  MYLENERN/CM

## 2023-12-21 NOTE — ED PROVIDER NOTES
Triage Chief Complaint:   Leg Swelling    [place]:  Today in the ED I had the pleasure of caring for Williams Su who is a 59 y.o. male that presents today to the ED for bilat leg swelling. Ongoing x4 days. Nki. No sob. No back pain. No rash.  No f/c/n/v/d.       ROS:  REVIEW OF SYSTEMS    At least 10 systems reviewed      All other review of systems are negative  See HPI and nursing notes for additional information       Past Medical History:   Diagnosis Date    Arthritis     Hands    Chronic cluster headache 6/7/2019 last    COPD (chronic obstructive pulmonary disease) ([address])     \"dx 2 yrs ago\" \\"does not see a lung dr Baldemar Ndiaye with PCP    Degenerative disc disease     \"in my back have degenarative disc\"    Depression     Edentulous     Fracture     \"fell over a 5 gallon bucket and landed on cement block and broke my right hand\" (7/6/2015)    GERD with esophagitis     H/O dizziness     States will feel like he is going to pass out, possible low BS    Hiatal hernia     Hx of gastric ulcer     HX OTHER MEDICAL     pt states has trouble with reading and writing\"can read very very little\"    Hyperlipidemia     Hypertension     No meds as of 5/27/21 - follows with PCP    Impingement syndrome of right shoulder     Left shoulder pain     limited range-of-motion    Lower back pain     Motion sickness     Neck pain     more on left side into shoulder--pain limits ROM    PONV (postoperative nausea and vomiting)     Prolonged emergence from general anesthesia     Vertigo      Past Surgical History:   Procedure Laterality Date    ARM SURGERY Left 30+ yrs    \"put plastic ligaments in \"  after injury through glass window    CARPAL TUNNEL RELEASE Right 10/4/2019    RIGHT CARPAL TUNNEL RELEASE performed by Laree Bamberger, DO at [address] [place] [address] Left 4/12/2022    LEFT CARPAL TUNNEL RELEASE performed by Laree Bamberger, DO at [address] [place] [address]  2010    COLONOSCOPY  2018    HX: polyps - Dr. Shaw Schooling

## 2023-12-21 NOTE — ED TRIAGE NOTES
Patient lower legs started swelling increasing pain, burning and pressure. Patient has hx of lower leg swelling due to heart issues. Patient alone at home unable to take care of himself. Patient states legs are so painful he is unable to ambulate.

## 2024-01-09 ENCOUNTER — HOSPITAL ENCOUNTER (OUTPATIENT)
Dept: CT IMAGING | Age: 64
Discharge: HOME | End: 2024-01-09
Payer: MEDICAID

## 2024-01-09 DIAGNOSIS — R91.1 PULMONARY NODULE: ICD-10-CM

## 2024-01-09 PROCEDURE — 71250 CT THORAX DX C-: CPT

## 2024-01-15 ENCOUNTER — APPOINTMENT (OUTPATIENT)
Dept: CT IMAGING | Age: 64
End: 2024-01-15
Payer: MEDICAID

## 2024-01-15 ENCOUNTER — HOSPITAL ENCOUNTER (EMERGENCY)
Age: 64
Discharge: HOME | End: 2024-01-15
Payer: MEDICAID

## 2024-01-15 VITALS
HEART RATE: 75 BPM | RESPIRATION RATE: 20 BRPM | WEIGHT: 151 LBS | BODY MASS INDEX: 22.36 KG/M2 | SYSTOLIC BLOOD PRESSURE: 137 MMHG | TEMPERATURE: 97.4 F | OXYGEN SATURATION: 97 % | DIASTOLIC BLOOD PRESSURE: 87 MMHG | HEIGHT: 69 IN

## 2024-01-15 DIAGNOSIS — T83.091A COMPLICATION, BLOCKED FOLEY CATHETER, INITIAL ENCOUNTER (HCC): Primary | ICD-10-CM

## 2024-01-15 DIAGNOSIS — T83.511A URINARY TRACT INFECTION ASSOCIATED WITH INDWELLING URETHRAL CATHETER, INITIAL ENCOUNTER (HCC): ICD-10-CM

## 2024-01-15 DIAGNOSIS — N39.0 URINARY TRACT INFECTION ASSOCIATED WITH INDWELLING URETHRAL CATHETER, INITIAL ENCOUNTER (HCC): ICD-10-CM

## 2024-01-15 LAB
ALBUMIN SERPL-MCNC: 4.1 GM/DL (ref 3.4–5)
ALP BLD-CCNC: 104 IU/L (ref 40–129)
ALT SERPL-CCNC: 9 U/L (ref 10–40)
ANION GAP SERPL CALCULATED.3IONS-SCNC: 12 MMOL/L (ref 7–16)
AST SERPL-CCNC: 13 IU/L (ref 15–37)
BACTERIA: ABNORMAL /HPF
BASOPHILS ABSOLUTE: 0 K/CU MM
BASOPHILS RELATIVE PERCENT: 0.4 % (ref 0–1)
BILIRUB SERPL-MCNC: 0.5 MG/DL (ref 0–1)
BILIRUBIN URINE: NEGATIVE MG/DL
BLOOD, URINE: ABNORMAL
BUN SERPL-MCNC: 13 MG/DL (ref 6–23)
CALCIUM SERPL-MCNC: 9.3 MG/DL (ref 8.3–10.6)
CHLORIDE BLD-SCNC: 101 MMOL/L (ref 99–110)
CLARITY: CLEAR
CO2: 24 MMOL/L (ref 21–32)
COLOR: YELLOW
CREAT SERPL-MCNC: 0.7 MG/DL (ref 0.9–1.3)
DIFFERENTIAL TYPE: ABNORMAL
EOSINOPHILS ABSOLUTE: 0.2 K/CU MM
EOSINOPHILS RELATIVE PERCENT: 1.7 % (ref 0–3)
GFR SERPL CREATININE-BSD FRML MDRD: >60 ML/MIN/1.73M2
GLUCOSE SERPL-MCNC: 85 MG/DL (ref 70–99)
GLUCOSE, URINE: NEGATIVE MG/DL
HCT VFR BLD CALC: 41.2 % (ref 42–52)
HEMOGLOBIN: 13.3 GM/DL (ref 13.5–18)
IMMATURE NEUTROPHIL %: 0.4 % (ref 0–0.43)
KETONES, URINE: NEGATIVE MG/DL
LACTIC ACID, SEPSIS: 1.3 MMOL/L (ref 0.4–2)
LEUKOCYTE ESTERASE, URINE: ABNORMAL
LYMPHOCYTES ABSOLUTE: 1.7 K/CU MM
LYMPHOCYTES RELATIVE PERCENT: 15.6 % (ref 24–44)
MCH RBC QN AUTO: 28.9 PG (ref 27–31)
MCHC RBC AUTO-ENTMCNC: 32.3 % (ref 32–36)
MCV RBC AUTO: 89.4 FL (ref 78–100)
MONOCYTES ABSOLUTE: 0.7 K/CU MM
MONOCYTES RELATIVE PERCENT: 6.6 % (ref 0–4)
MUCUS: ABNORMAL HPF
NITRITE URINE, QUANTITATIVE: NEGATIVE
NUCLEATED RBC %: 0 %
PDW BLD-RTO: 13.2 % (ref 11.7–14.9)
PH, URINE: 6.5 (ref 5–8)
PLATELET # BLD: 337 K/CU MM (ref 140–440)
PMV BLD AUTO: 8.9 FL (ref 7.5–11.1)
POTASSIUM SERPL-SCNC: 4.3 MMOL/L (ref 3.5–5.1)
PROTEIN UA: NEGATIVE MG/DL
RBC # BLD: 4.61 M/CU MM (ref 4.6–6.2)
RBC URINE: 171 /HPF (ref 0–3)
SEGMENTED NEUTROPHILS ABSOLUTE COUNT: 8 K/CU MM
SEGMENTED NEUTROPHILS RELATIVE PERCENT: 75.3 % (ref 36–66)
SODIUM BLD-SCNC: 137 MMOL/L (ref 135–145)
SPECIFIC GRAVITY UA: <1.005 (ref 1–1.03)
SQUAMOUS EPITHELIAL: <1 /HPF
TOTAL IMMATURE NEUTOROPHIL: 0.04 K/CU MM
TOTAL NUCLEATED RBC: 0 K/CU MM
TOTAL PROTEIN: 7.1 GM/DL (ref 6.4–8.2)
TRICHOMONAS: ABNORMAL /HPF
URIC ACID CRYSTALS: ABNORMAL /HPF
UROBILINOGEN, URINE: 0.2 MG/DL (ref 0.2–1)
WBC # BLD: 10.6 K/CU MM (ref 4–10.5)
WBC UA: 61 /HPF (ref 0–2)

## 2024-01-15 PROCEDURE — 85025 COMPLETE CBC W/AUTO DIFF WBC: CPT

## 2024-01-15 PROCEDURE — 51798 US URINE CAPACITY MEASURE: CPT

## 2024-01-15 PROCEDURE — 87077 CULTURE AEROBIC IDENTIFY: CPT

## 2024-01-15 PROCEDURE — 83605 ASSAY OF LACTIC ACID: CPT

## 2024-01-15 PROCEDURE — 87086 URINE CULTURE/COLONY COUNT: CPT

## 2024-01-15 PROCEDURE — 6360000002 HC RX W HCPCS: Performed by: PHYSICIAN ASSISTANT

## 2024-01-15 PROCEDURE — 2580000003 HC RX 258: Performed by: PHYSICIAN ASSISTANT

## 2024-01-15 PROCEDURE — 96374 THER/PROPH/DIAG INJ IV PUSH: CPT

## 2024-01-15 PROCEDURE — 74176 CT ABD & PELVIS W/O CONTRAST: CPT

## 2024-01-15 PROCEDURE — 99284 EMERGENCY DEPT VISIT MOD MDM: CPT

## 2024-01-15 PROCEDURE — 87186 SC STD MICRODIL/AGAR DIL: CPT

## 2024-01-15 PROCEDURE — 80053 COMPREHEN METABOLIC PANEL: CPT

## 2024-01-15 PROCEDURE — 81001 URINALYSIS AUTO W/SCOPE: CPT

## 2024-01-15 RX ORDER — 0.9 % SODIUM CHLORIDE 0.9 %
1000 INTRAVENOUS SOLUTION INTRAVENOUS ONCE
Status: COMPLETED | OUTPATIENT
Start: 2024-01-15 | End: 2024-01-15

## 2024-01-15 RX ORDER — LIDOCAINE HYDROCHLORIDE 20 MG/ML
JELLY TOPICAL PRN
Status: DISCONTINUED | OUTPATIENT
Start: 2024-01-15 | End: 2024-01-15 | Stop reason: HOSPADM

## 2024-01-15 RX ORDER — FENTANYL CITRATE 50 UG/ML
50 INJECTION, SOLUTION INTRAMUSCULAR; INTRAVENOUS ONCE
Status: COMPLETED | OUTPATIENT
Start: 2024-01-15 | End: 2024-01-15

## 2024-01-15 RX ADMIN — SODIUM CHLORIDE 1000 ML: 9 INJECTION, SOLUTION INTRAVENOUS at 13:31

## 2024-01-15 RX ADMIN — FENTANYL CITRATE 50 MCG: 50 INJECTION, SOLUTION INTRAMUSCULAR; INTRAVENOUS at 12:40

## 2024-01-15 ASSESSMENT — PAIN SCALES - GENERAL: PAINLEVEL_OUTOF10: 7

## 2024-01-15 NOTE — ED NOTES
Catheter balloon deflated, cath cleaned, advanced 4 inches, reinflated, retracted to point of resistance, Pt tolerated well

## 2024-01-15 NOTE — ED PROVIDER NOTES
EMERGENCY DEPARTMENT ENCOUNTER        Pt Name: Jama Ramirez  MRN: [id number]  Birthdate 1960  Date of evaluation: 1/15/2024  Provider: WM IRBY  PCP: Jennifer Gomez MD    ARIADNA. I have evaluated this patient.        Triage CHIEF COMPLAINT       Chief Complaint   Patient presents with    Urinary Catheter     States he thinks the catheter is clogged, reports nothing is coming out today. Also reports blood in urine yesterday         HISTORY OF PRESENT ILLNESS      Chief Complaint: Brown catheter issues, urinary retention    Jama Ramirez is a 63 y.o.  male who presents to the emergency department today for concern of Brown catheter dysfunction, urinary retention.  Patient has had laser surgery from prostate the day after Christmas, been following with urology at the [facility] in [place].  Has had issues with urinary retention.  Is presenting for Brown catheter insertion, recent antibiotic use.  Has not been flowing urine into the bag.  Feels like there is blood clots that could be obstructing.  He is having obvious lower abdominal pain.  Did complain of some mild right flank pain as well.  No other fevers or chills.  States that he attempted to call the urology office and they advised to come the emergency department for further evaluation.    Nursing Notes were all reviewed and agreed with or any disagreements were addressed in the HPI.    REVIEW OF SYSTEMS     At least 10 systems reviewed and otherwise acutely negative except as in the [place].     PAST MEDICAL HISTORY     Past Medical History:   Diagnosis Date    Arthritis     Hands    Chronic cluster headache 6/7/2019 last    COPD (chronic obstructive pulmonary disease) (H[facility])     \"dx 2 yrs ago\" \\"does not see a lung dr - Follows with PCP    Degenerative disc disease     \"in my back have degenarative disc\"    Depression     Edentulous     Fracture     \"fell over a 5 gallon bucket and landed on cement block and broke my right hand\"

## 2024-01-18 ENCOUNTER — TELEPHONE (OUTPATIENT)
Dept: PHARMACY | Age: 64
End: 2024-01-18

## 2024-01-18 LAB
CULTURE: ABNORMAL
Lab: ABNORMAL
SPECIMEN: ABNORMAL

## 2024-01-18 NOTE — PROGRESS NOTES
Addendum: Patient called back 1/19/24 and reported worsening of symptoms, despite stating they were improving since the catheter was replaced on 1/17/24. I advised patient to return to the ED for re-evaluation due to worsening of symptoms.     Pharmacy Note  ED Culture Follow-up    Jama Ramirez is a 63 y.o. male.     Allergies: Bee venom and Nsaids     Labs:  Lab Results   Component Value Date    BUN 13 01/15/2024    CREATININE 0.7 (L) 01/15/2024    WBC 10.6 (H) 01/15/2024     Estimated Creatinine Clearance: 106 mL/min (A) (based on SCr of 0.7 mg/dL (L)).    Current antimicrobials:   Cephalexin    ASSESSMENT:  Micro results:   Urine culture: positive for 25,000 E. coli and 50,000 P. aeruginosa     PLAN:  Need for intervention: Yes, but will defer to specialist  Chosen treatment:    Patient will be treated by specialist    Patient response:   Called and spoke with patient. Patient is following with urology and had his catheter replaced on 1/17/24 and reports improvement of symptoms. Patient will continue to follow closely with urology.  Counseled patient to return to the emergency department with any new or worsening symptoms.    Called/sent in prescription to: Not applicable    Please call with any questions. Ext. [phone]    Jefe Dobbins, Pharmacy Student 1:38 PM 1/18/2024

## 2024-01-18 NOTE — TELEPHONE ENCOUNTER
Pharmacy Note  ED Culture Follow-up     Jama Ramirez is a 63 y.o. male.      Allergies: Bee venom and Nsaids      Labs:        Lab Results   Component Value Date     BUN 13 01/15/2024     CREATININE 0.7 (L) 01/15/2024     WBC 10.6 (H) 01/15/2024      Estimated Creatinine Clearance: 106 mL/min (A) (based on SCr of 0.7 mg/dL (L)).     Current antimicrobials:   Cephalexin     ASSESSMENT:  Micro results:   Urine culture: positive for 25,000 E. coli and 50,000 P. aeruginosa     PLAN:  Need for intervention: Yes, but will defer to specialist  Chosen treatment:    Patient will be treated by specialist     Patient response:   Called and spoke with patient. Patient is following with urology and had his catheter replaced on 1/17/24. Patient will continue to follow closely with urology.  Counseled patient to return to the emergency department with any new or worsening symptoms.     Called/sent in prescription to: Not applicable     Please call with any questions. Ext. [phone]     Jefe Dobbins, Pharmacy Student 1:38 PM 1/18/2024

## 2024-01-19 NOTE — TELEPHONE ENCOUNTER
Addendum: Patient called back 1/19/24 and reported worsening of symptoms, despite stating they were improving since the catheter was replaced on 1/17/24. I advised patient to return to the ED for re-evaluation due to worsening of symptoms.     Pharmacy Note  ED Culture Follow-up    Jama Ramirez is a 63 y.o. male.     Allergies: Bee venom and Nsaids     Labs:  Lab Results   Component Value Date    BUN 13 01/15/2024    CREATININE 0.7 (L) 01/15/2024    WBC 10.6 (H) 01/15/2024     Estimated Creatinine Clearance: 106 mL/min (A) (based on SCr of 0.7 mg/dL (L)).    Current antimicrobials:   Cephalexin    ASSESSMENT:  Micro results:   Urine culture: positive for 25,000 E. coli and 50,000 P. aeruginosa     PLAN:  Need for intervention: Yes, but will defer to specialist  Chosen treatment:    Patient will be treated by specialist    Patient response:   Called and spoke with patient. Patient is following with urology and had his catheter replaced on 1/17/24 and reports improvement of symptoms. Patient will continue to follow closely with urology.  Counseled patient to return to the emergency department with any new or worsening symptoms.    Called/sent in prescription to: Not applicable    Please call with any questions. Ext. [phone]    Jefe Dobbins, Pharmacy Student 1:38 PM 1/18/2024

## 2024-03-17 NOTE — PROGRESS NOTES
tobacco: Never    Tobacco comments:     use to smoke 2.5 PPD   Vaping Use    Vaping Use: Never used   Substance and Sexual Activity    Alcohol use: Yes     Comment: Occasional 1/month, caffiene: 3 cups of coffee daily    Drug use: Never     Comment: 3 cups of coffee daily    Sexual activity: Not Currently     Partners: Female         FAMILY HISTORY     Family History   Problem Relation Age of Onset    Dementia Mother     Stroke Mother     Stroke Father     High Blood Pressure Father     Heart Disease Father     Breast Cancer Sister     Diabetes Brother     Cancer Brother         mouth and throat    Heart Attack Maternal Uncle     Heart Disease Maternal Uncle     No Known Problems Son     No Known Problems Son     No Known Problems Daughter          PHYSICAL EXAM     Wt Readings from Last 3 Encounters:   03/21/24 68.9 kg (152 lb)   01/15/24 68.5 kg (151 lb)   12/20/23 68.9 kg (152 lb)     Temp Readings from Last 3 Encounters:   01/15/24 97.4 °F (36.3 °C) (Oral)   12/20/23 97.3 °F (36.3 °C) (Oral)   09/04/23 97.8 °F (36.6 °C) (Oral)     BP Readings from Last 3 Encounters:   03/21/24 110/65   01/15/24 137/87   12/21/23 113/74     Pulse Readings from Last 3 Encounters:   03/21/24 62   01/15/24 75   12/21/23 57       General appearance:  Alert and oriented, NAD, well developed   HEENT: EOMI, no scleral injection, moist mucous membranes, no oral ulcers, normal hearing, no cartilage inflammation  Neck: Trachea midline, no masses  Lymph: no LAD  Lungs: CTAB, no rales  Heart: regular rate and rhythm, S1, S2 normal, no murmur, no lower extremity edema  Abdomen: Soft, ND, NT. + BS.  Extremities: atraumatic, no cyanosis or edema.   Neurologic: CN 2-12 grossly intact.   Skin: No active rashes, warm and dry, no telangiectasias, no digital pitting, no sclerodactyly, no rheumatoid nodules, no livedo  MSK:   Wrist : tenderness+   HANDS: Bilateral CMC tendenress+,  ? [place] neck deformity++, MCP and DIP tenderness+   Elbow: No

## 2024-03-21 ENCOUNTER — HOSPITAL ENCOUNTER (OUTPATIENT)
Dept: GENERAL RADIOLOGY | Age: 64
Discharge: HOME | End: 2024-03-21
Payer: MEDICAID

## 2024-03-21 ENCOUNTER — HOSPITAL ENCOUNTER (OUTPATIENT)
Age: 64
Discharge: HOME | End: 2024-03-21
Payer: MEDICAID

## 2024-03-21 ENCOUNTER — OFFICE VISIT (OUTPATIENT)
Dept: RHEUMATOLOGY | Age: 64
End: 2024-03-21
Payer: MEDICAID

## 2024-03-21 VITALS
SYSTOLIC BLOOD PRESSURE: 110 MMHG | WEIGHT: 152 LBS | DIASTOLIC BLOOD PRESSURE: 65 MMHG | BODY MASS INDEX: 22.45 KG/M2 | HEART RATE: 62 BPM | OXYGEN SATURATION: 96 %

## 2024-03-21 DIAGNOSIS — M06.4 INFLAMMATORY POLYARTHRITIS (HCC): ICD-10-CM

## 2024-03-21 DIAGNOSIS — M25.50 POLYARTHRALGIA: ICD-10-CM

## 2024-03-21 DIAGNOSIS — Z01.89 ENCOUNTER FOR OTHER SPECIFIED SPECIAL EXAMINATIONS: ICD-10-CM

## 2024-03-21 DIAGNOSIS — M06.4 INFLAMMATORY POLYARTHRITIS (HCC): Primary | ICD-10-CM

## 2024-03-21 LAB
25(OH)D3 SERPL-MCNC: 8.93 NG/ML
ALBUMIN SERPL-MCNC: 4.2 GM/DL (ref 3.4–5)
ALBUMIN SERPL-MCNC: 4.2 GM/DL (ref 3.4–5)
ALP BLD-CCNC: 83 IU/L (ref 40–129)
ALT SERPL-CCNC: 8 U/L (ref 10–40)
ANION GAP SERPL CALCULATED.3IONS-SCNC: 13 MMOL/L (ref 7–16)
AST SERPL-CCNC: 18 IU/L (ref 15–37)
BILIRUB SERPL-MCNC: 0.2 MG/DL (ref 0–1)
BILIRUBIN DIRECT: 0.2 MG/DL (ref 0–0.3)
BILIRUBIN, INDIRECT: 0 MG/DL (ref 0–0.7)
BUN SERPL-MCNC: 14 MG/DL (ref 6–23)
CALCIUM SERPL-MCNC: 8.8 MG/DL (ref 8.3–10.6)
CHLORIDE BLD-SCNC: 106 MMOL/L (ref 99–110)
CO2: 22 MMOL/L (ref 21–32)
CREAT SERPL-MCNC: 0.8 MG/DL (ref 0.9–1.3)
CRP SERPL HS-MCNC: 5.4 MG/L
ERYTHROCYTE SEDIMENTATION RATE: <1 MM/HR (ref 0–20)
GFR SERPL CREATININE-BSD FRML MDRD: >60 ML/MIN/1.73M2
GLUCOSE SERPL-MCNC: 85 MG/DL (ref 70–99)
HAV IGM SERPL QL IA: NON REACTIVE
HBV CORE IGM SERPL QL IA: NON REACTIVE
HBV SURFACE AG SERPL QL IA: NON REACTIVE
HCT VFR BLD CALC: 40.7 % (ref 42–52)
HCV AB SERPL QL IA: NON REACTIVE
HEMOGLOBIN: 13.1 GM/DL (ref 13.5–18)
MCH RBC QN AUTO: 28.6 PG (ref 27–31)
MCHC RBC AUTO-ENTMCNC: 32.2 % (ref 32–36)
MCV RBC AUTO: 88.9 FL (ref 78–100)
PDW BLD-RTO: 13.7 % (ref 11.7–14.9)
PHOSPHORUS: 3.7 MG/DL (ref 2.5–4.9)
PLATELET # BLD: 248 K/CU MM (ref 140–440)
PMV BLD AUTO: 11 FL (ref 7.5–11.1)
POTASSIUM SERPL-SCNC: 4.3 MMOL/L (ref 3.5–5.1)
RBC # BLD: 4.58 M/CU MM (ref 4.6–6.2)
SODIUM BLD-SCNC: 141 MMOL/L (ref 135–145)
TOTAL PROTEIN: 6.1 GM/DL (ref 6.4–8.2)
URATE SERPL-MCNC: 2.3 MG/DL (ref 3.5–7.2)
WBC # BLD: 6.5 K/CU MM (ref 4–10.5)

## 2024-03-21 PROCEDURE — G8420 CALC BMI NORM PARAMETERS: HCPCS | Performed by: STUDENT IN AN ORGANIZED HEALTH CARE EDUCATION/TRAINING PROGRAM

## 2024-03-21 PROCEDURE — G8427 DOCREV CUR MEDS BY ELIG CLIN: HCPCS | Performed by: STUDENT IN AN ORGANIZED HEALTH CARE EDUCATION/TRAINING PROGRAM

## 2024-03-21 PROCEDURE — 86200 CCP ANTIBODY: CPT

## 2024-03-21 PROCEDURE — 84550 ASSAY OF BLOOD/URIC ACID: CPT

## 2024-03-21 PROCEDURE — 80074 ACUTE HEPATITIS PANEL: CPT

## 2024-03-21 PROCEDURE — 84100 ASSAY OF PHOSPHORUS: CPT

## 2024-03-21 PROCEDURE — 82248 BILIRUBIN DIRECT: CPT

## 2024-03-21 PROCEDURE — 3074F SYST BP LT 130 MM HG: CPT | Performed by: STUDENT IN AN ORGANIZED HEALTH CARE EDUCATION/TRAINING PROGRAM

## 2024-03-21 PROCEDURE — G8484 FLU IMMUNIZE NO ADMIN: HCPCS | Performed by: STUDENT IN AN ORGANIZED HEALTH CARE EDUCATION/TRAINING PROGRAM

## 2024-03-21 PROCEDURE — 73610 X-RAY EXAM OF ANKLE: CPT

## 2024-03-21 PROCEDURE — 85027 COMPLETE CBC AUTOMATED: CPT

## 2024-03-21 PROCEDURE — 73130 X-RAY EXAM OF HAND: CPT

## 2024-03-21 PROCEDURE — 3017F COLORECTAL CA SCREEN DOC REV: CPT | Performed by: STUDENT IN AN ORGANIZED HEALTH CARE EDUCATION/TRAINING PROGRAM

## 2024-03-21 PROCEDURE — 4004F PT TOBACCO SCREEN RCVD TLK: CPT | Performed by: STUDENT IN AN ORGANIZED HEALTH CARE EDUCATION/TRAINING PROGRAM

## 2024-03-21 PROCEDURE — 82306 VITAMIN D 25 HYDROXY: CPT

## 2024-03-21 PROCEDURE — 86140 C-REACTIVE PROTEIN: CPT

## 2024-03-21 PROCEDURE — 99205 OFFICE O/P NEW HI 60 MIN: CPT | Performed by: STUDENT IN AN ORGANIZED HEALTH CARE EDUCATION/TRAINING PROGRAM

## 2024-03-21 PROCEDURE — 3078F DIAST BP <80 MM HG: CPT | Performed by: STUDENT IN AN ORGANIZED HEALTH CARE EDUCATION/TRAINING PROGRAM

## 2024-03-21 PROCEDURE — 86480 TB TEST CELL IMMUN MEASURE: CPT

## 2024-03-21 PROCEDURE — 80053 COMPREHEN METABOLIC PANEL: CPT

## 2024-03-21 PROCEDURE — 86430 RHEUMATOID FACTOR TEST QUAL: CPT

## 2024-03-21 PROCEDURE — 85652 RBC SED RATE AUTOMATED: CPT

## 2024-03-21 PROCEDURE — 36415 COLL VENOUS BLD VENIPUNCTURE: CPT

## 2024-03-21 RX ORDER — PREDNISONE 5 MG/1
TABLET ORAL
Qty: 160 TABLET | Refills: 0 | Status: SHIPPED | OUTPATIENT
Start: 2024-03-21

## 2024-03-21 NOTE — PATIENT INSTRUCTIONS
Complete ordered labs and get imaging done  Start prednisone 2 tabs daily then reduce to 1 tab daily   We will discuss results at next visit  RTC in 13 weeks      Lab Locations:    [facility]  [address] [address]    Central [phone]

## 2024-03-24 LAB
CYCLIC CITRULLINE AB IGG/IGA: 5 UNITS (ref 0–19)
QUANTI TB1 MINUS NIL: 0 IU/ML (ref 0–0.34)
QUANTI TB2 MINUS NIL: 0 IU/ML (ref 0–0.34)
QUANTIFERON (R) TB GOLD (INCUBATED): NEGATIVE IU/ML
QUANTIFERON MITOGEN MINUS NIL: 7.8 IU/ML
QUANTIFERON NIL: 0.02 IU/ML
RHEUMATOID FACTOR: <10 IU/ML (ref 0–14)

## 2024-03-26 ENCOUNTER — HOSPITAL ENCOUNTER (OUTPATIENT)
Dept: WOMENS IMAGING | Age: 64
Discharge: HOME | End: 2024-03-26
Payer: MEDICAID

## 2024-03-26 DIAGNOSIS — M06.4 INFLAMMATORY POLYARTHRITIS (HCC): ICD-10-CM

## 2024-03-26 DIAGNOSIS — M25.50 POLYARTHRALGIA: ICD-10-CM

## 2024-03-26 PROCEDURE — 77080 DXA BONE DENSITY AXIAL: CPT

## 2024-04-28 ENCOUNTER — APPOINTMENT (OUTPATIENT)
Dept: CT IMAGING | Age: 64
End: 2024-04-28
Payer: COMMERCIAL

## 2024-04-28 ENCOUNTER — APPOINTMENT (OUTPATIENT)
Dept: GENERAL RADIOLOGY | Age: 64
End: 2024-04-28
Payer: COMMERCIAL

## 2024-04-28 ENCOUNTER — HOSPITAL ENCOUNTER (INPATIENT)
Age: 64
LOS: 5 days | Discharge: HOME | End: 2024-05-03
Attending: EMERGENCY MEDICINE
Payer: COMMERCIAL

## 2024-04-28 DIAGNOSIS — S35.229A INJURY OF SUPERIOR MESENTERIC ARTERY, INITIAL ENCOUNTER: Primary | ICD-10-CM

## 2024-04-28 DIAGNOSIS — N39.0 URINARY TRACT INFECTION WITHOUT HEMATURIA, SITE UNSPECIFIED: ICD-10-CM

## 2024-04-28 DIAGNOSIS — R10.9 ABDOMINAL PAIN, UNSPECIFIED ABDOMINAL LOCATION: ICD-10-CM

## 2024-04-28 PROBLEM — K55.1 SUPERIOR MESENTERIC ARTERY STENOSIS (HCC): Status: ACTIVE | Noted: 2024-04-28

## 2024-04-28 LAB
ALBUMIN SERPL-MCNC: 4.1 GM/DL (ref 3.4–5)
ALP BLD-CCNC: 101 IU/L (ref 40–129)
ALT SERPL-CCNC: 23 U/L (ref 10–40)
ANION GAP SERPL CALCULATED.3IONS-SCNC: 9 MMOL/L (ref 7–16)
ANTI-XA UNFRAC HEPARIN: 0.62 IU/ML
ANTI-XA UNFRAC HEPARIN: <0.1 IU/ML
APTT: 143 SECONDS (ref 25.1–37.1)
APTT: 29.9 SECONDS (ref 25.1–37.1)
AST SERPL-CCNC: 19 IU/L (ref 15–37)
BACTERIA: NEGATIVE /HPF
BASOPHILS ABSOLUTE: 0 K/CU MM
BASOPHILS RELATIVE PERCENT: 0.6 % (ref 0–1)
BILIRUB SERPL-MCNC: 0.2 MG/DL (ref 0–1)
BILIRUBIN URINE: NEGATIVE MG/DL
BILIRUBIN URINE: NEGATIVE MG/DL
BLOOD, URINE: NEGATIVE
BLOOD, URINE: NEGATIVE
BUN SERPL-MCNC: 17 MG/DL (ref 6–23)
CALCIUM SERPL-MCNC: 8.6 MG/DL (ref 8.3–10.6)
CHLORIDE BLD-SCNC: 107 MMOL/L (ref 99–110)
CLARITY: CLEAR
CLARITY: CLEAR
CO2: 22 MMOL/L (ref 21–32)
COLOR: YELLOW
COLOR: YELLOW
COMMENT UA: NORMAL
CREAT SERPL-MCNC: 0.8 MG/DL (ref 0.9–1.3)
DIFFERENTIAL TYPE: ABNORMAL
EOSINOPHILS ABSOLUTE: 0.2 K/CU MM
EOSINOPHILS RELATIVE PERCENT: 2.5 % (ref 0–3)
GFR SERPL CREATININE-BSD FRML MDRD: >90 ML/MIN/1.73M2
GLUCOSE SERPL-MCNC: 106 MG/DL (ref 70–99)
GLUCOSE, URINE: NEGATIVE MG/DL
GLUCOSE, URINE: NEGATIVE MG/DL
HCT VFR BLD CALC: 38 % (ref 42–52)
HCT VFR BLD CALC: 38.1 % (ref 42–52)
HCT VFR BLD CALC: 41.1 % (ref 42–52)
HEMOGLOBIN: 12.3 GM/DL (ref 13.5–18)
HEMOGLOBIN: 12.4 GM/DL (ref 13.5–18)
HEMOGLOBIN: 13.5 GM/DL (ref 13.5–18)
IMMATURE NEUTROPHIL %: 0.7 % (ref 0–0.43)
INR BLD: 1 INDEX
INR BLD: 1.1 INDEX
KETONES, URINE: NEGATIVE MG/DL
KETONES, URINE: NEGATIVE MG/DL
LACTIC ACID, SEPSIS: 0.6 MMOL/L (ref 0.4–2)
LACTIC ACID, SEPSIS: 1 MMOL/L (ref 0.4–2)
LACTIC ACID, SEPSIS: 1.1 MMOL/L (ref 0.4–2)
LEUKOCYTE ESTERASE, URINE: ABNORMAL
LEUKOCYTE ESTERASE, URINE: NEGATIVE
LIPASE: 108 IU/L (ref 13–60)
LYMPHOCYTES ABSOLUTE: 1.9 K/CU MM
LYMPHOCYTES RELATIVE PERCENT: 27.4 % (ref 24–44)
MCH RBC QN AUTO: 28.6 PG (ref 27–31)
MCH RBC QN AUTO: 28.7 PG (ref 27–31)
MCH RBC QN AUTO: 28.7 PG (ref 27–31)
MCHC RBC AUTO-ENTMCNC: 32.4 % (ref 32–36)
MCHC RBC AUTO-ENTMCNC: 32.5 % (ref 32–36)
MCHC RBC AUTO-ENTMCNC: 32.8 % (ref 32–36)
MCV RBC AUTO: 87.1 FL (ref 78–100)
MCV RBC AUTO: 88.2 FL (ref 78–100)
MCV RBC AUTO: 88.6 FL (ref 78–100)
MONOCYTES ABSOLUTE: 0.5 K/CU MM
MONOCYTES RELATIVE PERCENT: 7.7 % (ref 0–4)
MUCUS: ABNORMAL HPF
NEUTROPHILS RELATIVE PERCENT: 61.1 % (ref 36–66)
NITRITE URINE, QUANTITATIVE: NEGATIVE
NITRITE URINE, QUANTITATIVE: NEGATIVE
NUCLEATED RBC %: 0 %
PDW BLD-RTO: 14 % (ref 11.7–14.9)
PDW BLD-RTO: 14.1 % (ref 11.7–14.9)
PDW BLD-RTO: 14.1 % (ref 11.7–14.9)
PH, URINE: 6 (ref 5–8)
PH, URINE: 6 (ref 5–8)
PLATELET # BLD: 213 K/CU MM (ref 140–440)
PLATELET # BLD: 229 K/CU MM (ref 140–440)
PLATELET # BLD: 245 K/CU MM (ref 140–440)
PMV BLD AUTO: 9.2 FL (ref 7.5–11.1)
PMV BLD AUTO: 9.3 FL (ref 7.5–11.1)
PMV BLD AUTO: 9.4 FL (ref 7.5–11.1)
POTASSIUM SERPL-SCNC: 3.8 MMOL/L (ref 3.5–5.1)
PRO-BNP: 59.94 PG/ML
PROTEIN UA: NEGATIVE MG/DL
PROTEIN UA: NEGATIVE MG/DL
PROTHROMBIN TIME: 13.4 SECONDS (ref 11.7–14.5)
PROTHROMBIN TIME: 14.2 SECONDS (ref 11.7–14.5)
RBC # BLD: 4.29 M/CU MM (ref 4.6–6.2)
RBC # BLD: 4.32 M/CU MM (ref 4.6–6.2)
RBC # BLD: 4.72 M/CU MM (ref 4.6–6.2)
RBC URINE: 6 /HPF (ref 0–3)
REASON FOR REJECTION: NORMAL
REJECTED TEST: NORMAL
SEGMENTED NEUTROPHILS ABSOLUTE COUNT: 4.2 K/CU MM
SODIUM BLD-SCNC: 138 MMOL/L (ref 135–145)
SPECIFIC GRAVITY UA: 1.02 (ref 1–1.03)
SPECIFIC GRAVITY UA: 1.02 (ref 1–1.03)
SQUAMOUS EPITHELIAL: 1 /HPF
TOTAL IMMATURE NEUTOROPHIL: 0.05 K/CU MM
TOTAL NUCLEATED RBC: 0 K/CU MM
TOTAL PROTEIN: 6.5 GM/DL (ref 6.4–8.2)
TRICHOMONAS: ABNORMAL /HPF
TROPONIN, HIGH SENSITIVITY: 7 NG/L (ref 0–22)
TROPONIN, HIGH SENSITIVITY: <6 NG/L (ref 0–22)
UROBILINOGEN, URINE: 0.2 MG/DL (ref 0.2–1)
UROBILINOGEN, URINE: 0.2 MG/DL (ref 0.2–1)
WBC # BLD: 6.9 K/CU MM (ref 4–10.5)
WBC # BLD: 7 K/CU MM (ref 4–10.5)
WBC # BLD: 8.3 K/CU MM (ref 4–10.5)
WBC UA: 54 /HPF (ref 0–2)

## 2024-04-28 PROCEDURE — 83880 ASSAY OF NATRIURETIC PEPTIDE: CPT

## 2024-04-28 PROCEDURE — 6360000002 HC RX W HCPCS

## 2024-04-28 PROCEDURE — 87040 BLOOD CULTURE FOR BACTERIA: CPT

## 2024-04-28 PROCEDURE — 6360000002 HC RX W HCPCS: Performed by: EMERGENCY MEDICINE

## 2024-04-28 PROCEDURE — 71045 X-RAY EXAM CHEST 1 VIEW: CPT

## 2024-04-28 PROCEDURE — 80053 COMPREHEN METABOLIC PANEL: CPT

## 2024-04-28 PROCEDURE — 2500000003 HC RX 250 WO HCPCS: Performed by: EMERGENCY MEDICINE

## 2024-04-28 PROCEDURE — 87086 URINE CULTURE/COLONY COUNT: CPT

## 2024-04-28 PROCEDURE — 96375 TX/PRO/DX INJ NEW DRUG ADDON: CPT

## 2024-04-28 PROCEDURE — 74174 CTA ABD&PLVS W/CONTRAST: CPT

## 2024-04-28 PROCEDURE — 85025 COMPLETE CBC W/AUTO DIFF WBC: CPT

## 2024-04-28 PROCEDURE — 2580000003 HC RX 258

## 2024-04-28 PROCEDURE — 94761 N-INVAS EAR/PLS OXIMETRY MLT: CPT

## 2024-04-28 PROCEDURE — 2580000003 HC RX 258: Performed by: EMERGENCY MEDICINE

## 2024-04-28 PROCEDURE — 96374 THER/PROPH/DIAG INJ IV PUSH: CPT

## 2024-04-28 PROCEDURE — 2140000000 HC CCU INTERMEDIATE R&B

## 2024-04-28 PROCEDURE — 94640 AIRWAY INHALATION TREATMENT: CPT

## 2024-04-28 PROCEDURE — 84484 ASSAY OF TROPONIN QUANT: CPT

## 2024-04-28 PROCEDURE — 6370000000 HC RX 637 (ALT 250 FOR IP): Performed by: STUDENT IN AN ORGANIZED HEALTH CARE EDUCATION/TRAINING PROGRAM

## 2024-04-28 PROCEDURE — 83690 ASSAY OF LIPASE: CPT

## 2024-04-28 PROCEDURE — 93005 ELECTROCARDIOGRAM TRACING: CPT

## 2024-04-28 PROCEDURE — 81003 URINALYSIS AUTO W/O SCOPE: CPT

## 2024-04-28 PROCEDURE — 85610 PROTHROMBIN TIME: CPT

## 2024-04-28 PROCEDURE — 85730 THROMBOPLASTIN TIME PARTIAL: CPT

## 2024-04-28 PROCEDURE — 2500000003 HC RX 250 WO HCPCS

## 2024-04-28 PROCEDURE — 85027 COMPLETE CBC AUTOMATED: CPT

## 2024-04-28 PROCEDURE — 6370000000 HC RX 637 (ALT 250 FOR IP)

## 2024-04-28 PROCEDURE — 2580000003 HC RX 258: Performed by: STUDENT IN AN ORGANIZED HEALTH CARE EDUCATION/TRAINING PROGRAM

## 2024-04-28 PROCEDURE — 87088 URINE BACTERIA CULTURE: CPT

## 2024-04-28 PROCEDURE — 6370000000 HC RX 637 (ALT 250 FOR IP): Performed by: EMERGENCY MEDICINE

## 2024-04-28 PROCEDURE — 85520 HEPARIN ASSAY: CPT

## 2024-04-28 PROCEDURE — 6360000004 HC RX CONTRAST MEDICATION

## 2024-04-28 PROCEDURE — 99285 EMERGENCY DEPT VISIT HI MDM: CPT

## 2024-04-28 PROCEDURE — 87186 SC STD MICRODIL/AGAR DIL: CPT

## 2024-04-28 PROCEDURE — 83605 ASSAY OF LACTIC ACID: CPT

## 2024-04-28 PROCEDURE — 36415 COLL VENOUS BLD VENIPUNCTURE: CPT

## 2024-04-28 PROCEDURE — 81001 URINALYSIS AUTO W/SCOPE: CPT

## 2024-04-28 RX ORDER — ACETAMINOPHEN 650 MG/1
650 SUPPOSITORY RECTAL EVERY 6 HOURS PRN
Status: DISCONTINUED | OUTPATIENT
Start: 2024-04-28 | End: 2024-05-03 | Stop reason: HOSPADM

## 2024-04-28 RX ORDER — SODIUM CHLORIDE 0.9 % (FLUSH) 0.9 %
5-40 SYRINGE (ML) INJECTION PRN
Status: DISCONTINUED | OUTPATIENT
Start: 2024-04-28 | End: 2024-05-03 | Stop reason: HOSPADM

## 2024-04-28 RX ORDER — HEPARIN SODIUM 1000 [USP'U]/ML
2000 INJECTION, SOLUTION INTRAVENOUS; SUBCUTANEOUS PRN
Status: DISCONTINUED | OUTPATIENT
Start: 2024-04-28 | End: 2024-05-02 | Stop reason: SDUPTHER

## 2024-04-28 RX ORDER — DICYCLOMINE HYDROCHLORIDE 10 MG/ML
20 INJECTION INTRAMUSCULAR ONCE
Status: COMPLETED | OUTPATIENT
Start: 2024-04-28 | End: 2024-04-28

## 2024-04-28 RX ORDER — ONDANSETRON 4 MG/1
4 TABLET, ORALLY DISINTEGRATING ORAL EVERY 8 HOURS PRN
Status: DISCONTINUED | OUTPATIENT
Start: 2024-04-28 | End: 2024-05-03 | Stop reason: HOSPADM

## 2024-04-28 RX ORDER — SODIUM CHLORIDE 0.9 % (FLUSH) 0.9 %
5-40 SYRINGE (ML) INJECTION EVERY 12 HOURS SCHEDULED
Status: DISCONTINUED | OUTPATIENT
Start: 2024-04-28 | End: 2024-05-03 | Stop reason: HOSPADM

## 2024-04-28 RX ORDER — ASPIRIN 81 MG/1
324 TABLET, CHEWABLE ORAL ONCE
Status: COMPLETED | OUTPATIENT
Start: 2024-04-28 | End: 2024-04-28

## 2024-04-28 RX ORDER — VERAPAMIL HYDROCHLORIDE 120 MG/1
120 CAPSULE, EXTENDED RELEASE ORAL DAILY
Status: DISCONTINUED | OUTPATIENT
Start: 2024-04-28 | End: 2024-04-28 | Stop reason: CLARIF

## 2024-04-28 RX ORDER — MORPHINE SULFATE 4 MG/ML
4 INJECTION, SOLUTION INTRAMUSCULAR; INTRAVENOUS ONCE
Status: COMPLETED | OUTPATIENT
Start: 2024-04-28 | End: 2024-04-28

## 2024-04-28 RX ORDER — IPRATROPIUM BROMIDE AND ALBUTEROL SULFATE 2.5; .5 MG/3ML; MG/3ML
1 SOLUTION RESPIRATORY (INHALATION)
Status: DISCONTINUED | OUTPATIENT
Start: 2024-04-28 | End: 2024-04-30

## 2024-04-28 RX ORDER — MAGNESIUM SULFATE IN WATER 40 MG/ML
2000 INJECTION, SOLUTION INTRAVENOUS PRN
Status: DISCONTINUED | OUTPATIENT
Start: 2024-04-28 | End: 2024-05-03 | Stop reason: HOSPADM

## 2024-04-28 RX ORDER — HEPARIN SODIUM 1000 [USP'U]/ML
40 INJECTION, SOLUTION INTRAVENOUS; SUBCUTANEOUS PRN
Status: DISCONTINUED | OUTPATIENT
Start: 2024-04-28 | End: 2024-05-02 | Stop reason: SDUPTHER

## 2024-04-28 RX ORDER — HEPARIN SODIUM 1000 [USP'U]/ML
80 INJECTION, SOLUTION INTRAVENOUS; SUBCUTANEOUS PRN
Status: DISCONTINUED | OUTPATIENT
Start: 2024-04-28 | End: 2024-05-02 | Stop reason: SDUPTHER

## 2024-04-28 RX ORDER — ASPIRIN 81 MG/1
324 TABLET, CHEWABLE ORAL ONCE
Status: DISCONTINUED | OUTPATIENT
Start: 2024-04-28 | End: 2024-04-28

## 2024-04-28 RX ORDER — TIZANIDINE 4 MG/1
4 TABLET ORAL NIGHTLY
Status: DISCONTINUED | OUTPATIENT
Start: 2024-04-28 | End: 2024-05-03 | Stop reason: HOSPADM

## 2024-04-28 RX ORDER — MAGNESIUM HYDROXIDE/ALUMINUM HYDROXICE/SIMETHICONE 120; 1200; 1200 MG/30ML; MG/30ML; MG/30ML
30 SUSPENSION ORAL ONCE
Status: COMPLETED | OUTPATIENT
Start: 2024-04-28 | End: 2024-04-28

## 2024-04-28 RX ORDER — SODIUM CHLORIDE, SODIUM LACTATE, POTASSIUM CHLORIDE, CALCIUM CHLORIDE 600; 310; 30; 20 MG/100ML; MG/100ML; MG/100ML; MG/100ML
INJECTION, SOLUTION INTRAVENOUS CONTINUOUS
Status: DISCONTINUED | OUTPATIENT
Start: 2024-04-28 | End: 2024-04-29

## 2024-04-28 RX ORDER — 0.9 % SODIUM CHLORIDE 0.9 %
500 INTRAVENOUS SOLUTION INTRAVENOUS ONCE
Status: COMPLETED | OUTPATIENT
Start: 2024-04-28 | End: 2024-04-28

## 2024-04-28 RX ORDER — HEPARIN SODIUM 1000 [USP'U]/ML
80 INJECTION, SOLUTION INTRAVENOUS; SUBCUTANEOUS ONCE
Status: DISCONTINUED | OUTPATIENT
Start: 2024-04-28 | End: 2024-05-02 | Stop reason: SDUPTHER

## 2024-04-28 RX ORDER — ONDANSETRON 2 MG/ML
4 INJECTION INTRAMUSCULAR; INTRAVENOUS EVERY 6 HOURS PRN
Status: DISCONTINUED | OUTPATIENT
Start: 2024-04-28 | End: 2024-04-28

## 2024-04-28 RX ORDER — SODIUM CHLORIDE 9 MG/ML
INJECTION, SOLUTION INTRAVENOUS PRN
Status: DISCONTINUED | OUTPATIENT
Start: 2024-04-28 | End: 2024-05-03 | Stop reason: HOSPADM

## 2024-04-28 RX ORDER — POTASSIUM CHLORIDE 20 MEQ/1
40 TABLET, EXTENDED RELEASE ORAL PRN
Status: DISCONTINUED | OUTPATIENT
Start: 2024-04-28 | End: 2024-05-03 | Stop reason: HOSPADM

## 2024-04-28 RX ORDER — HEPARIN SODIUM 10000 [USP'U]/100ML
5-30 INJECTION, SOLUTION INTRAVENOUS CONTINUOUS
Status: DISCONTINUED | OUTPATIENT
Start: 2024-04-28 | End: 2024-04-28

## 2024-04-28 RX ORDER — HEPARIN SODIUM 10000 [USP'U]/100ML
5-30 INJECTION, SOLUTION INTRAVENOUS CONTINUOUS
Status: DISCONTINUED | OUTPATIENT
Start: 2024-04-28 | End: 2024-04-29

## 2024-04-28 RX ORDER — HEPARIN SODIUM 10000 [USP'U]/100ML
5-30 INJECTION, SOLUTION INTRAVENOUS CONTINUOUS
Status: DISCONTINUED | OUTPATIENT
Start: 2024-04-28 | End: 2024-05-01

## 2024-04-28 RX ORDER — ONDANSETRON 2 MG/ML
4 INJECTION INTRAMUSCULAR; INTRAVENOUS EVERY 6 HOURS PRN
Status: DISCONTINUED | OUTPATIENT
Start: 2024-04-28 | End: 2024-05-03 | Stop reason: HOSPADM

## 2024-04-28 RX ORDER — ATORVASTATIN CALCIUM 10 MG/1
20 TABLET, FILM COATED ORAL DAILY
Status: DISCONTINUED | OUTPATIENT
Start: 2024-04-28 | End: 2024-05-03 | Stop reason: HOSPADM

## 2024-04-28 RX ORDER — POTASSIUM CHLORIDE 7.45 MG/ML
10 INJECTION INTRAVENOUS PRN
Status: DISCONTINUED | OUTPATIENT
Start: 2024-04-28 | End: 2024-05-03 | Stop reason: HOSPADM

## 2024-04-28 RX ORDER — FAMOTIDINE 10 MG/ML
20 INJECTION, SOLUTION INTRAVENOUS ONCE
Status: COMPLETED | OUTPATIENT
Start: 2024-04-28 | End: 2024-04-28

## 2024-04-28 RX ORDER — POLYETHYLENE GLYCOL 3350 17 G/17G
17 POWDER, FOR SOLUTION ORAL DAILY PRN
Status: DISCONTINUED | OUTPATIENT
Start: 2024-04-28 | End: 2024-05-03 | Stop reason: HOSPADM

## 2024-04-28 RX ORDER — HEPARIN SODIUM 1000 [USP'U]/ML
4000 INJECTION, SOLUTION INTRAVENOUS; SUBCUTANEOUS PRN
Status: DISCONTINUED | OUTPATIENT
Start: 2024-04-28 | End: 2024-05-02 | Stop reason: SDUPTHER

## 2024-04-28 RX ORDER — HEPARIN SODIUM 1000 [USP'U]/ML
4000 INJECTION, SOLUTION INTRAVENOUS; SUBCUTANEOUS ONCE
Status: COMPLETED | OUTPATIENT
Start: 2024-04-28 | End: 2024-04-28

## 2024-04-28 RX ORDER — ACETAMINOPHEN 325 MG/1
650 TABLET ORAL EVERY 6 HOURS PRN
Status: DISCONTINUED | OUTPATIENT
Start: 2024-04-28 | End: 2024-05-03 | Stop reason: HOSPADM

## 2024-04-28 RX ADMIN — Medication 30 ML: at 17:50

## 2024-04-28 RX ADMIN — ONDANSETRON 4 MG: 2 INJECTION INTRAMUSCULAR; INTRAVENOUS at 14:45

## 2024-04-28 RX ADMIN — SODIUM CHLORIDE 500 ML: 9 INJECTION, SOLUTION INTRAVENOUS at 14:46

## 2024-04-28 RX ADMIN — VERAPAMIL HYDROCHLORIDE 120 MG: 120 TABLET, FILM COATED, EXTENDED RELEASE ORAL at 21:26

## 2024-04-28 RX ADMIN — SODIUM CHLORIDE, POTASSIUM CHLORIDE, SODIUM LACTATE AND CALCIUM CHLORIDE: 600; 310; 30; 20 INJECTION, SOLUTION INTRAVENOUS at 21:29

## 2024-04-28 RX ADMIN — MORPHINE SULFATE 4 MG: 4 INJECTION, SOLUTION INTRAMUSCULAR; INTRAVENOUS at 14:44

## 2024-04-28 RX ADMIN — ASPIRIN 324 MG: 81 TABLET, CHEWABLE ORAL at 17:50

## 2024-04-28 RX ADMIN — DICYCLOMINE HYDROCHLORIDE 20 MG: 10 INJECTION, SOLUTION INTRAMUSCULAR at 17:51

## 2024-04-28 RX ADMIN — TIZANIDINE 4 MG: 4 TABLET ORAL at 21:25

## 2024-04-28 RX ADMIN — WATER 1000 MG: 1 INJECTION INTRAMUSCULAR; INTRAVENOUS; SUBCUTANEOUS at 17:50

## 2024-04-28 RX ADMIN — HEPARIN SODIUM 12 UNITS/KG/HR: 10000 INJECTION, SOLUTION INTRAVENOUS at 19:07

## 2024-04-28 RX ADMIN — FAMOTIDINE 20 MG: 10 INJECTION, SOLUTION INTRAVENOUS at 17:51

## 2024-04-28 RX ADMIN — HEPARIN SODIUM 4000 UNITS: 1000 INJECTION INTRAVENOUS; SUBCUTANEOUS at 19:03

## 2024-04-28 RX ADMIN — IPRATROPIUM BROMIDE AND ALBUTEROL SULFATE 1 DOSE: .5; 2.5 SOLUTION RESPIRATORY (INHALATION) at 21:55

## 2024-04-28 RX ADMIN — ATORVASTATIN CALCIUM 20 MG: 10 TABLET, FILM COATED ORAL at 21:25

## 2024-04-28 RX ADMIN — IOPAMIDOL 80 ML: 755 INJECTION, SOLUTION INTRAVENOUS at 15:52

## 2024-04-28 ASSESSMENT — PAIN DESCRIPTION - LOCATION: LOCATION: ABDOMEN

## 2024-04-28 ASSESSMENT — PAIN SCALES - GENERAL
PAINLEVEL_OUTOF10: 7
PAINLEVEL_OUTOF10: 0

## 2024-04-28 ASSESSMENT — PAIN DESCRIPTION - DESCRIPTORS: DESCRIPTORS: CRAMPING;BURNING

## 2024-04-28 ASSESSMENT — PAIN - FUNCTIONAL ASSESSMENT
PAIN_FUNCTIONAL_ASSESSMENT: PREVENTS OR INTERFERES SOME ACTIVE ACTIVITIES AND ADLS
PAIN_FUNCTIONAL_ASSESSMENT: 0-10

## 2024-04-28 ASSESSMENT — PAIN DESCRIPTION - PAIN TYPE: TYPE: ACUTE PAIN

## 2024-04-28 ASSESSMENT — PAIN DESCRIPTION - ORIENTATION: ORIENTATION: RIGHT;MID;UPPER

## 2024-04-28 NOTE — ED PROVIDER NOTES
YAEL St D.O. am the primary physician of record. I personally saw the patient and made/approved the management plan and take responsibility for the patient management. I independently examined and evaluated Jama Ramirez.    In brief their history revealed  a 63 y.o. male history of gastric hemorrhage, pneumonia, COPD, HLD, HTN who presents to ED stating over the last 3 days he has had suprapubic pressure chills body aches bilateral flank pain.  He states that he feels as if he is unable to void at times.  He states in the last day and a half he has gotten central chest \" swelling\" bleeding from the abdominal pain.  States he has been nauseous due to the pain.  Denies any actual vomiting diarrhea or constipation.  Denies any dark-colored stools or bloody emesis.  States his urine has been darker and odorous with some blood at times.  States the chest swelling and pressure gets worse with activity.       Their focused exam revealed alert and oriented male resting in bed no distress normocephalic atraumatic sclera clear lungs clear heart regular rate and rhythm 2+ pulses throughout abdomen soft mildly tender diffusely no rebound guarding or rigidity no obvious hernia no pulsatile masses, bowel sounds normal.,  Tender again mildly diffusely.  Does have bilateral flank pain skin has no other rash or swelling cranial nerves grossly intact.    ED course/MDM: Patient seen with NP please see her note.  Patient with complaint of abdominal pain flank pain dysuria.  He has a history of prostate issues, he was found to have a UTI he was given Rocephin culture urine.  Also complained of some chest pain shortness of breath abdominal pain.  CT of the chest performed cardiac enzymes are negative CT of the chest abdomen pelvis does show a superior mesenteric artery small dissection which is new since 2022.  Will call his general surgeon otherwise he has no signs of ischemic bowel on exam he is mildly tender but no

## 2024-04-28 NOTE — H&P
V2.0  History and Physical      Name:  Jama Ramirez /Age/Sex: 1960  (63 y.o. male)   MRN & CSN:  [id number] & [id number] Encounter Date/Time: 2024 5:29 PM EDT   Location:  ED17/ED-17 PCP: Jennifer Gomez MD       Hospital Day: 1    Assessment and Plan:   Jama Ramirez is a 63 y.o. male with a pmh of tobacco use disorder COPD GERD hypertension hyperlipidemia BPH who presents with Superior mesenteric artery stenosis (HCC)    Hospital Problems             Last Modified POA    * (Principal) Superior mesenteric artery stenosis (HCC) 2024 Yes     Intractable abdominal pain with focal dissection within the superior mesenteric artery.  CT surgery has been consulted.  CT surgery and general surgery to be consulted to be started on aspirin and heparin drip admitted to stepdown unit.  Consult for CT surgery and general surgery placed.  UTI start the patient on Rocephin fluid hydration.  If symptoms of retention as well consider Brown catheter insertion urine cultures are pending de-escalate based on culture results  Chronic obstructive pulmonary disease active smoker tobacco user.  Start the patient on DuoNebs not in exacerbation  Benign essential hypertension  Hyperlipidemia  BPH    Goals status was discussed in detail with patient.  Verbalized understanding of different options of CODE STATUS.  Patient opted for full code.    Comment: Please note this report has been produced using speech recognition software and may contain errors related to that system including errors in grammar, punctuation, and spelling, as well as words and phrases that may be inappropriate. If there are any questions or concerns please feel free to contact the dictating provider for clarification.     Disposition:   Current Living situation: Home  Expected Disposition: Home  Estimated D/C: To be declared    Diet No diet orders on file   DVT Prophylaxis [] Lovenox, []  Heparin, [] SCDs, [] Ambulation,  [] Eliquis, [] Xarelto  NEGATIVE 04/28/2024 02:29 PM    GLUCOSEU NEGATIVE 07/11/2012 10:24 AM    KETUA NEGATIVE 04/28/2024 02:29 PM     Urine Cultures: No results found for: \"LABURIN\"  Blood Cultures: No results found for: \"BC\"  No results found for: \"BLOODCULT2\"  Organism:   Lab Results   Component Value Date/Time    ORG ECOL 10/06/2017 08:45 PM       Imaging/Diagnostics Last 24 Hours   CTA CHEST ABDOMEN PELVIS W CONTRAST    Addendum Date: 4/28/2024    There is no evidence of any focal wall thickening within the GI tract. No evidence of free air. There is also a voice recognition error in the secondary impression of the report. 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 The dissection terminates at the branching point of the SMA. This is new from October 2022 but is age indeterminate. Electronically signed by David Fan DO    Result Date: 4/28/2024  CT angiography of the chest abdomen and pelvis with contrast HISTORY: Pain. COMPARISON: None Individualized dose optimization technique was used in order to meet ALARA standards for radiation dose reduction.  In addition to vendor specific dose reduction algorithms, the dose reduction techniques vary based on the specific scanner utilized but frequently include automated exposure control, adjustment of the mA and/or kV according to patient size, and use of iterative reconstruction technique. TECHNIQUE: Multidetector CT imaging of the chest abdomen pelvis was performed prior to and following administration of intravenous contrast material. 3-D MIP and MPR images were retrospectively generated at a separate workstation. FINDINGS: Emphysema is present. No consolidation. No suspicious adenopathy. No pericardial effusion. No acute aortic pathology. No destructive bone lesion. There is new extrahepatic biliary ductal dilatation measuring 16 mm at the level vishnu hepatis. No distal radiopaque obstructing lesion.

## 2024-04-28 NOTE — ED NOTES
ED TO INPATIENT SBAR HANDOFF    Patient Name: Jama Ramirez   :  1960  63 y.o.   Preferred Name  Rudolph  Family/Caregiver Present no   Restraints no   C-SSRS: Risk of Suicide: No Risk  Sitter no   Sepsis Risk Score Sepsis Risk Score: 1.23      Situation  Chief Complaint   Patient presents with    Abdominal Pain     Brief Description of Patient's Condition:   Pt arrives with complaint of abd pain in his lower abdomen.   Pt lipase increased in ED.   CTA shows dissection in mesenteric artery, awaiting the start of heparin drip on aPTT, Pt/INR, and anti-XA results.   Pt independent of ADLs, continent of both urine and stool.     Mental Status: oriented, alert, coherent, logical, thought processes intact, and able to concentrate and follow conversation  Arrived from: home    Imaging:     Abnormal labs:   Abnormal Labs Reviewed   URINALYSIS WITH REFLEX TO CULTURE - Abnormal; Notable for the following components:       Result Value    Leukocyte Esterase, Urine SMALL NUMBER OR AMOUNT OBSERVED (*)     All other components within normal limits   CBC WITH AUTO DIFFERENTIAL - Abnormal; Notable for the following components:    Hematocrit 41.1 (*)     Monocytes % 7.7 (*)     Immature Neutrophil % 0.7 (*)     All other components within normal limits   COMPREHENSIVE METABOLIC PANEL - Abnormal; Notable for the following components:    Glucose 106 (*)     Creatinine 0.8 (*)     All other components within normal limits   LIPASE - Abnormal; Notable for the following components:    Lipase 108 (*)     All other components within normal limits   URINE MICROSCOPIC WITH REFLEX TO CULTURE - Abnormal; Notable for the following components:    RBC, UA 6 (*)     WBC, UA 54 (*)     Mucus, UA RARE (*)     All other components within normal limits   CBC - Abnormal; Notable for the following components:    RBC 4.29 (*)     Hemoglobin 12.3 (*)     Hematocrit 38.0 (*)     All other components within normal limits       Background  History:  show no

## 2024-04-28 NOTE — ED PROVIDER NOTES
[facility]  EMERGENCY DEPARTMENT ENCOUNTER        Pt Name: Jama Ramirez  MRN: [id number]  Birthdate 1960  Date of evaluation: 4/28/2024  Provider: TIMOTHY Castaneda - SHANNON  PCP: Jennifer Gomez MD  Note Started: 2:06 PM EDT 4/28/24       I have seen and evaluated this patient with my supervising physician Dr St.      CHIEF COMPLAINT       Chief Complaint   Patient presents with    Abdominal Pain       HISTORY OF PRESENT ILLNESS: 1 or more Elements     History From: Patient    Limitations to history : None    Social Determinants Significantly Affecting Health : None    Chief Complaint: Abdominal pain and chest swelling    Jama Ramirez is a 63 y.o. male history of gastric hemorrhage, pneumonia, COPD, HLD, HTN who presents to ED stating over the last 3 days he has had suprapubic pressure chills body aches bilateral flank pain.  He states that he feels as if he is unable to void at times.  He states in the last day and a half he has gotten central chest \" swelling\" bleeding from the abdominal pain.  States he has been nauseous due to the pain.  Denies any actual vomiting diarrhea or constipation.  Denies any dark-colored stools or bloody emesis.  States his urine has been darker and odorous with some blood at times.  States the chest swelling and pressure gets worse with activity.    Nursing Notes were all reviewed and agreed with or any disagreements were addressed in the HPI.    REVIEW OF SYSTEMS :      Review of Systems    Positives and Pertinent negatives as per HPI.     SURGICAL HISTORY     Past Surgical History:   Procedure Laterality Date    ARM SURGERY Left 30+ yrs    \"put plastic ligaments in \"  after injury through glass window    CARPAL TUNNEL RELEASE Right 10/4/2019    RIGHT CARPAL TUNNEL RELEASE performed by Wolfgang Machuca DO at [facility] OR    CARPAL TUNNEL RELEASE Left 4/12/2022    LEFT CARPAL TUNNEL RELEASE performed by Wolfgang Machuca DO at [facility] OR    COLONOSCOPY  2010     Problems Daughter         SOCIAL HISTORY       Social History     Tobacco Use    Smoking status: Every Day     Current packs/day: 0.50     Average packs/day: 0.5 packs/day for 52.3 years (26.2 ttl pk-yrs)     Types: Cigarettes     Start date: 1972    Smokeless tobacco: Never    Tobacco comments:     use to smoke 2.5 PPD   Vaping Use    Vaping Use: Never used   Substance Use Topics    Alcohol use: Yes     Comment: Occasional 1/month, caffiene: 3 cups of coffee daily    Drug use: Never     Comment: 3 cups of coffee daily       SCREENINGS          Girard Coma Scale  Eye Opening: Spontaneous  Best Verbal Response: Oriented  Best Motor Response: Obeys commands  Girard Coma Scale Score: 15              CIWA Assessment  BP: 112/74  Pulse: 58             PHYSICAL EXAM  1 or more Elements     ED Triage Vitals   BP Temp Temp src Pulse Resp SpO2 Height Weight   -- -- -- -- -- -- -- --       Physical Exam  Constitutional:       General: He is not in acute distress.     Appearance: Normal appearance. He is not ill-appearing, toxic-appearing or diaphoretic.   HENT:      Head: Normocephalic and atraumatic.      Right Ear: External ear normal.      Left Ear: External ear normal.      Nose: Nose normal. No congestion or rhinorrhea.      Mouth/Throat:      Mouth: Mucous membranes are moist.      Pharynx: No pharyngeal swelling or oropharyngeal exudate.   Eyes:      General: No scleral icterus.        Right eye: No discharge.         Left eye: No discharge.      Conjunctiva/sclera: Conjunctivae normal.   Cardiovascular:      Rate and Rhythm: Normal rate. Rhythm irregular.      Pulses: Normal pulses.      Heart sounds: Normal heart sounds. No murmur heard.  Pulmonary:      Effort: Pulmonary effort is normal. No respiratory distress.      Breath sounds: No stridor. Examination of the right-lower field reveals decreased breath sounds. Examination of the left-lower field reveals decreased breath sounds. Decreased breath sounds present.

## 2024-04-29 ENCOUNTER — APPOINTMENT (OUTPATIENT)
Dept: ULTRASOUND IMAGING | Age: 64
End: 2024-04-29
Payer: COMMERCIAL

## 2024-04-29 LAB
ANTI-XA UNFRAC HEPARIN: 0.19 IU/ML
ANTI-XA UNFRAC HEPARIN: 0.25 IU/ML
ANTI-XA UNFRAC HEPARIN: 0.3 IU/ML
ANTI-XA UNFRAC HEPARIN: 0.34 IU/ML
ANTI-XA UNFRAC HEPARIN: 0.6 IU/ML
CULTURE: NORMAL
EKG ATRIAL RATE: 71 BPM
EKG DIAGNOSIS: NORMAL
EKG P AXIS: 74 DEGREES
EKG P-R INTERVAL: 200 MS
EKG Q-T INTERVAL: 394 MS
EKG QRS DURATION: 94 MS
EKG QTC CALCULATION (BAZETT): 428 MS
EKG R AXIS: 53 DEGREES
EKG T AXIS: 71 DEGREES
EKG VENTRICULAR RATE: 71 BPM
Lab: NORMAL
SPECIMEN: NORMAL

## 2024-04-29 PROCEDURE — 93010 ELECTROCARDIOGRAM REPORT: CPT | Performed by: INTERNAL MEDICINE

## 2024-04-29 PROCEDURE — 2140000000 HC CCU INTERMEDIATE R&B

## 2024-04-29 PROCEDURE — 6370000000 HC RX 637 (ALT 250 FOR IP): Performed by: STUDENT IN AN ORGANIZED HEALTH CARE EDUCATION/TRAINING PROGRAM

## 2024-04-29 PROCEDURE — 99255 IP/OBS CONSLTJ NEW/EST HI 80: CPT | Performed by: THORACIC SURGERY (CARDIOTHORACIC VASCULAR SURGERY)

## 2024-04-29 PROCEDURE — 94761 N-INVAS EAR/PLS OXIMETRY MLT: CPT

## 2024-04-29 PROCEDURE — 6360000002 HC RX W HCPCS

## 2024-04-29 PROCEDURE — 2500000003 HC RX 250 WO HCPCS: Performed by: STUDENT IN AN ORGANIZED HEALTH CARE EDUCATION/TRAINING PROGRAM

## 2024-04-29 PROCEDURE — 76705 ECHO EXAM OF ABDOMEN: CPT

## 2024-04-29 PROCEDURE — 99222 1ST HOSP IP/OBS MODERATE 55: CPT | Performed by: SURGERY

## 2024-04-29 PROCEDURE — 6370000000 HC RX 637 (ALT 250 FOR IP): Performed by: PHYSICIAN ASSISTANT

## 2024-04-29 PROCEDURE — 85520 HEPARIN ASSAY: CPT

## 2024-04-29 PROCEDURE — 36415 COLL VENOUS BLD VENIPUNCTURE: CPT

## 2024-04-29 PROCEDURE — 94640 AIRWAY INHALATION TREATMENT: CPT

## 2024-04-29 PROCEDURE — 2580000003 HC RX 258: Performed by: STUDENT IN AN ORGANIZED HEALTH CARE EDUCATION/TRAINING PROGRAM

## 2024-04-29 RX ORDER — TRAMADOL HYDROCHLORIDE 50 MG/1
25 TABLET ORAL EVERY 6 HOURS PRN
Status: DISCONTINUED | OUTPATIENT
Start: 2024-04-29 | End: 2024-05-03 | Stop reason: HOSPADM

## 2024-04-29 RX ORDER — ASPIRIN 81 MG/1
81 TABLET, CHEWABLE ORAL DAILY
Status: DISCONTINUED | OUTPATIENT
Start: 2024-04-29 | End: 2024-05-03 | Stop reason: HOSPADM

## 2024-04-29 RX ORDER — CLOPIDOGREL BISULFATE 75 MG/1
75 TABLET ORAL DAILY
Status: DISCONTINUED | OUTPATIENT
Start: 2024-04-29 | End: 2024-05-03 | Stop reason: HOSPADM

## 2024-04-29 RX ADMIN — CLOPIDOGREL BISULFATE 75 MG: 75 TABLET ORAL at 09:21

## 2024-04-29 RX ADMIN — HEPARIN SODIUM 16 UNITS/KG/HR: 10000 INJECTION, SOLUTION INTRAVENOUS at 23:41

## 2024-04-29 RX ADMIN — IPRATROPIUM BROMIDE AND ALBUTEROL SULFATE 1 DOSE: .5; 2.5 SOLUTION RESPIRATORY (INHALATION) at 20:09

## 2024-04-29 RX ADMIN — HEPARIN SODIUM 2000 UNITS: 1000 INJECTION INTRAVENOUS; SUBCUTANEOUS at 06:34

## 2024-04-29 RX ADMIN — TIZANIDINE 4 MG: 4 TABLET ORAL at 20:56

## 2024-04-29 RX ADMIN — TRAMADOL HYDROCHLORIDE 25 MG: 50 TABLET ORAL at 17:30

## 2024-04-29 RX ADMIN — HEPARIN SODIUM 2000 UNITS: 1000 INJECTION INTRAVENOUS; SUBCUTANEOUS at 19:19

## 2024-04-29 RX ADMIN — IPRATROPIUM BROMIDE AND ALBUTEROL SULFATE 1 DOSE: .5; 2.5 SOLUTION RESPIRATORY (INHALATION) at 11:20

## 2024-04-29 RX ADMIN — ATORVASTATIN CALCIUM 20 MG: 10 TABLET, FILM COATED ORAL at 09:19

## 2024-04-29 RX ADMIN — VERAPAMIL HYDROCHLORIDE 120 MG: 120 TABLET, FILM COATED, EXTENDED RELEASE ORAL at 19:03

## 2024-04-29 RX ADMIN — ASPIRIN 81 MG: 81 TABLET, CHEWABLE ORAL at 09:19

## 2024-04-29 RX ADMIN — SODIUM CHLORIDE, PRESERVATIVE FREE 10 ML: 5 INJECTION INTRAVENOUS at 09:19

## 2024-04-29 RX ADMIN — SODIUM CHLORIDE, POTASSIUM CHLORIDE, SODIUM LACTATE AND CALCIUM CHLORIDE: 600; 310; 30; 20 INJECTION, SOLUTION INTRAVENOUS at 07:27

## 2024-04-29 ASSESSMENT — PAIN SCALES - WONG BAKER
WONGBAKER_NUMERICALRESPONSE: NO HURT

## 2024-04-29 ASSESSMENT — PAIN SCALES - GENERAL
PAINLEVEL_OUTOF10: 0
PAINLEVEL_OUTOF10: 8

## 2024-04-29 ASSESSMENT — PAIN DESCRIPTION - LOCATION: LOCATION: ABDOMEN

## 2024-04-29 ASSESSMENT — PAIN DESCRIPTION - DESCRIPTORS: DESCRIPTORS: ACHING

## 2024-04-29 NOTE — CONSULTS
PM   Result Value Ref Range    Anti-XA Unfrac Heparin 0.34 IU/mL   Anti-Xa, Unfractionated Heparin    Collection Time: 04/29/24  5:19 AM   Result Value Ref Range    Anti-XA Unfrac Heparin 0.19 IU/mL     CT angiography of the chest abdomen and pelvis with contrast     HISTORY: Pain.     COMPARISON: None     Individualized dose optimization technique was used in order to meet ALARA standards for radiation dose reduction.  In addition to vendor specific dose reduction algorithms, the dose reduction techniques vary based on the specific scanner utilized but   frequently include automated exposure control, adjustment of the mA and/or kV according to patient size, and use of iterative reconstruction technique.        TECHNIQUE: Multidetector CT imaging of the chest abdomen pelvis was performed prior to and following administration of intravenous contrast material. 3-D MIP and MPR images were retrospectively generated at a separate workstation.     FINDINGS:     Emphysema is present. No consolidation. No suspicious adenopathy. No pericardial effusion. No acute aortic pathology. No destructive bone lesion.     There is new extrahepatic biliary ductal dilatation measuring 16 mm at the level vishnu hepatis. No distal radiopaque obstructing lesion. Kidneys, adrenal glands, spleen, pancreas unremarkable. No acute GI tract abnormality. No suspicious adenopathy   abdomen pelvis. Vascular calcifications. There is a focal intimal flap of the superior mesenteric artery, distal to the origin. The dissection appears to and had a branching point of the SMA. This was not clearly seen on prior contrast-enhanced CTs. No   other areas of acute arterial pathology in the abdomen or pelvis. No destructive bone lesion.     IMPRESSION:     1. No evidence of acute aortic pathology.  2. A focal dissection within the superior mesenteric artery distal to the branch point. The dissection terminates at the branching point of the SMA. This is new from  October 2022 but is age indeterminate.  3. New extrahepatic biliary ductal dilatation measuring 16 mm at the level of the vishnu hepatis. This finding is new from January 2024. Recommend MRCP for further assessment.     Electronically signed by David Fan DO        Assessment :      Superior mesenteric artery dissection  Abdominal pain  UTI    Plan:       Recommendation:   -CT chest/abdomen/pelvis reviewed with Dr. Blevins. SMA dissection noted with appropriate blood fow and no signs of bowel compromise.   -Continue ASA, Plavix, and heparin drip  -Being treated for UTI with Rocephin   -Recommend gall bladder workup with ultrasound. General surgery following. Defer workup.   -Clear liquid diet ok with general surgery  -No plans for surgical intervention from vascular surgery standpoint  -Will contact Dr. Arshad to review images who is out of town  -Further recommendations per CTS surgeons      An Lomeli PA-C 04/29/24 11:11 AM      New Consults 8:00AM-4:30PM: Call Office, 4:30PM to 8:00AM Surgeon on-call    CVICU or other units patient follow up: Batool author of this note 8:00AM-4:30PM    CVICU patient or urgent follow up: 4:30PM to 8:00AM Call or Page Surgeon on-call     Step-down patient follow up: 4:30PM to 8:00AM Page or secure chat PA on-call          Today, I personally spent 80 minutes with the patient, of which greater than 50% of the time was in direct face to face contact with the patient. The time was spent in patient education and counseling as described above and I personally reviewed his studies and coordinating his future care.      I have reviewed the HPI, past medical, surgical, family, social history sections, medications and allergies listed in the above medical record as well as performing a physical exam of the patient including the review of systems, medications and allergies listed in the above medical record. I have also reviewed and discussed the ARIADNA

## 2024-04-29 NOTE — CONSULTS
in \"  after injury through glass window    CARPAL TUNNEL RELEASE Right 10/4/2019    RIGHT CARPAL TUNNEL RELEASE performed by Wolfgang Machuca DO at [facility] OR    CARPAL TUNNEL RELEASE Left 4/12/2022    LEFT CARPAL TUNNEL RELEASE performed by Wolfgang Machuca DO at [facility] OR    COLONOSCOPY  2010    COLONOSCOPY  2018    HX: polyps - Dr. Herring    COLONOSCOPY N/A 12/30/2019    COLONOSCOPY DIAGNOSTIC performed by Mary Linares MD at [facility] ENDOSCOPY    COLONOSCOPY N/A 12/16/2021    COLONOSCOPY POLYPECTOMY SNARE/COLD BIOPSY performed by Humphrey Hsieh MD at [facility] ENDOSCOPY    DENTAL SURGERY      all teeth extracted    ENDOSCOPY, COLON, DIAGNOSTIC  04/08/2019    EGD - thrush noted through out    ENDOSCOPY, COLON, DIAGNOSTIC  06/01/2021    dr linares:multiple gastric ulcers, intact fundiplication with tightening, dil 18-20, biopsy r/o h pylori, f/u as scheduled    EYE SURGERY Left 1990's    \"metal removed from eye\"    GASTRIC FUNDOPLICATION N/A 12/26/2018    NISSEN FUNDOPLICATION LAPAROSCOPIC ROBOTIC HIATAL HERNIA performed by Mary Linares MD at [facility] OR    HERNIA REPAIR      abdominal    UPPER GASTROINTESTINAL ENDOSCOPY  12/2018    UPPER GASTROINTESTINAL ENDOSCOPY N/A 1/28/2019    EGD BIOPSY / BRUSHING OF ESOPHAGUS performed by Mary Linares MD at [facility] ENDOSCOPY    UPPER GASTROINTESTINAL ENDOSCOPY N/A 4/8/2019    EGD BIOPSY AND BRUSHING performed by Mary Linares MD at [facility] ENDOSCOPY    UPPER GASTROINTESTINAL ENDOSCOPY N/A 7/8/2019    EGD BIOPSY and brushing performed by Mary Linares MD at [facility] ENDOSCOPY    UPPER GASTROINTESTINAL ENDOSCOPY N/A 12/30/2019    EGD BIOPSY/BRUSHING OF ESOPHAGUS performed by Mary Linares MD at [facility] ENDOSCOPY    UPPER GASTROINTESTINAL ENDOSCOPY N/A 6/5/2020    EGD BIOPSY performed by Mary Linares MD at [facility] ENDOSCOPY    UPPER GASTROINTESTINAL ENDOSCOPY N/A 6/1/2021    EGD DILATION BALLOON WITH 18-20 BALLOON UP TO 20 WITH BIOPSIES performed by Mary Linares MD at

## 2024-04-29 NOTE — PLAN OF CARE
Problem: Discharge Planning  Goal: Discharge to home or other facility with appropriate resources  Outcome: Progressing  Flowsheets (Taken 4/28/2024 2000)  Discharge to home or other facility with appropriate resources:   Identify barriers to discharge with patient and caregiver   Arrange for needed discharge resources and transportation as appropriate   Identify discharge learning needs (meds, wound care, etc)     Problem: Pain  Goal: Verbalizes/displays adequate comfort level or baseline comfort level  Outcome: Progressing  Flowsheets (Taken 4/28/2024 2000)  Verbalizes/displays adequate comfort level or baseline comfort level:   Encourage patient to monitor pain and request assistance   Assess pain using appropriate pain scale   Administer analgesics based on type and severity of pain and evaluate response   Implement non-pharmacological measures as appropriate and evaluate response   Consider cultural and social influences on pain and pain management   Notify Licensed Independent Practitioner if interventions unsuccessful or patient reports new pain

## 2024-04-29 NOTE — PROGRESS NOTES
V2.0    [facility] Progress Note      Name:  Jama Ramirez /Age/Sex: 1960  (63 y.o. male)   MRN & CSN:  [id number] & [id number] Encounter Date/Time: 2024 12:06 PM EDT   Location:  [facility]- PCP: Jennifer Gomez MD     Attending:Vladimir Ramirez MD       Hospital Day: 2    Assessment and Recommendations   Jama Ramirez is a 63 y.o. male who presents with Superior mesenteric artery stenosis (HCC)      Plan:     Abdominal pain  Superior mesenteric artery dissection  UTI  COPD  Essential hypertension  Hyperlipidemia  BPH    Abdominal pain somewhat improved.  For mesenteric artery dissection he remains on aspirin, Plavix and heparin drip as per CT surgery recommendations.  Transition to oral anticoagulation as per CT surgery recommendations.  Monitor for signs of bleeding.  As per general surgery obtain ultrasound of the abdomen to evaluate gallbladder and common bile duct.  Continue IV antibiotics for UTI plan to complete total 5-day course but can be transition to oral if discharged sooner than that.  COPD not in exacerbation.  Monitor for signs of urinary retention.      Diet ADULT DIET; Clear Liquid   DVT Prophylaxis [] Lovenox, [x]  Heparin, [] SCDs, [] Ambulation,  [] Eliquis, [] Xarelto  [] Coumadin   Code Status Full Code   Disposition From: Home Home  Expected Disposition: Home  Estimated Date of Discharge: 2 to 3 days  Patient requires continued admission due to further investigation of abdominal pain   Surrogate Decision Maker/ POA Needs to designate     Personally reviewed Lab Studies and Imaging   Have  Drugs that require monitoring for toxicity include heparin and the method of monitoring was anti xa        Subjective:     Chief Complaint:   Chief Complaint   Patient presents with    Abdominal Pain       Reports some improvement in abdominal pain rates it at 3 out of 10 sharp in the epigastric region.  Denies any chest pain, nausea, vomiting, lightheadedness or dizziness.    Review of  sulfate, 2,000 mg, PRN  ondansetron, 4 mg, Q8H PRN   Or  ondansetron, 4 mg, Q6H PRN  polyethylene glycol, 17 g, Daily PRN  acetaminophen, 650 mg, Q6H PRN   Or  acetaminophen, 650 mg, Q6H PRN  heparin (porcine), 80 Units/kg, PRN  heparin (porcine), 40 Units/kg, PRN        Labs and Imaging   CTA CHEST ABDOMEN PELVIS W CONTRAST    Addendum Date: 4/28/2024    ADDENDUM #1 ADDENDUM There is no evidence of any focal wall thickening within the GI tract. No evidence of free air. There is also a voice recognition error in the secondary impression of the report. 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 The dissection terminates at the branching point of the SMA. This is new from October 2022 but is age indeterminate. Electronically signed by David Fan DO    Result Date: 4/28/2024  CT angiography of the chest abdomen and pelvis with contrast HISTORY: Pain. COMPARISON: None Individualized dose optimization technique was used in order to meet ALARA standards for radiation dose reduction.  In addition to vendor specific dose reduction algorithms, the dose reduction techniques vary based on the specific scanner utilized but frequently include automated exposure control, adjustment of the mA and/or kV according to patient size, and use of iterative reconstruction technique. TECHNIQUE: Multidetector CT imaging of the chest abdomen pelvis was performed prior to and following administration of intravenous contrast material. 3-D MIP and MPR images were retrospectively generated at a separate workstation. FINDINGS: Emphysema is present. No consolidation. No suspicious adenopathy. No pericardial effusion. No acute aortic pathology. No destructive bone lesion. There is new extrahepatic biliary ductal dilatation measuring 16 mm at the level vishnu hepatis. No distal radiopaque obstructing lesion. Kidneys, adrenal glands, spleen, pancreas unremarkable. No

## 2024-04-29 NOTE — PROGRESS NOTES
Pt identified as a candidate for COPD Gold assessment. Patient does not have Pulmonary Function testing on record. When patient is discharged and able to complete PFT testing as an out-patient, they would benefit from baseline PFT testing and COPD Gold could be assessed if patient were to have readmission at a later date.

## 2024-04-29 NOTE — CARE COORDINATION
04/29/24 1225   Service Assessment   Patient Orientation Alert and Oriented;Person;Place;Situation   Cognition Alert   History Provided By Patient   Primary Caregiver Self   Support Systems Family Members;Friends/Neighbors   Patient's Healthcare Decision Maker is: Legal Next of Kin   PCP Verified by CM Yes   Last Visit to PCP Within last 6 months   Prior Functional Level Independent in ADLs/IADLs   Current Functional Level Independent in ADLs/IADLs   Can patient return to prior living arrangement Yes   Ability to make needs known: Good   Family able to assist with home care needs: Yes   Would you like for me to discuss the discharge plan with any other family members/significant others, and if so, who? Yes  (pt has sister and brother in room and permission to speak in their presence.)     CM into see pt to initiate a safe discharge plan. Cm introduced self and explained role of CM. Pt is kind, alert and oriented. Pt lives alone in a one floor apartment at [facility]. Apartments have elevator service. Pt is able to drive. Pt is able to care for all ADL's. Pt uses [facility] for [facility]CP. Pt has insurance and able to obtain medications. Declined needs.  Discharge plan is for pt to return home alone, well supported by family. PCP/ insurance . Declined needs.   CM provided card and encouraged to call for any needs or concern.   CM is available if any needs arise.

## 2024-04-29 NOTE — PLAN OF CARE
Pt voided 300 ccs of light yellow clear urine into a urinal - disposed of in Ultrasound Department

## 2024-04-30 ENCOUNTER — APPOINTMENT (OUTPATIENT)
Dept: MRI IMAGING | Age: 64
End: 2024-04-30
Payer: COMMERCIAL

## 2024-04-30 LAB
ANTI-XA UNFRAC HEPARIN: 0.59 IU/ML
CULTURE: ABNORMAL
CULTURE: ABNORMAL
HCT VFR BLD CALC: 34.7 % (ref 42–52)
HEMOGLOBIN: 11.5 GM/DL (ref 13.5–18)
Lab: ABNORMAL
MCH RBC QN AUTO: 28.9 PG (ref 27–31)
MCHC RBC AUTO-ENTMCNC: 33.1 % (ref 32–36)
MCV RBC AUTO: 87.2 FL (ref 78–100)
PDW BLD-RTO: 14.1 % (ref 11.7–14.9)
PLATELET # BLD: 177 K/CU MM (ref 140–440)
PMV BLD AUTO: 8.3 FL (ref 7.5–11.1)
RBC # BLD: 3.98 M/CU MM (ref 4.6–6.2)
SPECIMEN: ABNORMAL
WBC # BLD: 6.2 K/CU MM (ref 4–10.5)

## 2024-04-30 PROCEDURE — 99232 SBSQ HOSP IP/OBS MODERATE 35: CPT | Performed by: SURGERY

## 2024-04-30 PROCEDURE — 85027 COMPLETE CBC AUTOMATED: CPT

## 2024-04-30 PROCEDURE — 6370000000 HC RX 637 (ALT 250 FOR IP): Performed by: PHYSICIAN ASSISTANT

## 2024-04-30 PROCEDURE — 36415 COLL VENOUS BLD VENIPUNCTURE: CPT

## 2024-04-30 PROCEDURE — 2500000003 HC RX 250 WO HCPCS: Performed by: STUDENT IN AN ORGANIZED HEALTH CARE EDUCATION/TRAINING PROGRAM

## 2024-04-30 PROCEDURE — 99232 SBSQ HOSP IP/OBS MODERATE 35: CPT | Performed by: THORACIC SURGERY (CARDIOTHORACIC VASCULAR SURGERY)

## 2024-04-30 PROCEDURE — 6370000000 HC RX 637 (ALT 250 FOR IP): Performed by: STUDENT IN AN ORGANIZED HEALTH CARE EDUCATION/TRAINING PROGRAM

## 2024-04-30 PROCEDURE — 2140000000 HC CCU INTERMEDIATE R&B

## 2024-04-30 PROCEDURE — 85520 HEPARIN ASSAY: CPT

## 2024-04-30 PROCEDURE — 94761 N-INVAS EAR/PLS OXIMETRY MLT: CPT

## 2024-04-30 PROCEDURE — 2580000003 HC RX 258: Performed by: STUDENT IN AN ORGANIZED HEALTH CARE EDUCATION/TRAINING PROGRAM

## 2024-04-30 RX ORDER — IPRATROPIUM BROMIDE AND ALBUTEROL SULFATE 2.5; .5 MG/3ML; MG/3ML
1 SOLUTION RESPIRATORY (INHALATION)
Status: DISCONTINUED | OUTPATIENT
Start: 2024-04-30 | End: 2024-05-03 | Stop reason: HOSPADM

## 2024-04-30 RX ADMIN — SODIUM CHLORIDE, PRESERVATIVE FREE 10 ML: 5 INJECTION INTRAVENOUS at 08:56

## 2024-04-30 RX ADMIN — VERAPAMIL HYDROCHLORIDE 120 MG: 120 TABLET, FILM COATED, EXTENDED RELEASE ORAL at 18:10

## 2024-04-30 RX ADMIN — ATORVASTATIN CALCIUM 20 MG: 10 TABLET, FILM COATED ORAL at 08:56

## 2024-04-30 RX ADMIN — SODIUM CHLORIDE, PRESERVATIVE FREE 10 ML: 5 INJECTION INTRAVENOUS at 21:37

## 2024-04-30 RX ADMIN — CLOPIDOGREL BISULFATE 75 MG: 75 TABLET ORAL at 08:56

## 2024-04-30 RX ADMIN — HEPARIN SODIUM 16 UNITS/KG/HR: 10000 INJECTION, SOLUTION INTRAVENOUS at 22:25

## 2024-04-30 RX ADMIN — ASPIRIN 81 MG: 81 TABLET, CHEWABLE ORAL at 08:56

## 2024-04-30 RX ADMIN — TIZANIDINE 4 MG: 4 TABLET ORAL at 21:37

## 2024-04-30 ASSESSMENT — PAIN SCALES - GENERAL
PAINLEVEL_OUTOF10: 0

## 2024-04-30 ASSESSMENT — PAIN SCALES - WONG BAKER
WONGBAKER_NUMERICALRESPONSE: NO HURT

## 2024-04-30 NOTE — PROGRESS NOTES
Cardiothoracic Surgery  IN-PATIENT SERVICE   [facility]    Progress Note            Date:   4/30/2024  Patient name:  Jama Ramirez  Date of admission:  4/28/2024  2:06 PM  MRN:   [id number]  Account:  [id number]  YOB: 1960  PCP:    Jennifer Gomez MD  Room:   [address] [place] [address]  Code Status:    Full Code    Physician Requesting Consult: Cynthia Suazo MD    Reason for Consult:  superior mesenteric artery dissection    Chief Complaint:     Abdominal pain    History of Present Illness:     Patient is a 63 y.o. male history of gastric hemorrhage, pneumonia, COPD, HLD, HTN who presents to ED stating over the last 3 days he has had suprapubic pressure chills body aches bilateral flank pain. He states that he feels as if he is unable to void at times. He states in the last day and a half he has gotten central chest \" swelling\" bleeding from the abdominal pain. States he has been nauseous due to the pain. Denies any actual vomiting diarrhea or constipation. Denies any dark-colored stools or bloody emesis. States his urine has been darker and odorous with some blood at times. States the chest swelling and pressure gets worse with activity. He was found to have a UTI he was given Rocephin culture urine. Patient had a CT scan of the chest done that was negative. CT scan of the chest abdominal pelvis does show superior mesenteric artery small dissection which is new since 2022. General surgery was contacted who do not believe that patient has any signs of ischemic bowel on exam but is mildly tender with no rebound or rigidity.  CTS team consulted to assess the patient in regards to the SMA dissection. Heparin drip was started.     Past Medical History:     Past Medical History:   Diagnosis Date    Arthritis     Hands    Chronic cluster headache 6/7/2019 last    COPD (chronic obstructive pulmonary disease) (HCC)     \"dx 2 yrs ago\" \\"does not see a lung dr - Follows with     NISSEN FUNDOPLICATION LAPAROSCOPIC ROBOTIC HIATAL HERNIA performed by Mary Linares MD at [facility] OR    HERNIA REPAIR      abdominal    UPPER GASTROINTESTINAL ENDOSCOPY  12/2018    UPPER GASTROINTESTINAL ENDOSCOPY N/A 1/28/2019    EGD BIOPSY / BRUSHING OF ESOPHAGUS performed by Mary Linares MD at [facility] ENDOSCOPY    UPPER GASTROINTESTINAL ENDOSCOPY N/A 4/8/2019    EGD BIOPSY AND BRUSHING performed by Mary Linares MD at [facility] ENDOSCOPY    UPPER GASTROINTESTINAL ENDOSCOPY N/A 7/8/2019    EGD BIOPSY and brushing performed by Mary Linares MD at [facility] ENDOSCOPY    UPPER GASTROINTESTINAL ENDOSCOPY N/A 12/30/2019    EGD BIOPSY/BRUSHING OF ESOPHAGUS performed by Mary Linares MD at [facility] ENDOSCOPY    UPPER GASTROINTESTINAL ENDOSCOPY N/A 6/5/2020    EGD BIOPSY performed by Mary Linares MD at [facility] ENDOSCOPY    UPPER GASTROINTESTINAL ENDOSCOPY N/A 6/1/2021    EGD DILATION BALLOON WITH 18-20 BALLOON UP TO 20 WITH BIOPSIES performed by Mary Linares MD at [facility] ENDOSCOPY    UPPER GASTROINTESTINAL ENDOSCOPY N/A 6/29/2021    EGD DILATION SAVORY Guevara Dilators used 46, 48. with biopsies performed by Mary Linares MD at [facility] ENDOSCOPY    UPPER GASTROINTESTINAL ENDOSCOPY N/A 12/16/2021    EGD BIOPSY performed by Humphrey Hsieh MD at [facility] ENDOSCOPY    UPPER GASTROINTESTINAL ENDOSCOPY N/A 7/7/2023    EGD BIOPSY performed by Humphrey Hsieh MD at [facility] ENDOSCOPY        Medications Prior to Admission:     Prior to Admission medications    Medication Sig Start Date End Date Taking? Authorizing Provider   vitamin D (ERGOCALCIFEROL) 1.25 MG (80286 UT) CAPS capsule Take 1 capsule by mouth once a week 3/25/24   Ramon Davis MD   predniSONE (DELTASONE) 5 MG tablet Take 2 tabs daily for 2 weeks then reduce to  1 tab daily 3/21/24   Ramon Davis MD   nicotine (NICODERM CQ) 14 MG/24HR Place 1 patch onto the skin daily 10/31/23 3/21/24  Daniel Daugherty MD   atorvastatin (LIPITOR)

## 2024-04-30 NOTE — PLAN OF CARE
Problem: Discharge Planning  Goal: Discharge to home or other facility with appropriate resources  Outcome: Progressing  Flowsheets (Taken 4/29/2024 2000)  Discharge to home or other facility with appropriate resources:   Identify barriers to discharge with patient and caregiver   Arrange for needed discharge resources and transportation as appropriate   Identify discharge learning needs (meds, wound care, etc)     Problem: Pain  Goal: Verbalizes/displays adequate comfort level or baseline comfort level  Outcome: Progressing  Flowsheets (Taken 4/29/2024 2000)  Verbalizes/displays adequate comfort level or baseline comfort level:   Encourage patient to monitor pain and request assistance   Assess pain using appropriate pain scale   Administer analgesics based on type and severity of pain and evaluate response   Implement non-pharmacological measures as appropriate and evaluate response   Consider cultural and social influences on pain and pain management   Notify Licensed Independent Practitioner if interventions unsuccessful or patient reports new pain     Problem: ABCDS Injury Assessment  Goal: Absence of physical injury  Outcome: Progressing     Problem: Safety - Adult  Goal: Free from fall injury  Outcome: Progressing

## 2024-04-30 NOTE — PROGRESS NOTES
04/30/24 0740   RT Protocol   History Pulmonary Disease 2   Respiratory pattern 0   Breath sounds 2   Cough 0   Indications for Bronchodilator Therapy Decreased or absent breath sounds   Bronchodilator Assessment Score 4

## 2024-04-30 NOTE — PROGRESS NOTES
V2.0    [facility] [place]      Name:  Jama Ramirez /Age/Sex: 1960  (63 y.o. male)   MRN & CSN:  [id number] & [id number] Encounter Date/Time: 2024 12:06 PM EDT   Location:  [address] [place] [address] PCP: Jennifer Gomez MD     Attending:Cynthia Suazo MD       Hospital Day: 3    Assessment and Recommendations   Jama Ramirez is a 63 y.o. male who presents with Superior mesenteric artery stenosis (HCC)      Plan:     Abdominal pain  Superior mesenteric artery dissection  UTI  COPD  Essential hypertension  Hyperlipidemia  BPH    Abdominal pain somewhat improved.  For mesenteric artery dissection he remains on aspirin, Plavix and heparin drip as per CT surgery recommendations.  Transition to oral anticoagulation as per CT surgery recommendations.  Monitor for signs of bleeding.  As per general surgery obtain ultrasound of the abdomen to evaluate gallbladder and common bile duct.  Shows common bile duct dilatation.  GI consult.  Continue IV antibiotics for UTI plan to complete total 5-day course but can be transition to oral if discharged sooner than that.  COPD not in exacerbation.  Monitor for signs of urinary retention.        Diet ADULT DIET; Regular   DVT Prophylaxis [] Lovenox, [x]  Heparin, [] SCDs, [] Ambulation,  [] Eliquis, [] Xarelto  [] Coumadin   Code Status Full Code   Disposition From: Home Home  Expected Disposition: Home  Estimated Date of Discharge: 2 to 3 days  Patient requires continued admission due to further investigation of abdominal pain   Surrogate Decision Maker/ POA Needs to designate     Personally reviewed Lab Studies and Imaging   Have  Drugs that require monitoring for toxicity include heparin and the method of monitoring was anti xa        Subjective:     Chief Complaint:   Chief Complaint   Patient presents with    Abdominal Pain       Reports some improvement in abdominal pain rates it at 3 out of 10 sharp in the epigastric region.  Denies any chest pain, nausea, vomiting,

## 2024-04-30 NOTE — PROGRESS NOTES
GENERAL SURGERY PROGRESS NOTE    Jama Ramirez is a 63 y.o. male with abdominal pain and SMA dissection on CTA.                 Subjective:  Doing better when seen this morning. Reports improvement in pain. Feeling hungry.    Objective:    Vitals: VITALS:  /73   Pulse 50   Temp 97.6 °F (36.4 °C) (Oral)   Resp 15   Ht 1.753 m (5' 9\")   Wt 65.9 kg (145 lb 3.2 oz)   SpO2 91%   BMI 21.44 kg/m²     I/O: 04/29 0701 - 04/30 0700  In: -   Out: 1075 [Urine:1075]    Labs/Imaging Results:   Lab Results   Component Value Date/Time     04/28/2024 02:39 PM    K 3.8 04/28/2024 02:39 PM     04/28/2024 02:39 PM    CO2 22 04/28/2024 02:39 PM    BUN 17 04/28/2024 02:39 PM    CREATININE 0.8 04/28/2024 02:39 PM    GLUCOSE 106 04/28/2024 02:39 PM    CALCIUM 8.6 04/28/2024 02:39 PM      Lab Results   Component Value Date    WBC 6.2 04/30/2024    HGB 11.5 (L) 04/30/2024    HCT 34.7 (L) 04/30/2024    MCV 87.2 04/30/2024     04/30/2024       IV Fluids:   sodium chloride    heparin (PORCINE) Infusion Last Rate: 16 Units/kg/hr (04/29/24 2341)    Scheduled Meds:   ipratropium 0.5 mg-albuterol 2.5 mg, 1 Dose, Inhalation, BID RT    aspirin, 81 mg, Oral, Daily    clopidogrel, 75 mg, Oral, Daily    sodium chloride flush, 5-40 mL, IntraVENous, 2 times per day    heparin (porcine), 80 Units/kg, IntraVENous, Once    atorvastatin, 20 mg, Oral, Daily    tiZANidine, 4 mg, Oral, Nightly    verapamil, 120 mg, Oral, Daily    Physical Exam:  General: A&O x 3, no distress.   HEENT: Anicteric sclerae, MMM.  Extremities: No edema bilat LE.  Abdomen: Soft, nontender, nondistended.       Assessment and Plan:  63 y.o. male with abdominal pain. 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  Pain improving. US and CT show common bile duct dilation.    Patient Active Problem List:     Impingement syndrome of right shoulder     Acute bronchitis with COPD (HCC)     Chest pain     Post-nasal drip     Chronic cluster headache     Generalized weakness      Vertigo     Positional lightheadedness     Hiatal hernia     Gastroesophageal reflux disease with esophagitis     PUD (peptic ulcer disease)     Paraesophageal hiatal hernia     Status post laparoscopic Nissen fundoplication     Esophageal dysphagia     Esophageal thrush (HCC)     Candida esophagitis (HCC)     Gastroparesis     Candida infection, esophageal (HCC)     Pneumonia     Left upper quadrant pain     LLQ pain     Fungal esophagitis     Left flank pain     Gastroesophageal reflux disease without esophagitis     Dyslipidemia     Essential hypertension     Epigastric pain     Esophageal stricture     Pharyngoesophageal dysphagia     Pain of upper abdomen     Nicotine dependence     Hematochezia     Acute gastritis with hemorrhage     Esophagitis     Superior mesenteric artery stenosis (HCC)      - will get MRCP and ask GI to evaluate given CBD dilation  - regular diet  - will follow    Humphrey Hsieh MD

## 2024-05-01 ENCOUNTER — APPOINTMENT (OUTPATIENT)
Dept: MRI IMAGING | Age: 64
End: 2024-05-01
Payer: COMMERCIAL

## 2024-05-01 PROBLEM — S35.229A: Status: ACTIVE | Noted: 2024-05-01

## 2024-05-01 LAB — ANTI-XA UNFRAC HEPARIN: 0.2 IU/ML

## 2024-05-01 PROCEDURE — 6370000000 HC RX 637 (ALT 250 FOR IP): Performed by: STUDENT IN AN ORGANIZED HEALTH CARE EDUCATION/TRAINING PROGRAM

## 2024-05-01 PROCEDURE — 6370000000 HC RX 637 (ALT 250 FOR IP): Performed by: INTERNAL MEDICINE

## 2024-05-01 PROCEDURE — 99231 SBSQ HOSP IP/OBS SF/LOW 25: CPT | Performed by: SURGERY

## 2024-05-01 PROCEDURE — 86301 IMMUNOASSAY TUMOR CA 19-9: CPT

## 2024-05-01 PROCEDURE — 99222 1ST HOSP IP/OBS MODERATE 55: CPT | Performed by: SPECIALIST

## 2024-05-01 PROCEDURE — 2580000003 HC RX 258: Performed by: STUDENT IN AN ORGANIZED HEALTH CARE EDUCATION/TRAINING PROGRAM

## 2024-05-01 PROCEDURE — 74181 MRI ABDOMEN W/O CONTRAST: CPT

## 2024-05-01 PROCEDURE — 94640 AIRWAY INHALATION TREATMENT: CPT

## 2024-05-01 PROCEDURE — 99253 IP/OBS CNSLTJ NEW/EST LOW 45: CPT | Performed by: THORACIC SURGERY (CARDIOTHORACIC VASCULAR SURGERY)

## 2024-05-01 PROCEDURE — 2140000000 HC CCU INTERMEDIATE R&B

## 2024-05-01 PROCEDURE — 85520 HEPARIN ASSAY: CPT

## 2024-05-01 PROCEDURE — 6370000000 HC RX 637 (ALT 250 FOR IP): Performed by: PHYSICIAN ASSISTANT

## 2024-05-01 PROCEDURE — 36415 COLL VENOUS BLD VENIPUNCTURE: CPT

## 2024-05-01 PROCEDURE — 94761 N-INVAS EAR/PLS OXIMETRY MLT: CPT

## 2024-05-01 RX ADMIN — ATORVASTATIN CALCIUM 20 MG: 10 TABLET, FILM COATED ORAL at 08:37

## 2024-05-01 RX ADMIN — IPRATROPIUM BROMIDE AND ALBUTEROL SULFATE 1 DOSE: 2.5; .5 SOLUTION RESPIRATORY (INHALATION) at 08:57

## 2024-05-01 RX ADMIN — IPRATROPIUM BROMIDE AND ALBUTEROL SULFATE 1 DOSE: 2.5; .5 SOLUTION RESPIRATORY (INHALATION) at 20:06

## 2024-05-01 RX ADMIN — ASPIRIN 81 MG: 81 TABLET, CHEWABLE ORAL at 08:37

## 2024-05-01 RX ADMIN — CLOPIDOGREL BISULFATE 75 MG: 75 TABLET ORAL at 08:37

## 2024-05-01 RX ADMIN — SODIUM CHLORIDE, PRESERVATIVE FREE 10 ML: 5 INJECTION INTRAVENOUS at 08:38

## 2024-05-01 RX ADMIN — TIZANIDINE 4 MG: 4 TABLET ORAL at 20:45

## 2024-05-01 RX ADMIN — SODIUM CHLORIDE, PRESERVATIVE FREE 10 ML: 5 INJECTION INTRAVENOUS at 20:45

## 2024-05-01 RX ADMIN — VERAPAMIL HYDROCHLORIDE 120 MG: 120 TABLET, FILM COATED, EXTENDED RELEASE ORAL at 18:43

## 2024-05-01 ASSESSMENT — PAIN SCALES - GENERAL
PAINLEVEL_OUTOF10: 0

## 2024-05-01 NOTE — CONSULTS
optimization technique was used in order to meet ALARA standards for radiation dose reduction.  In addition to vendor specific dose reduction algorithms, the dose reduction techniques vary based on the specific scanner utilized but frequently include automated exposure control, adjustment of the mA and/or kV according to patient size, and use of iterative reconstruction technique. TECHNIQUE: Multidetector CT imaging of the chest abdomen pelvis was performed prior to and following administration of intravenous contrast material. 3-D MIP and MPR images were retrospectively generated at a separate workstation. FINDINGS: Emphysema is present. No consolidation. No suspicious adenopathy. No pericardial effusion. No acute aortic pathology. No destructive bone lesion. There is new extrahepatic biliary ductal dilatation measuring 16 mm at the level vishnu hepatis. No distal radiopaque obstructing lesion. Kidneys, adrenal glands, spleen, pancreas unremarkable. No acute GI tract abnormality. No suspicious adenopathy abdomen pelvis. Vascular calcifications. There is a focal intimal flap of the superior mesenteric artery, distal to the origin. The dissection appears to and had a branching point of the SMA. This was not clearly seen on prior contrast-enhanced CTs. No other areas of acute arterial pathology in the abdomen or pelvis. No destructive bone lesion.     1. No evidence of acute aortic pathology. 2. A focal dissection within the superior mesenteric artery distal to the branch point. The dissection terminates at the branching point of the SMA. This is new from October 2022 but is age indeterminate. 3. New extrahepatic biliary ductal dilatation measuring 16 mm at the level of the vishnu hepatis. This finding is new from January 2024. Recommend MRCP for further assessment. Electronically signed by David Fan DO    XR CHEST PORTABLE    Result Date: 4/28/2024  1 view chest HISTORY: Chest pressure COMPARISON: None FINDINGS:  The cardiomediastinal contours are normal.  The lungs are clear.  No pleural abnormality is evident.     No acute disease Electronically signed by David Fan, DO    No results found for this or any previous visit (from the past 24 hour(s)).    IMPRESSION/RECOMMENDATIONS:    1) Common bile duct dilation as seen on CT  - New extrahepatic biliary ductal dilatation measuring 16 mm at the level of the vishnu hepatis. This finding is new from January 2024. -MRCP pending  -If MRCP is positive we will plan for ERCP with     2) Abdominal pain, ? secondary to #1 or #3    3) Superior mesenteric artery dissection, CT surgery has been consulted and is currently following  -CT abdomen shows No evidence of acute aortic pathology; A focal dissection within the superior mesenteric artery distal to the branch point. The dissection terminates at the branching point of the SMA. This is new from October 2022 but is age indeterminate.  -On aspirin, Plavix and heparin drip as per CT surgery, will plan to bolus Plavix and DC heparin drip after GI workup  -Monitor for any sign of bleeding    4) constipation, endorses 3 days of no BM  - suppository now  - miralax daily after MRI    5) GERD, with a history of gastritis-Daily PPI    6) UTI-will defer to hospital medicine appreciate recommendations  -currently on antibiotics    7) COPD, not in exacerbation-will defer to hospital medicine appreciate recommendations    8) essential hypertension-will defer to hospital medicine appreciate recommendation    9) hyperlipidemia-will defer to hospital medicine appreciate recommendation    10) BPH-will defer to hospital medicine appreciate recommendation        Patient's history, exam, and plan of care were discussed with the patient and Dr. Wetzel  . All questions and concerns were discussed with the patient. Patient agreed to plan.      Electronically signed by TIMOTHY Tello CNP on 5/1/2024 at 9:12 AM

## 2024-05-01 NOTE — PROGRESS NOTES
Cardiothoracic Surgery  IN-PATIENT SERVICE   [facility]    Progress Note            Date:   5/1/2024  Patient name:  Jama Ramirez  Date of admission:  4/28/2024  2:06 PM  MRN:   [id number]  Account:  [id number]  YOB: 1960  PCP:    Jennifer Gomez MD  Room:   [address] [place] [address]  Code Status:    Full Code    Physician Requesting Consult: Cynthia Suazo MD    Reason for Consult:  superior mesenteric artery dissection    Chief Complaint:     Abdominal pain    History of Present Illness:     Patient is a 63 y.o. male history of gastric hemorrhage, pneumonia, COPD, HLD, HTN who presents to ED stating over the last 3 days he has had suprapubic pressure chills body aches bilateral flank pain. He states that he feels as if he is unable to void at times. He states in the last day and a half he has gotten central chest \" swelling\" bleeding from the abdominal pain. States he has been nauseous due to the pain. Denies any actual vomiting diarrhea or constipation. Denies any dark-colored stools or bloody emesis. States his urine has been darker and odorous with some blood at times. States the chest swelling and pressure gets worse with activity. He was found to have a UTI he was given Rocephin culture urine. Patient had a CT scan of the chest done that was negative. CT scan of the chest abdominal pelvis does show superior mesenteric artery small dissection which is new since 2022. General surgery was contacted who do not believe that patient has any signs of ischemic bowel on exam but is mildly tender with no rebound or rigidity.  CTS team consulted to assess the patient in regards to the SMA dissection. Heparin drip was started and patient on Plavix.      Past Medical History:     Past Medical History:   Diagnosis Date    Arthritis     Hands    Chronic cluster headache 6/7/2019 last    COPD (chronic obstructive pulmonary disease) (HCC)     \"dx 2 yrs ago\" \\"does not see a  hours as needed  Patient not taking: Reported on 3/21/2024    Sariah Bailey MD   Nutritional Supplements (ENSURE HIGH PROTEIN) LIQD Take 1 Can by mouth 3 times daily Different flavors if possible  Patient not taking: Reported on 12/20/2023 5/20/20   Mary Linares MD   ondansetron (ZOFRAN ODT) 4 MG disintegrating tablet Place 1 tablet under the tongue every 8 hours as needed for Nausea or Vomiting 12/20/19   Mary Linares MD   ipratropium-albuterol (DUONEB) 0.5-2.5 (3) MG/3ML SOLN nebulizer solution as needed  10/19/19   Sariah Bailey MD   Nebulizers (MICRO AIR NEBULIZER) MISC TO USE AS NEEDED WITH NEBULIZER SOLUTION  Patient not taking: Reported on 3/21/2024 9/23/19   Sariah Bailey MD   acetaminophen (TYLENOL) 500 MG tablet Take 2 tablets by mouth every 6 hours as needed for Pain    Sariah Bailey MD   tiotropium (SPIRIVA HANDIHALER) 18 MCG inhalation capsule Inhale 1 capsule into the lungs daily  Patient taking differently: Inhale 1 capsule into the lungs daily Takes occasionally 1/3/17   Hattie Pedro MD   albuterol sulfate HFA (PROVENTIL HFA) 108 (90 BASE) MCG/ACT inhaler Inhale 2 puffs into the lungs every 6 hours as needed for Wheezing 1/3/17   Hattie Pedro MD        Allergies:     Bee venom and Nsaids    Social History:     Tobacco:    reports that he has been smoking cigarettes. He started smoking about 52 years ago. He has a 26.2 pack-year smoking history. He has never used smokeless tobacco.  Alcohol:      reports current alcohol use.  Drug Use:  reports no history of drug use.    Family History:     Family History   Problem Relation Age of Onset    Dementia Mother     Stroke Mother     Stroke Father     High Blood Pressure Father     Heart Disease Father     Breast Cancer Sister     Diabetes Brother     Cancer Brother         mouth and throat    Heart Attack Maternal Uncle     Heart Disease Maternal Uncle     No Known Problems Son     No Known Problems Son     No

## 2024-05-01 NOTE — PROGRESS NOTES
GENERAL SURGERY PROGRESS NOTE    Jama Ramirez is a 63 y.o. male with abdominal pain and SMA dissection on CTA.                 Subjective:  Doing well this morning. Minimal periumbilical pain. Denies new complaints. MRCP pending.    Objective:    Vitals: VITALS:  /69   Pulse (!) 47   Temp 98.4 °F (36.9 °C) (Oral)   Resp 10   Ht 1.753 m (5' 9\")   Wt 65.9 kg (145 lb 3.2 oz)   SpO2 97%   BMI 21.44 kg/m²     I/O: 04/30 0701 - 05/01 0700  In: 240 [P.O.:240]  Out: 400 [Urine:400]    Labs/Imaging Results:   Lab Results   Component Value Date/Time     04/28/2024 02:39 PM    K 3.8 04/28/2024 02:39 PM     04/28/2024 02:39 PM    CO2 22 04/28/2024 02:39 PM    BUN 17 04/28/2024 02:39 PM    CREATININE 0.8 04/28/2024 02:39 PM    GLUCOSE 106 04/28/2024 02:39 PM    CALCIUM 8.6 04/28/2024 02:39 PM      Lab Results   Component Value Date    WBC 6.2 04/30/2024    HGB 11.5 (L) 04/30/2024    HCT 34.7 (L) 04/30/2024    MCV 87.2 04/30/2024     04/30/2024       IV Fluids:   sodium chloride    Scheduled Meds:   ipratropium 0.5 mg-albuterol 2.5 mg, 1 Dose, Inhalation, BID RT    aspirin, 81 mg, Oral, Daily    clopidogrel, 75 mg, Oral, Daily    sodium chloride flush, 5-40 mL, IntraVENous, 2 times per day    heparin (porcine), 80 Units/kg, IntraVENous, Once    atorvastatin, 20 mg, Oral, Daily    tiZANidine, 4 mg, Oral, Nightly    verapamil, 120 mg, Oral, Daily    Physical Exam:  General: A&O x 3, no distress.   HEENT: Anicteric sclerae, MMM.  Extremities: No edema bilat LE.  Abdomen: Soft, mildly tender at the umbilicus, nondistended.       Assessment and Plan:  63 y.o. male with abdominal pain. 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  Pain improving. US and CT show common bile duct dilation.    Patient Active Problem List:     Impingement syndrome of right shoulder     Acute bronchitis with COPD (HCC)     Chest pain     Post-nasal drip     Chronic cluster headache     Generalized weakness     Vertigo     Positional

## 2024-05-01 NOTE — PROGRESS NOTES
V2.0    [facility] [place]      Name:  Jama Ramirez /Age/Sex: 1960  (63 y.o. male)   MRN & CSN:  [id number] & [id number] Encounter Date/Time: 2024 12:06 PM EDT   Location:  [facility] PCP: Jennifer Gomez MD     Attending:Cynthia Suazo MD       Hospital Day: 4    Assessment and Recommendations   Jama Ramirez is a 63 y.o. male who presents with Superior mesenteric artery stenosis (HCC)      Plan:     Abdominal pain  Superior mesenteric artery dissection  UTI  COPD  Essential hypertension  Hyperlipidemia  BPH    Abdominal pain somewhat improved.  For mesenteric artery dissection he remains on aspirin, Plavix and heparin drip as per CT surgery recommendations.  Transition to oral anticoagulation as per CT surgery recommendations.  Monitor for signs of bleeding.  As per general surgery obtain ultrasound of the abdomen to evaluate gallbladder and common bile duct.  Shows common bile duct dilatation.  GI team onboard. Plan for ERCP in MRCP is positive.  Continue IV antibiotics for UTI plan to complete total 5-day course but can be transition to oral if discharged sooner than that.  COPD not in exacerbation.  Monitor for signs of urinary retention.        Diet ADULT DIET; Regular   DVT Prophylaxis [] Lovenox, [x]  Heparin, [] SCDs, [] Ambulation,  [] Eliquis, [] Xarelto  [] Coumadin   Code Status Full Code   Disposition From: Home Home  Expected Disposition: Home  Estimated Date of Discharge: 2 to 3 days  Patient requires continued admission due to further investigation of abdominal pain   Surrogate Decision Maker/ POA Needs to designate     Personally reviewed Lab Studies and Imaging   Have  Drugs that require monitoring for toxicity include heparin and the method of monitoring was anti xa        Subjective:     Chief Complaint:   Chief Complaint   Patient presents with    Abdominal Pain       Reports some improvement in abdominal pain rates it at 3 out of 10 sharp in the epigastric region.

## 2024-05-02 LAB
ANTI-XA UNFRAC HEPARIN: 0.52 IU/ML
ANTI-XA UNFRAC HEPARIN: <0.1 IU/ML
ANTI-XA UNFRAC HEPARIN: <0.1 IU/ML
APTT: 28.8 SECONDS (ref 25.1–37.1)
APTT: 74.3 SECONDS (ref 25.1–37.1)
HCT VFR BLD CALC: 38.3 % (ref 42–52)
HCT VFR BLD CALC: 39.6 % (ref 42–52)
HEMOGLOBIN: 12.6 GM/DL (ref 13.5–18)
HEMOGLOBIN: 13 GM/DL (ref 13.5–18)
INR BLD: 0.9 INDEX
MCH RBC QN AUTO: 28.5 PG (ref 27–31)
MCH RBC QN AUTO: 28.6 PG (ref 27–31)
MCHC RBC AUTO-ENTMCNC: 32.8 % (ref 32–36)
MCHC RBC AUTO-ENTMCNC: 32.9 % (ref 32–36)
MCV RBC AUTO: 86.7 FL (ref 78–100)
MCV RBC AUTO: 87.2 FL (ref 78–100)
PDW BLD-RTO: 13.7 % (ref 11.7–14.9)
PDW BLD-RTO: 13.9 % (ref 11.7–14.9)
PLATELET # BLD: 218 K/CU MM (ref 140–440)
PLATELET # BLD: 226 K/CU MM (ref 140–440)
PMV BLD AUTO: 9.1 FL (ref 7.5–11.1)
PMV BLD AUTO: 9.1 FL (ref 7.5–11.1)
PROTHROMBIN TIME: 12.7 SECONDS (ref 11.7–14.5)
RBC # BLD: 4.42 M/CU MM (ref 4.6–6.2)
RBC # BLD: 4.54 M/CU MM (ref 4.6–6.2)
WBC # BLD: 6.3 K/CU MM (ref 4–10.5)
WBC # BLD: 7 K/CU MM (ref 4–10.5)

## 2024-05-02 PROCEDURE — 85730 THROMBOPLASTIN TIME PARTIAL: CPT

## 2024-05-02 PROCEDURE — 6370000000 HC RX 637 (ALT 250 FOR IP): Performed by: STUDENT IN AN ORGANIZED HEALTH CARE EDUCATION/TRAINING PROGRAM

## 2024-05-02 PROCEDURE — 99233 SBSQ HOSP IP/OBS HIGH 50: CPT | Performed by: INTERNAL MEDICINE

## 2024-05-02 PROCEDURE — 36415 COLL VENOUS BLD VENIPUNCTURE: CPT

## 2024-05-02 PROCEDURE — 2500000003 HC RX 250 WO HCPCS: Performed by: PHYSICIAN ASSISTANT

## 2024-05-02 PROCEDURE — 2580000003 HC RX 258: Performed by: STUDENT IN AN ORGANIZED HEALTH CARE EDUCATION/TRAINING PROGRAM

## 2024-05-02 PROCEDURE — 94761 N-INVAS EAR/PLS OXIMETRY MLT: CPT

## 2024-05-02 PROCEDURE — 85027 COMPLETE CBC AUTOMATED: CPT

## 2024-05-02 PROCEDURE — 6360000002 HC RX W HCPCS: Performed by: PHYSICIAN ASSISTANT

## 2024-05-02 PROCEDURE — 99231 SBSQ HOSP IP/OBS SF/LOW 25: CPT | Performed by: SURGERY

## 2024-05-02 PROCEDURE — 2140000000 HC CCU INTERMEDIATE R&B

## 2024-05-02 PROCEDURE — 99232 SBSQ HOSP IP/OBS MODERATE 35: CPT | Performed by: THORACIC SURGERY (CARDIOTHORACIC VASCULAR SURGERY)

## 2024-05-02 PROCEDURE — 6370000000 HC RX 637 (ALT 250 FOR IP): Performed by: PHYSICIAN ASSISTANT

## 2024-05-02 PROCEDURE — 85610 PROTHROMBIN TIME: CPT

## 2024-05-02 PROCEDURE — 94640 AIRWAY INHALATION TREATMENT: CPT

## 2024-05-02 PROCEDURE — 6370000000 HC RX 637 (ALT 250 FOR IP): Performed by: INTERNAL MEDICINE

## 2024-05-02 PROCEDURE — 6360000002 HC RX W HCPCS: Performed by: STUDENT IN AN ORGANIZED HEALTH CARE EDUCATION/TRAINING PROGRAM

## 2024-05-02 PROCEDURE — 85520 HEPARIN ASSAY: CPT

## 2024-05-02 RX ORDER — HEPARIN SODIUM 1000 [USP'U]/ML
60 INJECTION, SOLUTION INTRAVENOUS; SUBCUTANEOUS ONCE
Status: DISCONTINUED | OUTPATIENT
Start: 2024-05-02 | End: 2024-05-02 | Stop reason: SDUPTHER

## 2024-05-02 RX ORDER — HEPARIN SODIUM 1000 [USP'U]/ML
80 INJECTION, SOLUTION INTRAVENOUS; SUBCUTANEOUS PRN
Status: DISCONTINUED | OUTPATIENT
Start: 2024-05-02 | End: 2024-05-03

## 2024-05-02 RX ORDER — HEPARIN SODIUM 1000 [USP'U]/ML
40 INJECTION, SOLUTION INTRAVENOUS; SUBCUTANEOUS PRN
Status: DISCONTINUED | OUTPATIENT
Start: 2024-05-02 | End: 2024-05-03

## 2024-05-02 RX ORDER — HEPARIN SODIUM 1000 [USP'U]/ML
80 INJECTION, SOLUTION INTRAVENOUS; SUBCUTANEOUS ONCE
Status: COMPLETED | OUTPATIENT
Start: 2024-05-02 | End: 2024-05-02

## 2024-05-02 RX ORDER — HEPARIN SODIUM 1000 [USP'U]/ML
60 INJECTION, SOLUTION INTRAVENOUS; SUBCUTANEOUS ONCE
Status: DISCONTINUED | OUTPATIENT
Start: 2024-05-02 | End: 2024-05-02

## 2024-05-02 RX ORDER — HEPARIN SODIUM 10000 [USP'U]/100ML
5-30 INJECTION, SOLUTION INTRAVENOUS CONTINUOUS
Status: DISCONTINUED | OUTPATIENT
Start: 2024-05-02 | End: 2024-05-03

## 2024-05-02 RX ORDER — HEPARIN SODIUM 10000 [USP'U]/100ML
5-30 INJECTION, SOLUTION INTRAVENOUS CONTINUOUS
Status: DISCONTINUED | OUTPATIENT
Start: 2024-05-02 | End: 2024-05-02 | Stop reason: SDUPTHER

## 2024-05-02 RX ORDER — HEPARIN SODIUM 1000 [USP'U]/ML
60 INJECTION, SOLUTION INTRAVENOUS; SUBCUTANEOUS PRN
Status: DISCONTINUED | OUTPATIENT
Start: 2024-05-02 | End: 2024-05-02 | Stop reason: SDUPTHER

## 2024-05-02 RX ORDER — HEPARIN SODIUM 1000 [USP'U]/ML
30 INJECTION, SOLUTION INTRAVENOUS; SUBCUTANEOUS PRN
Status: DISCONTINUED | OUTPATIENT
Start: 2024-05-02 | End: 2024-05-02 | Stop reason: SDUPTHER

## 2024-05-02 RX ADMIN — VERAPAMIL HYDROCHLORIDE 120 MG: 120 TABLET, FILM COATED, EXTENDED RELEASE ORAL at 18:56

## 2024-05-02 RX ADMIN — ONDANSETRON 4 MG: 2 INJECTION INTRAMUSCULAR; INTRAVENOUS at 18:54

## 2024-05-02 RX ADMIN — IPRATROPIUM BROMIDE AND ALBUTEROL SULFATE 1 DOSE: 2.5; .5 SOLUTION RESPIRATORY (INHALATION) at 08:41

## 2024-05-02 RX ADMIN — TIZANIDINE 4 MG: 4 TABLET ORAL at 20:21

## 2024-05-02 RX ADMIN — TRAMADOL HYDROCHLORIDE 25 MG: 50 TABLET ORAL at 17:28

## 2024-05-02 RX ADMIN — ASPIRIN 81 MG: 81 TABLET, CHEWABLE ORAL at 08:56

## 2024-05-02 RX ADMIN — ATORVASTATIN CALCIUM 20 MG: 10 TABLET, FILM COATED ORAL at 08:56

## 2024-05-02 RX ADMIN — CLOPIDOGREL BISULFATE 75 MG: 75 TABLET ORAL at 08:55

## 2024-05-02 RX ADMIN — ACETAMINOPHEN 650 MG: 325 TABLET ORAL at 17:57

## 2024-05-02 RX ADMIN — HEPARIN SODIUM 5300 UNITS: 1000 INJECTION INTRAVENOUS; SUBCUTANEOUS at 11:29

## 2024-05-02 RX ADMIN — SODIUM CHLORIDE, PRESERVATIVE FREE 10 ML: 5 INJECTION INTRAVENOUS at 20:21

## 2024-05-02 RX ADMIN — HEPARIN SODIUM 18 UNITS/KG/HR: 10000 INJECTION, SOLUTION INTRAVENOUS at 11:31

## 2024-05-02 RX ADMIN — SODIUM CHLORIDE, PRESERVATIVE FREE 10 ML: 5 INJECTION INTRAVENOUS at 08:56

## 2024-05-02 ASSESSMENT — PAIN DESCRIPTION - LOCATION
LOCATION: ABDOMEN
LOCATION: ABDOMEN

## 2024-05-02 ASSESSMENT — PAIN DESCRIPTION - ORIENTATION
ORIENTATION: MID;UPPER
ORIENTATION: MID

## 2024-05-02 ASSESSMENT — PAIN DESCRIPTION - DESCRIPTORS
DESCRIPTORS: BURNING
DESCRIPTORS: BURNING

## 2024-05-02 ASSESSMENT — PAIN SCALES - GENERAL
PAINLEVEL_OUTOF10: 0
PAINLEVEL_OUTOF10: 5
PAINLEVEL_OUTOF10: 0
PAINLEVEL_OUTOF10: 0
PAINLEVEL_OUTOF10: 7
PAINLEVEL_OUTOF10: 2

## 2024-05-02 ASSESSMENT — PAIN - FUNCTIONAL ASSESSMENT: PAIN_FUNCTIONAL_ASSESSMENT: ACTIVITIES ARE NOT PREVENTED

## 2024-05-02 NOTE — PROGRESS NOTES
[facility] Gastroenterology and Hepatology             MD Zion Hamilton MD Carol Christensen, APRN-CNP             [address] [place] [address]             [phone] fax [phone]      Gastroenterology Progress note . 5/2/2024  Reason for consult:   Biliary dilation, abdominal pain          Interval H/P  Patient underwent MRCP yesterday noted to have a possible mass in the head of the pancreas versus periampullary mass with compression and mass effect on the CBD and PD.  LFTs noted to be normal on admission.  Denies any abdominal pain.     Physical Exam  Blood pressure 109/80, pulse 66, temperature 97.8 °F (36.6 °C), temperature source Oral, resp. rate 16, height 1.753 m (5' 9\"), weight 65.9 kg (145 lb 3.2 oz), SpO2 96 %.  Constitutional: Patient is in no distress.   Eyes: Pupils equal, round.  No icterus.  HENT: Oral mucosa is moist.   Pulmonary: No accessory muscle use. No localizing pulmonary findings.     Cardiovascular: Heart has a regular rate and rhythm, no JVD.   Gastrointestinal: Bowel sounds are present in all four quadrants. Abdomen is soft, nontender, nondistended. Rectal examination deferred.   Skin: No jaundice, spider angiomas, or palmar erythema. No purpura.  Neuro: Awake,alert, and oriented x3. No gross focal neurologic deficits.       Chart and labs reviewed.     Impression/Plan  1. Pancreatic head/ampullary lesion: MRCP noted to have a possible mass adjacent to the ampulla with mass effect on the CBD and PD  -Possible differentials include ampullary/periampullary adenocarcinoma versus pancreatic head adeno CA  -I had a long conversation with the patient and explained to him that dilation of both ducts -CBD and PD, double duct sign is concerning.  -He will need an EUS and an ERCP combined for both tissue acquisition as well as for biliary obstruction relief.   -Unfortunately he has been started on Plavix and he will need to be

## 2024-05-02 NOTE — PROGRESS NOTES
Cardiothoracic Surgery  IN-PATIENT SERVICE   [facility]    Progress Note            Date:   5/2/2024  Patient name:  Jama Ramirez  Date of admission:  4/28/2024  2:06 PM  MRN:   [id number]  Account:  [id number]  YOB: 1960  PCP:    Jennifer Gomez MD  Room:   [address] [place] [address]  Code Status:    Full Code    Physician Requesting Consult: Cynthia Suazo MD    Reason for Consult:  superior mesenteric artery dissection    Chief Complaint:     Abdominal pain    History of Present Illness:     Patient is a 63 y.o. male history of gastric hemorrhage, pneumonia, COPD, HLD, HTN who presents to ED stating over the last 3 days he has had suprapubic pressure chills body aches bilateral flank pain. He states that he feels as if he is unable to void at times. He states in the last day and a half he has gotten central chest \" swelling\" bleeding from the abdominal pain. States he has been nauseous due to the pain. Denies any actual vomiting diarrhea or constipation. Denies any dark-colored stools or bloody emesis. States his urine has been darker and odorous with some blood at times. States the chest swelling and pressure gets worse with activity. He was found to have a UTI he was given Rocephin culture urine. Patient had a CT scan of the chest done that was negative. CT scan of the chest abdominal pelvis does show superior mesenteric artery small dissection which is new since 2022. General surgery was contacted who do not believe that patient has any signs of ischemic bowel on exam but is mildly tender with no rebound or rigidity.  CTS team consulted to assess the patient in regards to the SMA dissection. Heparin drip was started and patient on Plavix.      Past Medical History:     Past Medical History:   Diagnosis Date    Arthritis     Hands    Chronic cluster headache 6/7/2019 last    COPD (chronic obstructive pulmonary disease) (HCC)     \"dx 2 yrs ago\" \\"does not see a  evidence of acute aortic pathology.  2. A focal dissection within the superior mesenteric artery distal to the branch point. The dissection terminates at the branching point of the SMA. This is new from October 2022 but is age indeterminate.  3. New extrahepatic biliary ductal dilatation measuring 16 mm at the level of the vishnu hepatis. This finding is new from January 2024. Recommend MRCP for further assessment.     Electronically signed by David Fan DO        Assessment :      Superior mesenteric artery dissection  Abdominal pain  UTI    Plan:       Recommendation:   -CT chest/abdomen/pelvis reviewed with Dr. Blevins and Dr. Arshad. SMA dissection noted with appropriate blood fow and no signs of bowel compromise.   -Continue ASA, Plavix, and heparin drip.  -We will bolus with 325 mg Plavix once general surgery/GI have no plans for further intervention. May need ERCP with GI. Once bolus of Plavix is given, we will DC heparin drip.  -Being treated for UTI with Rocephin   -Tolerating regular diet   -No plans for surgical intervention from vascular surgery standpoint  -Further recommendations per CTS surgeons      An Lomeli PA-C 05/02/24 8:46 AM      New Consults 8:00AM-4:30PM: Call Office, 4:30PM to 8:00AM Surgeon on-call    CVICU or other units patient follow up: [place] author of this note 8:00AM-4:30PM    CVICU patient or urgent follow up: 4:30PM to 8:00AM Call or Page Surgeon on-call     Step-down patient follow up: 4:30PM to 8:00AM Page or secure chat PA on-call

## 2024-05-02 NOTE — PROGRESS NOTES
weakness     Vertigo     Positional lightheadedness     Hiatal hernia     Gastroesophageal reflux disease with esophagitis     PUD (peptic ulcer disease)     Paraesophageal hiatal hernia     Status post laparoscopic Nissen fundoplication     Esophageal dysphagia     Esophageal thrush (HCC)     Candida esophagitis (HCC)     Gastroparesis     Candida infection, esophageal (HCC)     Pneumonia     Left upper quadrant pain     LLQ pain     Fungal esophagitis     Left flank pain     Gastroesophageal reflux disease without esophagitis     Dyslipidemia     Essential hypertension     Epigastric pain     Esophageal stricture     Pharyngoesophageal dysphagia     Pain of upper abdomen     Nicotine dependence     Hematochezia     Acute gastritis with hemorrhage     Esophagitis     Superior mesenteric artery stenosis (HCC)      - appreciate GI eval  - MRCP concerning for bile duct mass. Will likely need ERCP for biopsy/brushings  - regular diet for now  - will follow    Humphrey Hsieh MD

## 2024-05-02 NOTE — PROGRESS NOTES
PRN  traMADol, 25 mg, Q6H PRN  sodium chloride flush, 5-40 mL, PRN  sodium chloride, , PRN  potassium chloride, 40 mEq, PRN   Or  potassium alternative oral replacement, 40 mEq, PRN   Or  potassium chloride, 10 mEq, PRN  magnesium sulfate, 2,000 mg, PRN  ondansetron, 4 mg, Q8H PRN   Or  ondansetron, 4 mg, Q6H PRN  polyethylene glycol, 17 g, Daily PRN  acetaminophen, 650 mg, Q6H PRN   Or  acetaminophen, 650 mg, Q6H PRN        Labs and Imaging   CTA CHEST ABDOMEN PELVIS W CONTRAST    Addendum Date: 4/28/2024    ADDENDUM #1 ADDENDUM There is no evidence of any focal wall thickening within the GI tract. No evidence of free air. There is also a voice recognition error in the secondary impression of the report. 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 The dissection terminates at the branching point of the SMA. This is new from October 2022 but is age indeterminate. Electronically signed by David Fan DO    Result Date: 4/28/2024  CT angiography of the chest abdomen and pelvis with contrast HISTORY: Pain. COMPARISON: None Individualized dose optimization technique was used in order to meet ALARA standards for radiation dose reduction.  In addition to vendor specific dose reduction algorithms, the dose reduction techniques vary based on the specific scanner utilized but frequently include automated exposure control, adjustment of the mA and/or kV according to patient size, and use of iterative reconstruction technique. TECHNIQUE: Multidetector CT imaging of the chest abdomen pelvis was performed prior to and following administration of intravenous contrast material. 3-D MIP and MPR images were retrospectively generated at a separate workstation. FINDINGS: Emphysema is present. No consolidation. No suspicious adenopathy. No pericardial effusion. No acute aortic pathology. No destructive bone lesion. There is new extrahepatic biliary    Component Value Date/Time    TSH 0.919 01/16/2020 12:00 AM     Troponin:   Lab Results   Component Value Date/Time    TROPONINT <0.010 10/11/2019 05:34 AM    TROPONINT <0.010 10/11/2019 02:42 AM    TROPONINT <0.010 09/28/2018 07:26 AM       BNP:   No results for input(s): \"PROBNP\" in the last 72 hours.    UA:  Lab Results   Component Value Date/Time    NITRU NEGATIVE 04/28/2024 09:50 PM    NITRU POSITIVE 07/11/2012 10:24 AM    COLORU YELLOW 04/28/2024 09:50 PM    PHUR 6.0 04/28/2024 09:50 PM    WBCUA 54 04/28/2024 02:29 PM    RBCUA 6 04/28/2024 02:29 PM    MUCUS RARE 04/28/2024 02:29 PM    TRICHOMONAS NONE SEEN 04/28/2024 02:29 PM    BACTERIA NEGATIVE 04/28/2024 02:29 PM    CLARITYU CLEAR 04/28/2024 09:50 PM    LEUKOCYTESUR NEGATIVE 04/28/2024 09:50 PM    UROBILINOGEN 0.2 04/28/2024 09:50 PM    BILIRUBINUR NEGATIVE 04/28/2024 09:50 PM    BLOODU NEGATIVE 04/28/2024 09:50 PM    GLUCOSEU NEGATIVE 04/28/2024 09:50 PM    GLUCOSEU NEGATIVE 07/11/2012 10:24 AM    KETUA NEGATIVE 04/28/2024 09:50 PM       Organism:   Lab Results   Component Value Date/Time    ORG ECOL 10/06/2017 08:45 PM         Electronically signed by Cynthia Suazo MD on 5/2/2024 at 1:58 PM

## 2024-05-02 NOTE — PROGRESS NOTES
05/02/24 1352   Encounter Summary   Encounter Overview/Reason Initial Encounter   Service Provided For Patient   Referral/Consult From [facility]   Support System Significant other   Last Encounter  05/02/24  (prayer and visitation with patient. Patient has no Muslim needss but believe in God. Patient has no Uatsdin but in good spirits. Patient will have surgery tomorrow, will keep in prayers.)   Complexity of Encounter Low   Begin Time 1345   End Time  1354   Total Time Calculated 9 min   Spiritual/Emotional needs   Type Spiritual Support   Grief, Loss, and Adjustments   Type Adjustment to illness   Assessment/Intervention/Outcome   Assessment Calm;Peaceful   Intervention Active listening;Discussed belief system/Muslim practices/óscar;Discussed illness injury and it’s impact;Discussed relationship with God;Empowerment;Nurtured Hope;Prayer (assurance of)/Fowler;Sustaining Presence/Ministry of presence   Outcome Comfort;Encouraged;Expressed Gratitude;Expressed feelings of Brii, Peace and/or Love   Plan and Referrals   Plan/Referrals Continue Support (comment)  (as needed. Please see before or after surgery)

## 2024-05-03 VITALS
DIASTOLIC BLOOD PRESSURE: 69 MMHG | RESPIRATION RATE: 18 BRPM | HEIGHT: 69 IN | TEMPERATURE: 98.4 F | WEIGHT: 145.2 LBS | OXYGEN SATURATION: 93 % | SYSTOLIC BLOOD PRESSURE: 108 MMHG | BODY MASS INDEX: 21.51 KG/M2 | HEART RATE: 59 BPM

## 2024-05-03 PROBLEM — K83.8 DILATION OF BILIARY TRACT: Status: ACTIVE | Noted: 2024-05-03

## 2024-05-03 PROBLEM — S35.229A: Status: RESOLVED | Noted: 2024-05-01 | Resolved: 2024-05-03

## 2024-05-03 PROBLEM — K86.9 PANCREATIC LESION: Status: ACTIVE | Noted: 2024-05-03

## 2024-05-03 LAB
ANTI-XA UNFRAC HEPARIN: 0.35 IU/ML
ANTI-XA UNFRAC HEPARIN: 0.36 IU/ML
ANTI-XA UNFRAC HEPARIN: 0.4 IU/ML
ANTI-XA UNFRAC HEPARIN: 0.74 IU/ML
CANCER AG19-9 SERPL IA-ACNC: 39 U/ML
CULTURE: NORMAL
CULTURE: NORMAL
Lab: NORMAL
Lab: NORMAL
SPECIMEN: NORMAL
SPECIMEN: NORMAL

## 2024-05-03 PROCEDURE — 6370000000 HC RX 637 (ALT 250 FOR IP): Performed by: STUDENT IN AN ORGANIZED HEALTH CARE EDUCATION/TRAINING PROGRAM

## 2024-05-03 PROCEDURE — 2500000003 HC RX 250 WO HCPCS: Performed by: PHYSICIAN ASSISTANT

## 2024-05-03 PROCEDURE — 6370000000 HC RX 637 (ALT 250 FOR IP): Performed by: INTERNAL MEDICINE

## 2024-05-03 PROCEDURE — 94640 AIRWAY INHALATION TREATMENT: CPT

## 2024-05-03 PROCEDURE — 99232 SBSQ HOSP IP/OBS MODERATE 35: CPT | Performed by: INTERNAL MEDICINE

## 2024-05-03 PROCEDURE — 36415 COLL VENOUS BLD VENIPUNCTURE: CPT

## 2024-05-03 PROCEDURE — 94761 N-INVAS EAR/PLS OXIMETRY MLT: CPT

## 2024-05-03 PROCEDURE — 6360000002 HC RX W HCPCS: Performed by: INTERNAL MEDICINE

## 2024-05-03 PROCEDURE — 99231 SBSQ HOSP IP/OBS SF/LOW 25: CPT | Performed by: SURGERY

## 2024-05-03 PROCEDURE — 2580000003 HC RX 258: Performed by: STUDENT IN AN ORGANIZED HEALTH CARE EDUCATION/TRAINING PROGRAM

## 2024-05-03 PROCEDURE — 99232 SBSQ HOSP IP/OBS MODERATE 35: CPT | Performed by: THORACIC SURGERY (CARDIOTHORACIC VASCULAR SURGERY)

## 2024-05-03 PROCEDURE — 85520 HEPARIN ASSAY: CPT

## 2024-05-03 RX ORDER — ENOXAPARIN SODIUM 100 MG/ML
1 INJECTION SUBCUTANEOUS 2 TIMES DAILY
Qty: 16 ML | Refills: 1 | Status: SHIPPED | OUTPATIENT
Start: 2024-05-03 | End: 2024-05-03

## 2024-05-03 RX ORDER — ENOXAPARIN SODIUM 100 MG/ML
1 INJECTION SUBCUTANEOUS 2 TIMES DAILY
Qty: 9.6 ML | Refills: 0 | Status: SHIPPED | OUTPATIENT
Start: 2024-05-03 | End: 2024-05-03

## 2024-05-03 RX ORDER — ASPIRIN 81 MG/1
81 TABLET, CHEWABLE ORAL DAILY
Qty: 30 TABLET | Refills: 3 | Status: SHIPPED | OUTPATIENT
Start: 2024-05-04 | End: 2024-05-03

## 2024-05-03 RX ORDER — ASPIRIN 81 MG/1
81 TABLET, CHEWABLE ORAL DAILY
Qty: 30 TABLET | Refills: 3 | Status: SHIPPED | OUTPATIENT
Start: 2024-05-04

## 2024-05-03 RX ORDER — ENOXAPARIN SODIUM 100 MG/ML
1 INJECTION SUBCUTANEOUS 2 TIMES DAILY
Qty: 16 ML | Refills: 1 | Status: SHIPPED | OUTPATIENT
Start: 2024-05-03 | End: 2024-05-23

## 2024-05-03 RX ORDER — ENOXAPARIN SODIUM 100 MG/ML
1 INJECTION SUBCUTANEOUS 2 TIMES DAILY
Status: DISCONTINUED | OUTPATIENT
Start: 2024-05-03 | End: 2024-05-03 | Stop reason: HOSPADM

## 2024-05-03 RX ORDER — CLOPIDOGREL BISULFATE 75 MG/1
75 TABLET ORAL DAILY
Qty: 30 TABLET | Refills: 3 | Status: SHIPPED | OUTPATIENT
Start: 2024-05-09

## 2024-05-03 RX ORDER — CLOPIDOGREL BISULFATE 75 MG/1
75 TABLET ORAL DAILY
Qty: 30 TABLET | Refills: 3 | Status: SHIPPED | OUTPATIENT
Start: 2024-05-09 | End: 2024-05-03

## 2024-05-03 RX ADMIN — ATORVASTATIN CALCIUM 20 MG: 10 TABLET, FILM COATED ORAL at 09:18

## 2024-05-03 RX ADMIN — ASPIRIN 81 MG: 81 TABLET, CHEWABLE ORAL at 09:18

## 2024-05-03 RX ADMIN — SODIUM CHLORIDE, PRESERVATIVE FREE 10 ML: 5 INJECTION INTRAVENOUS at 09:18

## 2024-05-03 RX ADMIN — IPRATROPIUM BROMIDE AND ALBUTEROL SULFATE 1 DOSE: 2.5; .5 SOLUTION RESPIRATORY (INHALATION) at 07:25

## 2024-05-03 RX ADMIN — ENOXAPARIN SODIUM 70 MG: 100 INJECTION SUBCUTANEOUS at 12:09

## 2024-05-03 RX ADMIN — HEPARIN SODIUM 18 UNITS/KG/HR: 10000 INJECTION, SOLUTION INTRAVENOUS at 09:23

## 2024-05-03 NOTE — DISCHARGE INSTRUCTIONS
Take lovenox shots two times a day.   Do not take the shot from the night before the scheduled procedure.  Take aspirin daily  DO NOT take plavix for now. Take starting a day after the procedure - take 4 tablets on day 1 then one tablet  daily.

## 2024-05-03 NOTE — CARE COORDINATION
Met with pt for a f/u visit.  Introduced self  and explained reason for visit.  Pt states that he still plans to return home alone and does not have an d/c needs.  TE

## 2024-05-03 NOTE — H&P (VIEW-ONLY)
[facility]             MD Zion Hamilton MD Carol Christensen, APRN-CNP             [address] [place] [address]             [phone] fax [phone]      Gastroenterology Progress note . 5/3/2024  Reason for consult:   Biliary dilation, abdominal pain          Interval H/P  Doing well denies any nausea, vomiting no melena or hematochezia.  Discussed that we will have to reach back to him to find a time for him in the next coming weeks.     Physical Exam  Blood pressure 108/69, pulse 59, temperature 98.4 °F (36.9 °C), temperature source Oral, resp. rate 18, height 1.753 m (5' 9\"), weight 65.9 kg (145 lb 3.2 oz), SpO2 93 %.  Constitutional: Patient is in no distress.   Eyes: Pupils equal, round.  No icterus.  HENT: Oral mucosa is moist.   Pulmonary: No accessory muscle use. No localizing pulmonary findings.     Cardiovascular: Heart has a regular rate and rhythm, no JVD.   Gastrointestinal: Bowel sounds are present in all four quadrants. Abdomen is soft, nontender, nondistended. Rectal examination deferred.   Skin: No jaundice, spider angiomas, or palmar erythema. No purpura.  Neuro: Awake,alert, and oriented x3. No gross focal neurologic deficits.       Chart and labs reviewed.     Impression/Plan  1. Pancreatic head/ampullary lesion: MRCP noted to have a possible mass adjacent to the ampulla with mass effect on the CBD and PD  -Possible differentials include ampullary/periampullary adenocarcinoma versus pancreatic head adeno CA  -I had a long conversation with the patient and explained to him that dilation of both ducts -CBD and PD, double duct sign is concerning.  -He will need an EUS and an ERCP combined for both tissue acquisition as well as for biliary obstruction relief.   -Unfortunately he has been started on Plavix and he will need to be off Plavix for 5 days if it is okay with cardiothoracic surgery for

## 2024-05-03 NOTE — PROGRESS NOTES
Comprehensive Nutrition Assessment    Type and Reason for Visit:  Initial (los 5 d)    Nutrition Recommendations/Plan:   Continue current diet     Malnutrition Assessment:  Malnutrition Status:  At risk for malnutrition (05/03/24 1426)    Context:  Acute Illness       Nutrition Assessment:    Pt admitted with superior mesenteric artery dissection. H/O gastric hemorrhage, pneumonia, COPD, HLD, HTN. MRCP concerning for bile duct mass noted, plan for ERCP. He reports a poor appetite PTA 2/2 pain. Currently eating well on Regular Diet, consuming greater than half to all of recent meals. He reports his wt is up and down, Epic confirms this, stable overall for the past yr. Will add oral supplement to optimize intake and continue to follow as moderate nutrition risk.    Nutrition Related Findings:     Wound Type: None       Current Nutrition Intake & Therapies:    Average Meal Intake: %  Average Supplements Intake: None Ordered  ADULT DIET; Regular    Anthropometric Measures:  Height: 175.3 cm (5' 9\")  Ideal Body Weight (IBW): 160 lbs (73 kg)    Admission Body Weight: 69 kg (152 lb 1.9 oz)  Current Body Weight: 65.9 kg (145 lb 4.5 oz),   IBW.    Current BMI (kg/m2): 21.4  Usual Body Weight: 65.8 kg (145 lb 2 oz) (5/17/23)  % Weight Change (Calculated): 0.1                    BMI Categories: Normal Weight (BMI 18.5-24.9)    Estimated Daily Nutrient Needs:  Energy Requirements Based On: Kcal/kg  Weight Used for Energy Requirements: Current  Energy (kcal/day): 6538-5195 (25-30 kcal/kg)  Weight Used for Protein Requirements: Current  Protein (g/day): 66-79 (1.2-1.4 g/kg)  Method Used for Fluid Requirements: 1 ml/kcal  Fluid (ml/day): 1335-5667    Nutrition Diagnosis:   Predicted inadequate energy intake related to pain as evidenced by poor intake prior to admission    Nutrition Interventions:   Food and/or Nutrient Delivery: Continue Current Diet, Start Oral Nutrition Supplement  Nutrition Education/Counseling: Education

## 2024-05-03 NOTE — PROGRESS NOTES
the level vishnu hepatis. No distal radiopaque obstructing lesion. Kidneys, adrenal glands, spleen, pancreas unremarkable. No acute GI tract abnormality. No suspicious adenopathy   abdomen pelvis. Vascular calcifications. There is a focal intimal flap of the superior mesenteric artery, distal to the origin. The dissection appears to and had a branching point of the SMA. This was not clearly seen on prior contrast-enhanced CTs. No   other areas of acute arterial pathology in the abdomen or pelvis. No destructive bone lesion.     IMPRESSION:     1. No evidence of acute aortic pathology.  2. A focal dissection within the superior mesenteric artery distal to the branch point. The dissection terminates at the branching point of the SMA. This is new from October 2022 but is age indeterminate.  3. New extrahepatic biliary ductal dilatation measuring 16 mm at the level of the vishnu hepatis. This finding is new from January 2024. Recommend MRCP for further assessment.     Electronically signed by David Fan DO           MRI of the abdomen without contrast     TECHNIQUE: Multiplanar T1 and T2-weighted images were obtained of the abdomen without use of contrast.     COMPARISON: MRI of the abdomen 7/2/2019. CT of the chest, abdomen, and pelvis 4/28/2024. CT of the abdomen pelvis 1/15/2024.     FINDINGS:  At the head of the pancreas adjacent to the ampulla there is a low T2 signal lesion suspicious for possible mass adjacent to the ampulla which is best visualized on coronal image 13 of series 5 and is also suggested on the T1 series although motion   artifact limits evaluation. There is mild dilation of the pancreatic duct.     There is mild pancreatic ductal dilation associated with the findings. No acute pancreatic abnormality identified otherwise. Dilated common bile duct and dilated gallbladder as identified on recent CT imaging.     No hydronephrosis. No intracranial lymphadenopathy. No abdominal lymphadenopathy. No  vascular involvement although this is difficult to visualize without administration of contrast.     No suspicious osseous abnormality.     IMPRESSION:  1. Suspected small irregular mass obstructing the common bile duct and partially obstructing the pancreatic duct with severe biliary ductal dilation. Further evaluation with ERCP is recommended. The embryo lesion is estimated to measure 1.5 cm in size.     Electronically signed by Jama Lea MD      Assessment :      Superior mesenteric artery dissection  Abdominal pain  UTI    Plan:       Recommendation:   -CT chest/abdomen/pelvis reviewed with Dr. Blevins and Dr. Arshad. SMA dissection noted with appropriate blood flow and no signs of bowel compromise. Will need long term ASA and Plavix  -Patient needs ERCP with GI for common bile duct and pancreatic duct obstruction/dilation. Plavix on hold for this procedure.   -OK for discharge from CTS perspective  -Medication Recommendations if patient goes home before procedure: Use Lovenox for blood thinner as outpatient with baby ASA. Can hold day before ERCP. After ERCP, give Plavix bolus of 300 mg for one dose, then Plavix 75 mg daily thereafter with baby ASA.   -Medication Recommendations if patient remains inpatient: Heparin drip prior to ERCP. Can hold at midnight before procedure, After ERCP, give Plavix bolus of 300 mg for one dose, then Plavix 75 mg daily thereafter with baby ASA.   -Being treated for UTI with Rocephin   -Tolerating regular diet   -No plans for surgical intervention from vascular surgery standpoint  -Follow up in CTS office  in 2 weeks on 5/16/24 @ 2:20 PM  -Further recommendations per CTS surgeons      An Lomeli PA-C 05/03/24 8:45 AM      New Consults 8:00AM-4:30PM: Call Office, 4:30PM to 8:00AM Surgeon on-call    CVICU or other units patient follow up: [place] author of this note 8:00AM-4:30PM    CVICU patient or urgent follow up: 4:30PM to 8:00AM Call or Page Surgeon

## 2024-05-03 NOTE — PROGRESS NOTES
V2.0    [facility] Progress Note      Name:  Jama Ramirez /Age/Sex: 1960  (63 y.o. male)   MRN & CSN:  [id number] & [id number] Encounter Date/Time: 5/3/2024 12:06 PM EDT   Location:  [facility] PCP: Jennifer Gomez MD     Attending:Cynthia Suazo MD       Hospital Day: 6    Assessment and Recommendations   Jama Ramirez is a 63 y.o. male who presents with Superior mesenteric artery stenosis (HCC)      Plan:     Abdominal pain 2/2 SMA dissection vs obstructive CBD mass  Superior mesenteric artery dissection, stable  UTI  COPD  Essential hypertension  Hyperlipidemia  BPH    Abdominal pain somewhat improved.  For mesenteric artery dissection he was started on aspirin, Plavix and heparin drip as per CT surgery recommendations.  Tolerated well. Plavix held for EUS/ERCP. Transitioned to lovenox for anticoagulation.  As per general surgery obtain ultrasound of the abdomen to evaluate gallbladder and common bile duct.  Showed common bile duct dilatation.  GI team onboard. MRCP showed suspected small irregular mass obstructing the common bile duct and partially obstructing the pancreatic duct with severe biliary ductal dilatation.  Plan for ERCP with Dr. Wetzel. But needs to be 5 days off of plavix. To get him on scheduled prior to discharge.  Continue IV antibiotics for UTI plan to complete total 5-day course but can be transition to oral if discharged sooner than that.  COPD not in exacerbation.  Monitor for signs of urinary retention.        Diet ADULT DIET; Regular  ADULT ORAL NUTRITION SUPPLEMENT; Breakfast, Dinner; Standard High Calorie/High Protein Oral Supplement  ADULT ORAL NUTRITION SUPPLEMENT; Lunch; Frozen Oral Supplement   DVT Prophylaxis [] Lovenox, [x]  Heparin, [] SCDs, [] Ambulation,  [] Eliquis, [] Xarelto  [] Coumadin   Code Status Full Code   Disposition From: Home Home  Expected Disposition: Home  Estimated Date of Discharge: today/tomorrow  Patient requires continued admission due to pending  No pericardial effusion. No acute aortic pathology. No destructive bone lesion. There is new extrahepatic biliary ductal dilatation measuring 16 mm at the level vishnu hepatis. No distal radiopaque obstructing lesion. Kidneys, adrenal glands, spleen, pancreas unremarkable. No acute GI tract abnormality. No suspicious adenopathy abdomen pelvis. Vascular calcifications. There is a focal intimal flap of the superior mesenteric artery, distal to the origin. The dissection appears to and had a branching point of the SMA. This was not clearly seen on prior contrast-enhanced CTs. No other areas of acute arterial pathology in the abdomen or pelvis. No destructive bone lesion.     1. No evidence of acute aortic pathology. 2. A focal dissection within the superior mesenteric artery distal to the branch point. The dissection terminates at the branching point of the SMA. This is new from October 2022 but is age indeterminate. 3. New extrahepatic biliary ductal dilatation measuring 16 mm at the level of the vishnu hepatis. This finding is new from January 2024. Recommend MRCP for further assessment. Electronically signed by David Fan DO    XR CHEST PORTABLE    Result Date: 4/28/2024  1 view chest HISTORY: Chest pressure COMPARISON: None FINDINGS: The cardiomediastinal contours are normal.  The lungs are clear.  No pleural abnormality is evident.     No acute disease Electronically signed by David Fan DO      CBC:   Recent Labs     05/02/24  0425 05/02/24  1041   WBC 7.0 6.3   HGB 12.6* 13.0*    226       BMP:    No results for input(s): \"NA\", \"K\", \"CL\", \"CO2\", \"BUN\", \"CREATININE\", \"GLUCOSE\" in the last 72 hours.    Hepatic:   No results for input(s): \"AST\", \"ALT\", \"BILITOT\", \"ALKPHOS\" in the last 72 hours.    Invalid input(s): \"ALB\"    Lipids:   Lab Results   Component Value Date/Time    CHOL 200 01/16/2020 12:00 AM    HDL 54 01/16/2020 12:00 AM    TRIG 82 01/16/2020 12:00 AM     Hemoglobin A1C:   Lab Results

## 2024-05-03 NOTE — PROGRESS NOTES
[facility] Hepat[facility]             MD Zion Hamilton MD Carol Christensen, APRN-CNP             [address] [place] [address]             [phone] fax [phone]      Gastroenterology Progress note . 5/3/2024  Reason for consult:   Biliary dilation, abdominal pain          Interval H/P  Doing well denies any nausea, vomiting no melena or hematochezia.  Discussed that we will have to reach back to him to find a time for him in the next coming weeks.     Physical Exam  Blood pressure 108/69, pulse 59, temperature 98.4 °F (36.9 °C), temperature source Oral, resp. rate 18, height 1.753 m (5' 9\"), weight 65.9 kg (145 lb 3.2 oz), SpO2 93 %.  Constitutional: Patient is in no distress.   Eyes: Pupils equal, round.  No icterus.  HENT: Oral mucosa is moist.   Pulmonary: No accessory muscle use. No localizing pulmonary findings.     Cardiovascular: Heart has a regular rate and rhythm, no JVD.   Gastrointestinal: Bowel sounds are present in all four quadrants. Abdomen is soft, nontender, nondistended. Rectal examination deferred.   Skin: No jaundice, spider angiomas, or palmar erythema. No purpura.  Neuro: Awake,alert, and oriented x3. No gross focal neurologic deficits.       Chart and labs reviewed.     Impression/Plan  1. Pancreatic head/ampullary lesion: MRCP noted to have a possible mass adjacent to the ampulla with mass effect on the CBD and PD  -Possible differentials include ampullary/periampullary adenocarcinoma versus pancreatic head adeno CA  -I had a long conversation with the patient and explained to him that dilation of both ducts -CBD and PD, double duct sign is concerning.  -He will need an EUS and an ERCP combined for both tissue acquisition as well as for biliary obstruction relief.   -Unfortunately he has been started on Plavix and he will need to be off Plavix for 5 days if it is okay with cardiothoracic surgery for

## 2024-05-03 NOTE — PROGRESS NOTES
GENERAL SURGERY PROGRESS NOTE    Jama Ramirez is a 63 y.o. male with abdominal pain and SMA dissection on CTA.                 Subjective:  Doing well this morning. Plan for ERCP per GI but needs to be off plavix for 5 days.    Objective:    Vitals: VITALS:  BP 98/79   Pulse 57   Temp 97.9 °F (36.6 °C) (Oral)   Resp 13   Ht 1.753 m (5' 9\")   Wt 65.9 kg (145 lb 3.2 oz)   SpO2 93%   BMI 21.44 kg/m²     I/O: 05/02 0701 - 05/03 0700  In: 216.2 [I.V.:216.2]  Out: 2120 [Urine:2120]    Labs/Imaging Results:   Lab Results   Component Value Date/Time     04/28/2024 02:39 PM    K 3.8 04/28/2024 02:39 PM     04/28/2024 02:39 PM    CO2 22 04/28/2024 02:39 PM    BUN 17 04/28/2024 02:39 PM    CREATININE 0.8 04/28/2024 02:39 PM    GLUCOSE 106 04/28/2024 02:39 PM    CALCIUM 8.6 04/28/2024 02:39 PM      Lab Results   Component Value Date    WBC 6.3 05/02/2024    HGB 13.0 (L) 05/02/2024    HCT 39.6 (L) 05/02/2024    MCV 87.2 05/02/2024     05/02/2024       IV Fluids:   heparin (PORCINE) Infusion Last Rate: 18 Units/kg/hr (05/03/24 0528)    sodium chloride    Scheduled Meds:   ipratropium 0.5 mg-albuterol 2.5 mg, 1 Dose, Inhalation, BID RT    aspirin, 81 mg, Oral, Daily    [Held by provider] clopidogrel, 75 mg, Oral, Daily    sodium chloride flush, 5-40 mL, IntraVENous, 2 times per day    atorvastatin, 20 mg, Oral, Daily    tiZANidine, 4 mg, Oral, Nightly    verapamil, 120 mg, Oral, Daily    Physical Exam:  General: A&O x 3, no distress.   HEENT: Anicteric sclerae, MMM.  Extremities: No edema bilat LE.  Abd: mildly tender in the mid abdomen        Assessment and Plan:  63 y.o. male with abdominal pain. 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  Pain improving. US and CT show common bile duct dilation.  MRCP concerning for bile duct mass.     Patient Active Problem List:     Impingement syndrome of right shoulder     Acute bronchitis with COPD (HCC)     Chest pain     Post-nasal drip     Chronic cluster headache

## 2024-05-06 ENCOUNTER — PREP FOR PROCEDURE (OUTPATIENT)
Dept: GASTROENTEROLOGY | Age: 64
End: 2024-05-06

## 2024-05-06 DIAGNOSIS — K86.89 PANCREATIC DUCT OBSTRUCTION: ICD-10-CM

## 2024-05-06 DIAGNOSIS — K83.1 OBSTRUCTION OF BILE DUCT: ICD-10-CM

## 2024-05-07 RX ORDER — SODIUM CHLORIDE 0.9 % (FLUSH) 0.9 %
5-40 SYRINGE (ML) INJECTION EVERY 12 HOURS SCHEDULED
Status: CANCELLED | OUTPATIENT
Start: 2024-05-07

## 2024-05-07 RX ORDER — SODIUM CHLORIDE 9 MG/ML
INJECTION, SOLUTION INTRAVENOUS PRN
Status: CANCELLED | OUTPATIENT
Start: 2024-05-07

## 2024-05-07 RX ORDER — SODIUM CHLORIDE 0.9 % (FLUSH) 0.9 %
5-40 SYRINGE (ML) INJECTION PRN
Status: CANCELLED | OUTPATIENT
Start: 2024-05-07

## 2024-05-14 ENCOUNTER — TELEPHONE (OUTPATIENT)
Dept: CARDIOTHORACIC SURGERY | Age: 64
End: 2024-05-14

## 2024-05-15 ENCOUNTER — TELEPHONE (OUTPATIENT)
Dept: CARDIOTHORACIC SURGERY | Age: 64
End: 2024-05-15

## 2024-05-15 NOTE — TELEPHONE ENCOUNTER
Spoke w/pt to reschedule appt to Monday 5/20/24 per Dr. Arshad's request. Appt rescheduled for 5/20/24 @2:30pm. Pt voiced understanding.

## 2024-05-16 ENCOUNTER — ANESTHESIA EVENT (OUTPATIENT)
Dept: ENDOSCOPY | Age: 64
End: 2024-05-16
Payer: COMMERCIAL

## 2024-05-17 ENCOUNTER — APPOINTMENT (OUTPATIENT)
Dept: GENERAL RADIOLOGY | Age: 64
DRG: 438 | End: 2024-05-17
Attending: INTERNAL MEDICINE
Payer: COMMERCIAL

## 2024-05-17 ENCOUNTER — HOSPITAL ENCOUNTER (INPATIENT)
Age: 64
LOS: 4 days | Discharge: HOME | DRG: 438 | End: 2024-05-21
Attending: INTERNAL MEDICINE | Admitting: HOSPITALIST
Payer: COMMERCIAL

## 2024-05-17 ENCOUNTER — ANESTHESIA (OUTPATIENT)
Dept: ENDOSCOPY | Age: 64
End: 2024-05-17
Payer: COMMERCIAL

## 2024-05-17 DIAGNOSIS — K86.9 PANCREATIC LESION: ICD-10-CM

## 2024-05-17 DIAGNOSIS — K83.1 OBSTRUCTION OF BILE DUCT: ICD-10-CM

## 2024-05-17 DIAGNOSIS — K86.89 PANCREATIC DUCT OBSTRUCTION: ICD-10-CM

## 2024-05-17 PROBLEM — K31.89 DUODENAL MASS: Status: ACTIVE | Noted: 2024-05-17

## 2024-05-17 LAB
ALBUMIN SERPL-MCNC: 3.7 GM/DL (ref 3.4–5)
ALP BLD-CCNC: 804 IU/L (ref 40–129)
ALT SERPL-CCNC: 452 U/L (ref 10–40)
AST SERPL-CCNC: 272 IU/L (ref 15–37)
BILIRUB SERPL-MCNC: 6 MG/DL (ref 0–1)
BILIRUBIN DIRECT: 5.1 MG/DL (ref 0–0.3)
BILIRUBIN, INDIRECT: 0.9 MG/DL (ref 0–0.7)
TOTAL PROTEIN: 5.5 GM/DL (ref 6.4–8.2)

## 2024-05-17 PROCEDURE — 6360000004 HC RX CONTRAST MEDICATION: Performed by: INTERNAL MEDICINE

## 2024-05-17 PROCEDURE — 43247 EGD REMOVE FOREIGN BODY: CPT | Performed by: INTERNAL MEDICINE

## 2024-05-17 PROCEDURE — 88305 TISSUE EXAM BY PATHOLOGIST: CPT | Performed by: PATHOLOGY

## 2024-05-17 PROCEDURE — 6360000002 HC RX W HCPCS: Performed by: INTERNAL MEDICINE

## 2024-05-17 PROCEDURE — 6370000000 HC RX 637 (ALT 250 FOR IP): Performed by: HOSPITALIST

## 2024-05-17 PROCEDURE — 2720000010 HC SURG SUPPLY STERILE: Performed by: INTERNAL MEDICINE

## 2024-05-17 PROCEDURE — 2500000003 HC RX 250 WO HCPCS: Performed by: REGISTERED NURSE

## 2024-05-17 PROCEDURE — 1200000000 HC SEMI PRIVATE

## 2024-05-17 PROCEDURE — 6360000002 HC RX W HCPCS: Performed by: ANESTHESIOLOGY

## 2024-05-17 PROCEDURE — 43239 EGD BIOPSY SINGLE/MULTIPLE: CPT | Performed by: INTERNAL MEDICINE

## 2024-05-17 PROCEDURE — 76000 FLUOROSCOPY <1 HR PHYS/QHP: CPT

## 2024-05-17 PROCEDURE — 3700000001 HC ADD 15 MINUTES (ANESTHESIA): Performed by: INTERNAL MEDICINE

## 2024-05-17 PROCEDURE — 2580000003 HC RX 258: Performed by: REGISTERED NURSE

## 2024-05-17 PROCEDURE — 3609012400 HC EGD TRANSORAL BIOPSY SINGLE/MULTIPLE: Performed by: INTERNAL MEDICINE

## 2024-05-17 PROCEDURE — 80076 HEPATIC FUNCTION PANEL: CPT

## 2024-05-17 PROCEDURE — 2580000003 HC RX 258: Performed by: INTERNAL MEDICINE

## 2024-05-17 PROCEDURE — 7100000001 HC PACU RECOVERY - ADDTL 15 MIN: Performed by: INTERNAL MEDICINE

## 2024-05-17 PROCEDURE — 0DB98ZX EXCISION OF DUODENUM, VIA NATURAL OR ARTIFICIAL OPENING ENDOSCOPIC, DIAGNOSTIC: ICD-10-PCS | Performed by: INTERNAL MEDICINE

## 2024-05-17 PROCEDURE — 2580000003 HC RX 258: Performed by: HOSPITALIST

## 2024-05-17 PROCEDURE — 2709999900 HC NON-CHARGEABLE SUPPLY: Performed by: INTERNAL MEDICINE

## 2024-05-17 PROCEDURE — 7100000000 HC PACU RECOVERY - FIRST 15 MIN: Performed by: INTERNAL MEDICINE

## 2024-05-17 PROCEDURE — 3700000000 HC ANESTHESIA ATTENDED CARE: Performed by: INTERNAL MEDICINE

## 2024-05-17 PROCEDURE — 6360000002 HC RX W HCPCS: Performed by: REGISTERED NURSE

## 2024-05-17 PROCEDURE — 6360000002 HC RX W HCPCS: Performed by: HOSPITALIST

## 2024-05-17 RX ORDER — MEPERIDINE HYDROCHLORIDE 25 MG/ML
12.5 INJECTION INTRAMUSCULAR; INTRAVENOUS; SUBCUTANEOUS EVERY 5 MIN PRN
Status: DISCONTINUED | OUTPATIENT
Start: 2024-05-17 | End: 2024-05-17 | Stop reason: HOSPADM

## 2024-05-17 RX ORDER — HYDRALAZINE HYDROCHLORIDE 20 MG/ML
10 INJECTION INTRAMUSCULAR; INTRAVENOUS
Status: DISCONTINUED | OUTPATIENT
Start: 2024-05-17 | End: 2024-05-17 | Stop reason: HOSPADM

## 2024-05-17 RX ORDER — SODIUM CHLORIDE 0.9 % (FLUSH) 0.9 %
5-40 SYRINGE (ML) INJECTION PRN
Status: DISCONTINUED | OUTPATIENT
Start: 2024-05-17 | End: 2024-05-21 | Stop reason: HOSPADM

## 2024-05-17 RX ORDER — SODIUM CHLORIDE 9 MG/ML
INJECTION, SOLUTION INTRAVENOUS PRN
Status: DISCONTINUED | OUTPATIENT
Start: 2024-05-17 | End: 2024-05-21 | Stop reason: HOSPADM

## 2024-05-17 RX ORDER — ONDANSETRON 4 MG/1
4 TABLET, ORALLY DISINTEGRATING ORAL EVERY 8 HOURS PRN
Status: DISCONTINUED | OUTPATIENT
Start: 2024-05-17 | End: 2024-05-21 | Stop reason: HOSPADM

## 2024-05-17 RX ORDER — ASPIRIN 81 MG/1
81 TABLET, CHEWABLE ORAL DAILY
Status: DISCONTINUED | OUTPATIENT
Start: 2024-05-18 | End: 2024-05-21 | Stop reason: HOSPADM

## 2024-05-17 RX ORDER — LABETALOL HYDROCHLORIDE 5 MG/ML
10 INJECTION, SOLUTION INTRAVENOUS
Status: DISCONTINUED | OUTPATIENT
Start: 2024-05-17 | End: 2024-05-17 | Stop reason: HOSPADM

## 2024-05-17 RX ORDER — INDOMETHACIN 100 MG
100 SUPPOSITORY, RECTAL RECTAL ONCE
Status: DISCONTINUED | OUTPATIENT
Start: 2024-05-17 | End: 2024-05-17

## 2024-05-17 RX ORDER — FENTANYL CITRATE 50 UG/ML
50 INJECTION, SOLUTION INTRAMUSCULAR; INTRAVENOUS EVERY 5 MIN PRN
Status: DISCONTINUED | OUTPATIENT
Start: 2024-05-17 | End: 2024-05-17 | Stop reason: HOSPADM

## 2024-05-17 RX ORDER — POTASSIUM CHLORIDE 7.45 MG/ML
10 INJECTION INTRAVENOUS PRN
Status: DISCONTINUED | OUTPATIENT
Start: 2024-05-17 | End: 2024-05-21 | Stop reason: HOSPADM

## 2024-05-17 RX ORDER — POLYETHYLENE GLYCOL 3350 17 G/17G
17 POWDER, FOR SOLUTION ORAL DAILY PRN
Status: DISCONTINUED | OUTPATIENT
Start: 2024-05-17 | End: 2024-05-21 | Stop reason: HOSPADM

## 2024-05-17 RX ORDER — MAGNESIUM SULFATE 1 G/100ML
1000 INJECTION INTRAVENOUS PRN
Status: DISCONTINUED | OUTPATIENT
Start: 2024-05-17 | End: 2024-05-21 | Stop reason: HOSPADM

## 2024-05-17 RX ORDER — ASPIRIN 81 MG/1
81 TABLET, CHEWABLE ORAL DAILY
Status: DISCONTINUED | OUTPATIENT
Start: 2024-05-17 | End: 2024-05-17

## 2024-05-17 RX ORDER — ROCURONIUM BROMIDE 10 MG/ML
INJECTION, SOLUTION INTRAVENOUS PRN
Status: DISCONTINUED | OUTPATIENT
Start: 2024-05-17 | End: 2024-05-17 | Stop reason: SDUPTHER

## 2024-05-17 RX ORDER — OXYCODONE HYDROCHLORIDE 5 MG/1
5 TABLET ORAL
Status: DISCONTINUED | OUTPATIENT
Start: 2024-05-17 | End: 2024-05-17 | Stop reason: HOSPADM

## 2024-05-17 RX ORDER — SODIUM CHLORIDE, SODIUM LACTATE, POTASSIUM CHLORIDE, CALCIUM CHLORIDE 600; 310; 30; 20 MG/100ML; MG/100ML; MG/100ML; MG/100ML
INJECTION, SOLUTION INTRAVENOUS CONTINUOUS PRN
Status: DISCONTINUED | OUTPATIENT
Start: 2024-05-17 | End: 2024-05-17 | Stop reason: SDUPTHER

## 2024-05-17 RX ORDER — ENOXAPARIN SODIUM 100 MG/ML
40 INJECTION SUBCUTANEOUS DAILY
Status: DISCONTINUED | OUTPATIENT
Start: 2024-05-18 | End: 2024-05-21 | Stop reason: HOSPADM

## 2024-05-17 RX ORDER — CETIRIZINE HYDROCHLORIDE 10 MG/1
10 TABLET ORAL DAILY
Status: DISCONTINUED | OUTPATIENT
Start: 2024-05-17 | End: 2024-05-21 | Stop reason: HOSPADM

## 2024-05-17 RX ORDER — ACETAMINOPHEN 325 MG/1
650 TABLET ORAL EVERY 6 HOURS PRN
Status: DISCONTINUED | OUTPATIENT
Start: 2024-05-17 | End: 2024-05-21 | Stop reason: HOSPADM

## 2024-05-17 RX ORDER — ALBUTEROL SULFATE 90 UG/1
2 AEROSOL, METERED RESPIRATORY (INHALATION) EVERY 6 HOURS PRN
Status: DISCONTINUED | OUTPATIENT
Start: 2024-05-17 | End: 2024-05-21 | Stop reason: HOSPADM

## 2024-05-17 RX ORDER — NALOXONE HYDROCHLORIDE 0.4 MG/ML
INJECTION, SOLUTION INTRAMUSCULAR; INTRAVENOUS; SUBCUTANEOUS PRN
Status: DISCONTINUED | OUTPATIENT
Start: 2024-05-17 | End: 2024-05-17 | Stop reason: HOSPADM

## 2024-05-17 RX ORDER — DROPERIDOL 2.5 MG/ML
0.62 INJECTION, SOLUTION INTRAMUSCULAR; INTRAVENOUS
Status: DISCONTINUED | OUTPATIENT
Start: 2024-05-17 | End: 2024-05-17 | Stop reason: HOSPADM

## 2024-05-17 RX ORDER — POTASSIUM CHLORIDE 20 MEQ/1
40 TABLET, EXTENDED RELEASE ORAL PRN
Status: DISCONTINUED | OUTPATIENT
Start: 2024-05-17 | End: 2024-05-21 | Stop reason: HOSPADM

## 2024-05-17 RX ORDER — ACETAMINOPHEN 650 MG/1
650 SUPPOSITORY RECTAL EVERY 6 HOURS PRN
Status: DISCONTINUED | OUTPATIENT
Start: 2024-05-17 | End: 2024-05-21 | Stop reason: HOSPADM

## 2024-05-17 RX ORDER — SODIUM CHLORIDE 9 MG/ML
INJECTION, SOLUTION INTRAVENOUS PRN
Status: DISCONTINUED | OUTPATIENT
Start: 2024-05-17 | End: 2024-05-17 | Stop reason: HOSPADM

## 2024-05-17 RX ORDER — MORPHINE SULFATE 2 MG/ML
2 INJECTION, SOLUTION INTRAMUSCULAR; INTRAVENOUS EVERY 4 HOURS PRN
Status: DISCONTINUED | OUTPATIENT
Start: 2024-05-17 | End: 2024-05-21 | Stop reason: HOSPADM

## 2024-05-17 RX ORDER — ONDANSETRON 4 MG/1
4 TABLET, ORALLY DISINTEGRATING ORAL EVERY 8 HOURS PRN
Status: DISCONTINUED | OUTPATIENT
Start: 2024-05-17 | End: 2024-05-17

## 2024-05-17 RX ORDER — SODIUM CHLORIDE 0.9 % (FLUSH) 0.9 %
5-40 SYRINGE (ML) INJECTION EVERY 12 HOURS SCHEDULED
Status: DISCONTINUED | OUTPATIENT
Start: 2024-05-17 | End: 2024-05-17 | Stop reason: HOSPADM

## 2024-05-17 RX ORDER — ATORVASTATIN CALCIUM 10 MG/1
20 TABLET, FILM COATED ORAL DAILY
Status: DISCONTINUED | OUTPATIENT
Start: 2024-05-17 | End: 2024-05-21 | Stop reason: HOSPADM

## 2024-05-17 RX ORDER — DEXAMETHASONE SODIUM PHOSPHATE 4 MG/ML
INJECTION, SOLUTION INTRA-ARTICULAR; INTRALESIONAL; INTRAMUSCULAR; INTRAVENOUS; SOFT TISSUE PRN
Status: DISCONTINUED | OUTPATIENT
Start: 2024-05-17 | End: 2024-05-17 | Stop reason: SDUPTHER

## 2024-05-17 RX ORDER — DICYCLOMINE HCL 20 MG
20 TABLET ORAL 4 TIMES DAILY PRN
Status: DISCONTINUED | OUTPATIENT
Start: 2024-05-17 | End: 2024-05-21 | Stop reason: HOSPADM

## 2024-05-17 RX ORDER — ONDANSETRON 2 MG/ML
4 INJECTION INTRAMUSCULAR; INTRAVENOUS EVERY 6 HOURS PRN
Status: DISCONTINUED | OUTPATIENT
Start: 2024-05-17 | End: 2024-05-21 | Stop reason: HOSPADM

## 2024-05-17 RX ORDER — ONDANSETRON 2 MG/ML
4 INJECTION INTRAMUSCULAR; INTRAVENOUS
Status: DISCONTINUED | OUTPATIENT
Start: 2024-05-17 | End: 2024-05-17 | Stop reason: HOSPADM

## 2024-05-17 RX ORDER — ONDANSETRON 2 MG/ML
4 INJECTION INTRAMUSCULAR; INTRAVENOUS ONCE
Status: COMPLETED | OUTPATIENT
Start: 2024-05-17 | End: 2024-05-17

## 2024-05-17 RX ORDER — SODIUM CHLORIDE 0.9 % (FLUSH) 0.9 %
5-40 SYRINGE (ML) INJECTION PRN
Status: DISCONTINUED | OUTPATIENT
Start: 2024-05-17 | End: 2024-05-17 | Stop reason: HOSPADM

## 2024-05-17 RX ORDER — SODIUM CHLORIDE 0.9 % (FLUSH) 0.9 %
5-40 SYRINGE (ML) INJECTION EVERY 12 HOURS SCHEDULED
Status: DISCONTINUED | OUTPATIENT
Start: 2024-05-17 | End: 2024-05-21 | Stop reason: HOSPADM

## 2024-05-17 RX ORDER — VERAPAMIL HYDROCHLORIDE 120 MG/1
120 CAPSULE, EXTENDED RELEASE ORAL DAILY
Status: DISCONTINUED | OUTPATIENT
Start: 2024-05-17 | End: 2024-05-17

## 2024-05-17 RX ORDER — ERGOCALCIFEROL 1.25 MG/1
50000 CAPSULE ORAL WEEKLY
Status: DISCONTINUED | OUTPATIENT
Start: 2024-05-17 | End: 2024-05-21 | Stop reason: HOSPADM

## 2024-05-17 RX ORDER — PROPOFOL 10 MG/ML
INJECTION, EMULSION INTRAVENOUS PRN
Status: DISCONTINUED | OUTPATIENT
Start: 2024-05-17 | End: 2024-05-17 | Stop reason: SDUPTHER

## 2024-05-17 RX ORDER — SUCCINYLCHOLINE/SOD CL,ISO/PF 100 MG/5ML
SYRINGE (ML) INTRAVENOUS PRN
Status: DISCONTINUED | OUTPATIENT
Start: 2024-05-17 | End: 2024-05-17 | Stop reason: SDUPTHER

## 2024-05-17 RX ORDER — LIDOCAINE HYDROCHLORIDE 20 MG/ML
INJECTION, SOLUTION INTRAVENOUS PRN
Status: DISCONTINUED | OUTPATIENT
Start: 2024-05-17 | End: 2024-05-17 | Stop reason: SDUPTHER

## 2024-05-17 RX ORDER — SODIUM CHLORIDE, SODIUM LACTATE, POTASSIUM CHLORIDE, CALCIUM CHLORIDE 600; 310; 30; 20 MG/100ML; MG/100ML; MG/100ML; MG/100ML
INJECTION, SOLUTION INTRAVENOUS CONTINUOUS
Status: DISCONTINUED | OUTPATIENT
Start: 2024-05-17 | End: 2024-05-20

## 2024-05-17 RX ORDER — ASPIRIN 81 MG/1
81 TABLET, CHEWABLE ORAL DAILY
Status: DISCONTINUED | OUTPATIENT
Start: 2024-05-19 | End: 2024-05-17

## 2024-05-17 RX ORDER — TAMSULOSIN HYDROCHLORIDE 0.4 MG/1
0.4 CAPSULE ORAL DAILY
Status: DISCONTINUED | OUTPATIENT
Start: 2024-05-17 | End: 2024-05-21 | Stop reason: HOSPADM

## 2024-05-17 RX ORDER — FENTANYL CITRATE 50 UG/ML
INJECTION, SOLUTION INTRAMUSCULAR; INTRAVENOUS PRN
Status: DISCONTINUED | OUTPATIENT
Start: 2024-05-17 | End: 2024-05-17 | Stop reason: SDUPTHER

## 2024-05-17 RX ADMIN — DEXAMETHASONE SODIUM PHOSPHATE 4 MG: 4 INJECTION, SOLUTION INTRAMUSCULAR; INTRAVENOUS at 15:14

## 2024-05-17 RX ADMIN — PROPOFOL 150 MG: 10 INJECTION, EMULSION INTRAVENOUS at 15:10

## 2024-05-17 RX ADMIN — PIPERACILLIN AND TAZOBACTAM 3375 MG: 3; .375 INJECTION, POWDER, LYOPHILIZED, FOR SOLUTION INTRAVENOUS at 20:34

## 2024-05-17 RX ADMIN — GLUCAGON HYDROCHLORIDE 0.5 MG: KIT at 15:55

## 2024-05-17 RX ADMIN — Medication 100 MG: at 15:10

## 2024-05-17 RX ADMIN — LIDOCAINE HYDROCHLORIDE 60 MG: 20 INJECTION, SOLUTION INTRAVENOUS at 15:10

## 2024-05-17 RX ADMIN — SODIUM CHLORIDE, PRESERVATIVE FREE 10 ML: 5 INJECTION INTRAVENOUS at 20:35

## 2024-05-17 RX ADMIN — ROCURONIUM BROMIDE 30 MG: 10 INJECTION, SOLUTION INTRAVENOUS at 15:16

## 2024-05-17 RX ADMIN — ERGOCALCIFEROL 50000 UNITS: 1.25 CAPSULE ORAL at 23:36

## 2024-05-17 RX ADMIN — ONDANSETRON 4 MG: 2 INJECTION INTRAMUSCULAR; INTRAVENOUS at 14:25

## 2024-05-17 RX ADMIN — SODIUM CHLORIDE, POTASSIUM CHLORIDE, SODIUM LACTATE AND CALCIUM CHLORIDE: 600; 310; 30; 20 INJECTION, SOLUTION INTRAVENOUS at 15:03

## 2024-05-17 RX ADMIN — FENTANYL CITRATE 25 MCG: 50 INJECTION, SOLUTION INTRAMUSCULAR; INTRAVENOUS at 15:42

## 2024-05-17 RX ADMIN — FENTANYL CITRATE 50 MCG: 50 INJECTION, SOLUTION INTRAMUSCULAR; INTRAVENOUS at 15:07

## 2024-05-17 RX ADMIN — MORPHINE SULFATE 2 MG: 2 INJECTION, SOLUTION INTRAMUSCULAR; INTRAVENOUS at 23:35

## 2024-05-17 RX ADMIN — GLUCAGON HYDROCHLORIDE 0.5 MG: KIT at 16:01

## 2024-05-17 RX ADMIN — SUGAMMADEX 150 MG: 100 INJECTION, SOLUTION INTRAVENOUS at 16:41

## 2024-05-17 RX ADMIN — SODIUM CHLORIDE, POTASSIUM CHLORIDE, SODIUM LACTATE AND CALCIUM CHLORIDE: 600; 310; 30; 20 INJECTION, SOLUTION INTRAVENOUS at 18:20

## 2024-05-17 RX ADMIN — TAMSULOSIN HYDROCHLORIDE 0.4 MG: 0.4 CAPSULE ORAL at 23:36

## 2024-05-17 RX ADMIN — CETIRIZINE HYDROCHLORIDE 10 MG: 10 TABLET, FILM COATED ORAL at 23:36

## 2024-05-17 RX ADMIN — FENTANYL CITRATE 25 MCG: 50 INJECTION, SOLUTION INTRAMUSCULAR; INTRAVENOUS at 16:06

## 2024-05-17 ASSESSMENT — PAIN - FUNCTIONAL ASSESSMENT
PAIN_FUNCTIONAL_ASSESSMENT: 0-10
PAIN_FUNCTIONAL_ASSESSMENT: ACTIVITIES ARE NOT PREVENTED

## 2024-05-17 ASSESSMENT — PAIN DESCRIPTION - PAIN TYPE: TYPE: ACUTE PAIN

## 2024-05-17 ASSESSMENT — PAIN DESCRIPTION - LOCATION
LOCATION: ABDOMEN
LOCATION: ABDOMEN

## 2024-05-17 ASSESSMENT — PAIN SCALES - GENERAL
PAINLEVEL_OUTOF10: 7
PAINLEVEL_OUTOF10: 8

## 2024-05-17 ASSESSMENT — PAIN DESCRIPTION - DESCRIPTORS
DESCRIPTORS: BURNING;ACHING
DESCRIPTORS: BURNING;STABBING

## 2024-05-17 ASSESSMENT — PAIN DESCRIPTION - ORIENTATION: ORIENTATION: MID

## 2024-05-17 NOTE — INTERVAL H&P NOTE
Update History & Physical    The patient's History and Physical of May 3, 2024 was reviewed with the patient and I examined the patient. There was no change. The surgical site was confirmed by the patient and me.     Plan: The risks, benefits, expected outcome, and alternative to the recommended procedure have been discussed with the patient. Patient understands and wants to proceed with the procedure.     Electronically signed by Zion Wetzel MD on 5/17/2024 at 1:17 PM

## 2024-05-17 NOTE — H&P
V2.0  History and Physical      Name:  Jama Ramirez /Age/Sex: 1960  (63 y.o. male)   MRN & CSN:  [id number] & [id number] Encounter Date/Time: 2024 7:36 PM EDT   Location:  [facility] PCP: Jennifer Gomez MD       Hospital Day: 1    Assessment and Plan:   Jama Ramirez is a 63 y.o. male who presents with Pancreatic lesion    Bile duct and pancreatic duct obstruction-due to pancreatic lesion.  GI was unable to cannulate the bile duct during EGD.  GI to discuss with IR.  Keep NPO.  Placed on LR for 12 hours.  Patient placed on antibiotics.  Pancreatic head/ampullary lesion-MRCP noted to have a possible mass adjacent to the ampulla with mass effect on the CBD and PD.  GI notes they performed a duodenal mass biopsy .  Superior mesenteric artery dissection-on aspirin.  Plavix and anticoagulation held for procedure.  CT surgery was helping to manage.  Transaminitis-likely due to biliary duct obstruction    Comment: Please note this report has been produced using speech recognition software and may contain errors related to that system including errors in grammar, punctuation, and spelling, as well as words and phrases that may be inappropriate. If there are any questions or concerns please feel free to contact the dictating provider for clarification.      Time spent: 30 minutes    Disposition:   Current Living situation: Home  Expected Disposition: TBD  Estimated D/C:     Diet Diet NPO Exceptions are: Ice Chips   DVT Prophylaxis [x] Lovenox, []  Heparin, [] SCDs, [] Ambulation,  [] Eliquis, [] Xarelto   Code Status  Had discussion with patient about CPR, shocking, and intubation. Patient is a Full Code.    Surrogate Decision Maker/ POA Jordan - Viola Schreiber     Personally reviewed Lab Studies and Imaging     Discussed management of the case with GI who recommended admission        History from:     patient, family member -significant other, electronic medical record    History of Present

## 2024-05-17 NOTE — BRIEF OP NOTE
Brief Postoperative Note      Patient: Jama Ramirez  YOB: 1960  MRN: [id number]    Date of Procedure: 5/17/2024    Pre-Op Diagnosis Codes:     * Obstruction of bile duct [K83.1]     * Pancreatic duct obstruction [K86.89]     * Pancreatic lesion [K86.9]    Post-Op Diagnosis:  see below       Procedure(s):  ESOPHAGOGASTRODUODENOSCOPY BIOPSY    Surgeon(s):  Zion Wetzel MD    Assistant:  * No surgical staff found *    Anesthesia: Monitor Anesthesia Care    Estimated Blood Loss (mL): Minimal    Complications: None    Specimens:   ID Type Source Tests Collected by Time Destination   A : Duodenal mass bx Tissue Duodenum SURGICAL PATHOLOGY Zion Wetzel MD 5/17/2024 1636        Implants:  * No implants in log *      Drains: * No LDAs found *    Findings:         -  The  film was normal.          -  The scope was passed under direct vision through the upper GI tract.  A             large amount of food (residue) was found in the gastric body.          -  A large infiltrative mass was found at the major papilla.          - The bile duct could not be cannulated with the Jagtome sphincterotome. Due             to significant friability, Ulceration and anatomic distortion only 1-2 attempts             were made to cannulate the bile duct, which was unsuccessful. Further attempts             were avoided. The decision was made to stop and switch to a GIF scope to get             biopsies from duodenum.          - Food was removed through the esophagogastroduodenoscope using a espitia net.          -  The scope was passed under direct vision through the upper GI tract.  A             hiatal hernia was present.  A large infiltrative, frond-like/villous and             polypoid mass with bleeding was found in the second portion of the duodenum and             in the area of the papilla.          -  Biopsies were taken in the second portion of the duodenum through the             esophagogastroduodenoscope with

## 2024-05-17 NOTE — ANESTHESIA PRE PROCEDURE
with patient.    Use of blood products discussed with patient whom consented to blood products.    Plan discussed with CRNA.    Attending anesthesiologist reviewed and agrees with Preprocedure content              TIMOTHY Spicer - CRNA   5/17/2024

## 2024-05-18 ENCOUNTER — APPOINTMENT (OUTPATIENT)
Dept: INTERVENTIONAL RADIOLOGY/VASCULAR | Age: 64
DRG: 438 | End: 2024-05-18
Attending: INTERNAL MEDICINE
Payer: COMMERCIAL

## 2024-05-18 ENCOUNTER — ANESTHESIA (OUTPATIENT)
Dept: INTERVENTIONAL RADIOLOGY/VASCULAR | Age: 64
End: 2024-05-18
Payer: COMMERCIAL

## 2024-05-18 ENCOUNTER — APPOINTMENT (OUTPATIENT)
Dept: CT IMAGING | Age: 64
DRG: 438 | End: 2024-05-18
Attending: INTERNAL MEDICINE
Payer: COMMERCIAL

## 2024-05-18 ENCOUNTER — ANESTHESIA EVENT (OUTPATIENT)
Dept: INTERVENTIONAL RADIOLOGY/VASCULAR | Age: 64
End: 2024-05-18
Payer: COMMERCIAL

## 2024-05-18 LAB
ALBUMIN SERPL-MCNC: 3.3 GM/DL (ref 3.4–5)
ALP BLD-CCNC: 739 IU/L (ref 40–129)
ALT SERPL-CCNC: 423 U/L (ref 10–40)
ANION GAP SERPL CALCULATED.3IONS-SCNC: 11 MMOL/L (ref 7–16)
AST SERPL-CCNC: 251 IU/L (ref 15–37)
BASOPHILS ABSOLUTE: 0 K/CU MM
BASOPHILS RELATIVE PERCENT: 0.4 % (ref 0–1)
BILIRUB SERPL-MCNC: 6 MG/DL (ref 0–1)
BUN SERPL-MCNC: 16 MG/DL (ref 6–23)
CALCIUM SERPL-MCNC: 8.5 MG/DL (ref 8.3–10.6)
CHLORIDE BLD-SCNC: 107 MMOL/L (ref 99–110)
CO2: 22 MMOL/L (ref 21–32)
CREAT SERPL-MCNC: 0.7 MG/DL (ref 0.9–1.3)
DIFFERENTIAL TYPE: ABNORMAL
EOSINOPHILS ABSOLUTE: 0.1 K/CU MM
EOSINOPHILS RELATIVE PERCENT: 1.5 % (ref 0–3)
GFR, ESTIMATED: >90 ML/MIN/1.73M2
GLUCOSE SERPL-MCNC: 78 MG/DL (ref 70–99)
HCT VFR BLD CALC: 32.7 % (ref 42–52)
HEMOGLOBIN: 10.9 GM/DL (ref 13.5–18)
IMMATURE NEUTROPHIL %: 0.5 % (ref 0–0.43)
LYMPHOCYTES ABSOLUTE: 2 K/CU MM
LYMPHOCYTES RELATIVE PERCENT: 24 % (ref 24–44)
MCH RBC QN AUTO: 28.6 PG (ref 27–31)
MCHC RBC AUTO-ENTMCNC: 33.3 % (ref 32–36)
MCV RBC AUTO: 85.8 FL (ref 78–100)
MONOCYTES ABSOLUTE: 0.8 K/CU MM
MONOCYTES RELATIVE PERCENT: 9.7 % (ref 0–4)
NEUTROPHILS ABSOLUTE: 5.2 K/CU MM
NEUTROPHILS RELATIVE PERCENT: 63.9 % (ref 36–66)
NUCLEATED RBC %: 0 %
PDW BLD-RTO: 16.1 % (ref 11.7–14.9)
PLATELET # BLD: 235 K/CU MM (ref 140–440)
PMV BLD AUTO: 9.8 FL (ref 7.5–11.1)
POTASSIUM SERPL-SCNC: 4.3 MMOL/L (ref 3.5–5.1)
RBC # BLD: 3.81 M/CU MM (ref 4.6–6.2)
SODIUM BLD-SCNC: 140 MMOL/L (ref 135–145)
TOTAL IMMATURE NEUTOROPHIL: 0.04 K/CU MM
TOTAL NUCLEATED RBC: 0 K/CU MM
TOTAL PROTEIN: 5 GM/DL (ref 6.4–8.2)
WBC # BLD: 8.2 K/CU MM (ref 4–10.5)

## 2024-05-18 PROCEDURE — BF151ZZ FLUOROSCOPY OF LIVER USING LOW OSMOLAR CONTRAST: ICD-10-PCS | Performed by: RADIOLOGY

## 2024-05-18 PROCEDURE — BF101ZZ FLUOROSCOPY OF BILE DUCTS USING LOW OSMOLAR CONTRAST: ICD-10-PCS | Performed by: RADIOLOGY

## 2024-05-18 PROCEDURE — 99233 SBSQ HOSP IP/OBS HIGH 50: CPT | Performed by: INTERNAL MEDICINE

## 2024-05-18 PROCEDURE — 6360000002 HC RX W HCPCS: Performed by: INTERNAL MEDICINE

## 2024-05-18 PROCEDURE — 6360000002 HC RX W HCPCS

## 2024-05-18 PROCEDURE — 85025 COMPLETE CBC W/AUTO DIFF WBC: CPT

## 2024-05-18 PROCEDURE — 6360000002 HC RX W HCPCS: Performed by: HOSPITALIST

## 2024-05-18 PROCEDURE — 80053 COMPREHEN METABOLIC PANEL: CPT

## 2024-05-18 PROCEDURE — 87070 CULTURE OTHR SPECIMN AEROBIC: CPT

## 2024-05-18 PROCEDURE — 2500000003 HC RX 250 WO HCPCS

## 2024-05-18 PROCEDURE — 6360000004 HC RX CONTRAST MEDICATION: Performed by: INTERNAL MEDICINE

## 2024-05-18 PROCEDURE — 36415 COLL VENOUS BLD VENIPUNCTURE: CPT

## 2024-05-18 PROCEDURE — 6360000004 HC RX CONTRAST MEDICATION

## 2024-05-18 PROCEDURE — 2580000003 HC RX 258: Performed by: HOSPITALIST

## 2024-05-18 PROCEDURE — 2709999900 IR CHOLECYSTOSTOMY PERCUTANEOUS COMPLETE

## 2024-05-18 PROCEDURE — 0F9530Z DRAINAGE OF RIGHT HEPATIC DUCT WITH DRAINAGE DEVICE, PERCUTANEOUS APPROACH: ICD-10-PCS | Performed by: RADIOLOGY

## 2024-05-18 PROCEDURE — 47533 PLMT BILIARY DRAINAGE CATH: CPT

## 2024-05-18 PROCEDURE — 74177 CT ABD & PELVIS W/CONTRAST: CPT

## 2024-05-18 PROCEDURE — 2580000003 HC RX 258: Performed by: INTERNAL MEDICINE

## 2024-05-18 PROCEDURE — 6370000000 HC RX 637 (ALT 250 FOR IP): Performed by: HOSPITALIST

## 2024-05-18 PROCEDURE — 2500000003 HC RX 250 WO HCPCS: Performed by: RADIOLOGY

## 2024-05-18 PROCEDURE — 1200000000 HC SEMI PRIVATE

## 2024-05-18 PROCEDURE — 87205 SMEAR GRAM STAIN: CPT

## 2024-05-18 PROCEDURE — 94761 N-INVAS EAR/PLS OXIMETRY MLT: CPT

## 2024-05-18 PROCEDURE — APPNB30 APP NON BILLABLE TIME 0-30 MINS: Performed by: LICENSED PRACTICAL NURSE

## 2024-05-18 PROCEDURE — 0F9930Z DRAINAGE OF COMMON BILE DUCT WITH DRAINAGE DEVICE, PERCUTANEOUS APPROACH: ICD-10-PCS | Performed by: RADIOLOGY

## 2024-05-18 RX ORDER — LIDOCAINE HYDROCHLORIDE AND EPINEPHRINE BITARTRATE 20; .01 MG/ML; MG/ML
INJECTION, SOLUTION SUBCUTANEOUS PRN
Status: COMPLETED | OUTPATIENT
Start: 2024-05-18 | End: 2024-05-18

## 2024-05-18 RX ORDER — FENTANYL CITRATE 50 UG/ML
INJECTION, SOLUTION INTRAMUSCULAR; INTRAVENOUS PRN
Status: DISCONTINUED | OUTPATIENT
Start: 2024-05-18 | End: 2024-05-18 | Stop reason: SDUPTHER

## 2024-05-18 RX ORDER — HYDROMORPHONE HYDROCHLORIDE 1 MG/ML
0.5 INJECTION, SOLUTION INTRAMUSCULAR; INTRAVENOUS; SUBCUTANEOUS
Status: ACTIVE | OUTPATIENT
Start: 2024-05-18 | End: 2024-05-19

## 2024-05-18 RX ORDER — PROPOFOL 10 MG/ML
INJECTION, EMULSION INTRAVENOUS PRN
Status: DISCONTINUED | OUTPATIENT
Start: 2024-05-18 | End: 2024-05-18 | Stop reason: SDUPTHER

## 2024-05-18 RX ORDER — HYDROMORPHONE HYDROCHLORIDE 1 MG/ML
0.5 INJECTION, SOLUTION INTRAMUSCULAR; INTRAVENOUS; SUBCUTANEOUS ONCE
Status: DISCONTINUED | OUTPATIENT
Start: 2024-05-18 | End: 2024-05-18

## 2024-05-18 RX ORDER — DIPHENHYDRAMINE HCL 25 MG
25 TABLET ORAL EVERY 6 HOURS PRN
Status: DISCONTINUED | OUTPATIENT
Start: 2024-05-18 | End: 2024-05-18

## 2024-05-18 RX ORDER — LIDOCAINE HYDROCHLORIDE 10 MG/ML
INJECTION, SOLUTION EPIDURAL; INFILTRATION; INTRACAUDAL; PERINEURAL PRN
Status: COMPLETED | OUTPATIENT
Start: 2024-05-18 | End: 2024-05-18

## 2024-05-18 RX ADMIN — PIPERACILLIN AND TAZOBACTAM 3375 MG: 3; .375 INJECTION, POWDER, LYOPHILIZED, FOR SOLUTION INTRAVENOUS at 13:28

## 2024-05-18 RX ADMIN — MORPHINE SULFATE 2 MG: 2 INJECTION, SOLUTION INTRAMUSCULAR; INTRAVENOUS at 18:43

## 2024-05-18 RX ADMIN — PROPOFOL 50 MG: 10 INJECTION, EMULSION INTRAVENOUS at 14:24

## 2024-05-18 RX ADMIN — ASPIRIN 81 MG: 81 TABLET, CHEWABLE ORAL at 10:47

## 2024-05-18 RX ADMIN — PROPOFOL 50 MG: 10 INJECTION, EMULSION INTRAVENOUS at 14:49

## 2024-05-18 RX ADMIN — IOPAMIDOL 75 ML: 755 INJECTION, SOLUTION INTRAVENOUS at 13:00

## 2024-05-18 RX ADMIN — PROPOFOL 100 MG: 10 INJECTION, EMULSION INTRAVENOUS at 14:39

## 2024-05-18 RX ADMIN — LIDOCAINE HYDROCHLORIDE 6 ML: 10 INJECTION, SOLUTION EPIDURAL; INFILTRATION; INTRACAUDAL; PERINEURAL at 14:20

## 2024-05-18 RX ADMIN — PROPOFOL 50 MG: 10 INJECTION, EMULSION INTRAVENOUS at 14:19

## 2024-05-18 RX ADMIN — SODIUM CHLORIDE, PRESERVATIVE FREE 10 ML: 5 INJECTION INTRAVENOUS at 10:47

## 2024-05-18 RX ADMIN — VERAPAMIL HYDROCHLORIDE 120 MG: 120 TABLET, FILM COATED, EXTENDED RELEASE ORAL at 00:15

## 2024-05-18 RX ADMIN — ONDANSETRON 4 MG: 2 INJECTION INTRAMUSCULAR; INTRAVENOUS at 11:53

## 2024-05-18 RX ADMIN — CETIRIZINE HYDROCHLORIDE 10 MG: 10 TABLET, FILM COATED ORAL at 10:47

## 2024-05-18 RX ADMIN — LIDOCAINE HYDROCHLORIDE,EPINEPHRINE BITARTRATE 6 ML: 20; .01 INJECTION, SOLUTION INFILTRATION; PERINEURAL at 14:21

## 2024-05-18 RX ADMIN — SODIUM CHLORIDE, PRESERVATIVE FREE 10 ML: 5 INJECTION INTRAVENOUS at 19:51

## 2024-05-18 RX ADMIN — VERAPAMIL HYDROCHLORIDE 120 MG: 120 TABLET, FILM COATED, EXTENDED RELEASE ORAL at 19:51

## 2024-05-18 RX ADMIN — PIPERACILLIN AND TAZOBACTAM 3375 MG: 3; .375 INJECTION, POWDER, LYOPHILIZED, FOR SOLUTION INTRAVENOUS at 19:54

## 2024-05-18 RX ADMIN — LIDOCAINE HYDROCHLORIDE 6 ML: 10 INJECTION, SOLUTION EPIDURAL; INFILTRATION; INTRACAUDAL; PERINEURAL at 14:27

## 2024-05-18 RX ADMIN — PROPOFOL 100 MCG/KG/MIN: 10 INJECTION, EMULSION INTRAVENOUS at 14:18

## 2024-05-18 RX ADMIN — FENTANYL CITRATE 100 MCG: 50 INJECTION, SOLUTION INTRAMUSCULAR; INTRAVENOUS at 15:08

## 2024-05-18 RX ADMIN — PIPERACILLIN AND TAZOBACTAM 3375 MG: 3; .375 INJECTION, POWDER, LYOPHILIZED, FOR SOLUTION INTRAVENOUS at 04:16

## 2024-05-18 RX ADMIN — TAMSULOSIN HYDROCHLORIDE 0.4 MG: 0.4 CAPSULE ORAL at 10:47

## 2024-05-18 ASSESSMENT — PAIN DESCRIPTION - DESCRIPTORS
DESCRIPTORS: ACHING;SHARP;SHOOTING

## 2024-05-18 ASSESSMENT — PAIN SCALES - WONG BAKER
WONGBAKER_NUMERICALRESPONSE: HURTS A LITTLE BIT
WONGBAKER_NUMERICALRESPONSE: NO HURT

## 2024-05-18 ASSESSMENT — PAIN DESCRIPTION - LOCATION
LOCATION: ABDOMEN

## 2024-05-18 ASSESSMENT — PAIN DESCRIPTION - ONSET
ONSET: AWAKENED FROM SLEEP
ONSET: ON-GOING

## 2024-05-18 ASSESSMENT — PAIN SCALES - GENERAL
PAINLEVEL_OUTOF10: 10
PAINLEVEL_OUTOF10: 10
PAINLEVEL_OUTOF10: 4
PAINLEVEL_OUTOF10: 10
PAINLEVEL_OUTOF10: 5
PAINLEVEL_OUTOF10: 4
PAINLEVEL_OUTOF10: 2

## 2024-05-18 ASSESSMENT — PAIN DESCRIPTION - FREQUENCY: FREQUENCY: CONTINUOUS

## 2024-05-18 ASSESSMENT — PAIN DESCRIPTION - PAIN TYPE
TYPE: SURGICAL PAIN

## 2024-05-18 ASSESSMENT — PAIN - FUNCTIONAL ASSESSMENT
PAIN_FUNCTIONAL_ASSESSMENT: PREVENTS OR INTERFERES SOME ACTIVE ACTIVITIES AND ADLS
PAIN_FUNCTIONAL_ASSESSMENT: PREVENTS OR INTERFERES WITH MANY ACTIVE NOT PASSIVE ACTIVITIES
PAIN_FUNCTIONAL_ASSESSMENT: PREVENTS OR INTERFERES SOME ACTIVE ACTIVITIES AND ADLS

## 2024-05-18 ASSESSMENT — PAIN DESCRIPTION - ORIENTATION
ORIENTATION: UPPER;MID
ORIENTATION: UPPER;MID
ORIENTATION: MID;UPPER
ORIENTATION: MID

## 2024-05-18 NOTE — BRIEF OP NOTE
Brief Postoperative Note      Patient: Jama Ramirez  YOB: 1960  MRN: [id number]    Date of Procedure: 5/18/2024    Biliary Ampulla obstruction/ mass    Post-Op Diagnosis: Same       * No procedures listed *    * No surgeons listed *    Assistant:  * No surgical staff found *    Anesthesia: * No anesthesia type entered *    Estimated Blood Loss (mL): Minimal    Complications: None    Specimens:   CBD bleed    Implants:  * No implants in log *      Drains:   Biliary Tube 05/18/24 10 fr LUQ (Active)       Findings:  Infection Present At Time Of Surgery (PATOS) (choose all levels that have infection present):  No infection present  Other Findings: Distal CBD obstruction. A 10 Fr internal/external Drain  was placed    Electronically signed by DIEGO Gandhi MD on 5/18/2024 at 3:23 PM

## 2024-05-18 NOTE — PLAN OF CARE
Problem: Pain  Goal: Verbalizes/displays adequate comfort level or baseline comfort level  5/18/2024 1206 by Cecelia Gonzalez LPN  Outcome: Progressing  5/18/2024 0035 by Larisa Diaz, RN  Outcome: Progressing     Problem: Discharge Planning  Goal: Discharge to home or other facility with appropriate resources  5/18/2024 1206 by Cecelia Gonzalez LPN  Outcome: Progressing  5/18/2024 0035 by Larisa Diaz, RN  Outcome: Progressing     Problem: ABCDS Injury Assessment  Goal: Absence of physical injury  5/18/2024 1206 by Cecelia Gonzalez LPN  Outcome: Progressing  5/18/2024 0035 by Larisa Diaz, RN  Outcome: Progressing     Problem: Safety - Adult  Goal: Free from fall injury  5/18/2024 1206 by Cecelia Gonzalez LPN  Outcome: Progressing  5/18/2024 0035 by Larisa Diaz, RN  Outcome: Progressing

## 2024-05-18 NOTE — PRE SEDATION
MG tablet Take 1 tablet by mouth daily 10/5/23   Daniel Daugherty MD   verapamil (VERELAN) 120 MG extended release capsule Take 1 capsule by mouth daily 10/5/23   Daniel Daugherty MD   tamsulosin (FLOMAX) 0.4 MG capsule Take 1 capsule by mouth daily 9/3/23 5/17/24  Leona Gilbert, APRN - CNP   omeprazole (PRILOSEC) 20 MG delayed release capsule TAKE 1 CAPSULE BY MOUTH TWO TIMES A DAY  Patient taking differently: Take 1 capsule by mouth Daily 5/2/23   Humphrey Hsieh MD   acarbose (PRECOSE) 25 MG tablet take 1 tablet by mouth 3 times every day at the start (with the first bite) of each main meal  Patient not taking: Reported on 5/16/2024 11/12/22   Sariah Bailey MD   Psyllium 48.57 % POWD Take 1 Dose by mouth every morning  Patient not taking: Reported on 12/20/2023 11/16/22   Humphrey Hsieh MD   dicyclomine (BENTYL) 20 MG tablet Take 1 tablet by mouth 4 times daily as needed (Abdominal pain, spasm) 10/26/22   Barrera Pelaez DO   hydrocortisone (ANUSOL-HC) 2.5 % CREA rectal cream APPLY TO HEMMORROIDS topically UP TO four TIMES PER DAY AS NEEDED FOR PAIN OR BURNING 8/25/22   Humphrey Hsieh MD   tiZANidine (ZANAFLEX) 2 MG tablet TAKE 1 TABLET BY MOUTH EVERY EIGHT HOURS AS NEEDED. NOT TO EXCEED 3 DOSES IN 24 HOURS 3/10/22   Sraiah Bailey MD   glucose 4 g chewable tablet  10/27/21   Sariah Bailey MD   cetirizine (ZYRTEC) 10 MG tablet 1 tablet daily 3/9/21   Sariah Bailey MD   fluticasone (FLONASE) 50 MCG/ACT nasal spray as needed 3/9/21   Sariah Bailey MD   albuterol sulfate  (90 Base) MCG/ACT inhaler Inhale 2 puffs into the lungs every 6 hours as needed  Patient not taking: Reported on 3/21/2024    Sariah Bailey MD   Nutritional Supplements (ENSURE HIGH PROTEIN) LIQD Take 1 Can by mouth 3 times daily Different flavors if possible  Patient not taking: Reported on 12/20/2023 5/20/20   Mary Linares MD   ondansetron (ZOFRAN ODT) 4 MG

## 2024-05-18 NOTE — ANESTHESIA POSTPROCEDURE EVALUATION
Department of Anesthesiology  Postprocedure Note    Patient: Jama Ramirez  MRN: [id number]  YOB: 1960  Date of evaluation: 5/18/2024    Procedure Summary       Date: 05/18/24 Room / Location: [facility] Procedures    Anesthesia Start: 1416 Anesthesia Stop:     Procedure: IR CHOLECYSTOSTOMY PERCUTANEOUS COMPLETE Diagnosis:       Obstruction of bile duct      (bile duct obstruction)    Scheduled Providers:  Responsible Provider: Chandni Ambrose MD    Anesthesia Type: MAC ASA Status: 3            Anesthesia Type: No value filed.    Sreedhar Phase I: Sreedhar Score: 10    Sreedhar Phase II:      Anesthesia Post Evaluation    Patient location during evaluation: bedside  Patient participation: complete - patient participated  Airway patency: patent  Nausea & Vomiting: no nausea and no vomiting  Cardiovascular status: hemodynamically stable  Respiratory status: room air  Hydration status: stable  Pain management: adequate    No notable events documented.

## 2024-05-18 NOTE — ANESTHESIA PRE PROCEDURE
DILATION SAVORY Guevara Dilators used 46, 48. with biopsies performed by Mary Linares MD at [facility] ENDOSCOPY    UPPER GASTROINTESTINAL ENDOSCOPY N/A 12/16/2021    EGD BIOPSY performed by Humphrey Hsieh MD at [facility] ENDOSCOPY    UPPER GASTROINTESTINAL ENDOSCOPY N/A 7/7/2023    EGD BIOPSY performed by Humphrey Hsieh MD at [facility] ENDOSCOPY       Social History:    Social History     Tobacco Use    Smoking status: Every Day     Current packs/day: 0.50     Average packs/day: 0.5 packs/day for 52.4 years (26.2 ttl pk-yrs)     Types: Cigarettes     Start date: 1972    Smokeless tobacco: Never    Tobacco comments:     use to smoke 2.5 PPD   Substance Use Topics    Alcohol use: Yes     Comment: Occasional 1/month, caffiene: 3 cups of coffee daily                                Ready to quit: Not Answered  Counseling given: Not Answered  Tobacco comments: use to smoke 2.5 PPD      Vital Signs (Current):   Vitals:    05/17/24 2335 05/18/24 0005 05/18/24 0232 05/18/24 1030   BP:   90/62 104/73   Pulse:   61 64   Resp: 16 16 17 18   Temp:   97.4 °F (36.3 °C) 97.9 °F (36.6 °C)   TempSrc:   Oral Oral   SpO2:   94% 97%   Weight:       Height:                                                  BP Readings from Last 3 Encounters:   05/18/24 104/73   05/03/24 108/69   03/21/24 110/65       NPO Status: Time of last liquid consumption: 2200                        Time of last solid consumption: 2200                        Date of last liquid consumption: 05/16/24                        Date of last solid food consumption: 05/16/24    BMI:   Wt Readings from Last 3 Encounters:   05/17/24 66 kg (145 lb 8.1 oz)   04/29/24 65.9 kg (145 lb 3.2 oz)   03/21/24 68.9 kg (152 lb)     Body mass index is 21.49 kg/m².    CBC:   Lab Results   Component Value Date/Time    WBC 8.2 05/18/2024 09:45 AM    RBC 3.81 05/18/2024 09:45 AM    HGB 10.9 05/18/2024 09:45 AM    HCT 32.7 05/18/2024 09:45 AM    MCV 85.8 05/18/2024 09:45 AM    RDW 16.1

## 2024-05-19 LAB
ALBUMIN SERPL-MCNC: 3.4 GM/DL (ref 3.4–5)
ALP BLD-CCNC: 661 IU/L (ref 40–129)
ALT SERPL-CCNC: 393 U/L (ref 10–40)
ANION GAP SERPL CALCULATED.3IONS-SCNC: 12 MMOL/L (ref 7–16)
AST SERPL-CCNC: 203 IU/L (ref 15–37)
BASOPHILS ABSOLUTE: 0 K/CU MM
BASOPHILS RELATIVE PERCENT: 0.4 % (ref 0–1)
BILIRUB SERPL-MCNC: 2.5 MG/DL (ref 0–1)
BUN SERPL-MCNC: 12 MG/DL (ref 6–23)
CALCIUM SERPL-MCNC: 8.2 MG/DL (ref 8.3–10.6)
CHLORIDE BLD-SCNC: 103 MMOL/L (ref 99–110)
CO2: 22 MMOL/L (ref 21–32)
CREAT SERPL-MCNC: 0.6 MG/DL (ref 0.9–1.3)
DIFFERENTIAL TYPE: ABNORMAL
EOSINOPHILS ABSOLUTE: 0.1 K/CU MM
EOSINOPHILS RELATIVE PERCENT: 1 % (ref 0–3)
GFR, ESTIMATED: >90 ML/MIN/1.73M2
GLUCOSE SERPL-MCNC: 143 MG/DL (ref 70–99)
HCT VFR BLD CALC: 33.8 % (ref 42–52)
HEMOGLOBIN: 11.1 GM/DL (ref 13.5–18)
IMMATURE NEUTROPHIL %: 0.5 % (ref 0–0.43)
LYMPHOCYTES ABSOLUTE: 1 K/CU MM
LYMPHOCYTES RELATIVE PERCENT: 12.9 % (ref 24–44)
MCH RBC QN AUTO: 28.5 PG (ref 27–31)
MCHC RBC AUTO-ENTMCNC: 32.8 % (ref 32–36)
MCV RBC AUTO: 86.7 FL (ref 78–100)
MONOCYTES ABSOLUTE: 0.5 K/CU MM
MONOCYTES RELATIVE PERCENT: 7 % (ref 0–4)
NEUTROPHILS ABSOLUTE: 6 K/CU MM
NEUTROPHILS RELATIVE PERCENT: 78.2 % (ref 36–66)
NUCLEATED RBC %: 0 %
PDW BLD-RTO: 16.3 % (ref 11.7–14.9)
PLATELET # BLD: 215 K/CU MM (ref 140–440)
PMV BLD AUTO: 9.5 FL (ref 7.5–11.1)
POTASSIUM SERPL-SCNC: 3.9 MMOL/L (ref 3.5–5.1)
RBC # BLD: 3.9 M/CU MM (ref 4.6–6.2)
SODIUM BLD-SCNC: 137 MMOL/L (ref 135–145)
TOTAL IMMATURE NEUTOROPHIL: 0.04 K/CU MM
TOTAL NUCLEATED RBC: 0 K/CU MM
TOTAL PROTEIN: 5.1 GM/DL (ref 6.4–8.2)
WBC # BLD: 7.7 K/CU MM (ref 4–10.5)

## 2024-05-19 PROCEDURE — 80053 COMPREHEN METABOLIC PANEL: CPT

## 2024-05-19 PROCEDURE — 36415 COLL VENOUS BLD VENIPUNCTURE: CPT

## 2024-05-19 PROCEDURE — 1200000000 HC SEMI PRIVATE

## 2024-05-19 PROCEDURE — 2580000003 HC RX 258: Performed by: HOSPITALIST

## 2024-05-19 PROCEDURE — 99232 SBSQ HOSP IP/OBS MODERATE 35: CPT | Performed by: INTERNAL MEDICINE

## 2024-05-19 PROCEDURE — 6360000002 HC RX W HCPCS: Performed by: HOSPITALIST

## 2024-05-19 PROCEDURE — 6370000000 HC RX 637 (ALT 250 FOR IP): Performed by: HOSPITALIST

## 2024-05-19 PROCEDURE — 2580000003 HC RX 258: Performed by: INTERNAL MEDICINE

## 2024-05-19 PROCEDURE — 85025 COMPLETE CBC W/AUTO DIFF WBC: CPT

## 2024-05-19 PROCEDURE — 6360000002 HC RX W HCPCS: Performed by: INTERNAL MEDICINE

## 2024-05-19 RX ORDER — CLOPIDOGREL BISULFATE 75 MG/1
75 TABLET ORAL DAILY
Status: DISCONTINUED | OUTPATIENT
Start: 2024-05-20 | End: 2024-05-21 | Stop reason: HOSPADM

## 2024-05-19 RX ADMIN — DICYCLOMINE HYDROCHLORIDE 20 MG: 20 TABLET ORAL at 22:36

## 2024-05-19 RX ADMIN — PIPERACILLIN AND TAZOBACTAM 3375 MG: 3; .375 INJECTION, POWDER, LYOPHILIZED, FOR SOLUTION INTRAVENOUS at 11:45

## 2024-05-19 RX ADMIN — ONDANSETRON 4 MG: 2 INJECTION INTRAMUSCULAR; INTRAVENOUS at 20:27

## 2024-05-19 RX ADMIN — TAMSULOSIN HYDROCHLORIDE 0.4 MG: 0.4 CAPSULE ORAL at 10:42

## 2024-05-19 RX ADMIN — CETIRIZINE HYDROCHLORIDE 10 MG: 10 TABLET, FILM COATED ORAL at 10:42

## 2024-05-19 RX ADMIN — ASPIRIN 81 MG: 81 TABLET, CHEWABLE ORAL at 10:42

## 2024-05-19 RX ADMIN — PIPERACILLIN AND TAZOBACTAM 3375 MG: 3; .375 INJECTION, POWDER, LYOPHILIZED, FOR SOLUTION INTRAVENOUS at 20:35

## 2024-05-19 RX ADMIN — PIPERACILLIN AND TAZOBACTAM 3375 MG: 3; .375 INJECTION, POWDER, LYOPHILIZED, FOR SOLUTION INTRAVENOUS at 04:14

## 2024-05-19 RX ADMIN — SODIUM CHLORIDE, PRESERVATIVE FREE 10 ML: 5 INJECTION INTRAVENOUS at 10:42

## 2024-05-19 RX ADMIN — MORPHINE SULFATE 2 MG: 2 INJECTION, SOLUTION INTRAMUSCULAR; INTRAVENOUS at 01:19

## 2024-05-19 RX ADMIN — MORPHINE SULFATE 2 MG: 2 INJECTION, SOLUTION INTRAMUSCULAR; INTRAVENOUS at 22:16

## 2024-05-19 RX ADMIN — SODIUM CHLORIDE, POTASSIUM CHLORIDE, SODIUM LACTATE AND CALCIUM CHLORIDE: 600; 310; 30; 20 INJECTION, SOLUTION INTRAVENOUS at 05:18

## 2024-05-19 RX ADMIN — SODIUM CHLORIDE, POTASSIUM CHLORIDE, SODIUM LACTATE AND CALCIUM CHLORIDE: 600; 310; 30; 20 INJECTION, SOLUTION INTRAVENOUS at 20:36

## 2024-05-19 RX ADMIN — SODIUM CHLORIDE, PRESERVATIVE FREE 10 ML: 5 INJECTION INTRAVENOUS at 20:29

## 2024-05-19 RX ADMIN — MORPHINE SULFATE 2 MG: 2 INJECTION, SOLUTION INTRAMUSCULAR; INTRAVENOUS at 10:11

## 2024-05-19 ASSESSMENT — PAIN DESCRIPTION - ORIENTATION
ORIENTATION: UPPER;MID
ORIENTATION: RIGHT
ORIENTATION: MID;UPPER
ORIENTATION: MID;UPPER
ORIENTATION: RIGHT
ORIENTATION: MID;UPPER

## 2024-05-19 ASSESSMENT — PAIN DESCRIPTION - DESCRIPTORS
DESCRIPTORS: CRAMPING
DESCRIPTORS: ACHING;SHARP;SHOOTING
DESCRIPTORS: SORE
DESCRIPTORS: CRAMPING
DESCRIPTORS: ACHING;STABBING
DESCRIPTORS: ACHING;SORE;SHOOTING;SHARP

## 2024-05-19 ASSESSMENT — PAIN DESCRIPTION - FREQUENCY
FREQUENCY: INTERMITTENT
FREQUENCY: CONTINUOUS

## 2024-05-19 ASSESSMENT — PAIN DESCRIPTION - ONSET
ONSET: GRADUAL
ONSET: GRADUAL

## 2024-05-19 ASSESSMENT — PAIN SCALES - GENERAL
PAINLEVEL_OUTOF10: 8
PAINLEVEL_OUTOF10: 4
PAINLEVEL_OUTOF10: 8
PAINLEVEL_OUTOF10: 4
PAINLEVEL_OUTOF10: 3
PAINLEVEL_OUTOF10: 0
PAINLEVEL_OUTOF10: 0
PAINLEVEL_OUTOF10: 7

## 2024-05-19 ASSESSMENT — PAIN - FUNCTIONAL ASSESSMENT
PAIN_FUNCTIONAL_ASSESSMENT: ACTIVITIES ARE NOT PREVENTED
PAIN_FUNCTIONAL_ASSESSMENT: PREVENTS OR INTERFERES SOME ACTIVE ACTIVITIES AND ADLS

## 2024-05-19 ASSESSMENT — PAIN DESCRIPTION - LOCATION
LOCATION: ABDOMEN

## 2024-05-19 ASSESSMENT — PAIN DESCRIPTION - DIRECTION
RADIATING_TOWARDS: BACK
RADIATING_TOWARDS: BACK

## 2024-05-19 ASSESSMENT — PAIN DESCRIPTION - PAIN TYPE
TYPE: ACUTE PAIN
TYPE: ACUTE PAIN

## 2024-05-19 NOTE — PLAN OF CARE
Problem: Pain  Goal: Verbalizes/displays adequate comfort level or baseline comfort level  5/19/2024 1112 by Cecelia Gonzalez LPN  Outcome: Progressing  5/19/2024 0510 by Koe Drew RN  Outcome: Progressing     Problem: Discharge Planning  Goal: Discharge to home or other facility with appropriate resources  5/19/2024 1112 by Cecelia Gonzalez LPN  Outcome: Progressing  5/19/2024 0510 by Keo Drew RN  Outcome: Progressing     Problem: ABCDS Injury Assessment  Goal: Absence of physical injury  5/19/2024 1112 by Cecelia Gonzalez LPN  Outcome: Progressing  5/19/2024 0510 by Keo Drew RN  Outcome: Progressing     Problem: Safety - Adult  Goal: Free from fall injury  5/19/2024 1112 by Cecelia Gonzalez LPN  Outcome: Progressing  5/19/2024 0510 by Keo Drew, RN  Outcome: Progressing

## 2024-05-20 LAB
ALBUMIN SERPL-MCNC: 3.1 GM/DL (ref 3.4–5)
ALP BLD-CCNC: 518 IU/L (ref 40–129)
ALT SERPL-CCNC: 292 U/L (ref 10–40)
ANION GAP SERPL CALCULATED.3IONS-SCNC: 7 MMOL/L (ref 7–16)
AST SERPL-CCNC: 117 IU/L (ref 15–37)
BASOPHILS ABSOLUTE: 0 K/CU MM
BASOPHILS RELATIVE PERCENT: 0.5 % (ref 0–1)
BILIRUB SERPL-MCNC: 1.9 MG/DL (ref 0–1)
BUN SERPL-MCNC: 10 MG/DL (ref 6–23)
CALCIUM SERPL-MCNC: 8.4 MG/DL (ref 8.3–10.6)
CEA: 5.5 NG/ML (ref 0–5)
CHLORIDE BLD-SCNC: 105 MMOL/L (ref 99–110)
CO2: 27 MMOL/L (ref 21–32)
CREAT SERPL-MCNC: 0.7 MG/DL (ref 0.9–1.3)
DIFFERENTIAL TYPE: ABNORMAL
EOSINOPHILS ABSOLUTE: 0.2 K/CU MM
EOSINOPHILS RELATIVE PERCENT: 2.5 % (ref 0–3)
FERRITIN: 97 NG/ML (ref 30–400)
FOLATE SERPL-MCNC: 10.9 NG/ML (ref 3.1–17.5)
GFR, ESTIMATED: >90 ML/MIN/1.73M2
GLUCOSE SERPL-MCNC: 86 MG/DL (ref 70–99)
HCT VFR BLD CALC: 32.2 % (ref 42–52)
HEMOGLOBIN: 10.6 GM/DL (ref 13.5–18)
IMMATURE NEUTROPHIL %: 0.6 % (ref 0–0.43)
IRON: 45 UG/DL (ref 59–158)
LYMPHOCYTES ABSOLUTE: 1.5 K/CU MM
LYMPHOCYTES RELATIVE PERCENT: 22.9 % (ref 24–44)
MCH RBC QN AUTO: 29 PG (ref 27–31)
MCHC RBC AUTO-ENTMCNC: 32.9 % (ref 32–36)
MCV RBC AUTO: 88.2 FL (ref 78–100)
MONOCYTES ABSOLUTE: 0.7 K/CU MM
MONOCYTES RELATIVE PERCENT: 10.5 % (ref 0–4)
NEUTROPHILS ABSOLUTE: 4 K/CU MM
NEUTROPHILS RELATIVE PERCENT: 63 % (ref 36–66)
NUCLEATED RBC %: 0 %
PCT TRANSFERRIN: 16 % (ref 10–44)
PDW BLD-RTO: 16.2 % (ref 11.7–14.9)
PLATELET # BLD: 216 K/CU MM (ref 140–440)
PMV BLD AUTO: 10.7 FL (ref 7.5–11.1)
POTASSIUM SERPL-SCNC: 4 MMOL/L (ref 3.5–5.1)
RBC # BLD: 3.65 M/CU MM (ref 4.6–6.2)
SODIUM BLD-SCNC: 139 MMOL/L (ref 135–145)
TOTAL IMMATURE NEUTOROPHIL: 0.04 K/CU MM
TOTAL IRON BINDING CAPACITY: 287 UG/DL (ref 250–450)
TOTAL NUCLEATED RBC: 0 K/CU MM
TOTAL PROTEIN: 4.7 GM/DL (ref 6.4–8.2)
UNSATURATED IRON BINDING CAPACITY: 242 UG/DL (ref 110–370)
VITAMIN B-12: 551.8 PG/ML (ref 211–911)
WBC # BLD: 6.4 K/CU MM (ref 4–10.5)

## 2024-05-20 PROCEDURE — 80053 COMPREHEN METABOLIC PANEL: CPT

## 2024-05-20 PROCEDURE — 1200000000 HC SEMI PRIVATE

## 2024-05-20 PROCEDURE — 2580000003 HC RX 258: Performed by: INTERNAL MEDICINE

## 2024-05-20 PROCEDURE — APPNB30 APP NON BILLABLE TIME 0-30 MINS: Performed by: LICENSED PRACTICAL NURSE

## 2024-05-20 PROCEDURE — 83550 IRON BINDING TEST: CPT

## 2024-05-20 PROCEDURE — 6360000002 HC RX W HCPCS: Performed by: INTERNAL MEDICINE

## 2024-05-20 PROCEDURE — 85025 COMPLETE CBC W/AUTO DIFF WBC: CPT

## 2024-05-20 PROCEDURE — 99232 SBSQ HOSP IP/OBS MODERATE 35: CPT | Performed by: INTERNAL MEDICINE

## 2024-05-20 PROCEDURE — 82746 ASSAY OF FOLIC ACID SERUM: CPT

## 2024-05-20 PROCEDURE — 94761 N-INVAS EAR/PLS OXIMETRY MLT: CPT

## 2024-05-20 PROCEDURE — 6370000000 HC RX 637 (ALT 250 FOR IP): Performed by: HOSPITALIST

## 2024-05-20 PROCEDURE — APPNB60 APP NON BILLABLE TIME 46-60 MINS: Performed by: PHYSICIAN ASSISTANT

## 2024-05-20 PROCEDURE — 83540 ASSAY OF IRON: CPT

## 2024-05-20 PROCEDURE — 99222 1ST HOSP IP/OBS MODERATE 55: CPT | Performed by: INTERNAL MEDICINE

## 2024-05-20 PROCEDURE — 6370000000 HC RX 637 (ALT 250 FOR IP): Performed by: FAMILY MEDICINE

## 2024-05-20 PROCEDURE — 2580000003 HC RX 258: Performed by: HOSPITALIST

## 2024-05-20 PROCEDURE — 82607 VITAMIN B-12: CPT

## 2024-05-20 PROCEDURE — 36415 COLL VENOUS BLD VENIPUNCTURE: CPT

## 2024-05-20 PROCEDURE — 82378 CARCINOEMBRYONIC ANTIGEN: CPT

## 2024-05-20 PROCEDURE — 82728 ASSAY OF FERRITIN: CPT

## 2024-05-20 RX ORDER — CALCIUM CARBONATE 500 MG/1
1000 TABLET, CHEWABLE ORAL 3 TIMES DAILY PRN
Status: DISCONTINUED | OUTPATIENT
Start: 2024-05-20 | End: 2024-05-21 | Stop reason: HOSPADM

## 2024-05-20 RX ORDER — PANTOPRAZOLE SODIUM 40 MG/1
40 TABLET, DELAYED RELEASE ORAL
Status: DISCONTINUED | OUTPATIENT
Start: 2024-05-20 | End: 2024-05-21 | Stop reason: HOSPADM

## 2024-05-20 RX ADMIN — PIPERACILLIN AND TAZOBACTAM 3375 MG: 3; .375 INJECTION, POWDER, LYOPHILIZED, FOR SOLUTION INTRAVENOUS at 04:33

## 2024-05-20 RX ADMIN — VERAPAMIL HYDROCHLORIDE 120 MG: 120 TABLET, FILM COATED, EXTENDED RELEASE ORAL at 22:14

## 2024-05-20 RX ADMIN — PANTOPRAZOLE SODIUM 40 MG: 40 TABLET, DELAYED RELEASE ORAL at 04:34

## 2024-05-20 RX ADMIN — PIPERACILLIN AND TAZOBACTAM 3375 MG: 3; .375 INJECTION, POWDER, LYOPHILIZED, FOR SOLUTION INTRAVENOUS at 12:32

## 2024-05-20 RX ADMIN — CLOPIDOGREL BISULFATE 75 MG: 75 TABLET ORAL at 10:17

## 2024-05-20 RX ADMIN — CALCIUM CARBONATE 1000 MG: 500 TABLET, CHEWABLE ORAL at 01:37

## 2024-05-20 RX ADMIN — TAMSULOSIN HYDROCHLORIDE 0.4 MG: 0.4 CAPSULE ORAL at 10:17

## 2024-05-20 RX ADMIN — ASPIRIN 81 MG: 81 TABLET, CHEWABLE ORAL at 10:17

## 2024-05-20 RX ADMIN — SODIUM CHLORIDE, POTASSIUM CHLORIDE, SODIUM LACTATE AND CALCIUM CHLORIDE: 600; 310; 30; 20 INJECTION, SOLUTION INTRAVENOUS at 06:36

## 2024-05-20 RX ADMIN — CETIRIZINE HYDROCHLORIDE 10 MG: 10 TABLET, FILM COATED ORAL at 10:17

## 2024-05-20 NOTE — CARE COORDINATION
05/20/24 1242   Service Assessment   Patient Orientation Alert and Oriented   Cognition Alert   History Provided By Patient   Primary Caregiver Self   Support Systems Family Members   Patient's Healthcare Decision Maker is: Legal Next of Kin   PCP Verified by CM Yes   Last Visit to PCP Within last 6 months   Prior Functional Level Independent in ADLs/IADLs   Current Functional Level Independent in ADLs/IADLs   Can patient return to prior living arrangement Yes   Ability to make needs known: Good   Family able to assist with home care needs: Yes   Would you like for me to discuss the discharge plan with any other family members/significant others, and if so, who? No   Financial Resources Other (Comment)   Community Resources None     Chart reviewed and patient discussed in IDR. From home, independent. PCP and insurance. Independent in room. No needs at this time. Patient does have a drain he has been educated on draining on his own. CM following.

## 2024-05-21 VITALS
DIASTOLIC BLOOD PRESSURE: 87 MMHG | HEIGHT: 69 IN | TEMPERATURE: 97.6 F | RESPIRATION RATE: 18 BRPM | BODY MASS INDEX: 21.55 KG/M2 | HEART RATE: 57 BPM | WEIGHT: 145.5 LBS | SYSTOLIC BLOOD PRESSURE: 110 MMHG | OXYGEN SATURATION: 94 %

## 2024-05-21 LAB
ALBUMIN SERPL-MCNC: 3.7 GM/DL (ref 3.4–5)
ALP BLD-CCNC: 543 IU/L (ref 40–128)
ALT SERPL-CCNC: 249 U/L (ref 10–40)
ANION GAP SERPL CALCULATED.3IONS-SCNC: 13 MMOL/L (ref 7–16)
AST SERPL-CCNC: 94 IU/L (ref 15–37)
BASOPHILS ABSOLUTE: 0.1 K/CU MM
BASOPHILS RELATIVE PERCENT: 0.6 % (ref 0–1)
BILIRUB SERPL-MCNC: 1.8 MG/DL (ref 0–1)
BUN SERPL-MCNC: 10 MG/DL (ref 6–23)
CALCIUM SERPL-MCNC: 8.4 MG/DL (ref 8.3–10.6)
CHLORIDE BLD-SCNC: 104 MMOL/L (ref 99–110)
CO2: 24 MMOL/L (ref 21–32)
CREAT SERPL-MCNC: 0.7 MG/DL (ref 0.9–1.3)
CULTURE: ABNORMAL
DIFFERENTIAL TYPE: ABNORMAL
EOSINOPHILS ABSOLUTE: 0.2 K/CU MM
EOSINOPHILS RELATIVE PERCENT: 3 % (ref 0–3)
GFR, ESTIMATED: >90 ML/MIN/1.73M2
GLUCOSE SERPL-MCNC: 80 MG/DL (ref 70–99)
GRAM SMEAR: ABNORMAL
HCT VFR BLD CALC: 34.3 % (ref 42–52)
HEMOGLOBIN: 11.5 GM/DL (ref 13.5–18)
IMMATURE NEUTROPHIL %: 0.4 % (ref 0–0.43)
LYMPHOCYTES ABSOLUTE: 1.7 K/CU MM
LYMPHOCYTES RELATIVE PERCENT: 22.1 % (ref 24–44)
Lab: ABNORMAL
MCH RBC QN AUTO: 29.5 PG (ref 27–31)
MCHC RBC AUTO-ENTMCNC: 33.5 % (ref 32–36)
MCV RBC AUTO: 87.9 FL (ref 78–100)
MONOCYTES ABSOLUTE: 0.8 K/CU MM
MONOCYTES RELATIVE PERCENT: 9.9 % (ref 0–4)
NEUTROPHILS ABSOLUTE: 5 K/CU MM
NEUTROPHILS RELATIVE PERCENT: 64 % (ref 36–66)
NUCLEATED RBC %: 0 %
PDW BLD-RTO: 15.9 % (ref 11.7–14.9)
PLATELET # BLD: 231 K/CU MM (ref 140–440)
PMV BLD AUTO: 10 FL (ref 7.5–11.1)
POTASSIUM SERPL-SCNC: 3.8 MMOL/L (ref 3.5–5.1)
RBC # BLD: 3.9 M/CU MM (ref 4.6–6.2)
SODIUM BLD-SCNC: 141 MMOL/L (ref 135–145)
SPECIMEN: ABNORMAL
TOTAL IMMATURE NEUTOROPHIL: 0.03 K/CU MM
TOTAL NUCLEATED RBC: 0 K/CU MM
TOTAL PROTEIN: 5.5 GM/DL (ref 6.4–8.2)
WBC # BLD: 7.8 K/CU MM (ref 4–10.5)

## 2024-05-21 PROCEDURE — 2580000003 HC RX 258: Performed by: HOSPITALIST

## 2024-05-21 PROCEDURE — 99221 1ST HOSP IP/OBS SF/LOW 40: CPT | Performed by: THORACIC SURGERY (CARDIOTHORACIC VASCULAR SURGERY)

## 2024-05-21 PROCEDURE — 6370000000 HC RX 637 (ALT 250 FOR IP): Performed by: HOSPITALIST

## 2024-05-21 PROCEDURE — 36415 COLL VENOUS BLD VENIPUNCTURE: CPT

## 2024-05-21 PROCEDURE — 80053 COMPREHEN METABOLIC PANEL: CPT

## 2024-05-21 PROCEDURE — APPNB30 APP NON BILLABLE TIME 0-30 MINS: Performed by: LICENSED PRACTICAL NURSE

## 2024-05-21 PROCEDURE — 6370000000 HC RX 637 (ALT 250 FOR IP): Performed by: FAMILY MEDICINE

## 2024-05-21 PROCEDURE — 85025 COMPLETE CBC W/AUTO DIFF WBC: CPT

## 2024-05-21 PROCEDURE — 99232 SBSQ HOSP IP/OBS MODERATE 35: CPT | Performed by: PHYSICIAN ASSISTANT

## 2024-05-21 RX ADMIN — PANTOPRAZOLE SODIUM 40 MG: 40 TABLET, DELAYED RELEASE ORAL at 06:35

## 2024-05-21 RX ADMIN — ASPIRIN 81 MG: 81 TABLET, CHEWABLE ORAL at 10:18

## 2024-05-21 RX ADMIN — CETIRIZINE HYDROCHLORIDE 10 MG: 10 TABLET, FILM COATED ORAL at 10:18

## 2024-05-21 RX ADMIN — CLOPIDOGREL BISULFATE 75 MG: 75 TABLET ORAL at 10:18

## 2024-05-21 RX ADMIN — TAMSULOSIN HYDROCHLORIDE 0.4 MG: 0.4 CAPSULE ORAL at 10:18

## 2024-05-21 RX ADMIN — SODIUM CHLORIDE, PRESERVATIVE FREE 5 ML: 5 INJECTION INTRAVENOUS at 10:19

## 2024-05-21 NOTE — PLAN OF CARE
Problem: Pain  Goal: Verbalizes/displays adequate comfort level or baseline comfort level  5/21/2024 0957 by Cecelia Gonzalez LPN  Outcome: Adequate for Discharge  5/21/2024 0236 by Larisa Diaz, RN  Outcome: Progressing     Problem: Discharge Planning  Goal: Discharge to home or other facility with appropriate resources  5/21/2024 0957 by Cecelia Gonzalez LPN  Outcome: Adequate for Discharge  5/21/2024 0236 by Larisa Diaz, RN  Outcome: Progressing     Problem: ABCDS Injury Assessment  Goal: Absence of physical injury  5/21/2024 0957 by Cecelia Gonzalez LPN  Outcome: Adequate for Discharge  5/21/2024 0236 by Larisa Diaz, RN  Outcome: Progressing     Problem: Safety - Adult  Goal: Free from fall injury  5/21/2024 0957 by Cecelia Gonzalez LPN  Outcome: Adequate for Discharge  5/21/2024 0236 by Larisa Diaz, RN  Outcome: Progressing

## 2024-05-21 NOTE — DISCHARGE SUMMARY
V2.0  Discharge Summary    Name:  Jama Ramirez /Age/Sex: 1960 (63 y.o. male)   Admit Date: 2024  Discharge Date: 24    MRN & CSN:  [id number] & [id number] Encounter Date and Time 24 9:44 AM EDT    Attending:  Shelby Donovan MD Discharging Provider: Shelby Donovan MD       Hospital Course:     HPI:   Chief Complaint: Pancreatic lesion  Jama Ramirez is a 63 y.o. male with pmh of depression, COPD, HTN who presents with pancreatic lesion     Patient has had abdominal pain for years.  He was found to have biliary and pancreatic duct obstruction which appeared to be due to a lesion/mass on the pancreatic head.  Patient had an MRCP which showed a possible mass adjacent to the ampulla with mass effect on CBD and PD.  There was concern for ampullary/periampullary adenocarcinoma versus pancreatic head adenocarcinoma.  ERCP was performed today in order to try to cannulate the pancreatic duct to allow for relief for the bili obstruction.  However GI was unable to do this due to significant friability, ulceration, and anatomic distortion.  GI recommended admission and will speak with IR for potential procedure.    Problem Based Course:   Bile duct and pancreatic duct obstruction  Due to pancreatic lesion.  GI was unable to cannulate the bile duct during EGD.  Patient was initially placed on antibiotics.  He was given IV fluids.  IR placed a biliary drain on .  This will need daily flushing and irrigation of catheter with 20 cc twice daily.  CT of the abdomen was negative for any acute process.  Patient's bilirubin levels improved.  His diet was advanced to regular diet.  Bile culture from  showed no growth today.  IV fluids were stopped antibiotics were stopped.  Patient was able to tolerate his diet.  His jaundice had improved.    Pancreatic head/ampullary lesion  MRCP noted to have a possible mass adjacent to the ampulla with mass effect on the CBD and PD.  GI noted a duodenal mass on EGD

## 2024-05-21 NOTE — PROGRESS NOTES
[facility] Gastroenterology and Hepatology             MD Zion Hamilton MD Carol Christensen, APRN-CNP             [address] [place] [address]             [phone] fax [phone]      Gastroenterology Progress note . 5/19/2024  Reason for consult:   Biliary dilation, Jaundice.           Interval H/P  Patient underwent IR guided 10 Polish internal/external drain placed.  His CT scan also was noted to be unremarkable except for biliary dilation, no perforation or pancreatitis noted.  -Total bilirubin is down to 2.5 from 6.  CBC with hemoglobin of 11.1 stable.     Physical Exam  Blood pressure 106/71, pulse 69, temperature 97.8 °F (36.6 °C), temperature source Oral, resp. rate (!) 8, height 1.753 m (5' 9\"), weight 66 kg (145 lb 8.1 oz), SpO2 96 %.  Constitutional: Patient is in no distress.   Eyes: Pupils equal, round.  No icterus.  HENT: Oral mucosa is moist.   Pulmonary: No accessory muscle use. No localizing pulmonary findings.     Cardiovascular: Heart has a regular rate and rhythm, no JVD.   Gastrointestinal: Bowel sounds are present in all four quadrants. Abdomen is soft, nontender, nondistended. Rectal examination deferred.   Skin: No jaundice, spider angiomas, or palmar erythema. No purpura.  Neuro: Awake,alert, and oriented x3. No gross focal neurologic deficits.       Chart and labs reviewed.  #1 ampullary/duodenal versus possible pancreatic lesion  -Patient was initially planned for EGD/EUS/ERCP.  On EGD he was noted to have duodenal/ampullary mass raising concern for adenocarcinoma and tissue acquisition could be done during that so EUS was canceled.  -Biopsies were done however there was large amount of food present in the stomach as well as ampullary distortion and friability due to which multiple attempts at cannulation were not attempted.   -Status post IR guided 10 Polish internal/external biliary drain.  Discussed with the patient 
             [facility] Gastroenterology and Hepatology             MD Zion Hamilton MD Carol Christensen, APRN-CNP       Lindsey Valles, APRN-CNP             [address] [place] [address]             [phone] fax [phone]      Gastroenterology Progress note . 5/18/2024  Reason for consult:   Biliary dilation, abdominal pain    Physician attestation:  I have personally seen and examined the patient independently. I have reviewed the patient's available data,including medical history and recent test results. Reviewed and discussed note as documented by ARIADNA.  I agree with the physical exam findings, assessment and plan.     Briefly   Patient underwent EGD with attempted ERCP yesterday.  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  It is not clear if the mass is arising from the pancreas invading into the duodenum or it is a duodenal/ampullary adenocarcinoma.  Biopsies were done however there was large amount of food present in the stomach as well as ampullary distortion and friability due to which multiple attempts at cannulation were not attempted.   I discussed with the patient's interventional radiologist personally and there is tentative plan for IR guided biliary drain today  He also complains that his pain is slightly better today compared to last night.  Continues to be n.p.o.  Awaiting CT scan    Gen: normal mood, alert  ENT: normal TJ, icteric  Cardiovascular: RRR, No M/R/G  Respiratory: CTA BL  Gastrointestinal: Soft,NT ND  Genitourinary: no suprapubic tenderness  Musculoskeletal: no scars  Skin:moist, jaundiced  Neuro: alert and oriented x3    My assessment and plan reveals   #1 ampullary/duodenal versus possible pancreatic lesion  -Patient was initially planned for EGD/EUS/ERCP.  On EGD he was noted to have duodenal/ampullary mass raising concern for adenocarcinoma and tissue acquisition could be done during 
             [facility] Gastroenterology and Hepatology             MD Zion Hamilton MD Carol Christensen, APRN-CNP       Lindsey Valles, APRN-CNP             [address] [place] [address]             [phone] fax [phone]      Gastroenterology Progress note . 5/21/2024  Reason for consult:   Biliary dilation, jaundice    Physician attestation:     Interval H/P    CMP is unremarkable  LFTs continue to improve, total bilirubin continues to downtrend  Hemoglobin noted to continue to be stable at 11.5    Patient was examined at bedside.  He is aware that he needs to follow-up with us in the office as well as oncology and [facility].    CC today: No new complaints     GI symptoms: Denies dysphagia, heartburn/reflux, nausea, vomiting, diarrhea, abdominal pain, melena and hematochezia.    ROS: Per history of present illness. All other systems were reviewed and were negative.      Physical Exam  Blood pressure 111/73, pulse 58, temperature 98.1 °F (36.7 °C), temperature source Oral, resp. rate 16, height 1.753 m (5' 9\"), weight 66 kg (145 lb 8.1 oz), SpO2 93 %.  Constitutional: Patient is in no distress.   Eyes: Pupils equal, round.  No icterus.  HENT: Oral mucosa is moist.   Pulmonary: No accessory muscle use. No localizing pulmonary findings.     Cardiovascular: Heart has a regular rate and rhythm, no JVD.   Gastrointestinal: Bowel sounds are present in all four quadrants. Abdomen is soft, nontender, nondistended. Rectal examination deferred.   Skin: No jaundice, spider angiomas, or palmar erythema. No purpura.  Neuro: Awake,alert, and oriented x3. No gross focal neurologic deficits.       Chart and labs reviewed.     Impression/Plan  1) ambulatory/duodenal versus possible pancreatic lesion  -Patient was initially planned for EGD/EUS/ERCP.  On EGD he was noted to have duodenal ampullary mass raising concern for adenocarcinoma tissue acquisition could not be done during 
    V2.0+  [facility] Hospitalist Progress Note      Name:  Jama Ramirez /Age/Sex: 1960  (63 y.o. male)   MRN & CSN:  [id number] & [id number] Encounter Date/Time: 2024 9:33 AM EDT    [facility] PCP: Jennifer Gomez MD       Hospital Day: 2    Assessment and Plan:   Jama Ramirez is a 63 y.o. male who presents with pancreatic lesion    Bile duct and pancreatic duct obstruction  -due to pancreatic lesion.  GI was unable to cannulate the bile duct during EGD. Keep NPO. Patient placed on antibiotics.  -GI arranging with IR for drain placement.  Awaiting CT abdomen pelvis with IV and oral contrast.  Completed LR.  Can advance diet to liquids if CT abdomen is normal.  Pancreatic head/ampullary lesion  -MRCP noted to have a possible mass adjacent to the ampulla with mass effect on the CBD and PD.  GI notes they performed a duodenal mass biopsy .  Superior mesenteric artery dissection  -on aspirin.  Plavix and anticoagulation held for procedure.  CT surgery was helping to manage.  -If patient did not follow-up with CT surgery as an outpatient, then will consult during inpatient stay.  Transaminitis  -likely due to biliary duct obstruction    Comment: Please note this report has been produced using speech recognition software and may contain errors related to that system including errors in grammar, punctuation, and spelling, as well as words and phrases that may be inappropriate. If there are any questions or concerns please feel free to contact the dictating provider for clarification.      Diet Diet NPO Exceptions are: Ice Chips, Sips of Water with Meds    DVT Prophylaxis [] Lovenox, [] Heparin, [] Eliquis, [] Xarelto  [x] SCDs     Code Status Full Code   Surrogate Decision Maker/ POA Ex-Viola Foster   Disposition   Expected Disposition: TBD  Estimated Date of Discharge:   Patient requires continued admission due to biliary obstruction     Personally reviewed Lab Studies and Imaging 
    V2.0+  [facility] Hospitalist Progress Note      Name:  Jama Ramirez /Age/Sex: 1960  (63 y.o. male)   MRN & CSN:  [id number] & [id number] Encounter Date/Time: 2024 9:33 AM EDT    [facility]  [facility] PCP: Jennifer Gomez MD       Hospital Day: 5    Assessment and Plan:   Jama Ramirez is a 63 y.o. male who presents with pancreatic lesion    Bile duct and pancreatic duct obstruction  -due to pancreatic lesion.  GI was unable to cannulate the bile duct during EGD. Patient placed on antibiotics.  -Completed LR.    -IR placed biliary drain .  Will need daily flushing and irrigation of catheter 20 cc twice daily.  CT abdomen negative for any acute process.     -Improved bilirubin levels.  Diet advanced to regular.  Bile culture : No growth to date.  Stop IV fluids.  D/W GI and will stop antibiotics.  Pancreatic head/ampullary lesion  -MRCP noted to have a possible mass adjacent to the ampulla with mass effect on the CBD and PD.  GI notes they performed a duodenal mass biopsy .  -Will need outpatient follow-up for heme-onc for biopsy results.  Will need referral to Dr. Collado at [facility] for surgical evaluation.  Superior mesenteric artery dissection  -on aspirin.  Plavix and anticoagulation held for procedure.    -Consult CT surgery for recommendations for antiplatelets and anticoagulation for dissection.  Does not appear the patient followed up as an outpatient.  GI states okay to resume Plavix.  -Restarted Plavix.  F/u    Transaminitis  -likely due to biliary duct obstruction  -LFTs continue improved.    Comment: Please note this report has been produced using speech recognition software and may contain errors related to that system including errors in grammar, punctuation, and spelling, as well as words and phrases that may be inappropriate. If there are any questions or concerns please feel free to contact the dictating provider for clarification.      Diet ADULT DIET; Regular    DVT 
    V2.0+  [facility] Hospitalist Progress Note      Name:  Jama Ramirez /Age/Sex: 1960  (63 y.o. male)   MRN & CSN:  [id number] & [id number] Encounter Date/Time: 2024 9:33 AM EDT    [facility]:  [facility] PCP: Jennifer Gomez MD       Hospital Day: 4    Assessment and Plan:   Jama Ramirez is a 63 y.o. male who presents with pancreatic lesion    Bile duct and pancreatic duct obstruction  -due to pancreatic lesion.  GI was unable to cannulate the bile duct during EGD. Patient placed on antibiotics.  -Completed LR.    -IR placed biliary drain .  Will need daily flushing and irrigation of catheter 20 cc twice daily.  CT abdomen negative for any acute process.     -Improved bilirubin levels.  Diet advanced to regular.  Bile culture : No growth to date.  Stop IV fluids.  D/W GI and will stop antibiotics.  Pancreatic head/ampullary lesion  -MRCP noted to have a possible mass adjacent to the ampulla with mass effect on the CBD and PD.  GI notes they performed a duodenal mass biopsy .  -Will need outpatient follow-up for heme-onc for biopsy results.  Will need referral to Dr. Collado at [facility] for surgical evaluation.  Superior mesenteric artery dissection  -on aspirin.  Plavix and anticoagulation held for procedure.    -Consult CT surgery for recommendations for antiplatelets and anticoagulation for dissection.  Does not appear the patient followed up as an outpatient.  GI states okay to resume Plavix.  -Restarted Plavix.  Transaminitis  -likely due to biliary duct obstruction  -LFTs continue improved.    Comment: Please note this report has been produced using speech recognition software and may contain errors related to that system including errors in grammar, punctuation, and spelling, as well as words and phrases that may be inappropriate. If there are any questions or concerns please feel free to contact the dictating provider for clarification.      Diet ADULT DIET; Regular    DVT Prophylaxis 
   05/21/24 0959   Encounter Summary   Encounter Overview/Reason Initial Encounter   Service Provided For Patient   Referral/Consult From [facility]   Support System Family members   Last Encounter  05/21/24  (PT recovering well, expressed his concern with resolving his issue, feels cared for, prayers offered, PT is calm and confident, high risk readmit.)   Complexity of Encounter Low   Begin Time 0947   End Time  1000   Total Time Calculated 13 min   Spiritual/Emotional needs   Type Spiritual Support   Assessment/Intervention/Outcome   Assessment Calm;Concerns with suffering;Hopeful   Intervention Active listening;Discussed belief system/Zoroastrianism practices/óscar;Discussed illness injury and it’s impact;Discussed meaning/purpose;Discussed relationship with God;Explored/Affirmed feelings, thoughts, concerns;Prayer (assurance of)/[place];Sustaining Presence/Ministry of presence   Outcome Comfort;Coping;Expressed Gratitude   Plan and Referrals   Plan/Referrals Continue Support (comment)       
1649: pt arrived in PACU. Monitors applied and alarms on. Report from Tarah DE LEÓN and Augusta AMARAL.   1655: pt drowsy but able to open eyes and follow commands.  
4 Eyes Skin Assessment     NAME:  Jama Ramirez  YOB: 1960  MEDICAL RECORD NUMBER:  [id number]    The patient is being assessed for  Admission    I agree that at least one RN has performed a thorough Head to Toe Skin Assessment on the patient. ALL assessment sites listed below have been assessed.      Areas assessed by both nurses:    Head, Face, Ears, Shoulders, Back, Chest, Arms, Elbows, Hands, Sacrum. Buttock, Coccyx, Ischium, Legs. Feet and Heels, and Under Medical Devices         Does the Patient have a Wound? No noted wound(s)       Jefe Prevention initiated by RN: No  Wound Care Orders initiated by RN: No    Pressure Injury (Stage 3,4, Unstageable, DTI, NWPT, and Complex wounds) if present, place Wound referral order by RN under : No    New Ostomies, if present place, Ostomy referral order under : No     Nurse 1 eSignature: Electronically signed by Larisa Diaz RN on 5/17/24 at 9:32 PM EDT    **SHARE this note so that the co-signing nurse can place an eSignature**    Nurse 2 eSignature: Electronically signed by Jenny Troncoso LPN on 5/17/24 at 10:54 PM EDT  
Brief Progress Note:   Patient noted to be icteric on examination.  Wife present at bedside.  Discussed procedure in detail with the patient including the risk and complication as noted below.  She confirms that he has not taken any Plavix.  We will plan for LFTs today as well.    ERCP/EUS procedure was discussed in detail, including benefits/risks/alternatives. We also discussed potential adverse events from sedation, risk of infection, bleeding, perforation, pancreatitis, and risk of CRE infection from contamination from the scopes. Discussed that such adverse events can be life threating. Discussed that we may need to place a biliary and/ or pancreatic stent and may need a repeat procedure depending on the findings. Discussed the potential need to give rectal indomethacin to decrease the risk of post ERCP pancreatitis. Discussed pre-procedure preparation, and post procedure care including diet. All questions answered in detail.   
Hematology Oncology Inpatient Progress Note    Patient Name:  Jama Ramirez  Patient :  1960  Patient MRN:  [id number]  [facility]   Admitted: 2024 12:53 PM   Hospital Attending Provider: Shelby Donovan MD    PCP:  Jennifer Gomez MD     Date of Service: 24       Reason for Consult: ampullary lesion vs pancreatic lesion     Chief Complaint:  No chief complaint on file.    Principal Problem:    Pancreatic lesion  Active Problems:    Obstruction of bile duct    Pancreatic duct obstruction    Duodenal mass  Resolved Problems:    * No resolved hospital problems. *      HPI:   Jama Ramirez is a 63 y.o. male with medical history of COPD, GERD, chronic back pain presented to [facility] after MRCP showed possible mass adjacent to ampulla with mass effect on CBD and PD.  He was initially planned for EGD/EUS/ERCP on 2024 however on EGD he was noted to have duodenal/ampullary mass raising concern for adenocarcinoma and tissue acquisition could be done during that so EUS was canceled.  There is large amount of retained food residue and friability and polypoid mass second part of duodenum bile duct could not be cannulated.  He is status post internal/external percutaneous biliary drain placement with IR on 2024.  Biopsy results remain pending.  Gastroenterology recommends follow-up with Dr. Collado at [facility] in hepatobiliary surgery. CA 19-9 39. Tbili 6 on presentation improved post biliary drain placement.  Other lab data reviewed.  Renal function grossly normal.  LFT on day of consult with albumin 3.1, , , , T. bili 1.9.  This is globally improved since admission.  WBC 6.4, hemoglobin 10.6, hematocrit 32, MCV 88, MCHC 32.9.  Platelet 216K.      On exam patient states he has been feeling ill for several months.  He has had abdominal pain that has been worked up appropriately however has not improved and continued to get worse leading up to this event.  He feels improved 
Pancreatic lesion and obstruction  
Procedure @ [facility] on 5/17/24 1430 arrival 1300    NOTHING TO EAT OR DRINK AFTER MIDNIGHT DAY OF SURGERY  FOLLOW OFFICE INSTRUCTIONS FOR ANY NECESSARY PREP     1. Enter thru the hospital main entrance on day of surgery, check in at the Information Desk. If you arrive prior to 6:00am, enter thru the ER entrance.    2. Follow the directions as prescribed by the doctor for your procedure and medications.         Morning of surgery take: Take a nebulizer treatment if needed, use your inhalers, take Omeprazole, Verapamil with sips of water          Per physician's office last dose of Plavix to be 5/15/2024         Stop vitamins, supplements and NSAIDS:  today     3. Check with your Doctor regarding stopping blood thinners and follow their instructions.    4. Do not smoke, vape or use chewing tobacco morning of surgery. Do not drink any alcoholic beverages 24 hours prior to surgery.       This includes NA Beer. No street drugs 7 days prior to surgery.    5. If you have dentures, contacts of glasses they will be removed before going to the OR; please bring a case.    6. Please bring picture ID, insurance card, paperwork from the doctor’s office (H & P, Consent, & card for implantable devices).    7. Take a shower with an antibacterial soap the night before surgery and the morning of surgery. Do not put anything on your skin      After your morning shower.    8. You will need a responsible adult to drive you home and check on you after surgery.   
Progress note: Interventional radiology  Patient presents with ampullary lesion causing dilation of the intra and extrahepatic bile ducts.  Patient underwent a percutaneous transhepatic cholangiogram with placement of a 10 Congolese internal-external biliary drain.  The total bilirubin has improved and measures 2.5 as compared with 6 previously.  I would encourage daily flushing and irrigation of the catheter of 20 cc twice daily.    At this time there the bile cultures are unremarkable.  This can be followed up.    If necessary, the patient may return for upsizing of the biliary drain to 12 Congolese if necessary.  
TRANSFER - OUT REPORT: 10F biliary drain placed by Dr. Gandhi, culture sent per protocol, pt tolerated procedure well, VSS, Sedation per anesthesia.        Report consisted of patient's Situation, Background, Assessment and   Recommendations(SBAR).     Information from the following report(s) Nurse Handoff Report was reviewed with the receiving nurse.    Opportunity for questions and clarification was provided.      Patient transported with:   Registered Nurse    
The Son would like the Doctor to call him about what is going on.  If you could please give him a call tomorrow.  His phone [phone].  He would really appreciate it.  The Son's name is Gopal Ashleytox.  
Xarelto  [x] SCDs     Code Status Full Code   Surrogate Decision Maker/ POA Ex-Viola Schreiber   Disposition   Expected Disposition: TBD  Estimated Date of Discharge: 5/23  Patient requires continued admission due to biliary obstruction     Personally reviewed Lab Studies and Imaging       Subjective/Interval history:   Chief Complaint: No chief complaint on file.     Had drain placed yesterday  Still some soreness in abdomen, has not only had water so far    Review of Systems:    Negative unless mentioned above    Objective:     Intake/Output Summary (Last 24 hours) at 5/19/2024 0837  Last data filed at 5/19/2024 0707  Gross per 24 hour   Intake --   Output 2275 ml   Net -2275 ml        Vitals:   /76   Pulse 60   Temp 98.2 °F (36.8 °C) (Oral)   Resp 18   Ht 1.753 m (5' 9\")   Wt 66 kg (145 lb 8.1 oz)   SpO2 92%   BMI 21.49 kg/m²     Physical Exam:   General: NAD, laying in bed  Eyes: no discharge, scleral icterus  HENT: NCAT  Cardiovascular: RRR  Respiratory: Clear to auscultation   Gastrointestinal: Soft, tender in upper abdomen, nondistended, has drain in RUQ  Genitourinary: no suprapubic tenderness  Musculoskeletal: No edema, nontender  Skin: warm, dry, jaundiced  Neuro: Alert. No gross deficits  Psych: Mood appropriate.     Medications:   Medications:    sodium chloride flush  5-40 mL IntraVENous 2 times per day    [Held by provider] enoxaparin  40 mg SubCUTAneous Daily    piperacillin-tazobactam  3,375 mg IntraVENous Q8H    [Held by provider] atorvastatin  20 mg Oral Daily    cetirizine  10 mg Oral Daily    tamsulosin  0.4 mg Oral Daily    vitamin D  50,000 Units Oral Weekly    aspirin  81 mg Oral Daily    verapamil  120 mg Oral Nightly      Infusions:    sodium chloride      lactated ringers IV soln 150 mL/hr at 05/19/24 0518     PRN Meds: HYDROmorphone, 0.5 mg, Once PRN  sodium chloride flush, 5-40 mL, PRN  sodium chloride, , PRN  potassium chloride, 40 mEq, PRN   Or  potassium alternative oral 
Bowel sounds are present in all four quadrants. Abdomen is soft, nontender, non distended. Rectal examination deferred. Biliary drain in place draining well  Skin: jaundice, spider angiomas, or palmar erythema. No purpura.  Neuro: Awake,alert, and oriented x3. No gross focal neurologic deficits.       Chart and labs reviewed.     Impression/Plan  1) ambulatory/duodenal versus possible pancreatic lesion  -Patient was initially planned for EGD/EUS/ERCP.  On EGD he was noted to have duodenal ampullary mass raising concern for adenocarcinoma tissue acquisition could not be done during that so EUS was canceled  -Biopsies were done however there was a large amount of food present in the stomach as well as ampullary distortion and friability due to which multiple attempts at cannulation were not attempted  -Status post IR guided 10 Malay internal/external biliary drain-patient was reminded to make sure that nursing staff does tap flushes  -Patient interested in the future for rendezvous procedure of biliary drain  -notified office for referral to Dr. Collado's office  - noted improved jaundice    2) biliary dilation-likely secondary to problem #1  -Noted to have significant extrahepatic biliary dilation up to 6 to 10 mm  -Status post IR guided internal/external biliary drain    3) abdominal pain  -Pain has been improving  -CT noted to be negative for any pancreatitis or perforation  -May advance diet as tolerated    4) Mesenteric artery dissection  -Currently holding Plavix  -Discussed with internal medicine to resume Plavix from a GI perspective  -Monitor for overt GI blood loss    5) Elevated LFTs, hyperbilirubinuria  secondary to #1 (improving)   -noted to have downtrend LFTS  -significantly down trending total bilirubin levels  -continue to trend       Patient's history, exam, and plan of care were discussed with the patient and Dr. Wetzel  . All questions and concerns were discussed with the patient. Patient agreed to

## 2024-05-21 NOTE — CONSULTS
Hematology Oncology Inpatient Consult    Patient Name:  Jama Ramirez  Patient :  1960  Patient MRN:  [id number]  Room: [facility]   Admitted: 2024 12:53 PM   Hospital Attending Provider: Shelby Donovan MD    PCP:  Jennifer Gomez MD     Date of Service: 24       Reason for Consult: ampullary lesion vs pancreatic lesion     Chief Complaint:  No chief complaint on file.    Principal Problem:    Pancreatic lesion  Active Problems:    Obstruction of bile duct    Pancreatic duct obstruction    Duodenal mass  Resolved Problems:    * No resolved hospital problems. *      HPI:   Jama Ramirez is a 63 y.o. male with medical history of COPD, GERD, chronic back pain presented to [facility] after MRCP showed possible mass adjacent to ampulla with mass effect on CBD and PD.  He was initially planned for EGD/EUS/ERCP on 2024 however on EGD he was noted to have duodenal/ampullary mass raising concern for adenocarcinoma and tissue acquisition could be done during that so EUS was canceled.  There is large amount of retained food residue and friability and polypoid mass second part of duodenum bile duct could not be cannulated.  He is status post internal/external percutaneous biliary drain placement with IR on 2024.  Biopsy results remain pending.  Gastroenterology recommends follow-up with Dr. Collado at [facility] in hepatobiliary surgery. CA 19-9 39. Tbili 6 on presentation improved post biliary drain placement.  Other lab data reviewed.  Renal function grossly normal.  LFT on day of consult with albumin 3.1, , , , T. bili 1.9.  This is globally improved since admission.  WBC 6.4, hemoglobin 10.6, hematocrit 32, MCV 88, MCHC 32.9.  Platelet 216K.      On exam patient states he has been feeling ill for several months.  He has had abdominal pain that has been worked up appropriately however has not improved and continued to get worse leading up to this event.  He feels improved after 
documentation.    I verified and agreed with the above documentation.    An Lomeli PA-C 05/21/24 9:26 AM

## 2024-05-21 NOTE — PLAN OF CARE
Problem: Pain  Goal: Verbalizes/displays adequate comfort level or baseline comfort level  5/21/2024 0958 by Cecelia Gonzalez LPN  Outcome: Adequate for Discharge  5/21/2024 0957 by Cecelia Gonzalez LPN  Outcome: Adequate for Discharge  5/21/2024 0236 by Larisa Diaz RN  Outcome: Progressing     Problem: Discharge Planning  Goal: Discharge to home or other facility with appropriate resources  5/21/2024 0958 by Cecelia Gonzalez LPN  Outcome: Adequate for Discharge  5/21/2024 0957 by Cecelia Gonzalez LPN  Outcome: Adequate for Discharge  5/21/2024 0236 by Larisa Diaz RN  Outcome: Progressing     Problem: ABCDS Injury Assessment  Goal: Absence of physical injury  5/21/2024 0958 by Cecelia Gonzalez LPN  Outcome: Adequate for Discharge  5/21/2024 0957 by Cecelia Gonzalez LPN  Outcome: Adequate for Discharge  5/21/2024 0236 by Larisa Diaz RN  Outcome: Progressing     Problem: Safety - Adult  Goal: Free from fall injury  5/21/2024 0958 by Cecelia Gonzalez LPN  Outcome: Adequate for Discharge  5/21/2024 0957 by Cecelia Gonzalez LPN  Outcome: Adequate for Discharge  5/21/2024 0236 by Larisa Diaz RN  Outcome: Progressing

## 2024-05-21 NOTE — DISCHARGE INSTRUCTIONS
Medications: see computerized discharge medication list  Activity: as directed by GI  Diet: regular diet   Disposition: home  Discharged Condition: Stable

## 2024-05-21 NOTE — ADT AUTH CERT
is not nervous/anxious.             Vital Signs: /79   Pulse 54   Temp 97.8 °F (36.6 °C) (Oral)   Resp 16   Ht 1.753 m (5' 9\")   Wt 66 kg (145 lb 8.1 oz)   SpO2 92%   BMI 21.49 kg/m²      Physical Exam:  CONSTITUTIONAL: awake, alert, cooperative, no apparent distress   EYES: EOM grossly intact, no conjunctival pallor, +scleral icterus  ENT: Normocephalic, atraumatic, MMM  NECK: supple, symmetrical, no jugular venous distension  HEMATOLOGIC/LYMPHATIC: no cervical, supraclavicular or axillary lymphadenopathy   LUNGS: CTA bilaterally, no wheezes/rhonchi/rales, unlabored on RA   CARDIOVASCULAR: regular rate and rhythm,  no murmur noted  ABDOMEN: Non-tender, non-distended, NABS, percutaneous biliary drain site clean and dry, draining brown bile  MUSCULOSKELETAL: full range of motion noted, tone is normal  NEUROLOGIC: awake, alert, oriented to name, place and time. Fluent speech. Motor skills grossly intact.   SKIN: warm and dry, +jaundice, no bruising or petechiae.  EXTREMITIES: no peripheral edema, no clubbing or cyanosis     Labs:           Lab Results   Component Value Date     WBC 7.7 05/19/2024     HGB 11.1 (L) 05/19/2024     HCT 33.8 (L) 05/19/2024     MCV 86.7 05/19/2024      05/19/2024     LYMPHOPCT 12.9 (L) 05/19/2024     RBC 3.90 (L) 05/19/2024     MCH 28.5 05/19/2024     MCHC 32.8 05/19/2024     RDW 16.3 (H) 05/19/2024                     Lab Results   Component Value Date     INR 0.9 05/02/2024     PROTIME 12.7 05/02/2024            Lab Results   Component Value Date      05/19/2024     K 3.9 05/19/2024      05/19/2024     CO2 22 05/19/2024     BUN 12 05/19/2024     CREATININE 0.6 (L) 05/19/2024     GLUCOSE 143 (H) 05/19/2024     CALCIUM 8.2 (L) 05/19/2024     PHOS 3.7 03/21/2024     MG 2.2 08/02/2017     PROT 7.0 11/19/2012     BILITOT 2.5 (H) 05/19/2024     ALKPHOS 661 (H) 05/19/2024      (H) 05/19/2024      (H) 05/19/2024     LABGLOM >90 05/19/2024     GFRAA

## 2024-05-22 ENCOUNTER — APPOINTMENT (OUTPATIENT)
Dept: GENERAL RADIOLOGY | Age: 64
End: 2024-05-22
Payer: COMMERCIAL

## 2024-05-22 ENCOUNTER — HOSPITAL ENCOUNTER (EMERGENCY)
Age: 64
Discharge: HOME | End: 2024-05-22
Payer: COMMERCIAL

## 2024-05-22 ENCOUNTER — OFFICE VISIT (OUTPATIENT)
Dept: GASTROENTEROLOGY | Age: 64
End: 2024-05-22
Payer: COMMERCIAL

## 2024-05-22 VITALS
TEMPERATURE: 97.7 F | WEIGHT: 135 LBS | DIASTOLIC BLOOD PRESSURE: 76 MMHG | RESPIRATION RATE: 30 BRPM | OXYGEN SATURATION: 94 % | HEART RATE: 79 BPM | HEIGHT: 69 IN | SYSTOLIC BLOOD PRESSURE: 98 MMHG | BODY MASS INDEX: 19.99 KG/M2

## 2024-05-22 VITALS
DIASTOLIC BLOOD PRESSURE: 66 MMHG | TEMPERATURE: 97.5 F | BODY MASS INDEX: 20.08 KG/M2 | WEIGHT: 135.6 LBS | SYSTOLIC BLOOD PRESSURE: 128 MMHG | HEIGHT: 69 IN | HEART RATE: 67 BPM | OXYGEN SATURATION: 90 %

## 2024-05-22 DIAGNOSIS — K83.1 OBSTRUCTION OF BILE DUCT: Primary | ICD-10-CM

## 2024-05-22 DIAGNOSIS — K83.8 HEMOBILIA: Primary | ICD-10-CM

## 2024-05-22 DIAGNOSIS — K31.89 DUODENAL MASS: ICD-10-CM

## 2024-05-22 LAB
ABO/RH: NORMAL
ANION GAP SERPL CALCULATED.3IONS-SCNC: 10 MMOL/L (ref 7–16)
ANTIBODY SCREEN: NEGATIVE
BASOPHILS ABSOLUTE: 0 K/CU MM
BASOPHILS RELATIVE PERCENT: 0.5 % (ref 0–1)
BUN SERPL-MCNC: 14 MG/DL (ref 6–23)
CALCIUM SERPL-MCNC: 8.5 MG/DL (ref 8.3–10.6)
CHLORIDE BLD-SCNC: 105 MMOL/L (ref 99–110)
CO2: 25 MMOL/L (ref 21–32)
CREAT SERPL-MCNC: 0.8 MG/DL (ref 0.9–1.3)
DIFFERENTIAL TYPE: ABNORMAL
EOSINOPHILS ABSOLUTE: 0.3 K/CU MM
EOSINOPHILS RELATIVE PERCENT: 3.6 % (ref 0–3)
GFR, ESTIMATED: >90 ML/MIN/1.73M2
GLUCOSE SERPL-MCNC: 92 MG/DL (ref 70–99)
HCT VFR BLD CALC: 37.3 % (ref 42–52)
HEMOGLOBIN: 12.3 GM/DL (ref 13.5–18)
IMMATURE NEUTROPHIL %: 0.4 % (ref 0–0.43)
LYMPHOCYTES ABSOLUTE: 1.5 K/CU MM
LYMPHOCYTES RELATIVE PERCENT: 19.8 % (ref 24–44)
MCH RBC QN AUTO: 28.9 PG (ref 27–31)
MCHC RBC AUTO-ENTMCNC: 33 % (ref 32–36)
MCV RBC AUTO: 87.6 FL (ref 78–100)
MONOCYTES ABSOLUTE: 0.8 K/CU MM
MONOCYTES RELATIVE PERCENT: 10.2 % (ref 0–4)
NEUTROPHILS ABSOLUTE: 5 K/CU MM
NEUTROPHILS RELATIVE PERCENT: 65.5 % (ref 36–66)
NUCLEATED RBC %: 0 %
PDW BLD-RTO: 16 % (ref 11.7–14.9)
PLATELET # BLD: 278 K/CU MM (ref 140–440)
PMV BLD AUTO: 9.7 FL (ref 7.5–11.1)
POTASSIUM SERPL-SCNC: 3.7 MMOL/L (ref 3.5–5.1)
RBC # BLD: 4.26 M/CU MM (ref 4.6–6.2)
SODIUM BLD-SCNC: 140 MMOL/L (ref 135–145)
TOTAL IMMATURE NEUTOROPHIL: 0.03 K/CU MM
TOTAL NUCLEATED RBC: 0 K/CU MM
WBC # BLD: 7.7 K/CU MM (ref 4–10.5)

## 2024-05-22 PROCEDURE — 86901 BLOOD TYPING SEROLOGIC RH(D): CPT

## 2024-05-22 PROCEDURE — 3078F DIAST BP <80 MM HG: CPT | Performed by: INTERNAL MEDICINE

## 2024-05-22 PROCEDURE — 86850 RBC ANTIBODY SCREEN: CPT

## 2024-05-22 PROCEDURE — 86900 BLOOD TYPING SEROLOGIC ABO: CPT

## 2024-05-22 PROCEDURE — 1111F DSCHRG MED/CURRENT MED MERGE: CPT | Performed by: INTERNAL MEDICINE

## 2024-05-22 PROCEDURE — 99284 EMERGENCY DEPT VISIT MOD MDM: CPT

## 2024-05-22 PROCEDURE — 3017F COLORECTAL CA SCREEN DOC REV: CPT | Performed by: INTERNAL MEDICINE

## 2024-05-22 PROCEDURE — 99214 OFFICE O/P EST MOD 30 MIN: CPT | Performed by: INTERNAL MEDICINE

## 2024-05-22 PROCEDURE — 85025 COMPLETE CBC W/AUTO DIFF WBC: CPT

## 2024-05-22 PROCEDURE — 4004F PT TOBACCO SCREEN RCVD TLK: CPT | Performed by: INTERNAL MEDICINE

## 2024-05-22 PROCEDURE — G8427 DOCREV CUR MEDS BY ELIG CLIN: HCPCS | Performed by: INTERNAL MEDICINE

## 2024-05-22 PROCEDURE — 3074F SYST BP LT 130 MM HG: CPT | Performed by: INTERNAL MEDICINE

## 2024-05-22 PROCEDURE — G8420 CALC BMI NORM PARAMETERS: HCPCS | Performed by: INTERNAL MEDICINE

## 2024-05-22 PROCEDURE — 74018 RADEX ABDOMEN 1 VIEW: CPT

## 2024-05-22 PROCEDURE — 6370000000 HC RX 637 (ALT 250 FOR IP): Performed by: PHYSICIAN ASSISTANT

## 2024-05-22 PROCEDURE — 71045 X-RAY EXAM CHEST 1 VIEW: CPT

## 2024-05-22 PROCEDURE — 80048 BASIC METABOLIC PNL TOTAL CA: CPT

## 2024-05-22 RX ORDER — OMEPRAZOLE 20 MG/1
20 CAPSULE, DELAYED RELEASE ORAL
Qty: 30 CAPSULE | Refills: 3 | Status: SHIPPED | OUTPATIENT
Start: 2024-05-22 | End: 2024-05-22

## 2024-05-22 RX ORDER — OMEPRAZOLE 40 MG/1
40 CAPSULE, DELAYED RELEASE ORAL
Qty: 30 CAPSULE | Refills: 1 | Status: SHIPPED | OUTPATIENT
Start: 2024-05-22

## 2024-05-22 RX ORDER — HYDROCODONE BITARTRATE AND ACETAMINOPHEN 5; 325 MG/1; MG/1
1 TABLET ORAL ONCE
Status: COMPLETED | OUTPATIENT
Start: 2024-05-22 | End: 2024-05-22

## 2024-05-22 RX ADMIN — HYDROCODONE BITARTRATE AND ACETAMINOPHEN 1 TABLET: 5; 325 TABLET ORAL at 18:44

## 2024-05-22 ASSESSMENT — PAIN DESCRIPTION - DESCRIPTORS
DESCRIPTORS: ACHING
DESCRIPTORS: ACHING

## 2024-05-22 ASSESSMENT — PAIN DESCRIPTION - ORIENTATION
ORIENTATION: MID
ORIENTATION: MID
ORIENTATION: MID;UPPER

## 2024-05-22 ASSESSMENT — PAIN - FUNCTIONAL ASSESSMENT
PAIN_FUNCTIONAL_ASSESSMENT: 0-10
PAIN_FUNCTIONAL_ASSESSMENT: PREVENTS OR INTERFERES SOME ACTIVE ACTIVITIES AND ADLS

## 2024-05-22 ASSESSMENT — PAIN SCALES - GENERAL
PAINLEVEL_OUTOF10: 0
PAINLEVEL_OUTOF10: 8
PAINLEVEL_OUTOF10: 4

## 2024-05-22 ASSESSMENT — PAIN DESCRIPTION - LOCATION
LOCATION: ABDOMEN

## 2024-05-22 NOTE — PROGRESS NOTES
resume Plavix from GI perspective.     No follow-ups on file.    Zion Wetzel MD 5/22/2024 11:14 AM     Time of note may not reflect time of encounter     CC: Referring MD

## 2024-05-22 NOTE — CARE COORDINATION
CM received consult from registration for patient currently in the waiting room. CM met with patient to determine CM needs. CM introduced self and CM role. Patient states he would like to have contact information for his son Gopal removed from emergency contact list. CM removed contact information for patient's son. Patient states he would like to have his significant other Viola Schreiber designated as his healthcare power of . CM advised patient that in order for Viola to be designated as POA, patient would need to complete POA documentation. Patient wishes to complete POA documentation at this time. This CM and CM [facility] RN completed POA documentation with patient. Patient provided with original and copy. POA documentation scanned into EHR.

## 2024-05-22 NOTE — ED PROVIDER NOTES
extrahepatic biliary ductal dilatation. The common duct measures 1.5 cm. Distal common bile duct and pancreatic head obscured by bowel gas. GALLBLADDER: Layering sludge within the gallbladder. Normal gallbladder wall thickness. No gallstones. RIGHT KIDNEY: There is a mildly complex right-sided peripelvic renal cyst measuring 1.4 x 1.1 x 1.2 cm which contains internal septations. Right kidney otherwise normal. Length = 11.4 cm. ASCITES: None.     1. Moderate intra and extra hepatic biliary ductal dilatation. Distal common duct and pancreatic head obscured by bowel gas. Further evaluation with ERCP or MRCP could be considered. 2. Sludge within the gallbladder. 3. A septated small peripelvic cyst of the right kidney likely representing a benign Bosniak 2 cyst. There are no specific imaging follow-up requirements. Electronically signed by Danny Reece MD    CTA CHEST ABDOMEN PELVIS W CONTRAST    Addendum Date: 4/28/2024     ADDENDUM #1 ** ADDENDUM ** There is no evidence of any focal wall thickening within the GI tract. No evidence of free air. There is also a voice recognition error in the secondary impression of the report. 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 The dissection terminates at the branching point of the SMA. This is new from October 2022 but is age indeterminate. Electronically signed by David Fan DO    Result Date: 4/28/2024  CT angiography of the chest abdomen and pelvis with contrast HISTORY: Pain. COMPARISON: None Individualized dose optimization technique was used in order to meet ALARA standards for radiation dose reduction.  In addition to vendor specific dose reduction algorithms, the dose reduction techniques vary based on the specific scanner utilized but frequently include automated exposure control, adjustment of the mA and/or kV according to patient size, and use of iterative reconstruction technique.

## 2024-05-24 ENCOUNTER — HOSPITAL ENCOUNTER (OUTPATIENT)
Dept: INFUSION THERAPY | Age: 64
Discharge: HOME | End: 2024-05-24
Payer: COMMERCIAL

## 2024-05-24 ENCOUNTER — OFFICE VISIT (OUTPATIENT)
Dept: ONCOLOGY | Age: 64
End: 2024-05-24
Payer: COMMERCIAL

## 2024-05-24 VITALS
OXYGEN SATURATION: 97 % | BODY MASS INDEX: 19.85 KG/M2 | WEIGHT: 134 LBS | SYSTOLIC BLOOD PRESSURE: 109 MMHG | TEMPERATURE: 98.8 F | HEART RATE: 83 BPM | DIASTOLIC BLOOD PRESSURE: 73 MMHG | HEIGHT: 69 IN

## 2024-05-24 DIAGNOSIS — K31.89 DUODENAL MASS: Primary | ICD-10-CM

## 2024-05-24 DIAGNOSIS — K31.89 DUODENAL MASS: ICD-10-CM

## 2024-05-24 LAB
ALBUMIN SERPL-MCNC: 4.5 GM/DL (ref 3.4–5)
ALP BLD-CCNC: 492 IU/L (ref 40–129)
ALT SERPL-CCNC: 148 U/L (ref 10–40)
ANION GAP SERPL CALCULATED.3IONS-SCNC: 14 MMOL/L (ref 7–16)
AST SERPL-CCNC: 46 IU/L (ref 15–37)
BASOPHILS ABSOLUTE: 0 K/CU MM
BASOPHILS RELATIVE PERCENT: 0.2 % (ref 0–1)
BILIRUB SERPL-MCNC: 2.1 MG/DL (ref 0–1)
BUN SERPL-MCNC: 14 MG/DL (ref 6–23)
CALCIUM SERPL-MCNC: 9.3 MG/DL (ref 8.3–10.6)
CHLORIDE BLD-SCNC: 102 MMOL/L (ref 99–110)
CO2: 22 MMOL/L (ref 21–32)
CREAT SERPL-MCNC: 0.9 MG/DL (ref 0.9–1.3)
DIFFERENTIAL TYPE: ABNORMAL
EOSINOPHILS ABSOLUTE: 0.2 K/CU MM
EOSINOPHILS RELATIVE PERCENT: 2.2 % (ref 0–3)
GFR, ESTIMATED: >90 ML/MIN/1.73M2
GLUCOSE SERPL-MCNC: 85 MG/DL (ref 70–99)
HCT VFR BLD CALC: 38.2 % (ref 42–52)
HEMOGLOBIN: 13 GM/DL (ref 13.5–18)
LYMPHOCYTES ABSOLUTE: 1.7 K/CU MM
LYMPHOCYTES RELATIVE PERCENT: 19.5 % (ref 24–44)
MCH RBC QN AUTO: 29.3 PG (ref 27–31)
MCHC RBC AUTO-ENTMCNC: 34 % (ref 32–36)
MCV RBC AUTO: 86 FL (ref 78–100)
MONOCYTES ABSOLUTE: 0.8 K/CU MM
MONOCYTES RELATIVE PERCENT: 8.4 % (ref 0–4)
NEUTROPHILS ABSOLUTE: 6.2 K/CU MM
NEUTROPHILS RELATIVE PERCENT: 69.7 % (ref 36–66)
PDW BLD-RTO: 15.6 % (ref 11.7–14.9)
PLATELET # BLD: 307 K/CU MM (ref 140–440)
PMV BLD AUTO: 9.4 FL (ref 7.5–11.1)
POTASSIUM SERPL-SCNC: 3.7 MMOL/L (ref 3.5–5.1)
RBC # BLD: 4.44 M/CU MM (ref 4.6–6.2)
SODIUM BLD-SCNC: 138 MMOL/L (ref 135–145)
TOTAL PROTEIN: 6.9 GM/DL (ref 6.4–8.2)
WBC # BLD: 8.9 K/CU MM (ref 4–10.5)

## 2024-05-24 PROCEDURE — 99211 OFF/OP EST MAY X REQ PHY/QHP: CPT

## 2024-05-24 PROCEDURE — 3074F SYST BP LT 130 MM HG: CPT | Performed by: INTERNAL MEDICINE

## 2024-05-24 PROCEDURE — 1111F DSCHRG MED/CURRENT MED MERGE: CPT | Performed by: INTERNAL MEDICINE

## 2024-05-24 PROCEDURE — 3078F DIAST BP <80 MM HG: CPT | Performed by: INTERNAL MEDICINE

## 2024-05-24 PROCEDURE — 80053 COMPREHEN METABOLIC PANEL: CPT

## 2024-05-24 PROCEDURE — G8420 CALC BMI NORM PARAMETERS: HCPCS | Performed by: INTERNAL MEDICINE

## 2024-05-24 PROCEDURE — 3017F COLORECTAL CA SCREEN DOC REV: CPT | Performed by: INTERNAL MEDICINE

## 2024-05-24 PROCEDURE — 4004F PT TOBACCO SCREEN RCVD TLK: CPT | Performed by: INTERNAL MEDICINE

## 2024-05-24 PROCEDURE — 85025 COMPLETE CBC W/AUTO DIFF WBC: CPT

## 2024-05-24 PROCEDURE — G8427 DOCREV CUR MEDS BY ELIG CLIN: HCPCS | Performed by: INTERNAL MEDICINE

## 2024-05-24 PROCEDURE — 36415 COLL VENOUS BLD VENIPUNCTURE: CPT

## 2024-05-24 PROCEDURE — 99205 OFFICE O/P NEW HI 60 MIN: CPT | Performed by: INTERNAL MEDICINE

## 2024-05-24 ASSESSMENT — PATIENT HEALTH QUESTIONNAIRE - PHQ9
SUM OF ALL RESPONSES TO PHQ9 QUESTIONS 1 & 2: 0
SUM OF ALL RESPONSES TO PHQ QUESTIONS 1-9: 0
1. LITTLE INTEREST OR PLEASURE IN DOING THINGS: NOT AT ALL
2. FEELING DOWN, DEPRESSED OR HOPELESS: NOT AT ALL

## 2024-05-24 NOTE — PROGRESS NOTES
MA Rooming Questions  Patient: Jama Ramirez  MRN: [id number]    Date: 5/24/2024        NEW PATIENT     5. Did the patient have a depression screening completed today? Yes    PHQ-9 Total Score: 0 (5/24/2024  3:19 PM)       PHQ-9 Given to (if applicable):               PHQ-9 Score (if applicable):                     [] Positive     []  Negative              Does question #9 need addressed (if applicable)                     [] Yes    []  No               Cristina Gilbert, [facility]    
the tongue every 8 hours as needed for Nausea or Vomiting 30 tablet 3    ipratropium-albuterol (DUONEB) 0.5-2.5 (3) MG/3ML SOLN nebulizer solution as needed       Nebulizers (MICRO AIR NEBULIZER) MISC TO USE AS NEEDED WITH NEBULIZER SOLUTION (Patient not taking: Reported on 3/21/2024)  11    tiotropium (SPIRIVA HANDIHALER) 18 MCG inhalation capsule Inhale 1 capsule into the lungs daily 30 capsule 3    albuterol sulfate HFA (PROVENTIL HFA) 108 (90 BASE) MCG/ACT inhaler Inhale 2 puffs into the lungs every 6 hours as needed for Wheezing 1 Inhaler 3     No current facility-administered medications on file prior to visit.        Review of Systems:    Constitutional:  No fever, No chills, No night sweats.  Energy level very poor  Eyes:  No diplopia, No transient or permanent loss of vision, No scotomata.  ENT / Mouth:  No epistaxis, No dysphagia, No hoarseness, No oral ulcers, No gingival bleeding.  No sore throat, No postnasal drip, No nasal drip, No mouth pain, No sinus pain, No tinnitus, Normal hearing.  Cardiovascular:  No chest pain, No palpitations, No syncope, No upper extremity edema, No lower extremity edema, No calf discomfort.  Respiratory:  No cough.  No hemoptysis, No pleurisy, No wheezing, No dyspnea.  Gastrointestinal: As per HPI  Musculoskeletal:  No muscle pain, No swollen joints, No joint redness, No bone pain, No spine tenderness.  Skin:  No rash, No nodules, No pruritus, No lesions.  Neurologic:  No confusion, No seizures, No syncope, No tremor, No speech change, No headache, No hiccups, No abnormal gait, No sensory changes, No weakness.  Hematologic:  No epistaxis, No gingival bleeding, No petechiae, No ecchymosis.  Lymphatic:  No lymphadenopathy, No lymphedema.  Allergy / Immunologic:  No eczema, No frequent mucous infections, No frequent respiratory infections, No recurrent urticarial, No frequent skin infections.     Vital Signs: /73 (Site: Left Upper Arm, Position: Sitting, Cuff Size: Medium

## 2024-05-24 NOTE — RESULT ENCOUNTER NOTE
Your surgical pathology showed no malignancy or dysplasia. Dr. Wetzel wants to discuss this with you at the follow up apt in more detail.

## 2024-05-28 ENCOUNTER — TELEPHONE (OUTPATIENT)
Dept: ONCOLOGY | Age: 64
End: 2024-05-28

## 2024-05-28 NOTE — TELEPHONE ENCOUNTER
5/28/24 - spoke w/ pt for the 6/3/24 Pet scan at [facility] arrival time of 8:00 am for an 8:30 am appt. NPO 6 hours prior  - plain water only.

## 2024-05-30 ENCOUNTER — TELEPHONE (OUTPATIENT)
Dept: CARDIOTHORACIC SURGERY | Age: 64
End: 2024-05-30

## 2024-05-30 NOTE — TELEPHONE ENCOUNTER
Spoke w/ pt to reschedule today's appt to tomorrow 5/31/24 @9:30am due to provider being called to emergent surgery. Pt voiced understanding.

## 2024-05-31 ENCOUNTER — INITIAL CONSULT (OUTPATIENT)
Dept: CARDIOTHORACIC SURGERY | Age: 64
End: 2024-05-31
Payer: COMMERCIAL

## 2024-05-31 ENCOUNTER — TELEPHONE (OUTPATIENT)
Dept: CARDIOTHORACIC SURGERY | Age: 64
End: 2024-05-31

## 2024-05-31 VITALS
HEIGHT: 68 IN | OXYGEN SATURATION: 100 % | WEIGHT: 140.2 LBS | SYSTOLIC BLOOD PRESSURE: 100 MMHG | DIASTOLIC BLOOD PRESSURE: 82 MMHG | HEART RATE: 51 BPM | BODY MASS INDEX: 21.25 KG/M2

## 2024-05-31 DIAGNOSIS — I77.79 DISSECTION OF MESENTERIC ARTERY (HCC): Primary | ICD-10-CM

## 2024-05-31 PROCEDURE — 3079F DIAST BP 80-89 MM HG: CPT | Performed by: THORACIC SURGERY (CARDIOTHORACIC VASCULAR SURGERY)

## 2024-05-31 PROCEDURE — G8420 CALC BMI NORM PARAMETERS: HCPCS | Performed by: THORACIC SURGERY (CARDIOTHORACIC VASCULAR SURGERY)

## 2024-05-31 PROCEDURE — G8427 DOCREV CUR MEDS BY ELIG CLIN: HCPCS | Performed by: THORACIC SURGERY (CARDIOTHORACIC VASCULAR SURGERY)

## 2024-05-31 PROCEDURE — 3017F COLORECTAL CA SCREEN DOC REV: CPT | Performed by: THORACIC SURGERY (CARDIOTHORACIC VASCULAR SURGERY)

## 2024-05-31 PROCEDURE — 4004F PT TOBACCO SCREEN RCVD TLK: CPT | Performed by: THORACIC SURGERY (CARDIOTHORACIC VASCULAR SURGERY)

## 2024-05-31 PROCEDURE — 3074F SYST BP LT 130 MM HG: CPT | Performed by: THORACIC SURGERY (CARDIOTHORACIC VASCULAR SURGERY)

## 2024-05-31 PROCEDURE — 1111F DSCHRG MED/CURRENT MED MERGE: CPT | Performed by: THORACIC SURGERY (CARDIOTHORACIC VASCULAR SURGERY)

## 2024-05-31 PROCEDURE — 99214 OFFICE O/P EST MOD 30 MIN: CPT | Performed by: THORACIC SURGERY (CARDIOTHORACIC VASCULAR SURGERY)

## 2024-05-31 RX ORDER — ROPINIROLE 0.5 MG/1
TABLET, FILM COATED ORAL
COMMUNITY
Start: 2024-05-30

## 2024-05-31 NOTE — PATIENT INSTRUCTIONS
Thank you for allowing us to care for you today!   We want to ensure we can follow your treatment plan and we strive to give you the best outcomes and experience possible.   If you ever have a life threatening emergency and call 911 - for an ambulance (EMS)   Our providers can only care for you at:   [facility] or [facility].   Even if you have someone take you or you drive yourself we can only care for you in a [facility] facility. Our providers are not setup at the other healthcare locations!   **It is YOUR responsibilty to bring medication bottles and/or updated medication list to EACH APPOINTMENT. This will allow us to better serve you and all your healthcare needs**  We are committed to providing you the best care possible.    If you receive a survey after visiting one of our offices, please take time to share your experience concerning your physician office visit.  These surveys are confidential and no health information about you is shared.    We are eager to improve for you and we are counting on your feedback to help make that happen.

## 2024-05-31 NOTE — PROGRESS NOTES
Determinants of Health     Financial Resource Strain: Not on file   Food Insecurity: Food Insecurity Present (5/22/2024)    Hunger Vital Sign     Worried About Running Out of Food in the Last Year: Sometimes true     Ran Out of Food in the Last Year: Sometimes true   Transportation Needs: No Transportation Needs (5/17/2024)    PRAPARE - Transportation     Lack of Transportation (Medical): No     Lack of Transportation (Non-Medical): No   Physical Activity: Not on file   Stress: Not on file   Social Connections: Not on file   Intimate Partner Violence: Not on file   Housing Stability: Low Risk  (5/17/2024)    Housing Stability Vital Sign     Unable to Pay for Housing in the Last Year: No     Number of Places Lived in the Last Year: 1     Unstable Housing in the Last Year: No       Family History:  Family History   Problem Relation Age of Onset    Dementia Mother     Stroke Mother     Stroke Father     High Blood Pressure Father     Heart Disease Father     Breast Cancer Sister     Diabetes Brother     Cancer Brother         mouth and throat    Heart Attack Maternal Uncle     Heart Disease Maternal Uncle     No Known Problems Son     No Known Problems Son     No Known Problems Daughter         Allergies:  Allergies   Allergen Reactions    Bee Venom Anaphylaxis     \"swell up like a balloon in face/throat\"      Nsaids Other (See Comments)     Can take small doses for a short time - Not to take any longer than necessary stomach ulcer       Current Medications:    Current Outpatient Medications:     lipase-protease-amylase (CREON) 89586-02401 units delayed release capsule, Take 1 capsule by mouth 3 times daily (with meals), Disp: , Rfl:     rOPINIRole (REQUIP) 0.5 MG tablet, , Disp: , Rfl:     omeprazole (PRILOSEC) 40 MG delayed release capsule, Take 1 capsule by mouth every morning (before breakfast), Disp: 30 capsule, Rfl: 1    aspirin 81 MG chewable tablet, Take 1 tablet by mouth daily, Disp: 30 tablet, Rfl: 3

## 2024-05-31 NOTE — TELEPHONE ENCOUNTER
Called pt to schedule 3 mon f/u from 5/31/2024 (s/p Whipple procedure) with Dr. Arshad. Pt did not answer so LM to call back to office and schedule the appt. I put a tentative appointment in the appt desk for when pt should be seen. If pt calls back and the appt works for him then confirm appt and leave it. Please advise.

## 2024-06-03 ENCOUNTER — HOSPITAL ENCOUNTER (OUTPATIENT)
Dept: PET IMAGING | Age: 64
Discharge: HOME | End: 2024-06-03
Attending: INTERNAL MEDICINE
Payer: COMMERCIAL

## 2024-06-03 DIAGNOSIS — K31.89 DUODENAL MASS: ICD-10-CM

## 2024-06-03 PROCEDURE — A9609 HC RX DIAGNOSTIC RADIOPHARMACEUTICAL: HCPCS | Performed by: INTERNAL MEDICINE

## 2024-06-03 PROCEDURE — 2580000003 HC RX 258: Performed by: INTERNAL MEDICINE

## 2024-06-03 PROCEDURE — 3430000000 HC RX DIAGNOSTIC RADIOPHARMACEUTICAL: Performed by: INTERNAL MEDICINE

## 2024-06-03 PROCEDURE — 78815 PET IMAGE W/CT SKULL-THIGH: CPT

## 2024-06-03 RX ORDER — FLUDEOXYGLUCOSE F 18 200 MCI/ML
15.54 INJECTION, SOLUTION INTRAVENOUS
Status: COMPLETED | OUTPATIENT
Start: 2024-06-03 | End: 2024-06-03

## 2024-06-03 RX ORDER — SODIUM CHLORIDE 0.9 % (FLUSH) 0.9 %
10 SYRINGE (ML) INJECTION PRN
Status: COMPLETED | OUTPATIENT
Start: 2024-06-03 | End: 2024-06-03

## 2024-06-03 RX ADMIN — FLUDEOXYGLUCOSE F 18 15.54 MILLICURIE: 200 INJECTION, SOLUTION INTRAVENOUS at 08:25

## 2024-06-03 RX ADMIN — SODIUM CHLORIDE, PRESERVATIVE FREE 10 ML: 5 INJECTION INTRAVENOUS at 08:25

## 2024-06-04 ENCOUNTER — CLINICAL DOCUMENTATION (OUTPATIENT)
Dept: ONCOLOGY | Age: 64
End: 2024-06-04

## 2024-06-04 NOTE — PROGRESS NOTES
6/4/24 - received a call from Vasquez CLARKE at [employer] - the Pet Ct has been approved 6/3/24 - 7/2/24 approval # [id number]

## 2024-06-20 ENCOUNTER — OFFICE VISIT (OUTPATIENT)
Dept: ONCOLOGY | Age: 64
End: 2024-06-20
Payer: COMMERCIAL

## 2024-06-20 ENCOUNTER — HOSPITAL ENCOUNTER (OUTPATIENT)
Dept: INFUSION THERAPY | Age: 64
Discharge: HOME | End: 2024-06-20
Payer: COMMERCIAL

## 2024-06-20 ENCOUNTER — OFFICE VISIT (OUTPATIENT)
Dept: CARDIOLOGY CLINIC | Age: 64
End: 2024-06-20
Payer: COMMERCIAL

## 2024-06-20 VITALS
SYSTOLIC BLOOD PRESSURE: 110 MMHG | DIASTOLIC BLOOD PRESSURE: 72 MMHG | HEART RATE: 74 BPM | BODY MASS INDEX: 20.97 KG/M2 | HEIGHT: 69 IN | WEIGHT: 141.6 LBS | OXYGEN SATURATION: 98 %

## 2024-06-20 VITALS
DIASTOLIC BLOOD PRESSURE: 80 MMHG | HEART RATE: 66 BPM | BODY MASS INDEX: 20.65 KG/M2 | TEMPERATURE: 97.8 F | HEIGHT: 69 IN | SYSTOLIC BLOOD PRESSURE: 123 MMHG | OXYGEN SATURATION: 96 % | WEIGHT: 139.4 LBS

## 2024-06-20 DIAGNOSIS — K31.89 DUODENAL MASS: ICD-10-CM

## 2024-06-20 DIAGNOSIS — F17.219 CIGARETTE NICOTINE DEPENDENCE WITH NICOTINE-INDUCED DISORDER: ICD-10-CM

## 2024-06-20 DIAGNOSIS — I10 ESSENTIAL HYPERTENSION: Primary | ICD-10-CM

## 2024-06-20 DIAGNOSIS — K31.89 DUODENAL MASS: Primary | ICD-10-CM

## 2024-06-20 DIAGNOSIS — E78.5 DYSLIPIDEMIA: ICD-10-CM

## 2024-06-20 LAB
ALBUMIN SERPL-MCNC: 4.4 GM/DL (ref 3.4–5)
ALP BLD-CCNC: 158 IU/L (ref 40–128)
ALT SERPL-CCNC: 8 U/L (ref 10–40)
ANION GAP SERPL CALCULATED.3IONS-SCNC: 15 MMOL/L (ref 7–16)
AST SERPL-CCNC: 13 IU/L (ref 15–37)
BASOPHILS ABSOLUTE: 0 K/CU MM
BASOPHILS RELATIVE PERCENT: 0.3 % (ref 0–1)
BILIRUB SERPL-MCNC: 0.4 MG/DL (ref 0–1)
BUN SERPL-MCNC: 12 MG/DL (ref 6–23)
CALCIUM SERPL-MCNC: 9.1 MG/DL (ref 8.3–10.6)
CHLORIDE BLD-SCNC: 105 MMOL/L (ref 99–110)
CO2: 21 MMOL/L (ref 21–32)
CREAT SERPL-MCNC: 0.8 MG/DL (ref 0.9–1.3)
CRP SERPL HS-MCNC: 13.2 MG/L
DIFFERENTIAL TYPE: ABNORMAL
EOSINOPHILS ABSOLUTE: 0.4 K/CU MM
EOSINOPHILS RELATIVE PERCENT: 6.9 % (ref 0–3)
GFR, ESTIMATED: >90 ML/MIN/1.73M2
GLUCOSE SERPL-MCNC: 87 MG/DL (ref 70–99)
HCT VFR BLD CALC: 35.6 % (ref 42–52)
HEMOGLOBIN: 11.9 GM/DL (ref 13.5–18)
LYMPHOCYTES ABSOLUTE: 1.7 K/CU MM
LYMPHOCYTES RELATIVE PERCENT: 27.8 % (ref 24–44)
MCH RBC QN AUTO: 28.7 PG (ref 27–31)
MCHC RBC AUTO-ENTMCNC: 33.4 % (ref 32–36)
MCV RBC AUTO: 86 FL (ref 78–100)
MONOCYTES ABSOLUTE: 0.5 K/CU MM
MONOCYTES RELATIVE PERCENT: 7.7 % (ref 0–4)
NEUTROPHILS ABSOLUTE: 3.6 K/CU MM
NEUTROPHILS RELATIVE PERCENT: 57.3 % (ref 36–66)
PDW BLD-RTO: 14.9 % (ref 11.7–14.9)
PLATELET # BLD: 285 K/CU MM (ref 140–440)
PMV BLD AUTO: 8.5 FL (ref 7.5–11.1)
POTASSIUM SERPL-SCNC: 4.1 MMOL/L (ref 3.5–5.1)
PROCALCITONIN SERPL-MCNC: 0.08 NG/ML
RBC # BLD: 4.14 M/CU MM (ref 4.6–6.2)
SODIUM BLD-SCNC: 141 MMOL/L (ref 135–145)
TOTAL PROTEIN: 6.7 GM/DL (ref 6.4–8.2)
WBC # BLD: 6.2 K/CU MM (ref 4–10.5)

## 2024-06-20 PROCEDURE — 3078F DIAST BP <80 MM HG: CPT | Performed by: INTERNAL MEDICINE

## 2024-06-20 PROCEDURE — 80053 COMPREHEN METABOLIC PANEL: CPT

## 2024-06-20 PROCEDURE — 3074F SYST BP LT 130 MM HG: CPT | Performed by: INTERNAL MEDICINE

## 2024-06-20 PROCEDURE — G8427 DOCREV CUR MEDS BY ELIG CLIN: HCPCS | Performed by: INTERNAL MEDICINE

## 2024-06-20 PROCEDURE — 1111F DSCHRG MED/CURRENT MED MERGE: CPT | Performed by: INTERNAL MEDICINE

## 2024-06-20 PROCEDURE — 3017F COLORECTAL CA SCREEN DOC REV: CPT | Performed by: INTERNAL MEDICINE

## 2024-06-20 PROCEDURE — 84145 PROCALCITONIN (PCT): CPT

## 2024-06-20 PROCEDURE — 85025 COMPLETE CBC W/AUTO DIFF WBC: CPT

## 2024-06-20 PROCEDURE — 99213 OFFICE O/P EST LOW 20 MIN: CPT | Performed by: INTERNAL MEDICINE

## 2024-06-20 PROCEDURE — 4004F PT TOBACCO SCREEN RCVD TLK: CPT | Performed by: INTERNAL MEDICINE

## 2024-06-20 PROCEDURE — 86140 C-REACTIVE PROTEIN: CPT

## 2024-06-20 PROCEDURE — 99211 OFF/OP EST MAY X REQ PHY/QHP: CPT

## 2024-06-20 PROCEDURE — 1036F TOBACCO NON-USER: CPT | Performed by: INTERNAL MEDICINE

## 2024-06-20 PROCEDURE — 99214 OFFICE O/P EST MOD 30 MIN: CPT | Performed by: INTERNAL MEDICINE

## 2024-06-20 PROCEDURE — 87040 BLOOD CULTURE FOR BACTERIA: CPT

## 2024-06-20 PROCEDURE — 3079F DIAST BP 80-89 MM HG: CPT | Performed by: INTERNAL MEDICINE

## 2024-06-20 PROCEDURE — G8420 CALC BMI NORM PARAMETERS: HCPCS | Performed by: INTERNAL MEDICINE

## 2024-06-20 PROCEDURE — 36415 COLL VENOUS BLD VENIPUNCTURE: CPT

## 2024-06-20 RX ORDER — METRONIDAZOLE 500 MG/1
500 TABLET ORAL 3 TIMES DAILY
Qty: 30 TABLET | Refills: 0 | Status: SHIPPED | OUTPATIENT
Start: 2024-06-20 | End: 2024-06-21

## 2024-06-20 RX ORDER — CLOPIDOGREL BISULFATE 75 MG/1
75 TABLET ORAL DAILY
Qty: 30 TABLET | Refills: 3 | Status: SHIPPED | OUTPATIENT
Start: 2024-06-20

## 2024-06-20 RX ORDER — BUPROPION HYDROCHLORIDE 150 MG/1
TABLET ORAL
COMMUNITY
Start: 2024-05-30

## 2024-06-20 RX ORDER — ATORVASTATIN CALCIUM 20 MG/1
TABLET, FILM COATED ORAL
COMMUNITY
Start: 2024-05-30

## 2024-06-20 RX ORDER — DOCUSATE SODIUM 100 MG/1
CAPSULE, LIQUID FILLED ORAL
COMMUNITY
Start: 2024-05-30

## 2024-06-20 RX ORDER — LEVOFLOXACIN 500 MG/1
500 TABLET, FILM COATED ORAL DAILY
Qty: 7 TABLET | Refills: 0 | Status: SHIPPED | OUTPATIENT
Start: 2024-06-20 | End: 2024-06-21

## 2024-06-20 NOTE — PROGRESS NOTES
Patient Name:  Jama Ramirez  Patient :  1960  Patient MRN:  [id number]     Primary Oncologist: Bijal Herring MD  Referring Provider: Jennifer Gomez MD     Date of Service: 2024      Reason for Consult:      Chief Complaint:    Chief Complaint   Patient presents with    Follow-up    Results       Encounter Diagnosis   Name Primary?    Duodenal mass Yes        HPI:   2024, he arrived with his ex-wife to the clinic today.  Patient was admitted to [facility] on 2024 with abdominal pain and jaundice.  He was found to have a mass adjacent to the ampulla with mass effect on CBD and PD.  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  ERCP was performed to try to cannulate the pancreatic duct however unable to do this due to significant friability, ulceration and anatomic distortion.  IR guided external/internal biliary drain, bilirubin decreased from 6 to 1.2 upon discharge.  EGD with biopsy of the duodenal mass was done.  Pathology inconclusive.  CTA of the chest with no evidence of any pulmonary metastatic disease.  CA 19-9 was 39 and CEA was 5.5.  2024 today in the office he reported that he continues to have abdominal discomfort around the biliary drain.  Reported that he has chronic cough secondary to COPD.  Productive of clear sputum.  No hemoptysis.  Reported that he had a coughing spell which caused bloody drainage through the drain.  Lasted for 2 days and resolved.  Reported that he has very poor appetite.  Continues to lose weight.  Reported fatigue and tiredness.  6/3/2024 PET scan with focal area of hypermetabolic FDG uptake demonstrated within the ampullary region near the medial descending duodenum/ pancreatic head.  SUV is 5.7.  No abnormal hypermetabolic uptake detected within the neck or chest.  No other distant metastatic disease is appreciated.      Past Medical History:     copd

## 2024-06-20 NOTE — PATIENT INSTRUCTIONS
reflux noted in the veins of the bilateral lower extremity.     Patient more than likely has Arthritis problem. Need to see a Rheumatologist.  Need to stop smoking.     TESTS ORDERED:none this visit      PREVIOUSLY ORDERED TESTS REVIEWED & DISCUSSED WITH THE PATIENT:     I personally reviewed & interpreted, all previously ordered tests as copied above. Latest Labs are pulled in to the note with dates.   Labs, specially in Reference to Lipid profile, Cardiac testing in the form of Echo ( dated: ), stress tests ( dated: ) & other relevant cardiac testing reviewed with patient & recommendations made based on assessment of the results.    Discussed role of Cardiac risk factors & effects + treatment of co morbidities with patient & advised accordingly.     MEDICATIONS: List of medications patient is currently taking is reviewed in detail with the patient. Discussed any side effects or problems taking the medication.     Recommend Continue present management & medications as listed.     AFFIRMATION: I spent at least 20 minutes of time reviewing patient's history, previous & current medical problems & all Labs + testing. This includes chart prep even prior to the vosit. Various goals are discussed and multiple questions answered.Relevant concelling performed.     Office follow up in six months.

## 2024-06-20 NOTE — PROGRESS NOTES
OFFICE PROGRESS NOTES      Jama is a 63 y.o. male who has    CHIEF COMPLAINT AS FOLLOWS:  CHEST PAIN:  Patient denies any C/O chest pains at this time.                               SOB: No C/O SOB at this time.          LEG EDEMA: STILL C/O Ankle & foot EDEMA,    PALPITATIONS: Denies any C/O Palpitations                                   DIZZINESS: C/O positional Dizziness but no black out spells   SYNCOPE: None   OTHER/ ADDITIONAL COMPLAINTS:                                     HPI: Patient is here for F/U on his HTN & Dyslipidemia problems.     HTN: Patient has known essential HTN. Has been treated with guideline recommended medical / physical/ diet therapy as stated below.  Dyslipidemia: Patient has known mixed dyslipidemia. Has been treated with guideline recommended medical / physical/ diet therapy as stated below.                Current Outpatient Medications   Medication Sig Dispense Refill    clopidogrel (PLAVIX) 75 MG tablet Take 1 tablet by mouth daily . Start only 24 hour after the procedure. Take 4 tablets on day 1 then one table once daily. 30 tablet 3    lipase-protease-amylase (CREON) 48625-78596 units delayed release capsule Take 1 capsule by mouth 3 times daily (with meals)      rOPINIRole (REQUIP) 0.5 MG tablet       omeprazole (PRILOSEC) 40 MG delayed release capsule Take 1 capsule by mouth every morning (before breakfast) 30 capsule 1    aspirin 81 MG chewable tablet Take 1 tablet by mouth daily 30 tablet 3    vitamin D (ERGOCALCIFEROL) 1.25 MG (52401 UT) CAPS capsule Take 1 capsule by mouth once a week 12 capsule 1    verapamil (VERELAN) 120 MG extended release capsule Take 1 capsule by mouth daily 30 capsule 5    tamsulosin (FLOMAX) 0.4 MG capsule Take 1 capsule by mouth daily 30 capsule 0    dicyclomine (BENTYL) 20 MG tablet Take 1 tablet by mouth 4 times daily as needed (Abdominal pain, spasm) 20 tablet 0    hydrocortisone (ANUSOL-HC) 2.5 % CREA rectal cream APPLY TO HEMMORROIDS

## 2024-06-20 NOTE — PROGRESS NOTES
MA Rooming Questions  Patient: Jama Ramirez  MRN: [id number]    Date: 6/20/2024        1. Do you have any new issues?   yes - chest pain          2. Do you need any refills on medications?    no    3. Have you had any imaging done since your last visit?   yes - labs 5/24 pet ct 6/3    4. Have you been hospitalized or seen in the emergency room since your last visit here?   no    5. Did the patient have a depression screening completed today? No    No data recorded     PHQ-9 Given to (if applicable):               PHQ-9 Score (if applicable):                     [] Positive     []  Negative              Does question #9 need addressed (if applicable)                     [] Yes    []  No               Cristina Gilbert CMA

## 2024-06-21 ENCOUNTER — OFFICE VISIT (OUTPATIENT)
Dept: GASTROENTEROLOGY | Age: 64
End: 2024-06-21
Payer: COMMERCIAL

## 2024-06-21 ENCOUNTER — TELEPHONE (OUTPATIENT)
Dept: GASTROENTEROLOGY | Age: 64
End: 2024-06-21

## 2024-06-21 VITALS
DIASTOLIC BLOOD PRESSURE: 72 MMHG | TEMPERATURE: 97.5 F | HEIGHT: 69 IN | OXYGEN SATURATION: 96 % | WEIGHT: 139.6 LBS | BODY MASS INDEX: 20.68 KG/M2 | HEART RATE: 69 BPM | SYSTOLIC BLOOD PRESSURE: 126 MMHG

## 2024-06-21 DIAGNOSIS — R78.81 BACTEREMIA: ICD-10-CM

## 2024-06-21 DIAGNOSIS — K83.1 OBSTRUCTION OF BILE DUCT: Primary | ICD-10-CM

## 2024-06-21 DIAGNOSIS — K31.89 DUODENAL MASS: ICD-10-CM

## 2024-06-21 PROCEDURE — 3074F SYST BP LT 130 MM HG: CPT | Performed by: INTERNAL MEDICINE

## 2024-06-21 PROCEDURE — 99214 OFFICE O/P EST MOD 30 MIN: CPT | Performed by: INTERNAL MEDICINE

## 2024-06-21 PROCEDURE — 4004F PT TOBACCO SCREEN RCVD TLK: CPT | Performed by: INTERNAL MEDICINE

## 2024-06-21 PROCEDURE — G8420 CALC BMI NORM PARAMETERS: HCPCS | Performed by: INTERNAL MEDICINE

## 2024-06-21 PROCEDURE — 3078F DIAST BP <80 MM HG: CPT | Performed by: INTERNAL MEDICINE

## 2024-06-21 PROCEDURE — G8427 DOCREV CUR MEDS BY ELIG CLIN: HCPCS | Performed by: INTERNAL MEDICINE

## 2024-06-21 PROCEDURE — 3017F COLORECTAL CA SCREEN DOC REV: CPT | Performed by: INTERNAL MEDICINE

## 2024-06-21 RX ORDER — METRONIDAZOLE 500 MG/1
500 TABLET ORAL 3 TIMES DAILY
Qty: 42 TABLET | Refills: 0 | Status: SHIPPED | OUTPATIENT
Start: 2024-06-21 | End: 2024-07-05

## 2024-06-21 RX ORDER — CIPROFLOXACIN 500 MG/1
500 TABLET, FILM COATED ORAL 2 TIMES DAILY
Qty: 28 TABLET | Refills: 0 | Status: SHIPPED | OUTPATIENT
Start: 2024-06-21 | End: 2024-07-05

## 2024-06-21 RX ORDER — ASPIRIN 81 MG
1 TABLET, DELAYED RELEASE (ENTERIC COATED) ORAL DAILY
Qty: 7 TABLET | Refills: 0 | Status: SHIPPED | OUTPATIENT
Start: 2024-06-21

## 2024-06-21 NOTE — PROGRESS NOTES
Rx for colace tablets e-scribed to [facility] pharmacy for 1 week supply due to an interaction with the colace soft gels and the flagyl.

## 2024-06-21 NOTE — TELEPHONE ENCOUNTER
[facility] pharmacy called regarding ciprofloxacin. Pt received an antibiotic from dr Herring,  levofloxacin. Should the patient be on both medications? Need to cancel yours? Cancel Nima? Please Clarify.

## 2024-06-21 NOTE — PROGRESS NOTES
[facility] Gastroenterology and Hepatology             MD Zion Hamilton MD Carol Christensen, APRN-CNP       Lindsey Valles, APRN-CNP             [address] [place] [address]             [phone] fax [phone]        Gastroenterology Clinic Consultation    Zion Wetzel MD  Encounter Date: 06/21/24     CC: Follow-up (Pt states he's having bile coming up out of the whole, he's had a fever the last of days-just at night )       No referring provider defined for this encounter.     History obtained from: patient, medical records     Subjective:       Jama Ramirez is an 63 y.o. male with past medical history of arthritis, COPD, degenerative disc disease, GERD, hiatal hernia, recently diagnosed mass with obstructive jaundice who presents for Follow-up (Pt states he's having bile coming up out of the whole, he's had a fever the last of days-just at night )    Patient was recently evaluated by us earlier in May in the hospital when he was noted to have significant extrahepatic biliary dilation with CBD noted to be 16 mm.  He was also noted to have SMA dissection at that time and was evaluated by CT surgery.  Due to him needing Plavix and inability of time he was scheduled as an outpatient for possible EGD EUS ERCP on 5/17/2024.    Endoscopy report         -  The scope was passed under direct vision through the upper GI tract.  A             large amount of food (residue) was found in the gastric body.          -  A large infiltrative mass was found at the major papilla.          - The bile duct could not be cannulated with the Jagtome sphincterotome. Due             to significant friability, Ulceration and anatomic distortion only 1-2 attempts             were made to cannulate the bile duct, which was unsuccessful. Further attempts             were avoided. The decision was made to stop and switch to a GIF scope to get             biopsies

## 2024-06-22 NOTE — PROGRESS NOTES
inflammation  Neck: Trachea midline, no masses  Lymph: no LAD  Lungs: CTAB, no rales  Heart: regular rate and rhythm, S1, S2 normal, no murmur, no lower extremity edema  Abdomen: Soft, ND, NT. + BS.  Extremities: atraumatic, no cyanosis or edema.   Neurologic: CN 2-12 grossly intact.   Skin: No active rashes, warm and dry, no telangiectasias, no digital pitting, no sclerodactyly, no rheumatoid nodules, no livedo  MSK:   Wrist : tenderness+   HANDS: Bilateral CMC tendenress+,  ? Radnor neck deformity++, MCP and DIP tenderness+   Elbow: No synovitis, good ROM,   Shoulder:good ROM,   Knee: no effusion, good ROM,   Ankle: left synovitis +  FEET: pos forefoot squeeze test    Spine:  no tender points, no obvious deformities.    Neuro:  Alert & oriented x 3,  Muscle strength: 4/4 in bilateral upper and lower extremities.    Psychiatric: Mood and affect are appropriate, recent and remote memory normal,      LABS AND IMAGING  Available labs were reviewed and discussed with patient   1/15/2024  WBC 10.6, H  Hemoglobin 13.3, L  Platelets normal  Creatinine normal  LFTs normal    Assessment   Patient is presenting with joint pain and swelling, likely 2/2 pancreatic head mass. Bilateral lower extremity symptoms have improved since biliary drain was inserted. Concern for autoimmune process is low. He is planned for whipple procedure on 7/16. Will follow and monitot leg swelling for now.     Polyarthralgia    Elevated C-reactive protein (CRP)      Patient Instructions  RTC in 2 months      -  The patient indicates understanding of these issues and agrees with the plan.    I spent  30  minutes on the date of service, preparing to see the patient (eg, review of tests), obtaining and/or reviewing separately obtained history, counseling, ordering medications, tests, or procedures, documenting clinical information in the electronic or other health record and in care coordination.This note was dictated with voice recognition

## 2024-06-23 LAB
CULTURE: NORMAL
CULTURE: NORMAL
Lab: NORMAL
Lab: NORMAL
SPECIMEN: NORMAL
SPECIMEN: NORMAL

## 2024-06-25 ENCOUNTER — OFFICE VISIT (OUTPATIENT)
Dept: RHEUMATOLOGY | Age: 64
End: 2024-06-25
Payer: COMMERCIAL

## 2024-06-25 ENCOUNTER — TELEPHONE (OUTPATIENT)
Dept: ONCOLOGY | Age: 64
End: 2024-06-25

## 2024-06-25 VITALS
SYSTOLIC BLOOD PRESSURE: 110 MMHG | OXYGEN SATURATION: 97 % | DIASTOLIC BLOOD PRESSURE: 70 MMHG | BODY MASS INDEX: 20.37 KG/M2 | WEIGHT: 138 LBS | HEART RATE: 71 BPM

## 2024-06-25 DIAGNOSIS — M25.50 POLYARTHRALGIA: Primary | ICD-10-CM

## 2024-06-25 DIAGNOSIS — R79.82 ELEVATED C-REACTIVE PROTEIN (CRP): ICD-10-CM

## 2024-06-25 LAB
CULTURE: NORMAL
CULTURE: NORMAL
Lab: NORMAL
Lab: NORMAL
SPECIMEN: NORMAL
SPECIMEN: NORMAL

## 2024-06-25 PROCEDURE — G8428 CUR MEDS NOT DOCUMENT: HCPCS | Performed by: STUDENT IN AN ORGANIZED HEALTH CARE EDUCATION/TRAINING PROGRAM

## 2024-06-25 PROCEDURE — G8420 CALC BMI NORM PARAMETERS: HCPCS | Performed by: STUDENT IN AN ORGANIZED HEALTH CARE EDUCATION/TRAINING PROGRAM

## 2024-06-25 PROCEDURE — 4004F PT TOBACCO SCREEN RCVD TLK: CPT | Performed by: STUDENT IN AN ORGANIZED HEALTH CARE EDUCATION/TRAINING PROGRAM

## 2024-06-25 PROCEDURE — 99214 OFFICE O/P EST MOD 30 MIN: CPT | Performed by: STUDENT IN AN ORGANIZED HEALTH CARE EDUCATION/TRAINING PROGRAM

## 2024-06-25 PROCEDURE — 3017F COLORECTAL CA SCREEN DOC REV: CPT | Performed by: STUDENT IN AN ORGANIZED HEALTH CARE EDUCATION/TRAINING PROGRAM

## 2024-06-25 PROCEDURE — 3078F DIAST BP <80 MM HG: CPT | Performed by: STUDENT IN AN ORGANIZED HEALTH CARE EDUCATION/TRAINING PROGRAM

## 2024-06-25 PROCEDURE — 3074F SYST BP LT 130 MM HG: CPT | Performed by: STUDENT IN AN ORGANIZED HEALTH CARE EDUCATION/TRAINING PROGRAM

## 2024-06-26 ENCOUNTER — CLINICAL DOCUMENTATION (OUTPATIENT)
Dept: ONCOLOGY | Age: 64
End: 2024-06-26

## 2024-06-26 DIAGNOSIS — K31.89 DUODENAL MASS: Primary | ICD-10-CM

## 2024-06-26 RX ORDER — HYDROCODONE BITARTRATE AND ACETAMINOPHEN 5; 325 MG/1; MG/1
1 TABLET ORAL EVERY 12 HOURS PRN
Qty: 30 TABLET | Refills: 0 | Status: SHIPPED | OUTPATIENT
Start: 2024-06-26 | End: 2024-07-11

## 2024-06-26 NOTE — TELEPHONE ENCOUNTER
This nurse called the patient @ [phone] to review lab results. Patient was notified of his labs being WNL. Patient verbalized understanding and denies further needs at this time.

## 2024-06-26 NOTE — PROGRESS NOTES
Patient requesting pain medication and c/o insomnia. Physician aware and prescribed norco 5-325 mg Q12 hours PRN. Advised to try OTC melatonin 3 to 5 mg to help with sleep. Called patient @ [phone] to notify. Voices understanding. Patient denites further needs at this time.

## 2024-06-27 ENCOUNTER — APPOINTMENT (OUTPATIENT)
Dept: GENERAL RADIOLOGY | Age: 64
End: 2024-06-27
Payer: COMMERCIAL

## 2024-06-27 ENCOUNTER — HOSPITAL ENCOUNTER (INPATIENT)
Age: 64
LOS: 2 days | Discharge: HOME | End: 2024-06-29
Attending: EMERGENCY MEDICINE | Admitting: INTERNAL MEDICINE
Payer: COMMERCIAL

## 2024-06-27 ENCOUNTER — APPOINTMENT (OUTPATIENT)
Dept: CT IMAGING | Age: 64
End: 2024-06-27
Payer: COMMERCIAL

## 2024-06-27 DIAGNOSIS — R11.0 NAUSEA: ICD-10-CM

## 2024-06-27 DIAGNOSIS — K56.7 ILEUS (HCC): Primary | ICD-10-CM

## 2024-06-27 DIAGNOSIS — Z85.9 HISTORY OF CANCER: ICD-10-CM

## 2024-06-27 DIAGNOSIS — R10.9 ABDOMINAL PAIN, UNSPECIFIED ABDOMINAL LOCATION: ICD-10-CM

## 2024-06-27 PROBLEM — K56.600 PARTIAL SMALL BOWEL OBSTRUCTION (HCC): Status: ACTIVE | Noted: 2024-06-27

## 2024-06-27 LAB
ALBUMIN SERPL-MCNC: 4.3 GM/DL (ref 3.4–5)
ALP BLD-CCNC: 129 IU/L (ref 40–128)
ALT SERPL-CCNC: 10 U/L (ref 10–40)
ANION GAP SERPL CALCULATED.3IONS-SCNC: 13 MMOL/L (ref 7–16)
AST SERPL-CCNC: 21 IU/L (ref 15–37)
BASOPHILS ABSOLUTE: 0.1 K/CU MM
BASOPHILS RELATIVE PERCENT: 0.7 % (ref 0–1)
BILIRUB SERPL-MCNC: 0.4 MG/DL (ref 0–1)
BILIRUBIN, URINE: NEGATIVE MG/DL
BLOOD, URINE: NEGATIVE
BUN SERPL-MCNC: 15 MG/DL (ref 6–23)
CALCIUM SERPL-MCNC: 8.9 MG/DL (ref 8.3–10.6)
CHLORIDE BLD-SCNC: 105 MMOL/L (ref 99–110)
CLARITY, UA: CLEAR
CO2: 21 MMOL/L (ref 21–32)
COLOR, UA: YELLOW
COMMENT UA: NORMAL
CREAT SERPL-MCNC: 0.7 MG/DL (ref 0.9–1.3)
CRP SERPL HS-MCNC: 3 MG/L
DIFFERENTIAL TYPE: ABNORMAL
EOSINOPHILS ABSOLUTE: 0.4 K/CU MM
EOSINOPHILS RELATIVE PERCENT: 3.8 % (ref 0–3)
GFR, ESTIMATED: >90 ML/MIN/1.73M2
GLUCOSE SERPL-MCNC: 81 MG/DL (ref 70–99)
GLUCOSE URINE: NEGATIVE MG/DL
HCT VFR BLD CALC: 36.3 % (ref 42–52)
HEMOGLOBIN: 11.8 GM/DL (ref 13.5–18)
IMMATURE NEUTROPHIL %: 0.3 % (ref 0–0.43)
KETONES, URINE: NEGATIVE MG/DL
LACTATE: 0.6 MMOL/L (ref 0.5–1.9)
LACTATE: 1.5 MMOL/L (ref 0.5–1.9)
LEUKOCYTE ESTERASE, URINE: NEGATIVE
LIPASE: 518 IU/L (ref 13–60)
LYMPHOCYTES ABSOLUTE: 1.3 K/CU MM
LYMPHOCYTES RELATIVE PERCENT: 14.2 % (ref 24–44)
MCH RBC QN AUTO: 28.5 PG (ref 27–31)
MCHC RBC AUTO-ENTMCNC: 32.5 % (ref 32–36)
MCV RBC AUTO: 87.7 FL (ref 78–100)
MONOCYTES ABSOLUTE: 0.7 K/CU MM
MONOCYTES RELATIVE PERCENT: 7.2 % (ref 0–4)
NEUTROPHILS ABSOLUTE: 6.7 K/CU MM
NEUTROPHILS RELATIVE PERCENT: 73.8 % (ref 36–66)
NITRITE URINE, QUANTITATIVE: NEGATIVE
NUCLEATED RBC %: 0 %
PDW BLD-RTO: 14.6 % (ref 11.7–14.9)
PH, URINE: 5.5 (ref 5–8)
PLATELET # BLD: 286 K/CU MM (ref 140–440)
PMV BLD AUTO: 8.9 FL (ref 7.5–11.1)
POTASSIUM SERPL-SCNC: 4.1 MMOL/L (ref 3.5–5.1)
PROCALCITONIN SERPL-MCNC: 0.06 NG/ML
PROTEIN UA: NEGATIVE MG/DL
RBC # BLD: 4.14 M/CU MM (ref 4.6–6.2)
SED RATE, AUTOMATED: 7 MM/HR (ref 0–20)
SODIUM BLD-SCNC: 139 MMOL/L (ref 135–145)
SPECIFIC GRAVITY UA: 1.01 (ref 1–1.03)
TOTAL IMMATURE NEUTOROPHIL: 0.03 K/CU MM
TOTAL NUCLEATED RBC: 0 K/CU MM
TOTAL PROTEIN: 6.9 GM/DL (ref 6.4–8.2)
UROBILINOGEN, URINE: 0.2 MG/DL (ref 0.2–1)
WBC # BLD: 9.1 K/CU MM (ref 4–10.5)

## 2024-06-27 PROCEDURE — 83690 ASSAY OF LIPASE: CPT

## 2024-06-27 PROCEDURE — 87086 URINE CULTURE/COLONY COUNT: CPT

## 2024-06-27 PROCEDURE — 81003 URINALYSIS AUTO W/O SCOPE: CPT

## 2024-06-27 PROCEDURE — 87040 BLOOD CULTURE FOR BACTERIA: CPT

## 2024-06-27 PROCEDURE — 2500000003 HC RX 250 WO HCPCS: Performed by: EMERGENCY MEDICINE

## 2024-06-27 PROCEDURE — 96376 TX/PRO/DX INJ SAME DRUG ADON: CPT

## 2024-06-27 PROCEDURE — 2140000000 HC CCU INTERMEDIATE R&B

## 2024-06-27 PROCEDURE — 96375 TX/PRO/DX INJ NEW DRUG ADDON: CPT

## 2024-06-27 PROCEDURE — 2580000003 HC RX 258: Performed by: EMERGENCY MEDICINE

## 2024-06-27 PROCEDURE — 83605 ASSAY OF LACTIC ACID: CPT

## 2024-06-27 PROCEDURE — 6360000004 HC RX CONTRAST MEDICATION: Performed by: EMERGENCY MEDICINE

## 2024-06-27 PROCEDURE — 93005 ELECTROCARDIOGRAM TRACING: CPT | Performed by: EMERGENCY MEDICINE

## 2024-06-27 PROCEDURE — 85652 RBC SED RATE AUTOMATED: CPT

## 2024-06-27 PROCEDURE — 96374 THER/PROPH/DIAG INJ IV PUSH: CPT

## 2024-06-27 PROCEDURE — 2580000003 HC RX 258: Performed by: INTERNAL MEDICINE

## 2024-06-27 PROCEDURE — 84145 PROCALCITONIN (PCT): CPT

## 2024-06-27 PROCEDURE — 6360000002 HC RX W HCPCS: Performed by: INTERNAL MEDICINE

## 2024-06-27 PROCEDURE — 6360000002 HC RX W HCPCS: Performed by: EMERGENCY MEDICINE

## 2024-06-27 PROCEDURE — 36415 COLL VENOUS BLD VENIPUNCTURE: CPT

## 2024-06-27 PROCEDURE — 96372 THER/PROPH/DIAG INJ SC/IM: CPT

## 2024-06-27 PROCEDURE — 80053 COMPREHEN METABOLIC PANEL: CPT

## 2024-06-27 PROCEDURE — 99285 EMERGENCY DEPT VISIT HI MDM: CPT

## 2024-06-27 PROCEDURE — 74177 CT ABD & PELVIS W/CONTRAST: CPT

## 2024-06-27 PROCEDURE — 86140 C-REACTIVE PROTEIN: CPT

## 2024-06-27 PROCEDURE — 94761 N-INVAS EAR/PLS OXIMETRY MLT: CPT

## 2024-06-27 PROCEDURE — 74022 RADEX COMPL AQT ABD SERIES: CPT

## 2024-06-27 PROCEDURE — 85025 COMPLETE CBC W/AUTO DIFF WBC: CPT

## 2024-06-27 PROCEDURE — 6370000000 HC RX 637 (ALT 250 FOR IP): Performed by: INTERNAL MEDICINE

## 2024-06-27 RX ORDER — MORPHINE SULFATE 4 MG/ML
4 INJECTION, SOLUTION INTRAMUSCULAR; INTRAVENOUS EVERY 30 MIN PRN
Status: DISCONTINUED | OUTPATIENT
Start: 2024-06-27 | End: 2024-06-29 | Stop reason: HOSPADM

## 2024-06-27 RX ORDER — BUPROPION HYDROCHLORIDE 150 MG/1
150 TABLET ORAL EVERY MORNING
Status: DISCONTINUED | OUTPATIENT
Start: 2024-06-28 | End: 2024-06-29 | Stop reason: HOSPADM

## 2024-06-27 RX ORDER — ATORVASTATIN CALCIUM 10 MG/1
20 TABLET, FILM COATED ORAL DAILY
Status: DISCONTINUED | OUTPATIENT
Start: 2024-06-28 | End: 2024-06-29 | Stop reason: HOSPADM

## 2024-06-27 RX ORDER — ONDANSETRON 2 MG/ML
4 INJECTION INTRAMUSCULAR; INTRAVENOUS EVERY 30 MIN PRN
Status: DISCONTINUED | OUTPATIENT
Start: 2024-06-27 | End: 2024-06-29 | Stop reason: HOSPADM

## 2024-06-27 RX ORDER — ROPINIROLE 0.25 MG/1
0.5 TABLET, FILM COATED ORAL NIGHTLY
Status: DISCONTINUED | OUTPATIENT
Start: 2024-06-28 | End: 2024-06-29 | Stop reason: HOSPADM

## 2024-06-27 RX ORDER — IPRATROPIUM BROMIDE AND ALBUTEROL SULFATE 2.5; .5 MG/3ML; MG/3ML
1 SOLUTION RESPIRATORY (INHALATION) EVERY 4 HOURS PRN
Status: DISCONTINUED | OUTPATIENT
Start: 2024-06-27 | End: 2024-06-29 | Stop reason: HOSPADM

## 2024-06-27 RX ORDER — ONDANSETRON 4 MG/1
4 TABLET, ORALLY DISINTEGRATING ORAL EVERY 8 HOURS PRN
Status: DISCONTINUED | OUTPATIENT
Start: 2024-06-27 | End: 2024-06-29 | Stop reason: HOSPADM

## 2024-06-27 RX ORDER — ACETAMINOPHEN 650 MG/1
650 SUPPOSITORY RECTAL EVERY 6 HOURS PRN
Status: DISCONTINUED | OUTPATIENT
Start: 2024-06-27 | End: 2024-06-29 | Stop reason: HOSPADM

## 2024-06-27 RX ORDER — POLYETHYLENE GLYCOL 3350 17 G/17G
17 POWDER, FOR SOLUTION ORAL DAILY PRN
Status: DISCONTINUED | OUTPATIENT
Start: 2024-06-27 | End: 2024-06-28

## 2024-06-27 RX ORDER — ALBUTEROL SULFATE 90 UG/1
2 AEROSOL, METERED RESPIRATORY (INHALATION) EVERY 6 HOURS PRN
Status: DISCONTINUED | OUTPATIENT
Start: 2024-06-27 | End: 2024-06-29 | Stop reason: HOSPADM

## 2024-06-27 RX ORDER — DICYCLOMINE HYDROCHLORIDE 10 MG/ML
20 INJECTION INTRAMUSCULAR ONCE
Status: COMPLETED | OUTPATIENT
Start: 2024-06-27 | End: 2024-06-27

## 2024-06-27 RX ORDER — ENOXAPARIN SODIUM 100 MG/ML
40 INJECTION SUBCUTANEOUS DAILY
Status: DISCONTINUED | OUTPATIENT
Start: 2024-06-28 | End: 2024-06-29 | Stop reason: HOSPADM

## 2024-06-27 RX ORDER — PROMETHAZINE HYDROCHLORIDE 25 MG/ML
25 INJECTION, SOLUTION INTRAMUSCULAR; INTRAVENOUS EVERY 6 HOURS PRN
Status: DISCONTINUED | OUTPATIENT
Start: 2024-06-27 | End: 2024-06-29 | Stop reason: HOSPADM

## 2024-06-27 RX ORDER — SODIUM CHLORIDE, SODIUM LACTATE, POTASSIUM CHLORIDE, CALCIUM CHLORIDE 600; 310; 30; 20 MG/100ML; MG/100ML; MG/100ML; MG/100ML
INJECTION, SOLUTION INTRAVENOUS CONTINUOUS
Status: DISCONTINUED | OUTPATIENT
Start: 2024-06-27 | End: 2024-06-29 | Stop reason: HOSPADM

## 2024-06-27 RX ORDER — POTASSIUM CHLORIDE 20 MEQ/1
40 TABLET, EXTENDED RELEASE ORAL PRN
Status: DISCONTINUED | OUTPATIENT
Start: 2024-06-27 | End: 2024-06-29 | Stop reason: HOSPADM

## 2024-06-27 RX ORDER — ACETAMINOPHEN 325 MG/1
650 TABLET ORAL EVERY 6 HOURS PRN
Status: DISCONTINUED | OUTPATIENT
Start: 2024-06-27 | End: 2024-06-29 | Stop reason: HOSPADM

## 2024-06-27 RX ORDER — CETIRIZINE HYDROCHLORIDE 10 MG/1
10 TABLET ORAL DAILY
Status: DISCONTINUED | OUTPATIENT
Start: 2024-06-27 | End: 2024-06-29 | Stop reason: HOSPADM

## 2024-06-27 RX ORDER — MAGNESIUM SULFATE IN WATER 40 MG/ML
2000 INJECTION, SOLUTION INTRAVENOUS PRN
Status: DISCONTINUED | OUTPATIENT
Start: 2024-06-27 | End: 2024-06-29 | Stop reason: HOSPADM

## 2024-06-27 RX ORDER — 0.9 % SODIUM CHLORIDE 0.9 %
1000 INTRAVENOUS SOLUTION INTRAVENOUS ONCE
Status: COMPLETED | OUTPATIENT
Start: 2024-06-27 | End: 2024-06-27

## 2024-06-27 RX ORDER — ONDANSETRON 2 MG/ML
4 INJECTION INTRAMUSCULAR; INTRAVENOUS EVERY 6 HOURS PRN
Status: DISCONTINUED | OUTPATIENT
Start: 2024-06-27 | End: 2024-06-29 | Stop reason: HOSPADM

## 2024-06-27 RX ORDER — FAMOTIDINE 10 MG/ML
20 INJECTION, SOLUTION INTRAVENOUS ONCE
Status: COMPLETED | OUTPATIENT
Start: 2024-06-27 | End: 2024-06-27

## 2024-06-27 RX ORDER — POTASSIUM CHLORIDE 7.45 MG/ML
10 INJECTION INTRAVENOUS PRN
Status: DISCONTINUED | OUTPATIENT
Start: 2024-06-27 | End: 2024-06-29 | Stop reason: HOSPADM

## 2024-06-27 RX ORDER — VERAPAMIL HYDROCHLORIDE 120 MG/1
120 CAPSULE, EXTENDED RELEASE ORAL DAILY
Status: DISCONTINUED | OUTPATIENT
Start: 2024-06-28 | End: 2024-06-28

## 2024-06-27 RX ORDER — SODIUM CHLORIDE 0.9 % (FLUSH) 0.9 %
5-40 SYRINGE (ML) INJECTION EVERY 12 HOURS SCHEDULED
Status: DISCONTINUED | OUTPATIENT
Start: 2024-06-27 | End: 2024-06-29 | Stop reason: HOSPADM

## 2024-06-27 RX ORDER — SODIUM CHLORIDE 0.9 % (FLUSH) 0.9 %
5-40 SYRINGE (ML) INJECTION PRN
Status: DISCONTINUED | OUTPATIENT
Start: 2024-06-27 | End: 2024-06-29 | Stop reason: HOSPADM

## 2024-06-27 RX ORDER — SODIUM CHLORIDE 9 MG/ML
INJECTION, SOLUTION INTRAVENOUS PRN
Status: DISCONTINUED | OUTPATIENT
Start: 2024-06-27 | End: 2024-06-29 | Stop reason: HOSPADM

## 2024-06-27 RX ORDER — ASPIRIN 81 MG/1
81 TABLET, CHEWABLE ORAL DAILY
Status: DISCONTINUED | OUTPATIENT
Start: 2024-06-28 | End: 2024-06-29 | Stop reason: HOSPADM

## 2024-06-27 RX ADMIN — HYDROMORPHONE HYDROCHLORIDE 0.25 MG: 1 INJECTION, SOLUTION INTRAMUSCULAR; INTRAVENOUS; SUBCUTANEOUS at 19:15

## 2024-06-27 RX ADMIN — MORPHINE SULFATE 4 MG: 4 INJECTION, SOLUTION INTRAMUSCULAR; INTRAVENOUS at 15:27

## 2024-06-27 RX ADMIN — MORPHINE SULFATE 4 MG: 4 INJECTION, SOLUTION INTRAMUSCULAR; INTRAVENOUS at 17:30

## 2024-06-27 RX ADMIN — SODIUM CHLORIDE, POTASSIUM CHLORIDE, SODIUM LACTATE AND CALCIUM CHLORIDE: 600; 310; 30; 20 INJECTION, SOLUTION INTRAVENOUS at 18:44

## 2024-06-27 RX ADMIN — ONDANSETRON 4 MG: 2 INJECTION INTRAMUSCULAR; INTRAVENOUS at 15:27

## 2024-06-27 RX ADMIN — PIPERACILLIN AND TAZOBACTAM 4500 MG: 4; .5 INJECTION, POWDER, FOR SOLUTION INTRAVENOUS at 21:15

## 2024-06-27 RX ADMIN — FAMOTIDINE 20 MG: 10 INJECTION, SOLUTION INTRAVENOUS at 15:27

## 2024-06-27 RX ADMIN — CETIRIZINE HYDROCHLORIDE 10 MG: 10 TABLET, FILM COATED ORAL at 21:15

## 2024-06-27 RX ADMIN — DICYCLOMINE HYDROCHLORIDE 20 MG: 10 INJECTION, SOLUTION INTRAMUSCULAR at 15:28

## 2024-06-27 RX ADMIN — SODIUM CHLORIDE 1000 ML: 9 INJECTION, SOLUTION INTRAVENOUS at 15:26

## 2024-06-27 RX ADMIN — IOPAMIDOL 80 ML: 755 INJECTION, SOLUTION INTRAVENOUS at 16:42

## 2024-06-27 ASSESSMENT — PAIN DESCRIPTION - ORIENTATION
ORIENTATION: MID
ORIENTATION: RIGHT;LEFT
ORIENTATION: MID
ORIENTATION: MID;ANTERIOR

## 2024-06-27 ASSESSMENT — PAIN DESCRIPTION - DESCRIPTORS
DESCRIPTORS: ACHING
DESCRIPTORS: ACHING
DESCRIPTORS: BURNING;CRAMPING
DESCRIPTORS: ACHING

## 2024-06-27 ASSESSMENT — PAIN SCALES - GENERAL
PAINLEVEL_OUTOF10: 10
PAINLEVEL_OUTOF10: 5
PAINLEVEL_OUTOF10: 9
PAINLEVEL_OUTOF10: 7

## 2024-06-27 ASSESSMENT — PAIN DESCRIPTION - LOCATION
LOCATION: ABDOMEN

## 2024-06-27 ASSESSMENT — ENCOUNTER SYMPTOMS
ABDOMINAL PAIN: 1
NAUSEA: 1
EYES NEGATIVE: 1
CONSTIPATION: 1
RESPIRATORY NEGATIVE: 1
ALLERGIC/IMMUNOLOGIC NEGATIVE: 1

## 2024-06-27 NOTE — PROGRESS NOTES
4 Eyes Skin Assessment     NAME:  Jama Ramirez  YOB: 1960  MEDICAL RECORD NUMBER:  [id number]    The patient is being assessed for  Admission    I agree that at least one RN has performed a thorough Head to Toe Skin Assessment on the patient. ALL assessment sites listed below have been assessed.      Areas assessed by both nurses:    Head, Face, Ears, Shoulders, Back, Chest, Arms, Elbows, Hands, Sacrum. Buttock, Coccyx, Ischium, and Legs. Feet and Heels        Does the Patient have a Wound? No noted wound(s)       Jefe Prevention initiated by RN: No  Wound Care Orders initiated by RN: No    Pressure Injury (Stage 3,4, Unstageable, DTI, NWPT, and Complex wounds) if present, place Wound referral order by RN under : No    New Ostomies, if present place, Ostomy referral order under : No     Nurse 1 eSignature: Electronically signed by Jennifer Holden RN on 6/27/24 at 7:05 PM EDT    **SHARE this note so that the co-signing nurse can place an eSignature**    Nurse 2 eSignature: Electronically signed by Estuardo Chen RN on 6/27/24 at 7:06 PM EDT

## 2024-06-27 NOTE — ED NOTES
Medication History  [facility]    Patient Name: Jama Ramirez 1960     Medication history has been completed by: Reina Blankenship [facility]    Source(s) of information: patient insurance claims and retail pharmacy     Primary Care Physician: Jennifer Gomez MD     Pharmacy: [facility]    Allergies as of 06/27/2024 - Fully Reviewed 06/27/2024   Allergen Reaction Noted    Bee venom Anaphylaxis 08/20/2016    Nsaids Other (See Comments) 12/21/2018        Prior to Admission medications    Medication Sig Start Date End Date Taking? Authorizing Provider   HYDROcodone-acetaminophen (NORCO) 5-325 MG per tablet Take 1 tablet by mouth every 12 hours as needed for Pain for up to 15 days. Intended supply: 3 days. Take lowest dose possible to manage pain Max Daily Amount: 2 tablets 6/26/24 7/11/24  Bijal Herring MD   Docusate Sodium 100 MG TABS Take 1 tablet by mouth daily  Patient not taking: Reported on 6/25/2024 6/21/24   Bijal Herring MD   metroNIDAZOLE (FLAGYL) 500 MG tablet Take 1 tablet by mouth 3 times daily for 14 days 6/21/24 7/5/24  Zion Wetzel MD   ciprofloxacin (CIPRO) 500 MG tablet Take 1 tablet by mouth 2 times daily for 14 days 6/21/24 7/5/24  Zion Wetzel MD   clopidogrel (PLAVIX) 75 MG tablet Take 1 tablet by mouth daily . Start only 24 hour after the procedure. Take 4 tablets on day 1 then one table once daily. 6/20/24   Daniel Daugherty MD   atorvastatin (LIPITOR) 20 MG tablet Take 1 tablet by mouth daily 5/30/24   Jennifer Gomez MD   buPROPion (WELLBUTRIN XL) 150 MG extended release tablet Take 1 tablet by mouth every morning 5/30/24   Jennifer Gomez MD   lipase-protease-amylase (CREON) 33937-87459 units delayed release capsule Take 1 capsule by mouth 3 times daily (with meals) 5/29/24   Sariah Bailey MD   rOPINIRole (REQUIP) 0.5 MG tablet Take 1 tablet by mouth nightly 5/30/24   Sariah Bailey MD   omeprazole (PRILOSEC) 40 MG delayed release capsule Take 1

## 2024-06-27 NOTE — H&P
V2.0  History and Physical      Name:  Jama Ramirez /Age/Sex: 1960  (63 y.o. male)   MRN & CSN:  [id number] & [id number] Encounter Date/Time: 2024 5:59 PM EDT   Location:  ED17/ED-17 PCP: Jennifer Gomez MD       Hospital Day: 1    Assessment and Plan:   Jama Ramirez is a 63 y.o. 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  Reports that his pain got acutely worse over the past couple of days prior to presentation.  He also reports foul-smelling leakage around the biliary drain.  He has been on antibiotics ciprofloxacin and metronidazole per recommendation from GI team.  There is no active leakage/drainage around the biliary drain site.  Patient reports significant nausea and vomiting-not actively present at this time.  -Start on IV Zosyn  -Antiemetic as needed  -IV analgesics  -Keep n.p.o.  -Place NG tube if patient starts to have intractable vomiting again  -Consult general surgery  -Start IV fluids    Constipation   -CT abdomen pelvis with IV contrast does show significant amount of stool  -Will start on bowel regimen once oral intake is started; no significant stool burden noted in the rectum or sigmoid colon    focal dissection within SMA 2024, stabilized with conservative management  Polyarthralgia, follows with rheumatology  COPD, stable without exacerbation        Disposition:

## 2024-06-27 NOTE — ED PROVIDER NOTES
reading and writing\"can read very very little\"    Hyperlipidemia     Hypertension     No meds as of 5/27/21 - follows with PCP    Impingement syndrome of right shoulder     Left shoulder pain     limited range-of-motion    Lower back pain     Motion sickness     Neck pain     more on left side into shoulder--pain limits ROM    PONV (postoperative nausea and vomiting)     Prolonged emergence from general anesthesia     Vertigo      Past Surgical History:   Procedure Laterality Date    ARM SURGERY Left 30+ yrs    \"put plastic ligaments in \"  after injury through glass window    CARPAL TUNNEL RELEASE Right 10/4/2019    RIGHT CARPAL TUNNEL RELEASE performed by Wolfgang Machuca DO at [facility] OR    CARPAL TUNNEL RELEASE Left 4/12/2022    LEFT CARPAL TUNNEL RELEASE performed by Wolfgang Machuca DO at [facility] OR    COLONOSCOPY  2010    COLONOSCOPY  2018    HX: polyps - Dr. Herring    COLONOSCOPY N/A 12/30/2019    COLONOSCOPY DIAGNOSTIC performed by Mary Linares MD at [facility] ENDOSCOPY    COLONOSCOPY N/A 12/16/2021    COLONOSCOPY POLYPECTOMY SNARE/COLD BIOPSY performed by Humphrey Hsieh MD at [facility] ENDOSCOPY    DENTAL SURGERY      all teeth extracted    ENDOSCOPY, COLON, DIAGNOSTIC  04/08/2019    EGD - thrush noted through out    ENDOSCOPY, COLON, DIAGNOSTIC  06/01/2021    dr linares:multiple gastric ulcers, intact fundiplication with tightening, dil 18-20, biopsy r/o h pylori, f/u as scheduled    EYE SURGERY Left 1990's    \"metal removed from eye\"    GASTRIC FUNDOPLICATION N/A 12/26/2018    NISSEN FUNDOPLICATION LAPAROSCOPIC ROBOTIC HIATAL HERNIA performed by Mary Linares MD at [facility] OR    HERNIA REPAIR      abdominal    IR CHOLECYSTOSTOMY PERCUTANEOUS COMPLETE  5/18/2024    IR CHOLECYSTOSTOMY PERCUTANEOUS COMPLETE 5/18/2024 [facility] SPECIAL PROCEDURES    UPPER GASTROINTESTINAL ENDOSCOPY  12/2018    UPPER GASTROINTESTINAL ENDOSCOPY N/A 1/28/2019    EGD BIOPSY / BRUSHING OF ESOPHAGUS performed by Mary Linares MD at [facility]  no definite obstruction but does have ileus.  I did talk to his GI physician would like patient admitted for observation if his bowel distention gets worse and he does not pass gas or flatus or have a bowel movement he may need NG tube for decompression.  At this time will admit patient to hospital medicine for abdominal pain observation ileus otherwise stable.    CLINICAL IMPRESSION:  Final diagnoses:   Abdominal pain, unspecified abdominal location   Nausea   History of cancer   Ileus (HCC)       (Please note that portions of this note may have been completed with a voice recognition program. Efforts were made to edit the dictations but occasionally words aremis-transcribed.)    DISPOSITION REFERRAL (if applicable):  No follow-up provider specified.    DISPOSITION MEDICATIONS (if applicable):  New Prescriptions    No medications on file          DO Maciel Chand James, DO  06/27/24 3148

## 2024-06-27 NOTE — PROGRESS NOTES
ED TO INPATIENT SBAR HANDOFF    Patient Name: Jama Ramirez   :  1960  63 y.o.   Preferred Name  jama   Family/Caregiver Present no   Restraints no   C-SSRS: Risk of Suicide: No Risk  Sitter no   Sepsis Risk Score        Situation  Chief Complaint   Patient presents with    Abdominal Pain     Hx of mass bile duct- sees [facility] has biliary drain      Brief Description of Patient's Condition: Jama Ramirez is a 63 y.o. male that presents with complaint of abdominal pain nausea vomiting fevers chills constipation.  Patient has a history of bile duct cancer he follows with GI here.  About a month ago he had a biliary tube put in, he states has been having worsening pain nausea vomiting constipation.  He has been taking pain medicine relief.  Denies any chest pain shortness of breath he has had fevers and chills, no urinary complaints no black or bloody stools no other questions or concerns no longer on chemoradiation.   Mental Status: oriented, alert, coherent, logical, thought processes intact, and able to concentrate and follow conversation  Arrived from: home    Imaging:   CT ABDOMEN PELVIS W IV CONTRAST Additional Contrast? None   Final Result      1. A few dilated small bowel loops in the right lower quadrant may reflect focal   ileus.   .      Electronically signed by Braulio WILHELM ACUTE ABD SERIES CHEST 1 VW   Final Result   1. No acute abnormality.               Electronically signed by [place]        Abnormal labs:   Abnormal Labs Reviewed   CBC WITH AUTO DIFFERENTIAL - Abnormal; Notable for the following components:       Result Value    RBC 4.14 (*)     Hemoglobin 11.8 (*)     Hematocrit 36.3 (*)     Neutrophils % 73.8 (*)     Lymphocytes % 14.2 (*)     Monocytes % 7.2 (*)     Eosinophils % 3.8 (*)     All other components within normal limits   COMPREHENSIVE METABOLIC PANEL - Abnormal; Notable for the following components:    Creatinine 0.7 (*)     Alkaline Phosphatase 129 (*)     All

## 2024-06-27 NOTE — PROGRESS NOTES
Pharmacy Note - Extended Infusion Beta-Lactam Adjustment    Piperacillin/Tazobactam 3375mg Q8h for treatment of Intra-abdominal Infection. Per [facility] Extended Infusion Beta-Lactam Policy, piperacillin/tazobactam will be changed to 4500mg loading dose followed by 3375mg Q8h extended infusion    Estimated Creatinine Clearance: Estimated Creatinine Clearance: 98 mL/min (A) (based on SCr of 0.7 mg/dL (L)).    BMI: Body mass index is 20.7 kg/m².    Please call with any questions.    Thank you,    Babita Kelley, [facility]

## 2024-06-28 PROBLEM — R10.9 ABDOMINAL PAIN: Status: ACTIVE | Noted: 2024-06-28

## 2024-06-28 PROBLEM — R11.0 NAUSEA: Status: ACTIVE | Noted: 2024-06-28

## 2024-06-28 PROBLEM — C24.1 CANCER OF AMPULLA OF VATER (HCC): Status: ACTIVE | Noted: 2024-06-28

## 2024-06-28 PROBLEM — K56.7 ILEUS (HCC): Status: ACTIVE | Noted: 2024-06-28

## 2024-06-28 LAB
ALBUMIN SERPL-MCNC: 3.6 GM/DL (ref 3.4–5)
ALP BLD-CCNC: 111 IU/L (ref 40–129)
ALT SERPL-CCNC: 9 U/L (ref 10–40)
ANION GAP SERPL CALCULATED.3IONS-SCNC: 9 MMOL/L (ref 7–16)
AST SERPL-CCNC: 19 IU/L (ref 15–37)
BASOPHILS ABSOLUTE: 0 K/CU MM
BASOPHILS RELATIVE PERCENT: 0.7 % (ref 0–1)
BILIRUB SERPL-MCNC: 0.3 MG/DL (ref 0–1)
BILIRUBIN DIRECT: 0.2 MG/DL (ref 0–0.3)
BILIRUBIN, INDIRECT: 0.1 MG/DL (ref 0–0.7)
BUN SERPL-MCNC: 12 MG/DL (ref 6–23)
CALCIUM SERPL-MCNC: 8.6 MG/DL (ref 8.3–10.6)
CHLORIDE BLD-SCNC: 109 MMOL/L (ref 99–110)
CO2: 23 MMOL/L (ref 21–32)
CREAT SERPL-MCNC: 0.8 MG/DL (ref 0.9–1.3)
CULTURE: NORMAL
DIFFERENTIAL TYPE: ABNORMAL
EKG ATRIAL RATE: 67 BPM
EKG DIAGNOSIS: NORMAL
EKG P AXIS: 72 DEGREES
EKG P-R INTERVAL: 200 MS
EKG Q-T INTERVAL: 376 MS
EKG QRS DURATION: 74 MS
EKG QTC CALCULATION (BAZETT): 397 MS
EKG R AXIS: 54 DEGREES
EKG T AXIS: 70 DEGREES
EKG VENTRICULAR RATE: 67 BPM
EOSINOPHILS ABSOLUTE: 0.4 K/CU MM
EOSINOPHILS RELATIVE PERCENT: 6.1 % (ref 0–3)
GFR, ESTIMATED: >90 ML/MIN/1.73M2
GLUCOSE SERPL-MCNC: 86 MG/DL (ref 70–99)
HCT VFR BLD CALC: 33.1 % (ref 42–52)
HEMOGLOBIN: 10.8 GM/DL (ref 13.5–18)
IMMATURE NEUTROPHIL %: 0.2 % (ref 0–0.43)
LYMPHOCYTES ABSOLUTE: 1.5 K/CU MM
LYMPHOCYTES RELATIVE PERCENT: 24.1 % (ref 24–44)
Lab: NORMAL
MAGNESIUM: 2.2 MG/DL (ref 1.8–2.4)
MCH RBC QN AUTO: 28.6 PG (ref 27–31)
MCHC RBC AUTO-ENTMCNC: 32.6 % (ref 32–36)
MCV RBC AUTO: 87.6 FL (ref 78–100)
MONOCYTES ABSOLUTE: 0.6 K/CU MM
MONOCYTES RELATIVE PERCENT: 9.5 % (ref 0–4)
NEUTROPHILS ABSOLUTE: 3.6 K/CU MM
NEUTROPHILS RELATIVE PERCENT: 59.4 % (ref 36–66)
NUCLEATED RBC %: 0 %
PDW BLD-RTO: 14.7 % (ref 11.7–14.9)
PHOSPHORUS: 3.3 MG/DL (ref 2.5–4.9)
PLATELET # BLD: 249 K/CU MM (ref 140–440)
PMV BLD AUTO: 8.8 FL (ref 7.5–11.1)
POTASSIUM SERPL-SCNC: 4.5 MMOL/L (ref 3.5–5.1)
RBC # BLD: 3.78 M/CU MM (ref 4.6–6.2)
SODIUM BLD-SCNC: 141 MMOL/L (ref 135–145)
SPECIMEN: NORMAL
TOTAL IMMATURE NEUTOROPHIL: 0.01 K/CU MM
TOTAL NUCLEATED RBC: 0 K/CU MM
TOTAL PROTEIN: 5.8 GM/DL (ref 6.4–8.2)
WBC # BLD: 6.1 K/CU MM (ref 4–10.5)

## 2024-06-28 PROCEDURE — 80053 COMPREHEN METABOLIC PANEL: CPT

## 2024-06-28 PROCEDURE — 6370000000 HC RX 637 (ALT 250 FOR IP): Performed by: INTERNAL MEDICINE

## 2024-06-28 PROCEDURE — APPNB180 APP NON BILLABLE TIME > 60 MINS: Performed by: LICENSED PRACTICAL NURSE

## 2024-06-28 PROCEDURE — 93010 ELECTROCARDIOGRAM REPORT: CPT | Performed by: INTERNAL MEDICINE

## 2024-06-28 PROCEDURE — 2580000003 HC RX 258: Performed by: INTERNAL MEDICINE

## 2024-06-28 PROCEDURE — 83735 ASSAY OF MAGNESIUM: CPT

## 2024-06-28 PROCEDURE — 85025 COMPLETE CBC W/AUTO DIFF WBC: CPT

## 2024-06-28 PROCEDURE — 84100 ASSAY OF PHOSPHORUS: CPT

## 2024-06-28 PROCEDURE — 2140000000 HC CCU INTERMEDIATE R&B

## 2024-06-28 PROCEDURE — 36415 COLL VENOUS BLD VENIPUNCTURE: CPT

## 2024-06-28 PROCEDURE — 99222 1ST HOSP IP/OBS MODERATE 55: CPT | Performed by: PHYSICIAN ASSISTANT

## 2024-06-28 PROCEDURE — 94761 N-INVAS EAR/PLS OXIMETRY MLT: CPT

## 2024-06-28 PROCEDURE — 82248 BILIRUBIN DIRECT: CPT

## 2024-06-28 PROCEDURE — 6370000000 HC RX 637 (ALT 250 FOR IP): Performed by: LICENSED PRACTICAL NURSE

## 2024-06-28 PROCEDURE — 99222 1ST HOSP IP/OBS MODERATE 55: CPT | Performed by: INTERNAL MEDICINE

## 2024-06-28 PROCEDURE — 94640 AIRWAY INHALATION TREATMENT: CPT

## 2024-06-28 PROCEDURE — 6360000002 HC RX W HCPCS: Performed by: INTERNAL MEDICINE

## 2024-06-28 PROCEDURE — 2580000003 HC RX 258: Performed by: LICENSED PRACTICAL NURSE

## 2024-06-28 RX ORDER — POLYETHYLENE GLYCOL 3350 17 G/17G
17 POWDER, FOR SOLUTION ORAL 2 TIMES DAILY
Status: DISCONTINUED | OUTPATIENT
Start: 2024-06-28 | End: 2024-06-28

## 2024-06-28 RX ORDER — SENNOSIDES A AND B 8.6 MG/1
1 TABLET, FILM COATED ORAL NIGHTLY
Status: DISCONTINUED | OUTPATIENT
Start: 2024-06-28 | End: 2024-06-29 | Stop reason: HOSPADM

## 2024-06-28 RX ORDER — LACTULOSE 10 G/15ML
15 SOLUTION ORAL DAILY
Status: COMPLETED | OUTPATIENT
Start: 2024-06-28 | End: 2024-06-29

## 2024-06-28 RX ORDER — SENNA AND DOCUSATE SODIUM 50; 8.6 MG/1; MG/1
2 TABLET, FILM COATED ORAL 2 TIMES DAILY
Status: DISCONTINUED | OUTPATIENT
Start: 2024-06-28 | End: 2024-06-29 | Stop reason: HOSPADM

## 2024-06-28 RX ORDER — BISACODYL 10 MG
10 SUPPOSITORY, RECTAL RECTAL PRN
Status: DISCONTINUED | OUTPATIENT
Start: 2024-06-28 | End: 2024-06-29 | Stop reason: HOSPADM

## 2024-06-28 RX ADMIN — HYDROMORPHONE HYDROCHLORIDE 0.25 MG: 1 INJECTION, SOLUTION INTRAMUSCULAR; INTRAVENOUS; SUBCUTANEOUS at 01:30

## 2024-06-28 RX ADMIN — SENNOSIDES 8.6 MG: 8.6 TABLET, FILM COATED ORAL at 20:53

## 2024-06-28 RX ADMIN — ASPIRIN 81 MG: 81 TABLET, CHEWABLE ORAL at 13:09

## 2024-06-28 RX ADMIN — PANCRELIPASE LIPASE, PANCRELIPASE PROTEASE, PANCRELIPASE AMYLASE 20000 UNITS: 5000; 17000; 24000 CAPSULE, DELAYED RELEASE ORAL at 14:52

## 2024-06-28 RX ADMIN — BUPROPION HYDROCHLORIDE 150 MG: 150 TABLET, EXTENDED RELEASE ORAL at 13:09

## 2024-06-28 RX ADMIN — HYDROMORPHONE HYDROCHLORIDE 0.5 MG: 1 INJECTION, SOLUTION INTRAMUSCULAR; INTRAVENOUS; SUBCUTANEOUS at 20:54

## 2024-06-28 RX ADMIN — SODIUM CHLORIDE, POTASSIUM CHLORIDE, SODIUM LACTATE AND CALCIUM CHLORIDE: 600; 310; 30; 20 INJECTION, SOLUTION INTRAVENOUS at 20:58

## 2024-06-28 RX ADMIN — WATER: 100 IRRIGANT IRRIGATION at 21:05

## 2024-06-28 RX ADMIN — TIOTROPIUM BROMIDE INHALATION SPRAY 2 PUFF: 3.12 SPRAY, METERED RESPIRATORY (INHALATION) at 08:09

## 2024-06-28 RX ADMIN — LACTULOSE 15.33 G: 10 SOLUTION ORAL at 13:09

## 2024-06-28 RX ADMIN — ROPINIROLE HYDROCHLORIDE 0.5 MG: 0.25 TABLET, FILM COATED ORAL at 20:53

## 2024-06-28 RX ADMIN — SODIUM CHLORIDE, PRESERVATIVE FREE 10 ML: 5 INJECTION INTRAVENOUS at 13:13

## 2024-06-28 RX ADMIN — POLYETHYLENE GLYCOL (3350) 17 G: 17 POWDER, FOR SOLUTION ORAL at 13:07

## 2024-06-28 RX ADMIN — HYDROMORPHONE HYDROCHLORIDE 0.5 MG: 1 INJECTION, SOLUTION INTRAMUSCULAR; INTRAVENOUS; SUBCUTANEOUS at 14:53

## 2024-06-28 RX ADMIN — PIPERACILLIN AND TAZOBACTAM 3375 MG: 3; .375 INJECTION, POWDER, LYOPHILIZED, FOR SOLUTION INTRAVENOUS at 16:03

## 2024-06-28 RX ADMIN — PANCRELIPASE LIPASE, PANCRELIPASE PROTEASE, PANCRELIPASE AMYLASE 5000 UNITS: 5000; 17000; 24000 CAPSULE, DELAYED RELEASE ORAL at 17:02

## 2024-06-28 RX ADMIN — CETIRIZINE HYDROCHLORIDE 10 MG: 10 TABLET, FILM COATED ORAL at 13:09

## 2024-06-28 RX ADMIN — PANCRELIPASE LIPASE, PANCRELIPASE PROTEASE, PANCRELIPASE AMYLASE 5000 UNITS: 5000; 17000; 24000 CAPSULE, DELAYED RELEASE ORAL at 14:36

## 2024-06-28 RX ADMIN — PIPERACILLIN AND TAZOBACTAM 3375 MG: 3; .375 INJECTION, POWDER, LYOPHILIZED, FOR SOLUTION INTRAVENOUS at 10:04

## 2024-06-28 RX ADMIN — DOCUSATE SODIUM 50 MG AND SENNOSIDES 8.6 MG 2 TABLET: 8.6; 5 TABLET, FILM COATED ORAL at 16:03

## 2024-06-28 RX ADMIN — PIPERACILLIN AND TAZOBACTAM 3375 MG: 3; .375 INJECTION, POWDER, LYOPHILIZED, FOR SOLUTION INTRAVENOUS at 01:33

## 2024-06-28 RX ADMIN — ENOXAPARIN SODIUM 40 MG: 100 INJECTION SUBCUTANEOUS at 13:10

## 2024-06-28 RX ADMIN — ATORVASTATIN CALCIUM 20 MG: 10 TABLET, FILM COATED ORAL at 13:09

## 2024-06-28 RX ADMIN — PANCRELIPASE LIPASE, PANCRELIPASE PROTEASE, PANCRELIPASE AMYLASE 20000 UNITS: 5000; 17000; 24000 CAPSULE, DELAYED RELEASE ORAL at 17:03

## 2024-06-28 RX ADMIN — SODIUM CHLORIDE, PRESERVATIVE FREE 10 ML: 5 INJECTION INTRAVENOUS at 20:54

## 2024-06-28 ASSESSMENT — ENCOUNTER SYMPTOMS
RECTAL PAIN: 0
STRIDOR: 0
SORE THROAT: 0
EYE ITCHING: 0
ABDOMINAL PAIN: 1
NAUSEA: 1
COLOR CHANGE: 0
CONSTIPATION: 1
PHOTOPHOBIA: 0
APNEA: 0
CHOKING: 0
ANAL BLEEDING: 0
BACK PAIN: 0
EYE REDNESS: 0

## 2024-06-28 ASSESSMENT — PAIN SCALES - WONG BAKER
WONGBAKER_NUMERICALRESPONSE: HURTS LITTLE MORE
WONGBAKER_NUMERICALRESPONSE: HURTS A LITTLE BIT

## 2024-06-28 ASSESSMENT — PAIN DESCRIPTION - LOCATION
LOCATION: ABDOMEN

## 2024-06-28 ASSESSMENT — PAIN SCALES - GENERAL
PAINLEVEL_OUTOF10: 8
PAINLEVEL_OUTOF10: 5
PAINLEVEL_OUTOF10: 4
PAINLEVEL_OUTOF10: 5
PAINLEVEL_OUTOF10: 7
PAINLEVEL_OUTOF10: 8
PAINLEVEL_OUTOF10: 6

## 2024-06-28 ASSESSMENT — PAIN DESCRIPTION - ORIENTATION
ORIENTATION: MID
ORIENTATION: MID;ANTERIOR
ORIENTATION: MID
ORIENTATION: MID
ORIENTATION: ANTERIOR;MID

## 2024-06-28 ASSESSMENT — PAIN DESCRIPTION - DESCRIPTORS
DESCRIPTORS: ACHING;CRAMPING;BURNING
DESCRIPTORS: STABBING;CRAMPING;ACHING
DESCRIPTORS: BURNING;CRAMPING
DESCRIPTORS: ACHING
DESCRIPTORS: BURNING;CRAMPING

## 2024-06-28 ASSESSMENT — PAIN - FUNCTIONAL ASSESSMENT: PAIN_FUNCTIONAL_ASSESSMENT: PREVENTS OR INTERFERES SOME ACTIVE ACTIVITIES AND ADLS

## 2024-06-28 NOTE — CARE COORDINATION
.CM met with pt for d/c planning.  Introduced self, updated white board and explained role of CM.  Pt lives alone in an apt building and is independent with ADL's.  Pt drives, has a PCP, has insurance, and is able to afford his medication. Pt denies having any DME or services.  [facility] offered and pt declined.  [facility] info provided.  Pt denies any d/c needs at this time.  D/c plan is home alone, no needs.  Notify CM if any d/c needs arise.  TE        06/28/24 1009   Service Assessment   Patient Orientation Alert and Oriented   Cognition Alert   History Provided By Patient   Primary Caregiver Self   Support Systems Family Members   Patient's Healthcare Decision Maker is: Named in Scanned ACP Document  (SELF)   PCP Verified by CM Yes   Last Visit to PCP Within last 6 months   Prior Functional Level Independent in ADLs/IADLs   Current Functional Level Independent in ADLs/IADLs   Can patient return to prior living arrangement Yes   Ability to make needs known: Good   Family able to assist with home care needs: Yes   Would you like for me to discuss the discharge plan with any other family members/significant others, and if so, who? No   Financial Resources Medicaid   Community Resources None   Social/Functional History   Lives With Alone   Type of Home Apartment   Home Layout One level   Home Access Elevator   Bathroom Shower/Tub Tub/Shower unit   Bathroom Toilet Standard   Bathroom Equipment Grab bars in shower   Bathroom Accessibility Accessible   Home Equipment None   Receives Help From Family   ADL Assistance Independent   Homemaking Assistance Independent   Ambulation Assistance Independent   Transfer Assistance Independent   Active  Yes   Mode of Transportation Car   Discharge Planning   Type of Residence Apartment   Living Arrangements Alone   Current Services Prior To Admission None   Potential Assistance Needed N/A   DME Ordered? No   Potential Assistance Purchasing Medications No   Type of Home Care

## 2024-06-28 NOTE — PLAN OF CARE
Problem: Discharge Planning  Goal: Discharge to home or other facility with appropriate resources  Outcome: Progressing     Problem: Pain  Goal: Verbalizes/displays adequate comfort level or baseline comfort level  Outcome: Progressing  Flowsheets (Taken 6/27/2024 1900 by Estuardo Chen RN)  Verbalizes/displays adequate comfort level or baseline comfort level: Assess pain using appropriate pain scale     Problem: Safety - Adult  Goal: Free from fall injury  Outcome: Progressing

## 2024-06-28 NOTE — PROGRESS NOTES
Comprehensive Nutrition Assessment    Type and Reason for Visit:  Initial, Positive Nutrition Screen (reported weight loss 2-13#, poor intake)    Nutrition Recommendations/Plan:   Resume diet when medically appropriate   Will follow up during stay to monitor nutrition status, need for alternate nutrition      Malnutrition Assessment:  Malnutrition Status:  At risk for malnutrition (Comment) (NPO with plan for surgery) (06/28/24 1129)    Context:  Acute Illness       Nutrition Assessment:    Admit with abdomen pain, nauses, vomiting, constipation with eval for pSBO versus ileus. NPO today. Hx bile duct cancer, pancreatic mass with whipple procedure planned for next month. Patient reports usually with very good appetite, eats 3-4 larger meals every day up until admit. Has tried oral nutrition supplements but dislikes them. Noted diet ed provided at [facility] for post-op whipple diet. Patient anxious for diet to restart and dislikes inactivity. No significant weight loss in past 2 months but concern for loss during admit and after whipple. Will follow at high nutrition risk at this time with concern for adequate intake.    Nutrition Related Findings:    resting in bed,  hx COPD  meds noted: zenpep, PRN zofran, glycolax, phenergran  IV fluid Wound Type:  (biliary drain)       Current Nutrition Intake & Therapies:    Average Meal Intake: NPO  Average Supplements Intake: NPO      Anthropometric Measures:  Height: 175.3 cm (5' 9.02\")  Ideal Body Weight (IBW): 160 lbs (73 kg)    Admission Body Weight: 63.6 kg (140 lb 3.4 oz)  Current Body Weight: 63.6 kg (140 lb 3.4 oz), 87.6 % IBW. Weight Source: Bed Scale  Current BMI (kg/m2): 20.7  Usual Body Weight: 65.9 kg (145 lb 4.5 oz) (5/3/24)  % Weight Change (Calculated): -3.5  Weight Adjustment For: No Adjustment                 BMI Categories: Normal Weight (BMI 18.5-24.9)    Estimated Daily Nutrient Needs:  Energy Requirements Based On: Kcal/kg  Weight Used for Energy Requirements:

## 2024-06-28 NOTE — CONSULTS
of Food in the Last Year: Sometimes true   Transportation Needs: No Transportation Needs (6/27/2024)    PRAPARE - Transportation     Lack of Transportation (Medical): No     Lack of Transportation (Non-Medical): No   Housing Stability: Low Risk  (6/27/2024)    Housing Stability Vital Sign     Unable to Pay for Housing in the Last Year: No     Number of Places Lived in the Last Year: 1     Unstable Housing in the Last Year: No       Family History:   Family History   Problem Relation Age of Onset    Dementia Mother     Stroke Mother     Stroke Father     High Blood Pressure Father     Heart Disease Father     Breast Cancer Sister     Diabetes Brother     Cancer Brother         mouth and throat    Heart Attack Maternal Uncle     Heart Disease Maternal Uncle     No Known Problems Son     No Known Problems Son     No Known Problems Daughter        REVIEW OFSYSTEMS:    Review of Systems   Constitutional:  Negative for chills and fever.   HENT:  Negative for ear pain, mouth sores, sore throat and tinnitus.    Eyes:  Negative for photophobia, redness and itching.   Respiratory:  Negative for apnea, choking and stridor.    Cardiovascular:  Negative for chest pain and palpitations.   Gastrointestinal:  Positive for abdominal pain, constipation and nausea. Negative for anal bleeding and rectal pain.   Endocrine: Negative for polydipsia.   Genitourinary:  Negative for enuresis, flank pain and hematuria.   Musculoskeletal:  Negative for back pain, joint swelling and myalgias.   Skin:  Negative for color change and pallor.   Allergic/Immunologic: Negative for environmental allergies.   Neurological:  Negative for syncope and speech difficulty.   Psychiatric/Behavioral:  Negative for confusion and hallucinations.        PHYSICAL EXAM:  Vitals:    06/28/24 1000 06/28/24 1005 06/28/24 1131 06/28/24 1142   BP: 112/86   110/76   Pulse: 64 64  57   Resp: 20   12   Temp: 98 °F (36.7 °C)   97.6 °F (36.4 °C)   TempSrc: Oral   Oral  drip     Chronic cluster headache     Generalized weakness     Vertigo     Positional lightheadedness     Hiatal hernia     Gastroesophageal reflux disease with esophagitis     PUD (peptic ulcer disease)     Paraesophageal hiatal hernia     Status post laparoscopic Nissen fundoplication     Esophageal dysphagia     Esophageal thrush (HCC)     Candida esophagitis (HCC)     Gastroparesis     Candida infection, esophageal (HCC)     Pneumonia     Left upper quadrant pain     LLQ pain     Fungal esophagitis     Left flank pain     Gastroesophageal reflux disease without esophagitis     Dyslipidemia     Essential hypertension     Epigastric pain     Esophageal stricture     Pharyngoesophageal dysphagia     Pain of upper abdomen     Nicotine dependence     Hematochezia     Acute gastritis with hemorrhage     Esophagitis     Superior mesenteric artery stenosis (HCC)     Injury of superior mesenteric artery     Pancreatic lesion     Dilation of biliary tract     Obstruction of bile duct     Pancreatic duct obstruction     Duodenal mass     Partial small bowel obstruction (HCC)      PLAN:    Pt is passing flatus, and abd is soft.     OK with clear liquid diet.   Recommend bowel regimen to relieve stool burden.     No indication for surgery at this time.     Will follow.     Patient and plan of care discussed with Dr. Lara.     Nani Car PA-C

## 2024-06-28 NOTE — PROGRESS NOTES
Disposition: home  Estimated Date of Discharge: 1-2 days  Patient requires continued admission due to severe constipation, concern for ileus vs SBO   Surrogate Decision Maker/ POA -       Personally reviewed Lab Studies and Imaging       Subjective:     Chief Complaint: Abdominal Pain (Hx of mass bile duct- sees [facility] has biliary drain )       -seen and evaluated at bedside         Review of System     Review of Systems  Negative unless mentioned above    I have reviewed all pertinent PMHx, PSHx, FamHx, SocialHx, medications, and allergies and updated history as appropriate.    Physical Exam   VITAL SIGNS:  /76   Pulse 57   Temp 97.6 °F (36.4 °C) (Oral)   Resp 12   Ht 1.753 m (5' 9.02\")   Wt 63.6 kg (140 lb 3.4 oz)   SpO2 94%   BMI 20.70 kg/m²   Tmax over 24 hours:  Temp (24hrs), Av.4 °F (25.8 °C), Min:0.9 °F (-17.3 °C), Max:98 °F (36.7 °C)      Patient Vitals for the past 6 hrs:   BP Temp Temp src Pulse Resp SpO2 Height   24 1142 110/76 97.6 °F (36.4 °C) Oral 57 12 94 % --   24 1131 -- -- -- -- -- -- 1.753 m (5' 9.02\")   24 1005 -- -- -- 64 -- -- --   24 1000 112/86 98 °F (36.7 °C) Oral 64 20 -- --         Intake/Output Summary (Last 24 hours) at 2024 1449  Last data filed at 2024 1445  Gross per 24 hour   Intake 363 ml   Output 1050 ml   Net -687 ml     Wt Readings from Last 2 Encounters:   24 63.6 kg (140 lb 3.4 oz)   24 62.6 kg (138 lb)     Body mass index is 20.7 kg/m².      Physical Exam  Constitutional:       General: He is not in acute distress.     Appearance: Normal appearance. He is normal weight. He is not ill-appearing, toxic-appearing or diaphoretic.   HENT:      Head: Normocephalic and atraumatic.      Right Ear: Tympanic membrane, ear canal and external ear normal. There is no impacted cerumen.      Left Ear: Tympanic membrane, ear canal and external ear normal. There is no impacted cerumen.      Nose: Nose normal. No congestion or

## 2024-06-28 NOTE — CONSULTS
[facility] Gastroenterology and Hepatology             MD Zion Hamilton MD Carol Christensen, APRN-CNP       Lindsey Valles, APRN-CNP             [address] [place] [address]             [phone] fax [phone]      Physician attestation:  I have personally seen and examined the patient independently. I have reviewed the patient's available data,including medical history and recent test results. Reviewed and discussed note as documented by ARIADNA.  I agree with the physical exam findings, assessment and plan.     Briefly   63-year-old  male with past medical history of COPD, degenerative disc disease, arthritis, GERD, hiatal hernia recently diagnosed ampullary/pancreatic head adenocarcinoma who was seen by me in the clinic recently and presents to the hospital with complaint of abdominal pain, nausea, vomiting.   Patient was recently evaluated by our team in May of this year.  Where he was noted to have significant extrahepatic biliary dilation with CBD noted to be 16 mm.  He was also noted at that time to have SMA dissection and was evaluated by CT surgery.  Due to him needing Plavix and inability of time he was scheduled as an outpatient for possible EGD EUS ERCP on 5/15/2024.     EGD/ERCP with Dr. Wetzel on 5/15/2024:   The scope was passed under direct vision through the upper GI tract.          -     A  large amount of food (residue) was found in the gastric body.          -  A large infiltrative mass was found at the major papilla.          - The bile duct could not be cannulated with the Jagtome sphincterotome. Due to significant friability, Ulceration and anatomic distortion only 1-2 attempts  were made to cannulate the bile duct, which was unsuccessful. Further attempts        were avoided. The decision was made to stop and switch to a GIF scope to get  biopsies from duodenum.          - Food was removed through the

## 2024-06-29 ENCOUNTER — APPOINTMENT (OUTPATIENT)
Dept: GENERAL RADIOLOGY | Age: 64
End: 2024-06-29
Payer: COMMERCIAL

## 2024-06-29 VITALS
BODY MASS INDEX: 19.64 KG/M2 | SYSTOLIC BLOOD PRESSURE: 113 MMHG | OXYGEN SATURATION: 97 % | DIASTOLIC BLOOD PRESSURE: 76 MMHG | RESPIRATION RATE: 15 BRPM | WEIGHT: 132.6 LBS | TEMPERATURE: 97.4 F | HEART RATE: 45 BPM | HEIGHT: 69 IN

## 2024-06-29 PROBLEM — K56.600 PARTIAL SMALL BOWEL OBSTRUCTION (HCC): Status: RESOLVED | Noted: 2024-06-27 | Resolved: 2024-06-29

## 2024-06-29 PROBLEM — R10.9 ABDOMINAL PAIN: Status: RESOLVED | Noted: 2024-06-28 | Resolved: 2024-06-29

## 2024-06-29 PROBLEM — K56.7 ILEUS (HCC): Status: RESOLVED | Noted: 2024-06-28 | Resolved: 2024-06-29

## 2024-06-29 PROBLEM — R11.0 NAUSEA: Status: RESOLVED | Noted: 2024-06-28 | Resolved: 2024-06-29

## 2024-06-29 LAB
ALBUMIN SERPL-MCNC: 3.6 GM/DL (ref 3.4–5)
ALP BLD-CCNC: 106 IU/L (ref 40–129)
ALT SERPL-CCNC: 8 U/L (ref 10–40)
ANION GAP SERPL CALCULATED.3IONS-SCNC: 12 MMOL/L (ref 7–16)
AST SERPL-CCNC: 18 IU/L (ref 15–37)
BASOPHILS ABSOLUTE: 0 K/CU MM
BASOPHILS RELATIVE PERCENT: 0.5 % (ref 0–1)
BILIRUB SERPL-MCNC: 0.4 MG/DL (ref 0–1)
BILIRUBIN DIRECT: 0.2 MG/DL (ref 0–0.3)
BILIRUBIN, INDIRECT: 0.2 MG/DL (ref 0–0.7)
BUN SERPL-MCNC: 13 MG/DL (ref 6–23)
CALCIUM SERPL-MCNC: 8.7 MG/DL (ref 8.3–10.6)
CHLORIDE BLD-SCNC: 106 MMOL/L (ref 99–110)
CO2: 22 MMOL/L (ref 21–32)
CREAT SERPL-MCNC: 0.9 MG/DL (ref 0.9–1.3)
DIFFERENTIAL TYPE: ABNORMAL
EOSINOPHILS ABSOLUTE: 0.4 K/CU MM
EOSINOPHILS RELATIVE PERCENT: 4 % (ref 0–3)
GFR, ESTIMATED: >90 ML/MIN/1.73M2
GLUCOSE SERPL-MCNC: 80 MG/DL (ref 70–99)
HCT VFR BLD CALC: 33.7 % (ref 42–52)
HEMOGLOBIN: 10.9 GM/DL (ref 13.5–18)
IMMATURE NEUTROPHIL %: 0.3 % (ref 0–0.43)
LYMPHOCYTES ABSOLUTE: 1.7 K/CU MM
LYMPHOCYTES RELATIVE PERCENT: 19.5 % (ref 24–44)
MAGNESIUM: 2.2 MG/DL (ref 1.8–2.4)
MCH RBC QN AUTO: 28.5 PG (ref 27–31)
MCHC RBC AUTO-ENTMCNC: 32.3 % (ref 32–36)
MCV RBC AUTO: 88.2 FL (ref 78–100)
MONOCYTES ABSOLUTE: 0.8 K/CU MM
MONOCYTES RELATIVE PERCENT: 8.9 % (ref 0–4)
NEUTROPHILS ABSOLUTE: 5.9 K/CU MM
NEUTROPHILS RELATIVE PERCENT: 66.8 % (ref 36–66)
NUCLEATED RBC %: 0 %
PDW BLD-RTO: 14.4 % (ref 11.7–14.9)
PHOSPHORUS: 3.3 MG/DL (ref 2.5–4.9)
PLATELET # BLD: 237 K/CU MM (ref 140–440)
PMV BLD AUTO: 8.8 FL (ref 7.5–11.1)
POTASSIUM SERPL-SCNC: 4.1 MMOL/L (ref 3.5–5.1)
RBC # BLD: 3.82 M/CU MM (ref 4.6–6.2)
SODIUM BLD-SCNC: 140 MMOL/L (ref 135–145)
TOTAL IMMATURE NEUTOROPHIL: 0.03 K/CU MM
TOTAL NUCLEATED RBC: 0 K/CU MM
TOTAL PROTEIN: 5.6 GM/DL (ref 6.4–8.2)
WBC # BLD: 8.8 K/CU MM (ref 4–10.5)

## 2024-06-29 PROCEDURE — APPNB15 APP NON BILLABLE TIME 0-15 MINS: Performed by: NURSE PRACTITIONER

## 2024-06-29 PROCEDURE — 36415 COLL VENOUS BLD VENIPUNCTURE: CPT

## 2024-06-29 PROCEDURE — 2580000003 HC RX 258: Performed by: INTERNAL MEDICINE

## 2024-06-29 PROCEDURE — 6360000002 HC RX W HCPCS: Performed by: INTERNAL MEDICINE

## 2024-06-29 PROCEDURE — 85025 COMPLETE CBC W/AUTO DIFF WBC: CPT

## 2024-06-29 PROCEDURE — 82248 BILIRUBIN DIRECT: CPT

## 2024-06-29 PROCEDURE — 94761 N-INVAS EAR/PLS OXIMETRY MLT: CPT

## 2024-06-29 PROCEDURE — 6370000000 HC RX 637 (ALT 250 FOR IP): Performed by: LICENSED PRACTICAL NURSE

## 2024-06-29 PROCEDURE — 99231 SBSQ HOSP IP/OBS SF/LOW 25: CPT | Performed by: NURSE PRACTITIONER

## 2024-06-29 PROCEDURE — 74018 RADEX ABDOMEN 1 VIEW: CPT

## 2024-06-29 PROCEDURE — 80053 COMPREHEN METABOLIC PANEL: CPT

## 2024-06-29 PROCEDURE — 84100 ASSAY OF PHOSPHORUS: CPT

## 2024-06-29 PROCEDURE — 83735 ASSAY OF MAGNESIUM: CPT

## 2024-06-29 PROCEDURE — 6370000000 HC RX 637 (ALT 250 FOR IP): Performed by: INTERNAL MEDICINE

## 2024-06-29 RX ORDER — POLYETHYLENE GLYCOL 3350 17 G/17G
17 POWDER, FOR SOLUTION ORAL DAILY
Qty: 578 G | Refills: 1 | Status: SHIPPED | OUTPATIENT
Start: 2024-06-29 | End: 2024-09-05

## 2024-06-29 RX ORDER — CLOPIDOGREL BISULFATE 75 MG/1
75 TABLET ORAL DAILY
Status: DISCONTINUED | OUTPATIENT
Start: 2024-06-29 | End: 2024-06-29 | Stop reason: HOSPADM

## 2024-06-29 RX ORDER — SENNA AND DOCUSATE SODIUM 50; 8.6 MG/1; MG/1
2 TABLET, FILM COATED ORAL DAILY
Qty: 60 TABLET | Refills: 2 | Status: SHIPPED | OUTPATIENT
Start: 2024-06-29

## 2024-06-29 RX ADMIN — PANCRELIPASE LIPASE, PANCRELIPASE PROTEASE, PANCRELIPASE AMYLASE 20000 UNITS: 5000; 17000; 24000 CAPSULE, DELAYED RELEASE ORAL at 09:17

## 2024-06-29 RX ADMIN — CETIRIZINE HYDROCHLORIDE 10 MG: 10 TABLET, FILM COATED ORAL at 09:17

## 2024-06-29 RX ADMIN — HYDROMORPHONE HYDROCHLORIDE 0.5 MG: 1 INJECTION, SOLUTION INTRAMUSCULAR; INTRAVENOUS; SUBCUTANEOUS at 06:21

## 2024-06-29 RX ADMIN — LACTULOSE 15.33 G: 10 SOLUTION ORAL at 09:16

## 2024-06-29 RX ADMIN — PIPERACILLIN AND TAZOBACTAM 3375 MG: 3; .375 INJECTION, POWDER, LYOPHILIZED, FOR SOLUTION INTRAVENOUS at 02:16

## 2024-06-29 RX ADMIN — BUPROPION HYDROCHLORIDE 150 MG: 150 TABLET, EXTENDED RELEASE ORAL at 09:16

## 2024-06-29 RX ADMIN — CLOPIDOGREL BISULFATE 75 MG: 75 TABLET ORAL at 09:26

## 2024-06-29 RX ADMIN — PANCRELIPASE LIPASE, PANCRELIPASE PROTEASE, PANCRELIPASE AMYLASE 20000 UNITS: 5000; 17000; 24000 CAPSULE, DELAYED RELEASE ORAL at 13:56

## 2024-06-29 RX ADMIN — HYDROMORPHONE HYDROCHLORIDE 0.25 MG: 1 INJECTION, SOLUTION INTRAMUSCULAR; INTRAVENOUS; SUBCUTANEOUS at 09:35

## 2024-06-29 RX ADMIN — ASPIRIN 81 MG: 81 TABLET, CHEWABLE ORAL at 09:16

## 2024-06-29 RX ADMIN — SODIUM CHLORIDE, PRESERVATIVE FREE 10 ML: 5 INJECTION INTRAVENOUS at 06:21

## 2024-06-29 RX ADMIN — ATORVASTATIN CALCIUM 20 MG: 10 TABLET, FILM COATED ORAL at 09:16

## 2024-06-29 RX ADMIN — PANCRELIPASE LIPASE, PANCRELIPASE PROTEASE, PANCRELIPASE AMYLASE 5000 UNITS: 5000; 17000; 24000 CAPSULE, DELAYED RELEASE ORAL at 09:20

## 2024-06-29 RX ADMIN — HYDROMORPHONE HYDROCHLORIDE 0.5 MG: 1 INJECTION, SOLUTION INTRAMUSCULAR; INTRAVENOUS; SUBCUTANEOUS at 02:17

## 2024-06-29 RX ADMIN — SODIUM CHLORIDE, PRESERVATIVE FREE 10 ML: 5 INJECTION INTRAVENOUS at 02:16

## 2024-06-29 RX ADMIN — PANCRELIPASE LIPASE, PANCRELIPASE PROTEASE, PANCRELIPASE AMYLASE 5000 UNITS: 5000; 17000; 24000 CAPSULE, DELAYED RELEASE ORAL at 13:56

## 2024-06-29 RX ADMIN — DOCUSATE SODIUM 50 MG AND SENNOSIDES 8.6 MG 2 TABLET: 8.6; 5 TABLET, FILM COATED ORAL at 09:16

## 2024-06-29 RX ADMIN — SODIUM CHLORIDE, PRESERVATIVE FREE 10 ML: 5 INJECTION INTRAVENOUS at 09:18

## 2024-06-29 RX ADMIN — PIPERACILLIN AND TAZOBACTAM 3375 MG: 3; .375 INJECTION, POWDER, LYOPHILIZED, FOR SOLUTION INTRAVENOUS at 09:23

## 2024-06-29 ASSESSMENT — PAIN SCALES - GENERAL
PAINLEVEL_OUTOF10: 0
PAINLEVEL_OUTOF10: 7
PAINLEVEL_OUTOF10: 7
PAINLEVEL_OUTOF10: 6
PAINLEVEL_OUTOF10: 5

## 2024-06-29 ASSESSMENT — PAIN DESCRIPTION - DESCRIPTORS
DESCRIPTORS: ACHING;CRAMPING
DESCRIPTORS: CRAMPING;ACHING;THROBBING

## 2024-06-29 ASSESSMENT — PAIN SCALES - WONG BAKER
WONGBAKER_NUMERICALRESPONSE: HURTS A LITTLE BIT
WONGBAKER_NUMERICALRESPONSE: NO HURT

## 2024-06-29 ASSESSMENT — PAIN - FUNCTIONAL ASSESSMENT: PAIN_FUNCTIONAL_ASSESSMENT: ACTIVITIES ARE NOT PREVENTED

## 2024-06-29 ASSESSMENT — PAIN DESCRIPTION - LOCATION
LOCATION: ABDOMEN

## 2024-06-29 ASSESSMENT — PAIN DESCRIPTION - ORIENTATION
ORIENTATION: MID
ORIENTATION: MID
ORIENTATION: LOWER;MID

## 2024-06-29 NOTE — PROGRESS NOTES
D/c instructions gone over with pt. All questions/concerns addressed at this time. Pt assisted via w/c to front lobby

## 2024-06-29 NOTE — DISCHARGE SUMMARY
Discharge Summary    Name:  Jama Ramirez /Age/Sex: 1960  (63 y.o. male)   MRN & CSN:  [id number] & [id number] Admission Date/Time: 2024  2:58 PM   Attending:  Cynthia Suazo MD Discharging Physician: Cynthia Suazo MD     Discharge diagnosis and plan:    Severe constipation   Abdominal pain 2/2 above  Recently diagnosed ampullary/periampullary/pancreatic head mass  Obstructive jaundice 2/2 above s/p internal/external biliary drain 2024  Patient reports chronic abdominal pain since diagnosis of the mass in May 2024.  Reports that his pain got acutely worse over the past couple of days prior to presentation.  He also reports foul-smelling leakage around the biliary drain.  He has been on antibiotics ciprofloxacin and metronidazole per recommendation from GI team.  There is no active leakage/drainage around the biliary drain site.  Patient reports significant nausea and vomiting-not actively present at this time. Patient has been on IV Zosyn while inpatient. She was continued on Antiemetic as needed. He was started on clears and then started on laxatives. He had good bowel movement and abdominal pain improved as well. He is to be discharged on scheduled laxatives.      Focal dissection within SMA 2024, stabilized with conservative management  Polyarthralgia, follows with rheumatology  COPD, stable without exacerbation      Discharge Exam  /76   Pulse (!) 45   Temp 97.4 °F (36.3 °C) (Oral)   Resp 15   Ht 1.753 m (5' 9.02\")   Wt 60.1 kg (132 lb 9.6 oz)   SpO2 97%   BMI 19.57 kg/m²     Physical Exam  Constitutional:       General: He is not in acute distress.     Appearance: Normal appearance. He is normal weight. He is not ill-appearing, toxic-appearing or diaphoretic.   HENT:      Head: Normocephalic and atraumatic.      Right Ear: Tympanic membrane, ear canal and external ear normal. There is no impacted cerumen.      Left Ear: Tympanic membrane, ear canal and external ear normal.  Mood normal.         Behavior: Behavior normal.         Thought Content: Thought content normal.         Judgment: Judgment normal.           Hospital Course:   Jama Ramirez is a 63 y.o.  male  who presents with Partial small bowel obstruction (HCC)    -Please refer to discharge diagnosis and plan as mentioned above for details on hospital course.    The patient expressed appropriate understanding of and agreement with the discharge recommendations, medications, and plan.     Consults this admission:  IP CONSULT TO GI  IP CONSULT TO GENERAL SURGERY      Discharge Instruction:   Handoff to PCP:   -  Follow up appointments: PCP, GI  Primary care physician: -    Diet:  regular diet   Activity: activity as tolerated  Disposition: Discharged to:   [x]Home, [][facility], []SNF, []Acute Rehab, []Hospice   Condition on discharge: Stable    Discharge Medications:        Medication List        START taking these medications      polyethylene glycol 17 GM/SCOOP powder  Commonly known as: GLYCOLAX  Take 17 g by mouth daily . Can be take twice daily. Aim to have at least 1 soft bowel movement a day.     sennosides-docusate sodium 8.6-50 MG tablet  Commonly known as: SENOKOT-S  Take 2 tablets by mouth daily . Can take twice daily if needed. Aim to have at least 1 soft bowel movement a day.            CONTINUE taking these medications      albuterol sulfate  (90 Base) MCG/ACT inhaler  Commonly known as: Proventil HFA  Inhale 2 puffs into the lungs every 6 hours as needed for Wheezing     aspirin 81 MG chewable tablet  Take 1 tablet by mouth daily     atorvastatin 20 MG tablet  Commonly known as: LIPITOR     buPROPion 150 MG extended release tablet  Commonly known as: WELLBUTRIN XL     cetirizine 10 MG tablet  Commonly known as: ZYRTEC     ciprofloxacin 500 MG tablet  Commonly known as: CIPRO  Take 1 tablet by mouth 2 times daily for 14 days     clopidogrel 75 MG tablet  Commonly known as: PLAVIX  Take 1 tablet by mouth daily .

## 2024-06-29 NOTE — DISCHARGE INSTRUCTIONS
Continue the antibiotics given by Dr. Wetzel - ciprofloxacin and metronidazole; complete the course until 7/5/2024  STOP taking verapamil. Your blood pressure is table without this medication. This medication is also known to cause constipation.  Start taking Glycolax and senokot-S once daily. Both of these can be taken twice daily. Aim to have at least 1 soft bowel movement a day.

## 2024-06-29 NOTE — PROGRESS NOTES
GENERAL SURGERY INPATIENT PROGRESS NOTE  [facility]    PATIENT: Jama Ramirez, 63 y.o., male, MRN: [id number]    Hospital Day:  LOS: 2 days                 Subjective:    Pain: 0/10  BM: +ve   Diet: ADULT DIET; Clear Liquid  Activity: as tolerated    Patient feeling better this am, states had several bowel movements.  Denies N/V.      Objective:    Vitals: /76   Pulse (!) 43   Temp 98 °F (36.7 °C) (Oral)   Resp 16   Ht 1.753 m (5' 9.02\")   Wt 60.1 kg (132 lb 9.6 oz)   SpO2 98%   BMI 19.57 kg/m²     I/O: 06/27 1901 - 06/29 0700  In: 723 [P.O.:720; I.V.:3]  Out: 2250 [Urine:2250]    Physical Exam:  General Appearance:   Alert, cooperative, no distress    Head:   Normocephalic, atraumatic    Lungs:    Equal chest rise, respirations unlabored   Heart:   Regular rate and rhythm    Abdomen:    Soft, tenderness, nondistended.  RUQ biliary drain   Extremities:  No cyanosis or edema    Neurologic:  Nonfocal, grossly intact        Labs/Imaging Results:   Recent Results (from the past 24 hour(s))   Basic Metabolic Panel    Collection Time: 06/29/24  3:05 AM   Result Value Ref Range    Sodium 140 135 - 145 MMOL/L    Potassium 4.1 3.5 - 5.1 MMOL/L    Chloride 106 99 - 110 mMol/L    CO2 22 21 - 32 MMOL/L    Anion Gap 12 7 - 16    Glucose 80 70 - 99 MG/DL    BUN 13 6 - 23 MG/DL    Creatinine 0.9 0.9 - 1.3 MG/DL    Est, Glom Filt Rate >90 >60 mL/min/1.73m2    Calcium 8.7 8.3 - 10.6 MG/DL   Magnesium    Collection Time: 06/29/24  3:05 AM   Result Value Ref Range    Magnesium 2.2 1.8 - 2.4 mg/dl   Phosphorus    Collection Time: 06/29/24  3:05 AM   Result Value Ref Range    Phosphorus 3.3 2.5 - 4.9 MG/DL   Hepatic Function Panel    Collection Time: 06/29/24  3:05 AM   Result Value Ref Range    Albumin 3.6 3.4 - 5.0 GM/DL    Total Bilirubin 0.4 0.0 - 1.0 MG/DL    Bilirubin, Direct 0.2 0.0 - 0.3 MG/DL    Bilirubin, Indirect 0.2 0 - 0.7 MG/DL    Alkaline Phosphatase 106 40 - 129 IU/L    AST 18 15 - 37 IU/L     ALT 8 (L) 10 - 40 U/L    Total Protein 5.6 (L) 6.4 - 8.2 GM/DL   CBC with Auto Differential    Collection Time: 06/29/24  3:05 AM   Result Value Ref Range    WBC 8.8 4.0 - 10.5 K/CU MM    RBC 3.82 (L) 4.6 - 6.2 M/CU MM    Hemoglobin 10.9 (L) 13.5 - 18.0 GM/DL    Hematocrit 33.7 (L) 42 - 52 %    MCV 88.2 78 - 100 FL    MCH 28.5 27 - 31 PG    MCHC 32.3 32.0 - 36.0 %    RDW 14.4 11.7 - 14.9 %    Platelets 237 140 - 440 K/CU MM    MPV 8.8 7.5 - 11.1 FL    Differential Type AUTOMATED DIFFERENTIAL     Neutrophils % 66.8 (H) 36 - 66 %    Lymphocytes % 19.5 (L) 24 - 44 %    Monocytes % 8.9 (H) 0 - 4 %    Eosinophils % 4.0 (H) 0 - 3 %    Basophils % 0.5 0 - 1 %    Neutrophils Absolute 5.9 K/CU MM    Lymphocytes Absolute 1.7 K/CU MM    Monocytes Absolute 0.8 K/CU MM    Eosinophils Absolute 0.4 K/CU MM    Basophils Absolute 0.0 K/CU MM    Nucleated RBC % 0.0 %    Total Nucleated RBC 0.0 K/CU MM    Total Immature Neutrophil 0.03 K/CU MM    Immature Neutrophil % 0.3 0 - 0.43 %         Assessment:  Jama Ramirez is a 63 y.o. male who presented with abdominal pain and nausea    BM/flatus:  +ve  Diet: Tolerating, denies N/V, ADAT  Labs/imaging: reviewed  Ambulation: OOB to chair, mobilize as able  Respiratory:  IS at bedside, encourage hourly IS and deep breathing    SUE Arellano  6/29/2024  8:12 AM

## 2024-06-30 LAB
CULTURE: NORMAL
Lab: NORMAL
SPECIMEN: NORMAL

## 2024-07-02 LAB
CULTURE: NORMAL
Lab: NORMAL
SPECIMEN: NORMAL

## 2024-07-26 ENCOUNTER — APPOINTMENT (OUTPATIENT)
Dept: CT IMAGING | Age: 64
End: 2024-07-26
Payer: COMMERCIAL

## 2024-07-26 ENCOUNTER — HOSPITAL ENCOUNTER (EMERGENCY)
Age: 64
Discharge: ANOTHER HOSPITAL | End: 2024-07-26
Attending: STUDENT IN AN ORGANIZED HEALTH CARE EDUCATION/TRAINING PROGRAM
Payer: COMMERCIAL

## 2024-07-26 VITALS
WEIGHT: 139 LBS | HEIGHT: 69 IN | DIASTOLIC BLOOD PRESSURE: 93 MMHG | OXYGEN SATURATION: 96 % | SYSTOLIC BLOOD PRESSURE: 136 MMHG | HEART RATE: 100 BPM | RESPIRATION RATE: 16 BRPM | TEMPERATURE: 98.1 F | BODY MASS INDEX: 20.59 KG/M2

## 2024-07-26 DIAGNOSIS — N30.01 ACUTE CYSTITIS WITH HEMATURIA: ICD-10-CM

## 2024-07-26 DIAGNOSIS — Z90.49 H/O WHIPPLE PROCEDURE: ICD-10-CM

## 2024-07-26 DIAGNOSIS — Z90.410 H/O WHIPPLE PROCEDURE: ICD-10-CM

## 2024-07-26 DIAGNOSIS — K56.609 SMALL BOWEL OBSTRUCTION (HCC): Primary | ICD-10-CM

## 2024-07-26 LAB
ALBUMIN SERPL-MCNC: 3 GM/DL (ref 3.4–5)
ALP BLD-CCNC: 519 IU/L (ref 40–129)
ALT SERPL-CCNC: 26 U/L (ref 10–40)
ANION GAP SERPL CALCULATED.3IONS-SCNC: 15 MMOL/L (ref 7–16)
AST SERPL-CCNC: 33 IU/L (ref 15–37)
BACTERIA: ABNORMAL /HPF
BASOPHILS ABSOLUTE: 0 K/CU MM
BASOPHILS RELATIVE PERCENT: 0.3 % (ref 0–1)
BILIRUB SERPL-MCNC: 0.4 MG/DL (ref 0–1)
BILIRUBIN DIRECT: 0.2 MG/DL (ref 0–0.3)
BILIRUBIN, INDIRECT: 0.2 MG/DL (ref 0–0.7)
BILIRUBIN, URINE: ABNORMAL MG/DL
BLOOD, URINE: ABNORMAL
BUN SERPL-MCNC: 9 MG/DL (ref 6–23)
CALCIUM SERPL-MCNC: 8.3 MG/DL (ref 8.3–10.6)
CHLORIDE BLD-SCNC: 98 MMOL/L (ref 99–110)
CLARITY, UA: CLEAR
CO2: 19 MMOL/L (ref 21–32)
COLOR, UA: YELLOW
CREAT SERPL-MCNC: 0.4 MG/DL (ref 0.9–1.3)
DIFFERENTIAL TYPE: ABNORMAL
EOSINOPHILS ABSOLUTE: 0.1 K/CU MM
EOSINOPHILS RELATIVE PERCENT: 0.9 % (ref 0–3)
GFR, ESTIMATED: >90 ML/MIN/1.73M2
GLUCOSE SERPL-MCNC: 90 MG/DL (ref 70–99)
GLUCOSE URINE: NEGATIVE MG/DL
HCT VFR BLD CALC: 37.1 % (ref 42–52)
HEMOGLOBIN: 12.1 GM/DL (ref 13.5–18)
IMMATURE NEUTROPHIL %: 1.3 % (ref 0–0.43)
KETONES, URINE: >80 MG/DL
LACTATE: 1.5 MMOL/L (ref 0.5–1.9)
LEUKOCYTE ESTERASE, URINE: ABNORMAL
LIPASE: 13 IU/L (ref 13–60)
LYMPHOCYTES ABSOLUTE: 1 K/CU MM
LYMPHOCYTES RELATIVE PERCENT: 6.8 % (ref 24–44)
MCH RBC QN AUTO: 28.3 PG (ref 27–31)
MCHC RBC AUTO-ENTMCNC: 32.6 % (ref 32–36)
MCV RBC AUTO: 86.9 FL (ref 78–100)
MONOCYTES ABSOLUTE: 0.8 K/CU MM
MONOCYTES RELATIVE PERCENT: 5 % (ref 0–4)
MUCUS: ABNORMAL HPF
NEUTROPHILS ABSOLUTE: 12.9 K/CU MM
NEUTROPHILS RELATIVE PERCENT: 85.7 % (ref 36–66)
NITRITE URINE, QUANTITATIVE: POSITIVE
NUCLEATED RBC %: 0 %
PDW BLD-RTO: 14.2 % (ref 11.7–14.9)
PH, URINE: 6 (ref 5–8)
PLATELET # BLD: 474 K/CU MM (ref 140–440)
PMV BLD AUTO: 9.9 FL (ref 7.5–11.1)
POTASSIUM SERPL-SCNC: 4.2 MMOL/L (ref 3.5–5.1)
PROTEIN UA: ABNORMAL MG/DL
RBC # BLD: 4.27 M/CU MM (ref 4.6–6.2)
RBC URINE: 6 /HPF (ref 0–3)
REASON FOR REJECTION: NORMAL
REJECTED TEST: NORMAL
SODIUM BLD-SCNC: 132 MMOL/L (ref 135–145)
SPECIFIC GRAVITY UA: >1.03 (ref 1–1.03)
SQUAMOUS EPITHELIAL: 2 /HPF
TOTAL IMMATURE NEUTOROPHIL: 0.19 K/CU MM
TOTAL NUCLEATED RBC: 0 K/CU MM
TOTAL PROTEIN: 5.4 GM/DL (ref 6.4–8.2)
TRICHOMONAS: ABNORMAL /HPF
UROBILINOGEN, URINE: 1 MG/DL (ref 0.2–1)
WBC # BLD: 15 K/CU MM (ref 4–10.5)
WBC UA: 50 /HPF (ref 0–2)

## 2024-07-26 PROCEDURE — 96375 TX/PRO/DX INJ NEW DRUG ADDON: CPT

## 2024-07-26 PROCEDURE — 99285 EMERGENCY DEPT VISIT HI MDM: CPT

## 2024-07-26 PROCEDURE — 80048 BASIC METABOLIC PNL TOTAL CA: CPT

## 2024-07-26 PROCEDURE — 2580000003 HC RX 258: Performed by: STUDENT IN AN ORGANIZED HEALTH CARE EDUCATION/TRAINING PROGRAM

## 2024-07-26 PROCEDURE — 82248 BILIRUBIN DIRECT: CPT

## 2024-07-26 PROCEDURE — 96374 THER/PROPH/DIAG INJ IV PUSH: CPT

## 2024-07-26 PROCEDURE — 6360000002 HC RX W HCPCS: Performed by: STUDENT IN AN ORGANIZED HEALTH CARE EDUCATION/TRAINING PROGRAM

## 2024-07-26 PROCEDURE — 83605 ASSAY OF LACTIC ACID: CPT

## 2024-07-26 PROCEDURE — 80053 COMPREHEN METABOLIC PANEL: CPT

## 2024-07-26 PROCEDURE — 74177 CT ABD & PELVIS W/CONTRAST: CPT

## 2024-07-26 PROCEDURE — 80076 HEPATIC FUNCTION PANEL: CPT

## 2024-07-26 PROCEDURE — 87086 URINE CULTURE/COLONY COUNT: CPT

## 2024-07-26 PROCEDURE — 85025 COMPLETE CBC W/AUTO DIFF WBC: CPT

## 2024-07-26 PROCEDURE — 81001 URINALYSIS AUTO W/SCOPE: CPT

## 2024-07-26 PROCEDURE — 83690 ASSAY OF LIPASE: CPT

## 2024-07-26 PROCEDURE — 6360000004 HC RX CONTRAST MEDICATION: Performed by: STUDENT IN AN ORGANIZED HEALTH CARE EDUCATION/TRAINING PROGRAM

## 2024-07-26 RX ORDER — HYDROMORPHONE HYDROCHLORIDE 1 MG/ML
0.25 INJECTION, SOLUTION INTRAMUSCULAR; INTRAVENOUS; SUBCUTANEOUS
Status: DISCONTINUED | OUTPATIENT
Start: 2024-07-26 | End: 2024-07-27 | Stop reason: HOSPADM

## 2024-07-26 RX ORDER — LIDOCAINE HYDROCHLORIDE 20 MG/ML
15 SOLUTION OROPHARYNGEAL ONCE
Status: DISCONTINUED | OUTPATIENT
Start: 2024-07-26 | End: 2024-07-26

## 2024-07-26 RX ORDER — MIDAZOLAM HYDROCHLORIDE 2 MG/2ML
1 INJECTION, SOLUTION INTRAMUSCULAR; INTRAVENOUS ONCE
Status: DISCONTINUED | OUTPATIENT
Start: 2024-07-26 | End: 2024-07-26

## 2024-07-26 RX ORDER — ONDANSETRON 2 MG/ML
4 INJECTION INTRAMUSCULAR; INTRAVENOUS ONCE
Status: COMPLETED | OUTPATIENT
Start: 2024-07-26 | End: 2024-07-26

## 2024-07-26 RX ORDER — HYDROMORPHONE HYDROCHLORIDE 1 MG/ML
0.5 INJECTION, SOLUTION INTRAMUSCULAR; INTRAVENOUS; SUBCUTANEOUS
Status: DISCONTINUED | OUTPATIENT
Start: 2024-07-26 | End: 2024-07-27 | Stop reason: HOSPADM

## 2024-07-26 RX ORDER — LIDOCAINE HYDROCHLORIDE 10 MG/ML
10 INJECTION, SOLUTION INFILTRATION; PERINEURAL ONCE
Status: DISCONTINUED | OUTPATIENT
Start: 2024-07-26 | End: 2024-07-26

## 2024-07-26 RX ORDER — MORPHINE SULFATE 4 MG/ML
4 INJECTION, SOLUTION INTRAMUSCULAR; INTRAVENOUS
Status: COMPLETED | OUTPATIENT
Start: 2024-07-26 | End: 2024-07-26

## 2024-07-26 RX ORDER — 0.9 % SODIUM CHLORIDE 0.9 %
30 INTRAVENOUS SOLUTION INTRAVENOUS ONCE
Status: COMPLETED | OUTPATIENT
Start: 2024-07-26 | End: 2024-07-26

## 2024-07-26 RX ADMIN — WATER 1000 MG: 1 INJECTION INTRAMUSCULAR; INTRAVENOUS; SUBCUTANEOUS at 18:22

## 2024-07-26 RX ADMIN — SODIUM CHLORIDE 1893 ML: 9 INJECTION, SOLUTION INTRAVENOUS at 18:23

## 2024-07-26 RX ADMIN — MORPHINE SULFATE 4 MG: 4 INJECTION, SOLUTION INTRAMUSCULAR; INTRAVENOUS at 15:39

## 2024-07-26 RX ADMIN — HYDROMORPHONE HYDROCHLORIDE 0.5 MG: 1 INJECTION, SOLUTION INTRAMUSCULAR; INTRAVENOUS; SUBCUTANEOUS at 20:18

## 2024-07-26 RX ADMIN — IOPAMIDOL 75 ML: 755 INJECTION, SOLUTION INTRAVENOUS at 18:49

## 2024-07-26 RX ADMIN — ONDANSETRON 4 MG: 2 INJECTION INTRAMUSCULAR; INTRAVENOUS at 15:39

## 2024-07-26 NOTE — ED PROVIDER NOTES
Emergency Department Encounter    Patient: Jama Ramirez  MRN: [id number]  : 1960  Date of Evaluation: 2024  ED Provider:  Carlos Benton DO    Triage Chief Complaint:   Abdominal Pain (States has Whipple procedure performed at [facility] on 24. States the pain increased last night and this morning. )    [place]:  Jama Ramirez is a 63 y.o. male that presents with 1 day of diffuse abdominal pain nausea and vomiting.  States that started on 1 AM.  He recently had a Whipple procedure through [facility] done due to a duodenal mass.  States postoperatively he was actually recovering very well until the pain started last night.  He denies any urinary difficulty.  Having liquid bowel movements.  No fevers or chills.    MDM:    History from : Patient    On arrival patient appears uncomfortable but is mild hypertension but otherwise stable vital signs afebrile.  His abdomen was with slight distention and diffusely tender.  No rebound or guarding.  His incision sites appear to be clean dry and intact without any surrounding erythema or induration.  No drainage noted.  Given his recent complex surgical history and now with significant pain and distention we did obtain CT scan of the abdomen pelvis.  His urine was nitrite positive with significant pyuria so we did start him on ceftriaxone.  He was given antiemetics and pain control.  Lactate normal.  He does have a leukocytosis.  Renal function was normal    On CT scan he has concern for small bowel obstruction.  Given his recent Whipple procedure I did discuss with our general surgery who feels patient would be better served to be transferred to [facility] where he had his procedure done for definitive management.  Additional pain control ordered.  Patient will be ER to ER transfer with Dr. Danny Arriola accepting physician.           Patient was given the following medications:  Medications   HYDROmorphone HCl PF (DILAUDID) injection 0.25 mg ( IntraVENous See Alternative 24

## 2024-07-26 NOTE — ED TRIAGE NOTES
Patient to rm. 31 via EMS with c/o abd. Pain that started last night and increased this morning. Patient is s/p Whipple procedure on 7/16/24. Denies fever. States he has been having hot flashes off and on. Resps even and unlabored.

## 2024-07-28 LAB
CULTURE: ABNORMAL
CULTURE: ABNORMAL
Lab: ABNORMAL
SPECIMEN: ABNORMAL

## 2024-07-29 LAB
CULTURE: ABNORMAL
CULTURE: ABNORMAL
Lab: ABNORMAL
SPECIMEN: ABNORMAL

## 2024-07-30 ENCOUNTER — TELEPHONE (OUTPATIENT)
Dept: PHARMACY | Age: 64
End: 2024-07-30

## 2024-07-30 ENCOUNTER — TELEPHONE (OUTPATIENT)
Dept: GASTROENTEROLOGY | Age: 64
End: 2024-07-30

## 2024-07-30 NOTE — TELEPHONE ENCOUNTER
Patient is currently in [facility] hospital and has been there since 7/27/2024 and he needs to cancel appointment for 7/31 with Dr Wetzel, he will call when he is out of hospital to get rescheduled.

## 2024-07-30 NOTE — TELEPHONE ENCOUNTER
Pharmacy Note  ED Culture Follow-up    Jama Ramirez is a 63 y.o. male.     Allergies: Bee venom and Nsaids     Labs:  Lab Results   Component Value Date    BUN 9 07/26/2024    CREATININE 0.4 (L) 07/26/2024    WBC 15.0 (H) 07/26/2024     Estimated Creatinine Clearance: 171 mL/min (A) (based on SCr of 0.4 mg/dL (L)).    Current antimicrobials:   None     ASSESSMENT:  Micro results:   Urine culture: positive for Escherichia coli >100,000 CFU/ml     PLAN:  Need for intervention: Yes  Chosen treatment:    Transferred to [facility]. Called [facility] to confirm patient admission and RN requested for results to be faxed.    Patient response:   Called and spoke to RN and faxed results to [phone]    Called/sent in prescription to: Not applicable    Please call with any questions. Ext. [phone]    Mahsa Stallworth RPH, PharmD 9:37 AM 7/30/2024

## 2024-08-14 ENCOUNTER — OFFICE VISIT (OUTPATIENT)
Dept: ONCOLOGY | Age: 64
End: 2024-08-14
Payer: MEDICAID

## 2024-08-14 ENCOUNTER — HOSPITAL ENCOUNTER (OUTPATIENT)
Dept: INFUSION THERAPY | Age: 64
Discharge: HOME | End: 2024-08-14
Payer: MEDICAID

## 2024-08-14 VITALS
WEIGHT: 126.6 LBS | SYSTOLIC BLOOD PRESSURE: 118 MMHG | OXYGEN SATURATION: 99 % | RESPIRATION RATE: 18 BRPM | HEIGHT: 69 IN | TEMPERATURE: 97.9 F | DIASTOLIC BLOOD PRESSURE: 76 MMHG | BODY MASS INDEX: 18.75 KG/M2 | HEART RATE: 79 BPM

## 2024-08-14 DIAGNOSIS — D64.9 ANEMIA, UNSPECIFIED TYPE: ICD-10-CM

## 2024-08-14 DIAGNOSIS — C7A.8 NEUROENDOCRINE CARCINOMA OF SMALL BOWEL (HCC): ICD-10-CM

## 2024-08-14 DIAGNOSIS — Z11.59 NEED FOR HEPATITIS B SCREENING TEST: ICD-10-CM

## 2024-08-14 DIAGNOSIS — C24.1 CANCER OF AMPULLA OF VATER (HCC): Primary | ICD-10-CM

## 2024-08-14 DIAGNOSIS — Z79.899 NEED FOR PROPHYLACTIC CHEMOTHERAPY: ICD-10-CM

## 2024-08-14 PROCEDURE — 99211 OFF/OP EST MAY X REQ PHY/QHP: CPT

## 2024-08-14 PROCEDURE — 3017F COLORECTAL CA SCREEN DOC REV: CPT | Performed by: INTERNAL MEDICINE

## 2024-08-14 PROCEDURE — 99215 OFFICE O/P EST HI 40 MIN: CPT | Performed by: INTERNAL MEDICINE

## 2024-08-14 PROCEDURE — G8427 DOCREV CUR MEDS BY ELIG CLIN: HCPCS | Performed by: INTERNAL MEDICINE

## 2024-08-14 PROCEDURE — 4004F PT TOBACCO SCREEN RCVD TLK: CPT | Performed by: INTERNAL MEDICINE

## 2024-08-14 PROCEDURE — G8420 CALC BMI NORM PARAMETERS: HCPCS | Performed by: INTERNAL MEDICINE

## 2024-08-14 PROCEDURE — 3078F DIAST BP <80 MM HG: CPT | Performed by: INTERNAL MEDICINE

## 2024-08-14 PROCEDURE — 3074F SYST BP LT 130 MM HG: CPT | Performed by: INTERNAL MEDICINE

## 2024-08-14 ASSESSMENT — PATIENT HEALTH QUESTIONNAIRE - PHQ9
2. FEELING DOWN, DEPRESSED OR HOPELESS: NOT AT ALL
SUM OF ALL RESPONSES TO PHQ QUESTIONS 1-9: 0
SUM OF ALL RESPONSES TO PHQ QUESTIONS 1-9: 0
1. LITTLE INTEREST OR PLEASURE IN DOING THINGS: NOT AT ALL
SUM OF ALL RESPONSES TO PHQ QUESTIONS 1-9: 0
SUM OF ALL RESPONSES TO PHQ9 QUESTIONS 1 & 2: 0
SUM OF ALL RESPONSES TO PHQ QUESTIONS 1-9: 0

## 2024-08-14 NOTE — PROGRESS NOTES
MA Rooming Questions  Patient: Jama Ramirez  MRN: [id number]    Date: 8/14/2024        1. Do you have any new issues?   yes - ..Last Weight Metrics:      8/14/2024     9:31 AM 7/26/2024     2:45 PM 6/29/2024     5:35 AM 6/28/2024    11:31 AM 6/27/2024     6:25 PM 6/25/2024     9:23 AM 6/21/2024    11:10 AM   Weight Loss Metrics   Height 5' 9.016\" 5' 9\"  5' 9.016\" 5' 9.016\"  5' 9.016\"   Weight - Scale 126 lbs 10 oz 139 lbs 132 lbs 10 oz  140 lbs 3 oz 138 lbs 139 lbs 10 oz   BMI (Calculated) 18.7 kg/m2 20.6 kg/m2 19.6 kg/m2  20.7 kg/m2 0 kg/m2 20.6 kg/m2             2. Do you need any refills on medications?    no    3. Have you had any imaging done since your last visit?   yes -     4. Have you been hospitalized or seen in the emergency room since your last visit here?   yes -     5. Did the patient have a depression screening completed today? Yes    No data recorded     PHQ-9 Given to (if applicable):               PHQ-9 Score (if applicable):                     [] Positive     []  Negative              Does question #9 need addressed (if applicable)                     [] Yes    []  No               Umu Villanueva CMA    
ATNC  NECK: No JVD  HEMATOLOGIC/LYMPHATIC: no cervical, supraclavicular or axillary lymphadenopathy   LUNGS: Clear to auscultation bilaterally  CARDIOVASCULAR: s1s2 rrr no murmurs  ABDOMEN: Healed scars.  Mild tenderness to palpation.  Bilateral area  NEUROLOGIC: GI  SKIN: No rash  EXTREMITIES: no LE edema bilaterally     Labs:  Hematology:  Lab Results   Component Value Date    WBC 15.0 (H) 07/26/2024    RBC 4.27 (L) 07/26/2024    HGB 12.1 (L) 07/26/2024    HCT 37.1 (L) 07/26/2024    MCV 86.9 07/26/2024    MCH 28.3 07/26/2024    MCHC 32.6 07/26/2024    RDW 14.2 07/26/2024     (H) 07/26/2024    MPV 9.9 07/26/2024    BASOPCT 0.3 07/26/2024    LYMPHOPCT 6.8 (L) 07/26/2024    MONOPCT 5.0 (H) 07/26/2024    EOSABS 0.1 07/26/2024    BASOSABS 0.0 07/26/2024    LYMPHSABS 1.0 07/26/2024    MONOSABS 0.8 07/26/2024    DIFFTYPE AUTOMATED DIFFERENTIAL 07/26/2024     No results found for: \"ESR\"  Chemistry:  Lab Results   Component Value Date     (L) 07/26/2024    K 4.2 07/26/2024    CL 98 (L) 07/26/2024    CO2 19 (L) 07/26/2024    BUN 9 07/26/2024    CREATININE 0.4 (L) 07/26/2024    GLUCOSE 90 07/26/2024    CALCIUM 8.3 07/26/2024    BILITOT 0.4 07/26/2024    ALKPHOS 519 (H) 07/26/2024    AST 33 07/26/2024    ALT 26 07/26/2024    LABGLOM >90 07/26/2024    GFRAA >60 04/07/2022    PHOS 3.3 06/29/2024    MG 2.2 06/29/2024    POCGLU 92 07/07/2023     Lab Results   Component Value Date    HOMOCYSTEINE 8.5 01/03/2017     No results found for: \"LD\"  Lab Results   Component Value Date    TSHHS 1.010 08/02/2017    T4FREE 0.78 (L) 12/30/2016     Immunology:  No results found for: \"SPEP\", \"ALBUMINELP\", \"LABALPH\", \"LABBETA\", \"GAMGLOB\"    No results found for: \"KAPPAUVOL\", \"LAMBDAUVOL\", \"KLFLCR\"  No results found for: \"B2M\"  Coagulation Panel:  Lab Results   Component Value Date    PROTIME 12.7 05/02/2024    INR 0.9 05/02/2024    APTT 74.3 (H) 05/02/2024    DDIMER <200 08/02/2017     Anemia Panel:  Lab Results   Component Value

## 2024-08-15 PROBLEM — Z11.59 NEED FOR HEPATITIS B SCREENING TEST: Status: ACTIVE | Noted: 2024-08-15

## 2024-08-15 PROBLEM — C7A.8 NEUROENDOCRINE CARCINOMA OF SMALL BOWEL (HCC): Status: ACTIVE | Noted: 2024-08-15

## 2024-08-15 PROBLEM — Z79.69 NEED FOR PROPHYLACTIC CHEMOTHERAPY: Status: ACTIVE | Noted: 2024-08-15

## 2024-08-15 PROBLEM — Z79.899 NEED FOR PROPHYLACTIC CHEMOTHERAPY: Status: ACTIVE | Noted: 2024-08-15

## 2024-08-19 DIAGNOSIS — D64.9 ANEMIA, UNSPECIFIED TYPE: ICD-10-CM

## 2024-08-19 DIAGNOSIS — C24.1 CANCER OF AMPULLA OF VATER (HCC): Primary | ICD-10-CM

## 2024-08-19 DIAGNOSIS — C7A.8 NEUROENDOCRINE CARCINOMA OF SMALL BOWEL (HCC): ICD-10-CM

## 2024-08-20 ENCOUNTER — TELEPHONE (OUTPATIENT)
Dept: SURGERY | Age: 64
End: 2024-08-20

## 2024-08-20 NOTE — TELEPHONE ENCOUNTER
SPOKE WITH PATIENT ABOUT SCHEDULING AN APPT FOR A MEDIPORT CONSULT-PATIENT DOES NOT WANT TO SCHEDULE AT THIS TIME-REFERRAL SENT BACK TO OFFICE

## 2024-08-28 ENCOUNTER — OFFICE VISIT (OUTPATIENT)
Dept: ONCOLOGY | Age: 64
End: 2024-08-28
Payer: MEDICAID

## 2024-08-28 ENCOUNTER — HOSPITAL ENCOUNTER (OUTPATIENT)
Dept: INFUSION THERAPY | Age: 64
Discharge: HOME | End: 2024-08-28
Payer: MEDICAID

## 2024-08-28 VITALS
SYSTOLIC BLOOD PRESSURE: 117 MMHG | DIASTOLIC BLOOD PRESSURE: 74 MMHG | HEART RATE: 69 BPM | WEIGHT: 126.6 LBS | OXYGEN SATURATION: 94 % | TEMPERATURE: 98 F | BODY MASS INDEX: 18.75 KG/M2 | HEIGHT: 69 IN

## 2024-08-28 DIAGNOSIS — C7A.8 NEUROENDOCRINE CARCINOMA OF SMALL BOWEL (HCC): ICD-10-CM

## 2024-08-28 DIAGNOSIS — C24.1 CANCER OF AMPULLA OF VATER (HCC): ICD-10-CM

## 2024-08-28 DIAGNOSIS — C24.1 CANCER OF AMPULLA OF VATER (HCC): Primary | ICD-10-CM

## 2024-08-28 DIAGNOSIS — D64.9 ANEMIA, UNSPECIFIED TYPE: ICD-10-CM

## 2024-08-28 LAB
ALBUMIN SERPL-MCNC: 3.4 GM/DL (ref 3.4–5)
ALP BLD-CCNC: 146 IU/L (ref 40–129)
ALT SERPL-CCNC: 8 U/L (ref 10–40)
ANION GAP SERPL CALCULATED.3IONS-SCNC: 10 MMOL/L (ref 7–16)
AST SERPL-CCNC: 13 IU/L (ref 15–37)
BASOPHILS ABSOLUTE: 0 K/CU MM
BASOPHILS RELATIVE PERCENT: 0.3 % (ref 0–1)
BILIRUB SERPL-MCNC: 0.3 MG/DL (ref 0–1)
BUN SERPL-MCNC: 16 MG/DL (ref 6–23)
CALCIUM SERPL-MCNC: 8.4 MG/DL (ref 8.3–10.6)
CHLORIDE BLD-SCNC: 104 MMOL/L (ref 99–110)
CO2: 25 MMOL/L (ref 21–32)
CREAT SERPL-MCNC: 0.6 MG/DL (ref 0.9–1.3)
DIFFERENTIAL TYPE: ABNORMAL
EOSINOPHILS ABSOLUTE: 0.2 K/CU MM
EOSINOPHILS RELATIVE PERCENT: 3.5 % (ref 0–3)
FERRITIN: 31 NG/ML (ref 30–400)
FOLATE SERPL-MCNC: 12.2 NG/ML (ref 3.1–17.5)
GFR, ESTIMATED: >90 ML/MIN/1.73M2
GLUCOSE SERPL-MCNC: 106 MG/DL (ref 70–99)
HCT VFR BLD CALC: 34.8 % (ref 42–52)
HEMOGLOBIN: 11.3 GM/DL (ref 13.5–18)
IRON: 40 UG/DL (ref 59–158)
LACTATE DEHYDROGENASE: 151 IU/L (ref 120–246)
LYMPHOCYTES ABSOLUTE: 2.6 K/CU MM
LYMPHOCYTES RELATIVE PERCENT: 40 % (ref 24–44)
MCH RBC QN AUTO: 26.8 PG (ref 27–31)
MCHC RBC AUTO-ENTMCNC: 32.5 % (ref 32–36)
MCV RBC AUTO: 82.7 FL (ref 78–100)
MONOCYTES ABSOLUTE: 0.6 K/CU MM
MONOCYTES RELATIVE PERCENT: 9.1 % (ref 0–4)
NEUTROPHILS ABSOLUTE: 3 K/CU MM
NEUTROPHILS RELATIVE PERCENT: 47.1 % (ref 36–66)
PCT TRANSFERRIN: 14 % (ref 10–44)
PDW BLD-RTO: 15.9 % (ref 11.7–14.9)
PLATELET # BLD: 255 K/CU MM (ref 140–440)
PMV BLD AUTO: 8.7 FL (ref 7.5–11.1)
POTASSIUM SERPL-SCNC: 3.8 MMOL/L (ref 3.5–5.1)
RBC # BLD: 4.21 M/CU MM (ref 4.6–6.2)
RETICULOCYTE COUNT PCT: 1.3 % (ref 0.2–2.2)
SODIUM BLD-SCNC: 139 MMOL/L (ref 135–145)
TOTAL IRON BINDING CAPACITY: 284 UG/DL (ref 250–450)
TOTAL PROTEIN: 5.3 GM/DL (ref 6.4–8.2)
UNSATURATED IRON BINDING CAPACITY: 244 UG/DL (ref 110–370)
VITAMIN B-12: 287.3 PG/ML (ref 211–911)
WBC # BLD: 6.4 K/CU MM (ref 4–10.5)

## 2024-08-28 PROCEDURE — 85025 COMPLETE CBC W/AUTO DIFF WBC: CPT

## 2024-08-28 PROCEDURE — 83615 LACTATE (LD) (LDH) ENZYME: CPT

## 2024-08-28 PROCEDURE — G8420 CALC BMI NORM PARAMETERS: HCPCS | Performed by: INTERNAL MEDICINE

## 2024-08-28 PROCEDURE — 82746 ASSAY OF FOLIC ACID SERUM: CPT

## 2024-08-28 PROCEDURE — 83540 ASSAY OF IRON: CPT

## 2024-08-28 PROCEDURE — 80053 COMPREHEN METABOLIC PANEL: CPT

## 2024-08-28 PROCEDURE — 3074F SYST BP LT 130 MM HG: CPT | Performed by: INTERNAL MEDICINE

## 2024-08-28 PROCEDURE — 83550 IRON BINDING TEST: CPT

## 2024-08-28 PROCEDURE — 3017F COLORECTAL CA SCREEN DOC REV: CPT | Performed by: INTERNAL MEDICINE

## 2024-08-28 PROCEDURE — 99214 OFFICE O/P EST MOD 30 MIN: CPT | Performed by: INTERNAL MEDICINE

## 2024-08-28 PROCEDURE — 82728 ASSAY OF FERRITIN: CPT

## 2024-08-28 PROCEDURE — 36415 COLL VENOUS BLD VENIPUNCTURE: CPT

## 2024-08-28 PROCEDURE — 3078F DIAST BP <80 MM HG: CPT | Performed by: INTERNAL MEDICINE

## 2024-08-28 PROCEDURE — 4004F PT TOBACCO SCREEN RCVD TLK: CPT | Performed by: INTERNAL MEDICINE

## 2024-08-28 PROCEDURE — 82607 VITAMIN B-12: CPT

## 2024-08-28 PROCEDURE — 99211 OFF/OP EST MAY X REQ PHY/QHP: CPT

## 2024-08-28 PROCEDURE — G8427 DOCREV CUR MEDS BY ELIG CLIN: HCPCS | Performed by: INTERNAL MEDICINE

## 2024-08-28 PROCEDURE — 85045 AUTOMATED RETICULOCYTE COUNT: CPT

## 2024-08-28 PROCEDURE — 86301 IMMUNOASSAY TUMOR CA 19-9: CPT

## 2024-08-28 RX ORDER — PANTOPRAZOLE SODIUM 40 MG/1
TABLET, DELAYED RELEASE ORAL
COMMUNITY
Start: 2024-08-20

## 2024-08-28 NOTE — PROGRESS NOTES
MA Rooming Questions  Patient: Jama Ramirez  MRN: [id number]    Date: 8/28/2024        1. Do you have any new issues?   no         2. Do you need any refills on medications?    no    3. Have you had any imaging done since your last visit?   no    4. Have you been hospitalized or seen in the emergency room since your last visit here?   no    5. Did the patient have a depression screening completed today? No    No data recorded     PHQ-9 Given to (if applicable):               PHQ-9 Score (if applicable):                     [] Positive     []  Negative              Does question #9 need addressed (if applicable)                     [] Yes    []  No               Cristina Gilbert CMA

## 2024-08-28 NOTE — PROGRESS NOTES
Patient Name:  Jama Ramirez  Patient :  1960  Patient MRN:  [id number]     Primary Oncologist: Bijal Herring MD  Referring Provider: Jennifer Gomez MD     Date of Service: 2024      Reason for Consult:      Chief Complaint:    Chief Complaint   Patient presents with    Follow-up    Results       Encounter Diagnosis   Name Primary?    Cancer of ampulla of Vater (HCC) Yes        HPI:   2024, he arrived with his ex-wife to the clinic today.  Patient was admitted to [facility] on 2024 with abdominal pain and jaundice.  He was found to have a mass adjacent to the ampulla with mass effect on CBD and PD.  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  ERCP was performed to try to cannulate the pancreatic duct however unable to do this due to significant friability, ulceration and anatomic distortion.  IR guided external/internal biliary drain, bilirubin decreased from 6 to 1.2 upon discharge.  EGD with biopsy of the duodenal mass was done.  Pathology inconclusive.  CTA of the chest with no evidence of any pulmonary metastatic disease.  CA 19-9 was 39 and CEA was 5.5.  2024 today in the office he reported that he continues to have abdominal discomfort around the biliary drain.  Reported that he has chronic cough secondary to COPD.  Productive of clear sputum.  No hemoptysis.  Reported that he had a coughing spell which caused bloody drainage through the drain.  Lasted for 2 days and resolved.  Reported that he has very poor appetite.  Continues to lose weight.  Reported fatigue and tiredness.  6/3/2024 PET scan with focal area of hypermetabolic FDG uptake demonstrated within the ampullary region near the medial descending duodenum/ pancreatic head.  SUV is 5.7.  No abnormal hypermetabolic uptake detected within the neck or chest.  No other distant metastatic disease is appreciated.    s/p robotic HHR in 2018, and duodenal mass s/p robotic Whipple  CALCIUM 8.3 07/26/2024    BILITOT 0.4 07/26/2024    ALKPHOS 519 (H) 07/26/2024    AST 33 07/26/2024    ALT 26 07/26/2024    LABGLOM >90 07/26/2024    GFRAA >60 04/07/2022    PHOS 3.3 06/29/2024    MG 2.2 06/29/2024    POCGLU 92 07/07/2023     Lab Results   Component Value Date    HOMOCYSTEINE 8.5 01/03/2017     No results found for: \"LD\"  Lab Results   Component Value Date    TSHHS 1.010 08/02/2017    T4FREE 0.78 (L) 12/30/2016     Immunology:  No results found for: \"SPEP\", \"ALBUMINELP\", \"LABALPH\", \"LABBETA\", \"GAMGLOB\"    No results found for: \"KAPPAUVOL\", \"LAMBDAUVOL\", \"KLFLCR\"  No results found for: \"B2M\"  Coagulation Panel:  Lab Results   Component Value Date    PROTIME 12.7 05/02/2024    INR 0.9 05/02/2024    APTT 74.3 (H) 05/02/2024    DDIMER <200 08/02/2017     Anemia Panel:  Lab Results   Component Value Date    NTHBOPGM63 551.8 05/20/2024    FOLATE 10.9 05/20/2024     Tumor Markers:  Lab Results   Component Value Date    CEA 5.5 (H) 05/20/2024     39 (H) 05/01/2024        Observations:  ECOG:  No data recorded         Treatment History:  C1D1  C2D1  C3D1  C4D1  C5D1  C6D1      CARIS:  GAURDANT:    Assessment & Plan:                                                          Ampullary/periampullary versus pancreatic head mass.  CEA 5.5 and CA 19 9 was 39.  Initial biopsy inconclusive.  PET scan with no hypermetabolic area.  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  Ki-67 was 30%.  3 lymph nodes were involved.  Lymphovascular invasion and perineural invasion was present.  All surgical margins negative for cancer.  Stage IIIa.  Discussed the findings, diagnosis, stage, prognosis and recommend systemic chemotherapy plus or minus subsequent chemoradiation.  Discussed modified FOLFIRINOX and discussed adverse effects versus gemcitabine with Abraxane and also discussed the side effects of it.  He wants to wait for

## 2024-08-29 ENCOUNTER — OFFICE VISIT (OUTPATIENT)
Dept: BARIATRICS/WEIGHT MGMT | Age: 64
End: 2024-08-29

## 2024-08-29 ENCOUNTER — TELEPHONE (OUTPATIENT)
Dept: INFUSION THERAPY | Age: 64
End: 2024-08-29

## 2024-08-29 ENCOUNTER — TELEPHONE (OUTPATIENT)
Dept: SURGERY | Age: 64
End: 2024-08-29

## 2024-08-29 ENCOUNTER — PREP FOR PROCEDURE (OUTPATIENT)
Dept: BARIATRICS/WEIGHT MGMT | Age: 64
End: 2024-08-29

## 2024-08-29 ENCOUNTER — ANESTHESIA EVENT (OUTPATIENT)
Dept: OPERATING ROOM | Age: 64
End: 2024-08-29
Payer: MEDICAID

## 2024-08-29 VITALS
OXYGEN SATURATION: 92 % | DIASTOLIC BLOOD PRESSURE: 80 MMHG | HEART RATE: 74 BPM | SYSTOLIC BLOOD PRESSURE: 122 MMHG | BODY MASS INDEX: 18.69 KG/M2 | HEIGHT: 69 IN | WEIGHT: 126.2 LBS

## 2024-08-29 DIAGNOSIS — C24.1 MALIGNANT NEOPLASM OF AMPULLA OF VATER (HCC): ICD-10-CM

## 2024-08-29 DIAGNOSIS — C24.1 CANCER OF AMPULLA OF VATER (HCC): Primary | ICD-10-CM

## 2024-08-29 DIAGNOSIS — D64.9 ANEMIA, UNSPECIFIED TYPE: Primary | ICD-10-CM

## 2024-08-29 RX ORDER — FERROUS SULFATE 325(65) MG
325 TABLET ORAL
Qty: 30 TABLET | Refills: 5 | Status: SHIPPED | OUTPATIENT
Start: 2024-08-29

## 2024-08-29 RX ORDER — SODIUM CHLORIDE, SODIUM LACTATE, POTASSIUM CHLORIDE, CALCIUM CHLORIDE 600; 310; 30; 20 MG/100ML; MG/100ML; MG/100ML; MG/100ML
INJECTION, SOLUTION INTRAVENOUS CONTINUOUS
Status: CANCELLED | OUTPATIENT
Start: 2024-08-29

## 2024-08-29 RX ORDER — SODIUM CHLORIDE 9 MG/ML
INJECTION, SOLUTION INTRAVENOUS PRN
Status: CANCELLED | OUTPATIENT
Start: 2024-08-29

## 2024-08-29 RX ORDER — SODIUM CHLORIDE 0.9 % (FLUSH) 0.9 %
5-40 SYRINGE (ML) INJECTION EVERY 12 HOURS SCHEDULED
Status: CANCELLED | OUTPATIENT
Start: 2024-08-29

## 2024-08-29 RX ORDER — SODIUM CHLORIDE 0.9 % (FLUSH) 0.9 %
5-40 SYRINGE (ML) INJECTION PRN
Status: CANCELLED | OUTPATIENT
Start: 2024-08-29

## 2024-08-29 RX ORDER — LANOLIN ALCOHOL/MO/W.PET/CERES
1000 CREAM (GRAM) TOPICAL DAILY
Qty: 30 TABLET | Refills: 3 | Status: SHIPPED | OUTPATIENT
Start: 2024-08-29

## 2024-08-29 ASSESSMENT — ENCOUNTER SYMPTOMS
RESPIRATORY NEGATIVE: 1
EYES NEGATIVE: 1
GASTROINTESTINAL NEGATIVE: 1
ALLERGIC/IMMUNOLOGIC NEGATIVE: 1

## 2024-08-29 NOTE — PROGRESS NOTES
8/29/24 -  with my call-back #, times and instructions:    .Surgery @ [facility] on 8/30/24 at 1045, arrival 0845    NOTHING TO EAT OR DRINK AFTER MIDNIGHT DAY OF SURGERY    1. Enter thru the hospital main entrance on day of surgery, check in at the Information Desk. If you arrive prior to 6:00am, enter thru the ER entrance.    2. Follow the directions as prescribed by the doctor for your procedure and medications.         Morning of surgery take:  Omeprazole with sips of water         Stop vitamins, supplements and NSAIDS:  NOW    3. Check with your Doctor regarding stopping blood thinners and follow their instructions.  Please call PAT in regards to your Plavix    4. Do not smoke, vape or use chewing tobacco morning of surgery. Do not drink any alcoholic beverages 24 hours prior to surgery.       This includes NA Beer. No street drugs 7 days prior to surgery.    5. If you have dentures, contacts of glasses they will be removed before going to the OR; please bring a case.    6. Please bring picture ID, insurance card, paperwork from the doctor’s office (H & P, Consent, & card for implantable devices).    7. Take a shower with an antibacterial soap the night before surgery and the morning of surgery. Do not put anything on your skin      After your morning shower.    8. You will need a responsible adult to drive you home and check on you after surgery.

## 2024-08-29 NOTE — PROGRESS NOTES
This nurse called the patient @ [phone] to review lab results. However there was no answer. This nurse left a VM for the patient advising of the need for an oral iron and B12 supplement. Orders e-scribed to [facility].

## 2024-08-29 NOTE — PROGRESS NOTES
Chief Complaint   Patient presents with    Consultation     Mediport Placement         SUBJECTIVE:  HPI: Patient is here with complaints of:    Referral from Dr. Herring for planned chemotherapy and need for tunneled port.     Known to have mass adjacent to ampulla of Vater.     Has had multiple studies.     Had robotic whipple on 7/16 at [facility].     I have reviewed the patient's(pertinent information to this visit) medical history, family history(scanned in  the [employer] under \"patient questioner\"), social history and review of systems with the patient today in the office.            Past Surgical History:   Procedure Laterality Date    ABDOMEN SURGERY      ARM SURGERY Left 30+ yrs    \"put plastic ligaments in \"  after injury through glass window    CARPAL TUNNEL RELEASE Right 10/04/2019    RIGHT CARPAL TUNNEL RELEASE performed by Wolfgang Machuca DO at [facility] OR    CARPAL TUNNEL RELEASE Left 04/12/2022    LEFT CARPAL TUNNEL RELEASE performed by Wolfgang Machuca DO at [facility] OR    COLONOSCOPY  2010    COLONOSCOPY  2018    HX: polyps - Dr. Herring    COLONOSCOPY N/A 12/30/2019    COLONOSCOPY DIAGNOSTIC performed by Mary Linares MD at [facility] ENDOSCOPY    COLONOSCOPY N/A 12/16/2021    COLONOSCOPY POLYPECTOMY SNARE/COLD BIOPSY performed by Humphrey Hsieh MD at [facility] ENDOSCOPY    DENTAL SURGERY      all teeth extracted    ENDOSCOPY, COLON, DIAGNOSTIC  04/08/2019    EGD - thrush noted through out    ENDOSCOPY, COLON, DIAGNOSTIC  06/01/2021    dr linares:multiple gastric ulcers, intact fundiplication with tightening, dil 18-20, biopsy r/o h pylori, f/u as scheduled    EYE SURGERY Left 1990's    \"metal removed from eye\"    GASTRIC FUNDOPLICATION N/A 12/26/2018    NISSEN FUNDOPLICATION LAPAROSCOPIC ROBOTIC HIATAL HERNIA performed by Mary Linares MD at [facility] OR    HERNIA REPAIR      abdominal    IR CHOLECYSTOSTOMY PERCUTANEOUS COMPLETE  05/18/2024    IR CHOLECYSTOSTOMY PERCUTANEOUS COMPLETE 5/18/2024 [facility] SPECIAL  unexpected weight change.   HENT: Negative.     Eyes: Negative.    Respiratory: Negative.     Cardiovascular: Negative.    Gastrointestinal: Negative.    Endocrine: Negative.    Genitourinary: Negative.    Musculoskeletal:  Positive for arthralgias.   Allergic/Immunologic: Negative.    All other systems reviewed and are negative.        OBJECTIVE:  Physical Exam:    /80   Pulse 74   Ht 1.753 m (5' 9\")   Wt 57.2 kg (126 lb 3.2 oz)   SpO2 92%   BMI 18.64 kg/m²      Physical Exam  Vitals reviewed.   Constitutional:       Appearance: He is ill-appearing.   HENT:      Head: Normocephalic and atraumatic.      Right Ear: External ear normal.      Left Ear: External ear normal.   Eyes:      General:         Right eye: No discharge.         Left eye: No discharge.      Extraocular Movements: Extraocular movements intact.   Pulmonary:      Effort: Pulmonary effort is normal.   Abdominal:      Palpations: Abdomen is soft.   Musculoskeletal:         General: No swelling.   Skin:     General: Skin is warm.   Neurological:      General: No focal deficit present.      Mental Status: He is alert.       CT A/P:  1.  Findings suspicious for partial small bowel obstruction with a transition   point in the mid abdomen, just left of midline. Mesenteric swirling and bowel   position are worrisome for an internal hernia.     2.  The mesenteric swirling causes moderate narrowing of the SMV with upstream   dilation. Filling defect in the SMV just upstream to the swirling/narrowing   may be mixing artifact versus a small nonocclusive thrombus. No pneumatosis or   definite evidence of bowel ischemia but clinical correlation is recommended.     3.  Postoperative changes from recent Whipple procedure.     4.  Stable dissection flap in the proximal mid SMA.       ASSESSMENT:  1. Cancer of ampulla of Vater (HCC)          PLAN:  Treatment:      -reviewed Dr. Herring's note and d/w pt.   -reviewed [facility] notes from recent robotic whipple in

## 2024-08-29 NOTE — ANESTHESIA PRE PROCEDURE
5/4/24   Cynthia Suazo MD   vitamin D (ERGOCALCIFEROL) 1.25 MG (16180 UT) CAPS capsule Take 1 capsule by mouth once a week 3/25/24   Raomn Davis MD   tamsulosin (FLOMAX) 0.4 MG capsule Take 1 capsule by mouth daily 9/3/23 8/29/24  Leona Gilbert APRN - CNP   hydrocortisone (ANUSOL-HC) 2.5 % CREA rectal cream APPLY TO HEMMORROIDS topically UP TO four TIMES PER DAY AS NEEDED FOR PAIN OR BURNING 8/25/22   Humphrey Hsieh MD   glucose 4 g chewable tablet  10/27/21   Sariah Bailey MD   cetirizine (ZYRTEC) 10 MG tablet Take 1 tablet by mouth daily 3/9/21   Sariah Bailey MD   fluticasone (FLONASE) 50 MCG/ACT nasal spray as needed 3/9/21   Sariah Bailey MD   Nutritional Supplements (ENSURE HIGH PROTEIN) LIQD Take 1 Can by mouth 3 times daily Different flavors if possible 5/20/20   Mary Linares MD   ondansetron (ZOFRAN ODT) 4 MG disintegrating tablet Place 1 tablet under the tongue every 8 hours as needed for Nausea or Vomiting 12/20/19   Mary Linares MD   tiotropium (SPIRIVA HANDIHALER) 18 MCG inhalation capsule Inhale 1 capsule into the lungs daily 1/3/17   Hattie Pedro MD   albuterol sulfate HFA (PROVENTIL HFA) 108 (90 BASE) MCG/ACT inhaler Inhale 2 puffs into the lungs every 6 hours as needed for Wheezing 1/3/17   Hattie Pedro MD       Current medications:    No current facility-administered medications for this encounter.     Current Outpatient Medications   Medication Sig Dispense Refill   • pantoprazole (PROTONIX) 40 MG tablet      • sennosides-docusate sodium (SENOKOT-S) 8.6-50 MG tablet Take 2 tablets by mouth daily . Can take twice daily if needed. Aim to have at least 1 soft bowel movement a day. 60 tablet 2   • polyethylene glycol (GLYCOLAX) 17 GM/SCOOP powder Take 17 g by mouth daily . Can be take twice daily. Aim to have at least 1 soft bowel movement a day. (Patient taking differently: Take 17 g by mouth daily as needed . Can be take twice daily.  \"POCHEMO\", \"POCHCT\" in the last 72 hours.    Coags:   Lab Results   Component Value Date/Time    PROTIME 12.7 05/02/2024 10:41 AM    PROTIME 10.8 05/15/2011 12:35 AM    INR 0.9 05/02/2024 10:41 AM    APTT 74.3 05/02/2024 06:19 PM       HCG (If Applicable): No results found for: \"PREGTESTUR\", \"PREGSERUM\", \"HCG\", \"HCGQUANT\"     ABGs: No results found for: \"PHART\", \"PO2ART\", \"CMO9OWD\", \"VLW8FDE\", \"BEART\", \"V2QFCRVJ\"     Type & Screen (If Applicable):  No results found for: \"LABABO\"    Drug/Infectious Status (If Applicable):  Lab Results   Component Value Date/Time    HEPCAB NON REACTIVE 03/21/2024 03:27 PM       COVID-19 Screening (If Applicable):   Lab Results   Component Value Date/Time    COVID19 NOT DETECTED 12/10/2021 10:45 AM           Anesthesia Evaluation  Patient summary reviewed   history of anesthetic complications: PONV.  Airway: Mallampati: I  TM distance: >3 FB   Neck ROM: full  Mouth opening: > = 3 FB   Dental:    (+) edentulous      Pulmonary:normal exam    (+)  COPD:          current smoker                           Cardiovascular:    (+) hypertension:, hyperlipidemia               ROS comment: Echo 4/26/2021    Summary   Left ventricular function is low normal, EF is estimated at 50-55%.   Mild left ventricular hypertrophy.   E/A reversal; indeterminate diastolic function.   Mild mitral and tricuspid regurgitation is present.   RVSP is 21 mmHg.   Dilation of the aortic root(4.2) and the ascending aorta(4.0).   No evidence of pericardial effusion.       Neuro/Psych:   (+) headaches: cluster headachesdepression/anxiety             GI/Hepatic/Renal:   (+) hiatal hernia, GERD:         ROS comment: S/p whipple  Cancer of ampulla of Vater.   Endo/Other:    (+) malignancy/cancer.                  ROS comment: Neuroendocrine carcinoma of small bowel Abdominal: normal exam            Vascular:          Other Findings:         Anesthesia Plan      MAC     ASA 3     (Chart review)  Induction:

## 2024-08-29 NOTE — PROGRESS NOTES
8/29/24 - Called patient again, he did not answer. Called his contact (Viola), she states patient is aware to be at [facility] at 0845 for a 1045 surgery. Unsure of when he last took his Plavix.   Update: patient left me a message stating he was not taking any blood thinners. The doctor had \"stopped them awhile back\".

## 2024-08-29 NOTE — TELEPHONE ENCOUNTER
SPOKE TO Jama Ramirez REGARDING SURGERY Mediport Placement SCHEDULED @ [facility]. NOTIFIED OF DATES, TIMES AND LOCATION    PHONE ASSESSMENT   SURGERY - 8/30/2024 at 10:45, Arrive at 8:45  P/O - 9/12/2024 at 10:00    NPO AFTER MIDNIGHT

## 2024-08-30 ENCOUNTER — ANESTHESIA (OUTPATIENT)
Dept: OPERATING ROOM | Age: 64
End: 2024-08-30
Payer: MEDICAID

## 2024-08-30 ENCOUNTER — HOSPITAL ENCOUNTER (OUTPATIENT)
Age: 64
Setting detail: OUTPATIENT SURGERY
Discharge: HOME | End: 2024-08-30
Attending: SURGERY | Admitting: SURGERY
Payer: MEDICAID

## 2024-08-30 ENCOUNTER — APPOINTMENT (OUTPATIENT)
Dept: GENERAL RADIOLOGY | Age: 64
End: 2024-08-30
Attending: SURGERY
Payer: MEDICAID

## 2024-08-30 VITALS
DIASTOLIC BLOOD PRESSURE: 99 MMHG | OXYGEN SATURATION: 97 % | HEART RATE: 75 BPM | RESPIRATION RATE: 18 BRPM | TEMPERATURE: 97.1 F | SYSTOLIC BLOOD PRESSURE: 124 MMHG

## 2024-08-30 DIAGNOSIS — Z45.2 ENCOUNTER FOR INSERTION OF TUNNELED CENTRAL VENOUS CATHETER (CVC) WITH PORT: Primary | ICD-10-CM

## 2024-08-30 LAB — CANCER AG19-9 SERPL IA-ACNC: 152 U/ML

## 2024-08-30 PROCEDURE — 2500000003 HC RX 250 WO HCPCS: Performed by: NURSE ANESTHETIST, CERTIFIED REGISTERED

## 2024-08-30 PROCEDURE — 2709999900 HC NON-CHARGEABLE SUPPLY: Performed by: SURGERY

## 2024-08-30 PROCEDURE — 6360000002 HC RX W HCPCS: Performed by: SURGERY

## 2024-08-30 PROCEDURE — 2580000003 HC RX 258: Performed by: SURGERY

## 2024-08-30 PROCEDURE — 2500000003 HC RX 250 WO HCPCS: Performed by: SURGERY

## 2024-08-30 PROCEDURE — 3600000013 HC SURGERY LEVEL 3 ADDTL 15MIN: Performed by: SURGERY

## 2024-08-30 PROCEDURE — 3700000000 HC ANESTHESIA ATTENDED CARE: Performed by: SURGERY

## 2024-08-30 PROCEDURE — C1788 PORT, INDWELLING, IMP: HCPCS | Performed by: SURGERY

## 2024-08-30 PROCEDURE — 3700000001 HC ADD 15 MINUTES (ANESTHESIA): Performed by: SURGERY

## 2024-08-30 PROCEDURE — 77001 FLUOROGUIDE FOR VEIN DEVICE: CPT | Performed by: SURGERY

## 2024-08-30 PROCEDURE — 36561 INSERT TUNNELED CV CATH: CPT | Performed by: SURGERY

## 2024-08-30 PROCEDURE — 7100000011 HC PHASE II RECOVERY - ADDTL 15 MIN: Performed by: SURGERY

## 2024-08-30 PROCEDURE — 7100000010 HC PHASE II RECOVERY - FIRST 15 MIN: Performed by: SURGERY

## 2024-08-30 PROCEDURE — 3600000003 HC SURGERY LEVEL 3 BASE: Performed by: SURGERY

## 2024-08-30 PROCEDURE — 76000 FLUOROSCOPY <1 HR PHYS/QHP: CPT

## 2024-08-30 PROCEDURE — 6360000002 HC RX W HCPCS: Performed by: NURSE ANESTHETIST, CERTIFIED REGISTERED

## 2024-08-30 PROCEDURE — 71045 X-RAY EXAM CHEST 1 VIEW: CPT

## 2024-08-30 PROCEDURE — 6360000002 HC RX W HCPCS: Performed by: ANESTHESIOLOGY

## 2024-08-30 DEVICE — POWERPORT CLEARVUE SLIM IMPLANTABLE PORT WITH ATTACHABLE 8F POLYURETHANE OPEN-ENDED SINGLE-LUMEN VENOUS CATHETER INTERMEDIATE KIT
Type: IMPLANTABLE DEVICE | Site: CHEST | Status: FUNCTIONAL
Brand: POWERPORT CLEARVUE

## 2024-08-30 RX ORDER — SODIUM CHLORIDE 9 MG/ML
INJECTION, SOLUTION INTRAVENOUS PRN
Status: DISCONTINUED | OUTPATIENT
Start: 2024-08-30 | End: 2024-08-30 | Stop reason: HOSPADM

## 2024-08-30 RX ORDER — DEXAMETHASONE SODIUM PHOSPHATE 4 MG/ML
INJECTION, SOLUTION INTRA-ARTICULAR; INTRALESIONAL; INTRAMUSCULAR; INTRAVENOUS; SOFT TISSUE PRN
Status: DISCONTINUED | OUTPATIENT
Start: 2024-08-30 | End: 2024-08-30 | Stop reason: SDUPTHER

## 2024-08-30 RX ORDER — AMOXICILLIN 250 MG
2 CAPSULE ORAL DAILY
Qty: 28 TABLET | Refills: 0 | Status: SHIPPED | OUTPATIENT
Start: 2024-08-30 | End: 2024-09-13

## 2024-08-30 RX ORDER — LIDOCAINE HYDROCHLORIDE 20 MG/ML
INJECTION, SOLUTION EPIDURAL; INFILTRATION; INTRACAUDAL; PERINEURAL PRN
Status: DISCONTINUED | OUTPATIENT
Start: 2024-08-30 | End: 2024-08-30 | Stop reason: SDUPTHER

## 2024-08-30 RX ORDER — KETOROLAC TROMETHAMINE 30 MG/ML
INJECTION, SOLUTION INTRAMUSCULAR; INTRAVENOUS PRN
Status: DISCONTINUED | OUTPATIENT
Start: 2024-08-30 | End: 2024-08-30 | Stop reason: SDUPTHER

## 2024-08-30 RX ORDER — LIDOCAINE HYDROCHLORIDE 10 MG/ML
INJECTION, SOLUTION INFILTRATION; PERINEURAL
Status: COMPLETED | OUTPATIENT
Start: 2024-08-30 | End: 2024-08-30

## 2024-08-30 RX ORDER — PROPOFOL 10 MG/ML
INJECTION, EMULSION INTRAVENOUS CONTINUOUS PRN
Status: DISCONTINUED | OUTPATIENT
Start: 2024-08-30 | End: 2024-08-30 | Stop reason: SDUPTHER

## 2024-08-30 RX ORDER — HYDROCODONE BITARTRATE AND ACETAMINOPHEN 5; 325 MG/1; MG/1
1 TABLET ORAL EVERY 6 HOURS PRN
Qty: 6 TABLET | Refills: 0 | Status: SHIPPED | OUTPATIENT
Start: 2024-08-30 | End: 2024-09-02

## 2024-08-30 RX ORDER — SODIUM CHLORIDE 0.9 % (FLUSH) 0.9 %
5-40 SYRINGE (ML) INJECTION EVERY 12 HOURS SCHEDULED
Status: DISCONTINUED | OUTPATIENT
Start: 2024-08-30 | End: 2024-08-30 | Stop reason: HOSPADM

## 2024-08-30 RX ORDER — SODIUM CHLORIDE, SODIUM LACTATE, POTASSIUM CHLORIDE, CALCIUM CHLORIDE 600; 310; 30; 20 MG/100ML; MG/100ML; MG/100ML; MG/100ML
INJECTION, SOLUTION INTRAVENOUS CONTINUOUS
Status: DISCONTINUED | OUTPATIENT
Start: 2024-08-30 | End: 2024-08-30 | Stop reason: HOSPADM

## 2024-08-30 RX ORDER — HEPARIN 100 UNIT/ML
SYRINGE INTRAVENOUS
Status: COMPLETED | OUTPATIENT
Start: 2024-08-30 | End: 2024-08-30

## 2024-08-30 RX ORDER — FENTANYL CITRATE 50 UG/ML
INJECTION, SOLUTION INTRAMUSCULAR; INTRAVENOUS PRN
Status: DISCONTINUED | OUTPATIENT
Start: 2024-08-30 | End: 2024-08-30 | Stop reason: SDUPTHER

## 2024-08-30 RX ORDER — KETOROLAC TROMETHAMINE 30 MG/ML
30 INJECTION, SOLUTION INTRAMUSCULAR; INTRAVENOUS ONCE
Status: COMPLETED | OUTPATIENT
Start: 2024-08-30 | End: 2024-08-30

## 2024-08-30 RX ORDER — SODIUM CHLORIDE 0.9 % (FLUSH) 0.9 %
5-40 SYRINGE (ML) INJECTION PRN
Status: DISCONTINUED | OUTPATIENT
Start: 2024-08-30 | End: 2024-08-30 | Stop reason: HOSPADM

## 2024-08-30 RX ORDER — ONDANSETRON 2 MG/ML
INJECTION INTRAMUSCULAR; INTRAVENOUS PRN
Status: DISCONTINUED | OUTPATIENT
Start: 2024-08-30 | End: 2024-08-30 | Stop reason: SDUPTHER

## 2024-08-30 RX ORDER — ONDANSETRON 4 MG/1
4 TABLET, FILM COATED ORAL DAILY PRN
Qty: 20 TABLET | Refills: 3 | Status: SHIPPED | OUTPATIENT
Start: 2024-08-30

## 2024-08-30 RX ADMIN — PROPOFOL 125 MCG/KG/MIN: 10 INJECTION, EMULSION INTRAVENOUS at 10:25

## 2024-08-30 RX ADMIN — DEXAMETHASONE SODIUM PHOSPHATE 8 MG: 4 INJECTION, SOLUTION INTRAMUSCULAR; INTRAVENOUS at 10:26

## 2024-08-30 RX ADMIN — KETOROLAC TROMETHAMINE 15 MG: 30 INJECTION, SOLUTION INTRAMUSCULAR; INTRAVENOUS at 10:30

## 2024-08-30 RX ADMIN — FENTANYL CITRATE 50 MCG: 50 INJECTION, SOLUTION INTRAMUSCULAR; INTRAVENOUS at 10:35

## 2024-08-30 RX ADMIN — FENTANYL CITRATE 50 MCG: 50 INJECTION, SOLUTION INTRAMUSCULAR; INTRAVENOUS at 10:30

## 2024-08-30 RX ADMIN — PROPOFOL 20 MG: 10 INJECTION, EMULSION INTRAVENOUS at 10:27

## 2024-08-30 RX ADMIN — ONDANSETRON 4 MG: 2 INJECTION INTRAMUSCULAR; INTRAVENOUS at 10:26

## 2024-08-30 RX ADMIN — KETOROLAC TROMETHAMINE 30 MG: 30 INJECTION, SOLUTION INTRAMUSCULAR; INTRAVENOUS at 12:12

## 2024-08-30 RX ADMIN — SODIUM CHLORIDE, POTASSIUM CHLORIDE, SODIUM LACTATE AND CALCIUM CHLORIDE: 600; 310; 30; 20 INJECTION, SOLUTION INTRAVENOUS at 09:38

## 2024-08-30 RX ADMIN — LIDOCAINE HYDROCHLORIDE 100 MG: 20 INJECTION, SOLUTION EPIDURAL; INFILTRATION; INTRACAUDAL; PERINEURAL at 10:26

## 2024-08-30 RX ADMIN — PROPOFOL 30 MG: 10 INJECTION, EMULSION INTRAVENOUS at 10:24

## 2024-08-30 RX ADMIN — CEFAZOLIN 2000 MG: 2 INJECTION, POWDER, FOR SOLUTION INTRAMUSCULAR; INTRAVENOUS at 10:30

## 2024-08-30 ASSESSMENT — PAIN - FUNCTIONAL ASSESSMENT
PAIN_FUNCTIONAL_ASSESSMENT: ACTIVITIES ARE NOT PREVENTED
PAIN_FUNCTIONAL_ASSESSMENT: 0-10
PAIN_FUNCTIONAL_ASSESSMENT: 0-10

## 2024-08-30 ASSESSMENT — LIFESTYLE VARIABLES: SMOKING_STATUS: 1

## 2024-08-30 ASSESSMENT — PAIN DESCRIPTION - DESCRIPTORS: DESCRIPTORS: ACHING

## 2024-08-30 NOTE — ANESTHESIA POSTPROCEDURE EVALUATION
Department of Anesthesiology  Postprocedure Note    Patient: Jama Ramirez  MRN: [id number]  YOB: 1960  Date of evaluation: 8/30/2024    Procedure Summary       Date: 08/30/24 Room / Location: [address]    Anesthesia Start: 1023 Anesthesia Stop: 1127    Procedure: PORT INSERTION (Left: Chest) Diagnosis:       Malignant neoplasm of ampulla of Vater (HCC)      (Malignant neoplasm of ampulla of Vater (HCC) [C24.1])    Surgeons: Terrell Marino II, MD Responsible Provider: Sofy Lewis MD    Anesthesia Type: MAC ASA Status: 3            Anesthesia Type: No value filed.    Sreedhar Phase I:      Sreedhar Phase II:      Anesthesia Post Evaluation    Patient location during evaluation: bedside  Patient participation: complete - patient participated  Level of consciousness: awake  Airway patency: patent  Nausea & Vomiting: no nausea  Cardiovascular status: blood pressure returned to baseline  Respiratory status: acceptable  Hydration status: euvolemic  Pain management: adequate    No notable events documented.

## 2024-08-30 NOTE — PROGRESS NOTES
1123 - received patient from or report received from Sam DE LEÓN and Dr. Lewis, patient weartin a simple mask at 6 liters of O2, patient drowsy, incision dry, call light within reach  1125 - O2 off  1130 - patient A&O, given juice and crackers, family at bedside  1134 - VSS  1145 - Xray called, report given to Clari DE LEÓN  1235 - care assumed, pain 3/10, incisions dry, discharge instructions given, patient voiced understanding  1244 - iv removed  1245 - patient dressing  1250 - patient ambulated to bathroom  1255 - patient left sds via wheelchair accompanied by pca

## 2024-08-30 NOTE — OP NOTE
Procedure Note:    Patient ID:  Jama Ramirez  [id number]  63 y.o.  1960    Pre-operative Diagnosis: Ampulla of vater cancer, need for port placement    Post-operative Diagnosis: Same    Procedure: Mediport placement under C-arm guidance in left subclavian vein    Surgeon:  Terrell Marino II, MD    Assistant: None    Anesthesia:  MAC/ Local    Estimated Blood Loss:  Less than 10 mL           Total IV Fluids: Per anesthesia mL           Complications:  None; patient tolerated the procedure well.           Disposition: PACU - hemodynamically stable.           Condition: stable    Indications: As above    Procedure Details : The patient was seen again in the pre-operative area. The risks, benefits, complications, treatment options, and expected outcomes were discussed with the patient. The patient and/or family concurred with the proposed plan, giving informed consent.      The patient was transported to the operating room and transferred onto the operating room table in supine position with a shoulder roll placed underneath the back. The patient was secured to the operating room table in various locations with all pressure points well-padded.    The neck and chest were prepped and draped in the usual sterile fashion.     A time-out was held with all members of the operating room team present and in agreement.     Appropriate IV antibiotics were administered prior to the start of the case.      The patient was placed in trendelenburg and 1% lidocaine was infiltrated in the infraclavicular region.    Using a 16-gauge needle, the left subclavian vein was accessed easily. A guidewire was threaded and the wire was visualized with use of C-arm. It was directed appropriately toward the heart.     Additional 1% lidocaine was infiltrated just caudad to the wire insertion site for creation of the subcutaneous port pocket. The incision was deepened with electrocautery. The pocket was created for the Mediport and Mediport

## 2024-08-30 NOTE — DISCHARGE INSTRUCTIONS
Watch for signs of infection    Elevated temperature, redness or pain at site,   Drainage at incision

## 2024-08-30 NOTE — H&P
History and Physical Update    Original H&P done in office on 8/29/24 (less than 30 days ago).    Pt reports the following changes in health since being seen last:    None    Vitals:    08/30/24 0900   BP: 115/81   Resp: 18   Temp: 97.1 °F (36.2 °C)   SpO2: 100%       Alert and oriented x 3, no apparent distress at rest  Atraumatic, normocephalic.  EOMI.  Breathing unlabored.  RRR.  Soft, non-tender, non-distended.  Moves all extremities.   Warm, dry.         64 y/o M with cancer of ampulla of vater here for port placement    -Consent obtained in office.  -Abx ordered in office.  -Reviewed expected pre-operative, operative, and post-operative courses.  -Answered questions to patient's satisfaction.   -Reviewed risks, benefits, alternative to procedure.   -Proceed as scheduled.        Terrell Marino II, MD

## 2024-09-03 ENCOUNTER — TELEPHONE (OUTPATIENT)
Dept: SURGERY | Age: 64
End: 2024-09-03

## 2024-09-03 NOTE — TELEPHONE ENCOUNTER
Post-op Phone Call Follow-up  Dr Marino    Tried calling pt to check on them post operatively. No answer. LVM asking for a return phone call back to the clinic.      Umu Nguyen LPN

## 2024-09-04 NOTE — TELEPHONE ENCOUNTER
Called patient regarding chemo ed and start date of tx, patient is scheduled for chemo ed on 9/9 @ 1pm and tx on 9/10 @ 8:30am, advised them that 1 visitor allowed at time of education and day of their treatments, patient states understanding

## 2024-09-05 ENCOUNTER — APPOINTMENT (OUTPATIENT)
Dept: CT IMAGING | Age: 64
DRG: 247 | End: 2024-09-05
Payer: MEDICAID

## 2024-09-05 ENCOUNTER — HOSPITAL ENCOUNTER (INPATIENT)
Age: 64
LOS: 2 days | Discharge: ANOTHER HOSPITAL | DRG: 247 | End: 2024-09-07
Admitting: STUDENT IN AN ORGANIZED HEALTH CARE EDUCATION/TRAINING PROGRAM
Payer: MEDICAID

## 2024-09-05 DIAGNOSIS — K56.609 SMALL BOWEL OBSTRUCTION (HCC): Primary | ICD-10-CM

## 2024-09-05 DIAGNOSIS — C24.1 CANCER OF AMPULLA OF VATER (HCC): ICD-10-CM

## 2024-09-05 LAB
ALBUMIN SERPL-MCNC: 3.2 GM/DL (ref 3.4–5)
ALP BLD-CCNC: 131 IU/L (ref 40–128)
ALT SERPL-CCNC: 8 U/L (ref 10–40)
ANION GAP SERPL CALCULATED.3IONS-SCNC: 12 MMOL/L (ref 7–16)
AST SERPL-CCNC: 16 IU/L (ref 15–37)
BASOPHILS ABSOLUTE: 0 K/CU MM
BASOPHILS RELATIVE PERCENT: 0.4 % (ref 0–1)
BILIRUB SERPL-MCNC: 0.4 MG/DL (ref 0–1)
BUN SERPL-MCNC: 14 MG/DL (ref 6–23)
CALCIUM SERPL-MCNC: 7.3 MG/DL (ref 8.3–10.6)
CHLORIDE BLD-SCNC: 107 MMOL/L (ref 99–110)
CO2: 21 MMOL/L (ref 21–32)
CREAT SERPL-MCNC: 0.4 MG/DL (ref 0.9–1.3)
DIFFERENTIAL TYPE: ABNORMAL
EOSINOPHILS ABSOLUTE: 0.1 K/CU MM
EOSINOPHILS RELATIVE PERCENT: 0.4 % (ref 0–3)
GFR, ESTIMATED: >90 ML/MIN/1.73M2
GLUCOSE SERPL-MCNC: 95 MG/DL (ref 70–99)
HCT VFR BLD CALC: 40.3 % (ref 42–52)
HEMOGLOBIN: 12.8 GM/DL (ref 13.5–18)
IMMATURE NEUTROPHIL %: 0.4 % (ref 0–0.43)
LACTATE: 1.9 MMOL/L (ref 0.5–1.9)
LIPASE: 14 IU/L (ref 13–60)
LYMPHOCYTES ABSOLUTE: 1.7 K/CU MM
LYMPHOCYTES RELATIVE PERCENT: 15.1 % (ref 24–44)
MCH RBC QN AUTO: 26.5 PG (ref 27–31)
MCHC RBC AUTO-ENTMCNC: 31.8 % (ref 32–36)
MCV RBC AUTO: 83.4 FL (ref 78–100)
MONOCYTES ABSOLUTE: 0.8 K/CU MM
MONOCYTES RELATIVE PERCENT: 6.8 % (ref 0–4)
NEUTROPHILS ABSOLUTE: 8.7 K/CU MM
NEUTROPHILS RELATIVE PERCENT: 76.9 % (ref 36–66)
NUCLEATED RBC %: 0 %
PDW BLD-RTO: 15.7 % (ref 11.7–14.9)
PLATELET # BLD: 299 K/CU MM (ref 140–440)
PMV BLD AUTO: 9 FL (ref 7.5–11.1)
POTASSIUM SERPL-SCNC: 3.5 MMOL/L (ref 3.5–5.1)
RBC # BLD: 4.83 M/CU MM (ref 4.6–6.2)
SODIUM BLD-SCNC: 140 MMOL/L (ref 135–145)
TOTAL IMMATURE NEUTOROPHIL: 0.04 K/CU MM
TOTAL NUCLEATED RBC: 0 K/CU MM
TOTAL PROTEIN: 5.6 GM/DL (ref 6.4–8.2)
WBC # BLD: 11.3 K/CU MM (ref 4–10.5)

## 2024-09-05 PROCEDURE — 2580000003 HC RX 258

## 2024-09-05 PROCEDURE — 6360000002 HC RX W HCPCS

## 2024-09-05 PROCEDURE — 6360000004 HC RX CONTRAST MEDICATION

## 2024-09-05 PROCEDURE — 1200000000 HC SEMI PRIVATE

## 2024-09-05 PROCEDURE — 96375 TX/PRO/DX INJ NEW DRUG ADDON: CPT

## 2024-09-05 PROCEDURE — 74177 CT ABD & PELVIS W/CONTRAST: CPT

## 2024-09-05 PROCEDURE — 99285 EMERGENCY DEPT VISIT HI MDM: CPT

## 2024-09-05 PROCEDURE — 6360000002 HC RX W HCPCS: Performed by: STUDENT IN AN ORGANIZED HEALTH CARE EDUCATION/TRAINING PROGRAM

## 2024-09-05 PROCEDURE — 96376 TX/PRO/DX INJ SAME DRUG ADON: CPT

## 2024-09-05 PROCEDURE — 96374 THER/PROPH/DIAG INJ IV PUSH: CPT

## 2024-09-05 PROCEDURE — APPNB45 APP NON BILLABLE 31-45 MINUTES: Performed by: LICENSED PRACTICAL NURSE

## 2024-09-05 PROCEDURE — 80053 COMPREHEN METABOLIC PANEL: CPT

## 2024-09-05 PROCEDURE — 83690 ASSAY OF LIPASE: CPT

## 2024-09-05 PROCEDURE — 83605 ASSAY OF LACTIC ACID: CPT

## 2024-09-05 PROCEDURE — 93005 ELECTROCARDIOGRAM TRACING: CPT

## 2024-09-05 PROCEDURE — 96361 HYDRATE IV INFUSION ADD-ON: CPT

## 2024-09-05 PROCEDURE — 85025 COMPLETE CBC W/AUTO DIFF WBC: CPT

## 2024-09-05 PROCEDURE — 2500000003 HC RX 250 WO HCPCS

## 2024-09-05 PROCEDURE — 99222 1ST HOSP IP/OBS MODERATE 55: CPT | Performed by: INTERNAL MEDICINE

## 2024-09-05 RX ORDER — SODIUM CHLORIDE 0.9 % (FLUSH) 0.9 %
5-40 SYRINGE (ML) INJECTION EVERY 12 HOURS SCHEDULED
Status: DISCONTINUED | OUTPATIENT
Start: 2024-09-05 | End: 2024-09-07 | Stop reason: HOSPADM

## 2024-09-05 RX ORDER — ALBUTEROL SULFATE 90 UG/1
2 AEROSOL, METERED RESPIRATORY (INHALATION) EVERY 6 HOURS PRN
Status: DISCONTINUED | OUTPATIENT
Start: 2024-09-05 | End: 2024-09-07 | Stop reason: HOSPADM

## 2024-09-05 RX ORDER — ONDANSETRON 4 MG/1
4 TABLET, ORALLY DISINTEGRATING ORAL EVERY 8 HOURS PRN
Status: DISCONTINUED | OUTPATIENT
Start: 2024-09-05 | End: 2024-09-07 | Stop reason: HOSPADM

## 2024-09-05 RX ORDER — ASPIRIN 81 MG/1
81 TABLET, CHEWABLE ORAL DAILY
Status: DISCONTINUED | OUTPATIENT
Start: 2024-09-05 | End: 2024-09-05

## 2024-09-05 RX ORDER — BUPROPION HYDROCHLORIDE 150 MG/1
150 TABLET ORAL EVERY MORNING
Status: DISCONTINUED | OUTPATIENT
Start: 2024-09-06 | End: 2024-09-07 | Stop reason: HOSPADM

## 2024-09-05 RX ORDER — HYDROMORPHONE HYDROCHLORIDE 1 MG/ML
0.25 INJECTION, SOLUTION INTRAMUSCULAR; INTRAVENOUS; SUBCUTANEOUS ONCE
Status: COMPLETED | OUTPATIENT
Start: 2024-09-05 | End: 2024-09-05

## 2024-09-05 RX ORDER — ONDANSETRON 2 MG/ML
4 INJECTION INTRAMUSCULAR; INTRAVENOUS EVERY 6 HOURS PRN
Status: DISCONTINUED | OUTPATIENT
Start: 2024-09-05 | End: 2024-09-05

## 2024-09-05 RX ORDER — POTASSIUM CHLORIDE 7.45 MG/ML
10 INJECTION INTRAVENOUS PRN
Status: DISCONTINUED | OUTPATIENT
Start: 2024-09-05 | End: 2024-09-07 | Stop reason: HOSPADM

## 2024-09-05 RX ORDER — ATORVASTATIN CALCIUM 10 MG/1
20 TABLET, FILM COATED ORAL NIGHTLY
Status: DISCONTINUED | OUTPATIENT
Start: 2024-09-05 | End: 2024-09-07 | Stop reason: HOSPADM

## 2024-09-05 RX ORDER — HYDROCODONE BITARTRATE AND ACETAMINOPHEN 5; 325 MG/1; MG/1
1 TABLET ORAL EVERY 6 HOURS PRN
Status: DISCONTINUED | OUTPATIENT
Start: 2024-09-05 | End: 2024-09-07 | Stop reason: HOSPADM

## 2024-09-05 RX ORDER — CLOPIDOGREL BISULFATE 75 MG/1
75 TABLET ORAL DAILY
Status: DISCONTINUED | OUTPATIENT
Start: 2024-09-05 | End: 2024-09-05

## 2024-09-05 RX ORDER — SODIUM CHLORIDE 0.9 % (FLUSH) 0.9 %
5-40 SYRINGE (ML) INJECTION PRN
Status: DISCONTINUED | OUTPATIENT
Start: 2024-09-05 | End: 2024-09-07 | Stop reason: HOSPADM

## 2024-09-05 RX ORDER — ONDANSETRON 2 MG/ML
4 INJECTION INTRAMUSCULAR; INTRAVENOUS EVERY 6 HOURS PRN
Status: DISCONTINUED | OUTPATIENT
Start: 2024-09-05 | End: 2024-09-07 | Stop reason: HOSPADM

## 2024-09-05 RX ORDER — ENOXAPARIN SODIUM 100 MG/ML
40 INJECTION SUBCUTANEOUS DAILY
Status: DISCONTINUED | OUTPATIENT
Start: 2024-09-06 | End: 2024-09-07 | Stop reason: HOSPADM

## 2024-09-05 RX ORDER — ACETAMINOPHEN 650 MG/1
650 SUPPOSITORY RECTAL EVERY 6 HOURS PRN
Status: DISCONTINUED | OUTPATIENT
Start: 2024-09-05 | End: 2024-09-07 | Stop reason: HOSPADM

## 2024-09-05 RX ORDER — POTASSIUM CHLORIDE 1500 MG/1
40 TABLET, EXTENDED RELEASE ORAL PRN
Status: DISCONTINUED | OUTPATIENT
Start: 2024-09-05 | End: 2024-09-07 | Stop reason: HOSPADM

## 2024-09-05 RX ORDER — SODIUM CHLORIDE 9 MG/ML
INJECTION, SOLUTION INTRAVENOUS CONTINUOUS
Status: DISCONTINUED | OUTPATIENT
Start: 2024-09-05 | End: 2024-09-07 | Stop reason: HOSPADM

## 2024-09-05 RX ORDER — 0.9 % SODIUM CHLORIDE 0.9 %
1000 INTRAVENOUS SOLUTION INTRAVENOUS ONCE
Status: COMPLETED | OUTPATIENT
Start: 2024-09-05 | End: 2024-09-05

## 2024-09-05 RX ORDER — LANOLIN ALCOHOL/MO/W.PET/CERES
1000 CREAM (GRAM) TOPICAL DAILY
Status: DISCONTINUED | OUTPATIENT
Start: 2024-09-05 | End: 2024-09-07 | Stop reason: HOSPADM

## 2024-09-05 RX ORDER — SENNA AND DOCUSATE SODIUM 50; 8.6 MG/1; MG/1
2 TABLET, FILM COATED ORAL DAILY
Status: DISCONTINUED | OUTPATIENT
Start: 2024-09-05 | End: 2024-09-05

## 2024-09-05 RX ORDER — FERROUS SULFATE 325(65) MG
325 TABLET ORAL
Status: DISCONTINUED | OUTPATIENT
Start: 2024-09-06 | End: 2024-09-07 | Stop reason: HOSPADM

## 2024-09-05 RX ORDER — POLYETHYLENE GLYCOL 3350 17 G/17G
17 POWDER, FOR SOLUTION ORAL DAILY PRN
Status: DISCONTINUED | OUTPATIENT
Start: 2024-09-05 | End: 2024-09-07 | Stop reason: HOSPADM

## 2024-09-05 RX ORDER — ROPINIROLE 0.25 MG/1
0.5 TABLET, FILM COATED ORAL NIGHTLY
Status: DISCONTINUED | OUTPATIENT
Start: 2024-09-05 | End: 2024-09-07 | Stop reason: HOSPADM

## 2024-09-05 RX ORDER — FAMOTIDINE 10 MG/ML
20 INJECTION, SOLUTION INTRAVENOUS ONCE
Status: COMPLETED | OUTPATIENT
Start: 2024-09-05 | End: 2024-09-05

## 2024-09-05 RX ORDER — MAGNESIUM SULFATE IN WATER 40 MG/ML
2000 INJECTION, SOLUTION INTRAVENOUS PRN
Status: DISCONTINUED | OUTPATIENT
Start: 2024-09-05 | End: 2024-09-07 | Stop reason: HOSPADM

## 2024-09-05 RX ORDER — POLYETHYLENE GLYCOL 3350 17 G/17G
17 POWDER, FOR SOLUTION ORAL DAILY
Status: DISCONTINUED | OUTPATIENT
Start: 2024-09-05 | End: 2024-09-05

## 2024-09-05 RX ORDER — ACETAMINOPHEN 325 MG/1
650 TABLET ORAL EVERY 6 HOURS PRN
Status: DISCONTINUED | OUTPATIENT
Start: 2024-09-05 | End: 2024-09-07 | Stop reason: HOSPADM

## 2024-09-05 RX ORDER — MORPHINE SULFATE 4 MG/ML
4 INJECTION, SOLUTION INTRAMUSCULAR; INTRAVENOUS EVERY 4 HOURS PRN
Status: DISCONTINUED | OUTPATIENT
Start: 2024-09-05 | End: 2024-09-07 | Stop reason: HOSPADM

## 2024-09-05 RX ORDER — PANTOPRAZOLE SODIUM 40 MG/1
40 TABLET, DELAYED RELEASE ORAL DAILY
Status: DISCONTINUED | OUTPATIENT
Start: 2024-09-05 | End: 2024-09-07 | Stop reason: HOSPADM

## 2024-09-05 RX ORDER — SODIUM CHLORIDE 9 MG/ML
INJECTION, SOLUTION INTRAVENOUS PRN
Status: DISCONTINUED | OUTPATIENT
Start: 2024-09-05 | End: 2024-09-07 | Stop reason: HOSPADM

## 2024-09-05 RX ORDER — IOPAMIDOL 755 MG/ML
75 INJECTION, SOLUTION INTRAVASCULAR
Status: COMPLETED | OUTPATIENT
Start: 2024-09-05 | End: 2024-09-05

## 2024-09-05 RX ORDER — SODIUM CHLORIDE, SODIUM LACTATE, POTASSIUM CHLORIDE, CALCIUM CHLORIDE 600; 310; 30; 20 MG/100ML; MG/100ML; MG/100ML; MG/100ML
INJECTION, SOLUTION INTRAVENOUS CONTINUOUS
Status: ACTIVE | OUTPATIENT
Start: 2024-09-05 | End: 2024-09-06

## 2024-09-05 RX ORDER — ATORVASTATIN CALCIUM 10 MG/1
20 TABLET, FILM COATED ORAL DAILY
Status: DISCONTINUED | OUTPATIENT
Start: 2024-09-05 | End: 2024-09-05

## 2024-09-05 RX ADMIN — FAMOTIDINE 20 MG: 10 INJECTION, SOLUTION INTRAVENOUS at 08:38

## 2024-09-05 RX ADMIN — HYDROMORPHONE HYDROCHLORIDE 0.25 MG: 1 INJECTION, SOLUTION INTRAMUSCULAR; INTRAVENOUS; SUBCUTANEOUS at 08:42

## 2024-09-05 RX ADMIN — HYDROMORPHONE HYDROCHLORIDE 0.25 MG: 1 INJECTION, SOLUTION INTRAMUSCULAR; INTRAVENOUS; SUBCUTANEOUS at 09:49

## 2024-09-05 RX ADMIN — MORPHINE SULFATE 4 MG: 4 INJECTION, SOLUTION INTRAMUSCULAR; INTRAVENOUS at 23:29

## 2024-09-05 RX ADMIN — SODIUM CHLORIDE 1000 ML: 9 INJECTION, SOLUTION INTRAVENOUS at 08:48

## 2024-09-05 RX ADMIN — HYDROMORPHONE HYDROCHLORIDE 0.25 MG: 1 INJECTION, SOLUTION INTRAMUSCULAR; INTRAVENOUS; SUBCUTANEOUS at 13:03

## 2024-09-05 RX ADMIN — ONDANSETRON 4 MG: 2 INJECTION INTRAMUSCULAR; INTRAVENOUS at 08:39

## 2024-09-05 RX ADMIN — SODIUM CHLORIDE: 9 INJECTION, SOLUTION INTRAVENOUS at 20:59

## 2024-09-05 RX ADMIN — ONDANSETRON 4 MG: 2 INJECTION INTRAMUSCULAR; INTRAVENOUS at 23:26

## 2024-09-05 RX ADMIN — IOPAMIDOL 75 ML: 755 INJECTION, SOLUTION INTRAVENOUS at 10:05

## 2024-09-05 ASSESSMENT — PAIN SCALES - GENERAL
PAINLEVEL_OUTOF10: 5
PAINLEVEL_OUTOF10: 5
PAINLEVEL_OUTOF10: 8
PAINLEVEL_OUTOF10: 8
PAINLEVEL_OUTOF10: 10
PAINLEVEL_OUTOF10: 0
PAINLEVEL_OUTOF10: 10
PAINLEVEL_OUTOF10: 7

## 2024-09-05 ASSESSMENT — PAIN DESCRIPTION - LOCATION
LOCATION: ABDOMEN

## 2024-09-05 ASSESSMENT — PAIN - FUNCTIONAL ASSESSMENT: PAIN_FUNCTIONAL_ASSESSMENT: 0-10

## 2024-09-05 ASSESSMENT — PAIN DESCRIPTION - DESCRIPTORS: DESCRIPTORS: ACHING;CRAMPING

## 2024-09-05 NOTE — ED NOTES
Orders placed for stool softeners. This RN consulted Cors, DO about orders, provider stated that orders can be discontinued. Provider stated that Protonix can still be given.

## 2024-09-05 NOTE — ED NOTES
Medication History  [facility]    Patient Name: Jama Ramirez 1960     Medication history has been completed by: Reina Blankenship CP    Source(s) of information: patient insurance claims and retail pharmacy     Primary Care Physician: Jennifer Gomez MD     Pharmacy: [facility]    Allergies as of 09/05/2024 - Fully Reviewed 09/05/2024   Allergen Reaction Noted    Bee venom Anaphylaxis 08/20/2016    Nsaids Other (See Comments) 12/21/2018        Prior to Admission medications    Medication Sig Start Date End Date Taking? Authorizing Provider   ferrous sulfate (IRON 325) 325 (65 Fe) MG tablet Take 1 tablet by mouth daily (with breakfast) 8/29/24  Yes Bijal Herring MD   vitamin B-12 (CYANOCOBALAMIN) 1000 MCG tablet Take 1 tablet by mouth daily 8/29/24  Yes Bijal Herring MD   pantoprazole (PROTONIX) 40 MG tablet Take 1 tablet by mouth daily 8/20/24  Yes Sariah Bailey MD   clopidogrel (PLAVIX) 75 MG tablet Take 1 tablet by mouth daily . Start only 24 hour after the procedure. Take 4 tablets on day 1 then one table once daily. 6/20/24  Yes Daniel Daugherty MD   lipase-protease-amylase (CREON) 96959-92936 units delayed release capsule Take 1 capsule by mouth 3 times daily (with meals) 5/29/24  Yes ProviderSariah MD   aspirin 81 MG chewable tablet Take 1 tablet by mouth daily 5/4/24  Yes Cynthia Suazo MD   ondansetron (ZOFRAN) 4 MG tablet Take 1 tablet by mouth daily as needed for Nausea or Vomiting 8/30/24   Terrell Marino II, MD   senna-docusate (PERICOLACE) 8.6-50 MG per tablet Take 2 tablets by mouth daily for 14 days 8/30/24 9/13/24  Terrell Marino II, MD   sennosides-docusate sodium (SENOKOT-S) 8.6-50 MG tablet Take 2 tablets by mouth daily . Can take twice daily if needed. Aim to have at least 1 soft bowel movement a day. 6/29/24   Cynthia Suazo MD   polyethylene glycol (GLYCOLAX) 17 GM/SCOOP powder Take 17 g by mouth daily . Can be take twice daily. Aim to have at  least 1 soft bowel movement a day.  Patient taking differently: Take 17 g by mouth daily as needed . Can be take twice daily. Aim to have at least 1 soft bowel movement a day. 6/29/24 9/5/24  Cynthia Suazo MD   atorvastatin (LIPITOR) 20 MG tablet Take 1 tablet by mouth daily 5/30/24   Jennifer Gomez MD   buPROPion (WELLBUTRIN XL) 150 MG extended release tablet Take 1 tablet by mouth every morning 5/30/24   Jennifer Gomez MD   rOPINIRole (REQUIP) 0.5 MG tablet Take 1 tablet by mouth nightly 5/30/24   Sariah Bailey MD   vitamin D (ERGOCALCIFEROL) 1.25 MG (38145 UT) CAPS capsule Take 1 capsule by mouth once a week  09/05/24 Patient reports takes this on Monday 3/25/24   Ramon Davis MD   tamsulosin (FLOMAX) 0.4 MG capsule Take 1 capsule by mouth daily 9/3/23 8/29/24  Leona Gilbert APRN - CNP   hydrocortisone (ANUSOL-HC) 2.5 % CREA rectal cream APPLY TO HEMMORROIDS topically UP TO four TIMES PER DAY AS NEEDED FOR PAIN OR BURNING 8/25/22   Humphrey Hsieh MD   glucose 4 g chewable tablet  10/27/21   Sariah Bailey MD   cetirizine (ZYRTEC) 10 MG tablet Take 1 tablet by mouth daily as needed 3/9/21   Sariah Bailey MD   fluticasone (FLONASE) 50 MCG/ACT nasal spray as needed 3/9/21   Sariah Bailey MD   Nutritional Supplements (ENSURE HIGH PROTEIN) LIQD Take 1 Can by mouth 3 times daily Different flavors if possible  Patient not taking: Reported on 9/5/2024 5/20/20   Mary Linares MD   tiotropium (SPIRIVA HANDIHALER) 18 MCG inhalation capsule Inhale 1 capsule into the lungs daily 1/3/17   Hattie Pedro MD   albuterol sulfate HFA (PROVENTIL HFA) 108 (90 BASE) MCG/ACT inhaler Inhale 2 puffs into the lungs every 6 hours as needed for Wheezing 1/3/17   Hattie Pedro MD     Medications removed from list (include reason, ex. noncompliance, medication cost, therapy complete etc.):   Omeprazole taking pantoprazole    Comments:  Medication list reviewed with patient,

## 2024-09-05 NOTE — ED NOTES
Patient is resting, this RN will give patient Protonix once awake. Garcia MOBLEY was notified and agrees.

## 2024-09-05 NOTE — ED PROVIDER NOTES
I personally saw Jama Ashleytox and made/approved the management plan and take responsibility for the patient management.    In brief, patient comes in with abdominal pain that onset this morning at 1 AM.  Symptoms have been constant.  Has history of Whipple and other abdominal surgeries at [facility].    Focused exam revealed   General- no acute distress, alert, cooperative  Skin -intact, no rashes  Respiratory -no respiratory distress, speaking full sentences  MSK: No acute deformities  Abdomen: Nonlocalized moderate tenderness, no guarding or rigidity  Psych: Mood and affect appropriate  .      ED course: Patient came in was seen by ARIADNA, found to have SBO.  Given he has had Whipple and treatment for cancer of ampulla of [place], plan to transfer to [facility] for continuation of care and possible surgical complication.  Discussed with patient and he is agreeable to this.  See ARIADNA note for transfer details.  He is currently not vomiting and we will forego NG tube at this time.  Patient made NPO.    Records Reviewed : Care Everywhere previous care at [facility]    CT ABDOMEN PELVIS W IV CONTRAST Additional Contrast? None   Final Result   Small bowel obstruction. Surgical consultation recommended.      Additional findings as above.      Results discussed with Dr. Cortez on 9/5/2024 at 10:52 a.m.      Electronically signed by FLORY MCCORMACK        Labs Reviewed   CBC WITH AUTO DIFFERENTIAL - Abnormal; Notable for the following components:       Result Value    WBC 11.3 (*)     Hemoglobin 12.8 (*)     Hematocrit 40.3 (*)     MCH 26.5 (*)     MCHC 31.8 (*)     RDW 15.7 (*)     Neutrophils % 76.9 (*)     Lymphocytes % 15.1 (*)     Monocytes % 6.8 (*)     All other components within normal limits   COMPREHENSIVE METABOLIC PANEL - Abnormal; Notable for the following components:    Creatinine 0.4 (*)     Calcium 7.3 (*)     Total Protein 5.6 (*)     Albumin 3.2 (*)     Alkaline Phosphatase 131 (*)     ALT 8 (*)     All other components within

## 2024-09-05 NOTE — ED PROVIDER NOTES
[facility] EMERGENCY DEPARTMENT  EMERGENCY DEPARTMENT ENCOUNTER        Pt Name: Jama Ramirez  MRN: [id number]  Birthdate 1960  Date of evaluation: 9/5/2024  Provider: TIMOTHY Castaneda - SHANNON  PCP: Jennifer Gomez MD  Note Started: 8:17 AM EDT 9/5/24       I have seen and evaluated this patient with my supervising physician Martinez Zelaya DO.      CHIEF COMPLAINT       Chief Complaint   Patient presents with    Abdominal Pain    Nausea       HISTORY OF PRESENT ILLNESS: 1 or more Elements     History From: Patient    Limitations to history : None    Social Determinants Significantly Affecting Health : None    Chief Complaint: Abdominal pain nausea vomiting white-colored stools    Jama Ramirez is a 64 y.o. male history of malignancy of duodenum, COPD who presents to ED stating last night around 1 AM he developed sharp cramping upper abdominal pain began having nausea with vomiting.  Stated his last episode of vomiting was dark-colored.  Reports he had a large bowel movement that filled the toilet yesterday morning with white-colored stool.  States he has felt hot on and off is unsure if he had a fever.  Denies any chest pain lower extremity edema.  Denies any alcohol use or drug use.  Reports he feels very dehydrated.  Has a history of surgery due to malignancy of the abdomen as well as history of small bowel obstruction in July.  Recently had a port placed.     Nursing Notes were all reviewed and agreed with or any disagreements were addressed in the HPI.    REVIEW OF SYSTEMS :      Review of Systems    Positives and Pertinent negatives as per HPI.     SURGICAL HISTORY     Past Surgical History:   Procedure Laterality Date    ABDOMEN SURGERY      ARM SURGERY Left 30+ yrs    \"put plastic ligaments in \"  after injury through glass window    CARPAL TUNNEL RELEASE Right 10/04/2019    RIGHT CARPAL TUNNEL RELEASE performed by Wolfgang Machuca DO at [facility] OR     of ampulla of Vater on Creon Plavix    CC/HPI Summary, DDx, ED Course, and Reassessment: See HPI and physical above    Abdominal workup was initiated, concern for worsening malignancy patient's abdomen rigid with mild distention noted guarding severe tenderness noted throughout.    Patient was provided with Zofran Dilaudid Protonix 1 L normal saline.    Concern for small bowel obstruction, patient nausea vomiting is currently resolved.  Patient was provided with additional doses of Dilaudid for pain relief.  CBC with mild leukocytosis of 11.3 white blood cell count, patient has a history of chronic anemia which is stable at this time.  CMP with chronically elevated alkaline phosphatase slightly improved at 131 kidney function electrolytes without abnormalities.    EKG without acute STEMI see attending provider note for EKG interpretation patient denies any chest pain at this time.    Lipase without elevation at 14.    Lactic acid without elevation currently at 1.9.  Patient is being provided with 1 L normal saline, is being transferred to [facility] at this time.    CT showing small bowel obstruction, patient has a history of this in the past with surgical procedure.  Consult to his surgical team for [facility] for surgical oncology.    Patient symptoms consistent with small bowel obstruction.  Have not placed NG tube at this time patient is currently not nausea vomiting.  Vital signs are stable.  Patient's pain is relieved.    On reassessment patient sleeping comfortably, no nausea vomiting noted.  Multiple attempts to contact [facility].  Dr. Collado was spoken to reviewed CT findings.  No new orders were given.  Patient is to be transferred when bed is obtained.  Home medications were reviewed and ordered at this time.    Disposition Considerations (tests considered but not done, Admit vs D/C, Shared Decision Making, Pt Expectation of Test or Tx.): Patient transferred in stable condition continue therapy    The patient

## 2024-09-05 NOTE — ED PROVIDER NOTES
I did not see this patient.  I reviewed the EKG obtained in this visit.  EKG reveals normal sinus rhythm ventricular rate of 74 bpm without obvious ST changes or Q waves.  No AV block or ectopy noted.  QTc is 459 ms.  MN interval is 160 ms with QRS duration of 90 ms     Austyn Cortez MD  09/05/24 6961

## 2024-09-05 NOTE — ED TRIAGE NOTES
Patient arrived via EMS stating he had a normal bowel movement yesterday and ate some pizza last evening and has had abd pain, vomiting, cold chills, and nausea since then. No diarrhea. States he \"had surgery about a month and a half ago on his bowels due to cancer\".

## 2024-09-05 NOTE — ED NOTES
[facility] Spoke with Hazel Amador at intake. Faxed demographic page to the [facility] [phone]. Had CT images pushed to [facility]    1304 called [facility] on patient transfer. Spoke with Hazel at intake. Waiting for patients surgeon to return call.    1350 Hazel from [facility] returned call with excepting provider. Dr Mariza Collado has excepted patient to her service. At this time there is no bed available for transfer. [facility] will call back when bed has been assigned.      1852 Attempted to call [facility] command center to check on bed status. Was on hold 18 minutes listening to recorded message with no success of talking to intake.

## 2024-09-05 NOTE — CONSULTS
[facility] Gastroenterology and Hepatology             MD Zion Hamilton MD Carol Christensen, APRN-CNP       Lindsey Hai, APRN-CNP             [address] [place] [address]             [phone] fax [phone]      Physician attestation:  I have personally seen and examined the patient independently. I have reviewed the patient's available data,including medical history and recent test results. Reviewed and discussed note as documented by ARIADNA.  I agree with the physical exam findings, assessment and plan.     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  Patient recently underwent Whipple surgery at [facility] on 7/16 with Dr. Foote and was complicated by postop ileus for which she was readmitted manage conservatively.  Currently presents with abdominal pain and distention.  CT with small bowel obstruction and surgical recommendation.      Gen: normal mood, alert  ENT: normal TJ  Cardiovascular: RRR, No M/R/G  Respiratory: CTA BL  Gastrointestinal: Surgical scar, abdominal distention noted mostly on the left side, mildly tender  Genitourinary: no suprapubic tenderness  Musculoskeletal: no scars  Skin:moist  Neuro: alert and oriented x3    My assessment and plan reveals   #1 small bowel obstruction.  CT with small bowel obstruction surgical consultation recommended.  History of SBO status post Whipple previously treated conservatively.  General Surgery recommend transfer to [facility] where the patient has a surgical oncologist and recently evaluated.  Serial abdominal exam    #2 ampullary cancer   Pathology revealed mixed neuroendocrine and nonneuroendocrine adenocarcinoma.  Status

## 2024-09-05 NOTE — ED NOTES
ED TO INPATIENT SBAR HANDOFF    Patient Name: Jama Ramirez   :  1960  64 y.o.   Preferred Name  Jama   Family/Caregiver Present no   Restraints no   C-SSRS: Risk of Suicide: No Risk  Sitter no   Sepsis Risk Score        Situation  Chief Complaint   Patient presents with    Abdominal Pain    Nausea     Brief Description of Patient's Condition:   Pt arrived to ED with c/o abdominal pain  and nausea. stating he had a normal bowel movement yesterday and ate some pizza last evening and has had abd pain, vomiting, cold chills, and nausea since then. No diarrhea. States he \"had surgery about a month and a half ago on his bowels due to cancer\". A/o and on room air.   Mental Status: oriented, alert, coherent, logical, thought processes intact, and able to concentrate and follow conversation  Arrived from: home    Imaging:   CT ABDOMEN PELVIS W IV CONTRAST Additional Contrast? None    (Results Pending)     Abnormal labs:   Abnormal Labs Reviewed   CBC WITH AUTO DIFFERENTIAL - Abnormal; Notable for the following components:       Result Value    WBC 11.3 (*)     Hemoglobin 12.8 (*)     Hematocrit 40.3 (*)     MCH 26.5 (*)     MCHC 31.8 (*)     RDW 15.7 (*)     Neutrophils % 76.9 (*)     Lymphocytes % 15.1 (*)     Monocytes % 6.8 (*)     All other components within normal limits   COMPREHENSIVE METABOLIC PANEL - Abnormal; Notable for the following components:    Creatinine 0.4 (*)     Calcium 7.3 (*)     Total Protein 5.6 (*)     Albumin 3.2 (*)     Alkaline Phosphatase 131 (*)     ALT 8 (*)     All other components within normal limits        Background  History:   Past Medical History:   Diagnosis Date    Arthritis     Hands    Cancer of ampulla of Vater (HCC) 2024    Dr. CHUYITA Herring    Chronic cluster headache 2019 last    COPD (chronic obstructive pulmonary disease) ([facility])     \"dx 2 yrs ago\" \\"does not see a lung dr - Follows with PCP    Degenerative disc disease     \"in my back have degenarative disc\"     Depression     Edentulous     Fracture     \"fell over a 5 gallon bucket and landed on cement block and broke my right hand\" (7/6/2015)    GERD with esophagitis     H/O dizziness     States will feel like he is going to pass out, possible low BS    Hiatal hernia     Hx of gastric ulcer     HX OTHER MEDICAL     pt states has trouble with reading and writing\"can read very very little\"    Hyperlipidemia     Hypertension     No meds as of 5/27/21 - follows with PCP    Impingement syndrome of right shoulder     Left shoulder pain     limited range-of-motion    Lower back pain     Motion sickness     Neck pain     more on left side into shoulder--pain limits ROM    PONV (postoperative nausea and vomiting)     Prolonged emergence from general anesthesia     Vertigo        Assessment    Vitals:    Level of Consciousness: Alert (0)   Vitals:    09/05/24 0807 09/05/24 0808 09/05/24 0813   BP: (!) 147/103     Pulse: 76     Resp:  16    Temp:   97.5 °F (36.4 °C)   TempSrc:   Oral   SpO2:  98%    Weight:  57.2 kg (126 lb)    Height:  1.753 m (5' 9\")      PO Status: Nothing by Mouth  O2 Flow Rate: O2 Device: None (Room air)    Cardiac Rhythm:   Last documented pain medication administered: See MAR   NIH Score: NIH     Active LDA's:   Peripheral IV 09/05/24 Left Antecubital (Active)       Pertinent or High Risk Medications/Drips: no   If Yes, please provide details:   Blood Product Administration: no  If Yes, please provide details:     Recommendation    Incomplete orders   Additional Comments:   If any further questions, please call Sending RN at [phone]    Electronically signed by: Electronically signed by Alliyah Schoenleben, RN on 9/5/2024 at 10:49 AM

## 2024-09-06 PROBLEM — R10.32 LEFT LOWER QUADRANT ABDOMINAL PAIN: Status: ACTIVE | Noted: 2024-09-06

## 2024-09-06 LAB
ALBUMIN SERPL-MCNC: 3.1 GM/DL (ref 3.4–5)
ALP BLD-CCNC: 128 IU/L (ref 40–128)
ALT SERPL-CCNC: 8 U/L (ref 10–40)
ANION GAP SERPL CALCULATED.3IONS-SCNC: 10 MMOL/L (ref 7–16)
AST SERPL-CCNC: 13 IU/L (ref 15–37)
BACTERIA: NEGATIVE /HPF
BASOPHILS ABSOLUTE: 0 K/CU MM
BASOPHILS RELATIVE PERCENT: 0.8 % (ref 0–1)
BILIRUB SERPL-MCNC: 0.4 MG/DL (ref 0–1)
BILIRUBIN, URINE: NEGATIVE MG/DL
BLOOD, URINE: NEGATIVE
BUN SERPL-MCNC: 10 MG/DL (ref 6–23)
CALCIUM SERPL-MCNC: 8.1 MG/DL (ref 8.3–10.6)
CHLORIDE BLD-SCNC: 104 MMOL/L (ref 99–110)
CLARITY, UA: ABNORMAL
CO2: 24 MMOL/L (ref 21–32)
COLOR, UA: YELLOW
CREAT SERPL-MCNC: 0.5 MG/DL (ref 0.9–1.3)
DIFFERENTIAL TYPE: ABNORMAL
EKG ATRIAL RATE: 74 BPM
EKG DIAGNOSIS: NORMAL
EKG P AXIS: 89 DEGREES
EKG P-R INTERVAL: 160 MS
EKG Q-T INTERVAL: 414 MS
EKG QRS DURATION: 90 MS
EKG QTC CALCULATION (BAZETT): 459 MS
EKG R AXIS: 45 DEGREES
EKG T AXIS: 59 DEGREES
EKG VENTRICULAR RATE: 74 BPM
EOSINOPHILS ABSOLUTE: 0.2 K/CU MM
EOSINOPHILS RELATIVE PERCENT: 3.2 % (ref 0–3)
GFR, ESTIMATED: >90 ML/MIN/1.73M2
GLUCOSE SERPL-MCNC: 63 MG/DL (ref 70–99)
GLUCOSE URINE: NEGATIVE MG/DL
HCT VFR BLD CALC: 36.1 % (ref 42–52)
HEMOGLOBIN: 10.5 GM/DL (ref 13.5–18)
IMMATURE NEUTROPHIL %: 0.2 % (ref 0–0.43)
INR BLD: 1 INDEX
KETONES, URINE: NEGATIVE MG/DL
LEUKOCYTE ESTERASE, URINE: ABNORMAL
LYMPHOCYTES ABSOLUTE: 1.7 K/CU MM
LYMPHOCYTES RELATIVE PERCENT: 33.7 % (ref 24–44)
MCH RBC QN AUTO: 25.9 PG (ref 27–31)
MCHC RBC AUTO-ENTMCNC: 29.1 % (ref 32–36)
MCV RBC AUTO: 88.9 FL (ref 78–100)
MONOCYTES ABSOLUTE: 0.4 K/CU MM
MONOCYTES RELATIVE PERCENT: 8.5 % (ref 0–4)
MUCUS: ABNORMAL HPF
NEUTROPHILS ABSOLUTE: 2.7 K/CU MM
NEUTROPHILS RELATIVE PERCENT: 53.6 % (ref 36–66)
NITRITE URINE, QUANTITATIVE: POSITIVE
NUCLEATED RBC %: 0 %
PDW BLD-RTO: 16 % (ref 11.7–14.9)
PH, URINE: 6 (ref 5–8)
PLATELET # BLD: 211 K/CU MM (ref 140–440)
PMV BLD AUTO: 8.6 FL (ref 7.5–11.1)
POTASSIUM SERPL-SCNC: 4.3 MMOL/L (ref 3.5–5.1)
PROTEIN UA: NEGATIVE MG/DL
PROTHROMBIN TIME: 13.8 SECONDS (ref 11.7–14.5)
RBC # BLD: 4.06 M/CU MM (ref 4.6–6.2)
RBC URINE: 1 /HPF (ref 0–3)
SODIUM BLD-SCNC: 138 MMOL/L (ref 135–145)
SPECIFIC GRAVITY UA: 1.02 (ref 1–1.03)
SQUAMOUS EPITHELIAL: <1 /HPF
TOTAL IMMATURE NEUTOROPHIL: 0.01 K/CU MM
TOTAL NUCLEATED RBC: 0 K/CU MM
TOTAL PROTEIN: 5.2 GM/DL (ref 6.4–8.2)
TRICHOMONAS: ABNORMAL /HPF
UROBILINOGEN, URINE: 0.2 MG/DL (ref 0.2–1)
WBC # BLD: 5 K/CU MM (ref 4–10.5)
WBC UA: 58 /HPF (ref 0–2)

## 2024-09-06 PROCEDURE — 87186 SC STD MICRODIL/AGAR DIL: CPT

## 2024-09-06 PROCEDURE — 6370000000 HC RX 637 (ALT 250 FOR IP)

## 2024-09-06 PROCEDURE — 1200000000 HC SEMI PRIVATE

## 2024-09-06 PROCEDURE — 87077 CULTURE AEROBIC IDENTIFY: CPT

## 2024-09-06 PROCEDURE — 87086 URINE CULTURE/COLONY COUNT: CPT

## 2024-09-06 PROCEDURE — 6360000002 HC RX W HCPCS: Performed by: STUDENT IN AN ORGANIZED HEALTH CARE EDUCATION/TRAINING PROGRAM

## 2024-09-06 PROCEDURE — 36415 COLL VENOUS BLD VENIPUNCTURE: CPT

## 2024-09-06 PROCEDURE — 81001 URINALYSIS AUTO W/SCOPE: CPT

## 2024-09-06 PROCEDURE — 94761 N-INVAS EAR/PLS OXIMETRY MLT: CPT

## 2024-09-06 PROCEDURE — 93010 ELECTROCARDIOGRAM REPORT: CPT | Performed by: INTERNAL MEDICINE

## 2024-09-06 PROCEDURE — 6370000000 HC RX 637 (ALT 250 FOR IP): Performed by: STUDENT IN AN ORGANIZED HEALTH CARE EDUCATION/TRAINING PROGRAM

## 2024-09-06 PROCEDURE — 94640 AIRWAY INHALATION TREATMENT: CPT

## 2024-09-06 PROCEDURE — 85025 COMPLETE CBC W/AUTO DIFF WBC: CPT

## 2024-09-06 PROCEDURE — 2580000003 HC RX 258: Performed by: STUDENT IN AN ORGANIZED HEALTH CARE EDUCATION/TRAINING PROGRAM

## 2024-09-06 PROCEDURE — 2580000003 HC RX 258

## 2024-09-06 PROCEDURE — 99232 SBSQ HOSP IP/OBS MODERATE 35: CPT | Performed by: INTERNAL MEDICINE

## 2024-09-06 PROCEDURE — APPNB45 APP NON BILLABLE 31-45 MINUTES: Performed by: LICENSED PRACTICAL NURSE

## 2024-09-06 PROCEDURE — 80053 COMPREHEN METABOLIC PANEL: CPT

## 2024-09-06 PROCEDURE — 85610 PROTHROMBIN TIME: CPT

## 2024-09-06 RX ADMIN — MORPHINE SULFATE 4 MG: 4 INJECTION, SOLUTION INTRAMUSCULAR; INTRAVENOUS at 04:33

## 2024-09-06 RX ADMIN — ENOXAPARIN SODIUM 40 MG: 100 INJECTION SUBCUTANEOUS at 09:15

## 2024-09-06 RX ADMIN — ATORVASTATIN CALCIUM 20 MG: 10 TABLET, FILM COATED ORAL at 21:07

## 2024-09-06 RX ADMIN — SODIUM CHLORIDE, POTASSIUM CHLORIDE, SODIUM LACTATE AND CALCIUM CHLORIDE: 600; 310; 30; 20 INJECTION, SOLUTION INTRAVENOUS at 00:06

## 2024-09-06 RX ADMIN — TIOTROPIUM BROMIDE INHALATION SPRAY 2 PUFF: 3.12 SPRAY, METERED RESPIRATORY (INHALATION) at 07:54

## 2024-09-06 RX ADMIN — Medication 1000 MCG: at 09:15

## 2024-09-06 RX ADMIN — MORPHINE SULFATE 4 MG: 4 INJECTION, SOLUTION INTRAMUSCULAR; INTRAVENOUS at 12:26

## 2024-09-06 RX ADMIN — PANCRELIPASE LIPASE, PANCRELIPASE PROTEASE, PANCRELIPASE AMYLASE 20000 UNITS: 5000; 17000; 24000 CAPSULE, DELAYED RELEASE ORAL at 09:22

## 2024-09-06 RX ADMIN — SODIUM CHLORIDE, PRESERVATIVE FREE 10 ML: 5 INJECTION INTRAVENOUS at 04:34

## 2024-09-06 RX ADMIN — SODIUM CHLORIDE, PRESERVATIVE FREE 10 ML: 5 INJECTION INTRAVENOUS at 21:08

## 2024-09-06 RX ADMIN — MORPHINE SULFATE 4 MG: 4 INJECTION, SOLUTION INTRAMUSCULAR; INTRAVENOUS at 21:07

## 2024-09-06 RX ADMIN — MORPHINE SULFATE 4 MG: 4 INJECTION, SOLUTION INTRAMUSCULAR; INTRAVENOUS at 16:45

## 2024-09-06 RX ADMIN — BUPROPION HYDROCHLORIDE 150 MG: 150 TABLET, EXTENDED RELEASE ORAL at 09:21

## 2024-09-06 RX ADMIN — SODIUM CHLORIDE: 9 INJECTION, SOLUTION INTRAVENOUS at 09:33

## 2024-09-06 RX ADMIN — PANTOPRAZOLE SODIUM 40 MG: 40 TABLET, DELAYED RELEASE ORAL at 09:14

## 2024-09-06 RX ADMIN — FERROUS SULFATE TAB 325 MG (65 MG ELEMENTAL FE) 325 MG: 325 (65 FE) TAB at 09:14

## 2024-09-06 RX ADMIN — SODIUM CHLORIDE: 9 INJECTION, SOLUTION INTRAVENOUS at 22:09

## 2024-09-06 RX ADMIN — PANCRELIPASE LIPASE, PANCRELIPASE PROTEASE, PANCRELIPASE AMYLASE 5000 UNITS: 5000; 17000; 24000 CAPSULE, DELAYED RELEASE ORAL at 16:29

## 2024-09-06 RX ADMIN — HYDROCODONE BITARTRATE AND ACETAMINOPHEN 1 TABLET: 5; 325 TABLET ORAL at 11:19

## 2024-09-06 RX ADMIN — ONDANSETRON 4 MG: 2 INJECTION INTRAMUSCULAR; INTRAVENOUS at 16:49

## 2024-09-06 RX ADMIN — PANCRELIPASE LIPASE, PANCRELIPASE PROTEASE, PANCRELIPASE AMYLASE 5000 UNITS: 5000; 17000; 24000 CAPSULE, DELAYED RELEASE ORAL at 09:23

## 2024-09-06 RX ADMIN — ROPINIROLE HYDROCHLORIDE 0.5 MG: 0.25 TABLET, FILM COATED ORAL at 21:07

## 2024-09-06 RX ADMIN — PANCRELIPASE LIPASE, PANCRELIPASE PROTEASE, PANCRELIPASE AMYLASE 20000 UNITS: 5000; 17000; 24000 CAPSULE, DELAYED RELEASE ORAL at 16:30

## 2024-09-06 ASSESSMENT — PAIN - FUNCTIONAL ASSESSMENT
PAIN_FUNCTIONAL_ASSESSMENT: ACTIVITIES ARE NOT PREVENTED
PAIN_FUNCTIONAL_ASSESSMENT: PREVENTS OR INTERFERES SOME ACTIVE ACTIVITIES AND ADLS
PAIN_FUNCTIONAL_ASSESSMENT: ACTIVITIES ARE NOT PREVENTED
PAIN_FUNCTIONAL_ASSESSMENT: ACTIVITIES ARE NOT PREVENTED
PAIN_FUNCTIONAL_ASSESSMENT: PREVENTS OR INTERFERES SOME ACTIVE ACTIVITIES AND ADLS

## 2024-09-06 ASSESSMENT — PAIN DESCRIPTION - LOCATION
LOCATION: ABDOMEN

## 2024-09-06 ASSESSMENT — PAIN DESCRIPTION - ORIENTATION
ORIENTATION: LOWER
ORIENTATION: LOWER;MID
ORIENTATION: UPPER
ORIENTATION: RIGHT;MID;LEFT;LOWER
ORIENTATION: UPPER
ORIENTATION: LOWER;MID
ORIENTATION: UPPER
ORIENTATION: RIGHT;LEFT;LOWER;UPPER;MID

## 2024-09-06 ASSESSMENT — PAIN DESCRIPTION - FREQUENCY
FREQUENCY: INTERMITTENT

## 2024-09-06 ASSESSMENT — PAIN SCALES - GENERAL
PAINLEVEL_OUTOF10: 7
PAINLEVEL_OUTOF10: 7
PAINLEVEL_OUTOF10: 5
PAINLEVEL_OUTOF10: 9
PAINLEVEL_OUTOF10: 7
PAINLEVEL_OUTOF10: 0
PAINLEVEL_OUTOF10: 5
PAINLEVEL_OUTOF10: 8
PAINLEVEL_OUTOF10: 4
PAINLEVEL_OUTOF10: 8
PAINLEVEL_OUTOF10: 7
PAINLEVEL_OUTOF10: 8
PAINLEVEL_OUTOF10: 4
PAINLEVEL_OUTOF10: 5

## 2024-09-06 ASSESSMENT — PAIN DESCRIPTION - ONSET
ONSET: ON-GOING

## 2024-09-06 ASSESSMENT — PAIN DESCRIPTION - DESCRIPTORS
DESCRIPTORS: CRAMPING;STABBING
DESCRIPTORS: ACHING;THROBBING
DESCRIPTORS: ACHING;DISCOMFORT
DESCRIPTORS: SHARP;STABBING
DESCRIPTORS: ACHING;DISCOMFORT
DESCRIPTORS: ACHING;STABBING
DESCRIPTORS: ACHING;DISCOMFORT

## 2024-09-06 ASSESSMENT — PAIN DESCRIPTION - PAIN TYPE
TYPE: ACUTE PAIN

## 2024-09-06 ASSESSMENT — ENCOUNTER SYMPTOMS
NAUSEA: 1
VOMITING: 1

## 2024-09-06 NOTE — CONSULTS
09/05/2024 08:09 AM    RDW 15.7 09/05/2024 08:09 AM    LYMPHOPCT 15.1 09/05/2024 08:09 AM    MONOPCT 6.8 09/05/2024 08:09 AM    EOSPCT 0.4 09/05/2024 08:09 AM    BASOPCT 0.4 09/05/2024 08:09 AM    MONOSABS 0.8 09/05/2024 08:09 AM    LYMPHSABS 1.7 09/05/2024 08:09 AM    EOSABS 0.1 09/05/2024 08:09 AM    BASOSABS 0.0 09/05/2024 08:09 AM    DIFFTYPE AUTOMATED DIFFERENTIAL 09/05/2024 08:09 AM     WBC:    Lab Results   Component Value Date/Time    WBC 11.3 09/05/2024 08:09 AM     Platelets:    Lab Results   Component Value Date/Time     09/05/2024 08:09 AM     Hemoglobin/Hematocrit:    Lab Results   Component Value Date/Time    HGB 12.8 09/05/2024 08:09 AM    HCT 40.3 09/05/2024 08:09 AM     CMP:    Lab Results   Component Value Date/Time     09/05/2024 08:09 AM    K 3.5 09/05/2024 08:09 AM     09/05/2024 08:09 AM    CO2 21 09/05/2024 08:09 AM    BUN 14 09/05/2024 08:09 AM    CREATININE 0.4 09/05/2024 08:09 AM    GFRAA >60 04/07/2022 09:33 AM    LABGLOM >90 09/05/2024 08:09 AM    LABGLOM >90 04/28/2024 02:39 PM    GLUCOSE 95 09/05/2024 08:09 AM    CALCIUM 7.3 09/05/2024 08:09 AM    BILITOT 0.4 09/05/2024 08:09 AM    ALKPHOS 131 09/05/2024 08:09 AM    AST 16 09/05/2024 08:09 AM    ALT 8 09/05/2024 08:09 AM     BMP:    Lab Results   Component Value Date/Time     09/05/2024 08:09 AM    K 3.5 09/05/2024 08:09 AM     09/05/2024 08:09 AM    CO2 21 09/05/2024 08:09 AM    BUN 14 09/05/2024 08:09 AM    CREATININE 0.4 09/05/2024 08:09 AM    CALCIUM 7.3 09/05/2024 08:09 AM    GFRAA >60 04/07/2022 09:33 AM    LABGLOM >90 09/05/2024 08:09 AM    LABGLOM >90 04/28/2024 02:39 PM    GLUCOSE 95 09/05/2024 08:09 AM     Sodium:    Lab Results   Component Value Date/Time     09/05/2024 08:09 AM     Potassium:    Lab Results   Component Value Date/Time    K 3.5 09/05/2024 08:09 AM     BUN/Creatinine:    Lab Results   Component Value Date/Time    BUN 14 09/05/2024 08:09 AM    CREATININE 0.4 09/05/2024 08:09  Candida infection, esophageal (HCC)     Pneumonia     Left upper quadrant pain     LLQ pain     Fungal esophagitis     Left flank pain     Gastroesophageal reflux disease without esophagitis     Dyslipidemia     Essential hypertension     Epigastric pain     Esophageal stricture     Pharyngoesophageal dysphagia     Pain of upper abdomen     Nicotine dependence     Hematochezia     Acute gastritis with hemorrhage     Esophagitis     Superior mesenteric artery stenosis (HCC)     Injury of superior mesenteric artery     Pancreatic lesion     Dilation of biliary tract     Obstruction of bile duct     Pancreatic duct obstruction     Duodenal mass     Cancer of ampulla of Vater (HCC)     Neuroendocrine carcinoma of small bowel (HCC)     Need for prophylactic chemotherapy     Need for hepatitis B screening test     Malignant neoplasm of ampulla of Vater (HCC)     Small bowel obstruction (HCC)     SBO (small bowel obstruction) (HCC)      65 y/o M with hx of ampulla cancer s/p whipple at [facility] with SBO    PLAN:    -reviewed chart, notes, images, labs.  -pt's abdominal exam is benign this AM and he is passing flatus. He was without NGT overnight and had no N/V. Ok to start clears.   -continue serial abdominal exams.  -if continues to improve then can hold off on transfer to [facility]. If pt worsens and needs surgery then would recommend transfer to [facility] given recent Whipple.   -above plan d/w pt and pt's nurse. Will continue to follow.           Terrell Marino II, MD

## 2024-09-06 NOTE — ED NOTES
Patient admitted to hospitalist here pending room assignment at [facility] who has no current bed availability.     Eladio Lai MD  09/05/24 4068

## 2024-09-06 NOTE — PROGRESS NOTES
Pt identified as a candidate for COPD/GOLD assessment. PFTs are required for confirmation of diagnosis and GOLD grading used for pharmacological and nonpharmacological therapy recommendations.  Patient would benefit if a pft were to be ordered, after discharged and able to complete PFT testing as an out-patient.

## 2024-09-06 NOTE — H&P
History and Physical      Name:  Jama Ramirez /Age/Sex: 1960  (64 y.o. male)   MRN & CSN:  [id number] & [id number] Encounter Date/Time: 2024 10:42 PM EDT   Location:  ED16/ED-16 PCP: Jennifer Gomez MD       Hospital Day: 1    Assessment and Plan:     Patient is a 64 y.o. male who presented with abdominal pain     SBO  -Patient with a pertinent hx of SBO s/p whipple at [facility] on 24 who presents with abdominal pain, N/V x 2 days  -On admit hemodynamically stable, afebrile, no leukocytosis and labs grossly unremarkable  -CT of abdomen shows SBO  -GS recommended tranfer to [facility] for [facility]ology, transfer accepted pending bed     -N.p.o.   -IV fluids  -Pain Control and antiemetics  -GS consult        Ampullary cancer   Pathology revealed mixed neuroendocrine and nonneuroendocrine adenocarcinoma.  Status post Whipple    HLD  -Continue statin     GERD  -Continue PPI    Mood Disorder   -Continue home meds    COPD  -Continue home inhalers     Checklist:  Advanced directive: full  Diet: NPO  DVT ppx: Lovenox      Disposition: admit to inpatient, pending transfer to [facility]   Estimated discharge: 2 day(s).  Current living situation: home.  Expected disposition: home.    Spoke with ED provider who recommended admission for the patient and I agree with that plan.  Personally reviewed lab studies and imaging.  EKG interpreted personally and results as stated above.  Imaging that was interpreted personally and results as stated above.    History of Present Illness:     Chief Complaint:    Chief Complaint   Patient presents with    Abdominal Pain    Nausea       Patient is a 64 y.o. male with a PMHx of ampulla of vator, duodenal adenocarcinoma, status post Whipple on  at [facility], COPD, depression, GERD with esophagitis, hiatal hernia, history of gastric ulcers, hyperlipidemia who presented to the ED with abdominal pain. Patient presents due to generalized abdominal pain associated with N/V that began the  Date/Time    [facility] 10/06/2017 08:45 PM       Radiology results:  CT ABDOMEN PELVIS W IV CONTRAST Additional Contrast? None   Final Result   Small bowel obstruction. Surgical consultation recommended.      Additional findings as above.      Results discussed with Dr. Cortez on 9/5/2024 at 10:52 a.m.      Electronically signed by FLORY Adams MD  09/05/24 10:42 PM

## 2024-09-06 NOTE — PROGRESS NOTES
[facility] Gastroenterology and Hepatology             MD Zion Hamilton MD Carol Christensen, APRN-CNP       Lindsey Valles, APRN-CNP             [address] [place] [address]             [phone] fax [phone]      Gastroenterology Progress note . 9/6/2024  Reason for consult:  History of pancreatic head cancer, status post Whipple, abdominal pain    Physician attestation:  I have personally seen and examined the patient independently. I have reviewed the patient's available data,including medical history and recent test results. Reviewed and discussed note as documented by ARIADNA.  I agree with the physical exam findings, assessment and plan.     Briefly   Patient noted to be lying comfortably in bed mentions that he has been tolerating liquid diet without any nausea, vomiting.  He mentions he is also now passing gas.  He has not had a bowel movement    Gen: normal mood, alert  ENT: normal TJ  Cardiovascular: RRR, No M/R/G  Respiratory: CTA BL  Gastrointestinal: Soft,NT ND  Genitourinary: no suprapubic tenderness  Musculoskeletal: no scars  Skin:moist  Neuro: alert and oriented x3    My assessment and plan reveals   #1 small bowel obstruction.  CT with small bowel obstruction surgical consultation recommended.  History of SBO status post Whipple previously treated conservatively.  Currently patient has been doing well without NG tube in place.  Recommend continued serial exam and imaging.  If having a bowel movement and tolerating p.o. intake then can consider monitoring here.  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  Status post Whipple     #3 abdominal pain likely secondary to problem #1  - Improving     My documented MDM is a substantive portion and Majority of the supervisory visit.      Comment: Please note this report has been produced using

## 2024-09-06 NOTE — ED NOTES
ED TO INPATIENT SBAR HANDOFF    Patient Name: Jama Ramirez   :  1960  64 y.o.   Preferred Name    Family/Caregiver Present no   Restraints no   C-SSRS: Risk of Suicide: No Risk  Sitter no   Sepsis Risk Score        Situation  Chief Complaint   Patient presents with    Abdominal Pain    Nausea     Brief Description of Patient's Condition: pt has hx of pancreatic cancer, ate pizza and had terrible stomach pain.   Mental Status: oriented, alert, thought processes intact, and able to concentrate and follow conversation  Arrived from: home    Imaging:   CT ABDOMEN PELVIS W IV CONTRAST Additional Contrast? None   Final Result   Small bowel obstruction. Surgical consultation recommended.      Additional findings as above.      Results discussed with Dr. Cortez on 2024 at 10:52 a.m.      Electronically signed by FLORY MCCORMACK        Abnormal labs:   Abnormal Labs Reviewed   CBC WITH AUTO DIFFERENTIAL - Abnormal; Notable for the following components:       Result Value    WBC 11.3 (*)     Hemoglobin 12.8 (*)     Hematocrit 40.3 (*)     MCH 26.5 (*)     MCHC 31.8 (*)     RDW 15.7 (*)     Neutrophils % 76.9 (*)     Lymphocytes % 15.1 (*)     Monocytes % 6.8 (*)     All other components within normal limits   COMPREHENSIVE METABOLIC PANEL - Abnormal; Notable for the following components:    Creatinine 0.4 (*)     Calcium 7.3 (*)     Total Protein 5.6 (*)     Albumin 3.2 (*)     Alkaline Phosphatase 131 (*)     ALT 8 (*)     All other components within normal limits        Background  History:   Past Medical History:   Diagnosis Date    Arthritis     Hands    Cancer of ampulla of Vater ([facility]) 2024    Dr. CHUYITA Herring    Chronic cluster headache 2019 last    COPD (chronic obstructive pulmonary disease) ([facility])     \"dx 2 yrs ago\" \\"does not see a lung dr Tatiana Follows with PCP    Degenerative disc disease     \"in my back have degenarative disc\"    Depression     Edentulous     Fracture     \"fell over a 5 gallon bucket

## 2024-09-06 NOTE — ED NOTES
Spoke with [facility] transfer center staff- pt still waiting on a bed- [facility] states they have no beds at this time.

## 2024-09-06 NOTE — PROGRESS NOTES
V2.0+  [facility] Hospitalist Progress Note      Name:  Jama Ramirez /Age/Sex: 1960  (64 y.o. male)   MRN & CSN:  [id number] & [id number] Encounter Date/Time: 2024 9:45 AM EDT    Location:  [address] [place] [address]-A PCP: Jennifer Gomez MD       Hospital Day: 2    Assessment and Plan:   Jama Ramirez is a 64 y.o. male who presents with abdominal pain    SBO  -Patient with a pertinent hx of SBO s/p whipple at [facility] on 24 who presents with abdominal pain, N/V x 2 days  -On admit hemodynamically stable, afebrile, no leukocytosis and labs grossly unremarkable  -CT of abdomen shows SBO  -GS recommended tranfer to [facility] for surgical oncology, transfer accepted pending bed   -IV fluids  -Pain Control and antiemetics  -General Surgery following patient.  Starting on clear liquid diet.  If patient is able to tolerate and improves, general surgery may advance diet and patient may not need to be transferred.     Ampullary cancer   Pathology revealed mixed neuroendocrine and nonneuroendocrine adenocarcinoma.  Status post Whipple     HLD  -Continue statin      GERD  -Continue PPI     Mood Disorder   -Continue home meds     COPD  -Continue home inhalers     Comment: Please note this report has been produced using speech recognition software and may contain errors related to that system including errors in grammar, punctuation, and spelling, as well as words and phrases that may be inappropriate. If there are any questions or concerns please feel free to contact the dictating provider for clarification.      Diet ADULT DIET; Clear Liquid    DVT Prophylaxis [x] Lovenox, [] Heparin, [] Eliquis, [] Xarelto  [] SCDs     Code Status Full Code       Disposition   Expected Disposition: transfer to [facility]  Estimated Date of Discharge:   Patient requires continued admission due to small bowel striction, awaiting transfer to [facility] unless improves while inpatient       Personally reviewed Lab Studies and Imaging

## 2024-09-06 NOTE — PROGRESS NOTES
4 Eyes Skin Assessment     NAME:  Jama Ramirez  YOB: 1960  MEDICAL RECORD NUMBER:  [id number]    The patient is being assess for  Admission    I agree that 2 RN's have performed a thorough Head to Toe Skin Assessment on the patient. ALL assessment sites listed below have been assessed.      Areas assessed by both nurses:    Head, Face, Ears, Shoulders, Back, Chest, Arms, Elbows, Hands, Sacrum. Buttock, Coccyx, Ischium, and Legs. Feet and Heels  - redness on coccyx  - surgical incision left chest x 2 sites        Does the Patient have a Wound? No noted wound(s)       Jefe Prevention initiated:  Yes   Wound Care Orders initiated:  NA    Pressure Injury (Stage 3,4, Unstageable, DTI, NWPT, and Complex wounds) if present place consult order under :: NA    New and Established Ostomies if present place consult order under : No      Nurse 1 eSignature: Electronically signed by Julio Strange RN on 9/6/24 at 12:41 AM EDT    **SHARE this note so that the co-signing nurse is able to place an eSignature**    Nurse 2 eSignature: Electronically signed by Kathryn Romero RN on 9/6/24 at 12:43 AM EDT

## 2024-09-06 NOTE — CARE COORDINATION
Patient chart reviewed and discussed in IDR. Waiting on bed to become available at [facility] to transfer. CM following.

## 2024-09-07 ENCOUNTER — APPOINTMENT (OUTPATIENT)
Dept: GENERAL RADIOLOGY | Age: 64
DRG: 247 | End: 2024-09-07
Payer: MEDICAID

## 2024-09-07 VITALS
OXYGEN SATURATION: 95 % | HEART RATE: 64 BPM | RESPIRATION RATE: 18 BRPM | DIASTOLIC BLOOD PRESSURE: 82 MMHG | WEIGHT: 126.1 LBS | HEIGHT: 69 IN | TEMPERATURE: 97.5 F | BODY MASS INDEX: 18.68 KG/M2 | SYSTOLIC BLOOD PRESSURE: 120 MMHG

## 2024-09-07 LAB
BASOPHILS # BLD: 0.02 K/UL
BASOPHILS NFR BLD: 0 % (ref 0–1)
EOSINOPHIL # BLD: 0.11 K/UL
EOSINOPHILS RELATIVE PERCENT: 2 % (ref 0–3)
ERYTHROCYTE [DISTWIDTH] IN BLOOD BY AUTOMATED COUNT: 15.6 % (ref 11.7–14.9)
GLUCOSE BLD-MCNC: 65 MG/DL (ref 74–99)
GLUCOSE BLD-MCNC: 82 MG/DL (ref 74–99)
HCT VFR BLD AUTO: 31.5 % (ref 42–52)
HGB BLD-MCNC: 10 G/DL (ref 13.5–18)
IMM GRANULOCYTES # BLD AUTO: 0.01 K/UL
IMM GRANULOCYTES NFR BLD: 0 %
LYMPHOCYTES NFR BLD: 0.99 K/UL
LYMPHOCYTES RELATIVE PERCENT: 21 % (ref 24–44)
MCH RBC QN AUTO: 26.4 PG (ref 27–31)
MCHC RBC AUTO-ENTMCNC: 31.7 G/DL (ref 32–36)
MCV RBC AUTO: 83.1 FL (ref 78–100)
MONOCYTES NFR BLD: 0.44 K/UL
MONOCYTES NFR BLD: 9 % (ref 0–4)
NEUTROPHILS NFR BLD: 68 % (ref 36–66)
NEUTS SEG NFR BLD: 3.26 K/UL
PLATELET # BLD AUTO: 205 K/UL (ref 140–440)
PMV BLD AUTO: 9.4 FL (ref 7.5–11.1)
RBC # BLD AUTO: 3.79 M/UL (ref 4.6–6.2)
WBC OTHER # BLD: 4.8 K/UL (ref 4–10.5)

## 2024-09-07 PROCEDURE — 99232 SBSQ HOSP IP/OBS MODERATE 35: CPT | Performed by: INTERNAL MEDICINE

## 2024-09-07 PROCEDURE — APPNB45 APP NON BILLABLE 31-45 MINUTES: Performed by: LICENSED PRACTICAL NURSE

## 2024-09-07 PROCEDURE — 2580000003 HC RX 258

## 2024-09-07 PROCEDURE — 74018 RADEX ABDOMEN 1 VIEW: CPT

## 2024-09-07 PROCEDURE — 82962 GLUCOSE BLOOD TEST: CPT

## 2024-09-07 PROCEDURE — 85025 COMPLETE CBC W/AUTO DIFF WBC: CPT

## 2024-09-07 PROCEDURE — 6360000002 HC RX W HCPCS: Performed by: INTERNAL MEDICINE

## 2024-09-07 PROCEDURE — 36415 COLL VENOUS BLD VENIPUNCTURE: CPT

## 2024-09-07 PROCEDURE — 94761 N-INVAS EAR/PLS OXIMETRY MLT: CPT

## 2024-09-07 PROCEDURE — 2580000003 HC RX 258: Performed by: INTERNAL MEDICINE

## 2024-09-07 PROCEDURE — 6360000002 HC RX W HCPCS: Performed by: STUDENT IN AN ORGANIZED HEALTH CARE EDUCATION/TRAINING PROGRAM

## 2024-09-07 PROCEDURE — 6370000000 HC RX 637 (ALT 250 FOR IP)

## 2024-09-07 PROCEDURE — 2580000003 HC RX 258: Performed by: STUDENT IN AN ORGANIZED HEALTH CARE EDUCATION/TRAINING PROGRAM

## 2024-09-07 RX ADMIN — MORPHINE SULFATE 4 MG: 4 INJECTION, SOLUTION INTRAMUSCULAR; INTRAVENOUS at 08:55

## 2024-09-07 RX ADMIN — PANTOPRAZOLE SODIUM 40 MG: 40 TABLET, DELAYED RELEASE ORAL at 08:47

## 2024-09-07 RX ADMIN — SODIUM CHLORIDE: 9 INJECTION, SOLUTION INTRAVENOUS at 14:46

## 2024-09-07 RX ADMIN — PANCRELIPASE LIPASE, PANCRELIPASE PROTEASE, PANCRELIPASE AMYLASE 5000 UNITS: 5000; 17000; 24000 CAPSULE, DELAYED RELEASE ORAL at 13:31

## 2024-09-07 RX ADMIN — SODIUM CHLORIDE, PRESERVATIVE FREE 10 ML: 5 INJECTION INTRAVENOUS at 08:46

## 2024-09-07 RX ADMIN — Medication 1000 MCG: at 08:48

## 2024-09-07 RX ADMIN — ENOXAPARIN SODIUM 40 MG: 100 INJECTION SUBCUTANEOUS at 08:47

## 2024-09-07 RX ADMIN — SODIUM CHLORIDE, PRESERVATIVE FREE 10 ML: 5 INJECTION INTRAVENOUS at 04:06

## 2024-09-07 RX ADMIN — WATER 1000 MG: 1 INJECTION INTRAMUSCULAR; INTRAVENOUS; SUBCUTANEOUS at 14:56

## 2024-09-07 RX ADMIN — MORPHINE SULFATE 4 MG: 4 INJECTION, SOLUTION INTRAMUSCULAR; INTRAVENOUS at 04:05

## 2024-09-07 RX ADMIN — FERROUS SULFATE TAB 325 MG (65 MG ELEMENTAL FE) 325 MG: 325 (65 FE) TAB at 08:48

## 2024-09-07 RX ADMIN — PANCRELIPASE LIPASE, PANCRELIPASE PROTEASE, PANCRELIPASE AMYLASE 20000 UNITS: 5000; 17000; 24000 CAPSULE, DELAYED RELEASE ORAL at 13:27

## 2024-09-07 RX ADMIN — BUPROPION HYDROCHLORIDE 150 MG: 150 TABLET, EXTENDED RELEASE ORAL at 08:48

## 2024-09-07 RX ADMIN — PANCRELIPASE LIPASE, PANCRELIPASE PROTEASE, PANCRELIPASE AMYLASE 5000 UNITS: 5000; 17000; 24000 CAPSULE, DELAYED RELEASE ORAL at 08:46

## 2024-09-07 RX ADMIN — PANCRELIPASE LIPASE, PANCRELIPASE PROTEASE, PANCRELIPASE AMYLASE 20000 UNITS: 5000; 17000; 24000 CAPSULE, DELAYED RELEASE ORAL at 08:47

## 2024-09-07 ASSESSMENT — PAIN DESCRIPTION - ORIENTATION
ORIENTATION: RIGHT;MID;LEFT;LOWER
ORIENTATION: RIGHT;MID;LEFT;LOWER

## 2024-09-07 ASSESSMENT — PAIN DESCRIPTION - DESCRIPTORS
DESCRIPTORS: STABBING;BURNING
DESCRIPTORS: BURNING;STABBING

## 2024-09-07 ASSESSMENT — PAIN SCALES - GENERAL
PAINLEVEL_OUTOF10: 4
PAINLEVEL_OUTOF10: 7
PAINLEVEL_OUTOF10: 8
PAINLEVEL_OUTOF10: 8

## 2024-09-07 ASSESSMENT — PAIN DESCRIPTION - FREQUENCY
FREQUENCY: INTERMITTENT
FREQUENCY: INTERMITTENT

## 2024-09-07 ASSESSMENT — PAIN DESCRIPTION - ONSET
ONSET: ON-GOING
ONSET: ON-GOING

## 2024-09-07 ASSESSMENT — PAIN DESCRIPTION - LOCATION
LOCATION: ABDOMEN
LOCATION: ABDOMEN

## 2024-09-07 ASSESSMENT — PAIN DESCRIPTION - PAIN TYPE
TYPE: ACUTE PAIN
TYPE: ACUTE PAIN

## 2024-09-07 ASSESSMENT — PAIN - FUNCTIONAL ASSESSMENT
PAIN_FUNCTIONAL_ASSESSMENT: PREVENTS OR INTERFERES SOME ACTIVE ACTIVITIES AND ADLS
PAIN_FUNCTIONAL_ASSESSMENT: PREVENTS OR INTERFERES SOME ACTIVE ACTIVITIES AND ADLS

## 2024-09-07 NOTE — PLAN OF CARE
Problem: Discharge Planning  Goal: Discharge to home or other facility with appropriate resources  Outcome: Progressing     Problem: Pain  Goal: Verbalizes/displays adequate comfort level or baseline comfort level  Outcome: Progressing  Flowsheets (Taken 9/6/2024 0933 by Hope Durand LPN)  Verbalizes/displays adequate comfort level or baseline comfort level: Encourage patient to monitor pain and request assistance     Problem: Safety - Adult  Goal: Free from fall injury  Outcome: Progressing     Problem: Skin/Tissue Integrity  Goal: Absence of new skin breakdown  Description: 1.  Monitor for areas of redness and/or skin breakdown  2.  Assess vascular access sites hourly  3.  Every 4-6 hours minimum:  Change oxygen saturation probe site  4.  Every 4-6 hours:  If on nasal continuous positive airway pressure, respiratory therapy assess nares and determine need for appliance change or resting period.  Outcome: Progressing

## 2024-09-07 NOTE — PROGRESS NOTES
V2.0+  [facility] [place]      Name:  aJma Ramirez /Age/Sex: 1960  (64 y.o. male)   MRN & CSN:  [id number] & [id number] Encounter Date/Time: 2024 9:45 AM EDT    Location:  [address] [place] [address]-A PCP: Jennifer Gomez MD       Hospital Day: 3    Assessment and Plan:   Jama Ramirez is a 64 y.o. male who presents with abdominal pain    SBO  -Patient with a pertinent hx of SBO s/p whipple at [facility] on 24 who presents with abdominal pain, N/V x 2 days  -On admit hemodynamically stable, afebrile, no leukocytosis and labs grossly unremarkable  -CT of abdomen shows SBO  -GS recommended tranfer to [facility] for surgical oncology, transfer accepted pending bed   -IV fluids  -Pain Control and antiemetics  -General Surgery following patient.  On clear liquid diet. Will passage of flatus till 6AM today but not since. 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  Status post Whipple     HLD  -Continue statin      GERD  -Continue PPI     Mood Disorder   -Continue home meds     COPD  -Continue home inhalers     Comment: Please note this report has been produced using speech recognition software and may contain errors related to that system including errors in grammar, punctuation, and spelling, as well as words and phrases that may be inappropriate. If there are any questions or concerns please feel free to contact the dictating provider for clarification.      Diet ADULT DIET; Regular    DVT Prophylaxis [x] Lovenox, [] Heparin, [] Eliquis, [] Xarelto  [] SCDs     Code Status Full Code       Disposition   Expected Disposition: transfer to [facility]  Estimated Date of Discharge: Pending [facility]   Patient requires continued admission due to small bowel obstruction. Awaiting transfer to [facility] unless improves while inpatient       Personally reviewed Lab Studies and Imaging     Discussed  with GI team - worsening distension. Pending [facility] transfer. X-ray KUB ordered.     Monitor lovenox with CBC.       Subjective/Interval history:   Chief Complaint: Abdominal Pain and Nausea     Patient states he had cramping in his abdomen over RLQ. Swelling over RLQ. No Flatus since 6AM. No nausea or vomiting today.       Review of Systems:    Negative unless mentioned above    Objective:     Intake/Output Summary (Last 24 hours) at 9/7/2024 1113  Last data filed at 9/7/2024 0722  Gross per 24 hour   Intake 360 ml   Output 2700 ml   Net -2340 ml        Vitals:   /79   Pulse 62   Temp 97.9 °F (36.6 °C) (Oral)   Resp 18   Ht 1.753 m (5' 9\")   Wt 57.2 kg (126 lb 1.7 oz)   SpO2 93%   BMI 18.62 kg/m²     Physical Exam:   General: NAD, sitting in bed  Eyes: no discharge  HENT: NCAT  Cardiovascular: RRR  Respiratory: Clear to auscultation. No wheeze, rales or rhonchi.   Gastrointestinal: Soft, tender in Lt lower abdomen with slight distension, has old surgical wounds  Genitourinary: no suprapubic tenderness  Musculoskeletal: No edema, nontender  Skin: warm, dry, no gross lesions  Neuro: Alert. No gross deficits  Psych: Mood appropriate.     Medications:   Medications:    pantoprazole  40 mg Oral Daily    buPROPion  150 mg Oral QAM    ferrous sulfate  325 mg Oral Daily with breakfast    tiotropium  2 puff Inhalation Daily RT    vitamin B-12  1,000 mcg Oral Daily    lipase-protease-amylase  5,000 Units Oral TID WC    lipase-protease-amylase  20,000 Units Oral TID WC    atorvastatin  20 mg Oral Nightly    rOPINIRole  0.5 mg Oral Nightly    sodium chloride flush  5-40 mL IntraVENous 2 times per day    enoxaparin  40 mg SubCUTAneous Daily      Infusions:    sodium chloride 100 mL/hr at 09/06/24 2209    sodium chloride       PRN Meds: albuterol sulfate HFA, 2 puff, Q6H PRN  sodium chloride flush, 5-40 mL, PRN  sodium chloride, , PRN  potassium chloride, 40 mEq, PRN   Or  potassium alternative oral replacement, 40

## 2024-09-07 NOTE — PROGRESS NOTES
Attempted to call report and nurse was busy with a patient.  Radha stated she would call back in 5-10 minutes.  Waiting for return call.      Call back received and report was given to Radha RN.

## 2024-09-07 NOTE — PROGRESS NOTES
[facility] Gastroenterology and Hepatology             MD Zion Hamilton MD Carol Christensen, APRN-CNP       Lindseyade Valles, APRN-CNP             [address] [place] [address]             [phone] fax [phone]      Gastroenterology Progress note . 9/7/2024  Reason for consult: History of pancreatic head cancer, status post Whipple, abdominal pain       Physician attestation:  I have personally seen and examined the patient independently. I have reviewed the patient's available data,including medical history and recent test results. Reviewed and discussed note as documented by ARIADNA.  I agree with the physical exam findings, assessment and plan.     Briefly   Patient noted to be lying comfortably in bed.  Mentions that he still passing gas and had no difficulty with clear liquid diet but today he feels a little bit more bloated compared to yesterday.  He has not had a bowel movement.    Gen: normal mood, alert  ENT: normal TJ  Cardiovascular: RRR, No M/R/G  Respiratory: CTA BL  Gastrointestinal:surgical scars,  Soft,NT ND  Genitourinary: no suprapubic tenderness  Musculoskeletal: no scars  Skin:moist  Neuro: alert and oriented x3    My assessment and plan reveals   #1 small bowel obstruction.  CT with small bowel obstruction surgical consultation recommended.  History of SBO status post Whipple previously treated conservatively.  Currently patient has been doing well without NG tube in place.  Recommend continued serial exam and imaging.  If having a bowel movement and tolerating p.o. intake then can consider monitoring here.  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  Status post Whipple     #3 abdominal pain likely secondary to problem #1  - Improving     My documented MDM is a substantive portion and Majority of the supervisory visit.      Comment:

## 2024-09-07 NOTE — PROGRESS NOTES
GENERAL SURGERY PROGRESS NOTE    Jama Ramirez is a 64 y.o. male with complex abdominal surgical history who presented with crampy abdominal pain, concern for developing SBO.                 Subjective:  Doing well this morning. Tolerated liquids yesterday. Pain improving. Passing flatus.    Objective:    Vitals: VITALS:  /79   Pulse 62   Temp 97.9 °F (36.6 °C) (Oral)   Resp 18   Ht 1.753 m (5' 9\")   Wt 57.2 kg (126 lb 1.7 oz)   SpO2 93%   BMI 18.62 kg/m²     I/O: 09/06 0701 - 09/07 0700  In: 360 [P.O.:360]  Out: 2175 [Urine:2175]    Labs/Imaging Results:   Lab Results   Component Value Date/Time     09/06/2024 08:03 AM    K 4.3 09/06/2024 08:03 AM     09/06/2024 08:03 AM    CO2 24 09/06/2024 08:03 AM    BUN 10 09/06/2024 08:03 AM    CREATININE 0.5 09/06/2024 08:03 AM    GLUCOSE 63 09/06/2024 08:03 AM    CALCIUM 8.1 09/06/2024 08:03 AM      Lab Results   Component Value Date    WBC 4.8 09/07/2024    HGB 10.0 (L) 09/07/2024    HCT 31.5 (L) 09/07/2024    MCV 83.1 09/07/2024     09/07/2024         IV Fluids:   sodium chloride Last Rate: 100 mL/hr at 09/06/24 2209    sodium chloride    Scheduled Meds:   pantoprazole, 40 mg, Oral, Daily    buPROPion, 150 mg, Oral, QAM    ferrous sulfate, 325 mg, Oral, Daily with breakfast    tiotropium, 2 puff, Inhalation, Daily RT    vitamin B-12, 1,000 mcg, Oral, Daily    lipase-protease-amylase, 5,000 Units, Oral, TID WC    lipase-protease-amylase, 20,000 Units, Oral, TID WC    atorvastatin, 20 mg, Oral, Nightly    rOPINIRole, 0.5 mg, Oral, Nightly    sodium chloride flush, 5-40 mL, IntraVENous, 2 times per day    enoxaparin, 40 mg, SubCUTAneous, Daily    Physical Exam:  General: A&O x 3, no distress.   HEENT: Anicteric sclerae, MMM.  Extremities: No edema bilat LE.  Abdomen: Soft,mildly tender, non-distended. Well healed abdominal surgical scars      Assessment and Plan:  64 y.o. male with recent history of Whipple at [facility], admitted with pain and

## 2024-09-08 LAB
CULTURE: ABNORMAL
CULTURE: ABNORMAL
Lab: ABNORMAL
SPECIMEN: ABNORMAL

## 2024-09-09 NOTE — DISCHARGE SUMMARY
tablet  Commonly known as: ZYRTEC     clopidogrel 75 MG tablet  Commonly known as: PLAVIX  Take 1 tablet by mouth daily . Start only 24 hour after the procedure. Take 4 tablets on day 1 then one table once daily.     ferrous sulfate 325 (65 Fe) MG tablet  Commonly known as: IRON 325  Take 1 tablet by mouth daily (with breakfast)     fluticasone 50 MCG/ACT nasal spray  Commonly known as: FLONASE     glucose 4 g chewable tablet     hydrocortisone 2.5 % Crea rectal cream  Commonly known as: ANUSOL-HC  APPLY TO HEMMORROIDS topically UP TO four TIMES PER DAY AS NEEDED FOR PAIN OR BURNING     lipase-protease-amylase 00704-82818 units delayed release capsule  Commonly known as: CREON     ondansetron 4 MG tablet  Commonly known as: Zofran  Take 1 tablet by mouth daily as needed for Nausea or Vomiting     pantoprazole 40 MG tablet  Commonly known as: PROTONIX     polyethylene glycol 17 GM/SCOOP powder  Commonly known as: GLYCOLAX  Take 17 g by mouth daily . Can be take twice daily. Aim to have at least 1 soft bowel movement a day.  Ask about: Should I take this medication?     rOPINIRole 0.5 MG tablet  Commonly known as: REQUIP     * sennosides-docusate sodium 8.6-50 MG tablet  Commonly known as: SENOKOT-S  Take 2 tablets by mouth daily . Can take twice daily if needed. Aim to have at least 1 soft bowel movement a day.     * senna-docusate 8.6-50 MG per tablet  Commonly known as: PERICOLACE  Take 2 tablets by mouth daily for 14 days     tamsulosin 0.4 MG capsule  Commonly known as: FLOMAX  Take 1 capsule by mouth daily     tiotropium 18 MCG inhalation capsule  Commonly known as: Spiriva HandiHaler  Inhale 1 capsule into the lungs daily     vitamin B-12 1000 MCG tablet  Commonly known as: CYANOCOBALAMIN  Take 1 tablet by mouth daily     vitamin D 1.25 MG (38126 UT) Caps capsule  Commonly known as: ERGOCALCIFEROL  Take 1 capsule by mouth once a week           * This list has 2 medication(s) that are the same as other

## 2024-09-12 ENCOUNTER — OFFICE VISIT (OUTPATIENT)
Dept: BARIATRICS/WEIGHT MGMT | Age: 64
End: 2024-09-12

## 2024-09-12 VITALS
OXYGEN SATURATION: 94 % | HEART RATE: 72 BPM | DIASTOLIC BLOOD PRESSURE: 80 MMHG | BODY MASS INDEX: 18.33 KG/M2 | SYSTOLIC BLOOD PRESSURE: 112 MMHG | WEIGHT: 123.8 LBS | HEIGHT: 69 IN

## 2024-09-12 DIAGNOSIS — Z85.09 ENCOUNTER FOR FOLLOW-UP SURVEILLANCE OF AMPULLA OF VATER CANCER: Primary | ICD-10-CM

## 2024-09-12 DIAGNOSIS — Z08 ENCOUNTER FOR FOLLOW-UP SURVEILLANCE OF AMPULLA OF VATER CANCER: Primary | ICD-10-CM

## 2024-09-12 RX ORDER — POLYETHYLENE GLYCOL 3350 17 G/17G
17 POWDER, FOR SOLUTION ORAL DAILY PRN
COMMUNITY
Start: 2024-09-10

## 2024-09-12 RX ORDER — SIMETHICONE 80 MG
80 TABLET,CHEWABLE ORAL EVERY 4 HOURS PRN
COMMUNITY
Start: 2024-09-10

## 2024-09-13 ENCOUNTER — NURSE ONLY (OUTPATIENT)
Dept: ONCOLOGY | Age: 64
End: 2024-09-13

## 2024-09-13 ENCOUNTER — HOSPITAL ENCOUNTER (OUTPATIENT)
Dept: INFUSION THERAPY | Age: 64
Discharge: HOME | End: 2024-09-13
Payer: MEDICAID

## 2024-09-13 VITALS
HEIGHT: 69 IN | DIASTOLIC BLOOD PRESSURE: 80 MMHG | BODY MASS INDEX: 18.22 KG/M2 | SYSTOLIC BLOOD PRESSURE: 121 MMHG | TEMPERATURE: 97.8 F | WEIGHT: 123 LBS | HEART RATE: 91 BPM | OXYGEN SATURATION: 95 %

## 2024-09-13 DIAGNOSIS — Z79.899 NEED FOR PROPHYLACTIC CHEMOTHERAPY: ICD-10-CM

## 2024-09-13 DIAGNOSIS — Z11.59 NEED FOR HEPATITIS B SCREENING TEST: ICD-10-CM

## 2024-09-13 DIAGNOSIS — C7A.8 NEUROENDOCRINE CARCINOMA OF SMALL BOWEL (HCC): ICD-10-CM

## 2024-09-13 DIAGNOSIS — C24.1 CANCER OF AMPULLA OF VATER (HCC): Primary | ICD-10-CM

## 2024-09-13 DIAGNOSIS — C24.1 CANCER OF AMPULLA OF VATER (HCC): ICD-10-CM

## 2024-09-13 LAB
ALBUMIN SERPL-MCNC: 3.5 G/DL (ref 3.4–5)
ALBUMIN/GLOB SERPL: 2 {RATIO} (ref 1.1–2.2)
ALP SERPL-CCNC: 133 U/L (ref 40–129)
ALT SERPL-CCNC: 9 U/L (ref 10–40)
ANION GAP SERPL CALCULATED.3IONS-SCNC: 12 MMOL/L (ref 9–17)
AST SERPL-CCNC: 15 U/L (ref 15–37)
BASOPHILS # BLD: 0.02 K/UL
BASOPHILS NFR BLD: 0 % (ref 0–1)
BILIRUB SERPL-MCNC: 0.2 MG/DL (ref 0–1)
BUN SERPL-MCNC: 15 MG/DL (ref 7–20)
CALCIUM SERPL-MCNC: 8.5 MG/DL (ref 8.3–10.6)
CHLORIDE SERPL-SCNC: 107 MMOL/L (ref 99–110)
CO2 SERPL-SCNC: 22 MMOL/L (ref 21–32)
CREAT SERPL-MCNC: 0.7 MG/DL (ref 0.8–1.3)
EOSINOPHIL # BLD: 0.2 K/UL
EOSINOPHILS RELATIVE PERCENT: 4 % (ref 0–3)
ERYTHROCYTE [DISTWIDTH] IN BLOOD BY AUTOMATED COUNT: 17 % (ref 11.7–14.9)
GFR, ESTIMATED: >90 ML/MIN/1.73M2
GLUCOSE SERPL-MCNC: 51 MG/DL (ref 74–99)
HBV CORE AB SER QL: NONREACTIVE
HBV SURFACE AB SERPL IA-ACNC: 3.5 MIU/ML
HBV SURFACE AG SERPL QL IA: NONREACTIVE
HCT VFR BLD AUTO: 35.1 % (ref 42–52)
HGB BLD-MCNC: 11.2 G/DL (ref 13.5–18)
LYMPHOCYTES NFR BLD: 1.7 K/UL
LYMPHOCYTES RELATIVE PERCENT: 32 % (ref 24–44)
MCH RBC QN AUTO: 26.2 PG (ref 27–31)
MCHC RBC AUTO-ENTMCNC: 31.9 G/DL (ref 32–36)
MCV RBC AUTO: 82 FL (ref 78–100)
MONOCYTES NFR BLD: 0.6 K/UL
MONOCYTES NFR BLD: 11 % (ref 0–4)
NEUTROPHILS NFR BLD: 53 % (ref 36–66)
NEUTS SEG NFR BLD: 2.86 K/UL
PLATELET # BLD AUTO: 285 K/UL (ref 140–440)
PMV BLD AUTO: 8.7 FL (ref 7.5–11.1)
POTASSIUM SERPL-SCNC: 4.1 MMOL/L (ref 3.5–5.1)
PROT SERPL-MCNC: 5.3 G/DL (ref 6.4–8.2)
RBC # BLD AUTO: 4.28 M/UL (ref 4.6–6.2)
SODIUM SERPL-SCNC: 141 MMOL/L (ref 136–145)
WBC OTHER # BLD: 5.4 K/UL (ref 4–10.5)

## 2024-09-13 PROCEDURE — 99213 OFFICE O/P EST LOW 20 MIN: CPT

## 2024-09-13 PROCEDURE — 36415 COLL VENOUS BLD VENIPUNCTURE: CPT

## 2024-09-13 PROCEDURE — 80053 COMPREHEN METABOLIC PANEL: CPT

## 2024-09-13 PROCEDURE — 99211 OFF/OP EST MAY X REQ PHY/QHP: CPT

## 2024-09-13 PROCEDURE — 86704 HEP B CORE ANTIBODY TOTAL: CPT

## 2024-09-13 PROCEDURE — 86317 IMMUNOASSAY INFECTIOUS AGENT: CPT

## 2024-09-13 PROCEDURE — 87340 HEPATITIS B SURFACE AG IA: CPT

## 2024-09-13 PROCEDURE — 85025 COMPLETE CBC W/AUTO DIFF WBC: CPT

## 2024-09-16 ENCOUNTER — HOSPITAL ENCOUNTER (OUTPATIENT)
Dept: INFUSION THERAPY | Age: 64
Discharge: HOME | End: 2024-09-16
Payer: MEDICAID

## 2024-09-16 ENCOUNTER — CLINICAL DOCUMENTATION (OUTPATIENT)
Dept: ONCOLOGY | Age: 64
End: 2024-09-16

## 2024-09-16 VITALS
DIASTOLIC BLOOD PRESSURE: 79 MMHG | HEART RATE: 77 BPM | SYSTOLIC BLOOD PRESSURE: 118 MMHG | TEMPERATURE: 97.8 F | OXYGEN SATURATION: 98 % | WEIGHT: 125 LBS | HEIGHT: 69 IN | BODY MASS INDEX: 18.51 KG/M2

## 2024-09-16 DIAGNOSIS — C24.1 CANCER OF AMPULLA OF VATER (HCC): Primary | ICD-10-CM

## 2024-09-16 DIAGNOSIS — C7A.8 NEUROENDOCRINE CARCINOMA OF SMALL BOWEL (HCC): ICD-10-CM

## 2024-09-16 DIAGNOSIS — Z11.59 NEED FOR HEPATITIS B SCREENING TEST: Primary | ICD-10-CM

## 2024-09-16 DIAGNOSIS — Z79.899 NEED FOR PROPHYLACTIC CHEMOTHERAPY: ICD-10-CM

## 2024-09-16 DIAGNOSIS — C24.1 CANCER OF AMPULLA OF VATER (HCC): ICD-10-CM

## 2024-09-16 PROCEDURE — 6360000002 HC RX W HCPCS: Performed by: INTERNAL MEDICINE

## 2024-09-16 PROCEDURE — 96375 TX/PRO/DX INJ NEW DRUG ADDON: CPT

## 2024-09-16 PROCEDURE — 96415 CHEMO IV INFUSION ADDL HR: CPT

## 2024-09-16 PROCEDURE — 2580000003 HC RX 258: Performed by: INTERNAL MEDICINE

## 2024-09-16 PROCEDURE — 96413 CHEMO IV INFUSION 1 HR: CPT

## 2024-09-16 PROCEDURE — 96367 TX/PROPH/DG ADDL SEQ IV INF: CPT

## 2024-09-16 RX ORDER — DEXAMETHASONE 4 MG/1
TABLET ORAL
Qty: 16 TABLET | Refills: 3 | Status: SHIPPED | OUTPATIENT
Start: 2024-09-16

## 2024-09-16 RX ORDER — SODIUM CHLORIDE 0.9 % (FLUSH) 0.9 %
5-40 SYRINGE (ML) INJECTION PRN
Status: DISCONTINUED | OUTPATIENT
Start: 2024-09-16 | End: 2024-09-17 | Stop reason: HOSPADM

## 2024-09-16 RX ORDER — ONDANSETRON 8 MG/1
8 TABLET, FILM COATED ORAL EVERY 8 HOURS PRN
Qty: 90 TABLET | Refills: 3 | Status: SHIPPED | OUTPATIENT
Start: 2024-09-16

## 2024-09-16 RX ORDER — DEXTROSE MONOHYDRATE 50 MG/ML
5-250 INJECTION, SOLUTION INTRAVENOUS PRN
Status: DISCONTINUED | OUTPATIENT
Start: 2024-09-16 | End: 2024-09-17 | Stop reason: HOSPADM

## 2024-09-16 RX ORDER — ATROPINE SULFATE 0.4 MG/ML
0.4 INJECTION, SOLUTION INTRAVENOUS ONCE
Status: COMPLETED | OUTPATIENT
Start: 2024-09-16 | End: 2024-09-16

## 2024-09-16 RX ORDER — LIDOCAINE/PRILOCAINE 2.5 %-2.5%
CREAM (GRAM) TOPICAL
Qty: 30 G | Refills: 3 | Status: SHIPPED | OUTPATIENT
Start: 2024-09-16

## 2024-09-16 RX ORDER — PALONOSETRON 0.05 MG/ML
0.25 INJECTION, SOLUTION INTRAVENOUS ONCE
Status: COMPLETED | OUTPATIENT
Start: 2024-09-16 | End: 2024-09-16

## 2024-09-16 RX ADMIN — IRINOTECAN HYDROCHLORIDE 200 MG: 20 INJECTION, SOLUTION INTRAVENOUS at 15:01

## 2024-09-16 RX ADMIN — ATROPINE SULFATE 0.4 MG: 0.4 INJECTION, SOLUTION INTRAVENOUS at 14:55

## 2024-09-16 RX ADMIN — LEUCOVORIN CALCIUM 500 MG: 500 INJECTION, POWDER, LYOPHILIZED, FOR SOLUTION INTRAMUSCULAR; INTRAVENOUS at 15:00

## 2024-09-16 RX ADMIN — DEXTROSE MONOHYDRATE 25 ML/HR: 50 INJECTION, SOLUTION INTRAVENOUS at 11:44

## 2024-09-16 RX ADMIN — OXALIPLATIN 115 MG: 5 INJECTION, SOLUTION INTRAVENOUS at 11:46

## 2024-09-16 RX ADMIN — SODIUM CHLORIDE 150 MG: 9 INJECTION, SOLUTION INTRAVENOUS at 10:09

## 2024-09-16 RX ADMIN — DEXAMETHASONE SODIUM PHOSPHATE 12 MG: 4 INJECTION, SOLUTION INTRAMUSCULAR; INTRAVENOUS at 10:54

## 2024-09-16 RX ADMIN — PALONOSETRON 0.25 MG: 0.25 INJECTION, SOLUTION INTRAVENOUS at 10:07

## 2024-09-17 ENCOUNTER — HOSPITAL ENCOUNTER (OUTPATIENT)
Dept: INTERVENTIONAL RADIOLOGY/VASCULAR | Age: 64
Discharge: HOME | End: 2024-09-17
Attending: INTERNAL MEDICINE
Payer: MEDICAID

## 2024-09-17 VITALS
RESPIRATION RATE: 16 BRPM | HEART RATE: 66 BPM | DIASTOLIC BLOOD PRESSURE: 87 MMHG | OXYGEN SATURATION: 94 % | SYSTOLIC BLOOD PRESSURE: 119 MMHG

## 2024-09-17 DIAGNOSIS — C24.1 MALIGNANT NEOPLASM OF AMPULLA OF VATER (HCC): ICD-10-CM

## 2024-09-17 DIAGNOSIS — C7A.8 NEUROENDOCRINE CARCINOMA OF SMALL BOWEL (HCC): ICD-10-CM

## 2024-09-17 DIAGNOSIS — C24.1 CANCER OF AMPULLA OF VATER (HCC): Primary | ICD-10-CM

## 2024-09-17 DIAGNOSIS — Z11.59 NEED FOR HEPATITIS B SCREENING TEST: ICD-10-CM

## 2024-09-17 DIAGNOSIS — Z79.899 NEED FOR PROPHYLACTIC CHEMOTHERAPY: ICD-10-CM

## 2024-09-17 PROCEDURE — 6360000004 HC RX CONTRAST MEDICATION

## 2024-09-17 PROCEDURE — 6360000002 HC RX W HCPCS

## 2024-09-17 PROCEDURE — 7100000010 HC PHASE II RECOVERY - FIRST 15 MIN

## 2024-09-17 PROCEDURE — 36598 INJ W/FLUOR EVAL CV DEVICE: CPT

## 2024-09-17 PROCEDURE — 2709999900 IR CONTRAST INJECTION  EXISTING CV ACCESS DEVICE EVALUATION W FLUORO

## 2024-09-17 RX ORDER — ALBUTEROL SULFATE 90 UG/1
4 INHALANT RESPIRATORY (INHALATION) PRN
Status: CANCELLED | OUTPATIENT
Start: 2024-09-18

## 2024-09-17 RX ORDER — EPINEPHRINE 1 MG/ML
0.3 INJECTION, SOLUTION, CONCENTRATE INTRAVENOUS PRN
Status: CANCELLED | OUTPATIENT
Start: 2024-09-18

## 2024-09-17 RX ORDER — DIPHENHYDRAMINE HYDROCHLORIDE 50 MG/ML
50 INJECTION INTRAMUSCULAR; INTRAVENOUS
Status: CANCELLED | OUTPATIENT
Start: 2024-09-18

## 2024-09-17 RX ORDER — SODIUM CHLORIDE 9 MG/ML
5-250 INJECTION, SOLUTION INTRAVENOUS PRN
Status: CANCELLED | OUTPATIENT
Start: 2024-09-18

## 2024-09-17 RX ORDER — MEPERIDINE HYDROCHLORIDE 25 MG/ML
12.5 INJECTION INTRAMUSCULAR; INTRAVENOUS; SUBCUTANEOUS PRN
Status: CANCELLED | OUTPATIENT
Start: 2024-09-18

## 2024-09-17 RX ORDER — HEPARIN 100 UNIT/ML
500 SYRINGE INTRAVENOUS PRN
Status: CANCELLED | OUTPATIENT
Start: 2024-09-18

## 2024-09-17 RX ORDER — FAMOTIDINE 10 MG/ML
20 INJECTION, SOLUTION INTRAVENOUS
Status: CANCELLED | OUTPATIENT
Start: 2024-09-18

## 2024-09-17 RX ORDER — ATROPINE SULFATE 0.4 MG/ML
0.4 INJECTION, SOLUTION INTRAVENOUS ONCE
Status: CANCELLED | OUTPATIENT
Start: 2024-09-18 | End: 2024-09-18

## 2024-09-17 RX ORDER — ACETAMINOPHEN 325 MG/1
650 TABLET ORAL
Status: CANCELLED | OUTPATIENT
Start: 2024-09-18

## 2024-09-17 RX ORDER — DEXTROSE MONOHYDRATE 50 MG/ML
5-250 INJECTION, SOLUTION INTRAVENOUS PRN
Status: CANCELLED | OUTPATIENT
Start: 2024-09-18

## 2024-09-17 RX ORDER — SODIUM CHLORIDE 0.9 % (FLUSH) 0.9 %
5-40 SYRINGE (ML) INJECTION PRN
Status: CANCELLED | OUTPATIENT
Start: 2024-09-18

## 2024-09-17 RX ORDER — PROCHLORPERAZINE EDISYLATE 5 MG/ML
5 INJECTION INTRAMUSCULAR; INTRAVENOUS
Status: CANCELLED | OUTPATIENT
Start: 2024-09-18

## 2024-09-17 RX ORDER — ONDANSETRON 2 MG/ML
8 INJECTION INTRAMUSCULAR; INTRAVENOUS
Status: CANCELLED | OUTPATIENT
Start: 2024-09-18

## 2024-09-17 RX ORDER — SODIUM CHLORIDE 9 MG/ML
INJECTION, SOLUTION INTRAVENOUS CONTINUOUS
Status: CANCELLED | OUTPATIENT
Start: 2024-09-18

## 2024-09-18 ENCOUNTER — HOSPITAL ENCOUNTER (OUTPATIENT)
Dept: INFUSION THERAPY | Age: 64
Discharge: HOME | End: 2024-09-18
Payer: MEDICAID

## 2024-09-18 ENCOUNTER — CLINICAL DOCUMENTATION (OUTPATIENT)
Dept: ONCOLOGY | Age: 64
End: 2024-09-18

## 2024-09-18 VITALS
HEIGHT: 69 IN | DIASTOLIC BLOOD PRESSURE: 68 MMHG | BODY MASS INDEX: 18.46 KG/M2 | TEMPERATURE: 97.7 F | HEART RATE: 78 BPM | SYSTOLIC BLOOD PRESSURE: 123 MMHG | OXYGEN SATURATION: 96 %

## 2024-09-18 DIAGNOSIS — C24.1 CANCER OF AMPULLA OF VATER (HCC): ICD-10-CM

## 2024-09-18 DIAGNOSIS — C24.1 MALIGNANT NEOPLASM OF AMPULLA OF VATER (HCC): Primary | ICD-10-CM

## 2024-09-18 DIAGNOSIS — Z11.59 NEED FOR HEPATITIS B SCREENING TEST: Primary | ICD-10-CM

## 2024-09-18 DIAGNOSIS — Z79.899 NEED FOR PROPHYLACTIC CHEMOTHERAPY: ICD-10-CM

## 2024-09-18 DIAGNOSIS — C7A.8 NEUROENDOCRINE CARCINOMA OF SMALL BOWEL (HCC): ICD-10-CM

## 2024-09-18 PROCEDURE — 2580000003 HC RX 258: Performed by: INTERNAL MEDICINE

## 2024-09-18 PROCEDURE — 96366 THER/PROPH/DIAG IV INF ADDON: CPT

## 2024-09-18 PROCEDURE — 96415 CHEMO IV INFUSION ADDL HR: CPT

## 2024-09-18 PROCEDURE — 6360000002 HC RX W HCPCS: Performed by: INTERNAL MEDICINE

## 2024-09-18 PROCEDURE — 96413 CHEMO IV INFUSION 1 HR: CPT

## 2024-09-18 PROCEDURE — 96367 TX/PROPH/DG ADDL SEQ IV INF: CPT

## 2024-09-18 PROCEDURE — 96375 TX/PRO/DX INJ NEW DRUG ADDON: CPT

## 2024-09-18 PROCEDURE — 96368 THER/DIAG CONCURRENT INF: CPT

## 2024-09-18 RX ORDER — ATROPINE SULFATE 0.4 MG/ML
0.4 INJECTION, SOLUTION INTRAVENOUS ONCE
Status: COMPLETED | OUTPATIENT
Start: 2024-09-18 | End: 2024-09-18

## 2024-09-18 RX ORDER — SODIUM CHLORIDE 0.9 % (FLUSH) 0.9 %
5-40 SYRINGE (ML) INJECTION PRN
Status: DISCONTINUED | OUTPATIENT
Start: 2024-09-18 | End: 2024-09-19 | Stop reason: HOSPADM

## 2024-09-18 RX ORDER — DEXTROSE MONOHYDRATE 50 MG/ML
5-250 INJECTION, SOLUTION INTRAVENOUS PRN
Status: DISCONTINUED | OUTPATIENT
Start: 2024-09-18 | End: 2024-09-19 | Stop reason: HOSPADM

## 2024-09-18 RX ADMIN — LEUCOVORIN CALCIUM 500 MG: 500 INJECTION, POWDER, LYOPHILIZED, FOR SOLUTION INTRAMUSCULAR; INTRAVENOUS at 11:34

## 2024-09-18 RX ADMIN — DEXAMETHASONE SODIUM PHOSPHATE 12 MG: 4 INJECTION, SOLUTION INTRAMUSCULAR; INTRAVENOUS at 10:46

## 2024-09-18 RX ADMIN — ATROPINE SULFATE 0.4 MG: 0.4 INJECTION, SOLUTION INTRAVENOUS at 11:28

## 2024-09-18 RX ADMIN — IRINOTECAN HYDROCHLORIDE 200 MG: 20 INJECTION, SOLUTION INTRAVENOUS at 11:33

## 2024-09-25 ENCOUNTER — CLINICAL DOCUMENTATION (OUTPATIENT)
Dept: ONCOLOGY | Age: 64
End: 2024-09-25

## 2024-09-26 ENCOUNTER — OFFICE VISIT (OUTPATIENT)
Dept: BARIATRICS/WEIGHT MGMT | Age: 64
End: 2024-09-26

## 2024-09-26 VITALS
SYSTOLIC BLOOD PRESSURE: 116 MMHG | BODY MASS INDEX: 18.56 KG/M2 | WEIGHT: 125.7 LBS | OXYGEN SATURATION: 95 % | DIASTOLIC BLOOD PRESSURE: 72 MMHG | HEART RATE: 78 BPM

## 2024-09-26 DIAGNOSIS — Z98.890 HISTORY OF INSERTION OF TUNNELED CENTRAL VENOUS CATHETER (CVC) WITH PORT: Primary | ICD-10-CM

## 2024-09-26 PROCEDURE — 99024 POSTOP FOLLOW-UP VISIT: CPT | Performed by: SURGERY

## 2024-09-27 ENCOUNTER — HOSPITAL ENCOUNTER (EMERGENCY)
Age: 64
Discharge: ANOTHER HOSPITAL | End: 2024-09-27
Attending: EMERGENCY MEDICINE
Payer: MEDICAID

## 2024-09-27 ENCOUNTER — APPOINTMENT (OUTPATIENT)
Dept: CT IMAGING | Age: 64
End: 2024-09-27
Payer: MEDICAID

## 2024-09-27 ENCOUNTER — HOSPITAL ENCOUNTER (OUTPATIENT)
Dept: INFUSION THERAPY | Age: 64
Discharge: HOME | End: 2024-09-27
Payer: MEDICAID

## 2024-09-27 VITALS
TEMPERATURE: 97.8 F | SYSTOLIC BLOOD PRESSURE: 107 MMHG | OXYGEN SATURATION: 92 % | RESPIRATION RATE: 13 BRPM | HEART RATE: 93 BPM | DIASTOLIC BLOOD PRESSURE: 71 MMHG

## 2024-09-27 DIAGNOSIS — Z11.59 NEED FOR HEPATITIS B SCREENING TEST: ICD-10-CM

## 2024-09-27 DIAGNOSIS — C7A.8 NEUROENDOCRINE CARCINOMA OF SMALL BOWEL (HCC): ICD-10-CM

## 2024-09-27 DIAGNOSIS — R11.2 NAUSEA AND VOMITING, UNSPECIFIED VOMITING TYPE: ICD-10-CM

## 2024-09-27 DIAGNOSIS — Z79.899 NEED FOR PROPHYLACTIC CHEMOTHERAPY: ICD-10-CM

## 2024-09-27 DIAGNOSIS — R10.84 GENERALIZED ABDOMINAL PAIN: Primary | ICD-10-CM

## 2024-09-27 DIAGNOSIS — C24.1 CANCER OF AMPULLA OF VATER (HCC): Primary | ICD-10-CM

## 2024-09-27 DIAGNOSIS — R93.5 ABNORMAL CT OF THE ABDOMEN: ICD-10-CM

## 2024-09-27 DIAGNOSIS — C24.1 CANCER OF AMPULLA OF VATER (HCC): ICD-10-CM

## 2024-09-27 LAB
ALBUMIN SERPL-MCNC: 3.6 G/DL (ref 3.4–5)
ALBUMIN SERPL-MCNC: 3.7 G/DL (ref 3.4–5)
ALBUMIN/GLOB SERPL: 1.8 {RATIO} (ref 1.1–2.2)
ALBUMIN/GLOB SERPL: 2.2 {RATIO} (ref 1.1–2.2)
ALP SERPL-CCNC: 110 U/L (ref 40–129)
ALP SERPL-CCNC: 113 U/L (ref 40–129)
ALT SERPL-CCNC: 11 U/L (ref 10–40)
ALT SERPL-CCNC: 8 U/L (ref 10–40)
ANION GAP SERPL CALCULATED.3IONS-SCNC: 11 MMOL/L (ref 9–17)
ANION GAP SERPL CALCULATED.3IONS-SCNC: 12 MMOL/L (ref 9–17)
AST SERPL-CCNC: 16 U/L (ref 15–37)
AST SERPL-CCNC: 17 U/L (ref 15–37)
BASOPHILS # BLD: 0.01 K/UL
BASOPHILS # BLD: 0.03 K/UL
BASOPHILS NFR BLD: 0 % (ref 0–1)
BASOPHILS NFR BLD: 1 % (ref 0–1)
BILIRUB SERPL-MCNC: 0.4 MG/DL (ref 0–1)
BILIRUB SERPL-MCNC: 0.4 MG/DL (ref 0–1)
BUN SERPL-MCNC: 12 MG/DL (ref 7–20)
BUN SERPL-MCNC: 14 MG/DL (ref 7–20)
CALCIUM SERPL-MCNC: 8.7 MG/DL (ref 8.3–10.6)
CALCIUM SERPL-MCNC: 8.8 MG/DL (ref 8.3–10.6)
CHLORIDE SERPL-SCNC: 102 MMOL/L (ref 99–110)
CHLORIDE SERPL-SCNC: 105 MMOL/L (ref 99–110)
CO2 SERPL-SCNC: 21 MMOL/L (ref 21–32)
CO2 SERPL-SCNC: 23 MMOL/L (ref 21–32)
CREAT SERPL-MCNC: 0.6 MG/DL (ref 0.8–1.3)
CREAT SERPL-MCNC: 0.7 MG/DL (ref 0.8–1.3)
EOSINOPHIL # BLD: 0.1 K/UL
EOSINOPHIL # BLD: 0.12 K/UL
EOSINOPHILS RELATIVE PERCENT: 2 % (ref 0–3)
EOSINOPHILS RELATIVE PERCENT: 2 % (ref 0–3)
ERYTHROCYTE [DISTWIDTH] IN BLOOD BY AUTOMATED COUNT: 17.4 % (ref 11.7–14.9)
ERYTHROCYTE [DISTWIDTH] IN BLOOD BY AUTOMATED COUNT: 17.7 % (ref 11.7–14.9)
GFR, ESTIMATED: >90 ML/MIN/1.73M2
GFR, ESTIMATED: >90 ML/MIN/1.73M2
GLUCOSE SERPL-MCNC: 82 MG/DL (ref 74–99)
GLUCOSE SERPL-MCNC: 86 MG/DL (ref 74–99)
HCT VFR BLD AUTO: 34.3 % (ref 42–52)
HCT VFR BLD AUTO: 35.3 % (ref 42–52)
HGB BLD-MCNC: 10.9 G/DL (ref 13.5–18)
HGB BLD-MCNC: 11.3 G/DL (ref 13.5–18)
IMM GRANULOCYTES # BLD AUTO: 0.02 K/UL
IMM GRANULOCYTES NFR BLD: 0 %
LACTATE BLDV-SCNC: 1 MMOL/L (ref 0.4–2)
LACTATE BLDV-SCNC: 1.7 MMOL/L (ref 0.4–2)
LIPASE SERPL-CCNC: 14 U/L (ref 13–60)
LYMPHOCYTES NFR BLD: 1.67 K/UL
LYMPHOCYTES NFR BLD: 2.12 K/UL
LYMPHOCYTES RELATIVE PERCENT: 30 % (ref 24–44)
LYMPHOCYTES RELATIVE PERCENT: 34 % (ref 24–44)
MCH RBC QN AUTO: 26 PG (ref 27–31)
MCH RBC QN AUTO: 26.2 PG (ref 27–31)
MCHC RBC AUTO-ENTMCNC: 31.8 G/DL (ref 32–36)
MCHC RBC AUTO-ENTMCNC: 32 G/DL (ref 32–36)
MCV RBC AUTO: 81.7 FL (ref 78–100)
MCV RBC AUTO: 81.9 FL (ref 78–100)
MONOCYTES NFR BLD: 0.49 K/UL
MONOCYTES NFR BLD: 0.56 K/UL
MONOCYTES NFR BLD: 9 % (ref 0–4)
MONOCYTES NFR BLD: 9 % (ref 0–4)
NEUTROPHILS NFR BLD: 55 % (ref 36–66)
NEUTROPHILS NFR BLD: 58 % (ref 36–66)
NEUTS SEG NFR BLD: 3.2 K/UL
NEUTS SEG NFR BLD: 3.4 K/UL
PLATELET # BLD AUTO: 267 K/UL (ref 140–440)
PLATELET # BLD AUTO: 274 K/UL (ref 140–440)
PMV BLD AUTO: 8.1 FL (ref 7.5–11.1)
PMV BLD AUTO: 9.1 FL (ref 7.5–11.1)
POTASSIUM SERPL-SCNC: 3.1 MMOL/L (ref 3.5–5.1)
POTASSIUM SERPL-SCNC: 3.6 MMOL/L (ref 3.5–5.1)
PROT SERPL-MCNC: 5.4 G/DL (ref 6.4–8.2)
PROT SERPL-MCNC: 5.7 G/DL (ref 6.4–8.2)
RBC # BLD AUTO: 4.2 M/UL (ref 4.6–6.2)
RBC # BLD AUTO: 4.31 M/UL (ref 4.6–6.2)
SODIUM SERPL-SCNC: 135 MMOL/L (ref 136–145)
SODIUM SERPL-SCNC: 139 MMOL/L (ref 136–145)
WBC OTHER # BLD: 5.5 K/UL (ref 4–10.5)
WBC OTHER # BLD: 6.2 K/UL (ref 4–10.5)

## 2024-09-27 PROCEDURE — 83605 ASSAY OF LACTIC ACID: CPT

## 2024-09-27 PROCEDURE — 96376 TX/PRO/DX INJ SAME DRUG ADON: CPT

## 2024-09-27 PROCEDURE — 96375 TX/PRO/DX INJ NEW DRUG ADDON: CPT

## 2024-09-27 PROCEDURE — 80053 COMPREHEN METABOLIC PANEL: CPT

## 2024-09-27 PROCEDURE — 85025 COMPLETE CBC W/AUTO DIFF WBC: CPT

## 2024-09-27 PROCEDURE — 93005 ELECTROCARDIOGRAM TRACING: CPT | Performed by: EMERGENCY MEDICINE

## 2024-09-27 PROCEDURE — 96374 THER/PROPH/DIAG INJ IV PUSH: CPT

## 2024-09-27 PROCEDURE — 99285 EMERGENCY DEPT VISIT HI MDM: CPT

## 2024-09-27 PROCEDURE — 74177 CT ABD & PELVIS W/CONTRAST: CPT

## 2024-09-27 PROCEDURE — 83690 ASSAY OF LIPASE: CPT

## 2024-09-27 PROCEDURE — 6360000002 HC RX W HCPCS: Performed by: EMERGENCY MEDICINE

## 2024-09-27 PROCEDURE — 36415 COLL VENOUS BLD VENIPUNCTURE: CPT

## 2024-09-27 PROCEDURE — 6360000004 HC RX CONTRAST MEDICATION: Performed by: EMERGENCY MEDICINE

## 2024-09-27 RX ORDER — IOPAMIDOL 755 MG/ML
75 INJECTION, SOLUTION INTRAVASCULAR
Status: COMPLETED | OUTPATIENT
Start: 2024-09-27 | End: 2024-09-27

## 2024-09-27 RX ORDER — ONDANSETRON 2 MG/ML
4 INJECTION INTRAMUSCULAR; INTRAVENOUS EVERY 30 MIN PRN
Status: COMPLETED | OUTPATIENT
Start: 2024-09-27 | End: 2024-09-27

## 2024-09-27 RX ORDER — MORPHINE SULFATE 4 MG/ML
4 INJECTION, SOLUTION INTRAMUSCULAR; INTRAVENOUS EVERY 30 MIN PRN
Status: DISCONTINUED | OUTPATIENT
Start: 2024-09-27 | End: 2024-09-28 | Stop reason: HOSPADM

## 2024-09-27 RX ADMIN — ONDANSETRON 4 MG: 2 INJECTION, SOLUTION INTRAMUSCULAR; INTRAVENOUS at 17:59

## 2024-09-27 RX ADMIN — MORPHINE SULFATE 4 MG: 4 INJECTION, SOLUTION INTRAMUSCULAR; INTRAVENOUS at 20:54

## 2024-09-27 RX ADMIN — ONDANSETRON 4 MG: 2 INJECTION, SOLUTION INTRAMUSCULAR; INTRAVENOUS at 20:54

## 2024-09-27 RX ADMIN — MORPHINE SULFATE 4 MG: 4 INJECTION, SOLUTION INTRAMUSCULAR; INTRAVENOUS at 17:59

## 2024-09-27 RX ADMIN — ONDANSETRON 4 MG: 2 INJECTION, SOLUTION INTRAMUSCULAR; INTRAVENOUS at 16:24

## 2024-09-27 RX ADMIN — IOPAMIDOL 75 ML: 755 INJECTION, SOLUTION INTRAVENOUS at 15:55

## 2024-09-27 RX ADMIN — MORPHINE SULFATE 4 MG: 4 INJECTION, SOLUTION INTRAMUSCULAR; INTRAVENOUS at 16:24

## 2024-09-27 ASSESSMENT — PAIN SCALES - GENERAL
PAINLEVEL_OUTOF10: 8
PAINLEVEL_OUTOF10: 2
PAINLEVEL_OUTOF10: 3
PAINLEVEL_OUTOF10: 5
PAINLEVEL_OUTOF10: 4
PAINLEVEL_OUTOF10: 4
PAINLEVEL_OUTOF10: 8
PAINLEVEL_OUTOF10: 8
PAINLEVEL_OUTOF10: 6

## 2024-09-27 ASSESSMENT — PAIN DESCRIPTION - LOCATION
LOCATION: BACK
LOCATION: ABDOMEN;BACK

## 2024-09-27 NOTE — ED TRIAGE NOTES
Patient to ED with complaints of \"bowel obstruction\" states he has abdominal pain, back pain, nausea and vomiting that started last night. States he had two normal BMs this morning.

## 2024-09-27 NOTE — ED PROVIDER NOTES
Emergency Department Encounter    Patient: Jama Ramirez  MRN: [id number]  : 1960  Date of Evaluation: 2024  ED Provider:  Shanika Peralta MD    Triage Chief Complaint:   Constipation (Started at midnight last night ), Nausea, Emesis, Abdominal Pain, and Back Pain    Knik:  Jama Ramirez is a 64 y.o. male that presents with complaint of constipation, nausea, vomiting, abdominal pain and back pain.  Feels like previous bowel obstructions.  He had pancreatic cancer and had a Whipple, and has had recurrent bowel obstructions since that time.  He had just been admitted a few weeks ago for same.  Wife notes that he has not been able to adapt to eating small meals, and will try and eat large meals and then will get a lot of pain.  He did have a small bowel movement this morning.  He has had multiple episodes of vomiting.    ROS - see HPI, below listed is current ROS at time of my eval:  10 systems reviewed and negative except as above.     Past Medical History:   Diagnosis Date    Arthritis     Hands    Cancer of ampulla of Vater ([facility]) 2024    Dr. CHUYITA Herring    Chronic cluster headache 2019 last    COPD (chronic obstructive pulmonary disease) ([facility])     \"dx 2 yrs ago\" \\"does not see a lung dr - Follows with PCP    Degenerative disc disease     \"in my back have degenarative disc\"    Depression     Edentulous     GERD with esophagitis     Hiatal hernia     HX OTHER MEDICAL     pt states has trouble with reading and writing\"can read very very little\"    Hyperlipidemia     Hypertension     No meds as of 21 - follows with PCP    Impingement syndrome of right shoulder     Motion sickness     PONV (postoperative nausea and vomiting)     Prolonged emergence from general anesthesia     Vertigo      Past Surgical History:   Procedure Laterality Date    ABDOMEN SURGERY      ARM SURGERY Left 30+ yrs    \"put plastic ligaments in \"  after injury through glass window    CARPAL TUNNEL RELEASE Right 10/04/2019     6.20 m/uL    Hemoglobin 10.9 (L) 13.5 - 18.0 g/dL    Hematocrit 34.3 (L) 42.0 - 52.0 %    MCV 81.7 78.0 - 100.0 fL    MCH 26.0 (L) 27.0 - 31.0 pg    MCHC 31.8 (L) 32.0 - 36.0 g/dL    RDW 17.4 (H) 11.7 - 14.9 %    Platelets 274 140 - 440 k/uL    MPV 9.1 7.5 - 11.1 fL    Neutrophils % 55 36 - 66 %    Lymphocytes % 34 24 - 44 %    Monocytes % 9 (H) 0 - 4 %    Eosinophils % 2 0.0 - 3.0 %    Basophils % 1 0 - 1 %    Immature Granulocytes % 0 0 %    Neutrophils Absolute 3.40 k/uL    Lymphocytes Absolute 2.12 k/uL    Monocytes Absolute 0.56 k/uL    Eosinophils Absolute 0.10 k/uL    Basophils Absolute 0.03 k/uL    Immature Granulocytes Absolute 0.02 k/uL   Comprehensive Metabolic Panel   Result Value Ref Range    Sodium 135 (L) 136 - 145 mmol/L    Potassium 3.1 (L) 3.5 - 5.1 mmol/L    Chloride 102 99 - 110 mmol/L    CO2 21 21 - 32 mmol/L    Anion Gap 12 9 - 17 mmol/L    Glucose 86 74 - 99 mg/dL    BUN 12 7 - 20 mg/dL    Creatinine 0.6 (L) 0.8 - 1.3 mg/dL    Est, Glom Filt Rate >90 >60 mL/min/1.73m2    Calcium 8.8 8.3 - 10.6 mg/dL    Total Protein 5.7 (L) 6.4 - 8.2 g/dL    Albumin 3.6 3.4 - 5.0 g/dL    Albumin/Globulin Ratio 1.8 1.1 - 2.2    Total Bilirubin 0.4 0.0 - 1.0 mg/dL    Alkaline Phosphatase 110 40 - 129 U/L    ALT 8 (L) 10 - 40 U/L    AST 16 15 - 37 U/L   Lipase   Result Value Ref Range    Lipase 14 13 - 60 U/L   EKG 12 Lead   Result Value Ref Range    Ventricular Rate 62 BPM    Atrial Rate 62 BPM    P-R Interval 184 ms    QRS Duration 84 ms    Q-T Interval 402 ms    QTc Calculation (Bazett) 408 ms    P Axis 76 degrees    R Axis 68 degrees    T Axis 77 degrees    Diagnosis       Sinus rhythm with occasional premature ventricular complexes  When compared with ECG of 05-SEP-2024 08:45,  premature ventricular complexes are now present        Radiographs (if obtained):  Radiologist's Report Reviewed:  No results found.        MDM:  CC/HPI Summary, DDx, ED Course, and Reassessment: Patient presents with concern for

## 2024-09-28 LAB
EKG ATRIAL RATE: 62 BPM
EKG DIAGNOSIS: NORMAL
EKG P AXIS: 76 DEGREES
EKG P-R INTERVAL: 184 MS
EKG Q-T INTERVAL: 402 MS
EKG QRS DURATION: 84 MS
EKG QTC CALCULATION (BAZETT): 408 MS
EKG R AXIS: 68 DEGREES
EKG T AXIS: 77 DEGREES
EKG VENTRICULAR RATE: 62 BPM

## 2024-09-28 PROCEDURE — 93010 ELECTROCARDIOGRAM REPORT: CPT | Performed by: INTERNAL MEDICINE

## 2024-10-10 DIAGNOSIS — C24.1 CANCER OF AMPULLA OF VATER (HCC): Primary | ICD-10-CM

## 2024-10-10 DIAGNOSIS — Z79.899 NEED FOR PROPHYLACTIC CHEMOTHERAPY: ICD-10-CM

## 2024-10-10 DIAGNOSIS — C7A.8 NEUROENDOCRINE CARCINOMA OF SMALL BOWEL (HCC): ICD-10-CM

## 2024-10-10 DIAGNOSIS — Z11.59 NEED FOR HEPATITIS B SCREENING TEST: ICD-10-CM

## 2024-10-11 ENCOUNTER — HOSPITAL ENCOUNTER (OUTPATIENT)
Dept: INFUSION THERAPY | Age: 64
Discharge: HOME | End: 2024-10-11
Payer: MEDICAID

## 2024-10-11 DIAGNOSIS — Z11.59 NEED FOR HEPATITIS B SCREENING TEST: ICD-10-CM

## 2024-10-11 DIAGNOSIS — C24.1 CANCER OF AMPULLA OF VATER (HCC): ICD-10-CM

## 2024-10-11 DIAGNOSIS — C7A.8 NEUROENDOCRINE CARCINOMA OF SMALL BOWEL (HCC): ICD-10-CM

## 2024-10-11 DIAGNOSIS — Z79.899 NEED FOR PROPHYLACTIC CHEMOTHERAPY: ICD-10-CM

## 2024-10-11 LAB
ALBUMIN SERPL-MCNC: 3.7 G/DL (ref 3.4–5)
ALBUMIN/GLOB SERPL: 2.1 {RATIO} (ref 1.1–2.2)
ALP SERPL-CCNC: 102 U/L (ref 40–129)
ALT SERPL-CCNC: 14 U/L (ref 10–40)
ANION GAP SERPL CALCULATED.3IONS-SCNC: 11 MMOL/L (ref 9–17)
AST SERPL-CCNC: 21 U/L (ref 15–37)
BASOPHILS # BLD: 0.03 K/UL
BASOPHILS NFR BLD: 1 % (ref 0–1)
BILIRUB SERPL-MCNC: 0.3 MG/DL (ref 0–1)
BUN SERPL-MCNC: 13 MG/DL (ref 7–20)
CALCIUM SERPL-MCNC: 8.6 MG/DL (ref 8.3–10.6)
CHLORIDE SERPL-SCNC: 109 MMOL/L (ref 99–110)
CO2 SERPL-SCNC: 22 MMOL/L (ref 21–32)
CREAT SERPL-MCNC: 0.7 MG/DL (ref 0.8–1.3)
EOSINOPHIL # BLD: 0.17 K/UL
EOSINOPHILS RELATIVE PERCENT: 4 % (ref 0–3)
ERYTHROCYTE [DISTWIDTH] IN BLOOD BY AUTOMATED COUNT: 19.6 % (ref 11.7–14.9)
GFR, ESTIMATED: >90 ML/MIN/1.73M2
GLUCOSE SERPL-MCNC: 82 MG/DL (ref 74–99)
HCT VFR BLD AUTO: 35 % (ref 42–52)
HGB BLD-MCNC: 11.2 G/DL (ref 13.5–18)
LYMPHOCYTES NFR BLD: 1.82 K/UL
LYMPHOCYTES RELATIVE PERCENT: 37 % (ref 24–44)
MCH RBC QN AUTO: 26.4 PG (ref 27–31)
MCHC RBC AUTO-ENTMCNC: 32 G/DL (ref 32–36)
MCV RBC AUTO: 82.5 FL (ref 78–100)
MONOCYTES NFR BLD: 0.47 K/UL
MONOCYTES NFR BLD: 10 % (ref 0–4)
NEUTROPHILS NFR BLD: 49 % (ref 36–66)
NEUTS SEG NFR BLD: 2.37 K/UL
PLATELET # BLD AUTO: 226 K/UL (ref 140–440)
PMV BLD AUTO: 9 FL (ref 7.5–11.1)
POTASSIUM SERPL-SCNC: 3.6 MMOL/L (ref 3.5–5.1)
PROT SERPL-MCNC: 5.5 G/DL (ref 6.4–8.2)
RBC # BLD AUTO: 4.24 M/UL (ref 4.6–6.2)
SODIUM SERPL-SCNC: 142 MMOL/L (ref 136–145)
WBC OTHER # BLD: 4.9 K/UL (ref 4–10.5)

## 2024-10-11 PROCEDURE — 36415 COLL VENOUS BLD VENIPUNCTURE: CPT

## 2024-10-11 PROCEDURE — 80053 COMPREHEN METABOLIC PANEL: CPT

## 2024-10-11 PROCEDURE — 85025 COMPLETE CBC W/AUTO DIFF WBC: CPT

## 2024-10-11 RX ORDER — ALBUTEROL SULFATE 90 UG/1
4 INHALANT RESPIRATORY (INHALATION) PRN
Status: CANCELLED | OUTPATIENT
Start: 2024-10-14

## 2024-10-11 RX ORDER — SODIUM CHLORIDE 9 MG/ML
INJECTION, SOLUTION INTRAVENOUS CONTINUOUS
Status: CANCELLED | OUTPATIENT
Start: 2024-10-14

## 2024-10-11 RX ORDER — PROCHLORPERAZINE EDISYLATE 5 MG/ML
5 INJECTION INTRAMUSCULAR; INTRAVENOUS
Status: CANCELLED | OUTPATIENT
Start: 2024-10-14

## 2024-10-11 RX ORDER — ONDANSETRON 2 MG/ML
8 INJECTION INTRAMUSCULAR; INTRAVENOUS
Status: CANCELLED | OUTPATIENT
Start: 2024-10-14

## 2024-10-11 RX ORDER — HEPARIN 100 UNIT/ML
500 SYRINGE INTRAVENOUS PRN
Status: CANCELLED | OUTPATIENT
Start: 2024-10-14

## 2024-10-11 RX ORDER — ACETAMINOPHEN 325 MG/1
650 TABLET ORAL
Status: CANCELLED | OUTPATIENT
Start: 2024-10-14

## 2024-10-11 RX ORDER — SODIUM CHLORIDE 9 MG/ML
5-250 INJECTION, SOLUTION INTRAVENOUS PRN
Status: CANCELLED | OUTPATIENT
Start: 2024-10-14

## 2024-10-11 RX ORDER — EPINEPHRINE 1 MG/ML
0.3 INJECTION, SOLUTION, CONCENTRATE INTRAVENOUS PRN
Status: CANCELLED | OUTPATIENT
Start: 2024-10-14

## 2024-10-11 RX ORDER — MEPERIDINE HYDROCHLORIDE 25 MG/ML
12.5 INJECTION INTRAMUSCULAR; INTRAVENOUS; SUBCUTANEOUS PRN
Status: CANCELLED | OUTPATIENT
Start: 2024-10-14

## 2024-10-11 RX ORDER — SODIUM CHLORIDE 0.9 % (FLUSH) 0.9 %
5-40 SYRINGE (ML) INJECTION PRN
Status: CANCELLED | OUTPATIENT
Start: 2024-10-14

## 2024-10-11 RX ORDER — DIPHENHYDRAMINE HYDROCHLORIDE 50 MG/ML
50 INJECTION INTRAMUSCULAR; INTRAVENOUS
Status: CANCELLED | OUTPATIENT
Start: 2024-10-14

## 2024-10-11 RX ORDER — DEXTROSE MONOHYDRATE 50 MG/ML
5-250 INJECTION, SOLUTION INTRAVENOUS PRN
Status: CANCELLED | OUTPATIENT
Start: 2024-10-14

## 2024-10-11 RX ORDER — FAMOTIDINE 10 MG/ML
20 INJECTION, SOLUTION INTRAVENOUS
Status: CANCELLED | OUTPATIENT
Start: 2024-10-14

## 2024-10-14 ENCOUNTER — HOSPITAL ENCOUNTER (OUTPATIENT)
Dept: INFUSION THERAPY | Age: 64
Discharge: HOME | End: 2024-10-14
Payer: MEDICAID

## 2024-10-14 VITALS
HEIGHT: 69 IN | BODY MASS INDEX: 18.25 KG/M2 | WEIGHT: 123.2 LBS | DIASTOLIC BLOOD PRESSURE: 76 MMHG | OXYGEN SATURATION: 96 % | SYSTOLIC BLOOD PRESSURE: 121 MMHG | TEMPERATURE: 97.7 F | HEART RATE: 72 BPM

## 2024-10-14 DIAGNOSIS — C7A.8 NEUROENDOCRINE CARCINOMA OF SMALL BOWEL (HCC): ICD-10-CM

## 2024-10-14 DIAGNOSIS — C24.1 CANCER OF AMPULLA OF VATER (HCC): ICD-10-CM

## 2024-10-14 DIAGNOSIS — Z79.899 NEED FOR PROPHYLACTIC CHEMOTHERAPY: ICD-10-CM

## 2024-10-14 DIAGNOSIS — Z11.59 NEED FOR HEPATITIS B SCREENING TEST: Primary | ICD-10-CM

## 2024-10-14 PROCEDURE — 96415 CHEMO IV INFUSION ADDL HR: CPT

## 2024-10-14 PROCEDURE — 6360000002 HC RX W HCPCS: Performed by: INTERNAL MEDICINE

## 2024-10-14 PROCEDURE — 96367 TX/PROPH/DG ADDL SEQ IV INF: CPT

## 2024-10-14 PROCEDURE — 96368 THER/DIAG CONCURRENT INF: CPT

## 2024-10-14 PROCEDURE — 2580000003 HC RX 258: Performed by: INTERNAL MEDICINE

## 2024-10-14 PROCEDURE — G0498 CHEMO EXTEND IV INFUS W/PUMP: HCPCS

## 2024-10-14 PROCEDURE — 96413 CHEMO IV INFUSION 1 HR: CPT

## 2024-10-14 PROCEDURE — 96417 CHEMO IV INFUS EACH ADDL SEQ: CPT

## 2024-10-14 PROCEDURE — 96375 TX/PRO/DX INJ NEW DRUG ADDON: CPT

## 2024-10-14 RX ORDER — ATROPINE SULFATE 0.4 MG/ML
0.4 INJECTION, SOLUTION INTRAVENOUS ONCE
Status: COMPLETED | OUTPATIENT
Start: 2024-10-14 | End: 2024-10-14

## 2024-10-14 RX ORDER — PALONOSETRON 0.05 MG/ML
0.25 INJECTION, SOLUTION INTRAVENOUS ONCE
Status: COMPLETED | OUTPATIENT
Start: 2024-10-14 | End: 2024-10-14

## 2024-10-14 RX ADMIN — PALONOSETRON 0.25 MG: 0.25 INJECTION, SOLUTION INTRAVENOUS at 10:00

## 2024-10-14 RX ADMIN — IRINOTECAN HYDROCHLORIDE 250 MG: 20 INJECTION, SOLUTION INTRAVENOUS at 13:23

## 2024-10-14 RX ADMIN — LEUCOVORIN CALCIUM 650 MG: 500 INJECTION, POWDER, LYOPHILIZED, FOR SOLUTION INTRAMUSCULAR; INTRAVENOUS at 13:23

## 2024-10-14 RX ADMIN — OXALIPLATIN 140 MG: 5 INJECTION, SOLUTION INTRAVENOUS at 10:53

## 2024-10-14 RX ADMIN — FLUOROURACIL 4000 MG: 50 INJECTION, SOLUTION INTRAVENOUS at 15:21

## 2024-10-14 RX ADMIN — SODIUM CHLORIDE 150 MG: 9 INJECTION, SOLUTION INTRAVENOUS at 10:03

## 2024-10-14 RX ADMIN — DEXAMETHASONE SODIUM PHOSPHATE 12 MG: 4 INJECTION, SOLUTION INTRAMUSCULAR; INTRAVENOUS at 10:31

## 2024-10-14 RX ADMIN — ATROPINE SULFATE 0.4 MG: 0.4 INJECTION, SOLUTION INTRAVENOUS at 13:15

## 2024-10-14 NOTE — PROGRESS NOTES
Patient arrived to treatment suite for pre-medications, chemotherapy infusions, and connection of a home infusion pump.  Right chest mediport accessed and good blood return noted after initial difficulty flushing.  Patient has no questions or concerns for the doctor at this time.  Treatment approved and given.  Patient tolerated well.  Left treatment suite ambulatory.  Discharge instructions provided.  Education provided on pump along with initial bad with chemo spill kit as patient did not receive this during his last infusion due to difficulties with mediport.  Mediport left connected for home infusion.    Patient's status assessed and documented appropriately.  All labs and required results were also reviewed today.  Treatment parameters have been reviewed.  Today's treatment has been approved by the provider.  Treatment orders and medication sequencing (when applicable) was verified by 2 registered nurses.  The treatment plan was confirmed with the patient prior to administration, and the patient understands the need to report any treatment-related symptoms.    Prior to administration, when applicable, the following 8 elements of medication administration were reviewed with 2nd Registered Nurse prior to dosing: drug name, drug dose, infusion volume when prepared in a syringe, rate of administration, expiration dates and/or times, appearance and integrity of drug(s), and rate of pump for infusion.  The 5 rights of medication administration have been verified.

## 2024-10-16 ENCOUNTER — HOSPITAL ENCOUNTER (OUTPATIENT)
Dept: INFUSION THERAPY | Age: 64
Discharge: HOME | End: 2024-10-16
Payer: MEDICAID

## 2024-10-16 ENCOUNTER — TELEPHONE (OUTPATIENT)
Dept: ONCOLOGY | Age: 64
End: 2024-10-16

## 2024-10-16 DIAGNOSIS — C7A.8 NEUROENDOCRINE CARCINOMA OF SMALL BOWEL (HCC): ICD-10-CM

## 2024-10-16 DIAGNOSIS — C24.1 CANCER OF AMPULLA OF VATER (HCC): ICD-10-CM

## 2024-10-16 DIAGNOSIS — Z79.899 NEED FOR PROPHYLACTIC CHEMOTHERAPY: ICD-10-CM

## 2024-10-16 DIAGNOSIS — Z11.59 NEED FOR HEPATITIS B SCREENING TEST: Primary | ICD-10-CM

## 2024-10-16 PROCEDURE — 96523 IRRIG DRUG DELIVERY DEVICE: CPT

## 2024-10-16 PROCEDURE — 2580000003 HC RX 258: Performed by: INTERNAL MEDICINE

## 2024-10-16 RX ORDER — SODIUM CHLORIDE 0.9 % (FLUSH) 0.9 %
5-40 SYRINGE (ML) INJECTION PRN
Status: DISCONTINUED | OUTPATIENT
Start: 2024-10-16 | End: 2024-10-17 | Stop reason: HOSPADM

## 2024-10-16 RX ADMIN — SODIUM CHLORIDE, PRESERVATIVE FREE 20 ML: 5 INJECTION INTRAVENOUS at 13:29

## 2024-10-16 NOTE — TELEPHONE ENCOUNTER
Per Laurie, Nurse  with Dr. Collado, [facility], Dr. Collado stated that patient needs to delay treatment until after we hear back from them. Pt has appt with Dr. Collado on 10/30 and possible scope for recurrent bowel obstruction on 11/5. Laurie's direct number for questions/concerns is [phone].

## 2024-10-17 ENCOUNTER — CLINICAL DOCUMENTATION (OUTPATIENT)
Dept: ONCOLOGY | Age: 64
End: 2024-10-17

## 2024-10-17 NOTE — PROGRESS NOTES
Received correspondence fromLaurie RN/NOLVIA with Dr. Collado at [facility] recommending that patient delay treatment until after appointment with Dr. Collado on 10/30/2024 and EGD on 11/05//2024. Called RN/NOLVIA @ [phone] to confirm plan. Informed that patient has recurrent SBO and tx may change if patient needs surgical procedure (small bowel bypass). Chemo tx scheduled for 10/28/2024 at [facility] will be cancelled. RN/NOLVIA will continue to keep office updated on plan of care after appointments.        Called [phone] and spoke with POA regarding plan of care. POA voices understanding and states that patient is already aware. No further needs addressed at this time.

## 2024-10-21 ENCOUNTER — APPOINTMENT (OUTPATIENT)
Dept: GENERAL RADIOLOGY | Age: 64
End: 2024-10-21
Payer: MEDICAID

## 2024-10-21 ENCOUNTER — APPOINTMENT (OUTPATIENT)
Dept: CT IMAGING | Age: 64
End: 2024-10-21
Payer: MEDICAID

## 2024-10-21 ENCOUNTER — HOSPITAL ENCOUNTER (EMERGENCY)
Age: 64
Discharge: HOME | End: 2024-10-21
Payer: MEDICAID

## 2024-10-21 VITALS
SYSTOLIC BLOOD PRESSURE: 107 MMHG | HEART RATE: 65 BPM | OXYGEN SATURATION: 99 % | TEMPERATURE: 97.6 F | DIASTOLIC BLOOD PRESSURE: 85 MMHG | RESPIRATION RATE: 18 BRPM

## 2024-10-21 DIAGNOSIS — E86.0 DEHYDRATION: ICD-10-CM

## 2024-10-21 DIAGNOSIS — E87.6 HYPOKALEMIA: ICD-10-CM

## 2024-10-21 DIAGNOSIS — R11.2 NAUSEA AND VOMITING, UNSPECIFIED VOMITING TYPE: Primary | ICD-10-CM

## 2024-10-21 LAB
ALBUMIN SERPL-MCNC: 3.7 G/DL (ref 3.4–5)
ALBUMIN/GLOB SERPL: 1.9 {RATIO} (ref 1.1–2.2)
ALP SERPL-CCNC: 89 U/L (ref 40–129)
ALT SERPL-CCNC: 8 U/L (ref 10–40)
ANION GAP SERPL CALCULATED.3IONS-SCNC: 15 MMOL/L (ref 9–17)
AST SERPL-CCNC: 16 U/L (ref 15–37)
BASOPHILS # BLD: 0 K/UL
BASOPHILS NFR BLD: 0 % (ref 0–1)
BILIRUB SERPL-MCNC: 0.3 MG/DL (ref 0–1)
BILIRUB UR QL STRIP: ABNORMAL
BUN SERPL-MCNC: 13 MG/DL (ref 7–20)
CALCIUM SERPL-MCNC: 8.8 MG/DL (ref 8.3–10.6)
CHARACTER UR: ABNORMAL
CHLORIDE SERPL-SCNC: 102 MMOL/L (ref 99–110)
CLARITY UR: CLEAR
CO2 SERPL-SCNC: 22 MMOL/L (ref 21–32)
COLOR UR: YELLOW
CREAT SERPL-MCNC: 0.8 MG/DL (ref 0.8–1.3)
EOSINOPHIL # BLD: 0.06 K/UL
EOSINOPHILS RELATIVE PERCENT: 2 % (ref 0–3)
EPI CELLS #/AREA URNS HPF: 2 /HPF
ERYTHROCYTE [DISTWIDTH] IN BLOOD BY AUTOMATED COUNT: 17.6 % (ref 11.7–14.9)
GFR, ESTIMATED: >90 ML/MIN/1.73M2
GLUCOSE SERPL-MCNC: 79 MG/DL (ref 74–99)
GLUCOSE UR STRIP-MCNC: NEGATIVE MG/DL
HCT VFR BLD AUTO: 35.1 % (ref 42–52)
HGB BLD-MCNC: 11.2 G/DL (ref 13.5–18)
HGB UR QL STRIP.AUTO: NEGATIVE
IMM GRANULOCYTES # BLD AUTO: 0.02 K/UL
IMM GRANULOCYTES NFR BLD: 1 %
KETONES UR STRIP-MCNC: 40 MG/DL
LACTATE BLDV-SCNC: 1.1 MMOL/L (ref 0.4–2)
LEUKOCYTE ESTERASE UR QL STRIP: ABNORMAL
LIPASE SERPL-CCNC: 15 U/L (ref 13–60)
LYMPHOCYTES NFR BLD: 1.41 K/UL
LYMPHOCYTES RELATIVE PERCENT: 38 % (ref 24–44)
MCH RBC QN AUTO: 25.9 PG (ref 27–31)
MCHC RBC AUTO-ENTMCNC: 31.9 G/DL (ref 32–36)
MCV RBC AUTO: 81.1 FL (ref 78–100)
MONOCYTES NFR BLD: 0.18 K/UL
MONOCYTES NFR BLD: 5 % (ref 0–4)
MUCOUS THREADS URNS QL MICRO: ABNORMAL
NEUTROPHILS NFR BLD: 55 % (ref 36–66)
NEUTS SEG NFR BLD: 2.04 K/UL
NITRITE UR QL STRIP: NEGATIVE
PH UR STRIP: 5.5 [PH] (ref 5–8)
PLATELET # BLD AUTO: 189 K/UL (ref 140–440)
PMV BLD AUTO: 9.6 FL (ref 7.5–11.1)
POTASSIUM SERPL-SCNC: 3.3 MMOL/L (ref 3.5–5.1)
PROT SERPL-MCNC: 5.6 G/DL (ref 6.4–8.2)
PROT UR STRIP-MCNC: ABNORMAL MG/DL
RBC # BLD AUTO: 4.33 M/UL (ref 4.6–6.2)
RBC #/AREA URNS HPF: 2 /HPF (ref 0–2)
SARS-COV-2 RDRP RESP QL NAA+PROBE: NOT DETECTED
SODIUM SERPL-SCNC: 139 MMOL/L (ref 136–145)
SP GR UR STRIP: >1.03 (ref 1–1.03)
SPECIMEN DESCRIPTION: NORMAL
TRICHOMONAS #/AREA URNS HPF: ABNORMAL /[HPF]
UROBILINOGEN UR STRIP-ACNC: 0.2 EU/DL (ref 0–1)
WBC #/AREA URNS HPF: 5 /HPF (ref 0–5)
WBC OTHER # BLD: 3.7 K/UL (ref 4–10.5)

## 2024-10-21 PROCEDURE — 81001 URINALYSIS AUTO W/SCOPE: CPT

## 2024-10-21 PROCEDURE — 80053 COMPREHEN METABOLIC PANEL: CPT

## 2024-10-21 PROCEDURE — 83605 ASSAY OF LACTIC ACID: CPT

## 2024-10-21 PROCEDURE — 96374 THER/PROPH/DIAG INJ IV PUSH: CPT

## 2024-10-21 PROCEDURE — 2580000003 HC RX 258: Performed by: PHYSICIAN ASSISTANT

## 2024-10-21 PROCEDURE — 6360000004 HC RX CONTRAST MEDICATION: Performed by: PHYSICIAN ASSISTANT

## 2024-10-21 PROCEDURE — 85025 COMPLETE CBC W/AUTO DIFF WBC: CPT

## 2024-10-21 PROCEDURE — 6360000002 HC RX W HCPCS: Performed by: PHYSICIAN ASSISTANT

## 2024-10-21 PROCEDURE — 96375 TX/PRO/DX INJ NEW DRUG ADDON: CPT

## 2024-10-21 PROCEDURE — 83690 ASSAY OF LIPASE: CPT

## 2024-10-21 PROCEDURE — 96361 HYDRATE IV INFUSION ADD-ON: CPT

## 2024-10-21 PROCEDURE — 71045 X-RAY EXAM CHEST 1 VIEW: CPT

## 2024-10-21 PROCEDURE — 99285 EMERGENCY DEPT VISIT HI MDM: CPT

## 2024-10-21 PROCEDURE — 6370000000 HC RX 637 (ALT 250 FOR IP): Performed by: PHYSICIAN ASSISTANT

## 2024-10-21 PROCEDURE — 93005 ELECTROCARDIOGRAM TRACING: CPT | Performed by: PHYSICIAN ASSISTANT

## 2024-10-21 PROCEDURE — 74177 CT ABD & PELVIS W/CONTRAST: CPT

## 2024-10-21 PROCEDURE — 87635 SARS-COV-2 COVID-19 AMP PRB: CPT

## 2024-10-21 RX ORDER — ONDANSETRON 2 MG/ML
4 INJECTION INTRAMUSCULAR; INTRAVENOUS ONCE
Status: COMPLETED | OUTPATIENT
Start: 2024-10-21 | End: 2024-10-21

## 2024-10-21 RX ORDER — POTASSIUM CHLORIDE 1500 MG/1
20 TABLET, EXTENDED RELEASE ORAL ONCE
Status: COMPLETED | OUTPATIENT
Start: 2024-10-21 | End: 2024-10-21

## 2024-10-21 RX ORDER — IOPAMIDOL 755 MG/ML
75 INJECTION, SOLUTION INTRAVASCULAR
Status: COMPLETED | OUTPATIENT
Start: 2024-10-21 | End: 2024-10-21

## 2024-10-21 RX ORDER — ACARBOSE 25 MG/1
25 TABLET ORAL
COMMUNITY
Start: 2024-10-02

## 2024-10-21 RX ORDER — SUCRALFATE ORAL 1 G/10ML
1 SUSPENSION ORAL
COMMUNITY
Start: 2024-10-02

## 2024-10-21 RX ORDER — SODIUM CHLORIDE, SODIUM LACTATE, POTASSIUM CHLORIDE, AND CALCIUM CHLORIDE .6; .31; .03; .02 G/100ML; G/100ML; G/100ML; G/100ML
1000 INJECTION, SOLUTION INTRAVENOUS ONCE
Status: COMPLETED | OUTPATIENT
Start: 2024-10-21 | End: 2024-10-21

## 2024-10-21 RX ORDER — PROMETHAZINE HYDROCHLORIDE 25 MG/1
25 SUPPOSITORY RECTAL EVERY 6 HOURS PRN
Qty: 12 SUPPOSITORY | Refills: 0 | Status: SHIPPED | OUTPATIENT
Start: 2024-10-21 | End: 2024-10-28

## 2024-10-21 RX ORDER — DROPERIDOL 2.5 MG/ML
2.5 INJECTION, SOLUTION INTRAMUSCULAR; INTRAVENOUS ONCE
Status: COMPLETED | OUTPATIENT
Start: 2024-10-21 | End: 2024-10-21

## 2024-10-21 RX ADMIN — DROPERIDOL 2.5 MG: 2.5 INJECTION, SOLUTION INTRAMUSCULAR; INTRAVENOUS at 11:38

## 2024-10-21 RX ADMIN — ONDANSETRON 4 MG: 2 INJECTION INTRAMUSCULAR; INTRAVENOUS at 16:26

## 2024-10-21 RX ADMIN — POTASSIUM CHLORIDE 20 MEQ: 1500 TABLET, EXTENDED RELEASE ORAL at 16:26

## 2024-10-21 RX ADMIN — SODIUM CHLORIDE, POTASSIUM CHLORIDE, SODIUM LACTATE AND CALCIUM CHLORIDE 1000 ML: 600; 310; 30; 20 INJECTION, SOLUTION INTRAVENOUS at 11:38

## 2024-10-21 RX ADMIN — IOPAMIDOL 75 ML: 755 INJECTION, SOLUTION INTRAVENOUS at 13:57

## 2024-10-21 ASSESSMENT — PAIN DESCRIPTION - FREQUENCY: FREQUENCY: CONTINUOUS

## 2024-10-21 ASSESSMENT — PAIN SCALES - GENERAL: PAINLEVEL_OUTOF10: 7

## 2024-10-21 ASSESSMENT — PAIN DESCRIPTION - PAIN TYPE: TYPE: ACUTE PAIN

## 2024-10-21 ASSESSMENT — PAIN DESCRIPTION - DESCRIPTORS: DESCRIPTORS: ACHING

## 2024-10-21 ASSESSMENT — PAIN DESCRIPTION - LOCATION: LOCATION: BACK

## 2024-10-21 ASSESSMENT — PAIN - FUNCTIONAL ASSESSMENT: PAIN_FUNCTIONAL_ASSESSMENT: PREVENTS OR INTERFERES SOME ACTIVE ACTIVITIES AND ADLS

## 2024-10-21 NOTE — DISCHARGE INSTR - COC
04/13/2021, 11/12/2021, 04/29/2022    COVID-19, MODERNA Bivalent, (age 12y+), IM, 50 mcg/0.5 mL 11/03/2022    COVID-19, MODERNA, (age 12y+), IM, 50mcg/0.5mL 11/17/2023    COVID-19, PFIZER, (age 12y+), IM, 30mcg/0.3mL 08/31/2024    Influenza Virus Vaccine 10/25/2018    Influenza, FLUARIX, FLULAVAL, FLUZONE (age 6 mo+) and AFLURIA, (age 3 y+), Quadv PF, 0.5mL 01/01/2017, 10/12/2019, 10/05/2021    Influenza, FLUBLOK, (age 18 y+), Quadv PF, 0.5mL 10/05/2020    Influenza, FLUCELVAX, (age 6 mo+), MDCK, Quadv PF, 0.5mL 09/22/2022    TDaP, ADACEL (age 10y-64y), BOOSTRIX (age 10y+), IM, 0.5mL 06/26/2013    Zoster Recombinant (Shingrix) 02/21/2020, 05/14/2020       Active Problems:  Patient Active Problem List   Diagnosis Code    Impingement syndrome of right shoulder M75.41    Acute bronchitis with COPD ([facility]) J44.0, J20.9    Chest pain R07.9    Post-nasal drip R09.82    Chronic cluster headache G44.029    Generalized weakness R53.1    Vertigo R42    Positional lightheadedness R42    Hiatal hernia K44.9    Gastroesophageal reflux disease with esophagitis K21.00    PUD (peptic ulcer disease) K27.9    Paraesophageal hiatal hernia K44.9    Status post laparoscopic Nissen fundoplication Z98.890    Esophageal dysphagia R13.19    Esophageal thrush ([facility]) B37.81    Candida esophagitis ([facility]) B37.81    Gastroparesis K31.84    Candida infection, esophageal ([facility]) B37.81    Pneumonia J18.9    Left upper quadrant pain R10.12    LLQ pain R10.32    Fungal esophagitis K20.80, B49    Left flank pain R10.9    Gastroesophageal reflux disease without esophagitis K21.9    Dyslipidemia E78.5    Essential hypertension I10    Epigastric pain R10.13    Esophageal stricture K22.2    Pharyngoesophageal dysphagia R13.14    Pain of upper abdomen R10.10    Nicotine dependence F17.200    Hematochezia K92.1    Acute gastritis with hemorrhage K29.01    Esophagitis K20.90    Superior mesenteric artery stenosis (HCC) K55.1    Injury of superior mesenteric

## 2024-10-21 NOTE — ED TRIAGE NOTES
Pt arrives from EMS from home for c/o nausea and vomiting x 6 days. Pt received Chemo last week and has been sick since then. S/P Whipple surgery 8/16/24

## 2024-10-21 NOTE — ED PROVIDER NOTES
N/A 06/05/2020    EGD BIOPSY performed by Mary Linares MD at [facility] ENDOSCOPY    UPPER GASTROINTESTINAL ENDOSCOPY N/A 06/01/2021    EGD DILATION BALLOON WITH 18-20 BALLOON UP TO 20 WITH BIOPSIES performed by Mary Linares MD at [facility] ENDOSCOPY    UPPER GASTROINTESTINAL ENDOSCOPY N/A 06/29/2021    EGD DILATION SAVORY Guevara Dilators used 46, 48. with biopsies performed by Mary Linares MD at [facility] ENDOSCOPY    UPPER GASTROINTESTINAL ENDOSCOPY N/A 12/16/2021    EGD BIOPSY performed by Humphrey Hsieh MD at [facility] ENDOSCOPY    UPPER GASTROINTESTINAL ENDOSCOPY N/A 07/07/2023    EGD BIOPSY performed by Humphrey Hsieh MD at [facility] ENDOSCOPY    UPPER GASTROINTESTINAL ENDOSCOPY N/A 05/17/2024    ESOPHAGOGASTRODUODENOSCOPY BIOPSY performed by Zion Wetzel MD at [facility] ENDOSCOPY    WHIPPLE PROCEDURE W/ LAPAROSCOPY         CURRENTMEDICATIONS       Previous Medications    ACARBOSE (PRECOSE) 25 MG TABLET    Take 1 tablet by mouth 3 times daily (with meals)    ALBUTEROL SULFATE HFA (PROVENTIL HFA) 108 (90 BASE) MCG/ACT INHALER    Inhale 2 puffs into the lungs every 6 hours as needed for Wheezing    ASPIRIN 81 MG CHEWABLE TABLET    Take 1 tablet by mouth daily    ATORVASTATIN (LIPITOR) 20 MG TABLET    Take 1 tablet by mouth daily    BUPROPION (WELLBUTRIN XL) 150 MG EXTENDED RELEASE TABLET    Take 1 tablet by mouth every morning    CETIRIZINE (ZYRTEC) 10 MG TABLET    Take 1 tablet by mouth daily as needed    CLOPIDOGREL (PLAVIX) 75 MG TABLET    Take 1 tablet by mouth daily . Start only 24 hour after the procedure. Take 4 tablets on day 1 then one table once daily.    DEXAMETHASONE (DECADRON) 4 MG TABLET    Take one tab daily in the am for 2 days after each chemotherapy treatment    FERROUS SULFATE (IRON 325) 325 (65 FE) MG TABLET    Take 1 tablet by mouth daily (with breakfast)    FLUTICASONE (FLONASE) 50 MCG/ACT NASAL SPRAY    as needed    GLUCOSE 4 G CHEWABLE TABLET        HYDROCORTISONE (ANUSOL-HC) 2.5 % CREA RECTAL

## 2024-10-21 NOTE — ED NOTES
Medication History  [facility]    Patient Name: Jama Ramirez 1960     Medication history has been completed by: Re[facility]    Source(s) of information: patient insurance claims and retail pharmacy    Primary Care Physician: Jennifer Gomez MD     Pharmacy: [facility]    Allergies as of 10/21/2024 - Fully Reviewed 10/21/2024   Allergen Reaction Noted    Bee venom Anaphylaxis 08/20/2016    Nsaids Other (See Comments) 12/21/2018        Prior to Admission medications    Medication Sig Start Date End Date Taking? Authorizing Provider   acarbose (PRECOSE) 25 MG tablet Take 1 tablet by mouth 3 times daily (with meals) 10/2/24  Yes Sariah Bailey MD   omeprazole (PRILOSEC) 20 MG delayed release capsule Take 1 capsule by mouth Daily 10/2/24  Yes Sariah Bailey MD   sucralfate (CARAFATE) 1 GM/10ML suspension Take 10 mLs by mouth 4 times daily (before meals and nightly) 10/2/24  Yes Sariah Bailey MD   ondansetron (ZOFRAN) 8 MG tablet Take 1 tablet by mouth every 8 hours as needed for Nausea or Vomiting 9/16/24  Yes Bijal Herring MD   sennosides-docusate sodium (SENOKOT-S) 8.6-50 MG tablet Take 2 tablets by mouth daily . Can take twice daily if needed. Aim to have at least 1 soft bowel movement a day. 6/29/24  Yes Cynthia Suazo MD   clopidogrel (PLAVIX) 75 MG tablet Take 1 tablet by mouth daily . Start only 24 hour after the procedure. Take 4 tablets on day 1 then one table once daily. 6/20/24  Yes Daniel Daugherty MD   atorvastatin (LIPITOR) 20 MG tablet Take 1 tablet by mouth daily 5/30/24  Yes Jennifer Gomez MD   buPROPion (WELLBUTRIN XL) 150 MG extended release tablet Take 1 tablet by mouth every morning 5/30/24  Yes Jennifer Gomez MD   lipase-protease-amylase (CREON) 23635-78655 units delayed release capsule Take 1 capsule by mouth 3 times daily (with meals) 5/29/24  Yes Sariah Bailey MD   rOPINIRole (REQUIP) 0.5 MG tablet Take 1 tablet by mouth

## 2024-10-22 LAB
EKG ATRIAL RATE: 74 BPM
EKG DIAGNOSIS: NORMAL
EKG P AXIS: 78 DEGREES
EKG P-R INTERVAL: 176 MS
EKG Q-T INTERVAL: 406 MS
EKG QRS DURATION: 90 MS
EKG QTC CALCULATION (BAZETT): 450 MS
EKG R AXIS: 37 DEGREES
EKG T AXIS: 61 DEGREES
EKG VENTRICULAR RATE: 74 BPM

## 2024-10-22 PROCEDURE — 93010 ELECTROCARDIOGRAM REPORT: CPT | Performed by: INTERNAL MEDICINE

## 2024-11-05 DIAGNOSIS — Z79.899 NEED FOR PROPHYLACTIC CHEMOTHERAPY: ICD-10-CM

## 2024-11-05 DIAGNOSIS — C24.1 CANCER OF AMPULLA OF VATER (HCC): Primary | ICD-10-CM

## 2024-11-05 DIAGNOSIS — Z11.59 NEED FOR HEPATITIS B SCREENING TEST: ICD-10-CM

## 2024-11-05 DIAGNOSIS — C7A.8 NEUROENDOCRINE CARCINOMA OF SMALL BOWEL (HCC): ICD-10-CM

## 2024-11-05 RX ORDER — MEPERIDINE HYDROCHLORIDE 50 MG/ML
12.5 INJECTION INTRAMUSCULAR; INTRAVENOUS; SUBCUTANEOUS PRN
Status: CANCELLED | OUTPATIENT
Start: 2024-11-11

## 2024-11-05 RX ORDER — SODIUM CHLORIDE 9 MG/ML
5-250 INJECTION, SOLUTION INTRAVENOUS PRN
OUTPATIENT
Start: 2024-11-13

## 2024-11-05 RX ORDER — EPINEPHRINE 1 MG/ML
0.3 INJECTION, SOLUTION, CONCENTRATE INTRAVENOUS PRN
Status: CANCELLED | OUTPATIENT
Start: 2024-11-11

## 2024-11-05 RX ORDER — SODIUM CHLORIDE 0.9 % (FLUSH) 0.9 %
5-40 SYRINGE (ML) INJECTION PRN
Status: CANCELLED | OUTPATIENT
Start: 2024-11-11

## 2024-11-05 RX ORDER — SODIUM CHLORIDE 9 MG/ML
INJECTION, SOLUTION INTRAVENOUS CONTINUOUS
Status: CANCELLED | OUTPATIENT
Start: 2024-11-11

## 2024-11-05 RX ORDER — HEPARIN SODIUM (PORCINE) LOCK FLUSH IV SOLN 100 UNIT/ML 100 UNIT/ML
500 SOLUTION INTRAVENOUS PRN
OUTPATIENT
Start: 2024-11-13

## 2024-11-05 RX ORDER — FAMOTIDINE 10 MG/ML
20 INJECTION, SOLUTION INTRAVENOUS
Status: CANCELLED | OUTPATIENT
Start: 2024-11-11

## 2024-11-05 RX ORDER — DEXTROSE MONOHYDRATE 50 MG/ML
5-250 INJECTION, SOLUTION INTRAVENOUS PRN
Status: CANCELLED | OUTPATIENT
Start: 2024-11-11

## 2024-11-05 RX ORDER — PROCHLORPERAZINE EDISYLATE 5 MG/ML
5 INJECTION INTRAMUSCULAR; INTRAVENOUS
Status: CANCELLED | OUTPATIENT
Start: 2024-11-11

## 2024-11-05 RX ORDER — ACETAMINOPHEN 325 MG/1
650 TABLET ORAL
Status: CANCELLED | OUTPATIENT
Start: 2024-11-11

## 2024-11-05 RX ORDER — ALBUTEROL SULFATE 90 UG/1
4 INHALANT RESPIRATORY (INHALATION) PRN
Status: CANCELLED | OUTPATIENT
Start: 2024-11-11

## 2024-11-05 RX ORDER — DIPHENHYDRAMINE HYDROCHLORIDE 50 MG/ML
50 INJECTION INTRAMUSCULAR; INTRAVENOUS
Status: CANCELLED | OUTPATIENT
Start: 2024-11-11

## 2024-11-05 RX ORDER — ONDANSETRON 2 MG/ML
8 INJECTION INTRAMUSCULAR; INTRAVENOUS
Status: CANCELLED | OUTPATIENT
Start: 2024-11-11

## 2024-11-05 RX ORDER — PALONOSETRON 0.05 MG/ML
0.25 INJECTION, SOLUTION INTRAVENOUS ONCE
Status: CANCELLED | OUTPATIENT
Start: 2024-11-11 | End: 2024-11-05

## 2024-11-05 RX ORDER — HEPARIN SODIUM (PORCINE) LOCK FLUSH IV SOLN 100 UNIT/ML 100 UNIT/ML
500 SOLUTION INTRAVENOUS PRN
Status: CANCELLED | OUTPATIENT
Start: 2024-11-11

## 2024-11-05 RX ORDER — SODIUM CHLORIDE 9 MG/ML
5-250 INJECTION, SOLUTION INTRAVENOUS PRN
Status: CANCELLED | OUTPATIENT
Start: 2024-11-11

## 2024-11-05 RX ORDER — SODIUM CHLORIDE 0.9 % (FLUSH) 0.9 %
5-40 SYRINGE (ML) INJECTION PRN
OUTPATIENT
Start: 2024-11-13

## 2024-11-08 RX ORDER — ALBUTEROL SULFATE 90 UG/1
4 INHALANT RESPIRATORY (INHALATION) PRN
OUTPATIENT
Start: 2024-11-11

## 2024-11-08 RX ORDER — MEPERIDINE HYDROCHLORIDE 50 MG/ML
12.5 INJECTION INTRAMUSCULAR; INTRAVENOUS; SUBCUTANEOUS PRN
OUTPATIENT
Start: 2024-11-11

## 2024-11-08 RX ORDER — FAMOTIDINE 10 MG/ML
20 INJECTION, SOLUTION INTRAVENOUS
OUTPATIENT
Start: 2024-11-11

## 2024-11-08 RX ORDER — PROCHLORPERAZINE EDISYLATE 5 MG/ML
5 INJECTION INTRAMUSCULAR; INTRAVENOUS
OUTPATIENT
Start: 2024-11-11

## 2024-11-08 RX ORDER — SODIUM CHLORIDE 9 MG/ML
5-250 INJECTION, SOLUTION INTRAVENOUS PRN
OUTPATIENT
Start: 2024-11-11

## 2024-11-08 RX ORDER — ACETAMINOPHEN 325 MG/1
650 TABLET ORAL
OUTPATIENT
Start: 2024-11-11

## 2024-11-08 RX ORDER — HEPARIN SODIUM (PORCINE) LOCK FLUSH IV SOLN 100 UNIT/ML 100 UNIT/ML
500 SOLUTION INTRAVENOUS PRN
OUTPATIENT
Start: 2024-11-11

## 2024-11-08 RX ORDER — DIPHENHYDRAMINE HYDROCHLORIDE 50 MG/ML
50 INJECTION INTRAMUSCULAR; INTRAVENOUS
OUTPATIENT
Start: 2024-11-11

## 2024-11-08 RX ORDER — PALONOSETRON 0.05 MG/ML
0.25 INJECTION, SOLUTION INTRAVENOUS ONCE
OUTPATIENT
Start: 2024-11-11 | End: 2024-11-11

## 2024-11-08 RX ORDER — SODIUM CHLORIDE 9 MG/ML
INJECTION, SOLUTION INTRAVENOUS CONTINUOUS
OUTPATIENT
Start: 2024-11-11

## 2024-11-08 RX ORDER — EPINEPHRINE 1 MG/ML
0.3 INJECTION, SOLUTION, CONCENTRATE INTRAVENOUS PRN
OUTPATIENT
Start: 2024-11-11

## 2024-11-08 RX ORDER — ONDANSETRON 2 MG/ML
8 INJECTION INTRAMUSCULAR; INTRAVENOUS
OUTPATIENT
Start: 2024-11-11

## 2024-11-08 RX ORDER — SODIUM CHLORIDE 0.9 % (FLUSH) 0.9 %
5-40 SYRINGE (ML) INJECTION PRN
OUTPATIENT
Start: 2024-11-11

## 2024-11-08 RX ORDER — DEXTROSE MONOHYDRATE 50 MG/ML
5-250 INJECTION, SOLUTION INTRAVENOUS PRN
OUTPATIENT
Start: 2024-11-11

## 2024-11-11 ENCOUNTER — TELEPHONE (OUTPATIENT)
Dept: ONCOLOGY | Age: 64
End: 2024-11-11

## 2024-11-11 NOTE — TELEPHONE ENCOUNTER
Patient is admitted to [facility]. POA stated she left a message & spoke with someone here to cancel the appointment. I initially No Showed the appointment but I will change it to Cancelled.

## 2024-12-13 ENCOUNTER — OFFICE VISIT (OUTPATIENT)
Dept: CARDIOLOGY CLINIC | Age: 64
End: 2024-12-13
Payer: MEDICAID

## 2024-12-13 VITALS
DIASTOLIC BLOOD PRESSURE: 72 MMHG | SYSTOLIC BLOOD PRESSURE: 104 MMHG | BODY MASS INDEX: 18.51 KG/M2 | HEIGHT: 69 IN | WEIGHT: 125 LBS | HEART RATE: 90 BPM

## 2024-12-13 DIAGNOSIS — E78.5 DYSLIPIDEMIA: ICD-10-CM

## 2024-12-13 DIAGNOSIS — I10 ESSENTIAL HYPERTENSION: Primary | ICD-10-CM

## 2024-12-13 PROCEDURE — 1036F TOBACCO NON-USER: CPT | Performed by: INTERNAL MEDICINE

## 2024-12-13 PROCEDURE — 3078F DIAST BP <80 MM HG: CPT | Performed by: INTERNAL MEDICINE

## 2024-12-13 PROCEDURE — 99213 OFFICE O/P EST LOW 20 MIN: CPT | Performed by: INTERNAL MEDICINE

## 2024-12-13 PROCEDURE — 3017F COLORECTAL CA SCREEN DOC REV: CPT | Performed by: INTERNAL MEDICINE

## 2024-12-13 PROCEDURE — 3074F SYST BP LT 130 MM HG: CPT | Performed by: INTERNAL MEDICINE

## 2024-12-13 PROCEDURE — G8419 CALC BMI OUT NRM PARAM NOF/U: HCPCS | Performed by: INTERNAL MEDICINE

## 2024-12-13 PROCEDURE — G8427 DOCREV CUR MEDS BY ELIG CLIN: HCPCS | Performed by: INTERNAL MEDICINE

## 2024-12-13 PROCEDURE — G8484 FLU IMMUNIZE NO ADMIN: HCPCS | Performed by: INTERNAL MEDICINE

## 2024-12-13 RX ORDER — CLOPIDOGREL BISULFATE 75 MG/1
75 TABLET ORAL DAILY
Qty: 30 TABLET | Refills: 3 | Status: SHIPPED | OUTPATIENT
Start: 2024-12-13

## 2024-12-13 NOTE — PROGRESS NOTES
OFFICE PROGRESS NOTES      Jama is a 64 y.o. male who has    CHIEF COMPLAINT AS FOLLOWS:  CHEST PAIN:  Patient denies any C/O chest pains at this time.                               SOB: No C/O SOB at this time.          LEG EDEMA: STILL C/O Ankle & foot EDEMA,    PALPITATIONS: Denies any C/O Palpitations                                   DIZZINESS: C/O positional Dizziness but no black out spells   SYNCOPE: None   OTHER/ ADDITIONAL COMPLAINTS:                                     HPI: Patient is here for F/U on his HTN & Dyslipidemia problems.     HTN: Patient has known essential HTN. Has been treated with guideline recommended medical / physical/ diet therapy as stated below.  Dyslipidemia: Patient has known mixed dyslipidemia. Has been treated with guideline recommended medical / physical/ diet therapy as stated below.                Current Outpatient Medications   Medication Sig Dispense Refill    clopidogrel (PLAVIX) 75 MG tablet Take 1 tablet by mouth daily . Start only 24 hour after the procedure. Take 4 tablets on day 1 then one table once daily. 30 tablet 3    acarbose (PRECOSE) 25 MG tablet Take 1 tablet by mouth 3 times daily (with meals)      omeprazole (PRILOSEC) 20 MG delayed release capsule Take 1 capsule by mouth Daily      sucralfate (CARAFATE) 1 GM/10ML suspension Take 10 mLs by mouth 4 times daily (before meals and nightly)      dexAMETHasone (DECADRON) 4 MG tablet Take one tab daily in the am for 2 days after each chemotherapy treatment 16 tablet 3    ondansetron (ZOFRAN) 8 MG tablet Take 1 tablet by mouth every 8 hours as needed for Nausea or Vomiting 90 tablet 3    lidocaine-prilocaine (EMLA) 2.5-2.5 % cream Apply topically to Mediport site 30-60 minutes prior to treatments. 30 g 3    polyethylene glycol (GLYCOLAX) 17 g packet Take 1 packet by mouth daily as needed      simethicone (MYLICON) 80 MG chewable tablet Take 1 tablet by mouth every 4 hours as needed      ferrous sulfate (IRON 325) 
Never

## 2024-12-13 NOTE — PATIENT INSTRUCTIONS
CORONARY ARTERY DISEASE:None known. Recent  symptoms most likely due to musculoskeletal  problems. There is point tenderness.  Patient is currently  asymptomatic  from CAD.    Counseled regarding regular exercise & its benefits.  -Testing ordered:  yes,   [place] classification: 3     EKG: NSR 62 / min probably normal.     9/12/2023   Normal study.   Normal perfusion study with normal distribution in all coronal, short, and   horizontal axis.   The observed defect is consistent with diaphragmatic attenuation.   Low normal LV function, LVEF is 47 %.     CARDIOLITE 4/2021    Normal study.    Normal perfusion study with normal distribution in all coronal, short, and    horizontal axis.    The observed defect is consistent with diaphragmatic attenuation.    Normal LV function. LVEF is 48 %.      CATH 8/2017  1.  Left main is patent.  2.  Circ and OM1 have mild disease.  3.  LAD and diagonal branch have mild disease.  4.  RCA is normal.  5.  EDP was around 15 mmHg.     EKG: NSR,54/min, WNL     HYPERTENSION:Yes  well controlled on current medical regimen, see list above.  - changes in  treatment:   no   VHD:no              No significant VHD noted  ECHO 4/2021   Left ventricular function is low normal, EF is estimated at 50-55%.   Mild left ventricular hypertrophy.   E/A reversal; indeterminate diastolic function.   Mild mitral and tricuspid regurgitation is present.   RVSP is 21 mmHg.   Dilation of the aortic root(4.2) and the ascending aorta(4.0).   No evidence of pericardial effusion.     DYSLIPIDEMIA: yes,   Patient's profile is at / near Goal.yes,   Tolerating current medical regimen wellyes, takes Lipitor      CHRONIC VENOUS INSUFFICIENCY:none per US  10/20/2023   No evidence of DVT or SVT in the bilateral common femoral vein, femoral   vein, popliteal vein, greater saphenous vein or small saphenous vein.   Patient was stood and bilateral lower extremities were reassessed for   significant reflux.   No significant

## 2025-01-09 ENCOUNTER — CLINICAL DOCUMENTATION (OUTPATIENT)
Dept: INFUSION THERAPY | Age: 65
End: 2025-01-09

## 2025-01-09 NOTE — PROGRESS NOTES
Spoke with Dr Herring to inform her pt has not been seen in office since September. Per Dr Herring pt needs OV scheduled. Louise informed and will call pt to schedule appointment

## 2025-01-13 ENCOUNTER — TELEPHONE (OUTPATIENT)
Dept: ONCOLOGY | Age: 65
End: 2025-01-13

## 2025-01-13 NOTE — TELEPHONE ENCOUNTER
Attempted to call pt to see if he could come into to see provider per  . No answer lvm to call back

## 2025-02-13 ENCOUNTER — CLINICAL DOCUMENTATION (OUTPATIENT)
Dept: ONCOLOGY | Age: 65
End: 2025-02-13

## 2025-02-13 NOTE — PROGRESS NOTES
Received VM from RN/NOLVIA at Dr. Collado's office stating that she faxed JOSE for labs to be done locally. Correspondence not received. Called RN/CM @ [phone] and requested that JOSE be faxed to [facility] @ [phone] with attention to this RN. Awaiting correspondence and then will schedule patient for lab draws.

## 2025-02-17 DIAGNOSIS — C24.1 CANCER OF AMPULLA OF VATER (HCC): Primary | ICD-10-CM

## 2025-02-18 ENCOUNTER — HOSPITAL ENCOUNTER (OUTPATIENT)
Dept: INFUSION THERAPY | Age: 65
Discharge: HOME | End: 2025-02-18
Payer: MEDICAID

## 2025-02-18 DIAGNOSIS — C24.1 CANCER OF AMPULLA OF VATER (HCC): ICD-10-CM

## 2025-02-18 LAB
ALBUMIN SERPL-MCNC: 3.7 G/DL (ref 3.4–5)
ALBUMIN/GLOB SERPL: 1.5 {RATIO} (ref 1.1–2.2)
ALP SERPL-CCNC: 176 U/L (ref 40–129)
ALT SERPL-CCNC: 26 U/L (ref 10–40)
ANION GAP SERPL CALCULATED.3IONS-SCNC: 13 MMOL/L (ref 9–17)
AST SERPL-CCNC: 35 U/L (ref 15–37)
BASOPHILS # BLD: 0.02 K/UL
BASOPHILS NFR BLD: 0 % (ref 0–1)
BILIRUB SERPL-MCNC: <0.2 MG/DL (ref 0–1)
BUN SERPL-MCNC: 14 MG/DL (ref 7–20)
CALCIUM SERPL-MCNC: 9 MG/DL (ref 8.3–10.6)
CHLORIDE SERPL-SCNC: 105 MMOL/L (ref 99–110)
CO2 SERPL-SCNC: 22 MMOL/L (ref 21–32)
CREAT SERPL-MCNC: 0.9 MG/DL (ref 0.8–1.3)
EOSINOPHIL # BLD: 0.2 K/UL
EOSINOPHILS RELATIVE PERCENT: 3 % (ref 0–3)
ERYTHROCYTE [DISTWIDTH] IN BLOOD BY AUTOMATED COUNT: 20 % (ref 11.7–14.9)
GFR, ESTIMATED: 88 ML/MIN/1.73M2
GLUCOSE SERPL-MCNC: 85 MG/DL (ref 74–99)
HCT VFR BLD AUTO: 33.6 % (ref 42–52)
HGB BLD-MCNC: 10.3 G/DL (ref 13.5–18)
LYMPHOCYTES NFR BLD: 1.86 K/UL
LYMPHOCYTES RELATIVE PERCENT: 28 % (ref 24–44)
MCH RBC QN AUTO: 22.5 PG (ref 27–31)
MCHC RBC AUTO-ENTMCNC: 30.7 G/DL (ref 32–36)
MCV RBC AUTO: 73.5 FL (ref 78–100)
MONOCYTES NFR BLD: 0.54 K/UL
MONOCYTES NFR BLD: 8 % (ref 0–4)
NEUTROPHILS NFR BLD: 61 % (ref 36–66)
NEUTS SEG NFR BLD: 4.07 K/UL
PLATELET # BLD AUTO: 322 K/UL (ref 140–440)
PMV BLD AUTO: 8.3 FL (ref 7.5–11.1)
POTASSIUM SERPL-SCNC: 3.9 MMOL/L (ref 3.5–5.1)
PROT SERPL-MCNC: 6.1 G/DL (ref 6.4–8.2)
RBC # BLD AUTO: 4.57 M/UL (ref 4.6–6.2)
SODIUM SERPL-SCNC: 139 MMOL/L (ref 136–145)
WBC OTHER # BLD: 6.7 K/UL (ref 4–10.5)

## 2025-02-18 PROCEDURE — 36415 COLL VENOUS BLD VENIPUNCTURE: CPT

## 2025-02-18 PROCEDURE — 80053 COMPREHEN METABOLIC PANEL: CPT

## 2025-02-18 PROCEDURE — 86301 IMMUNOASSAY TUMOR CA 19-9: CPT

## 2025-02-18 PROCEDURE — 85025 COMPLETE CBC W/AUTO DIFF WBC: CPT

## 2025-02-21 LAB
MISCELLANEOUS LAB TEST RESULT: ABNORMAL
TEST NAME: ABNORMAL

## 2025-03-03 ENCOUNTER — HOSPITAL ENCOUNTER (OUTPATIENT)
Dept: INFUSION THERAPY | Age: 65
Discharge: HOME | End: 2025-03-03
Payer: MEDICAID

## 2025-03-03 ENCOUNTER — CLINICAL DOCUMENTATION (OUTPATIENT)
Dept: ONCOLOGY | Age: 65
End: 2025-03-03

## 2025-03-03 ENCOUNTER — OFFICE VISIT (OUTPATIENT)
Dept: ONCOLOGY | Age: 65
End: 2025-03-03
Payer: MEDICAID

## 2025-03-03 VITALS
HEIGHT: 69 IN | BODY MASS INDEX: 20.68 KG/M2 | HEART RATE: 80 BPM | WEIGHT: 139.6 LBS | TEMPERATURE: 97.9 F | DIASTOLIC BLOOD PRESSURE: 90 MMHG | OXYGEN SATURATION: 97 % | SYSTOLIC BLOOD PRESSURE: 126 MMHG

## 2025-03-03 DIAGNOSIS — C24.1 CANCER OF AMPULLA OF VATER (HCC): Primary | ICD-10-CM

## 2025-03-03 PROCEDURE — 99214 OFFICE O/P EST MOD 30 MIN: CPT | Performed by: INTERNAL MEDICINE

## 2025-03-03 PROCEDURE — 3017F COLORECTAL CA SCREEN DOC REV: CPT | Performed by: INTERNAL MEDICINE

## 2025-03-03 PROCEDURE — G8420 CALC BMI NORM PARAMETERS: HCPCS | Performed by: INTERNAL MEDICINE

## 2025-03-03 PROCEDURE — 3080F DIAST BP >= 90 MM HG: CPT | Performed by: INTERNAL MEDICINE

## 2025-03-03 PROCEDURE — G8427 DOCREV CUR MEDS BY ELIG CLIN: HCPCS | Performed by: INTERNAL MEDICINE

## 2025-03-03 PROCEDURE — 99212 OFFICE O/P EST SF 10 MIN: CPT

## 2025-03-03 PROCEDURE — 1036F TOBACCO NON-USER: CPT | Performed by: INTERNAL MEDICINE

## 2025-03-03 PROCEDURE — 3074F SYST BP LT 130 MM HG: CPT | Performed by: INTERNAL MEDICINE

## 2025-03-03 NOTE — PROGRESS NOTES
MA Rooming Questions  Patient: Jama Ramirez  MRN: [id number]    Date: 3/3/2025        1. Do you have any new issues?   no         2. Do you need any refills on medications?    no    3. Have you had any imaging done since your last visit?   yes - Ct Scans @ [facility] 2/9    4. Have you been hospitalized or seen in the emergency room since your last visit here?   no    5. Did the patient have a depression screening completed today? No    No data recorded     PHQ-9 Given to (if applicable):               PHQ-9 Score (if applicable):                     [] Positive     []  Negative              Does question #9 need addressed (if applicable)                     [] Yes    []  No               Kalpana Shah MA    
provider.    Time Spent with patient,  for face to face, exam, education, discussing treatment options, reviewing imaging, reviewing labs, decision making, Pre-charting, counseling and documenting today's visit, > 30 mins.     SKIP

## 2025-03-03 NOTE — PROGRESS NOTES
Physician requesting to schedule follow up appointment. OV scheduled for today 03/03/2025 @ 8225. Attempted to call patient @ [phone] to notify; however, no answer. VM left with instruction to call office should patient need to reschedule.

## 2025-03-31 ENCOUNTER — TELEPHONE (OUTPATIENT)
Dept: ONCOLOGY | Age: 65
End: 2025-03-31

## 2025-04-04 NOTE — TELEPHONE ENCOUNTER
4/4/25 - Tried to call pt again on the only number provided in the chart. The woman that answered stated she usually handled all of his scheduling but they got into an argument and she gave me 2 numbers to reach him . I called [phone] and left a vm to call me on my direct line. I als called the other number provided [phone] and this is not a working number.

## 2025-04-14 ENCOUNTER — TELEPHONE (OUTPATIENT)
Dept: INFUSION THERAPY | Age: 65
End: 2025-04-14

## 2025-04-14 NOTE — TELEPHONE ENCOUNTER
Pt states he recently had NUC PET HEAD TO THIGH with [facility] and inquires if he still needs to proceed with the PET CT SKULL BASE TO MID THIGH ordered by Dr. Herring scheduled for 4/30 in light of this.

## 2025-04-14 NOTE — TELEPHONE ENCOUNTER
This nurse called the patient back to advise that the order for the PET scan placed by Dr Herring will be cancelled.

## 2025-04-21 ENCOUNTER — TELEPHONE (OUTPATIENT)
Dept: ONCOLOGY | Age: 65
End: 2025-04-21

## 2025-04-21 NOTE — TELEPHONE ENCOUNTER
4/21/25 - pt had a Pet scan completed at [facility] on 3/20/25. Per Dr Herring the Pet scan scheduled on 4/30/25 can be camcelled. I cx Pet scan and closed out referral

## 2025-04-28 ENCOUNTER — HOSPITAL ENCOUNTER (OUTPATIENT)
Dept: INFUSION THERAPY | Age: 65
Discharge: HOME | End: 2025-04-28
Payer: MEDICAID

## 2025-04-28 DIAGNOSIS — Z11.59 NEED FOR HEPATITIS B SCREENING TEST: ICD-10-CM

## 2025-04-28 DIAGNOSIS — C24.1 CANCER OF AMPULLA OF VATER (HCC): ICD-10-CM

## 2025-04-28 LAB
ALBUMIN SERPL-MCNC: 3.8 G/DL (ref 3.4–5)
ALBUMIN/GLOB SERPL: 1.7 {RATIO} (ref 1.1–2.2)
ALP SERPL-CCNC: 245 U/L (ref 40–129)
ALT SERPL-CCNC: 17 U/L (ref 10–40)
ANION GAP SERPL CALCULATED.3IONS-SCNC: 13 MMOL/L (ref 9–17)
AST SERPL-CCNC: 26 U/L (ref 15–37)
BASOPHILS # BLD: 0.03 K/UL
BASOPHILS NFR BLD: 0 % (ref 0–1)
BILIRUB SERPL-MCNC: <0.2 MG/DL (ref 0–1)
BUN SERPL-MCNC: 19 MG/DL (ref 7–20)
CALCIUM SERPL-MCNC: 8.5 MG/DL (ref 8.3–10.6)
CHLORIDE SERPL-SCNC: 105 MMOL/L (ref 99–110)
CO2 SERPL-SCNC: 20 MMOL/L (ref 21–32)
CREAT SERPL-MCNC: 0.7 MG/DL (ref 0.8–1.3)
EOSINOPHIL # BLD: 0.21 K/UL
EOSINOPHILS RELATIVE PERCENT: 3 % (ref 0–3)
ERYTHROCYTE [DISTWIDTH] IN BLOOD BY AUTOMATED COUNT: 23.2 % (ref 11.7–14.9)
FERRITIN SERPL-MCNC: 14 NG/ML (ref 30–400)
FOLATE SERPL-MCNC: 18.2 NG/ML (ref 4.8–24.2)
GFR, ESTIMATED: >90 ML/MIN/1.73M2
GLUCOSE SERPL-MCNC: 85 MG/DL (ref 74–99)
HBV CORE AB SER QL: NONREACTIVE
HBV SURFACE AB SERPL IA-ACNC: 3.5 MIU/ML
HBV SURFACE AG SERPL QL IA: NONREACTIVE
HCT VFR BLD AUTO: 33.9 % (ref 42–52)
HGB BLD-MCNC: 10.3 G/DL (ref 13.5–18)
IRON SATN MFR SERPL: 33 % (ref 15–50)
IRON SERPL-MCNC: 136 UG/DL (ref 59–158)
LYMPHOCYTES NFR BLD: 1.67 K/UL
LYMPHOCYTES RELATIVE PERCENT: 23 % (ref 24–44)
MCH RBC QN AUTO: 23.4 PG (ref 27–31)
MCHC RBC AUTO-ENTMCNC: 30.4 G/DL (ref 32–36)
MCV RBC AUTO: 77 FL (ref 78–100)
MONOCYTES NFR BLD: 0.71 K/UL
MONOCYTES NFR BLD: 10 % (ref 0–5)
NEUTROPHILS NFR BLD: 64 % (ref 36–66)
NEUTS SEG NFR BLD: 4.63 K/UL
PLATELET # BLD AUTO: 240 K/UL (ref 140–440)
PMV BLD AUTO: 8.5 FL (ref 7.5–11.1)
POTASSIUM SERPL-SCNC: 3.6 MMOL/L (ref 3.5–5.1)
PROT SERPL-MCNC: 6 G/DL (ref 6.4–8.2)
RBC # BLD AUTO: 4.4 M/UL (ref 4.6–6.2)
SODIUM SERPL-SCNC: 138 MMOL/L (ref 136–145)
TIBC SERPL-MCNC: 418 UG/DL (ref 260–445)
UNSATURATED IRON BINDING CAPACITY: 282 UG/DL (ref 110–370)
VIT B12 SERPL-MCNC: 262 PG/ML (ref 211–911)
WBC OTHER # BLD: 7.3 K/UL (ref 4–10.5)

## 2025-04-28 PROCEDURE — 85025 COMPLETE CBC W/AUTO DIFF WBC: CPT

## 2025-04-28 PROCEDURE — 87340 HEPATITIS B SURFACE AG IA: CPT

## 2025-04-28 PROCEDURE — 83540 ASSAY OF IRON: CPT

## 2025-04-28 PROCEDURE — 83550 IRON BINDING TEST: CPT

## 2025-04-28 PROCEDURE — 36415 COLL VENOUS BLD VENIPUNCTURE: CPT

## 2025-04-28 PROCEDURE — 86301 IMMUNOASSAY TUMOR CA 19-9: CPT

## 2025-04-28 PROCEDURE — 82746 ASSAY OF FOLIC ACID SERUM: CPT

## 2025-04-28 PROCEDURE — 82607 VITAMIN B-12: CPT

## 2025-04-28 PROCEDURE — 86317 IMMUNOASSAY INFECTIOUS AGENT: CPT

## 2025-04-28 PROCEDURE — 86704 HEP B CORE ANTIBODY TOTAL: CPT

## 2025-04-28 PROCEDURE — 82728 ASSAY OF FERRITIN: CPT

## 2025-04-28 PROCEDURE — 80053 COMPREHEN METABOLIC PANEL: CPT

## 2025-05-05 ENCOUNTER — OFFICE VISIT (OUTPATIENT)
Dept: ONCOLOGY | Age: 65
End: 2025-05-05
Payer: MEDICAID

## 2025-05-05 ENCOUNTER — HOSPITAL ENCOUNTER (OUTPATIENT)
Dept: INFUSION THERAPY | Age: 65
Discharge: HOME | End: 2025-05-05
Payer: MEDICAID

## 2025-05-05 VITALS
OXYGEN SATURATION: 96 % | HEART RATE: 80 BPM | TEMPERATURE: 97.7 F | DIASTOLIC BLOOD PRESSURE: 87 MMHG | WEIGHT: 144.8 LBS | HEIGHT: 69 IN | BODY MASS INDEX: 21.45 KG/M2 | SYSTOLIC BLOOD PRESSURE: 122 MMHG

## 2025-05-05 DIAGNOSIS — C77.2 MALIGNANT NEOPLASM METASTATIC TO INTRA-ABDOMINAL LYMPH NODE (HCC): ICD-10-CM

## 2025-05-05 DIAGNOSIS — C24.1 CANCER OF AMPULLA OF VATER (HCC): Primary | ICD-10-CM

## 2025-05-05 PROCEDURE — 99212 OFFICE O/P EST SF 10 MIN: CPT

## 2025-05-05 PROCEDURE — G8420 CALC BMI NORM PARAMETERS: HCPCS | Performed by: INTERNAL MEDICINE

## 2025-05-05 PROCEDURE — 99214 OFFICE O/P EST MOD 30 MIN: CPT | Performed by: INTERNAL MEDICINE

## 2025-05-05 PROCEDURE — 3017F COLORECTAL CA SCREEN DOC REV: CPT | Performed by: INTERNAL MEDICINE

## 2025-05-05 PROCEDURE — 1036F TOBACCO NON-USER: CPT | Performed by: INTERNAL MEDICINE

## 2025-05-05 PROCEDURE — 3074F SYST BP LT 130 MM HG: CPT | Performed by: INTERNAL MEDICINE

## 2025-05-05 PROCEDURE — 3079F DIAST BP 80-89 MM HG: CPT | Performed by: INTERNAL MEDICINE

## 2025-05-05 PROCEDURE — G8427 DOCREV CUR MEDS BY ELIG CLIN: HCPCS | Performed by: INTERNAL MEDICINE

## 2025-05-05 RX ORDER — ONDANSETRON 8 MG/1
8 TABLET, FILM COATED ORAL EVERY 8 HOURS PRN
Qty: 90 TABLET | Refills: 3 | Status: SHIPPED | OUTPATIENT
Start: 2025-05-05

## 2025-05-05 NOTE — PROGRESS NOTES
Patient Name:  Jama Ramirez  Patient :  1960  Patient MRN:  [id number]     Primary Oncologist: Bijal Herring MD  Referring Provider: Jennifer Gomez MD     Date of Service: 2025      Reason for Consult:      Chief Complaint:    Chief Complaint   Patient presents with    Follow-up       Encounter Diagnosis   Name Primary?    Cancer of ampulla of Vater (HCC) Yes          HPI:   2024, he arrived with his ex-wife to the clinic today.  Patient was admitted to [facility] on 2024 with abdominal pain and jaundice.  He was found to have a mass adjacent to the ampulla with mass effect on CBD and PD.  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  ERCP was performed to try to cannulate the pancreatic duct however unable to do this due to significant friability, ulceration and anatomic distortion.  IR guided external/internal biliary drain, bilirubin decreased from 6 to 1.2 upon discharge.  EGD with biopsy of the duodenal mass was done.  Pathology inconclusive.  CTA of the chest with no evidence of any pulmonary metastatic disease.  CA 19-9 was 39 and CEA was 5.5.  2024 today in the office he reported that he continues to have abdominal discomfort around the biliary drain.  Reported that he has chronic cough secondary to COPD.  Productive of clear sputum.  No hemoptysis.  Reported that he had a coughing spell which caused bloody drainage through the drain.  Lasted for 2 days and resolved.  Reported that he has very poor appetite.  Continues to lose weight.  Reported fatigue and tiredness.  6/3/2024 PET scan with focal area of hypermetabolic FDG uptake demonstrated within the ampullary region near the medial descending duodenum/ pancreatic head.  SUV is 5.7.  No abnormal hypermetabolic uptake detected within the neck or chest.  No other distant metastatic disease is appreciated.    s/p robotic HHR in 2018, and duodenal mass s/p robotic Whipple complicated

## 2025-05-06 DIAGNOSIS — D50.0 IRON DEFICIENCY ANEMIA SECONDARY TO BLOOD LOSS (CHRONIC): ICD-10-CM

## 2025-05-06 DIAGNOSIS — C24.1 CANCER OF AMPULLA OF VATER (HCC): Primary | ICD-10-CM

## 2025-05-06 DIAGNOSIS — C7A.8 NEUROENDOCRINE CARCINOMA OF SMALL BOWEL (HCC): ICD-10-CM

## 2025-05-06 DIAGNOSIS — K90.9 INTESTINAL MALABSORPTION, UNSPECIFIED TYPE: ICD-10-CM

## 2025-05-06 RX ORDER — SODIUM CHLORIDE 9 MG/ML
5-250 INJECTION, SOLUTION INTRAVENOUS PRN
OUTPATIENT
Start: 2025-05-06

## 2025-05-06 RX ORDER — ACETAMINOPHEN 325 MG/1
650 TABLET ORAL
OUTPATIENT
Start: 2025-05-06

## 2025-05-06 RX ORDER — EPINEPHRINE 1 MG/ML
0.3 INJECTION, SOLUTION, CONCENTRATE INTRAVENOUS PRN
OUTPATIENT
Start: 2025-05-06

## 2025-05-06 RX ORDER — HYDROCORTISONE SODIUM SUCCINATE 100 MG/2ML
100 INJECTION INTRAMUSCULAR; INTRAVENOUS
OUTPATIENT
Start: 2025-05-06

## 2025-05-06 RX ORDER — MEPERIDINE HYDROCHLORIDE 50 MG/ML
12.5 INJECTION INTRAMUSCULAR; INTRAVENOUS; SUBCUTANEOUS PRN
OUTPATIENT
Start: 2025-05-06

## 2025-05-06 RX ORDER — FAMOTIDINE 10 MG/ML
20 INJECTION, SOLUTION INTRAVENOUS
OUTPATIENT
Start: 2025-05-06

## 2025-05-06 RX ORDER — SODIUM CHLORIDE 0.9 % (FLUSH) 0.9 %
5-40 SYRINGE (ML) INJECTION PRN
OUTPATIENT
Start: 2025-05-06

## 2025-05-06 RX ORDER — ONDANSETRON 2 MG/ML
8 INJECTION INTRAMUSCULAR; INTRAVENOUS
OUTPATIENT
Start: 2025-05-06

## 2025-05-06 RX ORDER — DIPHENHYDRAMINE HYDROCHLORIDE 50 MG/ML
50 INJECTION, SOLUTION INTRAMUSCULAR; INTRAVENOUS
OUTPATIENT
Start: 2025-05-06

## 2025-05-06 RX ORDER — ALBUTEROL SULFATE 90 UG/1
4 INHALANT RESPIRATORY (INHALATION) PRN
OUTPATIENT
Start: 2025-05-06

## 2025-05-06 RX ORDER — ACETAMINOPHEN 325 MG/1
650 TABLET ORAL ONCE
OUTPATIENT
Start: 2025-05-06 | End: 2025-05-06

## 2025-05-06 RX ORDER — HEPARIN SODIUM (PORCINE) LOCK FLUSH IV SOLN 100 UNIT/ML 100 UNIT/ML
500 SOLUTION INTRAVENOUS PRN
OUTPATIENT
Start: 2025-05-06

## 2025-05-06 RX ORDER — SODIUM CHLORIDE 9 MG/ML
INJECTION, SOLUTION INTRAVENOUS CONTINUOUS
OUTPATIENT
Start: 2025-05-06

## 2025-05-07 LAB
MISCELLANEOUS LAB TEST RESULT: ABNORMAL
TEST NAME: ABNORMAL

## 2025-05-09 ENCOUNTER — TELEPHONE (OUTPATIENT)
Dept: INFUSION THERAPY | Age: 65
End: 2025-05-09

## 2025-05-09 ENCOUNTER — TELEPHONE (OUTPATIENT)
Dept: ONCOLOGY | Age: 65
End: 2025-05-09

## 2025-05-09 PROBLEM — C79.9 METASTATIC DISEASE (HCC): Status: ACTIVE | Noted: 2025-05-09

## 2025-05-09 NOTE — TELEPHONE ENCOUNTER
Spoke to patient regarding biopsy scheduled for 5/20/25 with 0630 arrival time at [facility] facility. NPO 4 hrs prior and advised pt to stop aspirin, which he takes for headaches,  5 days prior. Patient voiced understanding.

## 2025-05-11 DIAGNOSIS — C24.1 CANCER OF AMPULLA OF VATER (HCC): Primary | ICD-10-CM

## 2025-05-11 DIAGNOSIS — Z11.59 NEED FOR HEPATITIS B SCREENING TEST: ICD-10-CM

## 2025-05-11 DIAGNOSIS — C77.2 MALIGNANT NEOPLASM METASTATIC TO INTRA-ABDOMINAL LYMPH NODE (HCC): ICD-10-CM

## 2025-05-11 DIAGNOSIS — Z79.69 NEED FOR PROPHYLACTIC CHEMOTHERAPY: ICD-10-CM

## 2025-05-11 DIAGNOSIS — C7A.8 NEUROENDOCRINE CARCINOMA OF SMALL BOWEL (HCC): ICD-10-CM

## 2025-05-11 RX ORDER — SODIUM CHLORIDE 0.9 % (FLUSH) 0.9 %
5-40 SYRINGE (ML) INJECTION PRN
OUTPATIENT
Start: 2025-05-11

## 2025-05-11 RX ORDER — ALBUTEROL SULFATE 90 UG/1
4 INHALANT RESPIRATORY (INHALATION) PRN
OUTPATIENT
Start: 2025-05-11

## 2025-05-11 RX ORDER — FLUOROURACIL 50 MG/ML
400 INJECTION, SOLUTION INTRAVENOUS ONCE
OUTPATIENT
Start: 2025-05-11 | End: 2025-05-11

## 2025-05-11 RX ORDER — SODIUM CHLORIDE 9 MG/ML
5-250 INJECTION, SOLUTION INTRAVENOUS PRN
OUTPATIENT
Start: 2025-05-11

## 2025-05-11 RX ORDER — PALONOSETRON 0.05 MG/ML
0.25 INJECTION, SOLUTION INTRAVENOUS ONCE
OUTPATIENT
Start: 2025-05-11 | End: 2025-05-11

## 2025-05-11 RX ORDER — DEXTROSE MONOHYDRATE 50 MG/ML
5-250 INJECTION, SOLUTION INTRAVENOUS PRN
OUTPATIENT
Start: 2025-05-11

## 2025-05-11 RX ORDER — ACETAMINOPHEN 325 MG/1
650 TABLET ORAL
OUTPATIENT
Start: 2025-05-11

## 2025-05-11 RX ORDER — SODIUM CHLORIDE 9 MG/ML
INJECTION, SOLUTION INTRAVENOUS CONTINUOUS
OUTPATIENT
Start: 2025-05-11

## 2025-05-11 RX ORDER — DIPHENHYDRAMINE HYDROCHLORIDE 50 MG/ML
50 INJECTION, SOLUTION INTRAMUSCULAR; INTRAVENOUS
OUTPATIENT
Start: 2025-05-11

## 2025-05-11 RX ORDER — HEPARIN SODIUM (PORCINE) LOCK FLUSH IV SOLN 100 UNIT/ML 100 UNIT/ML
500 SOLUTION INTRAVENOUS PRN
OUTPATIENT
Start: 2025-05-11

## 2025-05-11 RX ORDER — MEPERIDINE HYDROCHLORIDE 50 MG/ML
12.5 INJECTION INTRAMUSCULAR; INTRAVENOUS; SUBCUTANEOUS PRN
OUTPATIENT
Start: 2025-05-11

## 2025-05-11 RX ORDER — HYDROCORTISONE SODIUM SUCCINATE 100 MG/2ML
100 INJECTION INTRAMUSCULAR; INTRAVENOUS
OUTPATIENT
Start: 2025-05-11

## 2025-05-11 RX ORDER — ONDANSETRON 2 MG/ML
8 INJECTION INTRAMUSCULAR; INTRAVENOUS
OUTPATIENT
Start: 2025-05-11

## 2025-05-11 RX ORDER — FAMOTIDINE 10 MG/ML
20 INJECTION, SOLUTION INTRAVENOUS
OUTPATIENT
Start: 2025-05-11

## 2025-05-11 RX ORDER — EPINEPHRINE 1 MG/ML
0.3 INJECTION, SOLUTION, CONCENTRATE INTRAVENOUS PRN
OUTPATIENT
Start: 2025-05-11

## 2025-05-12 ENCOUNTER — CLINICAL DOCUMENTATION (OUTPATIENT)
Dept: ONCOLOGY | Age: 65
End: 2025-05-12

## 2025-05-12 DIAGNOSIS — C24.1 CANCER OF AMPULLA OF VATER (HCC): Primary | ICD-10-CM

## 2025-05-12 NOTE — PROGRESS NOTES
Order placed for monthly B12 injections per physician instruction. Therapy plan added. Tx regimen has been placed for  for FOLFOX only. Plan will be to give lowered dose by 15% for first cycle. Tempus xT order placed. Specimen #[id number]. Patient is scheduled for iron infusion tomorrow 05/13/2025. Patient added on for lab draw for liquid bx. Order completed for [id number] via portal. Infusion RN aware.

## 2025-05-13 ENCOUNTER — HOSPITAL ENCOUNTER (OUTPATIENT)
Dept: INFUSION THERAPY | Age: 65
Discharge: HOME | End: 2025-05-13
Payer: MEDICAID

## 2025-05-13 VITALS
SYSTOLIC BLOOD PRESSURE: 119 MMHG | OXYGEN SATURATION: 96 % | DIASTOLIC BLOOD PRESSURE: 79 MMHG | TEMPERATURE: 97.6 F | WEIGHT: 143 LBS | BODY MASS INDEX: 21.12 KG/M2 | HEART RATE: 87 BPM

## 2025-05-13 DIAGNOSIS — K90.9 INTESTINAL MALABSORPTION, UNSPECIFIED TYPE: Primary | ICD-10-CM

## 2025-05-13 DIAGNOSIS — D50.0 IRON DEFICIENCY ANEMIA SECONDARY TO BLOOD LOSS (CHRONIC): ICD-10-CM

## 2025-05-13 PROCEDURE — 96366 THER/PROPH/DIAG IV INF ADDON: CPT

## 2025-05-13 PROCEDURE — 96365 THER/PROPH/DIAG IV INF INIT: CPT

## 2025-05-13 PROCEDURE — 96361 HYDRATE IV INFUSION ADD-ON: CPT

## 2025-05-13 PROCEDURE — 2580000003 HC RX 258: Performed by: INTERNAL MEDICINE

## 2025-05-13 PROCEDURE — 96375 TX/PRO/DX INJ NEW DRUG ADDON: CPT

## 2025-05-13 PROCEDURE — 6370000000 HC RX 637 (ALT 250 FOR IP): Performed by: INTERNAL MEDICINE

## 2025-05-13 PROCEDURE — 6360000002 HC RX W HCPCS: Performed by: INTERNAL MEDICINE

## 2025-05-13 RX ORDER — ACETAMINOPHEN 325 MG/1
650 TABLET ORAL ONCE
Status: CANCELLED | OUTPATIENT
Start: 2025-05-13 | End: 2025-05-13

## 2025-05-13 RX ORDER — SODIUM CHLORIDE 9 MG/ML
INJECTION, SOLUTION INTRAVENOUS CONTINUOUS
Status: CANCELLED | OUTPATIENT
Start: 2025-05-13

## 2025-05-13 RX ORDER — ONDANSETRON 2 MG/ML
8 INJECTION INTRAMUSCULAR; INTRAVENOUS
Status: CANCELLED | OUTPATIENT
Start: 2025-05-13

## 2025-05-13 RX ORDER — ALBUTEROL SULFATE 90 UG/1
4 INHALANT RESPIRATORY (INHALATION) PRN
Status: CANCELLED | OUTPATIENT
Start: 2025-05-13

## 2025-05-13 RX ORDER — DEXAMETHASONE SODIUM PHOSPHATE 10 MG/ML
10 INJECTION, SOLUTION INTRA-ARTICULAR; INTRALESIONAL; INTRAMUSCULAR; INTRAVENOUS; SOFT TISSUE ONCE
Status: COMPLETED | OUTPATIENT
Start: 2025-05-13 | End: 2025-05-13

## 2025-05-13 RX ORDER — HYDROCORTISONE SODIUM SUCCINATE 100 MG/2ML
100 INJECTION INTRAMUSCULAR; INTRAVENOUS
Status: CANCELLED | OUTPATIENT
Start: 2025-05-13

## 2025-05-13 RX ORDER — ACETAMINOPHEN 325 MG/1
650 TABLET ORAL
Status: CANCELLED | OUTPATIENT
Start: 2025-05-13

## 2025-05-13 RX ORDER — HEPARIN 100 UNIT/ML
500 SYRINGE INTRAVENOUS PRN
Status: CANCELLED | OUTPATIENT
Start: 2025-05-13

## 2025-05-13 RX ORDER — SODIUM CHLORIDE 9 MG/ML
5-250 INJECTION, SOLUTION INTRAVENOUS PRN
Status: CANCELLED | OUTPATIENT
Start: 2025-05-13

## 2025-05-13 RX ORDER — FAMOTIDINE 10 MG/ML
20 INJECTION, SOLUTION INTRAVENOUS
Status: CANCELLED | OUTPATIENT
Start: 2025-05-13

## 2025-05-13 RX ORDER — MEPERIDINE HYDROCHLORIDE 25 MG/ML
12.5 INJECTION INTRAMUSCULAR; INTRAVENOUS; SUBCUTANEOUS PRN
Status: CANCELLED | OUTPATIENT
Start: 2025-05-13

## 2025-05-13 RX ORDER — ACETAMINOPHEN 325 MG/1
650 TABLET ORAL ONCE
Status: COMPLETED | OUTPATIENT
Start: 2025-05-13 | End: 2025-05-13

## 2025-05-13 RX ORDER — EPINEPHRINE 1 MG/ML
0.3 INJECTION, SOLUTION, CONCENTRATE INTRAVENOUS PRN
Status: CANCELLED | OUTPATIENT
Start: 2025-05-13

## 2025-05-13 RX ORDER — DEXAMETHASONE SODIUM PHOSPHATE 10 MG/ML
10 INJECTION, SOLUTION INTRA-ARTICULAR; INTRALESIONAL; INTRAMUSCULAR; INTRAVENOUS; SOFT TISSUE ONCE
Status: CANCELLED | OUTPATIENT
Start: 2025-05-13 | End: 2025-05-13

## 2025-05-13 RX ORDER — DIPHENHYDRAMINE HYDROCHLORIDE 50 MG/ML
50 INJECTION, SOLUTION INTRAMUSCULAR; INTRAVENOUS
Status: CANCELLED | OUTPATIENT
Start: 2025-05-13

## 2025-05-13 RX ORDER — SODIUM CHLORIDE 9 MG/ML
5-250 INJECTION, SOLUTION INTRAVENOUS PRN
Status: DISCONTINUED | OUTPATIENT
Start: 2025-05-13 | End: 2025-05-13

## 2025-05-13 RX ORDER — SODIUM CHLORIDE 0.9 % (FLUSH) 0.9 %
5-40 SYRINGE (ML) INJECTION PRN
Status: CANCELLED | OUTPATIENT
Start: 2025-05-13

## 2025-05-13 RX ADMIN — SODIUM CHLORIDE 25 MG: 9 INJECTION, SOLUTION INTRAVENOUS at 08:45

## 2025-05-13 RX ADMIN — ACETAMINOPHEN 650 MG: 325 TABLET ORAL at 10:08

## 2025-05-13 RX ADMIN — SODIUM CHLORIDE 20 ML/HR: 9 INJECTION, SOLUTION INTRAVENOUS at 08:45

## 2025-05-13 RX ADMIN — DEXAMETHASONE SODIUM PHOSPHATE 10 MG: 10 INJECTION INTRAMUSCULAR; INTRAVENOUS at 10:08

## 2025-05-13 RX ADMIN — SODIUM CHLORIDE 975 MG: 0.9 INJECTION, SOLUTION INTRAVENOUS at 10:23

## 2025-05-13 NOTE — PROGRESS NOTES
Pt to tx suite for new Infed infusion. Port accessed with blood return noted. Pt states having increased fatigue. No other symptoms voiced. Guardant labs drawn.     Infed test dose given pt observed for 1 hour post and tolerated without incident.     Pre medications given and then maintenance dose of Infed. Pt tolerated without incident. Pt left ambulatory discharge instructions given. Next OV 5/22. Pt given verbal and written instructions on prevention of fatigue.

## 2025-05-14 ENCOUNTER — CLINICAL DOCUMENTATION (OUTPATIENT)
Dept: ONCOLOGY | Age: 65
End: 2025-05-14

## 2025-05-14 DIAGNOSIS — C24.1 CANCER OF AMPULLA OF VATER (HCC): Primary | ICD-10-CM

## 2025-05-15 ENCOUNTER — TELEPHONE (OUTPATIENT)
Dept: ONCOLOGY | Age: 65
End: 2025-05-15

## 2025-05-15 NOTE — TELEPHONE ENCOUNTER
Called patient regarding chemo ed and start date of tx, patient is scheduled for chemo ed on 05/27 @ 1330 and tx on 05/28 @ 0815, advised them that 1 visitor allowed at time of education and day of their treatments, patient states understanding

## 2025-05-16 NOTE — PROGRESS NOTES
IR Procedure at [facility]:  LVM for patient explaining procedure is scheduled at 0800, arrival at 0700. Call back number left to complete PAT.     NPO at Midnight      Follow your directions as prescribed by the doctor for your procedure and medications.  3.   Consult your provider as to when to stop blood thinner- Plavix- hold for 5 days   4.   Do not take any pain medication within 6 hours of your procedure  5.   Do not drink any alcoholic beverages or use any street drugs 24 hours before procedure.  6.   Please wear simple, loose fitting clothing to the hospital.  Do not bring valuables (money,             credit cards, checkbooks, etc.)     7.   If you  have a Living Will and Durable Power of  for Healthcare, please bring in a copy.  8.   Please bring picture ID,  insurance card, paperwork from the doctors office            (H & P, Consent,  & card for implantable devices).  9.   Report to the information desk on the ground floor.  10. Take a shower the night before or morning of your procedure, do not apply any lotion, oil or powder.  11. You will need a responsible adult

## 2025-05-18 DIAGNOSIS — C77.2 MALIGNANT NEOPLASM METASTATIC TO INTRA-ABDOMINAL LYMPH NODE (HCC): ICD-10-CM

## 2025-05-18 DIAGNOSIS — C24.1 CANCER OF AMPULLA OF VATER (HCC): Primary | ICD-10-CM

## 2025-05-18 RX ORDER — HYDROCODONE BITARTRATE AND ACETAMINOPHEN 5; 325 MG/1; MG/1
1 TABLET ORAL EVERY 6 HOURS PRN
Qty: 28 TABLET | Refills: 0 | Status: SHIPPED | OUTPATIENT
Start: 2025-05-18 | End: 2025-05-25

## 2025-05-20 ENCOUNTER — HOSPITAL ENCOUNTER (OUTPATIENT)
Dept: CT IMAGING | Age: 65
Discharge: HOME | End: 2025-05-20
Payer: MEDICAID

## 2025-05-20 VITALS
OXYGEN SATURATION: 93 % | DIASTOLIC BLOOD PRESSURE: 89 MMHG | RESPIRATION RATE: 18 BRPM | WEIGHT: 144 LBS | SYSTOLIC BLOOD PRESSURE: 125 MMHG | HEART RATE: 55 BPM | BODY MASS INDEX: 21.33 KG/M2 | HEIGHT: 69 IN

## 2025-05-20 DIAGNOSIS — C24.1 CANCER OF AMPULLA OF VATER (HCC): ICD-10-CM

## 2025-05-20 LAB
ERYTHROCYTE [DISTWIDTH] IN BLOOD BY AUTOMATED COUNT: 22.4 % (ref 11.7–14.9)
HCT VFR BLD AUTO: 37.3 % (ref 42–52)
HGB BLD-MCNC: 11.1 G/DL (ref 13.5–18)
INR PPP: 1
MCH RBC QN AUTO: 23.8 PG (ref 27–31)
MCHC RBC AUTO-ENTMCNC: 29.8 G/DL (ref 32–36)
MCV RBC AUTO: 79.9 FL (ref 78–100)
PARTIAL THROMBOPLASTIN TIME: 30.7 SEC (ref 25.1–37.1)
PLATELET # BLD AUTO: 261 K/UL (ref 140–440)
PMV BLD AUTO: 8.9 FL (ref 7.5–11.1)
PROTHROMBIN TIME: 13.2 SEC (ref 11.7–14.5)
RBC # BLD AUTO: 4.67 M/UL (ref 4.6–6.2)
WBC OTHER # BLD: 7.2 K/UL (ref 4–10.5)

## 2025-05-20 PROCEDURE — 6360000002 HC RX W HCPCS

## 2025-05-20 PROCEDURE — 85027 COMPLETE CBC AUTOMATED: CPT

## 2025-05-20 PROCEDURE — 85730 THROMBOPLASTIN TIME PARTIAL: CPT

## 2025-05-20 PROCEDURE — 88305 TISSUE EXAM BY PATHOLOGIST: CPT

## 2025-05-20 PROCEDURE — 88333 PATH CONSLTJ SURG CYTO XM 1: CPT

## 2025-05-20 PROCEDURE — 88342 IMHCHEM/IMCYTCHM 1ST ANTB: CPT

## 2025-05-20 PROCEDURE — 6360000002 HC RX W HCPCS: Performed by: RADIOLOGY

## 2025-05-20 PROCEDURE — 88334 PATH CONSLTJ SURG CYTO XM EA: CPT

## 2025-05-20 PROCEDURE — 85610 PROTHROMBIN TIME: CPT

## 2025-05-20 PROCEDURE — 2709999900 CT GUIDED NEEDLE PLACEMENT

## 2025-05-20 PROCEDURE — 88341 IMHCHEM/IMCYTCHM EA ADD ANTB: CPT

## 2025-05-20 RX ORDER — DIPHENHYDRAMINE HYDROCHLORIDE 50 MG/ML
INJECTION, SOLUTION INTRAMUSCULAR; INTRAVENOUS PRN
Status: COMPLETED | OUTPATIENT
Start: 2025-05-20 | End: 2025-05-20

## 2025-05-20 RX ORDER — FENTANYL CITRATE 50 UG/ML
INJECTION, SOLUTION INTRAMUSCULAR; INTRAVENOUS PRN
Status: COMPLETED | OUTPATIENT
Start: 2025-05-20 | End: 2025-05-20

## 2025-05-20 RX ORDER — LIDOCAINE HYDROCHLORIDE 10 MG/ML
INJECTION, SOLUTION EPIDURAL; INFILTRATION; INTRACAUDAL; PERINEURAL PRN
Status: COMPLETED | OUTPATIENT
Start: 2025-05-20 | End: 2025-05-20

## 2025-05-20 RX ORDER — MIDAZOLAM HYDROCHLORIDE 2 MG/2ML
INJECTION, SOLUTION INTRAMUSCULAR; INTRAVENOUS PRN
Status: COMPLETED | OUTPATIENT
Start: 2025-05-20 | End: 2025-05-20

## 2025-05-20 RX ADMIN — MIDAZOLAM HYDROCHLORIDE 0.5 MG: 1 INJECTION, SOLUTION INTRAMUSCULAR; INTRAVENOUS at 08:43

## 2025-05-20 RX ADMIN — FENTANYL CITRATE 25 MCG: 50 INJECTION, SOLUTION INTRAMUSCULAR; INTRAVENOUS at 08:43

## 2025-05-20 RX ADMIN — FENTANYL CITRATE 50 MCG: 50 INJECTION, SOLUTION INTRAMUSCULAR; INTRAVENOUS at 08:39

## 2025-05-20 RX ADMIN — FENTANYL CITRATE 50 MCG: 50 INJECTION, SOLUTION INTRAMUSCULAR; INTRAVENOUS at 08:51

## 2025-05-20 RX ADMIN — LIDOCAINE HYDROCHLORIDE 12 ML: 10 INJECTION, SOLUTION EPIDURAL; INFILTRATION; INTRACAUDAL; PERINEURAL at 08:44

## 2025-05-20 RX ADMIN — FENTANYL CITRATE 25 MCG: 50 INJECTION, SOLUTION INTRAMUSCULAR; INTRAVENOUS at 08:44

## 2025-05-20 RX ADMIN — DIPHENHYDRAMINE HYDROCHLORIDE 25 MG: 50 INJECTION, SOLUTION INTRAMUSCULAR; INTRAVENOUS at 08:48

## 2025-05-20 RX ADMIN — DIPHENHYDRAMINE HYDROCHLORIDE 25 MG: 50 INJECTION, SOLUTION INTRAMUSCULAR; INTRAVENOUS at 08:42

## 2025-05-20 RX ADMIN — MIDAZOLAM HYDROCHLORIDE 1 MG: 1 INJECTION, SOLUTION INTRAMUSCULAR; INTRAVENOUS at 08:39

## 2025-05-20 ASSESSMENT — PAIN DESCRIPTION - LOCATION: LOCATION: GENERALIZED

## 2025-05-20 NOTE — BRIEF OP NOTE
Department of Interventional Radiology Post-Procedure Note      Date: 5/20/2025     Interventional Radiologist: Prince    Procedure Performed:  anterior pelvic  wall mass biopsy    H&P Status: H&P was reviewed, the patient was examined and no change has occurred in patient's condition since H&P was completed.    Pre-procedure Diagnosis:  anteiror pelvic wall mass    Post-procedure Diagnosis:  Same    Sedation:  conscious sedation    Contrast used:  none    Estimated blood loss:  Minimal    Preliminary Findings:  Successful    Complications:  none    Full Report to Follow.    Electronically signed by Angela Morrison MD on 5/20/2025 at 11:43 AM

## 2025-05-20 NOTE — PROGRESS NOTES
Pt to post of following biopsy, pt alert and oriented x 4, VSS, denies pain, dressing clean dry and intact.

## 2025-05-20 NOTE — PRE SEDATION
Sedation Pre-Procedure Note    Patient Name: Jama Ramirez   YOB: 1960  Room/Bed: Room/bed info not found  Medical Record Number: [id number]  Date: 5/20/2025   Time: 8:10 AM       Indication:  abdominal wall mass biopsy    Consent: I have discussed with the patient and/or the patient representative the indication, alternatives, and the possible risks and/or complications of the planned procedure and the anesthesia methods. The patient and/or patient representative appear to understand and agree to proceed.    Vital Signs:   Vitals:    05/20/25 0630   BP: 133/74   Pulse: 83   SpO2: 95%       Past Medical History:   has a past medical history of Arthritis, Cancer of ampulla of Vater (HCC), Chronic cluster headache, COPD (chronic obstructive pulmonary disease) (HCC), Degenerative disc disease, Depression, Edentulous, GERD with esophagitis, Hiatal hernia, HX OTHER MEDICAL, Hyperlipidemia, Hypertension, Impingement syndrome of right shoulder, Metastatic disease (HCC), Motion sickness, PONV (postoperative nausea and vomiting), Prolonged emergence from general anesthesia, and Vertigo.    Past Surgical History:   has a past surgical history that includes Arm Surgery (Left, 30+ yrs); Upper gastrointestinal endoscopy (12/2018); Dental surgery; Gastric fundoplication (N/A, 12/26/2018); Upper gastrointestinal endoscopy (N/A, 01/28/2019); Upper gastrointestinal endoscopy (N/A, 04/08/2019); Colonoscopy (2010); Colonoscopy (2018); eye surgery (Left, 1990's); Upper gastrointestinal endoscopy (N/A, 07/08/2019); Carpal tunnel release (Right, 10/04/2019); Colonoscopy (N/A, 12/30/2019); Upper gastrointestinal endoscopy (N/A, 12/30/2019); Upper gastrointestinal endoscopy (N/A, 06/05/2020); Endoscopy, colon, diagnostic (04/08/2019); Endoscopy, colon, diagnostic (06/01/2021); Upper gastrointestinal endoscopy (N/A, 06/01/2021); Upper gastrointestinal endoscopy (N/A, 06/29/2021); hernia repair; Colonoscopy (N/A, 12/16/2021);

## 2025-05-20 NOTE — PROGRESS NOTES
Pt okay for discharge.  Port deaccessed.  Pt ambulated well to the restroom.  Dressing WNL.  Pt educated regarding discharge instructions.  Volunteer will assist pt to lobby.

## 2025-05-20 NOTE — PROGRESS NOTES
Pt arrived for scheduled biopsy.  Vitals WNL.  Port accessed and labs obtained.  Pt states he is no longer taking Plavix.  He hasn't taken it in over a month.  Pt is comfortable at this time with call light within reach.

## 2025-05-20 NOTE — OR NURSING
Assessed and Documented by MD Morrison in Pre-Sedation Note     Mallampati Airway Assessment Class I    ASA Classification Class 3

## 2025-05-20 NOTE — PROGRESS NOTES
TRANSFER - OUT REPORT:    Verbal report given to GENET Kulkarni on Jama Ramirez being transferred to IR [facility] for routine progression of patient care       Report consisted of patient's Situation, Background, Assessment and   Recommendations(SBAR).     Information from the following report(s) Nurse Handoff Report was reviewed with the receiving nurse.    Opportunity for questions and clarification was provided.      Patient transported with:   Registered Nurse

## 2025-05-22 ENCOUNTER — HOSPITAL ENCOUNTER (OUTPATIENT)
Dept: INFUSION THERAPY | Age: 65
Discharge: HOME | End: 2025-05-22
Payer: MEDICAID

## 2025-05-22 ENCOUNTER — OFFICE VISIT (OUTPATIENT)
Dept: ONCOLOGY | Age: 65
End: 2025-05-22
Payer: MEDICAID

## 2025-05-22 VITALS
SYSTOLIC BLOOD PRESSURE: 136 MMHG | DIASTOLIC BLOOD PRESSURE: 101 MMHG | OXYGEN SATURATION: 97 % | BODY MASS INDEX: 20.97 KG/M2 | WEIGHT: 141.6 LBS | HEART RATE: 67 BPM | HEIGHT: 69 IN | TEMPERATURE: 97.8 F

## 2025-05-22 DIAGNOSIS — C77.2 MALIGNANT NEOPLASM METASTATIC TO INTRA-ABDOMINAL LYMPH NODE (HCC): Primary | ICD-10-CM

## 2025-05-22 DIAGNOSIS — C24.1 CANCER OF AMPULLA OF VATER (HCC): ICD-10-CM

## 2025-05-22 LAB — SURGICAL PATHOLOGY REPORT: NORMAL

## 2025-05-22 PROCEDURE — 3075F SYST BP GE 130 - 139MM HG: CPT | Performed by: INTERNAL MEDICINE

## 2025-05-22 PROCEDURE — 99214 OFFICE O/P EST MOD 30 MIN: CPT | Performed by: INTERNAL MEDICINE

## 2025-05-22 PROCEDURE — G8427 DOCREV CUR MEDS BY ELIG CLIN: HCPCS | Performed by: INTERNAL MEDICINE

## 2025-05-22 PROCEDURE — 1036F TOBACCO NON-USER: CPT | Performed by: INTERNAL MEDICINE

## 2025-05-22 PROCEDURE — 3080F DIAST BP >= 90 MM HG: CPT | Performed by: INTERNAL MEDICINE

## 2025-05-22 PROCEDURE — 99212 OFFICE O/P EST SF 10 MIN: CPT

## 2025-05-22 PROCEDURE — 3017F COLORECTAL CA SCREEN DOC REV: CPT | Performed by: INTERNAL MEDICINE

## 2025-05-22 PROCEDURE — G8420 CALC BMI NORM PARAMETERS: HCPCS | Performed by: INTERNAL MEDICINE

## 2025-05-22 NOTE — PROGRESS NOTES
Patient Name:  Jama Ramirez  Patient :  1960  Patient MRN:  [id number]     Primary Oncologist: Bijal Herring MD  Referring Provider: Jennifer Gomez MD     Date of Service: 2025      Reason for Consult:      Chief Complaint:    Chief Complaint   Patient presents with    Follow-up       Encounter Diagnoses   Name Primary?    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  Patient was admitted to [facility] on 2024 with abdominal pain and jaundice.  He was found to have a mass adjacent to the ampulla with mass effect on CBD and PD.  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  ERCP was performed to try to cannulate the pancreatic duct however unable to do this due to significant friability, ulceration and anatomic distortion.  IR guided external/internal biliary drain, bilirubin decreased from 6 to 1.2 upon discharge.  EGD with biopsy of the duodenal mass was done.  Pathology inconclusive.  CTA of the chest with no evidence of any pulmonary metastatic disease.  CA 19-9 was 39 and CEA was 5.5.  2024 today in the office he reported that he continues to have abdominal discomfort around the biliary drain.  Reported that he has chronic cough secondary to COPD.  Productive of clear sputum.  No hemoptysis.  Reported that he had a coughing spell which caused bloody drainage through the drain.  Lasted for 2 days and resolved.  Reported that he has very poor appetite.  Continues to lose weight.  Reported fatigue and tiredness.  6/3/2024 PET scan with focal area of hypermetabolic FDG uptake demonstrated within the ampullary region near the medial descending duodenum/ pancreatic head.  SUV is 5.7.  No abnormal hypermetabolic uptake detected within the neck or chest.  No other distant metastatic disease is appreciated.    s/p

## 2025-05-22 NOTE — PROGRESS NOTES
MA Rooming Questions  Patient: Jama Ramirez  MRN: [id number]    Date: 5/22/2025        1. Do you have any new issues?   no         2. Do you need any refills on medications?    no    3. Have you had any imaging done since your last visit?   yes - biopsy    4. Have you been hospitalized or seen in the emergency room since your last visit here?   no    5. Did the patient have a depression screening completed today? No    No data recorded     PHQ-9 Given to (if applicable):               PHQ-9 Score (if applicable):                     [] Positive     []  Negative              Does question #9 need addressed (if applicable)                     [] Yes    []  No               Adilene Sandy CMA

## 2025-05-26 DIAGNOSIS — G89.3 CANCER ASSOCIATED PAIN: Primary | ICD-10-CM

## 2025-05-26 RX ORDER — HYDROCODONE BITARTRATE AND ACETAMINOPHEN 5; 325 MG/1; MG/1
1 TABLET ORAL EVERY 6 HOURS PRN
Qty: 28 TABLET | Refills: 0 | Status: SHIPPED | OUTPATIENT
Start: 2025-05-26 | End: 2025-06-02

## 2025-05-26 NOTE — PROGRESS NOTES
- pt called for pain meds for abdominal pain from cancer.     Norco was prescribed for cancer related pain as patient called for pain meds and has ran out his norco meds. 28 pills q6h prn for 7 days was prescribed. Pt was informed that script has been sent to his pharmacy.

## 2025-05-27 ENCOUNTER — CLINICAL DOCUMENTATION (OUTPATIENT)
Dept: ONCOLOGY | Age: 65
End: 2025-05-27

## 2025-05-27 ENCOUNTER — HOSPITAL ENCOUNTER (OUTPATIENT)
Dept: INFUSION THERAPY | Age: 65
Discharge: HOME | End: 2025-05-27
Payer: MEDICAID

## 2025-05-27 DIAGNOSIS — C24.1 CANCER OF AMPULLA OF VATER (HCC): ICD-10-CM

## 2025-05-27 DIAGNOSIS — C7A.8 NEUROENDOCRINE CARCINOMA OF SMALL BOWEL (HCC): ICD-10-CM

## 2025-05-27 DIAGNOSIS — Z11.59 NEED FOR HEPATITIS B SCREENING TEST: ICD-10-CM

## 2025-05-27 DIAGNOSIS — Z79.69 NEED FOR PROPHYLACTIC CHEMOTHERAPY: ICD-10-CM

## 2025-05-27 DIAGNOSIS — C77.2 MALIGNANT NEOPLASM METASTATIC TO INTRA-ABDOMINAL LYMPH NODE (HCC): ICD-10-CM

## 2025-05-27 DIAGNOSIS — C77.2 MALIGNANT NEOPLASM METASTATIC TO INTRA-ABDOMINAL LYMPH NODE (HCC): Primary | ICD-10-CM

## 2025-05-27 LAB
ALBUMIN SERPL-MCNC: 3.7 G/DL (ref 3.4–5)
ALBUMIN/GLOB SERPL: 1.8 {RATIO} (ref 1.1–2.2)
ALP SERPL-CCNC: 314 U/L (ref 40–129)
ALT SERPL-CCNC: 22 U/L (ref 10–40)
ANION GAP SERPL CALCULATED.3IONS-SCNC: 11 MMOL/L (ref 9–17)
AST SERPL-CCNC: 31 U/L (ref 15–37)
BASOPHILS # BLD: 0.03 K/UL
BASOPHILS NFR BLD: 1 % (ref 0–1)
BILIRUB SERPL-MCNC: 0.2 MG/DL (ref 0–1)
BUN SERPL-MCNC: 17 MG/DL (ref 7–20)
CALCIUM SERPL-MCNC: 8.5 MG/DL (ref 8.3–10.6)
CHLORIDE SERPL-SCNC: 106 MMOL/L (ref 99–110)
CO2 SERPL-SCNC: 21 MMOL/L (ref 21–32)
CREAT SERPL-MCNC: 0.9 MG/DL (ref 0.8–1.3)
EOSINOPHIL # BLD: 0.15 K/UL
EOSINOPHILS RELATIVE PERCENT: 2 % (ref 0–3)
ERYTHROCYTE [DISTWIDTH] IN BLOOD BY AUTOMATED COUNT: 23.3 % (ref 11.7–14.9)
GFR, ESTIMATED: 88 ML/MIN/1.73M2
GLUCOSE SERPL-MCNC: 110 MG/DL (ref 74–99)
HCT VFR BLD AUTO: 34.9 % (ref 42–52)
HGB BLD-MCNC: 11.1 G/DL (ref 13.5–18)
LYMPHOCYTES NFR BLD: 1.47 K/UL
LYMPHOCYTES RELATIVE PERCENT: 22 % (ref 24–44)
MCH RBC QN AUTO: 25.2 PG (ref 27–31)
MCHC RBC AUTO-ENTMCNC: 31.8 G/DL (ref 32–36)
MCV RBC AUTO: 79.1 FL (ref 78–100)
MONOCYTES NFR BLD: 0.51 K/UL
MONOCYTES NFR BLD: 8 % (ref 0–5)
NEUTROPHILS NFR BLD: 67 % (ref 36–66)
NEUTS SEG NFR BLD: 4.39 K/UL
PLATELET # BLD AUTO: 200 K/UL (ref 140–440)
PMV BLD AUTO: 8.6 FL (ref 7.5–11.1)
POTASSIUM SERPL-SCNC: 3.9 MMOL/L (ref 3.5–5.1)
PROT SERPL-MCNC: 5.6 G/DL (ref 6.4–8.2)
RBC # BLD AUTO: 4.41 M/UL (ref 4.6–6.2)
SODIUM SERPL-SCNC: 139 MMOL/L (ref 136–145)
WBC OTHER # BLD: 6.6 K/UL (ref 4–10.5)

## 2025-05-27 PROCEDURE — 36415 COLL VENOUS BLD VENIPUNCTURE: CPT

## 2025-05-27 PROCEDURE — 85025 COMPLETE CBC W/AUTO DIFF WBC: CPT

## 2025-05-27 PROCEDURE — 80053 COMPREHEN METABOLIC PANEL: CPT

## 2025-05-27 NOTE — PROGRESS NOTES
Patient arrived for abbreviated treatment planning and labs prior to treatment tomorrow. Patient has labs drawn and left without completing the abbreviated treatment planning. This nurse will have patient appointment changed to chairside tx planning on 05/28/25.

## 2025-05-28 ENCOUNTER — CLINICAL SUPPORT (OUTPATIENT)
Dept: ONCOLOGY | Age: 65
End: 2025-05-28

## 2025-05-28 ENCOUNTER — HOSPITAL ENCOUNTER (OUTPATIENT)
Dept: INFUSION THERAPY | Age: 65
Discharge: HOME | End: 2025-05-28
Payer: MEDICAID

## 2025-05-28 VITALS
OXYGEN SATURATION: 97 % | HEIGHT: 69 IN | DIASTOLIC BLOOD PRESSURE: 89 MMHG | TEMPERATURE: 97.5 F | SYSTOLIC BLOOD PRESSURE: 125 MMHG | WEIGHT: 141.4 LBS | BODY MASS INDEX: 20.94 KG/M2 | HEART RATE: 72 BPM

## 2025-05-28 DIAGNOSIS — C77.2 MALIGNANT NEOPLASM METASTATIC TO INTRA-ABDOMINAL LYMPH NODE (HCC): Primary | ICD-10-CM

## 2025-05-28 DIAGNOSIS — C7A.8 NEUROENDOCRINE CARCINOMA OF SMALL BOWEL (HCC): ICD-10-CM

## 2025-05-28 DIAGNOSIS — Z79.69 NEED FOR PROPHYLACTIC CHEMOTHERAPY: ICD-10-CM

## 2025-05-28 DIAGNOSIS — Z11.59 NEED FOR HEPATITIS B SCREENING TEST: ICD-10-CM

## 2025-05-28 DIAGNOSIS — C24.1 CANCER OF AMPULLA OF VATER (HCC): ICD-10-CM

## 2025-05-28 DIAGNOSIS — C7A.8 NEUROENDOCRINE CARCINOMA OF SMALL BOWEL (HCC): Primary | ICD-10-CM

## 2025-05-28 PROCEDURE — 96415 CHEMO IV INFUSION ADDL HR: CPT

## 2025-05-28 PROCEDURE — 96368 THER/DIAG CONCURRENT INF: CPT

## 2025-05-28 PROCEDURE — 2580000003 HC RX 258: Performed by: INTERNAL MEDICINE

## 2025-05-28 PROCEDURE — G0498 CHEMO EXTEND IV INFUS W/PUMP: HCPCS

## 2025-05-28 PROCEDURE — 96413 CHEMO IV INFUSION 1 HR: CPT

## 2025-05-28 PROCEDURE — 6360000002 HC RX W HCPCS: Performed by: INTERNAL MEDICINE

## 2025-05-28 PROCEDURE — 96411 CHEMO IV PUSH ADDL DRUG: CPT

## 2025-05-28 PROCEDURE — 96375 TX/PRO/DX INJ NEW DRUG ADDON: CPT

## 2025-05-28 PROCEDURE — 96367 TX/PROPH/DG ADDL SEQ IV INF: CPT

## 2025-05-28 PROCEDURE — 96366 THER/PROPH/DIAG IV INF ADDON: CPT

## 2025-05-28 RX ORDER — PALONOSETRON 0.05 MG/ML
0.25 INJECTION, SOLUTION INTRAVENOUS ONCE
Status: COMPLETED | OUTPATIENT
Start: 2025-05-28 | End: 2025-05-28

## 2025-05-28 RX ORDER — DEXAMETHASONE 4 MG/1
TABLET ORAL
Qty: 16 TABLET | Refills: 2 | Status: SHIPPED | OUTPATIENT
Start: 2025-05-28

## 2025-05-28 RX ORDER — ONDANSETRON 8 MG/1
8 TABLET, FILM COATED ORAL EVERY 8 HOURS PRN
Qty: 90 TABLET | Refills: 2 | Status: SHIPPED | OUTPATIENT
Start: 2025-05-28

## 2025-05-28 RX ORDER — FLUOROURACIL 50 MG/ML
400 INJECTION, SOLUTION INTRAVENOUS ONCE
Status: COMPLETED | OUTPATIENT
Start: 2025-05-28 | End: 2025-05-28

## 2025-05-28 RX ORDER — DEXTROSE MONOHYDRATE 50 MG/ML
5-250 INJECTION, SOLUTION INTRAVENOUS PRN
Status: DISCONTINUED | OUTPATIENT
Start: 2025-05-28 | End: 2025-05-29 | Stop reason: HOSPADM

## 2025-05-28 RX ADMIN — FLUOROURACIL 700 MG: 50 INJECTION, SOLUTION INTRAVENOUS at 12:02

## 2025-05-28 RX ADMIN — OXALIPLATIN 150 MG: 5 INJECTION, SOLUTION INTRAVENOUS at 09:36

## 2025-05-28 RX ADMIN — PALONOSETRON 0.25 MG: 0.25 INJECTION, SOLUTION INTRAVENOUS at 08:59

## 2025-05-28 RX ADMIN — FLUOROURACIL 4300 MG: 50 INJECTION, SOLUTION INTRAVENOUS at 12:06

## 2025-05-28 RX ADMIN — DEXAMETHASONE SODIUM PHOSPHATE 12 MG: 4 INJECTION, SOLUTION INTRAMUSCULAR; INTRAVENOUS at 09:03

## 2025-05-28 RX ADMIN — DEXTROSE 20 ML/HR: 50 INJECTION, SOLUTION INTRAVENOUS at 09:01

## 2025-05-28 RX ADMIN — LEUCOVORIN CALCIUM 700 MG: 500 INJECTION, POWDER, LYOPHILIZED, FOR SOLUTION INTRAMUSCULAR; INTRAVENOUS at 09:35

## 2025-05-28 ASSESSMENT — PAIN SCALES - GENERAL: PAINLEVEL_OUTOF10: 5

## 2025-05-28 ASSESSMENT — PAIN DESCRIPTION - LOCATION: LOCATION: ABDOMEN

## 2025-05-28 ASSESSMENT — PAIN DESCRIPTION - ORIENTATION: ORIENTATION: RIGHT

## 2025-05-28 NOTE — PROGRESS NOTES
Patient arrived for treatment and chaiside education. Patient has previously had Folfirinox. His current treatment regimen is Folfox, he denies any questions or concerns. Consent signed and a copy provided to the patient.  PreMeds:  Ondansetron 8 mg- Take 1 tablet by mouth every 8 hours as needed for nausea and vomiting  Dexamethasone 4 mg- Take 1 tablet by mouth in the morning with food for 2 days after each treatment  Prescriptions sent per physician order

## 2025-05-29 LAB
DNA RANGE(S) EXAMINED NAR: NORMAL
GENE DIS ANL INTERP-IMP: POSITIVE
GENE DIS ASSESSED: NORMAL
GENE MUT TESTED BLD/T: 5.8 M/MB
HLA-A HIGH RES: NORMAL
HLA-A UNRESLVD ALLELES HIGH RES: YES
HLA-B HIGH RES: NORMAL
HLA-B UNRESLVD ALLELES HIGH RES: YES
HLA-C HIGH RES: NORMAL
HLA-C UNRESLVD ALLELES HIGH RES: YES
MSI CA SPEC-IMP: NORMAL
REASON FOR STUDY: NORMAL
TEMPUS GERMLINE NOTE: NORMAL
TEMPUS HLA-A SAMPLE TYPE: NORMAL
TEMPUS HLA-B SAMPLE TYPE: NORMAL
TEMPUS HLA-C SAMPLE TYPE: NORMAL
TEMPUS LCA: NORMAL
TEMPUS PORTAL: NORMAL
TEMPUS TREATMENT IMPLICATIONS NOTE: NORMAL
TEMPUS TRIAL1: NORMAL
TEMPUS TRIAL3: NORMAL
TEMPUS TRIALCOUNT: 3
TEMPUS TRIALMATCHES2: NORMAL
TEMPUS XR RESULT 1: NORMAL

## 2025-05-29 NOTE — PROGRESS NOTES
Assumed care from Salma DE LEÓN.  Remainder of treatment completed without issue.  Home chemo pump connected and infusing prior to discharge.  Return to center on 5/30 for pump disconnect. Discharge ambulatory in stable condition.  Denisse Mack RN

## 2025-05-30 ENCOUNTER — HOSPITAL ENCOUNTER (OUTPATIENT)
Dept: INFUSION THERAPY | Age: 65
Discharge: HOME | End: 2025-05-30
Payer: MEDICAID

## 2025-05-30 ENCOUNTER — TELEPHONE (OUTPATIENT)
Dept: ONCOLOGY | Age: 65
End: 2025-05-30

## 2025-05-30 DIAGNOSIS — C24.1 CANCER OF AMPULLA OF VATER (HCC): Primary | ICD-10-CM

## 2025-05-30 DIAGNOSIS — C77.2 MALIGNANT NEOPLASM METASTATIC TO INTRA-ABDOMINAL LYMPH NODE (HCC): Primary | ICD-10-CM

## 2025-05-30 DIAGNOSIS — Z79.69 NEED FOR PROPHYLACTIC CHEMOTHERAPY: ICD-10-CM

## 2025-05-30 DIAGNOSIS — C24.1 CANCER OF AMPULLA OF VATER (HCC): ICD-10-CM

## 2025-05-30 DIAGNOSIS — Z11.59 NEED FOR HEPATITIS B SCREENING TEST: ICD-10-CM

## 2025-05-30 DIAGNOSIS — C7A.8 NEUROENDOCRINE CARCINOMA OF SMALL BOWEL (HCC): ICD-10-CM

## 2025-05-30 DIAGNOSIS — R52 PAIN: ICD-10-CM

## 2025-05-30 PROCEDURE — 96523 IRRIG DRUG DELIVERY DEVICE: CPT

## 2025-05-30 PROCEDURE — 2500000003 HC RX 250 WO HCPCS: Performed by: INTERNAL MEDICINE

## 2025-05-30 RX ORDER — SODIUM CHLORIDE 0.9 % (FLUSH) 0.9 %
5-40 SYRINGE (ML) INJECTION PRN
Status: DISCONTINUED | OUTPATIENT
Start: 2025-05-30 | End: 2025-05-31 | Stop reason: HOSPADM

## 2025-05-30 RX ADMIN — SODIUM CHLORIDE, PRESERVATIVE FREE 20 ML: 5 INJECTION INTRAVENOUS at 10:22

## 2025-05-30 NOTE — TELEPHONE ENCOUNTER
5/30/25 - left pt vm for the US of chest on 6/4/25 at [facility] arrival time of 9:00 am. No prep

## 2025-05-30 NOTE — PROGRESS NOTES
Pt to tx suite for pump disconnect. All medication infused. Pt tolerated tx without incident. Pt states that his area around his port site is tender to touch, and it was pulsating. Port flushed with 20 cc NS with no pain while flushing and no swelling, positive blood return noted. Dr Herring to chairside and per her US order to be placed. Order placed by this RN. Mary Kate messaged order placed.

## 2025-06-02 ENCOUNTER — HOSPITAL ENCOUNTER (EMERGENCY)
Age: 65
Discharge: HOME | End: 2025-06-02
Attending: STUDENT IN AN ORGANIZED HEALTH CARE EDUCATION/TRAINING PROGRAM
Payer: MEDICAID

## 2025-06-02 VITALS
RESPIRATION RATE: 16 BRPM | DIASTOLIC BLOOD PRESSURE: 86 MMHG | WEIGHT: 141 LBS | TEMPERATURE: 97.8 F | OXYGEN SATURATION: 98 % | HEIGHT: 69 IN | BODY MASS INDEX: 20.88 KG/M2 | SYSTOLIC BLOOD PRESSURE: 112 MMHG | HEART RATE: 72 BPM

## 2025-06-02 DIAGNOSIS — N39.0 ACUTE UTI: Primary | ICD-10-CM

## 2025-06-02 LAB
ANION GAP SERPL CALCULATED.3IONS-SCNC: 14 MMOL/L (ref 9–17)
BASOPHILS # BLD: 0.04 K/UL
BASOPHILS NFR BLD: 1 % (ref 0–1)
BILIRUB UR QL STRIP: ABNORMAL
BUN SERPL-MCNC: 20 MG/DL (ref 7–20)
CALCIUM SERPL-MCNC: 8.9 MG/DL (ref 8.3–10.6)
CASTS #/AREA URNS LPF: ABNORMAL /LPF
CHLORIDE SERPL-SCNC: 103 MMOL/L (ref 99–110)
CLARITY UR: ABNORMAL
CO2 SERPL-SCNC: 21 MMOL/L (ref 21–32)
COLOR UR: YELLOW
CREAT SERPL-MCNC: 0.9 MG/DL (ref 0.8–1.3)
EOSINOPHIL # BLD: 0.06 K/UL
EOSINOPHILS RELATIVE PERCENT: 1 % (ref 0–3)
EPI CELLS #/AREA URNS HPF: 5 /HPF
ERYTHROCYTE [DISTWIDTH] IN BLOOD BY AUTOMATED COUNT: 22.5 % (ref 11.7–14.9)
GFR, ESTIMATED: >90 ML/MIN/1.73M2
GLUCOSE SERPL-MCNC: 125 MG/DL (ref 74–99)
GLUCOSE UR STRIP-MCNC: NEGATIVE MG/DL
HCT VFR BLD AUTO: 38.7 % (ref 42–52)
HGB BLD-MCNC: 11.8 G/DL (ref 13.5–18)
HGB UR QL STRIP.AUTO: NEGATIVE
IMM GRANULOCYTES # BLD AUTO: 0.02 K/UL
IMM GRANULOCYTES NFR BLD: 0 %
KETONES UR STRIP-MCNC: 15 MG/DL
LEUKOCYTE ESTERASE UR QL STRIP: ABNORMAL
LYMPHOCYTES NFR BLD: 1.41 K/UL
LYMPHOCYTES RELATIVE PERCENT: 18 % (ref 24–44)
MCH RBC QN AUTO: 24.9 PG (ref 27–31)
MCHC RBC AUTO-ENTMCNC: 30.5 G/DL (ref 32–36)
MCV RBC AUTO: 81.6 FL (ref 78–100)
MONOCYTES NFR BLD: 0.22 K/UL
MONOCYTES NFR BLD: 3 % (ref 0–5)
MUCOUS THREADS URNS QL MICRO: ABNORMAL
NEUTROPHILS NFR BLD: 77 % (ref 36–66)
NEUTS SEG NFR BLD: 5.95 K/UL
NITRITE UR QL STRIP: NEGATIVE
PH UR STRIP: 5.5 [PH] (ref 5–8)
PLATELET # BLD AUTO: 211 K/UL (ref 140–440)
PMV BLD AUTO: 9.2 FL (ref 7.5–11.1)
POTASSIUM SERPL-SCNC: 4 MMOL/L (ref 3.5–5.1)
PROT UR STRIP-MCNC: 30 MG/DL
RBC # BLD AUTO: 4.74 M/UL (ref 4.6–6.2)
RBC #/AREA URNS HPF: 6 /HPF (ref 0–2)
SODIUM SERPL-SCNC: 138 MMOL/L (ref 136–145)
SP GR UR STRIP: >1.03 (ref 1–1.03)
UROBILINOGEN UR STRIP-ACNC: 0.2 EU/DL (ref 0–1)
WBC #/AREA URNS HPF: 144 /HPF (ref 0–5)
WBC OTHER # BLD: 7.7 K/UL (ref 4–10.5)

## 2025-06-02 PROCEDURE — 80048 BASIC METABOLIC PNL TOTAL CA: CPT

## 2025-06-02 PROCEDURE — 51798 US URINE CAPACITY MEASURE: CPT

## 2025-06-02 PROCEDURE — 81001 URINALYSIS AUTO W/SCOPE: CPT

## 2025-06-02 PROCEDURE — 99283 EMERGENCY DEPT VISIT LOW MDM: CPT

## 2025-06-02 PROCEDURE — 6370000000 HC RX 637 (ALT 250 FOR IP): Performed by: STUDENT IN AN ORGANIZED HEALTH CARE EDUCATION/TRAINING PROGRAM

## 2025-06-02 PROCEDURE — 85025 COMPLETE CBC W/AUTO DIFF WBC: CPT

## 2025-06-02 RX ORDER — CEPHALEXIN 500 MG/1
500 CAPSULE ORAL 4 TIMES DAILY
Qty: 40 CAPSULE | Refills: 0 | Status: SHIPPED | OUTPATIENT
Start: 2025-06-02 | End: 2025-06-12

## 2025-06-02 RX ADMIN — CEPHALEXIN 500 MG: 250 CAPSULE ORAL at 13:08

## 2025-06-02 ASSESSMENT — PAIN SCALES - GENERAL
PAINLEVEL_OUTOF10: 6
PAINLEVEL_OUTOF10: 5
PAINLEVEL_OUTOF10: 6

## 2025-06-02 ASSESSMENT — PAIN DESCRIPTION - LOCATION
LOCATION: ABDOMEN
LOCATION: BACK

## 2025-06-02 ASSESSMENT — PAIN - FUNCTIONAL ASSESSMENT
PAIN_FUNCTIONAL_ASSESSMENT: 0-10
PAIN_FUNCTIONAL_ASSESSMENT: 0-10

## 2025-06-02 NOTE — DISCHARGE INSTRUCTIONS
Call and schedule follow-up appointment with your oncologist.  Return emergency department if you develop new or worsening symptoms.

## 2025-06-02 NOTE — ED TRIAGE NOTES
Patient presents with c/o urinary retention. He states burning and retention began Saturday after completion of chemo treatment. He has stage four pancreatic cancer.

## 2025-06-02 NOTE — ED PROVIDER NOTES
EMERGENCY DEPARTMENT HISTORY AND PHYSICAL EXAM      Patient Name: Jama Ramirez  MRN: [id number]  : 1960  Date of Evaluation: 2025  ED Provider:  Sanjuanita Longo DO    History of Presenting Illness     Chief Complaint:   Chief Complaint   Patient presents with    Urinary Retention     Hx of stage 4 pancreatic cancer. Urinary retention and burning started Saturday. The urge to go is there but comes out in little drops.        HPI: Patient is a 64 y.o. male with history of COPD and prostate cancer status post recent chemotherapy.  Presenting the emergency department with a chief complaint of increased urinary frequency.  Patient states for the last 3 days he has been feeling the need to urinate more frequently and inability to completely void.  Patient denies any pain with urination new or worsening abdominal pain or low back pain, or any fevers or chills.  Patient denies chest pain, shortness of breath, cough.  Patient has follow-up scheduled with oncology next week.          Past History     Past Medical History:   Past Medical History:   Diagnosis Date    Arthritis     Hands    Cancer of ampulla of Vater (HCC) 2024    Dr. CHUYITA Herring    Chronic cluster headache 2019 last    COPD (chronic obstructive pulmonary disease) ([facility])     \"dx 2 yrs ago\" \\"does not see a lung dr - Follows with PCP    Degenerative disc disease     \"in my back have degenarative disc\"    Depression     Edentulous     GERD with esophagitis     Hiatal hernia     HX OTHER MEDICAL     pt states has trouble with reading and writing\"can read very very little\"    Hyperlipidemia     Hypertension     No meds as of 21 - follows with PCP    Impingement syndrome of right shoulder     Metastatic disease (HCC) 2025    Motion sickness     PONV (postoperative nausea and vomiting)     Prolonged emergence from general anesthesia     Vertigo      Surgical History:   Past Surgical History:   Procedure Laterality Date    ABDOMEN SURGERY

## 2025-06-04 ENCOUNTER — HOSPITAL ENCOUNTER (OUTPATIENT)
Dept: ULTRASOUND IMAGING | Age: 65
Discharge: HOME | End: 2025-06-04
Attending: INTERNAL MEDICINE
Payer: MEDICAID

## 2025-06-04 DIAGNOSIS — R52 PAIN: ICD-10-CM

## 2025-06-04 DIAGNOSIS — C24.1 CANCER OF AMPULLA OF VATER (HCC): ICD-10-CM

## 2025-06-04 PROCEDURE — 76604 US EXAM CHEST: CPT

## 2025-06-08 DIAGNOSIS — C7A.8 NEUROENDOCRINE CARCINOMA OF SMALL BOWEL (HCC): ICD-10-CM

## 2025-06-08 DIAGNOSIS — C77.2 MALIGNANT NEOPLASM METASTATIC TO INTRA-ABDOMINAL LYMPH NODE (HCC): ICD-10-CM

## 2025-06-08 DIAGNOSIS — C24.1 CANCER OF AMPULLA OF VATER (HCC): Primary | ICD-10-CM

## 2025-06-08 DIAGNOSIS — Z11.59 NEED FOR HEPATITIS B SCREENING TEST: ICD-10-CM

## 2025-06-08 DIAGNOSIS — Z79.69 NEED FOR PROPHYLACTIC CHEMOTHERAPY: ICD-10-CM

## 2025-06-08 RX ORDER — DEXTROSE MONOHYDRATE 50 MG/ML
5-250 INJECTION, SOLUTION INTRAVENOUS PRN
OUTPATIENT
Start: 2025-06-11

## 2025-06-08 RX ORDER — HEPARIN SODIUM (PORCINE) LOCK FLUSH IV SOLN 100 UNIT/ML 100 UNIT/ML
500 SOLUTION INTRAVENOUS PRN
OUTPATIENT
Start: 2025-06-13

## 2025-06-08 RX ORDER — HYDROCORTISONE SODIUM SUCCINATE 100 MG/2ML
100 INJECTION INTRAMUSCULAR; INTRAVENOUS
OUTPATIENT
Start: 2025-06-11

## 2025-06-08 RX ORDER — SODIUM CHLORIDE 0.9 % (FLUSH) 0.9 %
5-40 SYRINGE (ML) INJECTION PRN
OUTPATIENT
Start: 2025-06-11

## 2025-06-08 RX ORDER — EPINEPHRINE 1 MG/ML
0.3 INJECTION, SOLUTION, CONCENTRATE INTRAVENOUS PRN
OUTPATIENT
Start: 2025-06-11

## 2025-06-08 RX ORDER — DIPHENHYDRAMINE HYDROCHLORIDE 50 MG/ML
50 INJECTION, SOLUTION INTRAMUSCULAR; INTRAVENOUS
OUTPATIENT
Start: 2025-06-11

## 2025-06-08 RX ORDER — MEPERIDINE HYDROCHLORIDE 50 MG/ML
12.5 INJECTION INTRAMUSCULAR; INTRAVENOUS; SUBCUTANEOUS PRN
OUTPATIENT
Start: 2025-06-11

## 2025-06-08 RX ORDER — HEPARIN SODIUM (PORCINE) LOCK FLUSH IV SOLN 100 UNIT/ML 100 UNIT/ML
500 SOLUTION INTRAVENOUS PRN
OUTPATIENT
Start: 2025-06-11

## 2025-06-08 RX ORDER — ONDANSETRON 2 MG/ML
8 INJECTION INTRAMUSCULAR; INTRAVENOUS
OUTPATIENT
Start: 2025-06-11

## 2025-06-08 RX ORDER — ALBUTEROL SULFATE 90 UG/1
4 INHALANT RESPIRATORY (INHALATION) PRN
OUTPATIENT
Start: 2025-06-11

## 2025-06-08 RX ORDER — FLUOROURACIL 50 MG/ML
400 INJECTION, SOLUTION INTRAVENOUS ONCE
OUTPATIENT
Start: 2025-06-11

## 2025-06-08 RX ORDER — SODIUM CHLORIDE 9 MG/ML
5-250 INJECTION, SOLUTION INTRAVENOUS PRN
OUTPATIENT
Start: 2025-06-11

## 2025-06-08 RX ORDER — FAMOTIDINE 10 MG/ML
20 INJECTION, SOLUTION INTRAVENOUS
OUTPATIENT
Start: 2025-06-11

## 2025-06-08 RX ORDER — SODIUM CHLORIDE 0.9 % (FLUSH) 0.9 %
5-40 SYRINGE (ML) INJECTION PRN
OUTPATIENT
Start: 2025-06-13

## 2025-06-08 RX ORDER — PALONOSETRON 0.05 MG/ML
0.25 INJECTION, SOLUTION INTRAVENOUS ONCE
OUTPATIENT
Start: 2025-06-11 | End: 2025-06-11

## 2025-06-08 RX ORDER — SODIUM CHLORIDE 9 MG/ML
5-250 INJECTION, SOLUTION INTRAVENOUS PRN
OUTPATIENT
Start: 2025-06-13

## 2025-06-08 RX ORDER — SODIUM CHLORIDE 9 MG/ML
INJECTION, SOLUTION INTRAVENOUS CONTINUOUS
OUTPATIENT
Start: 2025-06-11

## 2025-06-08 RX ORDER — ACETAMINOPHEN 325 MG/1
650 TABLET ORAL
OUTPATIENT
Start: 2025-06-11

## 2025-06-10 ENCOUNTER — HOSPITAL ENCOUNTER (OUTPATIENT)
Dept: INFUSION THERAPY | Age: 65
Discharge: HOME | End: 2025-06-10
Payer: MEDICAID

## 2025-06-10 DIAGNOSIS — C24.1 CANCER OF AMPULLA OF VATER (HCC): ICD-10-CM

## 2025-06-10 DIAGNOSIS — Z79.69 NEED FOR PROPHYLACTIC CHEMOTHERAPY: ICD-10-CM

## 2025-06-10 DIAGNOSIS — C7A.8 NEUROENDOCRINE CARCINOMA OF SMALL BOWEL (HCC): ICD-10-CM

## 2025-06-10 DIAGNOSIS — C77.2 MALIGNANT NEOPLASM METASTATIC TO INTRA-ABDOMINAL LYMPH NODE (HCC): ICD-10-CM

## 2025-06-10 DIAGNOSIS — Z11.59 NEED FOR HEPATITIS B SCREENING TEST: ICD-10-CM

## 2025-06-10 LAB
ALBUMIN SERPL-MCNC: 3.6 G/DL (ref 3.4–5)
ALBUMIN/GLOB SERPL: 1.8 {RATIO} (ref 1.1–2.2)
ALP SERPL-CCNC: 278 U/L (ref 40–129)
ALT SERPL-CCNC: 24 U/L (ref 10–40)
ANION GAP SERPL CALCULATED.3IONS-SCNC: 12 MMOL/L (ref 9–17)
AST SERPL-CCNC: 26 U/L (ref 15–37)
BASOPHILS # BLD: 0.01 K/UL
BASOPHILS NFR BLD: 0 % (ref 0–1)
BILIRUB SERPL-MCNC: 0.3 MG/DL (ref 0–1)
BUN SERPL-MCNC: 13 MG/DL (ref 7–20)
CALCIUM SERPL-MCNC: 8.9 MG/DL (ref 8.3–10.6)
CHLORIDE SERPL-SCNC: 102 MMOL/L (ref 99–110)
CO2 SERPL-SCNC: 22 MMOL/L (ref 21–32)
CREAT SERPL-MCNC: 0.7 MG/DL (ref 0.8–1.3)
EOSINOPHIL # BLD: 0 K/UL
EOSINOPHILS RELATIVE PERCENT: 0 % (ref 0–3)
ERYTHROCYTE [DISTWIDTH] IN BLOOD BY AUTOMATED COUNT: 24.6 % (ref 11.7–14.9)
GFR, ESTIMATED: >90 ML/MIN/1.73M2
GLUCOSE SERPL-MCNC: 149 MG/DL (ref 74–99)
HCT VFR BLD AUTO: 37.5 % (ref 42–52)
HGB BLD-MCNC: 12.2 G/DL (ref 13.5–18)
LYMPHOCYTES NFR BLD: 0.84 K/UL
LYMPHOCYTES RELATIVE PERCENT: 17 % (ref 24–44)
MCH RBC QN AUTO: 26 PG (ref 27–31)
MCHC RBC AUTO-ENTMCNC: 32.5 G/DL (ref 32–36)
MCV RBC AUTO: 79.8 FL (ref 78–100)
MONOCYTES NFR BLD: 0.19 K/UL
MONOCYTES NFR BLD: 4 % (ref 0–5)
NEUTROPHILS NFR BLD: 79 % (ref 36–66)
NEUTS SEG NFR BLD: 3.82 K/UL
PLATELET # BLD AUTO: 229 K/UL (ref 140–440)
PMV BLD AUTO: 8.3 FL (ref 7.5–11.1)
POTASSIUM SERPL-SCNC: 3.7 MMOL/L (ref 3.5–5.1)
PROT SERPL-MCNC: 5.6 G/DL (ref 6.4–8.2)
RBC # BLD AUTO: 4.7 M/UL (ref 4.6–6.2)
SODIUM SERPL-SCNC: 136 MMOL/L (ref 136–145)
WBC OTHER # BLD: 4.9 K/UL (ref 4–10.5)

## 2025-06-10 PROCEDURE — 80053 COMPREHEN METABOLIC PANEL: CPT

## 2025-06-10 PROCEDURE — 85025 COMPLETE CBC W/AUTO DIFF WBC: CPT

## 2025-06-10 PROCEDURE — 36415 COLL VENOUS BLD VENIPUNCTURE: CPT

## 2025-06-11 ENCOUNTER — OFFICE VISIT (OUTPATIENT)
Dept: ONCOLOGY | Age: 65
End: 2025-06-11
Payer: MEDICAID

## 2025-06-11 ENCOUNTER — HOSPITAL ENCOUNTER (OUTPATIENT)
Dept: INFUSION THERAPY | Age: 65
Discharge: HOME | End: 2025-06-11
Payer: MEDICAID

## 2025-06-11 VITALS
DIASTOLIC BLOOD PRESSURE: 89 MMHG | OXYGEN SATURATION: 95 % | HEIGHT: 69 IN | BODY MASS INDEX: 20.23 KG/M2 | HEART RATE: 71 BPM | SYSTOLIC BLOOD PRESSURE: 124 MMHG | WEIGHT: 136.6 LBS | TEMPERATURE: 98.5 F

## 2025-06-11 VITALS
SYSTOLIC BLOOD PRESSURE: 124 MMHG | DIASTOLIC BLOOD PRESSURE: 89 MMHG | WEIGHT: 136.6 LBS | HEART RATE: 71 BPM | TEMPERATURE: 98.5 F | OXYGEN SATURATION: 95 % | BODY MASS INDEX: 20.23 KG/M2 | HEIGHT: 69 IN

## 2025-06-11 DIAGNOSIS — Z11.59 NEED FOR HEPATITIS B SCREENING TEST: ICD-10-CM

## 2025-06-11 DIAGNOSIS — Z79.69 NEED FOR PROPHYLACTIC CHEMOTHERAPY: ICD-10-CM

## 2025-06-11 DIAGNOSIS — C7A.8 NEUROENDOCRINE CARCINOMA OF SMALL BOWEL (HCC): ICD-10-CM

## 2025-06-11 DIAGNOSIS — C77.2 MALIGNANT NEOPLASM METASTATIC TO INTRA-ABDOMINAL LYMPH NODE (HCC): Primary | ICD-10-CM

## 2025-06-11 DIAGNOSIS — G89.3 CANCER ASSOCIATED PAIN: ICD-10-CM

## 2025-06-11 DIAGNOSIS — C24.1 CANCER OF AMPULLA OF VATER (HCC): ICD-10-CM

## 2025-06-11 PROCEDURE — 6360000002 HC RX W HCPCS: Performed by: INTERNAL MEDICINE

## 2025-06-11 PROCEDURE — 3079F DIAST BP 80-89 MM HG: CPT | Performed by: INTERNAL MEDICINE

## 2025-06-11 PROCEDURE — 96411 CHEMO IV PUSH ADDL DRUG: CPT

## 2025-06-11 PROCEDURE — 96413 CHEMO IV INFUSION 1 HR: CPT

## 2025-06-11 PROCEDURE — 99214 OFFICE O/P EST MOD 30 MIN: CPT | Performed by: INTERNAL MEDICINE

## 2025-06-11 PROCEDURE — 96415 CHEMO IV INFUSION ADDL HR: CPT

## 2025-06-11 PROCEDURE — 3074F SYST BP LT 130 MM HG: CPT | Performed by: INTERNAL MEDICINE

## 2025-06-11 PROCEDURE — G8420 CALC BMI NORM PARAMETERS: HCPCS | Performed by: INTERNAL MEDICINE

## 2025-06-11 PROCEDURE — 2580000003 HC RX 258: Performed by: INTERNAL MEDICINE

## 2025-06-11 PROCEDURE — 96375 TX/PRO/DX INJ NEW DRUG ADDON: CPT

## 2025-06-11 PROCEDURE — 96417 CHEMO IV INFUS EACH ADDL SEQ: CPT

## 2025-06-11 PROCEDURE — G0498 CHEMO EXTEND IV INFUS W/PUMP: HCPCS

## 2025-06-11 PROCEDURE — 3017F COLORECTAL CA SCREEN DOC REV: CPT | Performed by: INTERNAL MEDICINE

## 2025-06-11 PROCEDURE — G8427 DOCREV CUR MEDS BY ELIG CLIN: HCPCS | Performed by: INTERNAL MEDICINE

## 2025-06-11 PROCEDURE — 1036F TOBACCO NON-USER: CPT | Performed by: INTERNAL MEDICINE

## 2025-06-11 PROCEDURE — 96367 TX/PROPH/DG ADDL SEQ IV INF: CPT

## 2025-06-11 PROCEDURE — 96368 THER/DIAG CONCURRENT INF: CPT

## 2025-06-11 RX ORDER — HYDROCODONE BITARTRATE AND ACETAMINOPHEN 5; 325 MG/1; MG/1
1 TABLET ORAL EVERY 6 HOURS PRN
Qty: 28 TABLET | Refills: 0 | Status: SHIPPED | OUTPATIENT
Start: 2025-06-11 | End: 2025-06-18

## 2025-06-11 RX ORDER — ONDANSETRON 4 MG/1
4 TABLET, FILM COATED ORAL EVERY 8 HOURS PRN
Qty: 90 TABLET | Refills: 3 | Status: SHIPPED | OUTPATIENT
Start: 2025-06-11 | End: 2025-10-09

## 2025-06-11 RX ORDER — SODIUM CHLORIDE 0.9 % (FLUSH) 0.9 %
5-40 SYRINGE (ML) INJECTION PRN
Status: DISCONTINUED | OUTPATIENT
Start: 2025-06-11 | End: 2025-06-12 | Stop reason: HOSPADM

## 2025-06-11 RX ORDER — PALONOSETRON 0.05 MG/ML
0.25 INJECTION, SOLUTION INTRAVENOUS ONCE
Status: COMPLETED | OUTPATIENT
Start: 2025-06-11 | End: 2025-06-11

## 2025-06-11 RX ORDER — SODIUM CHLORIDE 9 MG/ML
5-250 INJECTION, SOLUTION INTRAVENOUS PRN
Status: DISCONTINUED | OUTPATIENT
Start: 2025-06-11 | End: 2025-06-12 | Stop reason: HOSPADM

## 2025-06-11 RX ORDER — DEXTROSE MONOHYDRATE 50 MG/ML
5-250 INJECTION, SOLUTION INTRAVENOUS PRN
Status: DISCONTINUED | OUTPATIENT
Start: 2025-06-11 | End: 2025-06-12 | Stop reason: HOSPADM

## 2025-06-11 RX ORDER — FLUOROURACIL 50 MG/ML
400 INJECTION, SOLUTION INTRAVENOUS ONCE
Status: COMPLETED | OUTPATIENT
Start: 2025-06-11 | End: 2025-06-11

## 2025-06-11 RX ADMIN — PALONOSETRON 0.25 MG: 0.25 INJECTION, SOLUTION INTRAVENOUS at 10:17

## 2025-06-11 RX ADMIN — LEUCOVORIN CALCIUM 700 MG: 500 INJECTION, POWDER, LYOPHILIZED, FOR SOLUTION INTRAMUSCULAR; INTRAVENOUS at 11:00

## 2025-06-11 RX ADMIN — OXALIPLATIN 150 MG: 5 INJECTION, SOLUTION INTRAVENOUS at 11:00

## 2025-06-11 RX ADMIN — DEXAMETHASONE SODIUM PHOSPHATE 12 MG: 4 INJECTION, SOLUTION INTRAMUSCULAR; INTRAVENOUS at 10:18

## 2025-06-11 RX ADMIN — FLUOROURACIL 700 MG: 50 INJECTION, SOLUTION INTRAVENOUS at 13:22

## 2025-06-11 RX ADMIN — FLUOROURACIL 4300 MG: 50 INJECTION, SOLUTION INTRAVENOUS at 13:25

## 2025-06-11 RX ADMIN — DEXTROSE 25 ML/HR: 5 SOLUTION INTRAVENOUS at 10:10

## 2025-06-11 ASSESSMENT — PAIN SCALES - GENERAL: PAINLEVEL_OUTOF10: 6

## 2025-06-11 NOTE — PROGRESS NOTES
MA/LPN Rooming Questions  Patient: Jama Ramirez  MRN: [id number]    Date: 6/11/2025        1. Do you have any new issues?   yes - Vision changes with seeing colors that aren't there. Nausea vomiting and dizziness         2. Do you need any refills on medications?    yes - Norco    3. Have you had any imaging done since your last visit?   yes - US Chest 6/4    4. Have you been hospitalized or seen in the emergency room since your last visit here?   yes - Hospital 6/2    5. Did the patient have a depression screening completed today? No    No data recorded     PHQ-9 Given to (if applicable):               PHQ-9 Score (if applicable):                     [] Positive     []  Negative              Does question #9 need addressed (if applicable)                     [] Yes    []  No               Kalpana Shah MA    
options, reviewing imaging, reviewing labs, decision making, Pre-charting, counseling and documenting today's visit, > 30 mins.     SKIP CAVAZOS Rooming Questions  Patient: Jama Ramirez  MRN: [id number]    Date: 6/11/2025        1. Do you have any new issues?   no         2. Do you need any refills on medications?    yes - Zofran     3. Have you had any imaging done since your last visit?   yes - PET Scan @ [facility]    4. Have you been hospitalized or seen in the emergency room since your last visit here?   no    5. Did the patient have a depression screening completed today? No    No data recorded     PHQ-9 Given to (if applicable):               PHQ-9 Score (if applicable):                     [] Positive     []  Negative              Does question #9 need addressed (if applicable)                     [] Yes    []  No               Bijal Herring MD

## 2025-06-11 NOTE — PROGRESS NOTES
Status appropriately assessed and documented. All required labs and results reviewed. Treatment approved by provider. Treatment orders and medications verified by 2 Registered Nurses where applicable. Treatment plan was confirmed with patient prior to administration, and educated the need to report any treatment-related symptoms      Ambulated to infusion area, here today for treatment. PT has been nauseous, vomiting, vision blurriness, and dizziness. Taking home meds with some relief. Pt saw Dr. Herring prior to treatment. Right chest mediport accessed, positive blood return noted. Labs reviewed, Labs within defined limits.  Treatment administered as ordered. Call light within reach. Tolerated infusion without incident.  RTC 6/13 for pump off.     Pump attached, programmed, unclamped, locked and green prior to leaving the infusion unit.                6/25/2025  8:15 AM 5 HOUR CHEMO [facility] MEDICAL ONCOLOGY SCHEDULE, [facility] MED ONC TREATMENT    Appointment Notes:   FOLFOX PUMP              6/25/2025  8:30 AM FOLLOW UP [facility] Bijal Herring MD

## 2025-06-13 ENCOUNTER — HOSPITAL ENCOUNTER (OUTPATIENT)
Dept: INFUSION THERAPY | Age: 65
Discharge: HOME | End: 2025-06-13
Payer: MEDICAID

## 2025-06-13 DIAGNOSIS — C7A.8 NEUROENDOCRINE CARCINOMA OF SMALL BOWEL (HCC): ICD-10-CM

## 2025-06-13 DIAGNOSIS — Z11.59 NEED FOR HEPATITIS B SCREENING TEST: ICD-10-CM

## 2025-06-13 DIAGNOSIS — C24.1 CANCER OF AMPULLA OF VATER (HCC): ICD-10-CM

## 2025-06-13 DIAGNOSIS — C77.2 MALIGNANT NEOPLASM METASTATIC TO INTRA-ABDOMINAL LYMPH NODE (HCC): Primary | ICD-10-CM

## 2025-06-13 DIAGNOSIS — Z79.69 NEED FOR PROPHYLACTIC CHEMOTHERAPY: ICD-10-CM

## 2025-06-13 PROCEDURE — 96374 THER/PROPH/DIAG INJ IV PUSH: CPT

## 2025-06-13 PROCEDURE — 6360000002 HC RX W HCPCS: Performed by: INTERNAL MEDICINE

## 2025-06-13 PROCEDURE — 2500000003 HC RX 250 WO HCPCS: Performed by: INTERNAL MEDICINE

## 2025-06-13 RX ORDER — SODIUM CHLORIDE 0.9 % (FLUSH) 0.9 %
5-40 SYRINGE (ML) INJECTION PRN
Status: DISCONTINUED | OUTPATIENT
Start: 2025-06-13 | End: 2025-06-14 | Stop reason: HOSPADM

## 2025-06-13 RX ORDER — ONDANSETRON 2 MG/ML
4 INJECTION INTRAMUSCULAR; INTRAVENOUS ONCE
Status: COMPLETED | OUTPATIENT
Start: 2025-06-13 | End: 2025-06-13

## 2025-06-13 RX ADMIN — ONDANSETRON 4 MG: 2 INJECTION INTRAMUSCULAR; INTRAVENOUS at 12:30

## 2025-06-13 RX ADMIN — SODIUM CHLORIDE, PRESERVATIVE FREE 30 ML: 5 INJECTION INTRAVENOUS at 12:35

## 2025-06-13 NOTE — PROGRESS NOTES
Patient presents to infusion suite for d/c pump. A lot more nausea this round is taking home nausea education just not working Dr. Kim ordered 4mg Zofran which was given, pt educated to call us if he continues to be nausea or vomiting and we will reevaluate him and see if he needs fluids or other nausea medication . Pump removed, 0 mL remaining in pump. Decaccessed port, flushed with 20cc Normal saline, and locked with saline. Bandaid and gauze placed.  RTC 6/24 labs 6/25 treatment and OV

## 2025-06-13 NOTE — PROGRESS NOTES
Pt called to the center and is experiencing muscle cramps all over the body, belly up to the neck just feels like something is squeezing him dropping him to his knees this has happened at least four times since getting the pump off recommended going to the hospital to see what is going on. Pt did not drink anything cold or was in or around air conditioning he was working on a weed eater.

## 2025-06-16 ENCOUNTER — CLINICAL DOCUMENTATION (OUTPATIENT)
Dept: INFUSION THERAPY | Age: 65
End: 2025-06-16

## 2025-06-16 LAB
DNA RANGE(S) EXAMINED NAR: NORMAL
GENE DIS ANL INTERP-IMP: POSITIVE
GENE DIS ASSESSED: NORMAL
GENE MUT TESTED BLD/T: 3.7 M/MB
HLA-A HIGH RES: NORMAL
HLA-A UNRESLVD ALLELES HIGH RES: YES
HLA-B HIGH RES: NORMAL
HLA-B UNRESLVD ALLELES HIGH RES: YES
HLA-C HIGH RES: NORMAL
HLA-C UNRESLVD ALLELES HIGH RES: YES
MSI CA SPEC-IMP: NORMAL
REASON FOR STUDY: NORMAL
TEMPUS GERMLINE NOTE: NORMAL
TEMPUS HLA-A SAMPLE TYPE: NORMAL
TEMPUS HLA-B SAMPLE TYPE: NORMAL
TEMPUS HLA-C SAMPLE TYPE: NORMAL
TEMPUS LCA: NORMAL
TEMPUS PORTAL: NORMAL
TEMPUS TREATMENT IMPLICATIONS NOTE: NORMAL
TEMPUS TRIAL1: NORMAL
TEMPUS TRIAL3: NORMAL
TEMPUS TRIALCOUNT: 3
TEMPUS TRIALMATCHES2: NORMAL

## 2025-06-16 NOTE — PROGRESS NOTES
Pt called this nurse back and is still having those cramps but they are less often and he has had none today some vomiting yesterday but none today will call back if this changes or gets worse and I will discuss this with  and call pt back

## 2025-06-16 NOTE — PROGRESS NOTES
Call placed to see how pt is doing after the phone conversation Friday no answer VM left to call back with any questions or concerns

## 2025-06-18 ENCOUNTER — CLINICAL DOCUMENTATION (OUTPATIENT)
Dept: INFUSION THERAPY | Age: 65
End: 2025-06-18

## 2025-06-18 DIAGNOSIS — G89.3 CANCER ASSOCIATED PAIN: ICD-10-CM

## 2025-06-18 RX ORDER — HYDROCODONE BITARTRATE AND ACETAMINOPHEN 5; 325 MG/1; MG/1
1 TABLET ORAL EVERY 6 HOURS PRN
Qty: 28 TABLET | Refills: 0 | Status: SHIPPED | OUTPATIENT
Start: 2025-06-18 | End: 2025-06-25

## 2025-06-18 NOTE — TELEPHONE ENCOUNTER
Patient contacted our office in need of refill for hydrocodone. Patient has a scheduled appointment on 6/25. [facility] . Pending for patients chart provider Bijal Herring.

## 2025-06-18 NOTE — PROGRESS NOTES
Call placed to see how patient is feeling he is now experiencing sharp pain wrapping around his right side. Appointment made tomorrow with WM Casiano at 1pm and script pended to  for Bentyl for the stomach cramps pt is made aware of these and will come tomorrow

## 2025-06-19 ENCOUNTER — HOSPITAL ENCOUNTER (OUTPATIENT)
Dept: INFUSION THERAPY | Age: 65
Discharge: HOME | End: 2025-06-19
Payer: MEDICAID

## 2025-06-19 ENCOUNTER — OFFICE VISIT (OUTPATIENT)
Dept: ONCOLOGY | Age: 65
End: 2025-06-19
Payer: MEDICAID

## 2025-06-19 VITALS
HEART RATE: 71 BPM | HEIGHT: 69 IN | WEIGHT: 133.2 LBS | OXYGEN SATURATION: 96 % | BODY MASS INDEX: 19.73 KG/M2 | TEMPERATURE: 98.2 F | SYSTOLIC BLOOD PRESSURE: 117 MMHG | DIASTOLIC BLOOD PRESSURE: 74 MMHG

## 2025-06-19 DIAGNOSIS — C24.1 CANCER OF AMPULLA OF VATER (HCC): ICD-10-CM

## 2025-06-19 DIAGNOSIS — G89.3 CANCER ASSOCIATED PAIN: ICD-10-CM

## 2025-06-19 DIAGNOSIS — C7A.8 NEUROENDOCRINE CARCINOMA OF SMALL BOWEL (HCC): ICD-10-CM

## 2025-06-19 DIAGNOSIS — C7A.8 NEUROENDOCRINE CARCINOMA OF SMALL BOWEL (HCC): Primary | ICD-10-CM

## 2025-06-19 LAB
ALBUMIN SERPL-MCNC: 3.8 G/DL (ref 3.4–5)
ALBUMIN/GLOB SERPL: 2 {RATIO} (ref 1.1–2.2)
ALP SERPL-CCNC: 214 U/L (ref 40–129)
ALT SERPL-CCNC: 19 U/L (ref 10–40)
ANION GAP SERPL CALCULATED.3IONS-SCNC: 13 MMOL/L (ref 9–17)
AST SERPL-CCNC: 18 U/L (ref 15–37)
BASOPHILS # BLD: 0.01 K/UL
BASOPHILS NFR BLD: 0 % (ref 0–1)
BILIRUB SERPL-MCNC: 0.2 MG/DL (ref 0–1)
BUN SERPL-MCNC: 18 MG/DL (ref 7–20)
CALCIUM SERPL-MCNC: 8.9 MG/DL (ref 8.3–10.6)
CHLORIDE SERPL-SCNC: 101 MMOL/L (ref 99–110)
CO2 SERPL-SCNC: 21 MMOL/L (ref 21–32)
CREAT SERPL-MCNC: 0.7 MG/DL (ref 0.8–1.3)
EOSINOPHIL # BLD: 0 K/UL
EOSINOPHILS RELATIVE PERCENT: 0 % (ref 0–3)
ERYTHROCYTE [DISTWIDTH] IN BLOOD BY AUTOMATED COUNT: 25 % (ref 11.7–14.9)
GFR, ESTIMATED: >90 ML/MIN/1.73M2
GLUCOSE SERPL-MCNC: 159 MG/DL (ref 74–99)
HCT VFR BLD AUTO: 37.5 % (ref 42–52)
HGB BLD-MCNC: 12 G/DL (ref 13.5–18)
LYMPHOCYTES NFR BLD: 1.04 K/UL
LYMPHOCYTES RELATIVE PERCENT: 17 % (ref 24–44)
MCH RBC QN AUTO: 26.4 PG (ref 27–31)
MCHC RBC AUTO-ENTMCNC: 32 G/DL (ref 32–36)
MCV RBC AUTO: 82.4 FL (ref 78–100)
MONOCYTES NFR BLD: 0.32 K/UL
MONOCYTES NFR BLD: 5 % (ref 0–5)
NEUTROPHILS NFR BLD: 78 % (ref 36–66)
NEUTS SEG NFR BLD: 4.9 K/UL
PLATELET # BLD AUTO: 176 K/UL (ref 140–440)
PMV BLD AUTO: 8 FL (ref 7.5–11.1)
POTASSIUM SERPL-SCNC: 3.9 MMOL/L (ref 3.5–5.1)
PROT SERPL-MCNC: 5.7 G/DL (ref 6.4–8.2)
RBC # BLD AUTO: 4.55 M/UL (ref 4.6–6.2)
SODIUM SERPL-SCNC: 135 MMOL/L (ref 136–145)
WBC OTHER # BLD: 6.3 K/UL (ref 4–10.5)

## 2025-06-19 PROCEDURE — 85025 COMPLETE CBC W/AUTO DIFF WBC: CPT

## 2025-06-19 PROCEDURE — 80053 COMPREHEN METABOLIC PANEL: CPT

## 2025-06-19 PROCEDURE — G8420 CALC BMI NORM PARAMETERS: HCPCS | Performed by: PHYSICIAN ASSISTANT

## 2025-06-19 PROCEDURE — 3017F COLORECTAL CA SCREEN DOC REV: CPT | Performed by: PHYSICIAN ASSISTANT

## 2025-06-19 PROCEDURE — 1036F TOBACCO NON-USER: CPT | Performed by: PHYSICIAN ASSISTANT

## 2025-06-19 PROCEDURE — 99213 OFFICE O/P EST LOW 20 MIN: CPT | Performed by: PHYSICIAN ASSISTANT

## 2025-06-19 PROCEDURE — G8427 DOCREV CUR MEDS BY ELIG CLIN: HCPCS | Performed by: PHYSICIAN ASSISTANT

## 2025-06-19 PROCEDURE — 3074F SYST BP LT 130 MM HG: CPT | Performed by: PHYSICIAN ASSISTANT

## 2025-06-19 PROCEDURE — 36415 COLL VENOUS BLD VENIPUNCTURE: CPT

## 2025-06-19 PROCEDURE — 99212 OFFICE O/P EST SF 10 MIN: CPT

## 2025-06-19 PROCEDURE — 3078F DIAST BP <80 MM HG: CPT | Performed by: PHYSICIAN ASSISTANT

## 2025-06-19 RX ORDER — DICYCLOMINE HYDROCHLORIDE 10 MG/1
10 CAPSULE ORAL 3 TIMES DAILY PRN
Qty: 120 CAPSULE | Refills: 0 | Status: SHIPPED | OUTPATIENT
Start: 2025-06-19

## 2025-06-19 NOTE — PROGRESS NOTES
Patient Name:  Jama Ramirez  Patient :  1960  Patient MRN:  [id number]     Primary Oncologist: Bijal Herring MD  Referring Provider: Jennifer Gomez MD     Date of Service: 2025      Reason for Consult:      Chief Complaint:    Chief Complaint   Patient presents with    Follow-up       Encounter Diagnoses   Name Primary?    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  Patient was admitted to [facility] on 2024 with abdominal pain and jaundice.  He was found to have a mass adjacent to the ampulla with mass effect on CBD and PD.  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  ERCP was performed to try to cannulate the pancreatic duct however unable to do this due to significant friability, ulceration and anatomic distortion.  IR guided external/internal biliary drain, bilirubin decreased from 6 to 1.2 upon discharge.  EGD with biopsy of the duodenal mass was done.  Pathology inconclusive.  CTA of the chest with no evidence of any pulmonary metastatic disease.  CA 19-9 was 39 and CEA was 5.5.  2024 today in the office he reported that he continues to have abdominal discomfort around the biliary drain.  Reported that he has chronic cough secondary to COPD.  Productive of clear sputum.  No hemoptysis.  Reported that he had a coughing spell which caused bloody drainage through the drain.  Lasted for 2 days and resolved.  Reported that he has very poor appetite.  Continues to lose weight.  Reported fatigue and tiredness.  6/3/2024 PET scan with focal area of hypermetabolic FDG uptake demonstrated within the ampullary region near the medial descending duodenum/ pancreatic head.  SUV is 5.7.  No abnormal hypermetabolic uptake detected within the neck or chest.  No other distant metastatic disease is appreciated.    s/p robotic HHR in

## 2025-06-22 RX ORDER — ALBUTEROL SULFATE 90 UG/1
4 INHALANT RESPIRATORY (INHALATION) PRN
OUTPATIENT
Start: 2025-06-25

## 2025-06-22 RX ORDER — PALONOSETRON 0.05 MG/ML
0.25 INJECTION, SOLUTION INTRAVENOUS ONCE
OUTPATIENT
Start: 2025-06-25 | End: 2025-06-25

## 2025-06-22 RX ORDER — DIPHENHYDRAMINE HYDROCHLORIDE 50 MG/ML
50 INJECTION, SOLUTION INTRAMUSCULAR; INTRAVENOUS
OUTPATIENT
Start: 2025-06-25

## 2025-06-22 RX ORDER — EPINEPHRINE 1 MG/ML
0.3 INJECTION, SOLUTION, CONCENTRATE INTRAVENOUS PRN
OUTPATIENT
Start: 2025-06-25

## 2025-06-22 RX ORDER — SODIUM CHLORIDE 9 MG/ML
5-250 INJECTION, SOLUTION INTRAVENOUS PRN
OUTPATIENT
Start: 2025-06-27

## 2025-06-22 RX ORDER — FAMOTIDINE 10 MG/ML
20 INJECTION, SOLUTION INTRAVENOUS
OUTPATIENT
Start: 2025-06-25

## 2025-06-22 RX ORDER — SODIUM CHLORIDE 9 MG/ML
5-250 INJECTION, SOLUTION INTRAVENOUS PRN
OUTPATIENT
Start: 2025-06-25

## 2025-06-22 RX ORDER — FLUOROURACIL 50 MG/ML
400 INJECTION, SOLUTION INTRAVENOUS ONCE
OUTPATIENT
Start: 2025-06-25

## 2025-06-22 RX ORDER — DEXTROSE MONOHYDRATE 50 MG/ML
5-250 INJECTION, SOLUTION INTRAVENOUS PRN
OUTPATIENT
Start: 2025-06-25

## 2025-06-22 RX ORDER — SODIUM CHLORIDE 9 MG/ML
INJECTION, SOLUTION INTRAVENOUS CONTINUOUS
OUTPATIENT
Start: 2025-06-25

## 2025-06-22 RX ORDER — HEPARIN SODIUM (PORCINE) LOCK FLUSH IV SOLN 100 UNIT/ML 100 UNIT/ML
500 SOLUTION INTRAVENOUS PRN
OUTPATIENT
Start: 2025-06-27

## 2025-06-22 RX ORDER — ACETAMINOPHEN 325 MG/1
650 TABLET ORAL
OUTPATIENT
Start: 2025-06-25

## 2025-06-22 RX ORDER — MEPERIDINE HYDROCHLORIDE 50 MG/ML
12.5 INJECTION INTRAMUSCULAR; INTRAVENOUS; SUBCUTANEOUS PRN
OUTPATIENT
Start: 2025-06-25

## 2025-06-22 RX ORDER — SODIUM CHLORIDE 0.9 % (FLUSH) 0.9 %
5-40 SYRINGE (ML) INJECTION PRN
OUTPATIENT
Start: 2025-06-25

## 2025-06-22 RX ORDER — HYDROCORTISONE SODIUM SUCCINATE 100 MG/2ML
100 INJECTION INTRAMUSCULAR; INTRAVENOUS
OUTPATIENT
Start: 2025-06-25

## 2025-06-22 RX ORDER — HEPARIN SODIUM (PORCINE) LOCK FLUSH IV SOLN 100 UNIT/ML 100 UNIT/ML
500 SOLUTION INTRAVENOUS PRN
OUTPATIENT
Start: 2025-06-25

## 2025-06-22 RX ORDER — SODIUM CHLORIDE 0.9 % (FLUSH) 0.9 %
5-40 SYRINGE (ML) INJECTION PRN
OUTPATIENT
Start: 2025-06-27

## 2025-06-22 RX ORDER — ONDANSETRON 2 MG/ML
8 INJECTION INTRAMUSCULAR; INTRAVENOUS
OUTPATIENT
Start: 2025-06-25

## 2025-06-24 ENCOUNTER — HOSPITAL ENCOUNTER (OUTPATIENT)
Dept: INFUSION THERAPY | Age: 65
Discharge: HOME | End: 2025-06-24
Payer: MEDICAID

## 2025-06-24 DIAGNOSIS — C77.2 MALIGNANT NEOPLASM METASTATIC TO INTRA-ABDOMINAL LYMPH NODE (HCC): ICD-10-CM

## 2025-06-24 DIAGNOSIS — C24.1 CANCER OF AMPULLA OF VATER (HCC): ICD-10-CM

## 2025-06-24 DIAGNOSIS — C7A.8 NEUROENDOCRINE CARCINOMA OF SMALL BOWEL (HCC): ICD-10-CM

## 2025-06-24 DIAGNOSIS — Z79.69 NEED FOR PROPHYLACTIC CHEMOTHERAPY: ICD-10-CM

## 2025-06-24 DIAGNOSIS — Z11.59 NEED FOR HEPATITIS B SCREENING TEST: ICD-10-CM

## 2025-06-24 LAB
ALBUMIN SERPL-MCNC: 3.6 G/DL (ref 3.4–5)
ALBUMIN/GLOB SERPL: 2.1 {RATIO} (ref 1.1–2.2)
ALP SERPL-CCNC: 184 U/L (ref 40–129)
ALT SERPL-CCNC: 22 U/L (ref 10–40)
ANION GAP SERPL CALCULATED.3IONS-SCNC: 14 MMOL/L (ref 9–17)
AST SERPL-CCNC: 24 U/L (ref 15–37)
BASOPHILS # BLD: 0.01 K/UL
BASOPHILS NFR BLD: 0 % (ref 0–1)
BILIRUB SERPL-MCNC: 0.5 MG/DL (ref 0–1)
BUN SERPL-MCNC: 21 MG/DL (ref 7–20)
CALCIUM SERPL-MCNC: 8.8 MG/DL (ref 8.3–10.6)
CHLORIDE SERPL-SCNC: 108 MMOL/L (ref 99–110)
CO2 SERPL-SCNC: 19 MMOL/L (ref 21–32)
CREAT SERPL-MCNC: 0.9 MG/DL (ref 0.8–1.3)
DNA RANGE(S) EXAMINED NAR: NORMAL
EOSINOPHIL # BLD: 0 K/UL
EOSINOPHILS RELATIVE PERCENT: 0 % (ref 0–3)
ERYTHROCYTE [DISTWIDTH] IN BLOOD BY AUTOMATED COUNT: 26 % (ref 11.7–14.9)
GENE DIS ANL INTERP-IMP: POSITIVE
GENE DIS ASSESSED: NORMAL
GENE MUT TESTED BLD/T: 3.7 M/MB
GFR, ESTIMATED: 87 ML/MIN/1.73M2
GLUCOSE SERPL-MCNC: 132 MG/DL (ref 74–99)
HCT VFR BLD AUTO: 34.5 % (ref 42–52)
HGB BLD-MCNC: 11.2 G/DL (ref 13.5–18)
HLA-A HIGH RES: NORMAL
HLA-A UNRESLVD ALLELES HIGH RES: YES
HLA-B HIGH RES: NORMAL
HLA-B UNRESLVD ALLELES HIGH RES: YES
HLA-C HIGH RES: NORMAL
HLA-C UNRESLVD ALLELES HIGH RES: YES
LYMPHOCYTES NFR BLD: 0.84 K/UL
LYMPHOCYTES RELATIVE PERCENT: 12 % (ref 24–44)
MCH RBC QN AUTO: 27 PG (ref 27–31)
MCHC RBC AUTO-ENTMCNC: 32.5 G/DL (ref 32–36)
MCV RBC AUTO: 83.1 FL (ref 78–100)
MONOCYTES NFR BLD: 0.32 K/UL
MONOCYTES NFR BLD: 5 % (ref 0–5)
MSI CA SPEC-IMP: NORMAL
NEUTROPHILS NFR BLD: 83 % (ref 36–66)
NEUTS SEG NFR BLD: 5.74 K/UL
PLATELET # BLD AUTO: 129 K/UL (ref 140–440)
PMV BLD AUTO: 7.7 FL (ref 7.5–11.1)
POTASSIUM SERPL-SCNC: 3.6 MMOL/L (ref 3.5–5.1)
PROT SERPL-MCNC: 5.3 G/DL (ref 6.4–8.2)
RBC # BLD AUTO: 4.15 M/UL (ref 4.6–6.2)
REASON FOR STUDY: NORMAL
SODIUM SERPL-SCNC: 141 MMOL/L (ref 136–145)
TEMPUS GERMLINE NOTE: NORMAL
TEMPUS HLA-A SAMPLE TYPE: NORMAL
TEMPUS HLA-B SAMPLE TYPE: NORMAL
TEMPUS HLA-C SAMPLE TYPE: NORMAL
TEMPUS LCA: NORMAL
TEMPUS PORTAL: NORMAL
TEMPUS TREATMENT IMPLICATIONS NOTE: NORMAL
TEMPUS TRIAL1: NORMAL
TEMPUS TRIAL3: NORMAL
TEMPUS TRIALCOUNT: 3
TEMPUS TRIALMATCHES2: NORMAL
TEMPUS XR RESULT 1: NORMAL
WBC OTHER # BLD: 6.9 K/UL (ref 4–10.5)

## 2025-06-24 PROCEDURE — 36415 COLL VENOUS BLD VENIPUNCTURE: CPT

## 2025-06-24 PROCEDURE — 85025 COMPLETE CBC W/AUTO DIFF WBC: CPT

## 2025-06-24 PROCEDURE — 80053 COMPREHEN METABOLIC PANEL: CPT

## 2025-06-25 ENCOUNTER — HOSPITAL ENCOUNTER (OUTPATIENT)
Dept: INFUSION THERAPY | Age: 65
Discharge: HOME | End: 2025-06-25

## 2025-06-25 ENCOUNTER — OFFICE VISIT (OUTPATIENT)
Dept: ONCOLOGY | Age: 65
End: 2025-06-25
Payer: MEDICAID

## 2025-06-25 ENCOUNTER — HOSPITAL ENCOUNTER (OUTPATIENT)
Dept: CT IMAGING | Age: 65
Discharge: HOME | End: 2025-06-25
Attending: INTERNAL MEDICINE
Payer: MEDICAID

## 2025-06-25 VITALS
SYSTOLIC BLOOD PRESSURE: 125 MMHG | HEART RATE: 59 BPM | OXYGEN SATURATION: 97 % | RESPIRATION RATE: 16 BRPM | TEMPERATURE: 97.8 F | BODY MASS INDEX: 19.4 KG/M2 | DIASTOLIC BLOOD PRESSURE: 91 MMHG | HEIGHT: 69 IN | WEIGHT: 131 LBS

## 2025-06-25 VITALS
BODY MASS INDEX: 19.49 KG/M2 | HEART RATE: 59 BPM | WEIGHT: 131.6 LBS | DIASTOLIC BLOOD PRESSURE: 91 MMHG | OXYGEN SATURATION: 97 % | TEMPERATURE: 97.8 F | SYSTOLIC BLOOD PRESSURE: 125 MMHG | HEIGHT: 69 IN

## 2025-06-25 DIAGNOSIS — C77.2 MALIGNANT NEOPLASM METASTATIC TO INTRA-ABDOMINAL LYMPH NODE (HCC): ICD-10-CM

## 2025-06-25 DIAGNOSIS — C7A.8 NEUROENDOCRINE CARCINOMA OF SMALL BOWEL (HCC): ICD-10-CM

## 2025-06-25 DIAGNOSIS — C24.1 CANCER OF AMPULLA OF VATER (HCC): Primary | ICD-10-CM

## 2025-06-25 DIAGNOSIS — C24.1 CANCER OF AMPULLA OF VATER (HCC): ICD-10-CM

## 2025-06-25 DIAGNOSIS — G89.3 CANCER ASSOCIATED PAIN: ICD-10-CM

## 2025-06-25 DIAGNOSIS — C7A.8 NEUROENDOCRINE CARCINOMA OF SMALL BOWEL (HCC): Primary | ICD-10-CM

## 2025-06-25 DIAGNOSIS — Z11.59 NEED FOR HEPATITIS B SCREENING TEST: ICD-10-CM

## 2025-06-25 DIAGNOSIS — Z79.69 NEED FOR PROPHYLACTIC CHEMOTHERAPY: ICD-10-CM

## 2025-06-25 DIAGNOSIS — C24.1 MALIGNANT NEOPLASM OF AMPULLA OF VATER (HCC): ICD-10-CM

## 2025-06-25 PROCEDURE — G8420 CALC BMI NORM PARAMETERS: HCPCS | Performed by: INTERNAL MEDICINE

## 2025-06-25 PROCEDURE — 6360000004 HC RX CONTRAST MEDICATION: Performed by: INTERNAL MEDICINE

## 2025-06-25 PROCEDURE — 3074F SYST BP LT 130 MM HG: CPT | Performed by: INTERNAL MEDICINE

## 2025-06-25 PROCEDURE — 74177 CT ABD & PELVIS W/CONTRAST: CPT

## 2025-06-25 PROCEDURE — 3017F COLORECTAL CA SCREEN DOC REV: CPT | Performed by: INTERNAL MEDICINE

## 2025-06-25 PROCEDURE — 99214 OFFICE O/P EST MOD 30 MIN: CPT | Performed by: INTERNAL MEDICINE

## 2025-06-25 PROCEDURE — G8428 CUR MEDS NOT DOCUMENT: HCPCS | Performed by: INTERNAL MEDICINE

## 2025-06-25 PROCEDURE — 3080F DIAST BP >= 90 MM HG: CPT | Performed by: INTERNAL MEDICINE

## 2025-06-25 PROCEDURE — 1036F TOBACCO NON-USER: CPT | Performed by: INTERNAL MEDICINE

## 2025-06-25 RX ORDER — HYDROCODONE BITARTRATE AND ACETAMINOPHEN 5; 325 MG/1; MG/1
1 TABLET ORAL EVERY 6 HOURS PRN
Qty: 60 TABLET | Refills: 0 | Status: SHIPPED | OUTPATIENT
Start: 2025-06-25 | End: 2025-07-09

## 2025-06-25 RX ORDER — IOPAMIDOL 755 MG/ML
20 INJECTION, SOLUTION INTRAVASCULAR
Status: COMPLETED | OUTPATIENT
Start: 2025-06-25 | End: 2025-06-25

## 2025-06-25 RX ORDER — IOPAMIDOL 755 MG/ML
75 INJECTION, SOLUTION INTRAVASCULAR
Status: COMPLETED | OUTPATIENT
Start: 2025-06-25 | End: 2025-06-25

## 2025-06-25 RX ORDER — DRONABINOL 2.5 MG/1
2.5 CAPSULE ORAL
Qty: 60 CAPSULE | Refills: 0 | Status: SHIPPED | OUTPATIENT
Start: 2025-06-25 | End: 2025-07-25

## 2025-06-25 RX ADMIN — IOPAMIDOL 20 ML: 755 INJECTION, SOLUTION INTRAVENOUS at 10:45

## 2025-06-25 RX ADMIN — IOPAMIDOL 75 ML: 755 INJECTION, SOLUTION INTRAVENOUS at 11:47

## 2025-06-25 ASSESSMENT — PAIN SCALES - GENERAL: PAINLEVEL_OUTOF10: 6

## 2025-06-25 NOTE — PROGRESS NOTES
Patient Name:  Jama Ramirez  Patient :  1960  Patient MRN:  [id number]     Primary Oncologist: Bijal Herring MD  Referring Provider: Jennifer Gomez MD     Date of Service: 2025      Reason for Consult:      Chief Complaint:    Chief Complaint   Patient presents with    Follow-up       Encounter Diagnoses   Name Primary?    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  Patient was admitted to [facility] on 2024 with abdominal pain and jaundice.  He was found to have a mass adjacent to the ampulla with mass effect on CBD and PD.  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  ERCP was performed to try to cannulate the pancreatic duct however unable to do this due to significant friability, ulceration and anatomic distortion.  IR guided external/internal biliary drain, bilirubin decreased from 6 to 1.2 upon discharge.  EGD with biopsy of the duodenal mass was done.  Pathology inconclusive.  CTA of the chest with no evidence of any pulmonary metastatic disease.  CA 19-9 was 39 and CEA was 5.5.  2024 today in the office he reported that he continues to have abdominal discomfort around the biliary drain.  Reported that he has chronic cough secondary to COPD.  Productive of clear sputum.  No hemoptysis.  Reported that he had a coughing spell which caused bloody drainage through the drain.  Lasted for 2 days and resolved.  Reported that he has very poor appetite.  Continues to lose weight.  Reported fatigue and tiredness.  6/3/2024 PET scan with focal area of hypermetabolic FDG uptake demonstrated within the ampullary region near the medial descending duodenum/ pancreatic head.  SUV is 5.7.  No abnormal hypermetabolic

## 2025-06-25 NOTE — PROGRESS NOTES
MA/LPN Rooming Questions  Patient: Jama Ramirez  MRN: [id number]    Date: 6/25/2025        1. Do you have any new issues?   yes - C/O weight loss, sore mouth-making it hard to eat/drink, pain in back, discomfort when port was being flushed.   ..Last Weight Metrics:      6/25/2025     8:28 AM 6/25/2025     8:24 AM 6/19/2025     1:12 PM 6/11/2025     8:46 AM 6/11/2025     8:20 AM 6/2/2025     9:27 AM 5/28/2025     8:30 AM   Weight Loss Metrics   Height 5' 9.016\" 5' 9\" 5' 9\" 5' 9\" 5' 9\" 5' 9\" 5' 9\"   Weight - Scale 131 lbs 131 lbs 10 oz 133 lbs 3 oz 136 lbs 10 oz 136 lbs 10 oz 141 lbs 141 lbs 6 oz   BMI (Calculated) 19.4 kg/m2 19.5 kg/m2 19.7 kg/m2 20.2 kg/m2 20.2 kg/m2 20.9 kg/m2 20.9 kg/m2             2. Do you need any refills on medications?    yes - pain med, magic mouthwash needing sent elsewhere     3. Have you had any imaging done since your last visit?   yes - U/S    4. Have you been hospitalized or seen in the emergency room since your last visit here?   no    5. Did the patient have a depression screening completed today? No    No data recorded     PHQ-9 Given to (if applicable):               PHQ-9 Score (if applicable):                     [] Positive     []  Negative              Does question #9 need addressed (if applicable)                     [] Yes    []  No               Umu Villanueva CMA

## 2025-06-25 NOTE — PROGRESS NOTES
Assessment complete. Pt is in noticeable pain, difficult to move, hunched over and holding his back. States he felt soreness pain to left lower back last week.  Has gradually gotten worse, feels a bulge in back that is pushing on spine. Pain is taking his breath away, rates 6/10 stabbing.  Intermittent nausea, states has not been eating due to mouth soreness.  Pt weight loss of 2 lbs since last week. He has been using baking soda rinse.     After pump off Friday of last week, pt experienced \"two attacks\" where squeezing occurred from toes to chest.  Felt like a vacuum  sucking the life out of him.  Lasted for an hour following treatment.  Also experienced on Saturday.      Labs reviewed, within treatment parameters.      Mediport accessed, no blood return noted.  Painful when flushing.  Needle removed.  Reaccessed with the same result.  No swelling or drainage at site. Small red tender dot area over top of port access. States extremely painful, shooting pain from chest to left shoulder when flushing. Mediport flushed and de-accessed per protocol.     Sent to office visit.     Returned from OV.  Dr. Herring to delay for one week.  CT stat abd/pelvis ordered.  Port dye study ordered due to pain with access/flushing.  Pain medication and Magic Mouthwash ordered.  Discharge ambulatory in stable condition. Denisse Mack RN    Message sent to Julia regarding scheduling next lab and treatment.

## 2025-06-26 ENCOUNTER — CLINICAL DOCUMENTATION (OUTPATIENT)
Dept: ONCOLOGY | Age: 65
End: 2025-06-26

## 2025-06-26 ENCOUNTER — TELEPHONE (OUTPATIENT)
Dept: ONCOLOGY | Age: 65
End: 2025-06-26

## 2025-06-26 NOTE — PROGRESS NOTES
Per IR, patient has been scheduled for port dye study on 07/07/2025 @ 1230 with 1200 arrival time.

## 2025-06-26 NOTE — TELEPHONE ENCOUNTER
RX for MMW (equal parts of benadryl, maalox, viscous xylocaine and nystatin) e-scribed to [facility] pharmacy as requested.

## 2025-06-26 NOTE — TELEPHONE ENCOUNTER
Kathy from [facility] pharmacy called and lvm that they need ingredients  of magic mouth wash.

## 2025-06-26 NOTE — PROGRESS NOTES
Port dye study changed to 6/27/25 12:30 arrival for 1:00 at [facility].  Patient advised.

## 2025-06-26 NOTE — PROGRESS NOTES
6/26/25 -  for patient with times and instructions:    IR Procedure at [facility]:  6/27/25 @ 1300, arrival 1200        Follow your directions as prescribed by the doctor for your procedure and medications.  3.   Consult your provider as to when to stop blood thinner  4.   Do not take any pain medication within 6 hours of your procedure  5.   Do not drink any alcoholic beverages or use any street drugs 24 hours before procedure.  6.   Please wear simple, loose fitting clothing to the hospital.  Do not bring valuables (money,             credit cards, checkbooks, etc.)     7.   If you  have a Living Will and Durable Power of  for Healthcare, please bring in a copy.  8.   Please bring picture ID,  insurance card, paperwork from the doctors office            (H & P, Consent,  & card for implantable devices).  9.   Report to the information desk on the ground floor.  10. Take a shower the night before or morning of your procedure, do not apply any lotion, oil or powder.  11.

## 2025-06-27 ENCOUNTER — HOSPITAL ENCOUNTER (OUTPATIENT)
Dept: INTERVENTIONAL RADIOLOGY/VASCULAR | Age: 65
Discharge: HOME | End: 2025-06-27
Payer: MEDICAID

## 2025-06-27 VITALS — OXYGEN SATURATION: 95 % | HEART RATE: 53 BPM | DIASTOLIC BLOOD PRESSURE: 88 MMHG | SYSTOLIC BLOOD PRESSURE: 139 MMHG

## 2025-06-27 DIAGNOSIS — T82.598A: ICD-10-CM

## 2025-06-27 PROCEDURE — 6360000002 HC RX W HCPCS

## 2025-06-27 PROCEDURE — 6360000004 HC RX CONTRAST MEDICATION: Performed by: RADIOLOGY

## 2025-06-27 PROCEDURE — 6360000004 HC RX CONTRAST MEDICATION

## 2025-06-27 PROCEDURE — 36598 INJ W/FLUOR EVAL CV DEVICE: CPT

## 2025-06-27 RX ORDER — IOPAMIDOL 755 MG/ML
INJECTION, SOLUTION INTRAVASCULAR PRN
Status: COMPLETED | OUTPATIENT
Start: 2025-06-27 | End: 2025-06-27

## 2025-06-27 RX ADMIN — IOPAMIDOL 3 ML: 755 INJECTION, SOLUTION INTRAVENOUS at 13:29

## 2025-06-27 NOTE — PROGRESS NOTES
TRANSFER - OUT REPORT:    Verbal report given to Anjana(name) on Jama Ramirez being transferred to IR holding (unit) for routine post-op       Report consisted of patient's Situation, Background, Assessment and   Recommendations(SBAR).     Information from the following report(s) Nurse Handoff Report was reviewed with the receiving nurse.    Opportunity for questions and clarification was provided.      Patient transported with:   Registered Nurse

## 2025-06-27 NOTE — OP NOTE
IR    Left SC port check    Found to have a leak near jct first rib and clavicle.  Also partially occluded.    Should not be used.    Detailed report  to follow.

## 2025-06-27 NOTE — PROGRESS NOTES
1336 pt arrived back to IR holding, with port already de-accessed.  Dressing wnl with no bleeding noted.  Reviewed discharge instructions with the pt, no questions at this time.  Pt understands that the port should not be used.  Pt discharged.

## 2025-06-30 ENCOUNTER — CLINICAL DOCUMENTATION (OUTPATIENT)
Dept: INFUSION THERAPY | Age: 65
End: 2025-06-30

## 2025-06-30 NOTE — PROGRESS NOTES
Dr Herring informed of results of port dye study. Port will need replaced before pt can receive tx.     I called pt to inform him not to come in for tx on 7/1 pt was already aware and is requesting to speak with Dr Herring before we schedule to have his port replaced.  messaged that pt needs an OV scheduled asap.

## 2025-07-01 ENCOUNTER — OFFICE VISIT (OUTPATIENT)
Dept: ONCOLOGY | Age: 65
End: 2025-07-01
Payer: MEDICAID

## 2025-07-01 ENCOUNTER — HOSPITAL ENCOUNTER (OUTPATIENT)
Dept: INFUSION THERAPY | Age: 65
Discharge: HOME | End: 2025-07-01
Payer: MEDICAID

## 2025-07-01 ENCOUNTER — CLINICAL DOCUMENTATION (OUTPATIENT)
Dept: INFUSION THERAPY | Age: 65
End: 2025-07-01

## 2025-07-01 VITALS
WEIGHT: 132.2 LBS | SYSTOLIC BLOOD PRESSURE: 93 MMHG | BODY MASS INDEX: 19.58 KG/M2 | HEIGHT: 69 IN | DIASTOLIC BLOOD PRESSURE: 68 MMHG | HEART RATE: 67 BPM | OXYGEN SATURATION: 97 % | TEMPERATURE: 98.2 F

## 2025-07-01 DIAGNOSIS — C24.1 CANCER OF AMPULLA OF VATER (HCC): Primary | ICD-10-CM

## 2025-07-01 PROCEDURE — 99212 OFFICE O/P EST SF 10 MIN: CPT

## 2025-07-01 PROCEDURE — G8420 CALC BMI NORM PARAMETERS: HCPCS | Performed by: INTERNAL MEDICINE

## 2025-07-01 PROCEDURE — 3017F COLORECTAL CA SCREEN DOC REV: CPT | Performed by: INTERNAL MEDICINE

## 2025-07-01 PROCEDURE — G8427 DOCREV CUR MEDS BY ELIG CLIN: HCPCS | Performed by: INTERNAL MEDICINE

## 2025-07-01 PROCEDURE — 3078F DIAST BP <80 MM HG: CPT | Performed by: INTERNAL MEDICINE

## 2025-07-01 PROCEDURE — 99214 OFFICE O/P EST MOD 30 MIN: CPT | Performed by: INTERNAL MEDICINE

## 2025-07-01 PROCEDURE — 1036F TOBACCO NON-USER: CPT | Performed by: INTERNAL MEDICINE

## 2025-07-01 PROCEDURE — 3074F SYST BP LT 130 MM HG: CPT | Performed by: INTERNAL MEDICINE

## 2025-07-01 RX ORDER — PROCHLORPERAZINE MALEATE 10 MG
10 TABLET ORAL EVERY 6 HOURS PRN
Qty: 90 TABLET | Refills: 3 | Status: SHIPPED | OUTPATIENT
Start: 2025-07-01 | End: 2025-09-29

## 2025-07-01 ASSESSMENT — PATIENT HEALTH QUESTIONNAIRE - PHQ9
2. FEELING DOWN, DEPRESSED OR HOPELESS: SEVERAL DAYS
SUM OF ALL RESPONSES TO PHQ QUESTIONS 1-9: 1
1. LITTLE INTEREST OR PLEASURE IN DOING THINGS: NOT AT ALL
SUM OF ALL RESPONSES TO PHQ QUESTIONS 1-9: 1

## 2025-07-01 NOTE — PROGRESS NOTES
MA/LPN Rooming Questions  Patient: Jama Ramirez  MRN: [id number]    Date: 7/1/2025        1. Do you have any new issues?   yes - Patient states for the past 2-3 weeks he has been having nausea and vomiting and unable to keep anything down. Along with severe abdominal pain. Patient states zofran is not helping          2. Do you need any refills on medications?    no    3. Have you had any imaging done since your last visit?   no    4. Have you been hospitalized or seen in the emergency room since your last visit here?   no    5. Did the patient have a depression screening completed today? Yes    PHQ-9 Total Score: 1 (7/1/2025  2:10 PM)       PHQ-9 Given to (if applicable):               PHQ-9 Score (if applicable):                     [] Positive     []  Negative              Does question #9 need addressed (if applicable)                     [] Yes    []  No               Cristina Gilbert, CMA    
Also placed a consult for pain clinic.     Magic mouthwash and appetite stimulant ordered    Improve nutritional status.  Continues to defer nutrition consult    Continue other medical care.    Thank you for letting us be part of the care and will follow along.    Discussed above findings and plan with him and he voiced understanding.  Answered all his questions.    Discussed healthy lifestyle including healthy diet, regular exercise as tolerated.  Also discussed importance of being up-to-date with age-appropriate screening tools.    Recommend follow-up with primary care physician and other specialists.    Please do not hesitate to contact us if you need further information.    Return to clinic in 2 weeks or earlier if new Sx    This note is created with the assistance of a speech-recognition program. While intending to generate a document that accurately reflects the content of the visit, no guarantee can be provided that every mistake has been identified and corrected by editing.    Portions of this note are copied forward from previous clinic note.  Interval history, ROS, physical exam, assessment and plan has been reviewed and updated for accuracy by this provider.    Time Spent with patient,  for face to face, exam, education, discussing treatment options, reviewing imaging, reviewing labs, decision making, Pre-charting, counseling and documenting today's visit, > 30 mins.     SKIP CAVAZOS Rooming Questions  Patient: Jama Ramirez  MRN: [id number]    Date: 7/1/2025        1. Do you have any new issues?   no         2. Do you need any refills on medications?    yes - Zofran     3. Have you had any imaging done since your last visit?   yes - PET Scan @ [facility]    4. Have you been hospitalized or seen in the emergency room since your last visit here?   no    5. Did the patient have a depression screening completed today? No    PHQ-9 Total Score: 1 (7/1/2025  2:10 PM)       PHQ-9 Given to (if applicable):

## 2025-07-01 NOTE — PROGRESS NOTES
Per Dr Herring pt to have orders placed for port removal, and no replacement needed as she is changing pt's tx regimen that will not require a port.  Orders placed by Salma DE LEÓN to have urgent referral for port removal to Dr Coronel. PCCN made aware.

## 2025-07-02 DIAGNOSIS — Z79.69 NEED FOR PROPHYLACTIC CHEMOTHERAPY: ICD-10-CM

## 2025-07-02 DIAGNOSIS — C77.2 MALIGNANT NEOPLASM METASTATIC TO INTRA-ABDOMINAL LYMPH NODE (HCC): ICD-10-CM

## 2025-07-02 DIAGNOSIS — C24.1 CANCER OF AMPULLA OF VATER (HCC): Primary | ICD-10-CM

## 2025-07-02 DIAGNOSIS — C7A.8 NEUROENDOCRINE CARCINOMA OF SMALL BOWEL (HCC): ICD-10-CM

## 2025-07-02 DIAGNOSIS — Z11.59 NEED FOR HEPATITIS B SCREENING TEST: ICD-10-CM

## 2025-07-02 RX ORDER — MEPERIDINE HYDROCHLORIDE 50 MG/ML
12.5 INJECTION INTRAMUSCULAR; INTRAVENOUS; SUBCUTANEOUS PRN
OUTPATIENT
Start: 2025-07-02

## 2025-07-02 RX ORDER — ACETAMINOPHEN 325 MG/1
650 TABLET ORAL
OUTPATIENT
Start: 2025-07-02

## 2025-07-02 RX ORDER — ONDANSETRON 2 MG/ML
8 INJECTION INTRAMUSCULAR; INTRAVENOUS
OUTPATIENT
Start: 2025-07-02

## 2025-07-02 RX ORDER — ALBUTEROL SULFATE 90 UG/1
4 INHALANT RESPIRATORY (INHALATION) PRN
OUTPATIENT
Start: 2025-07-02

## 2025-07-02 RX ORDER — FAMOTIDINE 10 MG/ML
20 INJECTION, SOLUTION INTRAVENOUS
OUTPATIENT
Start: 2025-07-02

## 2025-07-02 RX ORDER — SODIUM CHLORIDE 9 MG/ML
5-250 INJECTION, SOLUTION INTRAVENOUS PRN
OUTPATIENT
Start: 2025-07-02

## 2025-07-02 RX ORDER — PALONOSETRON 0.05 MG/ML
0.25 INJECTION, SOLUTION INTRAVENOUS ONCE
OUTPATIENT
Start: 2025-07-02 | End: 2025-07-02

## 2025-07-02 RX ORDER — SODIUM CHLORIDE 0.9 % (FLUSH) 0.9 %
5-40 SYRINGE (ML) INJECTION PRN
OUTPATIENT
Start: 2025-07-02

## 2025-07-02 RX ORDER — EPINEPHRINE 1 MG/ML
0.3 INJECTION, SOLUTION, CONCENTRATE INTRAVENOUS PRN
OUTPATIENT
Start: 2025-07-02

## 2025-07-02 RX ORDER — HYDROCORTISONE SODIUM SUCCINATE 100 MG/2ML
100 INJECTION INTRAMUSCULAR; INTRAVENOUS
OUTPATIENT
Start: 2025-07-02

## 2025-07-02 RX ORDER — DIPHENHYDRAMINE HYDROCHLORIDE 50 MG/ML
50 INJECTION, SOLUTION INTRAMUSCULAR; INTRAVENOUS
OUTPATIENT
Start: 2025-07-02

## 2025-07-02 RX ORDER — SODIUM CHLORIDE 9 MG/ML
INJECTION, SOLUTION INTRAVENOUS PRN
OUTPATIENT
Start: 2025-07-02

## 2025-07-02 RX ORDER — DEXTROSE MONOHYDRATE 50 MG/ML
5-250 INJECTION, SOLUTION INTRAVENOUS PRN
OUTPATIENT
Start: 2025-07-02

## 2025-07-02 RX ORDER — CAPECITABINE 500 MG/1
1500 TABLET, FILM COATED ORAL 2 TIMES DAILY
Qty: 84 TABLET | Refills: 3 | Status: SHIPPED | OUTPATIENT
Start: 2025-07-02

## 2025-07-02 RX ORDER — HEPARIN SODIUM (PORCINE) LOCK FLUSH IV SOLN 100 UNIT/ML 100 UNIT/ML
500 SOLUTION INTRAVENOUS PRN
OUTPATIENT
Start: 2025-07-02

## 2025-07-02 NOTE — PROGRESS NOTES
Capecitabine 500mg- take 3 tablets by mouth twice daily for 14 days on then off for 7 days every 21 days e-scribed to [facility] and NN referral placed per physician order

## 2025-07-03 ENCOUNTER — TELEPHONE (OUTPATIENT)
Dept: SURGERY | Age: 65
End: 2025-07-03

## 2025-07-03 NOTE — TELEPHONE ENCOUNTER
LEFT MESSAGE FOR PT TO CALL BACK AND SCHEDULE APPT FROM REFERRAL.    LAST OV 9/26/24, PORT REMOVAL (Cancer of ampulla of Vater) Referral from Bijal Herring

## 2025-07-07 ENCOUNTER — APPOINTMENT (OUTPATIENT)
Dept: GENERAL RADIOLOGY | Age: 65
DRG: 139 | End: 2025-07-07
Payer: MEDICAID

## 2025-07-07 ENCOUNTER — APPOINTMENT (OUTPATIENT)
Dept: CT IMAGING | Age: 65
DRG: 139 | End: 2025-07-07
Payer: MEDICAID

## 2025-07-07 ENCOUNTER — HOSPITAL ENCOUNTER (INPATIENT)
Age: 65
LOS: 3 days | Discharge: HOME | DRG: 139 | End: 2025-07-10
Attending: EMERGENCY MEDICINE | Admitting: INTERNAL MEDICINE
Payer: MEDICAID

## 2025-07-07 DIAGNOSIS — J18.9 MULTIFOCAL PNEUMONIA: Primary | ICD-10-CM

## 2025-07-07 DIAGNOSIS — C25.9 PRIMARY MALIGNANT NEOPLASM OF PANCREAS WITH METASTASIS TO OTHER SITE (HCC): ICD-10-CM

## 2025-07-07 DIAGNOSIS — J96.01 ACUTE RESPIRATORY FAILURE WITH HYPOXIA (HCC): ICD-10-CM

## 2025-07-07 DIAGNOSIS — E55.9 VITAMIN D DEFICIENCY: ICD-10-CM

## 2025-07-07 PROBLEM — J15.9 BACTERIAL PNEUMONIA: Status: ACTIVE | Noted: 2025-07-07

## 2025-07-07 LAB
ALBUMIN SERPL-MCNC: 3.5 G/DL (ref 3.4–5)
ALBUMIN/GLOB SERPL: 1.6 {RATIO} (ref 1.1–2.2)
ALP SERPL-CCNC: 331 U/L (ref 40–129)
ALT SERPL-CCNC: 28 U/L (ref 10–40)
ANION GAP SERPL CALCULATED.3IONS-SCNC: 15 MMOL/L (ref 9–17)
AST SERPL-CCNC: 38 U/L (ref 15–37)
BASOPHILS # BLD: 0.01 K/UL
BASOPHILS NFR BLD: 0 % (ref 0–1)
BILIRUB SERPL-MCNC: 0.5 MG/DL (ref 0–1)
BUN SERPL-MCNC: 19 MG/DL (ref 7–20)
CALCIUM SERPL-MCNC: 8.8 MG/DL (ref 8.3–10.6)
CHLORIDE SERPL-SCNC: 107 MMOL/L (ref 99–110)
CO2 SERPL-SCNC: 17 MMOL/L (ref 21–32)
CREAT SERPL-MCNC: 0.7 MG/DL (ref 0.8–1.3)
EOSINOPHIL # BLD: 0.03 K/UL
EOSINOPHILS RELATIVE PERCENT: 0 % (ref 0–3)
ERYTHROCYTE [DISTWIDTH] IN BLOOD BY AUTOMATED COUNT: 25.2 % (ref 11.7–14.9)
GFR, ESTIMATED: >90 ML/MIN/1.73M2
GLUCOSE SERPL-MCNC: 112 MG/DL (ref 74–99)
HCT VFR BLD AUTO: 35.7 % (ref 42–52)
HGB BLD-MCNC: 11.7 G/DL (ref 13.5–18)
IMM GRANULOCYTES # BLD AUTO: 0.04 K/UL
IMM GRANULOCYTES NFR BLD: 0 %
LACTATE BLDV-SCNC: 1.6 MMOL/L (ref 0.4–2)
LIPASE SERPL-CCNC: 8 U/L (ref 13–60)
LYMPHOCYTES NFR BLD: 1.63 K/UL
LYMPHOCYTES RELATIVE PERCENT: 14 % (ref 24–44)
MCH RBC QN AUTO: 27.4 PG (ref 27–31)
MCHC RBC AUTO-ENTMCNC: 32.8 G/DL (ref 32–36)
MCV RBC AUTO: 83.6 FL (ref 78–100)
MONOCYTES NFR BLD: 0.75 K/UL
MONOCYTES NFR BLD: 6 % (ref 0–5)
NEUTROPHILS NFR BLD: 80 % (ref 36–66)
NEUTS SEG NFR BLD: 9.58 K/UL
PLATELET # BLD AUTO: 142 K/UL (ref 140–440)
PMV BLD AUTO: 9 FL (ref 7.5–11.1)
POTASSIUM SERPL-SCNC: 3.8 MMOL/L (ref 3.5–5.1)
PROCALCITONIN SERPL-MCNC: 0.21 NG/ML
PROT SERPL-MCNC: 5.7 G/DL (ref 6.4–8.2)
RBC # BLD AUTO: 4.27 M/UL (ref 4.6–6.2)
SODIUM SERPL-SCNC: 140 MMOL/L (ref 136–145)
TROPONIN I SERPL HS-MCNC: 13 NG/L (ref 0–22)
WBC OTHER # BLD: 12 K/UL (ref 4–10.5)

## 2025-07-07 PROCEDURE — 96375 TX/PRO/DX INJ NEW DRUG ADDON: CPT

## 2025-07-07 PROCEDURE — 84145 PROCALCITONIN (PCT): CPT

## 2025-07-07 PROCEDURE — 6360000002 HC RX W HCPCS: Performed by: EMERGENCY MEDICINE

## 2025-07-07 PROCEDURE — 1200000000 HC SEMI PRIVATE

## 2025-07-07 PROCEDURE — 85025 COMPLETE CBC W/AUTO DIFF WBC: CPT

## 2025-07-07 PROCEDURE — 87040 BLOOD CULTURE FOR BACTERIA: CPT

## 2025-07-07 PROCEDURE — 83690 ASSAY OF LIPASE: CPT

## 2025-07-07 PROCEDURE — 99285 EMERGENCY DEPT VISIT HI MDM: CPT

## 2025-07-07 PROCEDURE — 93005 ELECTROCARDIOGRAM TRACING: CPT | Performed by: EMERGENCY MEDICINE

## 2025-07-07 PROCEDURE — 80053 COMPREHEN METABOLIC PANEL: CPT

## 2025-07-07 PROCEDURE — 84484 ASSAY OF TROPONIN QUANT: CPT

## 2025-07-07 PROCEDURE — 74177 CT ABD & PELVIS W/CONTRAST: CPT

## 2025-07-07 PROCEDURE — 2580000003 HC RX 258: Performed by: EMERGENCY MEDICINE

## 2025-07-07 PROCEDURE — 96365 THER/PROPH/DIAG IV INF INIT: CPT

## 2025-07-07 PROCEDURE — 71046 X-RAY EXAM CHEST 2 VIEWS: CPT

## 2025-07-07 PROCEDURE — 2500000003 HC RX 250 WO HCPCS: Performed by: EMERGENCY MEDICINE

## 2025-07-07 PROCEDURE — 6360000004 HC RX CONTRAST MEDICATION: Performed by: EMERGENCY MEDICINE

## 2025-07-07 PROCEDURE — 83605 ASSAY OF LACTIC ACID: CPT

## 2025-07-07 RX ORDER — GUAIFENESIN/DEXTROMETHORPHAN 100-10MG/5
5 SYRUP ORAL EVERY 4 HOURS PRN
Status: DISCONTINUED | OUTPATIENT
Start: 2025-07-07 | End: 2025-07-10 | Stop reason: HOSPADM

## 2025-07-07 RX ORDER — SODIUM CHLORIDE 9 MG/ML
INJECTION, SOLUTION INTRAVENOUS CONTINUOUS
Status: DISCONTINUED | OUTPATIENT
Start: 2025-07-07 | End: 2025-07-08

## 2025-07-07 RX ORDER — IOPAMIDOL 755 MG/ML
70 INJECTION, SOLUTION INTRAVASCULAR
Status: DISCONTINUED | OUTPATIENT
Start: 2025-07-07 | End: 2025-07-10 | Stop reason: HOSPADM

## 2025-07-07 RX ORDER — MORPHINE SULFATE 4 MG/ML
4 INJECTION, SOLUTION INTRAMUSCULAR; INTRAVENOUS EVERY 30 MIN PRN
Refills: 0 | Status: DISCONTINUED | OUTPATIENT
Start: 2025-07-07 | End: 2025-07-08

## 2025-07-07 RX ORDER — IOPAMIDOL 755 MG/ML
75 INJECTION, SOLUTION INTRAVASCULAR
Status: COMPLETED | OUTPATIENT
Start: 2025-07-07 | End: 2025-07-07

## 2025-07-07 RX ADMIN — WATER 1000 MG: 1 INJECTION INTRAMUSCULAR; INTRAVENOUS; SUBCUTANEOUS at 20:50

## 2025-07-07 RX ADMIN — AZITHROMYCIN DIHYDRATE 500 MG: 500 INJECTION, POWDER, LYOPHILIZED, FOR SOLUTION INTRAVENOUS at 20:53

## 2025-07-07 RX ADMIN — SODIUM CHLORIDE: 9 INJECTION, SOLUTION INTRAVENOUS at 21:57

## 2025-07-07 RX ADMIN — IOPAMIDOL 75 ML: 755 INJECTION, SOLUTION INTRAVENOUS at 20:57

## 2025-07-07 RX ADMIN — MORPHINE SULFATE 4 MG: 4 INJECTION, SOLUTION INTRAMUSCULAR; INTRAVENOUS at 20:14

## 2025-07-07 ASSESSMENT — PAIN DESCRIPTION - LOCATION
LOCATION: CHEST
LOCATION: CHEST

## 2025-07-07 ASSESSMENT — PAIN DESCRIPTION - DESCRIPTORS
DESCRIPTORS: BURNING
DESCRIPTORS: BURNING

## 2025-07-07 ASSESSMENT — PAIN SCALES - GENERAL
PAINLEVEL_OUTOF10: 7
PAINLEVEL_OUTOF10: 5
PAINLEVEL_OUTOF10: 6

## 2025-07-07 ASSESSMENT — PAIN DESCRIPTION - ORIENTATION
ORIENTATION: RIGHT
ORIENTATION: RIGHT

## 2025-07-07 ASSESSMENT — PAIN - FUNCTIONAL ASSESSMENT
PAIN_FUNCTIONAL_ASSESSMENT: 0-10
PAIN_FUNCTIONAL_ASSESSMENT: 0-10

## 2025-07-07 ASSESSMENT — LIFESTYLE VARIABLES
HOW MANY STANDARD DRINKS CONTAINING ALCOHOL DO YOU HAVE ON A TYPICAL DAY: PATIENT DOES NOT DRINK
HOW OFTEN DO YOU HAVE A DRINK CONTAINING ALCOHOL: NEVER

## 2025-07-08 PROBLEM — E43 SEVERE MALNUTRITION: Chronic | Status: ACTIVE | Noted: 2025-07-08

## 2025-07-08 LAB
ABSOLUTE BANDS: 0.34 K/UL
ANION GAP SERPL CALCULATED.3IONS-SCNC: 11 MMOL/L (ref 9–17)
BANDS: 3 %
BASOPHILS # BLD: 0.11 K/UL
BASOPHILS NFR BLD: 1 % (ref 0–1)
BUN SERPL-MCNC: 13 MG/DL (ref 7–20)
CALCIUM SERPL-MCNC: 7.8 MG/DL (ref 8.3–10.6)
CHLORIDE SERPL-SCNC: 107 MMOL/L (ref 99–110)
CO2 SERPL-SCNC: 19 MMOL/L (ref 21–32)
CREAT SERPL-MCNC: 0.6 MG/DL (ref 0.8–1.3)
EKG ATRIAL RATE: 82 BPM
EKG DIAGNOSIS: NORMAL
EKG P AXIS: 94 DEGREES
EKG P-R INTERVAL: 156 MS
EKG Q-T INTERVAL: 378 MS
EKG QRS DURATION: 86 MS
EKG QTC CALCULATION (BAZETT): 441 MS
EKG R AXIS: 28 DEGREES
EKG T AXIS: 75 DEGREES
EKG VENTRICULAR RATE: 82 BPM
EOSINOPHIL # BLD: 0 K/UL
EOSINOPHILS RELATIVE PERCENT: 0 % (ref 0–3)
ERYTHROCYTE [DISTWIDTH] IN BLOOD BY AUTOMATED COUNT: 25.2 % (ref 11.7–14.9)
GFR, ESTIMATED: >90 ML/MIN/1.73M2
GLUCOSE SERPL-MCNC: 81 MG/DL (ref 74–99)
HCT VFR BLD AUTO: 33.8 % (ref 42–52)
HGB BLD-MCNC: 10.7 G/DL (ref 13.5–18)
L PNEUMO1 AG UR QL IA.RAPID: NEGATIVE
LACTATE BLDV-SCNC: 0.5 MMOL/L (ref 0.4–2)
LYMPHOCYTES NFR BLD: 2.05 K/UL
LYMPHOCYTES RELATIVE PERCENT: 18 % (ref 24–44)
MAGNESIUM SERPL-MCNC: 2.1 MG/DL (ref 1.8–2.4)
MCH RBC QN AUTO: 26.9 PG (ref 27–31)
MCHC RBC AUTO-ENTMCNC: 31.7 G/DL (ref 32–36)
MCV RBC AUTO: 84.9 FL (ref 78–100)
MONOCYTES NFR BLD: 0.23 K/UL
MONOCYTES NFR BLD: 2 % (ref 0–5)
NEUTROPHILS NFR BLD: 76 % (ref 36–66)
NEUTS SEG NFR BLD: 8.66 K/UL
PLATELET ESTIMATE: NORMAL
PLATELET, FLUORESCENCE: 132 K/UL (ref 140–440)
PMV BLD AUTO: 10.2 FL (ref 7.5–11.1)
POTASSIUM SERPL-SCNC: 3.4 MMOL/L (ref 3.5–5.1)
RBC # BLD AUTO: 3.98 M/UL (ref 4.6–6.2)
RBC # BLD: ABNORMAL 10*6/UL
S PNEUM AG SPEC QL: NEGATIVE
SODIUM SERPL-SCNC: 137 MMOL/L (ref 136–145)
SPECIMEN SOURCE: NORMAL
TROPONIN I SERPL HS-MCNC: 13 NG/L (ref 0–22)
WBC # BLD: NORMAL 10*3/UL
WBC OTHER # BLD: 11.4 K/UL (ref 4–10.5)

## 2025-07-08 PROCEDURE — 83605 ASSAY OF LACTIC ACID: CPT

## 2025-07-08 PROCEDURE — 36415 COLL VENOUS BLD VENIPUNCTURE: CPT

## 2025-07-08 PROCEDURE — 87449 NOS EACH ORGANISM AG IA: CPT

## 2025-07-08 PROCEDURE — 87899 AGENT NOS ASSAY W/OPTIC: CPT

## 2025-07-08 PROCEDURE — 80048 BASIC METABOLIC PNL TOTAL CA: CPT

## 2025-07-08 PROCEDURE — 87070 CULTURE OTHR SPECIMN AEROBIC: CPT

## 2025-07-08 PROCEDURE — 94664 DEMO&/EVAL PT USE INHALER: CPT

## 2025-07-08 PROCEDURE — 94640 AIRWAY INHALATION TREATMENT: CPT

## 2025-07-08 PROCEDURE — 6360000002 HC RX W HCPCS: Performed by: INTERNAL MEDICINE

## 2025-07-08 PROCEDURE — 93010 ELECTROCARDIOGRAM REPORT: CPT | Performed by: INTERNAL MEDICINE

## 2025-07-08 PROCEDURE — 6370000000 HC RX 637 (ALT 250 FOR IP): Performed by: INTERNAL MEDICINE

## 2025-07-08 PROCEDURE — 2700000000 HC OXYGEN THERAPY PER DAY

## 2025-07-08 PROCEDURE — 6360000002 HC RX W HCPCS: Performed by: EMERGENCY MEDICINE

## 2025-07-08 PROCEDURE — 2500000003 HC RX 250 WO HCPCS: Performed by: INTERNAL MEDICINE

## 2025-07-08 PROCEDURE — 6360000002 HC RX W HCPCS: Performed by: STUDENT IN AN ORGANIZED HEALTH CARE EDUCATION/TRAINING PROGRAM

## 2025-07-08 PROCEDURE — 6370000000 HC RX 637 (ALT 250 FOR IP): Performed by: STUDENT IN AN ORGANIZED HEALTH CARE EDUCATION/TRAINING PROGRAM

## 2025-07-08 PROCEDURE — 85025 COMPLETE CBC W/AUTO DIFF WBC: CPT

## 2025-07-08 PROCEDURE — 83735 ASSAY OF MAGNESIUM: CPT

## 2025-07-08 PROCEDURE — 94761 N-INVAS EAR/PLS OXIMETRY MLT: CPT

## 2025-07-08 PROCEDURE — 1200000000 HC SEMI PRIVATE

## 2025-07-08 PROCEDURE — 2580000003 HC RX 258: Performed by: INTERNAL MEDICINE

## 2025-07-08 PROCEDURE — 84484 ASSAY OF TROPONIN QUANT: CPT

## 2025-07-08 PROCEDURE — 87205 SMEAR GRAM STAIN: CPT

## 2025-07-08 RX ORDER — POTASSIUM CHLORIDE 1500 MG/1
40 TABLET, EXTENDED RELEASE ORAL ONCE
Status: COMPLETED | OUTPATIENT
Start: 2025-07-08 | End: 2025-07-08

## 2025-07-08 RX ORDER — IPRATROPIUM BROMIDE AND ALBUTEROL SULFATE 2.5; .5 MG/3ML; MG/3ML
1 SOLUTION RESPIRATORY (INHALATION)
Status: DISCONTINUED | OUTPATIENT
Start: 2025-07-08 | End: 2025-07-10 | Stop reason: HOSPADM

## 2025-07-08 RX ORDER — SODIUM CHLORIDE 0.9 % (FLUSH) 0.9 %
5-40 SYRINGE (ML) INJECTION EVERY 12 HOURS SCHEDULED
Status: DISCONTINUED | OUTPATIENT
Start: 2025-07-08 | End: 2025-07-10 | Stop reason: HOSPADM

## 2025-07-08 RX ORDER — ACETAMINOPHEN 650 MG/1
650 SUPPOSITORY RECTAL EVERY 6 HOURS PRN
Status: DISCONTINUED | OUTPATIENT
Start: 2025-07-08 | End: 2025-07-10 | Stop reason: HOSPADM

## 2025-07-08 RX ORDER — PANTOPRAZOLE SODIUM 40 MG/1
40 TABLET, DELAYED RELEASE ORAL
Status: DISCONTINUED | OUTPATIENT
Start: 2025-07-08 | End: 2025-07-10 | Stop reason: HOSPADM

## 2025-07-08 RX ORDER — MIRTAZAPINE 15 MG/1
15 TABLET, FILM COATED ORAL NIGHTLY
Status: DISCONTINUED | OUTPATIENT
Start: 2025-07-08 | End: 2025-07-10 | Stop reason: HOSPADM

## 2025-07-08 RX ORDER — ALBUTEROL SULFATE 90 UG/1
2 INHALANT RESPIRATORY (INHALATION) EVERY 6 HOURS PRN
Status: DISCONTINUED | OUTPATIENT
Start: 2025-07-08 | End: 2025-07-10 | Stop reason: HOSPADM

## 2025-07-08 RX ORDER — GUAIFENESIN 600 MG/1
600 TABLET, EXTENDED RELEASE ORAL 2 TIMES DAILY
Status: DISCONTINUED | OUTPATIENT
Start: 2025-07-08 | End: 2025-07-10 | Stop reason: HOSPADM

## 2025-07-08 RX ORDER — LIDOCAINE 4 G/G
1 PATCH TOPICAL DAILY
Status: DISCONTINUED | OUTPATIENT
Start: 2025-07-08 | End: 2025-07-10 | Stop reason: HOSPADM

## 2025-07-08 RX ORDER — ENOXAPARIN SODIUM 100 MG/ML
40 INJECTION SUBCUTANEOUS DAILY
Status: DISCONTINUED | OUTPATIENT
Start: 2025-07-08 | End: 2025-07-10 | Stop reason: HOSPADM

## 2025-07-08 RX ORDER — HYDROCODONE BITARTRATE AND ACETAMINOPHEN 5; 325 MG/1; MG/1
2 TABLET ORAL EVERY 6 HOURS PRN
Refills: 0 | Status: DISCONTINUED | OUTPATIENT
Start: 2025-07-08 | End: 2025-07-10 | Stop reason: HOSPADM

## 2025-07-08 RX ORDER — SODIUM CHLORIDE 9 MG/ML
INJECTION, SOLUTION INTRAVENOUS PRN
Status: DISCONTINUED | OUTPATIENT
Start: 2025-07-08 | End: 2025-07-10 | Stop reason: HOSPADM

## 2025-07-08 RX ORDER — ONDANSETRON 2 MG/ML
4 INJECTION INTRAMUSCULAR; INTRAVENOUS EVERY 6 HOURS PRN
Status: DISCONTINUED | OUTPATIENT
Start: 2025-07-08 | End: 2025-07-10 | Stop reason: HOSPADM

## 2025-07-08 RX ORDER — MIRTAZAPINE 15 MG/1
15 TABLET, FILM COATED ORAL NIGHTLY
COMMUNITY

## 2025-07-08 RX ORDER — POLYETHYLENE GLYCOL 3350 17 G/17G
17 POWDER, FOR SOLUTION ORAL DAILY PRN
Status: DISCONTINUED | OUTPATIENT
Start: 2025-07-08 | End: 2025-07-10 | Stop reason: HOSPADM

## 2025-07-08 RX ORDER — SODIUM CHLORIDE 0.9 % (FLUSH) 0.9 %
5-40 SYRINGE (ML) INJECTION PRN
Status: DISCONTINUED | OUTPATIENT
Start: 2025-07-08 | End: 2025-07-10 | Stop reason: HOSPADM

## 2025-07-08 RX ORDER — ONDANSETRON 4 MG/1
4 TABLET, ORALLY DISINTEGRATING ORAL EVERY 8 HOURS PRN
Status: DISCONTINUED | OUTPATIENT
Start: 2025-07-08 | End: 2025-07-10 | Stop reason: HOSPADM

## 2025-07-08 RX ORDER — ACETAMINOPHEN 325 MG/1
650 TABLET ORAL EVERY 6 HOURS PRN
Status: DISCONTINUED | OUTPATIENT
Start: 2025-07-08 | End: 2025-07-10 | Stop reason: HOSPADM

## 2025-07-08 RX ORDER — SODIUM CHLORIDE 9 MG/ML
INJECTION, SOLUTION INTRAVENOUS CONTINUOUS
Status: DISPENSED | OUTPATIENT
Start: 2025-07-08 | End: 2025-07-09

## 2025-07-08 RX ORDER — HYDROCODONE BITARTRATE AND ACETAMINOPHEN 5; 325 MG/1; MG/1
1 TABLET ORAL EVERY 6 HOURS PRN
Status: DISCONTINUED | OUTPATIENT
Start: 2025-07-08 | End: 2025-07-08

## 2025-07-08 RX ADMIN — GUAIFENESIN SYRUP AND DEXTROMETHORPHAN 5 ML: 100; 10 SYRUP ORAL at 00:27

## 2025-07-08 RX ADMIN — IPRATROPIUM BROMIDE AND ALBUTEROL SULFATE 1 DOSE: .5; 2.5 SOLUTION RESPIRATORY (INHALATION) at 12:21

## 2025-07-08 RX ADMIN — PANCRELIPASE LIPASE, PANCRELIPASE PROTEASE, PANCRELIPASE AMYLASE 15000 UNITS: 15000; 47000; 63000 CAPSULE, DELAYED RELEASE ORAL at 09:58

## 2025-07-08 RX ADMIN — HYDROCODONE BITARTRATE AND ACETAMINOPHEN 1 TABLET: 5; 325 TABLET ORAL at 12:32

## 2025-07-08 RX ADMIN — SODIUM CHLORIDE, PRESERVATIVE FREE 10 ML: 5 INJECTION INTRAVENOUS at 09:59

## 2025-07-08 RX ADMIN — GUAIFENESIN 600 MG: 600 TABLET, EXTENDED RELEASE ORAL at 09:58

## 2025-07-08 RX ADMIN — SODIUM CHLORIDE: 0.9 INJECTION, SOLUTION INTRAVENOUS at 10:28

## 2025-07-08 RX ADMIN — SODIUM CHLORIDE: 0.9 INJECTION, SOLUTION INTRAVENOUS at 00:52

## 2025-07-08 RX ADMIN — GUAIFENESIN SYRUP AND DEXTROMETHORPHAN 5 ML: 100; 10 SYRUP ORAL at 10:24

## 2025-07-08 RX ADMIN — HYDROMORPHONE HYDROCHLORIDE 0.25 MG: 1 INJECTION, SOLUTION INTRAMUSCULAR; INTRAVENOUS; SUBCUTANEOUS at 15:31

## 2025-07-08 RX ADMIN — GUAIFENESIN 600 MG: 600 TABLET, EXTENDED RELEASE ORAL at 00:51

## 2025-07-08 RX ADMIN — ACETAMINOPHEN 650 MG: 325 TABLET ORAL at 20:25

## 2025-07-08 RX ADMIN — AZITHROMYCIN DIHYDRATE 500 MG: 500 INJECTION, POWDER, LYOPHILIZED, FOR SOLUTION INTRAVENOUS at 20:39

## 2025-07-08 RX ADMIN — GUAIFENESIN 600 MG: 600 TABLET, EXTENDED RELEASE ORAL at 20:26

## 2025-07-08 RX ADMIN — ENOXAPARIN SODIUM 40 MG: 100 INJECTION SUBCUTANEOUS at 09:58

## 2025-07-08 RX ADMIN — HYDROMORPHONE HYDROCHLORIDE 0.5 MG: 1 INJECTION, SOLUTION INTRAMUSCULAR; INTRAVENOUS; SUBCUTANEOUS at 20:26

## 2025-07-08 RX ADMIN — PANCRELIPASE LIPASE, PANCRELIPASE PROTEASE, PANCRELIPASE AMYLASE 10000 UNITS: 5000; 17000; 24000 CAPSULE, DELAYED RELEASE ORAL at 12:32

## 2025-07-08 RX ADMIN — PANTOPRAZOLE SODIUM 40 MG: 40 TABLET, DELAYED RELEASE ORAL at 06:01

## 2025-07-08 RX ADMIN — PANCRELIPASE LIPASE, PANCRELIPASE PROTEASE, PANCRELIPASE AMYLASE 15000 UNITS: 15000; 47000; 63000 CAPSULE, DELAYED RELEASE ORAL at 16:59

## 2025-07-08 RX ADMIN — IPRATROPIUM BROMIDE AND ALBUTEROL SULFATE 1 DOSE: .5; 2.5 SOLUTION RESPIRATORY (INHALATION) at 08:40

## 2025-07-08 RX ADMIN — MIRTAZAPINE 15 MG: 15 TABLET, FILM COATED ORAL at 20:26

## 2025-07-08 RX ADMIN — GUAIFENESIN SYRUP AND DEXTROMETHORPHAN 5 ML: 100; 10 SYRUP ORAL at 06:00

## 2025-07-08 RX ADMIN — PANCRELIPASE LIPASE, PANCRELIPASE PROTEASE, PANCRELIPASE AMYLASE 15000 UNITS: 15000; 47000; 63000 CAPSULE, DELAYED RELEASE ORAL at 12:32

## 2025-07-08 RX ADMIN — POTASSIUM CHLORIDE 40 MEQ: 1500 TABLET, EXTENDED RELEASE ORAL at 09:58

## 2025-07-08 RX ADMIN — HYDROCODONE BITARTRATE AND ACETAMINOPHEN 1 TABLET: 5; 325 TABLET ORAL at 06:01

## 2025-07-08 RX ADMIN — PANCRELIPASE LIPASE, PANCRELIPASE PROTEASE, PANCRELIPASE AMYLASE 10000 UNITS: 5000; 17000; 24000 CAPSULE, DELAYED RELEASE ORAL at 09:58

## 2025-07-08 RX ADMIN — IPRATROPIUM BROMIDE AND ALBUTEROL SULFATE 1 DOSE: .5; 2.5 SOLUTION RESPIRATORY (INHALATION) at 16:10

## 2025-07-08 RX ADMIN — PANCRELIPASE LIPASE, PANCRELIPASE PROTEASE, PANCRELIPASE AMYLASE 10000 UNITS: 5000; 17000; 24000 CAPSULE, DELAYED RELEASE ORAL at 16:59

## 2025-07-08 RX ADMIN — SODIUM CHLORIDE, PRESERVATIVE FREE 10 ML: 5 INJECTION INTRAVENOUS at 20:27

## 2025-07-08 RX ADMIN — MORPHINE SULFATE 4 MG: 4 INJECTION, SOLUTION INTRAMUSCULAR; INTRAVENOUS at 00:27

## 2025-07-08 RX ADMIN — IPRATROPIUM BROMIDE AND ALBUTEROL SULFATE 1 DOSE: .5; 2.5 SOLUTION RESPIRATORY (INHALATION) at 20:39

## 2025-07-08 RX ADMIN — GUAIFENESIN SYRUP AND DEXTROMETHORPHAN 5 ML: 100; 10 SYRUP ORAL at 15:32

## 2025-07-08 RX ADMIN — GUAIFENESIN SYRUP AND DEXTROMETHORPHAN 5 ML: 100; 10 SYRUP ORAL at 20:25

## 2025-07-08 RX ADMIN — WATER 1000 MG: 1 INJECTION INTRAMUSCULAR; INTRAVENOUS; SUBCUTANEOUS at 20:26

## 2025-07-08 ASSESSMENT — PAIN SCALES - GENERAL
PAINLEVEL_OUTOF10: 6
PAINLEVEL_OUTOF10: 5
PAINLEVEL_OUTOF10: 4
PAINLEVEL_OUTOF10: 7
PAINLEVEL_OUTOF10: 7
PAINLEVEL_OUTOF10: 6
PAINLEVEL_OUTOF10: 6
PAINLEVEL_OUTOF10: 7

## 2025-07-08 ASSESSMENT — PAIN DESCRIPTION - ORIENTATION
ORIENTATION: RIGHT
ORIENTATION: LEFT

## 2025-07-08 ASSESSMENT — PAIN DESCRIPTION - LOCATION
LOCATION: CHEST

## 2025-07-08 ASSESSMENT — PAIN DESCRIPTION - DESCRIPTORS
DESCRIPTORS: SHARP
DESCRIPTORS: BURNING;ACHING
DESCRIPTORS: ACHING;BURNING
DESCRIPTORS: SHARP

## 2025-07-08 ASSESSMENT — PAIN - FUNCTIONAL ASSESSMENT
PAIN_FUNCTIONAL_ASSESSMENT: PREVENTS OR INTERFERES SOME ACTIVE ACTIVITIES AND ADLS
PAIN_FUNCTIONAL_ASSESSMENT: ACTIVITIES ARE NOT PREVENTED
PAIN_FUNCTIONAL_ASSESSMENT: PREVENTS OR INTERFERES SOME ACTIVE ACTIVITIES AND ADLS

## 2025-07-08 NOTE — DISCHARGE INSTR - DIET
Good nutrition is important when healing from an illness, injury, or surgery.  Follow any nutrition recommendations given to you during your hospital stay.   If you were given an oral nutrition supplement while in the hospital, continue to take this supplement at home.  You can take it with meals, in-between meals, and/or before bedtime. These supplements can be purchased at most local grocery stores, pharmacies, and chain super-stores.   If you have any questions about your diet or nutrition, call the hospital and ask for the dietitian.  Due to malnutrition diagnosis, after discharge, RD recommends patient to drink high calorie, high protein oral nutrition supplements of greater than 200 calories per serving with at least or greater than 10 gm protein per serving, at least 2-3 times per day, to promote increased po intakes and weight gain. Coupons and High Calorie, High Protein Nutrition Therapy handouts provided.     Suggestions to follow at home to improve appetite:   Aim for small, frequent eating sessions. (I.e. 5-8 times per day of smaller portions)   Schedule eating times (I.e. every 2-4 hours would have a snack)   Enhance the eating environment (I.e. Eating with family and friends, watching favorite movie, or listening to favorite music with meal)   Identify problem smells taste, textures, and temperatures  Choose foods that are easy to chew and swallow   Avoid drinking fluids during eating session. Save drinks and sips between meals.     There is no need to apply all of these strategies at once.     Many patients benefit from adding 1 or 2 suggestions at a time to see how they affect their ability to eat, and then making an informed decision on how to proceed.     Please read Nutrition handout below:     High Calorie, High Protein Nutrition Therapy from Nutrition Care Manual     A high-calorie, high-protein diet has been recommended to you. Your registered dietitian nutritionist (RDN) may have recommended  carrots  1 medium apple  1 cup grape juice   Afternoon Snack 1 whole wheat isaak bread  ½ cup hummus  ½ cup orange juice   Evening Meal Burrito made with: 2, 6-inch corn tortilla  ½ cup refried vegetarian beans  ½ cup chopped tomatoes  ½ cup lettuce  2 tablespoons salsa  ½ cup brown rice  ½ tablespoon olive oil for rice  ½ cup cooked zucchini  1 cup whole milk   Evening Snack ½ cup raisins  ¼ cup peanuts   Daily Sum  Nutrient Unit Value   Macronutrients   Energy kcal 3021   Energy kJ 54212   Protein g 105   Total lipid (fat) g 124   Carbohydrate, by difference g 405   Fiber, total dietary g 59   Sugars, total g 174   Minerals   Calcium, Ca mg 1659   Iron, Fe mg 20   Sodium, Na mg 2733   Vitamins   Vitamin C, total ascorbic acid mg 168   Vitamin A, IU IU 26625   Vitamin D    Lipids   Fatty acids, total saturated g 35   Fatty acids, total monounsaturated g 51   Fatty acids, total polyunsaturated g 26   Cholesterol mg 286

## 2025-07-08 NOTE — PLAN OF CARE
Problem: Discharge Planning  Goal: Discharge to home or other facility with appropriate resources  7/8/2025 1257 by Barbara Walsh, RN  Outcome: Progressing  7/8/2025 0038 by Jessie Mcmillan, RN  Outcome: Progressing     Problem: Pain  Goal: Verbalizes/displays adequate comfort level or baseline comfort level  7/8/2025 1257 by Barbara Walsh, RN  Outcome: Progressing  7/8/2025 0038 by Jessie Mcmillan, RN  Outcome: Progressing     Problem: Safety - Adult  Goal: Free from fall injury  Outcome: Progressing     Problem: Nutrition Deficit:  Goal: Optimize nutritional status  Outcome: Progressing

## 2025-07-08 NOTE — PROGRESS NOTES
Progress Note      Name:  Jama Ramirez /Age/Sex: 1960  (64 y.o. male)   MRN & CSN:  [id number] & [id number] Encounter Date/Time: 2025 11:10 PM EDT   Location:  3008/3008-A PCP: Jennifer Gomez MD       Hospital Day: 2    Assessment and Plan:     Pneumonia, on IV abx and pending workup. Chest pain appears to be due to PNA.    #. Pneumonia  - WBC 12, lactic acid 1.6. Procalcitonin 0.21  - Strep pneumo and legionella negative  - CT chest/abdomen/pelvis-moderate infiltrate in the right middle lobe.  Mild bronchial wall thickening consistent with bronchitis of the lung bases  - Follow respiratory culture  - Continue ceftriaxone, azithromycin  - Continue DuoNeb, Mucinex    #.  Chest pain  - Reproducible on examination, right sided and correlates with location of pneumonia thus that is most likely etiology  - Troponin 13  - EKG with no acute ST-T wave changes  - Lidocaine patch ordered  - Stress test-2021-normal study  - [facility]-2017-no significant CAD    #.  COPD  - Does not appear to be in exacerbation  - Continue bronchodilators    #.  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  Chronic pain-on Norco    #.  Chronic anemia    #.  GERD status post Nissen fundoplication-2018    #.  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  BPH  - Had cystoscopy and greenlight laser vaporization-2023    #.  Chronic tobacco dependence  - Admits to half PPD    Disposition:   Current Living situation: Home    Diet Regular   DVT Prophylaxis [x] Lovenox   Code Status FULL-discussed with patient   Surrogate Decision Maker/ POA Ex-Foster Viola     History from:   EMR, patient.    History of Present Illness:     Chief Complaint: Bacterial pneumonia    Pt continues to endorse right sided chest pain. Does have cough productive of yellow  disease (HCC) 5/9/2025    Motion sickness     PONV (postoperative nausea and vomiting)     Prolonged emergence from general anesthesia     Vertigo      PSHX:  has a past surgical history that includes Arm Surgery (Left, 30+ yrs); Upper gastrointestinal endoscopy (12/2018); Dental surgery; Gastric fundoplication (N/A, 12/26/2018); Upper gastrointestinal endoscopy (N/A, 01/28/2019); Upper gastrointestinal endoscopy (N/A, 04/08/2019); Colonoscopy (2010); Colonoscopy (2018); eye surgery (Left, 1990's); Upper gastrointestinal endoscopy (N/A, 07/08/2019); Carpal tunnel release (Right, 10/04/2019); Colonoscopy (N/A, 12/30/2019); Upper gastrointestinal endoscopy (N/A, 12/30/2019); Upper gastrointestinal endoscopy (N/A, 06/05/2020); Endoscopy, colon, diagnostic (04/08/2019); Endoscopy, colon, diagnostic (06/01/2021); Upper gastrointestinal endoscopy (N/A, 06/01/2021); Upper gastrointestinal endoscopy (N/A, 06/29/2021); hernia repair; Colonoscopy (N/A, 12/16/2021); Upper gastrointestinal endoscopy (N/A, 12/16/2021); Carpal tunnel release (Left, 04/12/2022); Upper gastrointestinal endoscopy (N/A, 07/07/2023); IR CHOLECYSTOSTOMY PERCUTANEOUS COMPLETE (05/18/2024); Upper gastrointestinal endoscopy (N/A, 05/17/2024); Abdomen surgery; Whipple procedure w/ laparoscopy; and Port Surgery (Left, 8/30/2024).  Allergies:   Allergies   Allergen Reactions    Bee Venom Anaphylaxis     \"swell up like a balloon in face/throat\"      Nsaids Other (See Comments)     Can take small doses for a short time - Not to take any longer than necessary stomach ulcer     Fam HX: family history includes Breast Cancer in his sister; Cancer in his brother; Dementia in his mother; Diabetes in his brother; Heart Attack in his maternal uncle; Heart Disease in his father and maternal uncle; High Blood Pressure in his father; No Known Problems in his daughter, son, and son; Stroke in his father and mother.  Soc HX:   Social History     Socioeconomic History

## 2025-07-08 NOTE — CARE COORDINATION
Pt lives alone in [facility], [place].  Pt has family support.  Pt does not have HC or DME at home.  Pt independent of his ADLs prior to admission.;  Pt has a PCP and has insurance to help with healthcare cost.  No needs identified at his time.  CM available if needed.       07/08/25 0837   Service Assessment   Patient Orientation Alert and Oriented   Cognition Alert   History Provided By Patient;Medical Record   Primary Caregiver Self   Support Systems Family Members   Patient's Healthcare Decision Maker is: Named in Scanned ACP Document   PCP Verified by CM Yes   Prior Functional Level Independent in ADLs/IADLs   Current Functional Level Independent in ADLs/IADLs   Can patient return to prior living arrangement Yes   Ability to make needs known: Good   Family able to assist with home care needs: Other (comment)  (unknown)   Would you like for me to discuss the discharge plan with any other family members/significant others, and if so, who? No   Social/Functional History   Lives With Alone   Type of Home Apartment   Mode of Transportation Car   Discharge Planning   Type of Residence Apartment   Living Arrangements Alone   Current Services Prior To Admission None   Potential Assistance Needed N/A   Type of Home Care Services None   Patient expects to be discharged to: Apartment   Condition of Participation: Discharge Planning   The Patient and/or Patient Representative was provided with a Choice of Provider? Patient   The Patient and/Or Patient Representative agree with the Discharge Plan? Yes   Freedom of Choice list was provided with basic dialogue that supports the patient's individualized plan of care/goals, treatment preferences, and shares the quality data associated with the providers?  Yes

## 2025-07-08 NOTE — PROGRESS NOTES
Comprehensive Nutrition Assessment    Type and Reason for Visit:  Initial, Positive nutrition screen (unsure of wt loss, poor appetite)    Nutrition Recommendations/Plan:   Continue regular   Trial frozen and fortified gelatin oral nutrition supplements  Monitor weights, po intakes, GI status, labs, POC     Malnutrition Assessment:  Malnutrition Status:  Severe malnutrition (07/08/25 0918)    Context:  Chronic Illness     Findings of the 6 clinical characteristics of malnutrition:  Energy Intake:  75% or less estimated energy requirements for 1 month or longer  Weight Loss:  Unable to assess (estimated)     Body Fat Loss:  Severe body fat loss Orbital, Triceps, Buccal region   Muscle Mass Loss:    Thigh (quadriceps), Calf (gastrocnemius)  Fluid Accumulation:  No fluid accumulation     Strength:  Not Performed    Nutrition Assessment:    Admitted w/ bacterial PNA, h/o PUD s/p Nissen fundiplocation, GERD, hiatal hernia, cancer of small bowel.Pt reports he is eating just OK but c/o food feeling like it gets stuck low in his chest, points to area under sternum. Also c/o dry mouth this is sometimes painful but denies active sores. Pt reports about 10# wt loss, #, now 130# per his recall. CBW is estimated, JORDI wt changes accurately. States he also takes Creon w/ all meals. Meets criteria for malnutrition. Dislikes traditional Ensure-type oral supplements, willing to try gel or Magic cup. Follow at high nutritoin risk at this time.    Nutrition Related Findings:    +NaCl @ 100ml/hr, remeron, Zenpep, ATBs; K 3.4, hgb 10.6 Wound Type: None       Current Nutrition Intake & Therapies:    Average Meal Intake: Unable to assess  Average Supplements Intake: None Ordered  ADULT DIET; Regular    Anthropometric Measures:  Height: 175.3 cm (5' 9\")  Ideal Body Weight (IBW): 160 lbs (73 kg)       Current Body Weight: 59 kg (130 lb),   IBW. Weight Source:  (estimated)  Current BMI (kg/m2): 19.2  Usual Body Weight: 60.1 kg (132

## 2025-07-08 NOTE — PROGRESS NOTES
4 Eyes Skin Assessment     NAME:  Jama Ramirez  YOB: 1960  MEDICAL RECORD NUMBER:  [id number]    The patient is being assessed for  Admission    I agree that at least one RN has performed a thorough Head to Toe Skin Assessment on the patient. ALL assessment sites listed below have been assessed.      Areas assessed by both nurses:    Head, Face, Ears, Shoulders, Back, Chest, Arms, Elbows, Hands, Sacrum. Buttock, Coccyx, Ischium, Legs. Feet and Heels, and Under Medical Devices         Does the Patient have a Wound? No noted wound(s)       Jefe Prevention initiated by RN: Yes  Wound Care Orders initiated by RN: No    Pressure Injury (Stage 1,2,3,4, Unstageable, DTI, NWPT, and Complex wounds) if present, place Wound referral order by RN under : No    New Ostomies, if present place, Ostomy referral order under : No     Nurse 1 eSignature: Electronically signed by Jessie Mcmillan RN on 7/8/25 at 1:25 AM EDT    **SHARE this note so that the co-signing nurse can place an eSignature**    Nurse 2 eSignature: Electronically signed by Binu Urbina RN on 7/8/25 at 1:39 AM EDT    .

## 2025-07-08 NOTE — H&P
software and may contain errors related to that system including errors in grammar, punctuation, and spelling, as well as words and phrases that may be inappropriate. If there are any questions or concerns please feel free to contact the dictating provider for clarification.

## 2025-07-08 NOTE — ED TRIAGE NOTES
ED TO INPATIENT SBAR HANDOFF    Patient Name: Jama Ramirez   :  1960  64 y.o.   Preferred Name  Rudolph  Family/Caregiver Present no   Restraints no   C-SSRS: Risk of Suicide: No Risk  Sitter no   Sepsis Risk Score Sepsis V2 Risk Score: 11.2    PLEASE NOTE--Encounter / Re-Admission Within 30 Days  This patient has had another encounter or admission within the last 30 days.      Readmission Risk Score: 26.9      Situation  Chief Complaint   Patient presents with    Chest Pain     X 4 days; right sided    Shortness of Breath     X 4 days; hx of COPD/asthma; productive cough w/ yellow mucus     Brief Description of Patient's Condition: Chest Pain (X 4 days; right sided)  Shortness of Breath (X 4 days; hx of COPD/asthma; productive cough w/ yellow mucus)  Mental Status: oriented and alert  Arrived from: home    Imaging:   CT CHEST ABDOMEN PELVIS W CONTRAST Additional Contrast? None   Final Result      XR CHEST (2 VW)   Final Result        Abnormal labs:   Abnormal Labs Reviewed   CBC WITH AUTO DIFFERENTIAL - Abnormal; Notable for the following components:       Result Value    WBC 12.0 (*)     RBC 4.27 (*)     Hemoglobin 11.7 (*)     Hematocrit 35.7 (*)     RDW 25.2 (*)     Neutrophils % 80 (*)     Lymphocytes % 14 (*)     Monocytes % 6 (*)     All other components within normal limits   COMPREHENSIVE METABOLIC PANEL W/ REFLEX TO MG FOR LOW K - Abnormal; Notable for the following components:    CO2 17 (*)     Glucose 112 (*)     Creatinine 0.7 (*)     Total Protein 5.7 (*)     Alkaline Phosphatase 331 (*)     AST 38 (*)     All other components within normal limits   LIPASE - Abnormal; Notable for the following components:    Lipase 8 (*)     All other components within normal limits        Background  History:   Past Medical History:   Diagnosis Date    Arthritis     Hands    Cancer of ampulla of Vater (HCC) 2024    Dr. CHUYITA Herring    Chronic cluster headache 2019 last    COPD (chronic obstructive pulmonary

## 2025-07-08 NOTE — ED PROVIDER NOTES
by myself in the absence of a cardiologist) sinus rhythm at 82.  Normal axis with good R wave progression.  No ST elevation or depression.  No ectopy.  No acute change from prior.    Chronic conditions affecting care: Metastatic pancreatic cancer, chronic anemia, HTN, COPD      Records Reviewed : Outpatient Notes Dr. Herring's notes from 7/1/2025 were reviewed as well as prior imaging from June.    Disposition Considerations (tests considered but not done, Shared Decision Making, Pt Expectation of Test or Tx.):     Patient to be admitted as above    I am the Primary Clinician of Record.    Final Impression:  1. Multifocal pneumonia    2. Acute respiratory failure with hypoxia (HCC)    3. Primary malignant neoplasm of pancreas with metastasis to other site (HCC)      DISPOSITION Admitted 07/07/2025 11:10:08 PM               Patient referred to:  No follow-up provider specified.  Discharge medications:  Current Discharge Medication List        (Please note that portions of this note may have been completed with a voice recognition program. Efforts were made to edit the dictations but occasionally words are mis-transcribed.)    DO Luis Daniel Scott Jenny L, DO  07/09/25 1243

## 2025-07-09 LAB
MRSA, DNA, NASAL: NOT DETECTED
SPECIMEN DESCRIPTION: NORMAL

## 2025-07-09 PROCEDURE — 6370000000 HC RX 637 (ALT 250 FOR IP): Performed by: INTERNAL MEDICINE

## 2025-07-09 PROCEDURE — 6360000002 HC RX W HCPCS: Performed by: STUDENT IN AN ORGANIZED HEALTH CARE EDUCATION/TRAINING PROGRAM

## 2025-07-09 PROCEDURE — 6360000002 HC RX W HCPCS: Performed by: INTERNAL MEDICINE

## 2025-07-09 PROCEDURE — 1200000000 HC SEMI PRIVATE

## 2025-07-09 PROCEDURE — 87641 MR-STAPH DNA AMP PROBE: CPT

## 2025-07-09 PROCEDURE — 94761 N-INVAS EAR/PLS OXIMETRY MLT: CPT

## 2025-07-09 PROCEDURE — 2580000003 HC RX 258: Performed by: INTERNAL MEDICINE

## 2025-07-09 PROCEDURE — 2700000000 HC OXYGEN THERAPY PER DAY

## 2025-07-09 PROCEDURE — 2500000003 HC RX 250 WO HCPCS: Performed by: INTERNAL MEDICINE

## 2025-07-09 PROCEDURE — 94640 AIRWAY INHALATION TREATMENT: CPT

## 2025-07-09 PROCEDURE — 6370000000 HC RX 637 (ALT 250 FOR IP): Performed by: STUDENT IN AN ORGANIZED HEALTH CARE EDUCATION/TRAINING PROGRAM

## 2025-07-09 RX ADMIN — PANCRELIPASE LIPASE, PANCRELIPASE PROTEASE, PANCRELIPASE AMYLASE 10000 UNITS: 5000; 17000; 24000 CAPSULE, DELAYED RELEASE ORAL at 12:08

## 2025-07-09 RX ADMIN — SODIUM CHLORIDE, PRESERVATIVE FREE 10 ML: 5 INJECTION INTRAVENOUS at 08:31

## 2025-07-09 RX ADMIN — ENOXAPARIN SODIUM 40 MG: 100 INJECTION SUBCUTANEOUS at 08:28

## 2025-07-09 RX ADMIN — PANCRELIPASE LIPASE, PANCRELIPASE PROTEASE, PANCRELIPASE AMYLASE 10000 UNITS: 5000; 17000; 24000 CAPSULE, DELAYED RELEASE ORAL at 08:29

## 2025-07-09 RX ADMIN — GUAIFENESIN SYRUP AND DEXTROMETHORPHAN 5 ML: 100; 10 SYRUP ORAL at 22:10

## 2025-07-09 RX ADMIN — IPRATROPIUM BROMIDE AND ALBUTEROL SULFATE 1 DOSE: .5; 2.5 SOLUTION RESPIRATORY (INHALATION) at 12:31

## 2025-07-09 RX ADMIN — GUAIFENESIN 600 MG: 600 TABLET, EXTENDED RELEASE ORAL at 08:29

## 2025-07-09 RX ADMIN — WATER 1000 MG: 1 INJECTION INTRAMUSCULAR; INTRAVENOUS; SUBCUTANEOUS at 22:10

## 2025-07-09 RX ADMIN — IPRATROPIUM BROMIDE AND ALBUTEROL SULFATE 1 DOSE: .5; 2.5 SOLUTION RESPIRATORY (INHALATION) at 17:17

## 2025-07-09 RX ADMIN — GUAIFENESIN SYRUP AND DEXTROMETHORPHAN 5 ML: 100; 10 SYRUP ORAL at 16:33

## 2025-07-09 RX ADMIN — PANCRELIPASE LIPASE, PANCRELIPASE PROTEASE, PANCRELIPASE AMYLASE 15000 UNITS: 15000; 47000; 63000 CAPSULE, DELAYED RELEASE ORAL at 18:05

## 2025-07-09 RX ADMIN — HYDROMORPHONE HYDROCHLORIDE 0.5 MG: 1 INJECTION, SOLUTION INTRAMUSCULAR; INTRAVENOUS; SUBCUTANEOUS at 16:23

## 2025-07-09 RX ADMIN — PANCRELIPASE LIPASE, PANCRELIPASE PROTEASE, PANCRELIPASE AMYLASE 15000 UNITS: 15000; 47000; 63000 CAPSULE, DELAYED RELEASE ORAL at 08:30

## 2025-07-09 RX ADMIN — GUAIFENESIN 600 MG: 600 TABLET, EXTENDED RELEASE ORAL at 22:10

## 2025-07-09 RX ADMIN — ACETAMINOPHEN 650 MG: 325 TABLET ORAL at 06:05

## 2025-07-09 RX ADMIN — PANCRELIPASE LIPASE, PANCRELIPASE PROTEASE, PANCRELIPASE AMYLASE 10000 UNITS: 5000; 17000; 24000 CAPSULE, DELAYED RELEASE ORAL at 18:05

## 2025-07-09 RX ADMIN — SODIUM CHLORIDE, PRESERVATIVE FREE 10 ML: 5 INJECTION INTRAVENOUS at 22:10

## 2025-07-09 RX ADMIN — MIRTAZAPINE 15 MG: 15 TABLET, FILM COATED ORAL at 22:10

## 2025-07-09 RX ADMIN — AZITHROMYCIN DIHYDRATE 500 MG: 500 INJECTION, POWDER, LYOPHILIZED, FOR SOLUTION INTRAVENOUS at 22:16

## 2025-07-09 RX ADMIN — IPRATROPIUM BROMIDE AND ALBUTEROL SULFATE 1 DOSE: .5; 2.5 SOLUTION RESPIRATORY (INHALATION) at 21:04

## 2025-07-09 RX ADMIN — PANCRELIPASE LIPASE, PANCRELIPASE PROTEASE, PANCRELIPASE AMYLASE 15000 UNITS: 15000; 47000; 63000 CAPSULE, DELAYED RELEASE ORAL at 12:08

## 2025-07-09 RX ADMIN — PANTOPRAZOLE SODIUM 40 MG: 40 TABLET, DELAYED RELEASE ORAL at 06:05

## 2025-07-09 ASSESSMENT — PAIN SCALES - GENERAL
PAINLEVEL_OUTOF10: 3
PAINLEVEL_OUTOF10: 5
PAINLEVEL_OUTOF10: 6
PAINLEVEL_OUTOF10: 4
PAINLEVEL_OUTOF10: 3

## 2025-07-09 ASSESSMENT — PAIN DESCRIPTION - LOCATION
LOCATION: ABDOMEN
LOCATION: CHEST
LOCATION: HEAD
LOCATION: HEAD

## 2025-07-09 ASSESSMENT — PAIN DESCRIPTION - ORIENTATION
ORIENTATION: RIGHT
ORIENTATION: RIGHT;LEFT
ORIENTATION: RIGHT

## 2025-07-09 ASSESSMENT — PAIN DESCRIPTION - DESCRIPTORS
DESCRIPTORS: ACHING
DESCRIPTORS: ACHING

## 2025-07-09 NOTE — PLAN OF CARE
Problem: Discharge Planning  Goal: Discharge to home or other facility with appropriate resources  7/8/2025 2017 by Jessie Mcmillan, RN  Outcome: Progressing  7/8/2025 1257 by Barbara Walsh, RN  Outcome: Progressing

## 2025-07-09 NOTE — PROGRESS NOTES
Progress Note      Name:  Jama Ramirez /Age/Sex: 1960  (64 y.o. male)   MRN & CSN:  [id number] & [id number] Encounter Date/Time: 2025 11:10 PM EDT   [facility] PCP: Jennifer Gomez MD       Hospital Day: 3    Assessment and Plan:     #. Pneumonia  - WBC 12, lactic acid 1.6. Procalcitonin 0.21  - Strep pneumo and legionella negative  - CT chest/abdomen/pelvis-moderate infiltrate in the right middle lobe.  Mild bronchial wall thickening consistent with bronchitis of the lung bases  - Awaiting respiratory culture  - Continue ceftriaxone, azithromycin  - Continue DuoNeb, Mucinex    #.  Chest pain  - Reproducible on examination, right sided and correlates with location of pneumonia thus that is most likely etiology  - Troponin 13  - EKG with no acute ST-T wave changes  - Lidocaine patch ordered  - Stress test-2021-normal study  - [facility]-2017-no significant CAD    #.  COPD  - Does not appear to be in exacerbation  - Continue bronchodilators    #.  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  Chronic pain-on Norco    #.  Chronic anemia    #.  GERD status post Nissen fundoplication-2018    #.  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  BPH  - Had cystoscopy and greenlight laser vaporization-2023    #.  Chronic tobacco dependence  - Admits to half PPD    Disposition:   Current Living situation: Home    Diet Regular   DVT Prophylaxis [x] Lovenox   Code Status FULL-discussed with patient   Surrogate Decision Maker/ POA Ex-Foster Viola     History from:   EMR, patient.    History of Present Illness:     Chief Complaint: Bacterial pneumonia    Does have cough productive of yellow sputum. No other complaints.  Did have fever 102.7 Fahrenheit yesterday    Review of Systems: Need 10 Elements   10 point review

## 2025-07-09 NOTE — CARE COORDINATION
CM reviewed pt’s medical record and discussed pt in IDR. CM in to see pt at bedside to discuss discharge plan. Plan for discharge is to return home alone with family support. CM offered HHC but pt declined.

## 2025-07-10 VITALS
HEIGHT: 69 IN | OXYGEN SATURATION: 94 % | TEMPERATURE: 97.6 F | WEIGHT: 130 LBS | HEART RATE: 72 BPM | RESPIRATION RATE: 20 BRPM | DIASTOLIC BLOOD PRESSURE: 85 MMHG | SYSTOLIC BLOOD PRESSURE: 121 MMHG | BODY MASS INDEX: 19.26 KG/M2

## 2025-07-10 LAB
MICROORGANISM SPEC CULT: ABNORMAL
MICROORGANISM/AGENT SPEC: ABNORMAL
SPECIMEN DESCRIPTION: ABNORMAL

## 2025-07-10 PROCEDURE — 6370000000 HC RX 637 (ALT 250 FOR IP): Performed by: INTERNAL MEDICINE

## 2025-07-10 PROCEDURE — 6360000002 HC RX W HCPCS: Performed by: INTERNAL MEDICINE

## 2025-07-10 PROCEDURE — 6370000000 HC RX 637 (ALT 250 FOR IP): Performed by: STUDENT IN AN ORGANIZED HEALTH CARE EDUCATION/TRAINING PROGRAM

## 2025-07-10 PROCEDURE — 94640 AIRWAY INHALATION TREATMENT: CPT

## 2025-07-10 PROCEDURE — 94761 N-INVAS EAR/PLS OXIMETRY MLT: CPT

## 2025-07-10 PROCEDURE — 2500000003 HC RX 250 WO HCPCS: Performed by: INTERNAL MEDICINE

## 2025-07-10 RX ORDER — LEVOFLOXACIN 750 MG/1
750 TABLET, FILM COATED ORAL DAILY
Qty: 5 TABLET | Refills: 0 | Status: SHIPPED | OUTPATIENT
Start: 2025-07-10 | End: 2025-07-15

## 2025-07-10 RX ORDER — GUAIFENESIN 600 MG/1
600 TABLET, EXTENDED RELEASE ORAL 2 TIMES DAILY
Qty: 20 TABLET | Refills: 0 | Status: SHIPPED | OUTPATIENT
Start: 2025-07-10 | End: 2025-07-20

## 2025-07-10 RX ADMIN — GUAIFENESIN SYRUP AND DEXTROMETHORPHAN 5 ML: 100; 10 SYRUP ORAL at 06:31

## 2025-07-10 RX ADMIN — IPRATROPIUM BROMIDE AND ALBUTEROL SULFATE 1 DOSE: .5; 2.5 SOLUTION RESPIRATORY (INHALATION) at 08:27

## 2025-07-10 RX ADMIN — PANTOPRAZOLE SODIUM 40 MG: 40 TABLET, DELAYED RELEASE ORAL at 06:29

## 2025-07-10 RX ADMIN — SODIUM CHLORIDE, PRESERVATIVE FREE 10 ML: 5 INJECTION INTRAVENOUS at 10:17

## 2025-07-10 RX ADMIN — ENOXAPARIN SODIUM 40 MG: 100 INJECTION SUBCUTANEOUS at 10:17

## 2025-07-10 RX ADMIN — GUAIFENESIN SYRUP AND DEXTROMETHORPHAN 5 ML: 100; 10 SYRUP ORAL at 10:16

## 2025-07-10 RX ADMIN — GUAIFENESIN 600 MG: 600 TABLET, EXTENDED RELEASE ORAL at 10:16

## 2025-07-10 RX ADMIN — PANCRELIPASE LIPASE, PANCRELIPASE PROTEASE, PANCRELIPASE AMYLASE 15000 UNITS: 15000; 47000; 63000 CAPSULE, DELAYED RELEASE ORAL at 10:17

## 2025-07-10 RX ADMIN — PANCRELIPASE LIPASE, PANCRELIPASE PROTEASE, PANCRELIPASE AMYLASE 10000 UNITS: 5000; 17000; 24000 CAPSULE, DELAYED RELEASE ORAL at 10:17

## 2025-07-10 NOTE — PLAN OF CARE
Problem: Discharge Planning  Goal: Discharge to home or other facility with appropriate resources  7/9/2025 2149 by Jessie Mcmillan, RN  Outcome: Progressing  7/9/2025 1105 by Ethel Esposito, RN  Outcome: Progressing

## 2025-07-10 NOTE — PROGRESS NOTES
Outpatient Pharmacy Progress Note for Meds-to-Beds    Total number of Prescriptions Filled: 3    Additional Documentation:  Medications given to nurse terry to provide to patient      Thank you for letting us serve your patients.  [facility] - [address] [address]    Phone: [phone]    Fax: [phone]

## 2025-07-13 LAB
MICROORGANISM SPEC CULT: NORMAL
MICROORGANISM SPEC CULT: NORMAL
SERVICE CMNT-IMP: NORMAL
SERVICE CMNT-IMP: NORMAL
SPECIMEN DESCRIPTION: NORMAL
SPECIMEN DESCRIPTION: NORMAL

## 2025-07-14 NOTE — DISCHARGE SUMMARY
V2.0  Discharge Summary    Name:  Jama Ramirez /Age/Sex: 1960 (64 y.o. male)   Admit Date: 2025  Discharge Date: 7/10/25    MRN & CSN:  [id number] & [id number] Encounter Date and Time 7/10/25 9:40 AM EDT    Attending:  No att. providers found Discharging Provider: Elvira Aguilera MD       Hospital Course:     Brief HPI: Jama Ramirez is a 64 y.o. male present to ED with complaints of chest pain.  Patient reported having chest pain for the last 2-3 days, constant, got worse today.  Patient describes substernal chest pain, worse with coughing, also described having productive cough with yellow phlegm.  Had fever 2 days ago, admits to nausea, vomiting, shortness of breath.  Has chronic abdominal pain.  Denying any urinary complaints, no constipation or diarrhea.  Continues to smoke.     Brief Problem Based Course:   Community-acquired pneumonia: Patient was started on Rocephin and azithromycin with improvement in symptoms respiratory culture with normal respiratory arias,  antibiotics switched to oral Levaquin on discharge to complete total 7-day of treatment    Right-sided chest pain which is likely secondary to underlying pneumonia versus musculoskeletal troponin within normal limit EKG without any acute ST-T wave changes.      The patient expressed appropriate understanding of, and agreement with the discharge recommendations, medications, and plan.     Consults this admission:  None    Discharge Diagnosis:   Bacterial pneumonia      Discharge Instruction:   Follow up appointments:    Primary care physician: Jennifer Gomez MD within 2 weeks  Diet: regular diet   Activity: activity as tolerated  Disposition: Discharged to:   [x]Home, []C, []SNF, []Acute Rehab, []Hospice   Condition on discharge: Stable  Labs and Tests to be Followed up as an outpatient by PCP or Specialist:     Discharge Medications:        Medication List        START taking these medications      guaiFENesin 600 MG extended

## 2025-07-15 LAB
DNA RANGE(S) EXAMINED NAR: NORMAL
DNA RANGE(S) EXAMINED NAR: NORMAL
GENE DIS ANL INTERP-IMP: POSITIVE
GENE DIS ANL INTERP-IMP: POSITIVE
GENE DIS ASSESSED: NORMAL
GENE DIS ASSESSED: NORMAL
GENE MUT TESTED BLD/T: 3.7 M/MB
GENE MUT TESTED BLD/T: 5.8 M/MB
HLA-A HIGH RES: NORMAL
HLA-A HIGH RES: NORMAL
HLA-A UNRESLVD ALLELES HIGH RES: YES
HLA-A UNRESLVD ALLELES HIGH RES: YES
HLA-B HIGH RES: NORMAL
HLA-B HIGH RES: NORMAL
HLA-B UNRESLVD ALLELES HIGH RES: YES
HLA-B UNRESLVD ALLELES HIGH RES: YES
HLA-C HIGH RES: NORMAL
HLA-C HIGH RES: NORMAL
HLA-C UNRESLVD ALLELES HIGH RES: YES
HLA-C UNRESLVD ALLELES HIGH RES: YES
MSI CA SPEC-IMP: NORMAL
MSI CA SPEC-IMP: NORMAL
REASON FOR STUDY: NORMAL
REASON FOR STUDY: NORMAL
TEMPUS GERMLINE NOTE: NORMAL
TEMPUS GERMLINE NOTE: NORMAL
TEMPUS HLA-A SAMPLE TYPE: NORMAL
TEMPUS HLA-A SAMPLE TYPE: NORMAL
TEMPUS HLA-B SAMPLE TYPE: NORMAL
TEMPUS HLA-B SAMPLE TYPE: NORMAL
TEMPUS HLA-C SAMPLE TYPE: NORMAL
TEMPUS HLA-C SAMPLE TYPE: NORMAL
TEMPUS LCA: NORMAL
TEMPUS LCA: NORMAL
TEMPUS PORTAL: NORMAL
TEMPUS PORTAL: NORMAL
TEMPUS TREATMENT IMPLICATIONS NOTE: NORMAL
TEMPUS TREATMENT IMPLICATIONS NOTE: NORMAL
TEMPUS TRIAL1: NORMAL
TEMPUS TRIAL1: NORMAL
TEMPUS TRIAL3: NORMAL
TEMPUS TRIAL3: NORMAL
TEMPUS TRIALCOUNT: 3
TEMPUS TRIALCOUNT: 3
TEMPUS TRIALMATCHES2: NORMAL
TEMPUS TRIALMATCHES2: NORMAL
TEMPUS XR RESULT 1: NORMAL
TEMPUS XR RESULT 1: NORMAL

## 2025-07-16 ENCOUNTER — HOSPITAL ENCOUNTER (OUTPATIENT)
Dept: INFUSION THERAPY | Age: 65
Discharge: HOME | End: 2025-07-16
Payer: MEDICAID

## 2025-07-16 ENCOUNTER — OFFICE VISIT (OUTPATIENT)
Dept: ONCOLOGY | Age: 65
End: 2025-07-16
Payer: MEDICAID

## 2025-07-16 ENCOUNTER — CLINICAL DOCUMENTATION (OUTPATIENT)
Dept: INFUSION THERAPY | Age: 65
End: 2025-07-16

## 2025-07-16 ENCOUNTER — TELEPHONE (OUTPATIENT)
Dept: ONCOLOGY | Age: 65
End: 2025-07-16

## 2025-07-16 ENCOUNTER — CLINICAL DOCUMENTATION (OUTPATIENT)
Dept: ONCOLOGY | Age: 65
End: 2025-07-16

## 2025-07-16 VITALS
BODY MASS INDEX: 18.51 KG/M2 | DIASTOLIC BLOOD PRESSURE: 68 MMHG | WEIGHT: 125 LBS | HEART RATE: 68 BPM | HEIGHT: 69 IN | SYSTOLIC BLOOD PRESSURE: 123 MMHG | OXYGEN SATURATION: 96 % | TEMPERATURE: 97.6 F

## 2025-07-16 DIAGNOSIS — C24.1 CANCER OF AMPULLA OF VATER (HCC): ICD-10-CM

## 2025-07-16 DIAGNOSIS — K20.90 ESOPHAGITIS: ICD-10-CM

## 2025-07-16 DIAGNOSIS — C77.2 MALIGNANT NEOPLASM METASTATIC TO INTRA-ABDOMINAL LYMPH NODE (HCC): Primary | ICD-10-CM

## 2025-07-16 PROCEDURE — 3017F COLORECTAL CA SCREEN DOC REV: CPT | Performed by: INTERNAL MEDICINE

## 2025-07-16 PROCEDURE — 3074F SYST BP LT 130 MM HG: CPT | Performed by: INTERNAL MEDICINE

## 2025-07-16 PROCEDURE — 99214 OFFICE O/P EST MOD 30 MIN: CPT | Performed by: INTERNAL MEDICINE

## 2025-07-16 PROCEDURE — 99212 OFFICE O/P EST SF 10 MIN: CPT

## 2025-07-16 PROCEDURE — 1111F DSCHRG MED/CURRENT MED MERGE: CPT | Performed by: INTERNAL MEDICINE

## 2025-07-16 PROCEDURE — G8419 CALC BMI OUT NRM PARAM NOF/U: HCPCS | Performed by: INTERNAL MEDICINE

## 2025-07-16 PROCEDURE — 3078F DIAST BP <80 MM HG: CPT | Performed by: INTERNAL MEDICINE

## 2025-07-16 PROCEDURE — 1036F TOBACCO NON-USER: CPT | Performed by: INTERNAL MEDICINE

## 2025-07-16 PROCEDURE — G8427 DOCREV CUR MEDS BY ELIG CLIN: HCPCS | Performed by: INTERNAL MEDICINE

## 2025-07-16 RX ORDER — FLUCONAZOLE 100 MG/1
200 TABLET ORAL ONCE
Qty: 2 TABLET | Refills: 0 | Status: SHIPPED | OUTPATIENT
Start: 2025-07-16 | End: 2025-07-16

## 2025-07-16 NOTE — PROGRESS NOTES
Patient in clinic for OV today 07/16/2025. Plan is to proceed with starting CapeOx. Patient has already received oral chemo. Physician recommending to start karen taking Capecitabine at (2) 500 mg tablets BID instead of (3) 500 mg tablets BID as previously prescribed.      updated and scheduled patient for education on 07/22/2025 @ 1030 and tx start on 07/23/2025 @ 0815.  to contact patient with appointment times.

## 2025-07-16 NOTE — PROGRESS NOTES
MA/LPN Rooming Questions  Patient: Jama Ramirez  MRN: [id number]    Date: 7/16/2025        1. Do you have any new issues?   no         2. Do you need any refills on medications?    no    3. Have you had any imaging done since your last visit?   yes - [facility]    4. Have you been hospitalized or seen in the emergency room since your last visit here?   yes - [facility] 7/7/7/10    5. Did the patient have a depression screening completed today? No    No data recorded     PHQ-9 Given to (if applicable):               PHQ-9 Score (if applicable):                     [] Positive     []  Negative              Does question #9 need addressed (if applicable)                     [] Yes    []  No               Adilene Sandy CMA

## 2025-07-16 NOTE — TELEPHONE ENCOUNTER
Called patient regarding chemo ed and start date of tx, patient is scheduled for chemo ed on 07/22 @ 1030 and tx on 07/23 @ 0815, advised them that 1 visitor allowed at time of education and day of their treatments, patient states understanding

## 2025-07-16 NOTE — PROGRESS NOTES
Patient Name:  Jama Ramirez  Patient :  1960  Patient MRN:  [id number]     Primary Oncologist: Bijal Herring MD  Referring Provider: Jennifer Gomez MD     Date of Service: 2025      Reason for Consult:      Chief Complaint:    Chief Complaint   Patient presents with    Follow-up    Results       Encounter Diagnoses   Name Primary?    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  Patient was admitted to [facility] on 2024 with abdominal pain and jaundice.  He was found to have a mass adjacent to the ampulla with mass effect on CBD and PD.  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  ERCP was performed to try to cannulate the pancreatic duct however unable to do this due to significant friability, ulceration and anatomic distortion.  IR guided external/internal biliary drain, bilirubin decreased from 6 to 1.2 upon discharge.  EGD with biopsy of the duodenal mass was done.  Pathology inconclusive.  CTA of the chest with no evidence of any pulmonary metastatic disease.  CA 19-9 was 39 and CEA was 5.5.  2024 today in the office he reported that he continues to have abdominal discomfort around the biliary drain.  Reported that he has chronic cough secondary to COPD.  Productive of clear sputum.  No hemoptysis.  Reported that he had a coughing spell which caused bloody drainage through the drain.  Lasted for 2 days and resolved.  Reported that he has very poor appetite.  Continues to lose weight.  Reported fatigue and tiredness.  6/3/2024 PET scan with focal area of hypermetabolic FDG uptake demonstrated within the ampullary region near the medial descending duodenum/ pancreatic head.  SUV is 5.7.  No abnormal hypermetabolic uptake detected within the neck or chest.  No other distant metastatic disease is

## 2025-07-22 ENCOUNTER — HOSPITAL ENCOUNTER (OUTPATIENT)
Dept: INFUSION THERAPY | Age: 65
Discharge: HOME | End: 2025-07-22
Payer: MEDICAID

## 2025-07-22 ENCOUNTER — CLINICAL SUPPORT (OUTPATIENT)
Dept: ONCOLOGY | Age: 65
End: 2025-07-22

## 2025-07-22 VITALS
OXYGEN SATURATION: 98 % | BODY MASS INDEX: 18.4 KG/M2 | WEIGHT: 124.2 LBS | HEIGHT: 69 IN | SYSTOLIC BLOOD PRESSURE: 99 MMHG | TEMPERATURE: 97.4 F | DIASTOLIC BLOOD PRESSURE: 76 MMHG | HEART RATE: 72 BPM

## 2025-07-22 DIAGNOSIS — C7A.8 NEUROENDOCRINE CARCINOMA OF SMALL BOWEL (HCC): ICD-10-CM

## 2025-07-22 DIAGNOSIS — C24.1 CANCER OF AMPULLA OF VATER (HCC): ICD-10-CM

## 2025-07-22 DIAGNOSIS — C77.2 MALIGNANT NEOPLASM METASTATIC TO INTRA-ABDOMINAL LYMPH NODE (HCC): ICD-10-CM

## 2025-07-22 DIAGNOSIS — Z11.59 NEED FOR HEPATITIS B SCREENING TEST: ICD-10-CM

## 2025-07-22 DIAGNOSIS — Z79.69 NEED FOR PROPHYLACTIC CHEMOTHERAPY: ICD-10-CM

## 2025-07-22 DIAGNOSIS — C24.1 MALIGNANT NEOPLASM OF AMPULLA OF VATER (HCC): Primary | ICD-10-CM

## 2025-07-22 LAB
ALBUMIN SERPL-MCNC: 3.5 G/DL (ref 3.4–5)
ALBUMIN/GLOB SERPL: 1.7 {RATIO} (ref 1.1–2.2)
ALP SERPL-CCNC: 397 U/L (ref 40–129)
ALT SERPL-CCNC: 29 U/L (ref 10–40)
ANION GAP SERPL CALCULATED.3IONS-SCNC: 12 MMOL/L (ref 9–17)
AST SERPL-CCNC: 40 U/L (ref 15–37)
BASOPHILS # BLD: 0.02 K/UL
BASOPHILS NFR BLD: 0 % (ref 0–1)
BILIRUB SERPL-MCNC: <0.2 MG/DL (ref 0–1)
BUN SERPL-MCNC: 17 MG/DL (ref 7–20)
CALCIUM SERPL-MCNC: 8.6 MG/DL (ref 8.3–10.6)
CHLORIDE SERPL-SCNC: 104 MMOL/L (ref 99–110)
CO2 SERPL-SCNC: 21 MMOL/L (ref 21–32)
CREAT SERPL-MCNC: 0.7 MG/DL (ref 0.8–1.3)
EOSINOPHIL # BLD: 0.09 K/UL
EOSINOPHILS RELATIVE PERCENT: 2 % (ref 0–3)
ERYTHROCYTE [DISTWIDTH] IN BLOOD BY AUTOMATED COUNT: 24.3 % (ref 11.7–14.9)
GFR, ESTIMATED: >90 ML/MIN/1.73M2
GLUCOSE SERPL-MCNC: 70 MG/DL (ref 74–99)
HCT VFR BLD AUTO: 34.5 % (ref 42–52)
HGB BLD-MCNC: 11.4 G/DL (ref 13.5–18)
LYMPHOCYTES NFR BLD: 1.28 K/UL
LYMPHOCYTES RELATIVE PERCENT: 23 % (ref 24–44)
MCH RBC QN AUTO: 28.1 PG (ref 27–31)
MCHC RBC AUTO-ENTMCNC: 33 G/DL (ref 32–36)
MCV RBC AUTO: 85.2 FL (ref 78–100)
MONOCYTES NFR BLD: 0.73 K/UL
MONOCYTES NFR BLD: 13 % (ref 0–5)
NEUTROPHILS NFR BLD: 62 % (ref 36–66)
NEUTS SEG NFR BLD: 3.47 K/UL
PLATELET # BLD AUTO: 224 K/UL (ref 140–440)
PMV BLD AUTO: 8.3 FL (ref 7.5–11.1)
POTASSIUM SERPL-SCNC: 4 MMOL/L (ref 3.5–5.1)
PROT SERPL-MCNC: 5.6 G/DL (ref 6.4–8.2)
RBC # BLD AUTO: 4.05 M/UL (ref 4.6–6.2)
SODIUM SERPL-SCNC: 137 MMOL/L (ref 136–145)
WBC OTHER # BLD: 5.6 K/UL (ref 4–10.5)

## 2025-07-22 PROCEDURE — 99212 OFFICE O/P EST SF 10 MIN: CPT

## 2025-07-22 PROCEDURE — 36415 COLL VENOUS BLD VENIPUNCTURE: CPT

## 2025-07-22 PROCEDURE — 80053 COMPREHEN METABOLIC PANEL: CPT

## 2025-07-22 PROCEDURE — 85025 COMPLETE CBC W/AUTO DIFF WBC: CPT

## 2025-07-22 PROCEDURE — 99215 OFFICE O/P EST HI 40 MIN: CPT

## 2025-07-22 RX ORDER — DEXAMETHASONE 4 MG/1
TABLET ORAL
Qty: 12 TABLET | Refills: 1 | Status: SHIPPED | OUTPATIENT
Start: 2025-07-22

## 2025-07-22 RX ORDER — ONDANSETRON 8 MG/1
8 TABLET, FILM COATED ORAL EVERY 8 HOURS PRN
Qty: 90 TABLET | Refills: 2 | Status: SHIPPED | OUTPATIENT
Start: 2025-07-22

## 2025-07-22 NOTE — PROGRESS NOTES
[facility] NOTE    Patient: Jama Ramirez  MRN: [id number]    Date: 7/22/2025        Treatment planning               Adilene Sandy CMA

## 2025-07-22 NOTE — PROGRESS NOTES
Patient arrived alone for chemotherapy education. Discussed treatment plan, potential side effects, prevention and symptom management. Reviewed in detail chemotherapy education folder and drug monograph. Patient's regimen will be CapeOx: Oxaliplatin (D1) + Capecitabine 1,000 mg BID Days 1-15 beginning the night of Day 1 though the evening of Day 15 Q21D. Consent signed by patient and will be scanned into patient's chart. Copy given to patient.     Instructed patient to take below medications as follows:   Dexamethasone 4 mg - Take 2 tablets (8 mg) by mouth daily in the morning with food for 2 days AFTER each chemo infusion (D2&3)  Zofran 8 mg - Take 1 tablet PO Q8 hours PRN for N/V  2 RX's e-scribed to [facility] pharmacy on [address]. Patient voices understanding on how and when to take these medications. July/August calendars provided (C1-C2) including tx regimen, appointment times and written instructions    Contact information to [facility]Physician After Hours and this RN direct number also provided to patient.     CBC and CMP drawn today 07/22/2025. Per lab, patient had Hep B panel in 04/2025. Confirmed C1D1 tx at 08/15/2025.     Distress thermometer given to patient to fill out: 4/10 and reviewed with patient. Patient aware of support services offered, but deferred any referrals at this time. Copy given to Psychosocial Coordinator.    Encouraged patient to ask questions and allowed patient to express feelings during visit. All needs addressed. Patient denies any further questions or concerns at this time.

## 2025-07-23 ENCOUNTER — HOSPITAL ENCOUNTER (OUTPATIENT)
Dept: INFUSION THERAPY | Age: 65
Discharge: HOME | End: 2025-07-23
Payer: MEDICAID

## 2025-07-23 VITALS
DIASTOLIC BLOOD PRESSURE: 79 MMHG | HEART RATE: 72 BPM | BODY MASS INDEX: 18.37 KG/M2 | TEMPERATURE: 97.8 F | WEIGHT: 124 LBS | SYSTOLIC BLOOD PRESSURE: 110 MMHG | OXYGEN SATURATION: 96 % | HEIGHT: 69 IN

## 2025-07-23 DIAGNOSIS — C7A.8 NEUROENDOCRINE CARCINOMA OF SMALL BOWEL (HCC): ICD-10-CM

## 2025-07-23 DIAGNOSIS — C24.1 CANCER OF AMPULLA OF VATER (HCC): Primary | ICD-10-CM

## 2025-07-23 DIAGNOSIS — C77.2 MALIGNANT NEOPLASM METASTATIC TO INTRA-ABDOMINAL LYMPH NODE (HCC): ICD-10-CM

## 2025-07-23 DIAGNOSIS — Z11.59 NEED FOR HEPATITIS B SCREENING TEST: ICD-10-CM

## 2025-07-23 DIAGNOSIS — Z79.69 NEED FOR PROPHYLACTIC CHEMOTHERAPY: ICD-10-CM

## 2025-07-23 PROCEDURE — 96375 TX/PRO/DX INJ NEW DRUG ADDON: CPT

## 2025-07-23 PROCEDURE — 2580000003 HC RX 258: Performed by: INTERNAL MEDICINE

## 2025-07-23 PROCEDURE — 96413 CHEMO IV INFUSION 1 HR: CPT

## 2025-07-23 PROCEDURE — 6360000002 HC RX W HCPCS: Performed by: INTERNAL MEDICINE

## 2025-07-23 RX ORDER — SODIUM CHLORIDE 0.9 % (FLUSH) 0.9 %
5-40 SYRINGE (ML) INJECTION PRN
Status: DISCONTINUED | OUTPATIENT
Start: 2025-07-23 | End: 2025-07-24 | Stop reason: HOSPADM

## 2025-07-23 RX ORDER — PALONOSETRON 0.05 MG/ML
0.25 INJECTION, SOLUTION INTRAVENOUS ONCE
Status: COMPLETED | OUTPATIENT
Start: 2025-07-23 | End: 2025-07-23

## 2025-07-23 RX ORDER — DEXTROSE MONOHYDRATE 50 MG/ML
5-250 INJECTION, SOLUTION INTRAVENOUS PRN
Status: DISCONTINUED | OUTPATIENT
Start: 2025-07-23 | End: 2025-07-24 | Stop reason: HOSPADM

## 2025-07-23 RX ADMIN — DEXAMETHASONE SODIUM PHOSPHATE 12 MG: 4 INJECTION, SOLUTION INTRAMUSCULAR; INTRAVENOUS at 08:51

## 2025-07-23 RX ADMIN — PALONOSETRON 0.25 MG: 0.25 INJECTION, SOLUTION INTRAVENOUS at 08:48

## 2025-07-23 RX ADMIN — DEXTROSE 20 ML/HR: 5 SOLUTION INTRAVENOUS at 09:10

## 2025-07-23 RX ADMIN — OXALIPLATIN 220 MG: 5 INJECTION, SOLUTION INTRAVENOUS at 09:12

## 2025-07-23 NOTE — PROGRESS NOTES
Arrived to treatment suite for scheduled CapeOx treatment.      Assessment complete, weak last 4-5 days.   Fever/chills x 1 week. Has been on antibiotic for bronchitis.  Cough is better, thick yellow sputum. Back pain bilaterally upper lobes. Taking steroids. Has been nauseated, Zofran is helping. Appetite has been good.  Denies other concerns/needs at this time.     Starting new treatment today. Pt has a port, but unable to access.  Pt is scheduled to have it removed on 7/24/25.  Will be using a peripheral site for treatment. PIV placed without difficulty.      Labs reviewed, within treatment parameters.      Treatment plan approved, released and completed as ordered.  Pt tolerated well.  PIV flushed and removed per protocol.  Discharge ambulatory in stable condition. Denisse Mack RN    2152 Received phone call from patient stating that his arm was burning and sensitive to touch.  Denies warm, redness, streaking at site of insertion.  Discussed with pharmacy.  Encouraged to use compress as tolerated.  Due to Oxaliplatin being the drug given through site, it was suggested using warm compress if cold could not be tolerated.  Pt instructed to call back tomorrow if the discomfort persists.  Pt verbalized understanding.  Denisse Mack RN

## 2025-07-24 ENCOUNTER — OFFICE VISIT (OUTPATIENT)
Dept: BARIATRICS/WEIGHT MGMT | Age: 65
End: 2025-07-24
Payer: MEDICAID

## 2025-07-24 VITALS
DIASTOLIC BLOOD PRESSURE: 84 MMHG | HEIGHT: 69 IN | SYSTOLIC BLOOD PRESSURE: 116 MMHG | HEART RATE: 68 BPM | BODY MASS INDEX: 18.46 KG/M2 | WEIGHT: 124.6 LBS | OXYGEN SATURATION: 93 %

## 2025-07-24 DIAGNOSIS — C24.1 PRIMARY ADENOCARCINOMA OF AMPULLA OF VATER (HCC): Primary | ICD-10-CM

## 2025-07-24 PROCEDURE — 3074F SYST BP LT 130 MM HG: CPT | Performed by: SURGERY

## 2025-07-24 PROCEDURE — 1111F DSCHRG MED/CURRENT MED MERGE: CPT | Performed by: SURGERY

## 2025-07-24 PROCEDURE — 3017F COLORECTAL CA SCREEN DOC REV: CPT | Performed by: SURGERY

## 2025-07-24 PROCEDURE — 1036F TOBACCO NON-USER: CPT | Performed by: SURGERY

## 2025-07-24 PROCEDURE — 3079F DIAST BP 80-89 MM HG: CPT | Performed by: SURGERY

## 2025-07-24 PROCEDURE — G8428 CUR MEDS NOT DOCUMENT: HCPCS | Performed by: SURGERY

## 2025-07-24 PROCEDURE — G8419 CALC BMI OUT NRM PARAM NOF/U: HCPCS | Performed by: SURGERY

## 2025-07-24 PROCEDURE — 99214 OFFICE O/P EST MOD 30 MIN: CPT | Performed by: SURGERY

## 2025-07-24 RX ORDER — SODIUM CHLORIDE 0.9 % (FLUSH) 0.9 %
5-40 SYRINGE (ML) INJECTION PRN
OUTPATIENT
Start: 2025-07-24

## 2025-07-24 RX ORDER — SODIUM CHLORIDE, SODIUM LACTATE, POTASSIUM CHLORIDE, CALCIUM CHLORIDE 600; 310; 30; 20 MG/100ML; MG/100ML; MG/100ML; MG/100ML
INJECTION, SOLUTION INTRAVENOUS CONTINUOUS
OUTPATIENT
Start: 2025-07-24

## 2025-07-24 RX ORDER — SODIUM CHLORIDE 0.9 % (FLUSH) 0.9 %
5-40 SYRINGE (ML) INJECTION EVERY 12 HOURS SCHEDULED
OUTPATIENT
Start: 2025-07-24

## 2025-07-24 RX ORDER — SODIUM CHLORIDE 9 MG/ML
INJECTION, SOLUTION INTRAVENOUS PRN
OUTPATIENT
Start: 2025-07-24

## 2025-07-24 NOTE — PROGRESS NOTES
LM with my call-back # concerning  surgery @ [facility] on  7/30/25.  Please call the PAT Nurse for a phone assessment and surgery instructions.

## 2025-07-24 NOTE — PROGRESS NOTES
Chief Complaint   Patient presents with    Follow-up     F/U for Mediport Removal, Ref Dr Herring  Placed on 8/30/24         SUBJECTIVE:  HPI: Patient is here with complaints of:    Desire port removal.    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     Reports pain from port and only planning peripheral chemo vs no further treatment. Wants port removed for comfort.    I have reviewed the patient's(pertinent information to this visit) medical history, family history(scanned in  the [employer] under \"patient questioner\"), social history and review of systems with the patient today in the office.            Past Surgical History:   Procedure Laterality Date    ABDOMEN SURGERY      ARM SURGERY Left 30+ yrs    \"put plastic ligaments in \"  after injury through glass window    CARPAL TUNNEL RELEASE Right 10/04/2019    RIGHT CARPAL TUNNEL RELEASE performed by Wolfgang Machuca DO at [facility] OR    CARPAL TUNNEL RELEASE Left 04/12/2022    LEFT CARPAL TUNNEL RELEASE performed by Wolfgang Machuca DO at [facility] OR    COLONOSCOPY  2010    COLONOSCOPY  2018    HX: polyps - Dr. Herring    COLONOSCOPY N/A 12/30/2019    COLONOSCOPY DIAGNOSTIC performed by Mary Linares MD at [facility] ENDOSCOPY    COLONOSCOPY N/A 12/16/2021    COLONOSCOPY POLYPECTOMY SNARE/COLD BIOPSY performed by Humphrey Hsieh MD at [facility] ENDOSCOPY    DENTAL SURGERY      all teeth extracted    ENDOSCOPY, COLON, DIAGNOSTIC  04/08/2019    EGD - thrush noted through out    ENDOSCOPY, COLON, DIAGNOSTIC  06/01/2021    dr linares:multiple gastric ulcers, intact fundiplication with tightening, dil 18-20, biopsy r/o h pylori, f/u as scheduled    EYE SURGERY Left 1990's    \"metal removed from eye\"    GASTRIC FUNDOPLICATION N/A 12/26/2018    NISSEN FUNDOPLICATION LAPAROSCOPIC ROBOTIC HIATAL HERNIA performed by Mary Linares MD at [facility] OR    HERNIA REPAIR      abdominal    IR CHOLECYSTOSTOMY PERCUTANEOUS COMPLETE  05/18/2024    IR CHOLECYSTOSTOMY PERCUTANEOUS

## 2025-07-24 NOTE — PROGRESS NOTES
Surgery @ [facility] on 7/30/25 you will be called 7/29/25 with times    NOTHING TO EAT OR DRINK AFTER MIDNIGHT DAY OF SURGERY    1. Enter thru the hospital main entrance on day of surgery, check in at the Information Desk. If you arrive prior to 6:00am, enter thru the ER entrance.    2. Follow the directions as prescribed by the doctor for your procedure and medications.         Morning of surgery take: Chemo medications, Prilosec and Protonix with a sip of water      Plavix- last dose was before his whipple procedure.    ASA- patient to ask provider if he needs to stop          Stop vitamins, supplements and NSAIDS:  NOW (Tylenol is ok to take for PRN pain)     3. Check with your Doctor regarding stopping blood thinners and follow their instructions.    4. Do not smoke, vape or use chewing tobacco morning of surgery. Do not drink any alcoholic beverages 24 hours prior to surgery.       This includes NA Beer. No street drugs 7 days prior to surgery.    5. If you have dentures, contacts of glasses they will be removed before going to the OR; please bring a case.    6. Please bring picture ID, insurance card, paperwork from the doctor’s office (H & P, Consent, & card for implantable devices).    7. Take a shower with an antibacterial soap the night before surgery and the morning of surgery. Do not put anything on your skin      After your morning shower.    8. You will need a responsible adult to drive you home and check on you after surgery.    moderate assist (50% patients effort)

## 2025-07-29 ENCOUNTER — ANESTHESIA EVENT (OUTPATIENT)
Dept: OPERATING ROOM | Age: 65
End: 2025-07-29
Payer: MEDICAID

## 2025-07-29 RX ORDER — OXYCODONE HYDROCHLORIDE 5 MG/1
10 TABLET ORAL PRN
Refills: 0 | Status: CANCELLED | OUTPATIENT
Start: 2025-07-29 | End: 2025-07-29

## 2025-07-29 RX ORDER — ONDANSETRON 2 MG/ML
4 INJECTION INTRAMUSCULAR; INTRAVENOUS
Status: CANCELLED | OUTPATIENT
Start: 2025-07-29

## 2025-07-29 RX ORDER — SODIUM CHLORIDE 0.9 % (FLUSH) 0.9 %
5-40 SYRINGE (ML) INJECTION PRN
Status: CANCELLED | OUTPATIENT
Start: 2025-07-29

## 2025-07-29 RX ORDER — SODIUM CHLORIDE 9 MG/ML
INJECTION, SOLUTION INTRAVENOUS PRN
Status: CANCELLED | OUTPATIENT
Start: 2025-07-29

## 2025-07-29 RX ORDER — OXYCODONE HYDROCHLORIDE 5 MG/1
5 TABLET ORAL PRN
Refills: 0 | Status: CANCELLED | OUTPATIENT
Start: 2025-07-29 | End: 2025-07-29

## 2025-07-29 RX ORDER — IPRATROPIUM BROMIDE AND ALBUTEROL SULFATE 2.5; .5 MG/3ML; MG/3ML
1 SOLUTION RESPIRATORY (INHALATION)
Status: CANCELLED | OUTPATIENT
Start: 2025-07-29

## 2025-07-29 RX ORDER — PROCHLORPERAZINE EDISYLATE 5 MG/ML
5 INJECTION INTRAMUSCULAR; INTRAVENOUS
Status: CANCELLED | OUTPATIENT
Start: 2025-07-29

## 2025-07-29 RX ORDER — SODIUM CHLORIDE 0.9 % (FLUSH) 0.9 %
5-40 SYRINGE (ML) INJECTION EVERY 12 HOURS SCHEDULED
Status: CANCELLED | OUTPATIENT
Start: 2025-07-29

## 2025-07-29 RX ORDER — FENTANYL CITRATE 50 UG/ML
25 INJECTION, SOLUTION INTRAMUSCULAR; INTRAVENOUS EVERY 5 MIN PRN
Refills: 0 | Status: CANCELLED | OUTPATIENT
Start: 2025-07-29

## 2025-07-29 NOTE — PROGRESS NOTES
Notified patient surgery @ [facility] on  7/30/25 @ 1400, arrival 1200. NPO status and morning medications reviewed. Understanding verbalized.

## 2025-07-30 ENCOUNTER — HOSPITAL ENCOUNTER (OUTPATIENT)
Age: 65
Setting detail: OUTPATIENT SURGERY
Discharge: HOME | End: 2025-07-30
Attending: SURGERY | Admitting: SURGERY
Payer: MEDICAID

## 2025-07-30 ENCOUNTER — ANESTHESIA (OUTPATIENT)
Dept: OPERATING ROOM | Age: 65
End: 2025-07-30
Payer: MEDICAID

## 2025-07-30 ENCOUNTER — TELEPHONE (OUTPATIENT)
Dept: BARIATRICS/WEIGHT MGMT | Age: 65
End: 2025-07-30

## 2025-07-30 VITALS
RESPIRATION RATE: 16 BRPM | SYSTOLIC BLOOD PRESSURE: 113 MMHG | BODY MASS INDEX: 18.37 KG/M2 | DIASTOLIC BLOOD PRESSURE: 85 MMHG | WEIGHT: 124 LBS | OXYGEN SATURATION: 98 % | HEIGHT: 69 IN | HEART RATE: 61 BPM | TEMPERATURE: 97.6 F

## 2025-07-30 PROCEDURE — 3700000000 HC ANESTHESIA ATTENDED CARE: Performed by: SURGERY

## 2025-07-30 PROCEDURE — 2580000003 HC RX 258: Performed by: NURSE ANESTHETIST, CERTIFIED REGISTERED

## 2025-07-30 PROCEDURE — 3600000012 HC SURGERY LEVEL 2 ADDTL 15MIN: Performed by: SURGERY

## 2025-07-30 PROCEDURE — 6360000002 HC RX W HCPCS: Performed by: SURGERY

## 2025-07-30 PROCEDURE — 6360000002 HC RX W HCPCS: Performed by: ANESTHESIOLOGY

## 2025-07-30 PROCEDURE — 2500000003 HC RX 250 WO HCPCS: Performed by: SURGERY

## 2025-07-30 PROCEDURE — 6360000002 HC RX W HCPCS: Performed by: NURSE ANESTHETIST, CERTIFIED REGISTERED

## 2025-07-30 PROCEDURE — 3700000001 HC ADD 15 MINUTES (ANESTHESIA): Performed by: SURGERY

## 2025-07-30 PROCEDURE — 3600000002 HC SURGERY LEVEL 2 BASE: Performed by: SURGERY

## 2025-07-30 PROCEDURE — 7100000010 HC PHASE II RECOVERY - FIRST 15 MIN: Performed by: SURGERY

## 2025-07-30 PROCEDURE — 7100000011 HC PHASE II RECOVERY - ADDTL 15 MIN: Performed by: SURGERY

## 2025-07-30 PROCEDURE — 36590 REMOVAL TUNNELED CV CATH: CPT | Performed by: SURGERY

## 2025-07-30 PROCEDURE — 2709999900 HC NON-CHARGEABLE SUPPLY: Performed by: SURGERY

## 2025-07-30 RX ORDER — PROPOFOL 10 MG/ML
INJECTION, EMULSION INTRAVENOUS
Status: DISCONTINUED | OUTPATIENT
Start: 2025-07-30 | End: 2025-07-30 | Stop reason: SDUPTHER

## 2025-07-30 RX ORDER — SODIUM CHLORIDE 0.9 % (FLUSH) 0.9 %
5-40 SYRINGE (ML) INJECTION EVERY 12 HOURS SCHEDULED
Status: DISCONTINUED | OUTPATIENT
Start: 2025-07-30 | End: 2025-07-30

## 2025-07-30 RX ORDER — SODIUM CHLORIDE 0.9 % (FLUSH) 0.9 %
5-40 SYRINGE (ML) INJECTION EVERY 12 HOURS SCHEDULED
Status: DISCONTINUED | OUTPATIENT
Start: 2025-07-30 | End: 2025-07-30 | Stop reason: HOSPADM

## 2025-07-30 RX ORDER — LIDOCAINE HYDROCHLORIDE 10 MG/ML
INJECTION, SOLUTION INFILTRATION; PERINEURAL
Status: DISCONTINUED | OUTPATIENT
Start: 2025-07-30 | End: 2025-07-30 | Stop reason: ALTCHOICE

## 2025-07-30 RX ORDER — SODIUM CHLORIDE 9 MG/ML
INJECTION, SOLUTION INTRAVENOUS PRN
Status: DISCONTINUED | OUTPATIENT
Start: 2025-07-30 | End: 2025-07-30 | Stop reason: HOSPADM

## 2025-07-30 RX ORDER — SODIUM CHLORIDE 0.9 % (FLUSH) 0.9 %
5-40 SYRINGE (ML) INJECTION PRN
Status: DISCONTINUED | OUTPATIENT
Start: 2025-07-30 | End: 2025-07-30 | Stop reason: HOSPADM

## 2025-07-30 RX ORDER — FENTANYL CITRATE 50 UG/ML
INJECTION, SOLUTION INTRAMUSCULAR; INTRAVENOUS
Status: DISCONTINUED | OUTPATIENT
Start: 2025-07-30 | End: 2025-07-30 | Stop reason: SDUPTHER

## 2025-07-30 RX ORDER — ONDANSETRON 2 MG/ML
4 INJECTION INTRAMUSCULAR; INTRAVENOUS ONCE
Status: COMPLETED | OUTPATIENT
Start: 2025-07-30 | End: 2025-07-30

## 2025-07-30 RX ORDER — SODIUM CHLORIDE, SODIUM LACTATE, POTASSIUM CHLORIDE, CALCIUM CHLORIDE 600; 310; 30; 20 MG/100ML; MG/100ML; MG/100ML; MG/100ML
INJECTION, SOLUTION INTRAVENOUS CONTINUOUS
Status: DISCONTINUED | OUTPATIENT
Start: 2025-07-30 | End: 2025-07-30 | Stop reason: HOSPADM

## 2025-07-30 RX ORDER — SODIUM CHLORIDE 0.9 % (FLUSH) 0.9 %
5-40 SYRINGE (ML) INJECTION PRN
Status: DISCONTINUED | OUTPATIENT
Start: 2025-07-30 | End: 2025-07-30

## 2025-07-30 RX ORDER — SODIUM CHLORIDE, SODIUM LACTATE, POTASSIUM CHLORIDE, CALCIUM CHLORIDE 600; 310; 30; 20 MG/100ML; MG/100ML; MG/100ML; MG/100ML
INJECTION, SOLUTION INTRAVENOUS
Status: DISCONTINUED | OUTPATIENT
Start: 2025-07-30 | End: 2025-07-30 | Stop reason: SDUPTHER

## 2025-07-30 RX ADMIN — FENTANYL CITRATE 25 MCG: 50 INJECTION, SOLUTION INTRAMUSCULAR; INTRAVENOUS at 15:15

## 2025-07-30 RX ADMIN — FENTANYL CITRATE 25 MCG: 50 INJECTION, SOLUTION INTRAMUSCULAR; INTRAVENOUS at 15:13

## 2025-07-30 RX ADMIN — FENTANYL CITRATE 25 MCG: 50 INJECTION, SOLUTION INTRAMUSCULAR; INTRAVENOUS at 15:25

## 2025-07-30 RX ADMIN — PROPOFOL 120 MCG/KG/MIN: 10 INJECTION, EMULSION INTRAVENOUS at 15:08

## 2025-07-30 RX ADMIN — SODIUM CHLORIDE, POTASSIUM CHLORIDE, SODIUM LACTATE AND CALCIUM CHLORIDE: 600; 310; 30; 20 INJECTION, SOLUTION INTRAVENOUS at 14:50

## 2025-07-30 RX ADMIN — FENTANYL CITRATE 25 MCG: 50 INJECTION, SOLUTION INTRAMUSCULAR; INTRAVENOUS at 15:27

## 2025-07-30 RX ADMIN — ONDANSETRON 4 MG: 2 INJECTION INTRAMUSCULAR; INTRAVENOUS at 16:10

## 2025-07-30 RX ADMIN — CEFAZOLIN 2000 MG: 2 INJECTION, POWDER, FOR SOLUTION INTRAMUSCULAR; INTRAVENOUS at 15:12

## 2025-07-30 ASSESSMENT — PAIN SCALES - GENERAL
PAINLEVEL_OUTOF10: 0
PAINLEVEL_OUTOF10: 0

## 2025-07-30 ASSESSMENT — PAIN - FUNCTIONAL ASSESSMENT
PAIN_FUNCTIONAL_ASSESSMENT: ACTIVITIES ARE NOT PREVENTED
PAIN_FUNCTIONAL_ASSESSMENT: 0-10

## 2025-07-30 ASSESSMENT — PAIN DESCRIPTION - DESCRIPTORS: DESCRIPTORS: DISCOMFORT;ACHING;PRESSURE

## 2025-07-30 ASSESSMENT — LIFESTYLE VARIABLES: SMOKING_STATUS: 1

## 2025-07-30 NOTE — DISCHARGE INSTRUCTIONS
[facility]  [phone]    Do not drive, work around machines or use equipment.  Do not drink any alcoholic beverages.  Do not smoke while alone.  Avoid making important decisions.  Plan to spend a quiet, relaxed evening @ home.  Resume normal activities as you begin to feel better.  Eat lightly for your first meal, then gradually increase your diet to what is normal for you.  In case of nausea, avoid food and drink only clear liquids.  Resume food as nausea ceases.  Notify your surgeon if you experience fever, chills, large amount of bleeding, difficulty breathing, persistent nausea and vomiting or any other disturbing problem.  Call for a follow-up appointment with your surgeon.     Patient Discharge Instructions  Dr. Terrell Marino    Discharge Date:  7/30/2025    Discharged To:  Home      RESUME ACTIVITY:      BATHING:   OK to shower but no bath tub or submerging incision under water    Wound:    Keep wound dry and clean.  May shower as instructed above.    Dressing:    Change outer dressing daily after shower or if soiled.     DRIVING:   3-5 days. No driving until off narcotic pain medications and     walking comfortably    WALKING:    As tolerated     STAIRS:    As tolerated    LIFTING:   Less than 10 pounds     DIET:    [place] diet on day of surgery then regular diet    SPECIAL INSTRUCTIONS:     Call the office at [phone]  if you have a fever greater than or equal to 101 oF or if your incision becomes red, tender, or has drainage of pus.      If follow up appointment was not given to you, call the Surgical Clinic at [phone] for follow up appointment with Dr. Lara in:  1-2 weeks.

## 2025-07-30 NOTE — ANESTHESIA POSTPROCEDURE EVALUATION
Department of Anesthesiology  Postprocedure Note    Patient: Jama Ramirez  MRN: [id number]  YOB: 1960  Date of evaluation: 7/30/2025    Procedure Summary       Date: 07/30/25 Room / Location: [address]    Anesthesia Start: 1504 Anesthesia Stop: 1537    Procedure: PORT REMOVAL (Chest) Diagnosis:       Cancer of ampulla of Vater (HCC)      (Cancer of ampulla of Vater (HCC) [C24.1])    Surgeons: Terrell Marino II, MD Responsible Provider: Kip Mcdermott MD    Anesthesia Type: MAC ASA Status: 3            Anesthesia Type: No value filed.    Sreedhar Phase I: Sreedhar Score: 10    Sreedhar Phase II:      Anesthesia Post Evaluation    Patient location during evaluation: bedside  Patient participation: complete - patient participated  Level of consciousness: awake and alert  Pain score: 0  Airway patency: patent  Nausea & Vomiting: no nausea and no vomiting  Cardiovascular status: blood pressure returned to baseline and hemodynamically stable  Respiratory status: acceptable, room air and spontaneous ventilation  Hydration status: euvolemic  Pain management: adequate and satisfactory to patient    No notable events documented.

## 2025-07-30 NOTE — ANESTHESIA PRE PROCEDURE
Department of Anesthesiology  Preprocedure Note       Name:  Jama Ramirez   Age:  64 y.o.  :  1960                                          MRN:  [id number]         Date:  2025      Surgeon: Surgeon(s):  Terrell Marino II, MD    Procedure: Procedure(s):  PORT REMOVAL    Medications prior to admission:   Prior to Admission medications    Medication Sig Start Date End Date Taking? Authorizing Provider   dexAMETHasone (DECADRON) 4 MG tablet Take 2 tablets (8 mg) by mouth daily in the morning with food for 2 days AFTER each chemo infusion (Days 2&3) 25  Yes Bijal Herring MD   ondansetron (ZOFRAN) 8 MG tablet Take 1 tablet by mouth every 8 hours as needed for Nausea or Vomiting 25  Yes Bijal Herring MD   lipase-protease-amylase (CREON) 36847-93950 units delayed release capsule Take by mouth 3 times daily (with meals) 7/10/25  Yes Elvira Aguilera MD   mirtazapine (REMERON) 15 MG tablet Take 1 tablet by mouth nightly   Yes Sariah Bailey MD   capecitabine (XELODA) 500 MG chemo tablet Take 3 tablets by mouth 2 times daily For 14 days then off for 7 days every 21 days 25  Yes Bijal Herring MD   prochlorperazine (COMPAZINE) 10 MG tablet Take 1 tablet by mouth every 6 hours as needed (nausea) 25 Yes Bijal Herring MD   Magic Mouthwash (MIRACLE MOUTHWASH) Equal parts of Benadryl, Maalox, 2% Viscous Xylocaine and Nystatin.  Take 5 mL 4 times daily. 25  Yes Bijal Herring MD   Magic Mouthwash (MIRACLE MOUTHWASH) Equal parts of Benadryl, Maalox, 2% Viscous Xylocaine and Nystatin.  Take 5 mL 4 times daily. 25  Yes Stephenie Ragland PA-C   dicyclomine (BENTYL) 10 MG capsule Take 1 capsule by mouth 3 times daily as needed (for stomach cramps or pains) 25  Yes Bijal Herring MD   ondansetron (ZOFRAN) 4 MG tablet Take 1 tablet by mouth every 8 hours as needed for Nausea or Vomiting 6/11/25 10/9/25 Yes Bijal Herring MD   dexAMETHasone (DECADRON) 4 MG tablet Take 1

## 2025-07-30 NOTE — H&P
History and Physical Update    Original H&P done in office on 7/24/25 (less than 30 days ago).    Pt reports the following changes in health since being seen last:    None    Vitals:    07/30/25 1241   BP:    Pulse:    Resp:    Temp:    SpO2: 94%       Alert and oriented x 3, no apparent distress at rest  Atraumatic, normocephalic.  EOMI.  Breathing unlabored.  RRR.  Soft, non-tender, non-distended.  Moves all extremities.   Warm, dry.         65 y/o M with mediport who desires removal secondary to no longer using    -Consent obtained in office.  -Abx ordered in office.  -Reviewed expected pre-operative, operative, and post-operative courses.  -Answered questions to patient's satisfaction.   -Reviewed risks, benefits, alternative to procedure.   -Proceed as scheduled.        Terrell Marino II, MD

## 2025-07-30 NOTE — PROGRESS NOTES
1534 - received patient from OR, report received from Tam DE LEÓN and Jessie AMARAL, patient A&O, denies pain, reports that he is slightly nauseated and would like something for it, given coffee and crackers, call light within reach, family at bedside, dressing dry  1549 - patient request mariela, Dr. Mcdermott notified, orders received  1610 - patient is dressed already, zofran given  1612 - VSS, patient denies pain, dressing dry  1615 - discharge instructions given to patient and family, understanding voiced without any further questions at this time  1620 - iv removed  1625 - patient states nausea is better  1627 - patient left sds via wheelchair accompanied by nurse

## 2025-07-30 NOTE — OP NOTE
Operative Report    Patient ID:  Jama Ramirez  [id number]  64 y.o.  1960    Indications:  History of mediport placement and desired removal    Pre-operative Diagnosis:  Mediport placement and desired removal due to no longer using    Post-operative Diagnosis: Same as above    Procedure:  Mediport removal    Surgeon: Terrell Marino MD, FACS    First Assistant: Kim Urena CNP. The skilled assistance of the CNP was necessary for the successful completion of this case. She was essential for the proper positioning, manipulation of instruments, proper exposure, manipulation of tissue, and wound closure.    Findings:   Consistent with post-operative diagnosis    Estimated Blood Loss:  Less than 5 mL           Total IV Fluids: Per anesthesia mL IV crystalloid    Specimen(s): None         Complications:  None; patient tolerated the procedure well.           Condition: stable      Procedure Details:  The patient was seen again in the preoperative holding room.     The risks, benefits, complications, treatment options, and expected outcomes were discussed with the patient.     The possibilities of reaction to medication, pulmonary aspiration, bleeding, recurrent infection, the need for additional procedures, and development of a complication requiring transfusion or further operation were discussed with the patient and/or family.     There was concurrence with the proposed plan, and informed consent was obtained.      The site of surgery was properly noted/marked.     The patient was transported to the operating room, identified as Jama Ramirez, and the procedure verified.     An approved time out was held and the above information confirmed with all members of the operating room team present and in agreement.     The entire chest was prepped and draped in a standard sterile fashion.      1% lidocaine was infiltrated and and incision was made over the mediport.      The incision was deepened and the port was

## 2025-07-31 ENCOUNTER — TELEPHONE (OUTPATIENT)
Dept: SURGERY | Age: 65
End: 2025-07-31

## 2025-07-31 NOTE — TELEPHONE ENCOUNTER
Post-op Phone Call Follow-up  Dr Jamila Ramirez is 1 days s/p Port Removal.     I called the pt today to see how they were doing post-operatively.  The pt's pain is  well controlled with current pain control regimen.   Pt's incisions are doing well. Denies erythema, swelling or drainage.   Pt is tolerating eating without N/V, and is having bowel movements takes a whaley stool softener to help with constipation.   I reviewed the post-operative instructions, addressed any concerns and answered the patient's questions.     I confirmed the patient's post-op appointment on 8/12/25 10:45    Rudolph voiced no problems or concerns at this time. Will if has any questions.         Kelly Knutson MA

## 2025-08-01 ENCOUNTER — TELEPHONE (OUTPATIENT)
Dept: ONCOLOGY | Age: 65
End: 2025-08-01

## 2025-08-01 NOTE — TELEPHONE ENCOUNTER
Called patient to reschedule their No Show appointment on 8/1 with Bijal Herring. Spoke with patient and rescheduled appointment to 8/6 with Bijal Herring.

## 2025-08-06 ENCOUNTER — HOSPITAL ENCOUNTER (EMERGENCY)
Age: 65
Discharge: HOME | End: 2025-08-06
Payer: MEDICAID

## 2025-08-06 ENCOUNTER — HOSPITAL ENCOUNTER (OUTPATIENT)
Dept: INFUSION THERAPY | Age: 65
Discharge: HOME | End: 2025-08-06
Payer: MEDICAID

## 2025-08-06 ENCOUNTER — OFFICE VISIT (OUTPATIENT)
Dept: ONCOLOGY | Age: 65
End: 2025-08-06
Payer: MEDICAID

## 2025-08-06 VITALS
HEIGHT: 69 IN | SYSTOLIC BLOOD PRESSURE: 115 MMHG | OXYGEN SATURATION: 96 % | TEMPERATURE: 98.1 F | WEIGHT: 127.8 LBS | BODY MASS INDEX: 18.93 KG/M2 | HEART RATE: 69 BPM | DIASTOLIC BLOOD PRESSURE: 88 MMHG

## 2025-08-06 VITALS
OXYGEN SATURATION: 95 % | HEIGHT: 69 IN | RESPIRATION RATE: 18 BRPM | BODY MASS INDEX: 18.81 KG/M2 | TEMPERATURE: 97.9 F | DIASTOLIC BLOOD PRESSURE: 84 MMHG | HEART RATE: 69 BPM | SYSTOLIC BLOOD PRESSURE: 118 MMHG | WEIGHT: 127 LBS

## 2025-08-06 DIAGNOSIS — C24.1 MALIGNANT NEOPLASM OF AMPULLA OF VATER (HCC): ICD-10-CM

## 2025-08-06 DIAGNOSIS — R31.9 URINARY TRACT INFECTION WITH HEMATURIA, SITE UNSPECIFIED: Primary | ICD-10-CM

## 2025-08-06 DIAGNOSIS — C24.1 CANCER OF AMPULLA OF VATER (HCC): Primary | ICD-10-CM

## 2025-08-06 DIAGNOSIS — C24.1 MALIGNANT NEOPLASM OF AMPULLA OF VATER (HCC): Primary | ICD-10-CM

## 2025-08-06 DIAGNOSIS — C7A.8 NEUROENDOCRINE CARCINOMA OF SMALL BOWEL (HCC): ICD-10-CM

## 2025-08-06 DIAGNOSIS — C77.2 MALIGNANT NEOPLASM METASTATIC TO INTRA-ABDOMINAL LYMPH NODE (HCC): ICD-10-CM

## 2025-08-06 DIAGNOSIS — N39.0 URINARY TRACT INFECTION WITH HEMATURIA, SITE UNSPECIFIED: Primary | ICD-10-CM

## 2025-08-06 LAB
BACTERIA URNS QL MICRO: ABNORMAL
BACTERIA URNS QL MICRO: ABNORMAL
BILIRUB UR QL STRIP: NEGATIVE
BILIRUB UR QL STRIP: NEGATIVE
CASTS #/AREA URNS LPF: ABNORMAL /LPF
CLARITY UR: ABNORMAL
CLARITY UR: ABNORMAL
COLOR UR: YELLOW
COLOR UR: YELLOW
CRYSTALS URNS MICRO: ABNORMAL /HPF
EPI CELLS #/AREA URNS HPF: 2 /HPF
EPI CELLS #/AREA URNS HPF: 2 /HPF
GLUCOSE UR STRIP-MCNC: NEGATIVE MG/DL
GLUCOSE UR STRIP-MCNC: NEGATIVE MG/DL
HGB UR QL STRIP.AUTO: ABNORMAL
HGB UR QL STRIP.AUTO: ABNORMAL
KETONES UR STRIP-MCNC: ABNORMAL MG/DL
KETONES UR STRIP-MCNC: NEGATIVE MG/DL
LEUKOCYTE ESTERASE UR QL STRIP: ABNORMAL
LEUKOCYTE ESTERASE UR QL STRIP: ABNORMAL
MUCOUS THREADS URNS QL MICRO: ABNORMAL
MUCOUS THREADS URNS QL MICRO: ABNORMAL
NITRITE UR QL STRIP: NEGATIVE
NITRITE UR QL STRIP: NEGATIVE
PH UR STRIP: 6 [PH] (ref 5–8)
PH UR STRIP: 6 [PH] (ref 5–8)
PROT UR STRIP-MCNC: NEGATIVE MG/DL
PROT UR STRIP-MCNC: NEGATIVE MG/DL
RBC #/AREA URNS HPF: 35 /HPF (ref 0–2)
RBC #/AREA URNS HPF: 51 /HPF (ref 0–2)
SP GR UR STRIP: 1.02 (ref 1–1.03)
SP GR UR STRIP: 1.02 (ref 1–1.03)
UROBILINOGEN UR STRIP-ACNC: 0.2 EU/DL (ref 0–1)
UROBILINOGEN UR STRIP-ACNC: 0.2 EU/DL (ref 0–1)
WBC #/AREA URNS HPF: 203 /HPF (ref 0–5)
WBC #/AREA URNS HPF: 87 /HPF (ref 0–5)

## 2025-08-06 PROCEDURE — 87086 URINE CULTURE/COLONY COUNT: CPT

## 2025-08-06 PROCEDURE — 99214 OFFICE O/P EST MOD 30 MIN: CPT | Performed by: INTERNAL MEDICINE

## 2025-08-06 PROCEDURE — 4004F PT TOBACCO SCREEN RCVD TLK: CPT | Performed by: INTERNAL MEDICINE

## 2025-08-06 PROCEDURE — G8428 CUR MEDS NOT DOCUMENT: HCPCS | Performed by: INTERNAL MEDICINE

## 2025-08-06 PROCEDURE — G8420 CALC BMI NORM PARAMETERS: HCPCS | Performed by: INTERNAL MEDICINE

## 2025-08-06 PROCEDURE — 3079F DIAST BP 80-89 MM HG: CPT | Performed by: INTERNAL MEDICINE

## 2025-08-06 PROCEDURE — 81001 URINALYSIS AUTO W/SCOPE: CPT

## 2025-08-06 PROCEDURE — 3074F SYST BP LT 130 MM HG: CPT | Performed by: INTERNAL MEDICINE

## 2025-08-06 PROCEDURE — 3017F COLORECTAL CA SCREEN DOC REV: CPT | Performed by: INTERNAL MEDICINE

## 2025-08-06 PROCEDURE — 99212 OFFICE O/P EST SF 10 MIN: CPT

## 2025-08-06 PROCEDURE — 99283 EMERGENCY DEPT VISIT LOW MDM: CPT

## 2025-08-06 PROCEDURE — 1111F DSCHRG MED/CURRENT MED MERGE: CPT | Performed by: INTERNAL MEDICINE

## 2025-08-06 PROCEDURE — 6370000000 HC RX 637 (ALT 250 FOR IP): Performed by: PHYSICIAN ASSISTANT

## 2025-08-06 RX ORDER — PROMETHAZINE HYDROCHLORIDE 25 MG/1
25 TABLET ORAL 4 TIMES DAILY PRN
Qty: 60 TABLET | Refills: 0 | Status: SHIPPED | OUTPATIENT
Start: 2025-08-06 | End: 2025-08-21

## 2025-08-06 RX ORDER — FLUCONAZOLE 100 MG/1
100 TABLET ORAL DAILY
Qty: 7 TABLET | Refills: 0 | Status: SHIPPED | OUTPATIENT
Start: 2025-08-06 | End: 2025-08-13

## 2025-08-06 RX ORDER — CEFUROXIME AXETIL 250 MG/1
250 TABLET ORAL 2 TIMES DAILY
Qty: 14 TABLET | Refills: 0 | Status: SHIPPED | OUTPATIENT
Start: 2025-08-06 | End: 2025-08-13

## 2025-08-06 RX ORDER — CEFUROXIME AXETIL 250 MG/1
250 TABLET ORAL ONCE
Status: COMPLETED | OUTPATIENT
Start: 2025-08-06 | End: 2025-08-06

## 2025-08-06 RX ADMIN — CEFUROXIME AXETIL 250 MG: 250 TABLET, FILM COATED ORAL at 16:18

## 2025-08-06 ASSESSMENT — PAIN - FUNCTIONAL ASSESSMENT
PAIN_FUNCTIONAL_ASSESSMENT: 0-10
PAIN_FUNCTIONAL_ASSESSMENT: 0-10
PAIN_FUNCTIONAL_ASSESSMENT: PREVENTS OR INTERFERES SOME ACTIVE ACTIVITIES AND ADLS

## 2025-08-06 ASSESSMENT — PAIN DESCRIPTION - LOCATION: LOCATION: FLANK

## 2025-08-06 ASSESSMENT — PAIN DESCRIPTION - PAIN TYPE
TYPE: ACUTE PAIN
TYPE: ACUTE PAIN

## 2025-08-06 ASSESSMENT — PAIN DESCRIPTION - ORIENTATION: ORIENTATION: RIGHT;LEFT

## 2025-08-06 ASSESSMENT — PAIN SCALES - GENERAL
PAINLEVEL_OUTOF10: 7
PAINLEVEL_OUTOF10: 7

## 2025-08-06 ASSESSMENT — PAIN DESCRIPTION - DESCRIPTORS: DESCRIPTORS: ACHING

## 2025-08-07 ENCOUNTER — TELEPHONE (OUTPATIENT)
Dept: ONCOLOGY | Age: 65
End: 2025-08-07

## 2025-08-07 LAB
MICROORGANISM SPEC CULT: NORMAL
SPECIMEN DESCRIPTION: NORMAL

## 2025-08-11 ENCOUNTER — CLINICAL DOCUMENTATION (OUTPATIENT)
Dept: ONCOLOGY | Age: 65
End: 2025-08-11

## 2025-08-11 DIAGNOSIS — Z79.69 NEED FOR PROPHYLACTIC CHEMOTHERAPY: ICD-10-CM

## 2025-08-11 DIAGNOSIS — N39.0 URINARY TRACT INFECTION WITHOUT HEMATURIA, SITE UNSPECIFIED: Primary | ICD-10-CM

## 2025-08-11 DIAGNOSIS — C77.2 MALIGNANT NEOPLASM METASTATIC TO INTRA-ABDOMINAL LYMPH NODE (HCC): ICD-10-CM

## 2025-08-11 DIAGNOSIS — C24.1 CANCER OF AMPULLA OF VATER (HCC): Primary | ICD-10-CM

## 2025-08-11 DIAGNOSIS — Z11.59 NEED FOR HEPATITIS B SCREENING TEST: ICD-10-CM

## 2025-08-11 DIAGNOSIS — C7A.8 NEUROENDOCRINE CARCINOMA OF SMALL BOWEL (HCC): ICD-10-CM

## 2025-08-11 RX ORDER — MEPERIDINE HYDROCHLORIDE 50 MG/ML
12.5 INJECTION INTRAMUSCULAR; INTRAVENOUS; SUBCUTANEOUS PRN
Status: CANCELLED | OUTPATIENT
Start: 2025-08-13

## 2025-08-11 RX ORDER — PALONOSETRON 0.05 MG/ML
0.25 INJECTION, SOLUTION INTRAVENOUS ONCE
Status: CANCELLED | OUTPATIENT
Start: 2025-08-13 | End: 2025-08-13

## 2025-08-11 RX ORDER — DIPHENHYDRAMINE HYDROCHLORIDE 50 MG/ML
50 INJECTION, SOLUTION INTRAMUSCULAR; INTRAVENOUS
Status: CANCELLED | OUTPATIENT
Start: 2025-08-13

## 2025-08-11 RX ORDER — ALBUTEROL SULFATE 90 UG/1
4 INHALANT RESPIRATORY (INHALATION) PRN
Status: CANCELLED | OUTPATIENT
Start: 2025-08-13

## 2025-08-11 RX ORDER — DEXTROSE MONOHYDRATE 50 MG/ML
5-250 INJECTION, SOLUTION INTRAVENOUS PRN
Status: CANCELLED | OUTPATIENT
Start: 2025-08-13

## 2025-08-11 RX ORDER — HYDROCORTISONE SODIUM SUCCINATE 100 MG/2ML
100 INJECTION INTRAMUSCULAR; INTRAVENOUS
Status: CANCELLED | OUTPATIENT
Start: 2025-08-13

## 2025-08-11 RX ORDER — SODIUM CHLORIDE 0.9 % (FLUSH) 0.9 %
5-40 SYRINGE (ML) INJECTION PRN
Status: CANCELLED | OUTPATIENT
Start: 2025-08-13

## 2025-08-11 RX ORDER — EPINEPHRINE 1 MG/ML
0.3 INJECTION, SOLUTION, CONCENTRATE INTRAVENOUS PRN
Status: CANCELLED | OUTPATIENT
Start: 2025-08-13

## 2025-08-11 RX ORDER — SODIUM CHLORIDE 9 MG/ML
5-250 INJECTION, SOLUTION INTRAVENOUS PRN
Status: CANCELLED | OUTPATIENT
Start: 2025-08-13

## 2025-08-11 RX ORDER — HEPARIN SODIUM (PORCINE) LOCK FLUSH IV SOLN 100 UNIT/ML 100 UNIT/ML
500 SOLUTION INTRAVENOUS PRN
Status: CANCELLED | OUTPATIENT
Start: 2025-08-13

## 2025-08-11 RX ORDER — ONDANSETRON 2 MG/ML
8 INJECTION INTRAMUSCULAR; INTRAVENOUS
Status: CANCELLED | OUTPATIENT
Start: 2025-08-13

## 2025-08-11 RX ORDER — SODIUM CHLORIDE 9 MG/ML
INJECTION, SOLUTION INTRAVENOUS PRN
Status: CANCELLED | OUTPATIENT
Start: 2025-08-13

## 2025-08-11 RX ORDER — NITROFURANTOIN 25; 75 MG/1; MG/1
100 CAPSULE ORAL 2 TIMES DAILY
Qty: 14 CAPSULE | Refills: 0 | Status: SHIPPED | OUTPATIENT
Start: 2025-08-11 | End: 2025-08-18

## 2025-08-11 RX ORDER — ACETAMINOPHEN 325 MG/1
650 TABLET ORAL
Status: CANCELLED | OUTPATIENT
Start: 2025-08-13

## 2025-08-11 RX ORDER — FAMOTIDINE 10 MG/ML
20 INJECTION, SOLUTION INTRAVENOUS
Status: CANCELLED | OUTPATIENT
Start: 2025-08-13

## 2025-08-12 ENCOUNTER — CLINICAL DOCUMENTATION (OUTPATIENT)
Dept: INFUSION THERAPY | Age: 65
End: 2025-08-12

## 2025-08-12 ENCOUNTER — OFFICE VISIT (OUTPATIENT)
Dept: BARIATRICS/WEIGHT MGMT | Age: 65
End: 2025-08-12
Payer: MEDICAID

## 2025-08-12 VITALS
SYSTOLIC BLOOD PRESSURE: 110 MMHG | BODY MASS INDEX: 18.13 KG/M2 | HEIGHT: 69 IN | HEART RATE: 72 BPM | DIASTOLIC BLOOD PRESSURE: 64 MMHG | RESPIRATION RATE: 18 BRPM | OXYGEN SATURATION: 94 % | WEIGHT: 122.4 LBS

## 2025-08-12 DIAGNOSIS — Z09 POSTOP CHECK: Primary | ICD-10-CM

## 2025-08-12 PROCEDURE — 3074F SYST BP LT 130 MM HG: CPT | Performed by: SURGERY

## 2025-08-12 PROCEDURE — G8419 CALC BMI OUT NRM PARAM NOF/U: HCPCS | Performed by: SURGERY

## 2025-08-12 PROCEDURE — 4004F PT TOBACCO SCREEN RCVD TLK: CPT | Performed by: SURGERY

## 2025-08-12 PROCEDURE — 99212 OFFICE O/P EST SF 10 MIN: CPT | Performed by: SURGERY

## 2025-08-12 PROCEDURE — G8427 DOCREV CUR MEDS BY ELIG CLIN: HCPCS | Performed by: SURGERY

## 2025-08-12 PROCEDURE — 3078F DIAST BP <80 MM HG: CPT | Performed by: SURGERY

## 2025-08-12 PROCEDURE — 3017F COLORECTAL CA SCREEN DOC REV: CPT | Performed by: SURGERY

## 2025-08-16 ENCOUNTER — HOSPITAL ENCOUNTER (EMERGENCY)
Age: 65
Discharge: HOME | End: 2025-08-16
Payer: MEDICAID

## 2025-08-16 VITALS
SYSTOLIC BLOOD PRESSURE: 106 MMHG | OXYGEN SATURATION: 94 % | RESPIRATION RATE: 15 BRPM | TEMPERATURE: 97.7 F | DIASTOLIC BLOOD PRESSURE: 82 MMHG | HEART RATE: 60 BPM

## 2025-08-16 LAB
BACTERIA URNS QL MICRO: ABNORMAL
BILIRUB UR QL STRIP: ABNORMAL
CHARACTER UR: ABNORMAL
CLARITY UR: CLEAR
COLOR UR: YELLOW
CRYSTALS URNS MICRO: ABNORMAL /HPF
EPI CELLS #/AREA URNS HPF: 3 /HPF
GLUCOSE UR STRIP-MCNC: NEGATIVE MG/DL
HGB UR QL STRIP.AUTO: NEGATIVE
KETONES UR STRIP-MCNC: NEGATIVE MG/DL
LEUKOCYTE ESTERASE UR QL STRIP: ABNORMAL
MUCOUS THREADS URNS QL MICRO: ABNORMAL
NITRITE UR QL STRIP: POSITIVE
PH UR STRIP: 5.5 [PH] (ref 5–8)
PROT UR STRIP-MCNC: ABNORMAL MG/DL
RBC #/AREA URNS HPF: 12 /HPF (ref 0–2)
SP GR UR STRIP: >1.03 (ref 1–1.03)
UROBILINOGEN UR STRIP-ACNC: 1 EU/DL (ref 0–1)
WBC #/AREA URNS HPF: 33 /HPF (ref 0–5)

## 2025-08-16 PROCEDURE — 87186 SC STD MICRODIL/AGAR DIL: CPT

## 2025-08-16 PROCEDURE — 81001 URINALYSIS AUTO W/SCOPE: CPT

## 2025-08-16 PROCEDURE — 87077 CULTURE AEROBIC IDENTIFY: CPT

## 2025-08-16 PROCEDURE — 87086 URINE CULTURE/COLONY COUNT: CPT

## 2025-08-16 ASSESSMENT — PAIN DESCRIPTION - LOCATION: LOCATION: ABDOMEN

## 2025-08-16 ASSESSMENT — PAIN SCALES - GENERAL: PAINLEVEL_OUTOF10: 7

## 2025-08-16 ASSESSMENT — PAIN DESCRIPTION - DESCRIPTORS: DESCRIPTORS: SHARP;STABBING

## 2025-08-18 LAB
MICROORGANISM SPEC CULT: ABNORMAL
SPECIMEN DESCRIPTION: ABNORMAL

## 2025-08-19 ENCOUNTER — HOSPITAL ENCOUNTER (INPATIENT)
Age: 65
LOS: 3 days | Discharge: HOME HEALTH | End: 2025-08-22
Attending: EMERGENCY MEDICINE | Admitting: STUDENT IN AN ORGANIZED HEALTH CARE EDUCATION/TRAINING PROGRAM
Payer: MEDICAID

## 2025-08-19 DIAGNOSIS — J44.0 ACUTE BRONCHITIS WITH COPD (HCC): Primary | ICD-10-CM

## 2025-08-19 DIAGNOSIS — Z16.12 ESBL (EXTENDED SPECTRUM BETA-LACTAMASE) PRODUCING BACTERIA INFECTION: ICD-10-CM

## 2025-08-19 DIAGNOSIS — A49.9 ESBL (EXTENDED SPECTRUM BETA-LACTAMASE) PRODUCING BACTERIA INFECTION: ICD-10-CM

## 2025-08-19 DIAGNOSIS — J20.9 ACUTE BRONCHITIS WITH COPD (HCC): Primary | ICD-10-CM

## 2025-08-19 DIAGNOSIS — N30.00 ACUTE CYSTITIS WITHOUT HEMATURIA: ICD-10-CM

## 2025-08-19 PROBLEM — N39.0 UTI (URINARY TRACT INFECTION): Status: ACTIVE | Noted: 2025-08-19

## 2025-08-19 LAB
ALBUMIN SERPL-MCNC: 3.5 G/DL (ref 3.4–5)
ALBUMIN/GLOB SERPL: 1.3 {RATIO} (ref 1.1–2.2)
ALP SERPL-CCNC: 459 U/L (ref 40–129)
ALT SERPL-CCNC: 23 U/L (ref 10–40)
ANION GAP SERPL CALCULATED.3IONS-SCNC: 11 MMOL/L (ref 9–17)
AST SERPL-CCNC: 34 U/L (ref 15–37)
BASOPHILS # BLD: 0.03 K/UL
BASOPHILS NFR BLD: 0 % (ref 0–1)
BILIRUB SERPL-MCNC: 0.5 MG/DL (ref 0–1)
BILIRUB UR QL STRIP: NEGATIVE
BUN SERPL-MCNC: 12 MG/DL (ref 7–20)
CALCIUM SERPL-MCNC: 8.8 MG/DL (ref 8.3–10.6)
CHARACTER UR: ABNORMAL
CHLORIDE SERPL-SCNC: 102 MMOL/L (ref 99–110)
CLARITY UR: CLEAR
CO2 SERPL-SCNC: 22 MMOL/L (ref 21–32)
COLOR UR: YELLOW
CREAT SERPL-MCNC: 0.6 MG/DL (ref 0.8–1.3)
EOSINOPHIL # BLD: 0.16 K/UL
EOSINOPHILS RELATIVE PERCENT: 2 % (ref 0–3)
EPI CELLS #/AREA URNS HPF: <1 /HPF
ERYTHROCYTE [DISTWIDTH] IN BLOOD BY AUTOMATED COUNT: 20.5 % (ref 11.7–14.9)
GFR, ESTIMATED: >90 ML/MIN/1.73M2
GLUCOSE SERPL-MCNC: 118 MG/DL (ref 74–99)
GLUCOSE UR STRIP-MCNC: NEGATIVE MG/DL
HCT VFR BLD AUTO: 39 % (ref 42–52)
HGB BLD-MCNC: 12.6 G/DL (ref 13.5–18)
HGB UR QL STRIP.AUTO: ABNORMAL
IMM GRANULOCYTES # BLD AUTO: 0.08 K/UL
IMM GRANULOCYTES NFR BLD: 1 %
KETONES UR STRIP-MCNC: NEGATIVE MG/DL
LEUKOCYTE ESTERASE UR QL STRIP: ABNORMAL
LYMPHOCYTES NFR BLD: 1.63 K/UL
LYMPHOCYTES RELATIVE PERCENT: 15 % (ref 24–44)
MCH RBC QN AUTO: 29.5 PG (ref 27–31)
MCHC RBC AUTO-ENTMCNC: 32.3 G/DL (ref 32–36)
MCV RBC AUTO: 91.3 FL (ref 78–100)
MONOCYTES NFR BLD: 0.63 K/UL
MONOCYTES NFR BLD: 6 % (ref 0–5)
MUCOUS THREADS URNS QL MICRO: ABNORMAL
NEUTROPHILS NFR BLD: 76 % (ref 36–66)
NEUTS SEG NFR BLD: 8.17 K/UL
NITRITE UR QL STRIP: POSITIVE
PH UR STRIP: 6 [PH] (ref 5–8)
PLATELET # BLD AUTO: 190 K/UL (ref 140–440)
PMV BLD AUTO: 9.8 FL (ref 7.5–11.1)
POTASSIUM SERPL-SCNC: 4.7 MMOL/L (ref 3.5–5.1)
PROT SERPL-MCNC: 6.1 G/DL (ref 6.4–8.2)
PROT UR STRIP-MCNC: NEGATIVE MG/DL
RBC # BLD AUTO: 4.27 M/UL (ref 4.6–6.2)
RBC #/AREA URNS HPF: 6 /HPF (ref 0–2)
SODIUM SERPL-SCNC: 135 MMOL/L (ref 136–145)
SP GR UR STRIP: 1.01 (ref 1–1.03)
UROBILINOGEN UR STRIP-ACNC: 1 EU/DL (ref 0–1)
WBC #/AREA URNS HPF: 58 /HPF (ref 0–5)
WBC OTHER # BLD: 10.7 K/UL (ref 4–10.5)

## 2025-08-19 PROCEDURE — 93005 ELECTROCARDIOGRAM TRACING: CPT | Performed by: EMERGENCY MEDICINE

## 2025-08-19 PROCEDURE — 6360000002 HC RX W HCPCS: Performed by: STUDENT IN AN ORGANIZED HEALTH CARE EDUCATION/TRAINING PROGRAM

## 2025-08-19 PROCEDURE — 87186 SC STD MICRODIL/AGAR DIL: CPT

## 2025-08-19 PROCEDURE — 6360000002 HC RX W HCPCS: Performed by: EMERGENCY MEDICINE

## 2025-08-19 PROCEDURE — 87040 BLOOD CULTURE FOR BACTERIA: CPT

## 2025-08-19 PROCEDURE — 99285 EMERGENCY DEPT VISIT HI MDM: CPT

## 2025-08-19 PROCEDURE — 1200000000 HC SEMI PRIVATE

## 2025-08-19 PROCEDURE — 87086 URINE CULTURE/COLONY COUNT: CPT

## 2025-08-19 PROCEDURE — 87077 CULTURE AEROBIC IDENTIFY: CPT

## 2025-08-19 PROCEDURE — 81001 URINALYSIS AUTO W/SCOPE: CPT

## 2025-08-19 PROCEDURE — 6370000000 HC RX 637 (ALT 250 FOR IP): Performed by: PHYSICIAN ASSISTANT

## 2025-08-19 PROCEDURE — 94761 N-INVAS EAR/PLS OXIMETRY MLT: CPT

## 2025-08-19 PROCEDURE — 2500000003 HC RX 250 WO HCPCS: Performed by: STUDENT IN AN ORGANIZED HEALTH CARE EDUCATION/TRAINING PROGRAM

## 2025-08-19 PROCEDURE — 2580000003 HC RX 258: Performed by: STUDENT IN AN ORGANIZED HEALTH CARE EDUCATION/TRAINING PROGRAM

## 2025-08-19 PROCEDURE — 80053 COMPREHEN METABOLIC PANEL: CPT

## 2025-08-19 PROCEDURE — 2700000000 HC OXYGEN THERAPY PER DAY

## 2025-08-19 PROCEDURE — 85025 COMPLETE CBC W/AUTO DIFF WBC: CPT

## 2025-08-19 PROCEDURE — 2580000003 HC RX 258: Performed by: EMERGENCY MEDICINE

## 2025-08-19 PROCEDURE — 96365 THER/PROPH/DIAG IV INF INIT: CPT

## 2025-08-19 RX ORDER — POTASSIUM CHLORIDE 1500 MG/1
40 TABLET, EXTENDED RELEASE ORAL PRN
Status: DISCONTINUED | OUTPATIENT
Start: 2025-08-19 | End: 2025-08-22 | Stop reason: HOSPADM

## 2025-08-19 RX ORDER — ACETAMINOPHEN 325 MG/1
650 TABLET ORAL EVERY 6 HOURS PRN
Status: DISCONTINUED | OUTPATIENT
Start: 2025-08-19 | End: 2025-08-22 | Stop reason: HOSPADM

## 2025-08-19 RX ORDER — MAGNESIUM SULFATE IN WATER 40 MG/ML
2000 INJECTION, SOLUTION INTRAVENOUS PRN
Status: DISCONTINUED | OUTPATIENT
Start: 2025-08-19 | End: 2025-08-22 | Stop reason: HOSPADM

## 2025-08-19 RX ORDER — ACETAMINOPHEN 650 MG/1
650 SUPPOSITORY RECTAL EVERY 6 HOURS PRN
Status: DISCONTINUED | OUTPATIENT
Start: 2025-08-19 | End: 2025-08-22 | Stop reason: HOSPADM

## 2025-08-19 RX ORDER — POTASSIUM CHLORIDE 7.45 MG/ML
10 INJECTION INTRAVENOUS PRN
Status: DISCONTINUED | OUTPATIENT
Start: 2025-08-19 | End: 2025-08-22 | Stop reason: HOSPADM

## 2025-08-19 RX ORDER — SODIUM CHLORIDE 9 MG/ML
INJECTION, SOLUTION INTRAVENOUS PRN
Status: DISCONTINUED | OUTPATIENT
Start: 2025-08-19 | End: 2025-08-22 | Stop reason: HOSPADM

## 2025-08-19 RX ORDER — MORPHINE SULFATE 4 MG/ML
4 INJECTION, SOLUTION INTRAMUSCULAR; INTRAVENOUS ONCE
Status: COMPLETED | OUTPATIENT
Start: 2025-08-19 | End: 2025-08-19

## 2025-08-19 RX ORDER — SODIUM CHLORIDE 0.9 % (FLUSH) 0.9 %
5-40 SYRINGE (ML) INJECTION PRN
Status: DISCONTINUED | OUTPATIENT
Start: 2025-08-19 | End: 2025-08-22 | Stop reason: HOSPADM

## 2025-08-19 RX ORDER — ONDANSETRON 2 MG/ML
4 INJECTION INTRAMUSCULAR; INTRAVENOUS EVERY 30 MIN PRN
Status: DISCONTINUED | OUTPATIENT
Start: 2025-08-19 | End: 2025-08-19 | Stop reason: SDUPTHER

## 2025-08-19 RX ORDER — ONDANSETRON 2 MG/ML
4 INJECTION INTRAMUSCULAR; INTRAVENOUS EVERY 6 HOURS PRN
Status: DISCONTINUED | OUTPATIENT
Start: 2025-08-19 | End: 2025-08-22 | Stop reason: HOSPADM

## 2025-08-19 RX ORDER — ONDANSETRON 4 MG/1
4 TABLET, ORALLY DISINTEGRATING ORAL EVERY 8 HOURS PRN
Status: DISCONTINUED | OUTPATIENT
Start: 2025-08-19 | End: 2025-08-22 | Stop reason: HOSPADM

## 2025-08-19 RX ORDER — ENOXAPARIN SODIUM 100 MG/ML
40 INJECTION SUBCUTANEOUS DAILY
Status: DISCONTINUED | OUTPATIENT
Start: 2025-08-20 | End: 2025-08-22 | Stop reason: HOSPADM

## 2025-08-19 RX ORDER — POLYETHYLENE GLYCOL 3350 17 G/17G
17 POWDER, FOR SOLUTION ORAL DAILY PRN
Status: DISCONTINUED | OUTPATIENT
Start: 2025-08-19 | End: 2025-08-22 | Stop reason: HOSPADM

## 2025-08-19 RX ORDER — SODIUM CHLORIDE 0.9 % (FLUSH) 0.9 %
5-40 SYRINGE (ML) INJECTION EVERY 12 HOURS SCHEDULED
Status: DISCONTINUED | OUTPATIENT
Start: 2025-08-19 | End: 2025-08-22 | Stop reason: HOSPADM

## 2025-08-19 RX ORDER — HYDROCODONE BITARTRATE AND ACETAMINOPHEN 5; 325 MG/1; MG/1
1 TABLET ORAL EVERY 6 HOURS PRN
Status: DISPENSED | OUTPATIENT
Start: 2025-08-19 | End: 2025-08-20

## 2025-08-19 RX ADMIN — ONDANSETRON 4 MG: 2 INJECTION INTRAMUSCULAR; INTRAVENOUS at 14:56

## 2025-08-19 RX ADMIN — MEROPENEM 1000 MG: 1 INJECTION, POWDER, FOR SOLUTION INTRAVENOUS at 20:41

## 2025-08-19 RX ADMIN — SODIUM CHLORIDE, PRESERVATIVE FREE 10 ML: 5 INJECTION INTRAVENOUS at 20:35

## 2025-08-19 RX ADMIN — MEROPENEM 1000 MG: 1 INJECTION, POWDER, FOR SOLUTION INTRAVENOUS at 12:53

## 2025-08-19 RX ADMIN — MORPHINE SULFATE 4 MG: 4 INJECTION, SOLUTION INTRAMUSCULAR; INTRAVENOUS at 14:55

## 2025-08-19 RX ADMIN — HYDROCODONE BITARTRATE AND ACETAMINOPHEN 1 TABLET: 5; 325 TABLET ORAL at 21:20

## 2025-08-19 ASSESSMENT — PAIN SCALES - GENERAL
PAINLEVEL_OUTOF10: 8
PAINLEVEL_OUTOF10: 9
PAINLEVEL_OUTOF10: 0

## 2025-08-19 ASSESSMENT — PAIN DESCRIPTION - LOCATION
LOCATION: BACK
LOCATION: BACK
LOCATION: RIB CAGE;BACK

## 2025-08-19 ASSESSMENT — PAIN - FUNCTIONAL ASSESSMENT
PAIN_FUNCTIONAL_ASSESSMENT: 0-10
PAIN_FUNCTIONAL_ASSESSMENT: ACTIVITIES ARE NOT PREVENTED
PAIN_FUNCTIONAL_ASSESSMENT: 0-10
PAIN_FUNCTIONAL_ASSESSMENT: 0-10

## 2025-08-19 ASSESSMENT — PAIN DESCRIPTION - DESCRIPTORS
DESCRIPTORS: ACHING
DESCRIPTORS: ACHING

## 2025-08-19 ASSESSMENT — PAIN DESCRIPTION - ORIENTATION
ORIENTATION: RIGHT;LEFT
ORIENTATION: RIGHT;LEFT

## 2025-08-20 ENCOUNTER — APPOINTMENT (OUTPATIENT)
Dept: GENERAL RADIOLOGY | Age: 65
End: 2025-08-20
Payer: MEDICAID

## 2025-08-20 LAB
ANION GAP SERPL CALCULATED.3IONS-SCNC: 7 MMOL/L (ref 9–17)
BASOPHILS # BLD: 0.04 K/UL
BASOPHILS NFR BLD: 0 % (ref 0–1)
BUN SERPL-MCNC: 9 MG/DL (ref 7–20)
CALCIUM SERPL-MCNC: 8.6 MG/DL (ref 8.3–10.6)
CHLORIDE SERPL-SCNC: 103 MMOL/L (ref 99–110)
CO2 SERPL-SCNC: 24 MMOL/L (ref 21–32)
CREAT SERPL-MCNC: 0.6 MG/DL (ref 0.8–1.3)
EOSINOPHIL # BLD: 0.16 K/UL
EOSINOPHILS RELATIVE PERCENT: 2 % (ref 0–3)
ERYTHROCYTE [DISTWIDTH] IN BLOOD BY AUTOMATED COUNT: 20.2 % (ref 11.7–14.9)
GFR, ESTIMATED: >90 ML/MIN/1.73M2
GLUCOSE SERPL-MCNC: 92 MG/DL (ref 74–99)
HCT VFR BLD AUTO: 36.6 % (ref 42–52)
HGB BLD-MCNC: 11.9 G/DL (ref 13.5–18)
IMM GRANULOCYTES # BLD AUTO: 0.08 K/UL
IMM GRANULOCYTES NFR BLD: 1 %
LYMPHOCYTES NFR BLD: 1.96 K/UL
LYMPHOCYTES RELATIVE PERCENT: 20 % (ref 24–44)
MCH RBC QN AUTO: 29.6 PG (ref 27–31)
MCHC RBC AUTO-ENTMCNC: 32.5 G/DL (ref 32–36)
MCV RBC AUTO: 91 FL (ref 78–100)
MONOCYTES NFR BLD: 0.99 K/UL
MONOCYTES NFR BLD: 10 % (ref 0–5)
NEUTROPHILS NFR BLD: 67 % (ref 36–66)
NEUTS SEG NFR BLD: 6.42 K/UL
PLATELET # BLD AUTO: 163 K/UL (ref 140–440)
PMV BLD AUTO: 9.6 FL (ref 7.5–11.1)
POTASSIUM SERPL-SCNC: 4.1 MMOL/L (ref 3.5–5.1)
RBC # BLD AUTO: 4.02 M/UL (ref 4.6–6.2)
SODIUM SERPL-SCNC: 133 MMOL/L (ref 136–145)
WBC OTHER # BLD: 9.7 K/UL (ref 4–10.5)

## 2025-08-20 PROCEDURE — 2500000003 HC RX 250 WO HCPCS: Performed by: STUDENT IN AN ORGANIZED HEALTH CARE EDUCATION/TRAINING PROGRAM

## 2025-08-20 PROCEDURE — 2580000003 HC RX 258: Performed by: STUDENT IN AN ORGANIZED HEALTH CARE EDUCATION/TRAINING PROGRAM

## 2025-08-20 PROCEDURE — 80048 BASIC METABOLIC PNL TOTAL CA: CPT

## 2025-08-20 PROCEDURE — 71045 X-RAY EXAM CHEST 1 VIEW: CPT

## 2025-08-20 PROCEDURE — 6370000000 HC RX 637 (ALT 250 FOR IP): Performed by: STUDENT IN AN ORGANIZED HEALTH CARE EDUCATION/TRAINING PROGRAM

## 2025-08-20 PROCEDURE — 94640 AIRWAY INHALATION TREATMENT: CPT

## 2025-08-20 PROCEDURE — 85025 COMPLETE CBC W/AUTO DIFF WBC: CPT

## 2025-08-20 PROCEDURE — 6360000002 HC RX W HCPCS: Performed by: STUDENT IN AN ORGANIZED HEALTH CARE EDUCATION/TRAINING PROGRAM

## 2025-08-20 PROCEDURE — 6370000000 HC RX 637 (ALT 250 FOR IP): Performed by: PHYSICIAN ASSISTANT

## 2025-08-20 PROCEDURE — 1200000000 HC SEMI PRIVATE

## 2025-08-20 PROCEDURE — 94761 N-INVAS EAR/PLS OXIMETRY MLT: CPT

## 2025-08-20 PROCEDURE — 36415 COLL VENOUS BLD VENIPUNCTURE: CPT

## 2025-08-20 RX ORDER — ASPIRIN 81 MG/1
81 TABLET, CHEWABLE ORAL DAILY
Status: DISCONTINUED | OUTPATIENT
Start: 2025-08-20 | End: 2025-08-22 | Stop reason: HOSPADM

## 2025-08-20 RX ORDER — PANTOPRAZOLE SODIUM 40 MG/1
40 TABLET, DELAYED RELEASE ORAL
Status: DISCONTINUED | OUTPATIENT
Start: 2025-08-20 | End: 2025-08-22 | Stop reason: HOSPADM

## 2025-08-20 RX ORDER — ROPINIROLE 0.25 MG/1
0.5 TABLET, FILM COATED ORAL NIGHTLY
Status: DISCONTINUED | OUTPATIENT
Start: 2025-08-20 | End: 2025-08-22 | Stop reason: HOSPADM

## 2025-08-20 RX ORDER — TRAMADOL HYDROCHLORIDE 50 MG/1
25 TABLET ORAL EVERY 6 HOURS PRN
Status: DISCONTINUED | OUTPATIENT
Start: 2025-08-20 | End: 2025-08-20

## 2025-08-20 RX ORDER — SUCRALFATE 1 G/1
1 TABLET ORAL EVERY 8 HOURS SCHEDULED
Status: DISCONTINUED | OUTPATIENT
Start: 2025-08-20 | End: 2025-08-22 | Stop reason: HOSPADM

## 2025-08-20 RX ORDER — ATORVASTATIN CALCIUM 10 MG/1
20 TABLET, FILM COATED ORAL DAILY
Status: DISCONTINUED | OUTPATIENT
Start: 2025-08-20 | End: 2025-08-22 | Stop reason: HOSPADM

## 2025-08-20 RX ORDER — ALBUTEROL SULFATE 90 UG/1
2 INHALANT RESPIRATORY (INHALATION) EVERY 6 HOURS PRN
Status: DISCONTINUED | OUTPATIENT
Start: 2025-08-20 | End: 2025-08-22 | Stop reason: HOSPADM

## 2025-08-20 RX ORDER — HYDROCODONE BITARTRATE AND ACETAMINOPHEN 5; 325 MG/1; MG/1
1 TABLET ORAL EVERY 6 HOURS PRN
Status: DISCONTINUED | OUTPATIENT
Start: 2025-08-20 | End: 2025-08-22 | Stop reason: HOSPADM

## 2025-08-20 RX ORDER — MIRTAZAPINE 15 MG/1
15 TABLET, FILM COATED ORAL NIGHTLY
Status: DISCONTINUED | OUTPATIENT
Start: 2025-08-20 | End: 2025-08-22 | Stop reason: HOSPADM

## 2025-08-20 RX ADMIN — PANTOPRAZOLE SODIUM 40 MG: 40 TABLET, DELAYED RELEASE ORAL at 09:40

## 2025-08-20 RX ADMIN — SUCRALFATE 1 G: 1 TABLET ORAL at 16:00

## 2025-08-20 RX ADMIN — ENOXAPARIN SODIUM 40 MG: 100 INJECTION SUBCUTANEOUS at 09:39

## 2025-08-20 RX ADMIN — PANCRELIPASE LIPASE, PANCRELIPASE PROTEASE, PANCRELIPASE AMYLASE 5000 UNITS: 5000; 17000; 24000 CAPSULE, DELAYED RELEASE ORAL at 16:00

## 2025-08-20 RX ADMIN — SUCRALFATE 1 G: 1 TABLET ORAL at 21:49

## 2025-08-20 RX ADMIN — PANCRELIPASE LIPASE, PANCRELIPASE PROTEASE, PANCRELIPASE AMYLASE 20000 UNITS: 5000; 17000; 24000 CAPSULE, DELAYED RELEASE ORAL at 09:36

## 2025-08-20 RX ADMIN — ASPIRIN 81 MG 81 MG: 81 TABLET ORAL at 09:38

## 2025-08-20 RX ADMIN — PANCRELIPASE LIPASE, PANCRELIPASE PROTEASE, PANCRELIPASE AMYLASE 20000 UNITS: 5000; 17000; 24000 CAPSULE, DELAYED RELEASE ORAL at 12:42

## 2025-08-20 RX ADMIN — ACETAMINOPHEN 650 MG: 325 TABLET ORAL at 21:52

## 2025-08-20 RX ADMIN — MEROPENEM 1000 MG: 1 INJECTION, POWDER, FOR SOLUTION INTRAVENOUS at 04:52

## 2025-08-20 RX ADMIN — ATORVASTATIN CALCIUM 20 MG: 10 TABLET, FILM COATED ORAL at 09:34

## 2025-08-20 RX ADMIN — PANCRELIPASE LIPASE, PANCRELIPASE PROTEASE, PANCRELIPASE AMYLASE 5000 UNITS: 5000; 17000; 24000 CAPSULE, DELAYED RELEASE ORAL at 09:35

## 2025-08-20 RX ADMIN — MEROPENEM 1000 MG: 1 INJECTION, POWDER, FOR SOLUTION INTRAVENOUS at 12:43

## 2025-08-20 RX ADMIN — HYDROCODONE BITARTRATE AND ACETAMINOPHEN 1 TABLET: 5; 325 TABLET ORAL at 18:14

## 2025-08-20 RX ADMIN — MIRTAZAPINE 15 MG: 15 TABLET, FILM COATED ORAL at 21:49

## 2025-08-20 RX ADMIN — ROPINIROLE HYDROCHLORIDE 0.5 MG: 0.25 TABLET, FILM COATED ORAL at 21:49

## 2025-08-20 RX ADMIN — SODIUM CHLORIDE, PRESERVATIVE FREE 10 ML: 5 INJECTION INTRAVENOUS at 09:40

## 2025-08-20 RX ADMIN — SODIUM CHLORIDE, PRESERVATIVE FREE 10 ML: 5 INJECTION INTRAVENOUS at 21:50

## 2025-08-20 RX ADMIN — MEROPENEM 1000 MG: 1 INJECTION, POWDER, FOR SOLUTION INTRAVENOUS at 21:48

## 2025-08-20 RX ADMIN — HYDROCODONE BITARTRATE AND ACETAMINOPHEN 1 TABLET: 5; 325 TABLET ORAL at 04:50

## 2025-08-20 RX ADMIN — PANCRELIPASE LIPASE, PANCRELIPASE PROTEASE, PANCRELIPASE AMYLASE 5000 UNITS: 5000; 17000; 24000 CAPSULE, DELAYED RELEASE ORAL at 12:42

## 2025-08-20 RX ADMIN — TRAMADOL HYDROCHLORIDE 25 MG: 50 TABLET, COATED ORAL at 11:51

## 2025-08-20 RX ADMIN — PANCRELIPASE LIPASE, PANCRELIPASE PROTEASE, PANCRELIPASE AMYLASE 20000 UNITS: 5000; 17000; 24000 CAPSULE, DELAYED RELEASE ORAL at 16:00

## 2025-08-20 RX ADMIN — SUCRALFATE 1 G: 1 TABLET ORAL at 09:39

## 2025-08-20 ASSESSMENT — PAIN DESCRIPTION - FREQUENCY: FREQUENCY: INTERMITTENT

## 2025-08-20 ASSESSMENT — PAIN SCALES - GENERAL
PAINLEVEL_OUTOF10: 6
PAINLEVEL_OUTOF10: 6
PAINLEVEL_OUTOF10: 7
PAINLEVEL_OUTOF10: 9
PAINLEVEL_OUTOF10: 6

## 2025-08-20 ASSESSMENT — PAIN DESCRIPTION - LOCATION
LOCATION: BACK

## 2025-08-20 ASSESSMENT — PAIN DESCRIPTION - DESCRIPTORS
DESCRIPTORS: ACHING;STABBING
DESCRIPTORS: SHARP
DESCRIPTORS: ACHING
DESCRIPTORS: PRESSURE
DESCRIPTORS: ACHING

## 2025-08-20 ASSESSMENT — PAIN DESCRIPTION - ORIENTATION
ORIENTATION: LEFT;RIGHT
ORIENTATION: RIGHT;LEFT

## 2025-08-20 ASSESSMENT — PAIN - FUNCTIONAL ASSESSMENT
PAIN_FUNCTIONAL_ASSESSMENT: 0-10
PAIN_FUNCTIONAL_ASSESSMENT: 0-10
PAIN_FUNCTIONAL_ASSESSMENT: ACTIVITIES ARE NOT PREVENTED
PAIN_FUNCTIONAL_ASSESSMENT: 0-10

## 2025-08-20 ASSESSMENT — PAIN DESCRIPTION - PAIN TYPE: TYPE: CHRONIC PAIN

## 2025-08-20 ASSESSMENT — PAIN DESCRIPTION - ONSET: ONSET: ON-GOING

## 2025-08-21 LAB
EKG ATRIAL RATE: 85 BPM
EKG DIAGNOSIS: NORMAL
EKG P AXIS: 78 DEGREES
EKG P-R INTERVAL: 168 MS
EKG Q-T INTERVAL: 354 MS
EKG QRS DURATION: 82 MS
EKG QTC CALCULATION (BAZETT): 421 MS
EKG R AXIS: 45 DEGREES
EKG T AXIS: 61 DEGREES
EKG VENTRICULAR RATE: 85 BPM

## 2025-08-21 PROCEDURE — 2500000003 HC RX 250 WO HCPCS: Performed by: STUDENT IN AN ORGANIZED HEALTH CARE EDUCATION/TRAINING PROGRAM

## 2025-08-21 PROCEDURE — 94761 N-INVAS EAR/PLS OXIMETRY MLT: CPT

## 2025-08-21 PROCEDURE — 94640 AIRWAY INHALATION TREATMENT: CPT

## 2025-08-21 PROCEDURE — 1200000000 HC SEMI PRIVATE

## 2025-08-21 PROCEDURE — 6370000000 HC RX 637 (ALT 250 FOR IP): Performed by: STUDENT IN AN ORGANIZED HEALTH CARE EDUCATION/TRAINING PROGRAM

## 2025-08-21 PROCEDURE — 2580000003 HC RX 258: Performed by: STUDENT IN AN ORGANIZED HEALTH CARE EDUCATION/TRAINING PROGRAM

## 2025-08-21 PROCEDURE — 6360000002 HC RX W HCPCS: Performed by: STUDENT IN AN ORGANIZED HEALTH CARE EDUCATION/TRAINING PROGRAM

## 2025-08-21 PROCEDURE — 93010 ELECTROCARDIOGRAM REPORT: CPT | Performed by: INTERNAL MEDICINE

## 2025-08-21 RX ADMIN — MEROPENEM 1000 MG: 1 INJECTION, POWDER, FOR SOLUTION INTRAVENOUS at 05:59

## 2025-08-21 RX ADMIN — HYDROCODONE BITARTRATE AND ACETAMINOPHEN 1 TABLET: 5; 325 TABLET ORAL at 23:57

## 2025-08-21 RX ADMIN — ENOXAPARIN SODIUM 40 MG: 100 INJECTION SUBCUTANEOUS at 09:29

## 2025-08-21 RX ADMIN — PANCRELIPASE LIPASE, PANCRELIPASE PROTEASE, PANCRELIPASE AMYLASE 5000 UNITS: 5000; 17000; 24000 CAPSULE, DELAYED RELEASE ORAL at 09:46

## 2025-08-21 RX ADMIN — HYDROCODONE BITARTRATE AND ACETAMINOPHEN 1 TABLET: 5; 325 TABLET ORAL at 00:06

## 2025-08-21 RX ADMIN — SODIUM CHLORIDE, PRESERVATIVE FREE 10 ML: 5 INJECTION INTRAVENOUS at 20:23

## 2025-08-21 RX ADMIN — ROPINIROLE HYDROCHLORIDE 0.5 MG: 0.25 TABLET, FILM COATED ORAL at 20:22

## 2025-08-21 RX ADMIN — SUCRALFATE 1 G: 1 TABLET ORAL at 05:59

## 2025-08-21 RX ADMIN — SODIUM CHLORIDE, PRESERVATIVE FREE 10 ML: 5 INJECTION INTRAVENOUS at 09:31

## 2025-08-21 RX ADMIN — PANCRELIPASE LIPASE, PANCRELIPASE PROTEASE, PANCRELIPASE AMYLASE 20000 UNITS: 5000; 17000; 24000 CAPSULE, DELAYED RELEASE ORAL at 09:29

## 2025-08-21 RX ADMIN — ATORVASTATIN CALCIUM 20 MG: 10 TABLET, FILM COATED ORAL at 09:29

## 2025-08-21 RX ADMIN — MIRTAZAPINE 15 MG: 15 TABLET, FILM COATED ORAL at 20:23

## 2025-08-21 RX ADMIN — PANCRELIPASE LIPASE, PANCRELIPASE PROTEASE, PANCRELIPASE AMYLASE 5000 UNITS: 5000; 17000; 24000 CAPSULE, DELAYED RELEASE ORAL at 17:22

## 2025-08-21 RX ADMIN — MEROPENEM 1000 MG: 1 INJECTION, POWDER, FOR SOLUTION INTRAVENOUS at 12:44

## 2025-08-21 RX ADMIN — PANCRELIPASE LIPASE, PANCRELIPASE PROTEASE, PANCRELIPASE AMYLASE 20000 UNITS: 5000; 17000; 24000 CAPSULE, DELAYED RELEASE ORAL at 17:22

## 2025-08-21 RX ADMIN — PANCRELIPASE LIPASE, PANCRELIPASE PROTEASE, PANCRELIPASE AMYLASE 20000 UNITS: 5000; 17000; 24000 CAPSULE, DELAYED RELEASE ORAL at 12:45

## 2025-08-21 RX ADMIN — HYDROCODONE BITARTRATE AND ACETAMINOPHEN 1 TABLET: 5; 325 TABLET ORAL at 18:41

## 2025-08-21 RX ADMIN — HYDROCODONE BITARTRATE AND ACETAMINOPHEN 1 TABLET: 5; 325 TABLET ORAL at 06:01

## 2025-08-21 RX ADMIN — SUCRALFATE 1 G: 1 TABLET ORAL at 14:49

## 2025-08-21 RX ADMIN — SUCRALFATE 1 G: 1 TABLET ORAL at 20:23

## 2025-08-21 RX ADMIN — SODIUM CHLORIDE: 0.9 INJECTION, SOLUTION INTRAVENOUS at 12:43

## 2025-08-21 RX ADMIN — PANTOPRAZOLE SODIUM 40 MG: 40 TABLET, DELAYED RELEASE ORAL at 05:59

## 2025-08-21 RX ADMIN — ALBUTEROL SULFATE 2 PUFF: 90 AEROSOL, METERED RESPIRATORY (INHALATION) at 00:14

## 2025-08-21 RX ADMIN — PANCRELIPASE LIPASE, PANCRELIPASE PROTEASE, PANCRELIPASE AMYLASE 5000 UNITS: 5000; 17000; 24000 CAPSULE, DELAYED RELEASE ORAL at 12:45

## 2025-08-21 RX ADMIN — MEROPENEM 1000 MG: 1 INJECTION, POWDER, FOR SOLUTION INTRAVENOUS at 20:25

## 2025-08-21 RX ADMIN — ASPIRIN 81 MG 81 MG: 81 TABLET ORAL at 09:28

## 2025-08-21 ASSESSMENT — PAIN DESCRIPTION - DESCRIPTORS
DESCRIPTORS: ACHING
DESCRIPTORS: SHARP;ACHING

## 2025-08-21 ASSESSMENT — PAIN DESCRIPTION - PAIN TYPE
TYPE: CHRONIC PAIN

## 2025-08-21 ASSESSMENT — PAIN DESCRIPTION - ORIENTATION
ORIENTATION: RIGHT;LEFT
ORIENTATION: MID
ORIENTATION: RIGHT;LEFT

## 2025-08-21 ASSESSMENT — PAIN SCALES - GENERAL
PAINLEVEL_OUTOF10: 0
PAINLEVEL_OUTOF10: 7
PAINLEVEL_OUTOF10: 6
PAINLEVEL_OUTOF10: 0
PAINLEVEL_OUTOF10: 7

## 2025-08-21 ASSESSMENT — PAIN - FUNCTIONAL ASSESSMENT
PAIN_FUNCTIONAL_ASSESSMENT: 0-10
PAIN_FUNCTIONAL_ASSESSMENT: 0-10
PAIN_FUNCTIONAL_ASSESSMENT: ACTIVITIES ARE NOT PREVENTED
PAIN_FUNCTIONAL_ASSESSMENT: ACTIVITIES ARE NOT PREVENTED
PAIN_FUNCTIONAL_ASSESSMENT: 0-10
PAIN_FUNCTIONAL_ASSESSMENT: 0-10
PAIN_FUNCTIONAL_ASSESSMENT: ACTIVITIES ARE NOT PREVENTED
PAIN_FUNCTIONAL_ASSESSMENT: 0-10

## 2025-08-21 ASSESSMENT — PAIN DESCRIPTION - ONSET
ONSET: ON-GOING

## 2025-08-21 ASSESSMENT — PAIN DESCRIPTION - FREQUENCY
FREQUENCY: INTERMITTENT

## 2025-08-21 ASSESSMENT — PAIN DESCRIPTION - LOCATION
LOCATION: BACK

## 2025-08-21 ASSESSMENT — PAIN DESCRIPTION - DIRECTION: RADIATING_TOWARDS: BACK

## 2025-08-22 VITALS
BODY MASS INDEX: 16.96 KG/M2 | OXYGEN SATURATION: 96 % | DIASTOLIC BLOOD PRESSURE: 82 MMHG | HEART RATE: 71 BPM | TEMPERATURE: 97.7 F | HEIGHT: 69 IN | SYSTOLIC BLOOD PRESSURE: 118 MMHG | WEIGHT: 114.5 LBS | RESPIRATION RATE: 16 BRPM

## 2025-08-22 LAB
MICROORGANISM SPEC CULT: ABNORMAL
SPECIMEN DESCRIPTION: ABNORMAL

## 2025-08-22 PROCEDURE — 94640 AIRWAY INHALATION TREATMENT: CPT

## 2025-08-22 PROCEDURE — 36410 VNPNXR 3YR/> PHY/QHP DX/THER: CPT

## 2025-08-22 PROCEDURE — 76937 US GUIDE VASCULAR ACCESS: CPT

## 2025-08-22 PROCEDURE — 2500000003 HC RX 250 WO HCPCS: Performed by: STUDENT IN AN ORGANIZED HEALTH CARE EDUCATION/TRAINING PROGRAM

## 2025-08-22 PROCEDURE — 05H933Z INSERTION OF INFUSION DEVICE INTO RIGHT BRACHIAL VEIN, PERCUTANEOUS APPROACH: ICD-10-PCS | Performed by: STUDENT IN AN ORGANIZED HEALTH CARE EDUCATION/TRAINING PROGRAM

## 2025-08-22 PROCEDURE — 6360000002 HC RX W HCPCS: Performed by: STUDENT IN AN ORGANIZED HEALTH CARE EDUCATION/TRAINING PROGRAM

## 2025-08-22 PROCEDURE — 6370000000 HC RX 637 (ALT 250 FOR IP): Performed by: STUDENT IN AN ORGANIZED HEALTH CARE EDUCATION/TRAINING PROGRAM

## 2025-08-22 PROCEDURE — 2580000003 HC RX 258: Performed by: STUDENT IN AN ORGANIZED HEALTH CARE EDUCATION/TRAINING PROGRAM

## 2025-08-22 PROCEDURE — C1751 CATH, INF, PER/CENT/MIDLINE: HCPCS

## 2025-08-22 RX ORDER — LIDOCAINE HYDROCHLORIDE 10 MG/ML
50 INJECTION, SOLUTION INFILTRATION; PERINEURAL ONCE
Status: DISCONTINUED | OUTPATIENT
Start: 2025-08-22 | End: 2025-08-22 | Stop reason: HOSPADM

## 2025-08-22 RX ORDER — HYDROCODONE BITARTRATE AND ACETAMINOPHEN 5; 325 MG/1; MG/1
1 TABLET ORAL EVERY 6 HOURS PRN
COMMUNITY
Start: 2025-08-12

## 2025-08-22 RX ORDER — SODIUM CHLORIDE 0.9 % (FLUSH) 0.9 %
5-40 SYRINGE (ML) INJECTION EVERY 12 HOURS SCHEDULED
Status: DISCONTINUED | OUTPATIENT
Start: 2025-08-22 | End: 2025-08-22 | Stop reason: HOSPADM

## 2025-08-22 RX ORDER — SODIUM CHLORIDE 0.9 % (FLUSH) 0.9 %
5-40 SYRINGE (ML) INJECTION PRN
Status: DISCONTINUED | OUTPATIENT
Start: 2025-08-22 | End: 2025-08-22 | Stop reason: HOSPADM

## 2025-08-22 RX ORDER — SODIUM CHLORIDE 9 MG/ML
INJECTION, SOLUTION INTRAVENOUS PRN
Status: DISCONTINUED | OUTPATIENT
Start: 2025-08-22 | End: 2025-08-22 | Stop reason: HOSPADM

## 2025-08-22 RX ADMIN — HYDROCODONE BITARTRATE AND ACETAMINOPHEN 1 TABLET: 5; 325 TABLET ORAL at 05:45

## 2025-08-22 RX ADMIN — SODIUM CHLORIDE, PRESERVATIVE FREE 10 ML: 5 INJECTION INTRAVENOUS at 10:21

## 2025-08-22 RX ADMIN — SODIUM CHLORIDE, PRESERVATIVE FREE 10 ML: 5 INJECTION INTRAVENOUS at 08:40

## 2025-08-22 RX ADMIN — ASPIRIN 81 MG 81 MG: 81 TABLET ORAL at 08:34

## 2025-08-22 RX ADMIN — PANCRELIPASE LIPASE, PANCRELIPASE PROTEASE, PANCRELIPASE AMYLASE 20000 UNITS: 5000; 17000; 24000 CAPSULE, DELAYED RELEASE ORAL at 13:23

## 2025-08-22 RX ADMIN — ATORVASTATIN CALCIUM 20 MG: 10 TABLET, FILM COATED ORAL at 08:33

## 2025-08-22 RX ADMIN — PANCRELIPASE LIPASE, PANCRELIPASE PROTEASE, PANCRELIPASE AMYLASE 20000 UNITS: 5000; 17000; 24000 CAPSULE, DELAYED RELEASE ORAL at 08:34

## 2025-08-22 RX ADMIN — PANCRELIPASE LIPASE, PANCRELIPASE PROTEASE, PANCRELIPASE AMYLASE 5000 UNITS: 5000; 17000; 24000 CAPSULE, DELAYED RELEASE ORAL at 13:24

## 2025-08-22 RX ADMIN — SUCRALFATE 1 G: 1 TABLET ORAL at 13:24

## 2025-08-22 RX ADMIN — MEROPENEM 1000 MG: 1 INJECTION, POWDER, FOR SOLUTION INTRAVENOUS at 13:19

## 2025-08-22 RX ADMIN — PANCRELIPASE LIPASE, PANCRELIPASE PROTEASE, PANCRELIPASE AMYLASE 20000 UNITS: 5000; 17000; 24000 CAPSULE, DELAYED RELEASE ORAL at 17:49

## 2025-08-22 RX ADMIN — PANCRELIPASE LIPASE, PANCRELIPASE PROTEASE, PANCRELIPASE AMYLASE 5000 UNITS: 5000; 17000; 24000 CAPSULE, DELAYED RELEASE ORAL at 17:49

## 2025-08-22 RX ADMIN — PANCRELIPASE LIPASE, PANCRELIPASE PROTEASE, PANCRELIPASE AMYLASE 5000 UNITS: 5000; 17000; 24000 CAPSULE, DELAYED RELEASE ORAL at 08:35

## 2025-08-22 RX ADMIN — SODIUM CHLORIDE 1000 MG: 9 INJECTION INTRAMUSCULAR; INTRAVENOUS; SUBCUTANEOUS at 17:15

## 2025-08-22 RX ADMIN — SUCRALFATE 1 G: 1 TABLET ORAL at 05:45

## 2025-08-22 RX ADMIN — ENOXAPARIN SODIUM 40 MG: 100 INJECTION SUBCUTANEOUS at 08:34

## 2025-08-22 RX ADMIN — PANTOPRAZOLE SODIUM 40 MG: 40 TABLET, DELAYED RELEASE ORAL at 05:44

## 2025-08-22 RX ADMIN — MEROPENEM 1000 MG: 1 INJECTION, POWDER, FOR SOLUTION INTRAVENOUS at 05:47

## 2025-08-22 ASSESSMENT — PAIN DESCRIPTION - LOCATION
LOCATION: BACK
LOCATION: BACK

## 2025-08-22 ASSESSMENT — PAIN DESCRIPTION - ORIENTATION
ORIENTATION: RIGHT;LEFT
ORIENTATION: RIGHT;LEFT

## 2025-08-22 ASSESSMENT — PAIN - FUNCTIONAL ASSESSMENT
PAIN_FUNCTIONAL_ASSESSMENT: 0-10

## 2025-08-22 ASSESSMENT — PAIN SCALES - GENERAL
PAINLEVEL_OUTOF10: 7
PAINLEVEL_OUTOF10: 5

## 2025-08-22 ASSESSMENT — PAIN DESCRIPTION - DESCRIPTORS
DESCRIPTORS: ACHING
DESCRIPTORS: ACHING

## 2025-08-29 ENCOUNTER — HOSPITAL ENCOUNTER (OUTPATIENT)
Dept: CT IMAGING | Age: 65
Discharge: HOME | End: 2025-08-29
Attending: INTERNAL MEDICINE
Payer: MEDICAID

## 2025-08-29 ENCOUNTER — TELEPHONE (OUTPATIENT)
Dept: ONCOLOGY | Age: 65
End: 2025-08-29

## 2025-08-29 ENCOUNTER — CLINICAL DOCUMENTATION (OUTPATIENT)
Dept: ONCOLOGY | Age: 65
End: 2025-08-29

## 2025-08-29 DIAGNOSIS — C24.1 MALIGNANT NEOPLASM OF AMPULLA OF VATER (HCC): ICD-10-CM

## 2025-08-29 DIAGNOSIS — C24.1 MALIGNANT NEOPLASM OF AMPULLA OF VATER (HCC): Primary | ICD-10-CM

## 2025-08-29 DIAGNOSIS — C77.2 MALIGNANT NEOPLASM METASTATIC TO INTRA-ABDOMINAL LYMPH NODE (HCC): ICD-10-CM

## 2025-08-29 PROCEDURE — 71260 CT THORAX DX C+: CPT

## 2025-08-29 PROCEDURE — 6360000004 HC RX CONTRAST MEDICATION: Performed by: INTERNAL MEDICINE

## 2025-08-29 PROCEDURE — 74177 CT ABD & PELVIS W/CONTRAST: CPT

## 2025-08-29 RX ORDER — IOPAMIDOL 755 MG/ML
75 INJECTION, SOLUTION INTRAVASCULAR
Status: COMPLETED | OUTPATIENT
Start: 2025-08-29 | End: 2025-08-29

## 2025-08-29 RX ORDER — IOPAMIDOL 755 MG/ML
20 INJECTION, SOLUTION INTRAVASCULAR
Status: COMPLETED | OUTPATIENT
Start: 2025-08-29 | End: 2025-08-29

## 2025-08-29 RX ADMIN — IOPAMIDOL 75 ML: 755 INJECTION, SOLUTION INTRAVENOUS at 09:56

## 2025-08-29 RX ADMIN — IOPAMIDOL 20 ML: 755 INJECTION, SOLUTION INTRAVENOUS at 09:58

## (undated) DEVICE — Z INACTIVE USE 2660663 SOLUTION IRRIG 1000ML STRIL H2O USP PLAS POUR BTL

## (undated) DEVICE — SINGLE USE CYTOLOGY BRUSH V: Brand: SINGLE USE CYTOLOGY BRUSH V

## (undated) DEVICE — SOLUTION PREP POVIDONE IOD FOR SKIN MUCOUS MEM PRIOR TO

## (undated) DEVICE — MONOPOLAR CURVED SCISSORS: Brand: ENDOWRIST

## (undated) DEVICE — Z DISCONTINUED NO SUB IDED TUBING ETCO2 AD L6.5FT NSL ORAL CVD PRNG NONFLARED TIP OVR

## (undated) DEVICE — GLOVE SURG SZ 65 L12IN FNGR THK79MIL GRN LTX FREE

## (undated) DEVICE — MARKER SURG SKIN UTIL REGULAR/FINE 2 TIP W/ RUL AND 9 LBL

## (undated) DEVICE — NEEDLE HYPO 25GA L1.5IN BLU POLYPR HUB S STL REG BVL STR

## (undated) DEVICE — SHEET,DRAPE,53X77,STERILE: Brand: MEDLINE

## (undated) DEVICE — ELECTRODE ES AD CRDLSS PT RET REM POLYHESIVE

## (undated) DEVICE — PADDING,UNDERCAST,COTTON, 3X4YD STERILE: Brand: MEDLINE

## (undated) DEVICE — GLOVE ORANGE PI 8   MSG9080

## (undated) DEVICE — GOWN,SIRUS,POLYRNF,BRTHSLV,XLN/XL,20/CS: Brand: MEDLINE

## (undated) DEVICE — NEEDLE HYPO 23GA L1.5IN TURQ POLYPR HUB S STL THN WALL IM

## (undated) DEVICE — SUTURE VCRL SZ 2-0 L27IN ABSRB VLT L26MM SH 1/2 CIR J317H

## (undated) DEVICE — ENDOSCOPY KIT: Brand: MEDLINE INDUSTRIES, INC.

## (undated) DEVICE — Z DISCONTINUED (USE MFG CAT MVABO)  TUBING GAS SAMPLING STD 6.5 FT FEMALE CONN SMRT CAPNOLINE

## (undated) DEVICE — SOLUTION IV 1000ML 0.9% SOD CHL FOR IRRIG PLAS CONT

## (undated) DEVICE — DRAPE SHEET ULTRAGARD: Brand: MEDLINE

## (undated) DEVICE — DRESSING PETRO W3XL3IN OIL EMUL N ADH GZ KNIT IMPREG CELOS

## (undated) DEVICE — Z INACTIVE NO ACTIVE SUPPLIER APPLICATOR MEDICATED 26 CC TINT HI-LITE ORNG STRL CHLORAPREP

## (undated) DEVICE — COUNTER NDL 30 COUNT FOAM STRP SGL MAG

## (undated) DEVICE — GLOVE ORANGE PI 7 1/2   MSG9075

## (undated) DEVICE — FORCEPS BX 240CM JAW 3.2MM L CAP NDL MIC MESH TTH M00513372

## (undated) DEVICE — CIRCUIT BREATHING SINGLE LIMB AD 72 IN 3 LT 2 FILTER LIMBO

## (undated) DEVICE — COUNTER NDL 60 COUNT FOAM STRP SGL MAG

## (undated) DEVICE — SKIN AFFIX SURG ADHESIVE 72/CS 0.55ML: Brand: MEDLINE

## (undated) DEVICE — SPONGE GZ W4XL8IN COT WVN 12 PLY

## (undated) DEVICE — FORCEPS BX L240CM JAW DIA2.8MM L CAP W/ NDL MIC MESH TOOTH

## (undated) DEVICE — SOLUTION IV IRRIG WATER 1000ML POUR BRL 2F7114

## (undated) DEVICE — CHLORAPREP 26ML ORANGE

## (undated) DEVICE — HANDLE SUCT REG CAP FLANGETIP SMOOTH YANK

## (undated) DEVICE — SUTURE MONOCRYL SZ 4-0 L18IN ABSRB UD L19MM PS-2 3/8 CIR PRIM Y496G

## (undated) DEVICE — GAUZE,SPONGE,4"X4",16PLY,XRAY,STRL,LF: Brand: MEDLINE

## (undated) DEVICE — DRAPE,REIN 53X77,STERILE: Brand: MEDLINE

## (undated) DEVICE — SUTURE VCRL SZ 4-0 L18IN ABSRB UD L19MM PS-2 3/8 CIR PRIM J496H

## (undated) DEVICE — LINER,SEMI-RIGID,3000CC,50EA/CS: Brand: MEDLINE

## (undated) DEVICE — DRAPE 3 QTR SHT POLYPR 53 IN X 77 IN ULTRAGARD

## (undated) DEVICE — TROCAR ENDOSCP L150MM DIA5MM BLDELSS STBL SL CAM PRT W/

## (undated) DEVICE — MARKER/RULER SKIN SURG REG TIP

## (undated) DEVICE — TUBING, SUCTION, 9/32" X 10', STRAIGHT: Brand: MEDLINE

## (undated) DEVICE — COVER,C-ARM,41X74: Brand: MEDLINE

## (undated) DEVICE — DRAPE,U/ SHT,SPLIT,PLAS,STERIL: Brand: MEDLINE

## (undated) DEVICE — SUTURE PROL SZ 3-0 L36IN NONABSORBABLE BLU L26MM SH 1/2 CIR 8522H

## (undated) DEVICE — TIP COVER ACCESSORY

## (undated) DEVICE — BANDAGE,ELASTIC,ESMARK,STERILE,4"X9',LF: Brand: MEDLINE

## (undated) DEVICE — TOWEL,OR,DSP,ST,BLUE,STD,6/PK,12PK/CS: Brand: MEDLINE

## (undated) DEVICE — TAPE,CLOTH/SILK,CURAD,3"X10YD,LF,40/CS: Brand: CURAD

## (undated) DEVICE — BRUSH CYTO CATH L240CM OD2.3MM BRIST OD3MM NYL 3 RNG HNDL

## (undated) DEVICE — BLADE SURG NO15 12MM S STL REUSE HNDL CONVENTIONAL SHRP TIP

## (undated) DEVICE — DRAPE,HAND,STERILE: Brand: MEDLINE

## (undated) DEVICE — DRAPE,UTILITY,XL,4/PK,STERILE: Brand: MEDLINE

## (undated) DEVICE — ANESTHESIA CIRCUIT ADULT-LF: Brand: MEDLINE INDUSTRIES, INC.

## (undated) DEVICE — SUTURE ETHLN SZ 3-0 L30IN NONABSORBABLE BLK FS-1 L24MM 3/8 669H

## (undated) DEVICE — ARM DRAPE

## (undated) DEVICE — FORCEPS BX L240CM JAW DIA2.2MM RAD JAW 4 HOT DISP

## (undated) DEVICE — CANNULA SEAL

## (undated) DEVICE — SUTURE ETHBND EXCEL SZ 0 L36IN NONABSORBABLE GRN SH L26MM X524H

## (undated) DEVICE — STERILE LATEX POWDER-FREE SURGICAL GLOVESWITH NITRILE COATING: Brand: PROTEXIS

## (undated) DEVICE — CONTAINER,SPECIMEN,OR STERILE,4OZ: Brand: MEDLINE

## (undated) DEVICE — INTENDED FOR TISSUE SEPARATION, AND OTHER PROCEDURES THAT REQUIRE A SHARP SURGICAL BLADE TO PUNCTURE OR CUT.: Brand: BARD-PARKER ® STAINLESS STEEL BLADES

## (undated) DEVICE — SYRINGE, LUER LOCK, 10ML: Brand: MEDLINE

## (undated) DEVICE — BLADE CLIPPER GEN PURP NS

## (undated) DEVICE — PENCIL ES CRD L10FT HND SWCHING ROCK SWCH W/ EDGE COAT BLDE

## (undated) DEVICE — GLOVE SURG SZ 8 L12IN THK75MIL DK GRN LTX FREE

## (undated) DEVICE — [HIGH FLOW HEATED INSUFFLATOR TUBING,  DO NOT USE IF PACKAGE IS DAMAGED]

## (undated) DEVICE — HERCULES 3 STAGE BALLOON ESOPHAGEAL: Brand: HERCULES

## (undated) DEVICE — GLOVE SURG SZ 65 THK91MIL LTX FREE SYN POLYISOPRENE

## (undated) DEVICE — COTTON UNDERCAST PADDING,CRIMPED FINISH: Brand: WEBRIL

## (undated) DEVICE — TOWEL OR BLUE STERILE PK OF 6

## (undated) DEVICE — GLOVE SURG SZ 75 L12IN FNGR THK79MIL GRN LTX FREE

## (undated) DEVICE — Z INACTIVE USE 2863041 SPONGE GZ W4XL4IN 100% COT 16 PLY RADPQ HIGHLY ABSRB

## (undated) DEVICE — CORD,CAUTERY,BIPOLAR,STERILE: Brand: MEDLINE

## (undated) DEVICE — ZIMMER® STERILE DISPOSABLE TOURNIQUET CUFF WITH PLC, DUAL PORT, SINGLE BLADDER, 18 IN. (46 CM)

## (undated) DEVICE — TUBING SCTION CONN 9/32X10 RIB

## (undated) DEVICE — LINER SUCT CANSTR 3000CC SEMI RIG DISP

## (undated) DEVICE — Z INACTIVE USE 2854096 SPONGE GZ W2XL2IN COT 8 PLY TYP VII WVN C FLD DSGN

## (undated) DEVICE — YANKAUER,FLEXIBLE HANDLE,REGLR CAPACITY: Brand: MEDLINE INDUSTRIES, INC.

## (undated) DEVICE — TROCAR ENDOSCP L100MM DIA12MM BLDELSS OBT RADLUC STBL SL

## (undated) DEVICE — BLADE SURG NO15 C STL SHRP PREM

## (undated) DEVICE — PROTECTOR EYE PT SELF ADH NS OPT GRD LF

## (undated) DEVICE — CANNULATING SPHINCTEROTOME: Brand: JAGTOME™ REVOLUTION RX

## (undated) DEVICE — ADHESIVE SKIN CLSR 0.7ML TOP DERMBND ADV

## (undated) DEVICE — BLADELESS OBTURATOR: Brand: WECK VISTA

## (undated) DEVICE — CONTROL SYRINGE LUER-LOCK TIP: Brand: MONOJECT

## (undated) DEVICE — SYRINGE MED 10ML LUERLOCK TIP W/O SFTY DISP

## (undated) DEVICE — PACK,BASIC,IX: Brand: MEDLINE

## (undated) DEVICE — PADDING CAST W3INXL4YD COT BLEND MIC PLEAT UNDERCAST SPEC

## (undated) DEVICE — PACK SET UP

## (undated) DEVICE — 3M™ IOBAN™ 2 ANTIMICROBIAL INCISE DRAPE 6650EZ: Brand: IOBAN™ 2

## (undated) DEVICE — ADULT HEAD POSITIONER: Brand: DEVON

## (undated) DEVICE — LINER SUCT CANSTR 1500CC SEMI RIG W/ POR HYDROPHOBIC SHUT

## (undated) DEVICE — DRAPE SURG W102XH77XL121IN THYRD SMS POLYPR T SHP SFT ABSRB

## (undated) DEVICE — Device

## (undated) DEVICE — PACK BASIC SET UP IX ECLIPSE W/ TWO GWN HALF SHT

## (undated) DEVICE — GLOVE SURG SZ 6 CRM LTX FREE POLYISOPRENE POLYMER BEAD ANTI

## (undated) DEVICE — BANDAGE COMPR W3INXL5YD WHT BGE POLY COT M E WRP WV HK AND

## (undated) DEVICE — SYRINGE INFL 60ML DISP ALLIANCE II

## (undated) DEVICE — CURITY NON-ADHERENT STRIPS: Brand: CURITY

## (undated) DEVICE — GOWN,SIRUS,FABRNF,RAGLAN,L,ST,30/CS: Brand: MEDLINE

## (undated) DEVICE — PACK SURG LAP CHOLE

## (undated) DEVICE — COLUMN DRAPE

## (undated) DEVICE — APPLICATOR MEDICATED 26 CC SOLUTION HI LT ORNG CHLORAPREP

## (undated) DEVICE — SUTURE ETHBND EXCEL SZ 2-0 L36IN NONABSORBABLE GRN L26MM SH X523H

## (undated) DEVICE — VESSEL SEALER EXTEND: Brand: ENDOWRIST

## (undated) DEVICE — Z DISCONTINUED USE 2220190 SUTURE VICRYL SZ 3-0 L27IN ABSRB UD L26MM SH 1/2 CIR J416H

## (undated) DEVICE — DRAPE,T,LAPARO,TRANS,STERILE: Brand: MEDLINE

## (undated) DEVICE — STANDARD HYPODERMIC NEEDLE,POLYPROPYLENE HUB: Brand: MONOJECT

## (undated) DEVICE — GLOVE ORANGE PI 7   MSG9070

## (undated) DEVICE — POSITIONER,HEAD,RING CUSHION,9IN,32CS: Brand: MEDLINE

## (undated) DEVICE — DRESSING TRNSPAR W2XL2.75IN FLM SHT SEMIPERMEABLE WIND